# Patient Record
Sex: MALE | Race: BLACK OR AFRICAN AMERICAN | NOT HISPANIC OR LATINO | Employment: OTHER | URBAN - METROPOLITAN AREA
[De-identification: names, ages, dates, MRNs, and addresses within clinical notes are randomized per-mention and may not be internally consistent; named-entity substitution may affect disease eponyms.]

---

## 2017-01-03 ENCOUNTER — EPISODE CHANGES (OUTPATIENT)
Dept: HEPATOLOGY | Facility: CLINIC | Age: 62
End: 2017-01-03

## 2017-01-03 ENCOUNTER — TELEPHONE (OUTPATIENT)
Dept: INTERNAL MEDICINE | Facility: CLINIC | Age: 62
End: 2017-01-03

## 2017-01-03 ENCOUNTER — TELEPHONE (OUTPATIENT)
Dept: HEPATOLOGY | Facility: CLINIC | Age: 62
End: 2017-01-03

## 2017-01-03 DIAGNOSIS — B18.2 CHRONIC HEPATITIS C WITHOUT HEPATIC COMA: Primary | ICD-10-CM

## 2017-01-03 RX ORDER — LEVOTHYROXINE SODIUM 112 UG/1
112 TABLET ORAL
Qty: 90 TABLET | Refills: 0 | Status: SHIPPED | OUTPATIENT
Start: 2017-01-03 | End: 2017-04-06 | Stop reason: SDUPTHER

## 2017-01-03 NOTE — TELEPHONE ENCOUNTER
MA called pt to relay provider message. Call successful. Spoke with pt. Provider message relayed. Pt verbalized understanding. Next lab appt scheduled  for 1/27/17. Appt letter mailed.lp    *Pt DO NOT open my [facility] account.*

## 2017-01-03 NOTE — TELEPHONE ENCOUNTER
HCV LAB REVIEW  Week 4 of Harvoni, planning on 12 weeks treatment  Baseline HCV RNA >6 million    Pertinent labs:  CMP: stable, Ca mildly elevated, PTH followed with PCP  HCV RNA: <12, detected    pls call pt:  [facility] message sent to pt:  Labs look good. Liver enzymes now normal. HCV is now too low for the lab to count. The medication is working!   - Continue Harvoni - 1 pill daily - don't miss any doses.    Next labs due / pls schedule:  - CMP at week 8 - 01/27/16  - CMP, HCV RNA at week 12 - end of treatment - 02/24/17

## 2017-01-09 ENCOUNTER — TELEPHONE (OUTPATIENT)
Dept: INTERNAL MEDICINE | Facility: CLINIC | Age: 62
End: 2017-01-09

## 2017-01-09 DIAGNOSIS — M54.2 CHRONIC NECK PAIN: ICD-10-CM

## 2017-01-09 DIAGNOSIS — G89.29 CHRONIC NECK PAIN: ICD-10-CM

## 2017-01-09 DIAGNOSIS — E21.3 HYPERPARATHYROIDISM: ICD-10-CM

## 2017-01-09 DIAGNOSIS — E21.5 PARATHYROID GLAND DISORDER: ICD-10-CM

## 2017-01-09 DIAGNOSIS — M54.2 NECK PAIN: Primary | ICD-10-CM

## 2017-01-09 NOTE — TELEPHONE ENCOUNTER
----- Message from Laura Cary sent at 1/9/2017  2:18 PM CST -----  Contact: TOMASA HAQ JR. [[id number]]  _x  1st Request  _  2nd Request  _  3rd Request        Who: TOMASA HAQ JR. [[id number]]    Why: Patient would like a call back says he would like a referral to a endocrinologist and also an orthopedic doctor. Please give patient a call back at your earliest convenience. Thanks!     What Number to Call Back: [phone]    When to Expect a call back: (Before the end of the day)   -- if call after 3:00 call back will be tomorrow.

## 2017-01-10 NOTE — TELEPHONE ENCOUNTER
----- Message from Nelda Flaviojoya sent at 1/10/2017  1:59 PM CST -----  _  1st Request  x_  2nd Request  _  3rd Request        Who: TOMASA HAQ JR. [[id number]]    Why: Pt called again to talk to the nurse to see an endocrinologist, please call him    What Number to Call Back:[phone]    When to Expect a call back: (Before the end of the day)   -- if call after 3:00 call back will be tomorrow.

## 2017-01-10 NOTE — TELEPHONE ENCOUNTER
----- Message from Janell Bhatia sent at 1/9/2017  4:51 PM CST -----  Contact: pt  _  1st Request  _  2nd Request  _  3rd Request        Who: pt    Why: pt returned nurse's phone call    What Number to Call Back:[phone]    When to Expect a call back: (Before the end of the day)   -- if call after 3:00 call back will be tomorrow.

## 2017-01-12 ENCOUNTER — HOSPITAL ENCOUNTER (OUTPATIENT)
Dept: RADIOLOGY | Facility: OTHER | Age: 62
Discharge: HOME | End: 2017-01-12
Attending: NEUROLOGICAL SURGERY
Payer: MEDICARE

## 2017-01-12 ENCOUNTER — TELEPHONE (OUTPATIENT)
Dept: SPINE | Facility: CLINIC | Age: 62
End: 2017-01-12

## 2017-01-12 ENCOUNTER — TELEPHONE (OUTPATIENT)
Dept: PHARMACY | Facility: CLINIC | Age: 62
End: 2017-01-12

## 2017-01-12 ENCOUNTER — OFFICE VISIT (OUTPATIENT)
Dept: SPINE | Facility: CLINIC | Age: 62
End: 2017-01-12
Payer: MEDICARE

## 2017-01-12 VITALS
BODY MASS INDEX: 23.62 KG/M2 | WEIGHT: 165 LBS | SYSTOLIC BLOOD PRESSURE: 165 MMHG | DIASTOLIC BLOOD PRESSURE: 84 MMHG | HEIGHT: 70 IN | HEART RATE: 68 BPM

## 2017-01-12 DIAGNOSIS — M54.12 BRACHIAL NEURITIS: ICD-10-CM

## 2017-01-12 DIAGNOSIS — M54.2 NECK PAIN: Primary | ICD-10-CM

## 2017-01-12 DIAGNOSIS — M47.12 CERVICAL SPONDYLOSIS WITH MYELOPATHY: ICD-10-CM

## 2017-01-12 DIAGNOSIS — M54.2 NECK PAIN: ICD-10-CM

## 2017-01-12 DIAGNOSIS — M48.02 CERVICAL STENOSIS OF SPINAL CANAL: ICD-10-CM

## 2017-01-12 PROCEDURE — 72050 X-RAY EXAM NECK SPINE 4/5VWS: CPT | Mod: 26,,, | Performed by: RADIOLOGY

## 2017-01-12 PROCEDURE — 3079F DIAST BP 80-89 MM HG: CPT | Mod: S$GLB,,, | Performed by: PHYSICIAN ASSISTANT

## 2017-01-12 PROCEDURE — 3077F SYST BP >= 140 MM HG: CPT | Mod: S$GLB,,, | Performed by: PHYSICIAN ASSISTANT

## 2017-01-12 PROCEDURE — 99499 UNLISTED E&M SERVICE: CPT | Mod: S$PBB,,, | Performed by: PHYSICIAN ASSISTANT

## 2017-01-12 PROCEDURE — 99204 OFFICE O/P NEW MOD 45 MIN: CPT | Mod: S$GLB,,, | Performed by: PHYSICIAN ASSISTANT

## 2017-01-12 PROCEDURE — 1159F MED LIST DOCD IN RCRD: CPT | Mod: S$GLB,,, | Performed by: PHYSICIAN ASSISTANT

## 2017-01-12 PROCEDURE — 99999 PR PBB SHADOW E&M-EST. PATIENT-LVL IV: CPT | Mod: PBBFAC,,, | Performed by: PHYSICIAN ASSISTANT

## 2017-01-12 NOTE — MR AVS SNAPSHOT
[facility]  [address]  [address]  [facility] [address]  Phone: [phone]  Fax: [phone]                  Rob Jones Jr.   2017 9:00 AM   Office Visit    Description:  Male : 1955   Provider:  Emy White PA-C   Department:  [facility]           Reason for Visit     Neck Pain           Diagnoses this Visit        Comments    Cervical spondylosis with myelopathy         Cervical stenosis of spinal canal         Brachial neuritis         Neck pain                To Do List           Future Appointments        Provider Department Dept Phone    2017 10:30 AM MD Miko Woodward - Endo/Diab/Metab [phone]    2017 1:00 PM LAB, [facility] [phone]    2/3/2017 2:00 PM Remi Weathers MD [facility] Internal Medicine [phone]    2017 2:00 PM VAL [facility] - Hepatology [phone]    2017 1:00 PM LAB, [facility] [phone]      Goals (5 Years of Data)     None      Follow-Up and Disposition     Return in 2 weeks (on 2017).      [facility]     [facility] On [facility] Nurse Care Line -  Assistance  Registered nurses in the [facility] Center provide clinical advisement, health education, appointment booking, and other advisory services.  Call for this free service at [phone].             Medications           Message regarding Medications     Verify the changes and/or additions to your medication regime listed below are the same as discussed with your clinician today.  If any of these changes or additions are incorrect, please notify your healthcare provider.             Verify that the below list of medications is an accurate representation of the medications you are currently taking.  If none reported, the list may be blank. If incorrect, please contact your healthcare provider. Carry this list with you in case of emergency.     "       Current Medications     dicyclomine (BENTYL) 20 mg tablet Take 20 mg by mouth daily as needed.    econazole nitrate 1 % cream Apply topically 2 (two) times daily.    fish oil-omega-3 fatty acids 300-1,000 mg capsule Take 2 g by mouth once daily.    gabapentin (NEURONTIN) 100 MG capsule Take 100 mg by mouth 3 (three) times daily.    hydrocodone-acetaminophen 10-325mg (NORCO)  mg Tab     insulin aspart (NOVOLOG FLEXPEN) 100 unit/mL InPn pen Inject 15 Units into the skin as needed.    insulin aspart (NOVOLOG) 100 unit/mL injection Inject 1 Units into the skin every evening.     insulin glargine (LANTUS SOLOSTAR) 100 unit/mL (3 mL) InPn pen Inject 15 Units into the skin every evening.    irbesartan (AVAPRO) 75 MG tablet Take 1 tablet (75 mg total) by mouth every evening.    ledipasvir-sofosbuvir (HARVONI)  mg Tab Take 1 tablet by mouth once daily.    levothyroxine (SYNTHROID) 112 MCG tablet Take 1 tablet (112 mcg total) by mouth before breakfast.    meloxicam (MOBIC) 15 MG tablet Take 1 tablet (15 mg total) by mouth daily as needed for Pain.    metformin (GLUCOPHAGE) 1000 MG tablet Take 1 tablet (1,000 mg total) by mouth 2 (two) times daily with meals.    nitroGLYCERIN 0.4 MG/HR TD PT24 (NITRODUR) 0.4 mg/hr Place 1 patch onto the skin continuous prn.    NOVOFINE 32 32 gauge x 1/4" Ndle 1 EACH BY MISC.(NON-DRUG COMBO ROUTE) ROUTE ONCE DAILY.    pen needle, diabetic (NOVOFINE PLUS) 32 gauge x 1/6" Ndle 1 each by Misc.(Non-Drug; Combo Route) route once daily.    tamsulosin (FLOMAX) 0.4 mg Cp24 Take 1 capsule (0.4 mg total) by mouth once daily.    temazepam (RESTORIL) 30 mg capsule TAKE 1 CAPSULE BY MOUTH EVERY DAY AT BEDTIME AS NEEDED    vitamin D 1000 units Tab Take 185 mg by mouth once daily.    clonazePAM (KLONOPIN) 0.5 MG tablet Take 1 tablet (0.5 mg total) by mouth 2 (two) times daily as needed for Anxiety.           Clinical Reference Information           Vital Signs - Last Recorded  Most recent " "update: 1/12/2017  9:11 AM by Jong Evans MA    BP Pulse Ht Wt BMI    (!) 165/84 68 5' 10" (1.778 m) 74.8 kg (165 lb) 23.68 kg/m2      Blood Pressure          Most Recent Value    BP  (!)  165/84      Allergies as of 1/12/2017     Tizanidine    Other [place]-3s      Immunizations Administered on Date of Encounter - 1/12/2017     None      Orders Placed During Today's Visit     Future Labs/Procedures Expected by Expires    MRI Cervical Spine Without Contrast  1/12/2017 1/12/2018      Smoking Cessation     If you would like to quit smoking:   You may be eligible for free services if you are a [place] resident and started smoking cigarettes before September 1, 1988.  Call the Smoking Cessation [employer] (SCT) toll free at [phone] [phone] or [phone].   Call 0-544-QUIT-NOW if you do not meet the above criteria.            "

## 2017-01-12 NOTE — LETTER
January 12, 2017      Remi Weathers MD  [address]  [facility] [address]           [facility]  [address]  [address]  [facility] [address]  Phone: [phone]  Fax: [phone]          Patient: Rob Jones Jr.   MR Number: [id number]   YOB: 1955   Date of Visit: 1/12/2017       Dear Dr. Remi Weathers:    Thank you for referring Rob Jones to me for evaluation. Attached you will find relevant portions of my assessment and plan of care.    If you have questions, please do not hesitate to call me. I look forward to following Rob Jones along with you.    Sincerely,    Emy White PA-C    Enclosure  CC:  No Recipients    If you would like to receive this communication electronically, please contact [email or web address] or [phone] to request more information on [facility] access.    For providers and/or their staff who would like to refer a patient to [facility], please contact us through our one-stop-shop provider referral line, [facility], at [phone].    If you feel you have received this communication in error or would no longer like to receive these types of communications, please e-mail [email or web address]

## 2017-01-12 NOTE — PROGRESS NOTES
Subjective:      Patient ID: Rob Jones Jr. is a 61 y.o. male.    Chief Complaint: Neck Pain      HPI     He presents today complaining of 15 month history of constant neck pain with right arm pain to his elbow. No left arm pain. Neck pain = right arm pain. He had injury on 10/31/15 when he slipped and fell- this is when pain started. He has intermittent numbness and tingling in right arm. He has weakness in right arm. History of bilateral shoulder surgery. Pain is burning in nature. He rates his pain as a 7 on a scale of 1-10. No alleviating/aggravating factors noted. He has known CTS in both hands and had gotten injections in the past (worse on left).     He has some PT in last year without relief. No cervical ESIs. No surgery on his neck. History of thyroidectomy. He is on mobic, neurontin, and norco with some improvement.     Patient denies fevers, chills, nausea, vomiting, and weight loss. He denies changes in handwriting, problems with hand dexterity, and frequent dropping of items. He admits to night sweats. He admits to balance issues.       Review of Systems   Constitution: Positive for weakness, malaise/fatigue and night sweats. Negative for fever, weight gain and weight loss.   HENT: Positive for headaches and tinnitus. Negative for hearing loss, nosebleeds and odynophagia.    Eyes: Negative for blurred vision and double vision.   Cardiovascular: Positive for chest pain. Negative for irregular heartbeat and palpitations.   Respiratory: Positive for shortness of breath and wheezing. Negative for cough and hemoptysis.    Endocrine: Positive for cold intolerance. Negative for polydipsia.   Hematologic/Lymphatic: Does not bruise/bleed easily.   Skin: Positive for dry skin and suspicious lesions. Negative for poor wound healing and rash.   Musculoskeletal: Positive for back pain, muscle cramps and neck pain.        See HPI for pertinent positives.   Gastrointestinal: Positive for constipation. Negative  for bloating, abdominal pain, diarrhea, hematochezia, melena, nausea and vomiting.   Genitourinary: Negative for bladder incontinence, dysuria, hematuria, hesitancy and incomplete emptying.        Positive for any type sexual dysfunction.    Neurological: Positive for dizziness, numbness and paresthesias. Negative for disturbances in coordination, focal weakness, loss of balance and seizures.        Positive for inability to feel hot/cold, loss of control of arms/legs, abnormal arm/leg sensations, unsteady gait.    Psychiatric/Behavioral: Negative for depression and hallucinations. The patient is nervous/anxious.         Positive for mood swings.            Objective:        General: Rob is well-developed, well-nourished, appears stated age, in no acute distress, alert and oriented to time, place and person.     General    Vitals reviewed.  Constitutional: He is oriented to person, place, and time. He appears well-developed and well-nourished.   Pulmonary/Chest: Effort normal.   Abdominal: He exhibits no distension.   Neurological: He is alert and oriented to person, place, and time.   Psychiatric: He has a normal mood and affect. His behavior is normal. Judgment and thought content normal.           Gait: normal, Romberg shows sway without falling, he has some difficulty tandem walking. He is able to heel/toe stand.     On exam of the cervical spine, pt has no posterior cervical or bilateral trapezial tenderness.     Patient has limited ROM of cervical spine in all planes with pain.     Skin in the cervical region is warm to the touch without visible rashes.     Strength Testing of Bilateral UEs shows  Right :  +5/5   Left :  +5/5  Right deltoid:  +5/5   Left deltoid:  +5/5  Right biceps:  +5/5   Left biceps:  +5/5  Right triceps:  +5/5   Left triceps:  +5/5  Right wrist extension:  +5/5  Left wrist extension:  +5/5  Right wrist flexion:  +5/5  Left wrist flexion:  +5/5  Right interosseus:  +5/5  Left  interosseus:  +5/5    Sensation is grossly intact to light touch in C5, C6, C7, C8, T1 distribution.     Right shoulder has mild pain with IR/ER. Limited ROM of shoulder and no tenderness.   Left shoulder has mild pain with IR/ER. Limited ROM of shoulder and no tenderness.     negative clonus in bilateral LEs.    negative hoffmans bilateral UEs.    DTRs:  Right biceps:  2+     Left biceps:  2+   Right brachioradialis:  2+  Left brachioradialis:  2+  Right patellar:  2+     Left patellar:  2+  Right achilles:  2+   Left achilles: 2+    On gross exam of bilateral LEs, patient has full painfree ROM with no signs of clubbing, laxity, cyanosis, edema, instability, and weakness.       XRAY INTERPRETATION:  X-rays of cervical spine (AP/lat/flex/ext) dated 1/12/17 are personally reviewed and show moderate to severe spondylosis with anterior spurring C4-C7.    Report of MRI of cervical spine dated 3/14/16 (films not available) is personally reviewed and shows severe central stenosis C6-C7 and diffuse spondylosis. This was scanned into chart.         Assessment:       1. Cervical spondylosis with myelopathy    2. Cervical stenosis of spinal canal    3. Brachial neuritis    4. Neck pain           Plan:       Orders Placed This Encounter    MRI Cervical Spine Without Contrast       15 month history of constant neck pain with right arm pain to his elbow s/p fall. No left arm pain. Neck pain = right arm pain. History of bilateral shoulder surgery and bilateral CTS. Known moderate to severe spondylosis with anterior spurring C4-C7. MRI report from 3/14/16 (films not available) shows severe central stenosis at C6-C7. He has balance issues, is hyper reflexic, and sways with romberg. Cervical myelopathy reviewed with patient along with above cervical XRs. Following plan made:     - Update cervical MRI scan to evaluate severe cervical stenosis C6-C7 seen on outside MRI.   - Continue neurontin, norco 10, and mobic from other  providers.   - Follow up on Kalyvas day. May need to discuss surgery.     Follow-up: Return in 2 weeks (on 1/26/2017). If there are any questions prior to this, the patient was instructed to contact the office.

## 2017-01-16 ENCOUNTER — HOSPITAL ENCOUNTER (OUTPATIENT)
Dept: RADIOLOGY | Facility: OTHER | Age: 62
Discharge: HOME | End: 2017-01-16
Attending: NEUROLOGICAL SURGERY
Payer: MEDICARE

## 2017-01-16 DIAGNOSIS — M48.02 CERVICAL STENOSIS OF SPINAL CANAL: ICD-10-CM

## 2017-01-16 DIAGNOSIS — M54.12 BRACHIAL NEURITIS: ICD-10-CM

## 2017-01-16 DIAGNOSIS — M47.12 CERVICAL SPONDYLOSIS WITH MYELOPATHY: ICD-10-CM

## 2017-01-16 DIAGNOSIS — M54.2 NECK PAIN: ICD-10-CM

## 2017-01-16 PROCEDURE — 72141 MRI NECK SPINE W/O DYE: CPT | Mod: TC

## 2017-01-16 PROCEDURE — 72141 MRI NECK SPINE W/O DYE: CPT | Mod: 26,,, | Performed by: RADIOLOGY

## 2017-01-18 ENCOUNTER — OFFICE VISIT (OUTPATIENT)
Dept: ENDOCRINOLOGY | Facility: CLINIC | Age: 62
End: 2017-01-18
Payer: MEDICARE

## 2017-01-18 VITALS
WEIGHT: 167.56 LBS | SYSTOLIC BLOOD PRESSURE: 136 MMHG | DIASTOLIC BLOOD PRESSURE: 82 MMHG | HEART RATE: 72 BPM | HEIGHT: 69 IN | BODY MASS INDEX: 24.82 KG/M2

## 2017-01-18 DIAGNOSIS — Z86.018 HISTORY OF PHEOCHROMOCYTOMA: ICD-10-CM

## 2017-01-18 DIAGNOSIS — E21.0 PRIMARY HYPERPARATHYROIDISM: Primary | ICD-10-CM

## 2017-01-18 DIAGNOSIS — E89.0 POSTOPERATIVE HYPOTHYROIDISM: ICD-10-CM

## 2017-01-18 DIAGNOSIS — I10 ESSENTIAL HYPERTENSION: ICD-10-CM

## 2017-01-18 DIAGNOSIS — Z72.0 TOBACCO USE: ICD-10-CM

## 2017-01-18 DIAGNOSIS — B35.1 ONYCHOMYCOSIS: ICD-10-CM

## 2017-01-18 PROCEDURE — 3079F DIAST BP 80-89 MM HG: CPT | Mod: S$GLB,,, | Performed by: INTERNAL MEDICINE

## 2017-01-18 PROCEDURE — 99499 UNLISTED E&M SERVICE: CPT | Mod: S$PBB,,, | Performed by: INTERNAL MEDICINE

## 2017-01-18 PROCEDURE — 99205 OFFICE O/P NEW HI 60 MIN: CPT | Mod: S$GLB,,, | Performed by: INTERNAL MEDICINE

## 2017-01-18 PROCEDURE — 1159F MED LIST DOCD IN RCRD: CPT | Mod: S$GLB,,, | Performed by: INTERNAL MEDICINE

## 2017-01-18 PROCEDURE — 4010F ACE/ARB THERAPY RXD/TAKEN: CPT | Mod: S$GLB,,, | Performed by: INTERNAL MEDICINE

## 2017-01-18 PROCEDURE — 99999 PR PBB SHADOW E&M-EST. PATIENT-LVL IV: CPT | Mod: PBBFAC,,, | Performed by: INTERNAL MEDICINE

## 2017-01-18 PROCEDURE — 3075F SYST BP GE 130 - 139MM HG: CPT | Mod: S$GLB,,, | Performed by: INTERNAL MEDICINE

## 2017-01-18 PROCEDURE — 2022F DILAT RTA XM EVC RTNOPTHY: CPT | Mod: S$GLB,,, | Performed by: INTERNAL MEDICINE

## 2017-01-18 PROCEDURE — 3044F HG A1C LEVEL LT 7.0%: CPT | Mod: S$GLB,,, | Performed by: INTERNAL MEDICINE

## 2017-01-18 NOTE — PROGRESS NOTES
Subjective:      Patient ID: Rob Jones Jr. is a 61 y.o. male.    Chief Complaint:  hyperparathyroidism      History of Present Illness    Mr.Lambert KHADAR Jones Jr. is seen as a new patient for hypothyroidism and hyperparathyroidism       Post operative hypothyroidism   likely for hyperthyroidism , all symptoms he mentioned were compatible with graves disease ~ in 2000     Since then on synthroid 112 mcg , was decreased in first week of January 2017     + weight loss - states tendency to lose weight   Chronic anxiety on medication, controlled with     Hyperparathyroidism   He was told he had hyperparathyroidism at the time of thyroidectomy, was advised by his Endocrinologist at the time for parathyroid surgery but surgeon did not agree at the time     Friday was his Endocrinologist and since then he was following with  for diabetes but he thought he is only diabetes doctor     Never been on lithium or HCTZ '  Never had nephrolithiasis   Fractures : left heel fracture after jagruti       T2DM uncontrolled     novolog sliding scale 0-8 units   lantus 15 units qhs     Hypoglycemia : once every couple of months lowest 70s         Review of Systems   Constitutional: Positive for unexpected weight change (per hpi).   HENT: Negative for trouble swallowing.    Eyes: Negative for visual disturbance (with glasses it is good).   Cardiovascular: Negative for chest pain.   Gastrointestinal: Positive for constipation (attributes to pain medication). Negative for diarrhea.   Endocrine: Negative for cold intolerance and heat intolerance.   Musculoskeletal: Positive for back pain.   Skin: Negative for rash and wound.   Allergic/Immunologic: Negative for food allergies.   Psychiatric/Behavioral: The patient is nervous/anxious (per hpi).        Objective:   Physical Exam   Constitutional: He appears well-developed.   HENT:   Right Ear: External ear normal.   Left Ear: External ear normal.   Nose: Nose normal.   Hearing  normal    Neck: No tracheal deviation present. Thyromegaly (palpable ) present.   Cardiovascular: Normal rate.    No murmur heard.  Pulmonary/Chest: Effort normal and breath sounds normal.   Abdominal: Soft. He exhibits no mass.   No hernia noted   Musculoskeletal: He exhibits deformity. He exhibits no edema.        Right foot: There is no deformity.        Left foot: There is no deformity.   Left foot onychomycosis    Feet:   Right Foot:   Skin Integrity: Negative for ulcer.   Left Foot:   Skin Integrity: Negative for ulcer.   Neurological: He is alert. No cranial nerve deficit or sensory deficit. Coordination and gait normal.   Sensations decreased to vibration     Skin: No rash noted.   No induration   injection sites are ok. No lipo hypertropthy or atrophy    +acanthosis and skin tags  No supraclavicular fulness  Pale thin striae  Normal proximal muscle strength     Psychiatric: He has a normal mood and affect. Judgment normal.   Vitals reviewed.      Lab Review:   Results for HAQ SEKOUSUMAYA RUBALCAVA JR. (MRN [id number]) as of 1/18/2017 11:25   Ref. Range 6/25/2009 04:55 9/14/2015 14:30 5/18/2016 15:49 11/3/2016 15:31 12/30/2016 09:00   Hemoglobin A1C Latest Ref Range: 4.5 - 6.2 % 6.5 (H)   6.6 (H)    A1c Latest Ref Range: <5.7 % of total Hgb  7.0 (H) 9.2 (H)     Estimated Avg Glucose Latest Ref Range: 68 - 131 mg/dL    143 (H)    TSH Latest Ref Range: 0.400 - 4.000 uIU/mL 1.38 0.47 0.15 (L) 0.311 (L) 0.270 (L)   T4, Free Latest Ref Range: 0.8 - 1.8 ng/dL  1.3 1.5     Free T4 Latest Ref Range: 0.71 - 1.51 ng/dL 1.22   1.32 1.47   PTH Latest Ref Range: 9.0 - 77.0 pg/mL 97 (H)    103.0 (H)     Clinical indication: Unspecified disorders of calcium metabolism    Technique: A Bone density measurement was performed to evaluate for the presence of bone mineral loss in the axial and appendicular skeleton.. Dual energy X-ray absorptiometry was used to determine the bone density of the distal radius and both hips   using the FRAX  methodology and a [employer] Lunar iDXA DXA system.    Findings:   The bone mineral density of the distal radius was 0.641 g/cm2. This corresponds to a Z-score of 1.9 and a T-score of 2.4 which places the patient in the normal bone densitycategory of bone mineral density as classified by the WHO criteria.    The bone mineral density of the left hip measured at the neck was 0.861 g/cm2. This corresponds to a Z-score of -1.8 and a T-score of -1.6 which places the patient in the osteopenia category of bone mineral density as classified by WHO criteria.    The bone mineral density of the right hip measured at the neck was 0.966g/cm2. This corresponds to a Z-score of -1.0 and a T-score of -0.8 which places the patient in the normal bone density category of bone density as classified by the WHO criteria.       Impression        1. Lumbar spine (L1-L4): Normal bone density  2. Left hip: Osteopenia  3. Right hip: Normal bone density.      ______________________________________  Electronically signed by: SHELLEY COOPER MD  Date: 10/30/12  Time: 11:20      Ct scan abdomen: 11/2016    CT abdomen with and without contrast.    Findings: 75 mL of Omnipaque 350 IV contrast was administered for today's examination.    The visualized portion of the base of the lungs is significant for bilateral dependent atelectatic versus fibrotic change.  The visualized portion of the heart is significant for calcification of the right coronary artery.  The stomach, spleen, pancreas, liver, and left adrenal gland are unremarkable.  The gallbladder is contracted but otherwise unremarkable.  There is nonspecific thickening of the right adrenal gland.  The kidneys have a normal appearance.  The kidneys appropriately concentrate and excrete contrast on the delayed images.  The visualized loops of bowel have a normal appearance.  The osseous structures demonstrate degenerative change.  There is postoperative change identified within the lower lumbar  spine.       Impression       No acute findings.      Electronically signed by: SHELLEY COOPER MD  Date: 11/25/16  Time: 14:14              Assessment:     1. Primary hyperparathyroidism  Miscellaneous Sendout Test Blood    PTH, intact    RENAL FUNCTION PANEL    Calcium, Timed Urine    Creatinine, urine, timed    Vitamin D    DXA Bone Density Spine And Hip_Axial Skeleton    Ambulatory referral to Smoking Cessation Program   2. Essential hypertension  Miscellaneous Sendout Test Blood    PTH, intact    RENAL FUNCTION PANEL    Calcium, Timed Urine    Creatinine, urine, timed    Vitamin D    DXA Bone Density Spine And Hip_Axial Skeleton    Ambulatory referral to Smoking Cessation Program   3. Type 2 diabetes, uncontrolled, with neuropathy  Miscellaneous Sendout Test Blood    PTH, intact    RENAL FUNCTION PANEL    Calcium, Timed Urine    Creatinine, urine, timed    Vitamin D    DXA Bone Density Spine And Hip_Axial Skeleton    Ambulatory referral to Smoking Cessation Program   4. Postoperative hypothyroidism  Miscellaneous Sendout Test Blood    PTH, intact    RENAL FUNCTION PANEL    Calcium, Timed Urine    Creatinine, urine, timed    Vitamin D    DXA Bone Density Spine And Hip_Axial Skeleton    Ambulatory referral to Smoking Cessation Program   5. History of pheochromocytoma  Miscellaneous Sendout Test Blood    PTH, intact    RENAL FUNCTION PANEL    Calcium, Timed Urine    Creatinine, urine, timed    Vitamin D    DXA Bone Density Spine And Hip_Axial Skeleton    Ambulatory referral to Smoking Cessation Program   6. Tobacco use  Miscellaneous Sendout Test Blood    PTH, intact    RENAL FUNCTION PANEL    Calcium, Timed Urine    Creatinine, urine, timed    Vitamin D    DXA Bone Density Spine And Hip_Axial Skeleton    Ambulatory referral to Smoking Cessation Program        # hyperparathyroidism with hypercalcemia - worsening   - we discussed indications for surgery   Plan to get Vit D,  renal function panel, 24 hour urine calcium ,  DXA scan  - if meet criteria for surgery we will get sestamibi and US thyroid       # post op hypothyroidism   bio chemically hyperthyroid  Agree with decreasing in synthroid to 112 mcg daily   Repeat TSH in 6-8 weeks       # h/o pheochromocytoma   - HTN well controlled   # T2DM controlled without complications   - followed by his PCP       # smoking cessation advised   - referral to smoking cessation program     # onychomycosis    - referral to podiatry       # 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

## 2017-01-18 NOTE — MR AVS SNAPSHOT
[facility]Diab/Metab  [address]  [facility] [address]  Phone: [phone]  Fax: [phone]                  Rob Jones Jr.   2017 10:30 AM   Office Visit    Description:  Male : 1955   Provider:  Doris Jo MD   Department:  [facility]Metab           Reason for Visit     hyperparathyroidism           Diagnoses this Visit        Comments    Primary hyperparathyroidism    -  Primary     Essential hypertension         Type 2 diabetes, uncontrolled, with neuropathy         Postoperative hypothyroidism         History of pheochromocytoma         Tobacco use         Onychomycosis                To Do List           Future Appointments        Provider Department Dept Phone    2017 9:00 AM Emy White PA-C [facility] - Spine Services [phone]    2017 1:00 PM LAB, BAP [facility] [phone]    2017 [facility]  [phone]    2017 10:20 AM [facility], DEXA1 [facility] [phone]    2/3/2017 2:00 PM Remi Weathers MD [facility] Internal Medicine [phone]      Goals (5 Years of Data)     None      Follow-Up and Disposition     Return for 6-8 weeks with me .      [facility]     [facility] -  Assistance  Registered nurses in the [facility] provide clinical advisement, health education, appointment booking, and other advisory services.  Call for this free service at [phone].             Medications           Message regarding Medications     Verify the changes and/or additions to your medication regime listed below are the same as discussed with your clinician today.  If any of these changes or additions are incorrect, please notify your healthcare provider.             Verify that the below list of medications is an accurate representation of the medications you are currently taking.  If none reported, the  "list may be blank. If incorrect, please contact your healthcare provider. Carry this list with you in case of emergency.           Current Medications     dicyclomine (BENTYL) 20 mg tablet Take 20 mg by mouth daily as needed.    econazole nitrate 1 % cream Apply topically 2 (two) times daily.    fish oil-omega-3 fatty acids 300-1,000 mg capsule Take 2 g by mouth once daily.    gabapentin (NEURONTIN) 100 MG capsule Take 100 mg by mouth 3 (three) times daily.    hydrocodone-acetaminophen 10-325mg (NORCO)  mg Tab     insulin aspart (NOVOLOG FLEXPEN) 100 unit/mL InPn pen Inject 15 Units into the skin as needed.    insulin aspart (NOVOLOG) 100 unit/mL injection Inject 1 Units into the skin every evening.     insulin glargine (LANTUS SOLOSTAR) 100 unit/mL (3 mL) InPn pen Inject 15 Units into the skin every evening.    irbesartan (AVAPRO) 75 MG tablet Take 1 tablet (75 mg total) by mouth every evening.    ledipasvir-sofosbuvir (HARVONI)  mg Tab Take 1 tablet by mouth once daily.    levothyroxine (SYNTHROID) 112 MCG tablet Take 1 tablet (112 mcg total) by mouth before breakfast.    meloxicam (MOBIC) 15 MG tablet Take 1 tablet (15 mg total) by mouth daily as needed for Pain.    metformin (GLUCOPHAGE) 1000 MG tablet Take 1 tablet (1,000 mg total) by mouth 2 (two) times daily with meals.    nitroGLYCERIN 0.4 MG/HR TD PT24 (NITRODUR) 0.4 mg/hr Place 1 patch onto the skin continuous prn.    NOVOFINE 32 32 gauge x 1/4" Ndle 1 EACH BY MISC.(NON-DRUG COMBO ROUTE) ROUTE ONCE DAILY.    pen needle, diabetic (NOVOFINE PLUS) 32 gauge x 1/6" Ndle 1 each by Misc.(Non-Drug; Combo Route) route once daily.    tamsulosin (FLOMAX) 0.4 mg Cp24 Take 1 capsule (0.4 mg total) by mouth once daily.    temazepam (RESTORIL) 30 mg capsule TAKE 1 CAPSULE BY MOUTH EVERY DAY AT BEDTIME AS NEEDED    vitamin D 1000 units Tab Take 185 mg by mouth once daily.    clonazePAM (KLONOPIN) 0.5 MG tablet Take 1 tablet (0.5 mg total) by mouth 2 (two) " "times daily as needed for Anxiety.           Clinical Reference Information           Vital Signs - Last Recorded  Most recent update: 1/18/2017 10:34 AM by Tania Hernandez LPN    BP Pulse Ht Wt BMI    136/82 72 5' 9" (1.753 m) 76 kg (167 lb 8.8 oz) 24.74 kg/m2      Blood Pressure          Most Recent Value    BP  136/82      Allergies as of 1/18/2017     Tizanidine    Other [place]-3s      Immunizations Administered on Date of Encounter - 1/18/2017     None      Orders Placed During Today's Visit      Normal Orders This Visit    Ambulatory Referral to Podiatry     Ambulatory referral to Smoking Cessation Program     Future Labs/Procedures Expected by Expires    DXA Bone Density Spine And Hip_Axial Skeleton  1/18/2017 1/18/2018    Miscellaneous Sendout Test Blood  1/18/2017 3/19/2018    PTH, intact  1/18/2017 1/18/2018    RENAL FUNCTION PANEL  1/18/2017 3/19/2018     Soft Tissue Head Neck Thyroid  1/18/2017 1/18/2018    Vitamin D  1/18/2017 3/19/2018    Calcium, Timed Urine  As directed 1/18/2018    Creatinine, urine, timed  As directed 1/18/2018      Smoking Cessation     If you would like to quit smoking:   You may be eligible for free services if you are a [place] resident and started smoking cigarettes before September 1, 1988.  Call the Smoking Cessation Trust (SCT) toll free at [phone] or [phone].   Call 2-151-QUIT-NOW if you do not meet the above criteria.            "

## 2017-01-23 ENCOUNTER — OFFICE VISIT (OUTPATIENT)
Dept: SPINE | Facility: CLINIC | Age: 62
End: 2017-01-23
Payer: MEDICARE

## 2017-01-23 ENCOUNTER — PATIENT OUTREACH (OUTPATIENT)
Dept: ADMINISTRATIVE | Facility: HOSPITAL | Age: 62
End: 2017-01-23
Payer: MEDICARE

## 2017-01-23 VITALS
WEIGHT: 167 LBS | BODY MASS INDEX: 24.73 KG/M2 | DIASTOLIC BLOOD PRESSURE: 85 MMHG | HEIGHT: 69 IN | HEART RATE: 65 BPM | SYSTOLIC BLOOD PRESSURE: 190 MMHG

## 2017-01-23 DIAGNOSIS — M54.17 THORACIC AND LUMBOSACRAL NEURITIS: ICD-10-CM

## 2017-01-23 DIAGNOSIS — M48.02 CERVICAL STENOSIS OF SPINAL CANAL: ICD-10-CM

## 2017-01-23 DIAGNOSIS — M54.2 NECK PAIN: ICD-10-CM

## 2017-01-23 DIAGNOSIS — M54.12 BRACHIAL NEURITIS: ICD-10-CM

## 2017-01-23 DIAGNOSIS — M47.12 CERVICAL SPONDYLOSIS WITH MYELOPATHY: ICD-10-CM

## 2017-01-23 DIAGNOSIS — Z98.1 HISTORY OF LUMBAR FUSION: Primary | ICD-10-CM

## 2017-01-23 DIAGNOSIS — R26.89 BALANCE PROBLEMS: ICD-10-CM

## 2017-01-23 DIAGNOSIS — M54.14 THORACIC AND LUMBOSACRAL NEURITIS: ICD-10-CM

## 2017-01-23 DIAGNOSIS — M54.42 BILATERAL LOW BACK PAIN WITH LEFT-SIDED SCIATICA, UNSPECIFIED CHRONICITY: ICD-10-CM

## 2017-01-23 PROCEDURE — 1159F MED LIST DOCD IN RCRD: CPT | Mod: S$GLB,,, | Performed by: PHYSICIAN ASSISTANT

## 2017-01-23 PROCEDURE — 3079F DIAST BP 80-89 MM HG: CPT | Mod: S$GLB,,, | Performed by: PHYSICIAN ASSISTANT

## 2017-01-23 PROCEDURE — 99999 PR PBB SHADOW E&M-EST. PATIENT-LVL V: CPT | Mod: PBBFAC,,, | Performed by: PHYSICIAN ASSISTANT

## 2017-01-23 PROCEDURE — 99214 OFFICE O/P EST MOD 30 MIN: CPT | Mod: S$GLB,,, | Performed by: PHYSICIAN ASSISTANT

## 2017-01-23 PROCEDURE — 3077F SYST BP >= 140 MM HG: CPT | Mod: S$GLB,,, | Performed by: PHYSICIAN ASSISTANT

## 2017-01-23 NOTE — MR AVS SNAPSHOT
[facility] - [facility]  [address]  [address]  [facility] [address]  Phone: [phone]  Fax: [phone]                  Rob Jones Jr.   2017 9:00 AM   Office Visit    Description:  Male : 1955   Provider:  Emy White PA-C   Department:  [facility] - Spine Services           Reason for Visit     Neck Pain           Diagnoses this Visit        Comments    History of lumbar fusion    -  Primary     Neck pain         Cervical stenosis of spinal canal         Brachial neuritis         Cervical spondylosis with myelopathy         Balance problems         Thoracic and lumbosacral neuritis         Bilateral low back pain with left-sided sciatica, unspecified chronicity                To Do List           Future Appointments        Provider Department Dept Phone    2017 1:00 PM LAB, B[facility] [phone]    2017 9:00 AM [facility]OLOG[facility]  [phone]    2017 10:20 AM [facility], DEXA1 [facility] [phone]    2/3/2017 2:00 PM Remi Weathers MD [facility] Internal Medicine [phone]    2017 2:30 PM [facility] - Podiatry [phone]      Goals (5 Years of Data)     None      Follow-Up and Disposition     Return in about 6 weeks (around 3/6/2017) for recheck on Kalyvas day.      [facility]     [facility] - 24/ Assistance  Registered nurses in the [facility] provide clinical advisement, health education, appointment booking, and other advisory services.  Call for this free service at [phone].             Medications           Message regarding Medications     Verify the changes and/or additions to your medication regime listed below are the same as discussed with your clinician today.  If any of these changes or additions are incorrect, please notify your healthcare provider.             Verify that the below list of  "medications is an accurate representation of the medications you are currently taking.  If none reported, the list may be blank. If incorrect, please contact your healthcare provider. Carry this list with you in case of emergency.           Current Medications     dicyclomine (BENTYL) 20 mg tablet Take 20 mg by mouth daily as needed.    econazole nitrate 1 % cream Apply topically 2 (two) times daily.    fish oil-omega-3 fatty acids 300-1,000 mg capsule Take 2 g by mouth once daily.    gabapentin (NEURONTIN) 100 MG capsule Take 100 mg by mouth 3 (three) times daily.    hydrocodone-acetaminophen 10-325mg (NORCO)  mg Tab     insulin aspart (NOVOLOG FLEXPEN) 100 unit/mL InPn pen Inject 15 Units into the skin as needed.    insulin aspart (NOVOLOG) 100 unit/mL injection Inject 1 Units into the skin every evening.     insulin glargine (LANTUS SOLOSTAR) 100 unit/mL (3 mL) InPn pen Inject 15 Units into the skin every evening.    irbesartan (AVAPRO) 75 MG tablet Take 1 tablet (75 mg total) by mouth every evening.    ledipasvir-sofosbuvir (HARVONI)  mg Tab Take 1 tablet by mouth once daily.    levothyroxine (SYNTHROID) 112 MCG tablet Take 1 tablet (112 mcg total) by mouth before breakfast.    meloxicam (MOBIC) 15 MG tablet Take 1 tablet (15 mg total) by mouth daily as needed for Pain.    metformin (GLUCOPHAGE) 1000 MG tablet Take 1 tablet (1,000 mg total) by mouth 2 (two) times daily with meals.    nitroGLYCERIN 0.4 MG/HR TD PT24 (NITRODUR) 0.4 mg/hr Place 1 patch onto the skin continuous prn.    NOVOFINE 32 32 gauge x 1/4" Ndle 1 EACH BY MISC.(NON-DRUG COMBO ROUTE) ROUTE ONCE DAILY.    pen needle, diabetic (NOVOFINE PLUS) 32 gauge x 1/6" Ndle 1 each by Misc.(Non-Drug; Combo Route) route once daily.    tamsulosin (FLOMAX) 0.4 mg Cp24 Take 1 capsule (0.4 mg total) by mouth once daily.    temazepam (RESTORIL) 30 mg capsule TAKE 1 CAPSULE BY MOUTH EVERY DAY AT BEDTIME AS NEEDED    vitamin D 1000 units Tab Take 185 mg " "by mouth once daily.    clonazePAM (KLONOPIN) 0.5 MG tablet Take 1 tablet (0.5 mg total) by mouth 2 (two) times daily as needed for Anxiety.           Clinical Reference Information           Vital Signs - Last Recorded  Most recent update: 1/23/2017  8:58 AM by Bettie Quijano MA    BP Pulse Ht Wt BMI    (!) 190/85 65 5' 9" (1.753 m) 75.8 kg (167 lb) 24.66 kg/m2      Blood Pressure          Most Recent Value    BP  (!)  190/85      Allergies as of 1/23/2017     Tizanidine    Other [place]-3s      Immunizations Administered on Date of Encounter - 1/23/2017     None      Orders Placed During Today's Visit     Future Labs/Procedures Expected by Expires    IR Epidural Transfor 1st Cerv Thor Uni  1/23/2017 1/23/2018    MRI Thoracic Spine Without Contrast  1/23/2017 1/23/2018    X-Ray Lumbar Spine Ap Lateral w/Flex Ext  1/23/2017 1/23/2018      Smoking Cessation     If you would like to quit smoking:   You may be eligible for free services if you are a [place] resident and started smoking cigarettes before September 1, 1988.  Call the Smoking Cessation Trust (SCT) toll free at [phone] or [phone].   Call 5-014-QUIT-NOW if you do not meet the above criteria.            "

## 2017-01-23 NOTE — PROGRESS NOTES
Subjective:      Patient ID: Rob Jones Jr. is a 61 y.o. male.    Chief Complaint: Neck Pain      HPI     He presents today to review his cervical MRI scan. He has known moderate to severe spondylosis with anterior spurring C4-C7. MRI report from 3/14/16 (films not available) shows severe central stenosis at C6-C7. He has balance issues, problems with hand dexterity, and frequent dropping of items. He is hyper reflexic and sways with romberg.    He continues to complain of constant neck pain with right arm pain to his elbow. No left arm pain. Neck pain = right arm pain. He has intermittent numbness and tingling in right arm. He has weakness in right arm. History of bilateral shoulder surgery. Pain is burning in nature. He rates his pain as a 7 on a scale of 1-10. No alleviating/aggravating factors noted. He has known CTS in both hands and had gotten injections in the past (worse on left). No relief with previous PT.     History of lumbar fusion in 2009 at [facility] with some improvement in his leg pain. He complains of constant LBP with left lateral leg pain to his knee. No right leg pain. LBP = left leg pain. Pain is better with pain medication. No known aggravating factors. He rates his pain as a 7 on a scale of 1-10. He's had weakness in left leg since his lumbar fusion. No numbness or tingling. Pain is burning in nature. No previous improvement with lumbar PT. Had ESIs prior to surgery. He is on mobic, neurontin, and norco with some improvement.       Review of Systems   Constitution: Negative for chills, fever, night sweats and weight gain.   Gastrointestinal: Negative for bowel incontinence, nausea and vomiting.   Genitourinary: Negative for bladder incontinence.   Neurological: Positive for loss of balance. Negative for disturbances in coordination.           Objective:        General: Rob is well-developed, well-nourished, appears stated age, in no acute distress, alert and oriented to time, place and  person.     Ortho/SPM ExamGait: normal, Romberg shows sway without falling, he has some difficulty tandem walking. He is able to heel/toe stand.     negative clonus in bilateral LEs.    negative hoffmans bilateral UEs.    DTRs:  Right biceps:  2+     Left biceps:  2+   Right brachioradialis:  2+  Left brachioradialis:  2+  Right patellar:  2+     Left patellar:  2+  Right achilles:  2+   Left achilles: 2+    Strength testing of the bilateral LEs shows  Right hip abduction:  +5/5  Left hip abduction:  5/5  Right hip flexion:  +5/5   Left hip flexion:  5/5  Right hip extensors:  +5/5  Left hip extensors:  5/5  Right quadriceps:  +5/5  Left quadriceps:  5/5  Right hamstring:  +5/5  Left hamstrin/5  Right dorsiflexion:  +5/5  Left dorsiflexion:  +5/5  Right plantar flexion:  +5/5  Left plantar flexion:  +5/5   Right EHL:  +5/5   Left EHL:  +5/5    negative straight leg raise on bilateral LEs.     Sensation is grossly intact in L2, L3, L4, L5, and S1 distribution.    Right hip has no pain with IR/ER. Left hip has no pain with IR/ER.       XRAY INTERPRETATION:  MRI of cervical spine dated 17 is personally reviewed and shows diffuse cervical spondylosis. Mild right foraminal stenosis C3-C4. Moderate bilateral foraminal stenosis and minimal central stenosis C4-C5. Minimal central stenosis and mild right foraminal stenosis C5-C6. Moderate right foraminal stenosis and moderate central stenosis C6-C7.     Above cervical MRI and previous cervical XRs reviewed with Dr. Liz.         Assessment:       1. History of lumbar fusion    2. Neck pain    3. Cervical stenosis of spinal canal    4. Brachial neuritis    5. Cervical spondylosis with myelopathy    6. Balance problems    7. Thoracic and lumbosacral neuritis    8. Bilateral low back pain with left-sided sciatica, unspecified chronicity           Plan:       Orders Placed This Encounter    MRI Thoracic Spine Without Contrast    IR Epidural Transfor 1st Cerv Thor  Uni    X-Ray Lumbar Spine Ap Lateral w/Flex Ext       15 month history of constant neck pain with right arm pain to his elbow s/p fall. No left arm pain. Neck pain = right arm pain. History of bilateral shoulder surgery and bilateral CTS. Known multilevel right foraminal stenosis with minimal central stenosis C4-C6 and moderate central stenosis C6-C7. He has balance issues, is hyper reflexic, and sways with romberg. Also with history of lumbar fusion in 2009 at [facility]. Now with constant LBP with left lateral leg pain to his knee. LBP = left leg pain. Mild weakness in left leg since his surgery. Treatment options discussed with Dr. Liz and reviewed with patient along with above cervical MRI scan. Following plan made:     - Set up for right C6-C7 TF DWAIN with IR. He is aware this will elevate his blood sugars short term.   - MRI of thoracic spine to further evaluate balance issues.   - Lumbar XRs to evaluate LBP and left leg radiculitis. History of lumbar fusion 2009.   - No relief with cervical PT in the past. He is not interested in PT for lumbar spine at this point.   - Continue neurontin, norco 10, and mobic from other providers.     Follow-up: Return in about 6 weeks (around 3/6/2017) for recheck on Kalyvas day. If there are any questions prior to this, the patient was instructed to contact the office.

## 2017-01-25 ENCOUNTER — HOSPITAL ENCOUNTER (OUTPATIENT)
Dept: RADIOLOGY | Facility: OTHER | Age: 62
Discharge: HOME | End: 2017-01-25
Attending: NEUROLOGICAL SURGERY
Payer: MEDICARE

## 2017-01-25 DIAGNOSIS — Z98.1 HISTORY OF LUMBAR FUSION: ICD-10-CM

## 2017-01-25 DIAGNOSIS — M54.42 BILATERAL LOW BACK PAIN WITH LEFT-SIDED SCIATICA, UNSPECIFIED CHRONICITY: ICD-10-CM

## 2017-01-25 DIAGNOSIS — M54.14 THORACIC AND LUMBOSACRAL NEURITIS: ICD-10-CM

## 2017-01-25 DIAGNOSIS — R26.89 BALANCE PROBLEMS: ICD-10-CM

## 2017-01-25 DIAGNOSIS — M54.17 THORACIC AND LUMBOSACRAL NEURITIS: ICD-10-CM

## 2017-01-25 PROCEDURE — 72120 X-RAY BEND ONLY L-S SPINE: CPT | Mod: 26,,, | Performed by: RADIOLOGY

## 2017-01-25 PROCEDURE — 72100 X-RAY EXAM L-S SPINE 2/3 VWS: CPT | Mod: 26,,, | Performed by: RADIOLOGY

## 2017-01-25 PROCEDURE — 72146 MRI CHEST SPINE W/O DYE: CPT | Mod: TC

## 2017-01-25 PROCEDURE — 72120 X-RAY BEND ONLY L-S SPINE: CPT | Mod: TC

## 2017-01-25 PROCEDURE — 72146 MRI CHEST SPINE W/O DYE: CPT | Mod: 26,,, | Performed by: RADIOLOGY

## 2017-01-26 ENCOUNTER — LAB VISIT (OUTPATIENT)
Dept: LAB | Facility: OTHER | Age: 62
End: 2017-01-26
Attending: INTERNAL MEDICINE
Payer: MEDICARE

## 2017-01-26 DIAGNOSIS — B18.2 CHRONIC HEPATITIS C WITHOUT HEPATIC COMA: ICD-10-CM

## 2017-01-26 LAB
ALBUMIN SERPL BCP-MCNC: 4 G/DL
ALP SERPL-CCNC: 107 U/L
ALT SERPL W/O P-5'-P-CCNC: 10 U/L
ANION GAP SERPL CALC-SCNC: 7 MMOL/L
AST SERPL-CCNC: 15 U/L
BILIRUB SERPL-MCNC: 0.2 MG/DL
BUN SERPL-MCNC: 14 MG/DL
CALCIUM SERPL-MCNC: 11.3 MG/DL
CHLORIDE SERPL-SCNC: 107 MMOL/L
CO2 SERPL-SCNC: 25 MMOL/L
CREAT SERPL-MCNC: 1.1 MG/DL
EST. GFR  (AFRICAN AMERICAN): >60 ML/MIN/1.73 M^2
EST. GFR  (NON AFRICAN AMERICAN): >60 ML/MIN/1.73 M^2
GLUCOSE SERPL-MCNC: 95 MG/DL
POTASSIUM SERPL-SCNC: 4.6 MMOL/L
PROT SERPL-MCNC: 7.6 G/DL
SODIUM SERPL-SCNC: 139 MMOL/L

## 2017-01-26 PROCEDURE — 36415 COLL VENOUS BLD VENIPUNCTURE: CPT

## 2017-01-26 PROCEDURE — 86704 HEP B CORE ANTIBODY TOTAL: CPT

## 2017-01-26 PROCEDURE — 86706 HEP B SURFACE ANTIBODY: CPT

## 2017-01-26 PROCEDURE — 86790 VIRUS ANTIBODY NOS: CPT

## 2017-01-26 PROCEDURE — 87340 HEPATITIS B SURFACE AG IA: CPT

## 2017-01-26 PROCEDURE — 80053 COMPREHEN METABOLIC PANEL: CPT

## 2017-01-27 ENCOUNTER — EPISODE CHANGES (OUTPATIENT)
Dept: HEPATOLOGY | Facility: CLINIC | Age: 62
End: 2017-01-27

## 2017-01-27 ENCOUNTER — TELEPHONE (OUTPATIENT)
Dept: HEPATOLOGY | Facility: CLINIC | Age: 62
End: 2017-01-27

## 2017-01-27 LAB
HBV CORE AB SERPL QL IA: POSITIVE
HBV SURFACE AB SER-ACNC: POSITIVE M[IU]/ML
HBV SURFACE AG SERPL QL IA: NEGATIVE
HEPATITIS A ANTIBODY, IGG: POSITIVE

## 2017-01-27 NOTE — TELEPHONE ENCOUNTER
HCV LAB REVIEW  Week 8 of Harvoni, planning on 12 weeks treatment  Baseline HCV RNA >6 million    Pertinent labs:  CMP: stable, Ca mildly elevated, PTH followed with PCP  (+) immunity to HAV and HBV    pls call pt:  Labs look good. He has protection against HAV and HBV and will not need vaccination. 4 weeks left!!     - Continue Harvoni - 1 pill daily - don't miss any doses.    Next labs due / pls schedule:  - CMP, HCV RNA at week 12 - end of treatment - 02/24/17

## 2017-01-31 ENCOUNTER — HOSPITAL ENCOUNTER (OUTPATIENT)
Dept: ENDOCRINOLOGY | Facility: CLINIC | Age: 62
Discharge: HOME | End: 2017-01-31
Attending: INTERNAL MEDICINE
Payer: MEDICARE

## 2017-01-31 ENCOUNTER — HOSPITAL ENCOUNTER (OUTPATIENT)
Dept: RADIOLOGY | Facility: CLINIC | Age: 62
Discharge: HOME | End: 2017-01-31
Attending: INTERNAL MEDICINE
Payer: MEDICARE

## 2017-01-31 DIAGNOSIS — E21.0 PRIMARY HYPERPARATHYROIDISM: ICD-10-CM

## 2017-01-31 DIAGNOSIS — Z72.0 TOBACCO USE: ICD-10-CM

## 2017-01-31 DIAGNOSIS — E89.0 POSTOPERATIVE HYPOTHYROIDISM: ICD-10-CM

## 2017-01-31 DIAGNOSIS — Z86.018 HISTORY OF PHEOCHROMOCYTOMA: ICD-10-CM

## 2017-01-31 DIAGNOSIS — I10 ESSENTIAL HYPERTENSION: ICD-10-CM

## 2017-01-31 PROCEDURE — 76536 US EXAM OF HEAD AND NECK: CPT | Mod: S$GLB,,, | Performed by: INTERNAL MEDICINE

## 2017-01-31 PROCEDURE — 77080 DXA BONE DENSITY AXIAL: CPT | Mod: TC

## 2017-01-31 PROCEDURE — 77080 DXA BONE DENSITY AXIAL: CPT | Mod: 26,,, | Performed by: INTERNAL MEDICINE

## 2017-02-06 ENCOUNTER — HOSPITAL ENCOUNTER (OUTPATIENT)
Dept: INTERVENTIONAL RADIOLOGY/VASCULAR | Facility: HOSPITAL | Age: 62
Discharge: HOME | End: 2017-02-06
Attending: NEUROLOGICAL SURGERY
Payer: MEDICARE

## 2017-02-06 ENCOUNTER — OFFICE VISIT (OUTPATIENT)
Dept: PODIATRY | Facility: CLINIC | Age: 62
End: 2017-02-06
Payer: MEDICARE

## 2017-02-06 VITALS
SYSTOLIC BLOOD PRESSURE: 165 MMHG | HEART RATE: 70 BPM | OXYGEN SATURATION: 98 % | DIASTOLIC BLOOD PRESSURE: 98 MMHG | RESPIRATION RATE: 20 BRPM

## 2017-02-06 VITALS
HEART RATE: 64 BPM | HEIGHT: 69 IN | BODY MASS INDEX: 25.31 KG/M2 | SYSTOLIC BLOOD PRESSURE: 160 MMHG | DIASTOLIC BLOOD PRESSURE: 82 MMHG | WEIGHT: 170.88 LBS

## 2017-02-06 DIAGNOSIS — M62.469 GASTROCNEMIUS EQUINUS, UNSPECIFIED LATERALITY: ICD-10-CM

## 2017-02-06 DIAGNOSIS — M54.2 NECK PAIN: ICD-10-CM

## 2017-02-06 DIAGNOSIS — M20.41 HAMMER TOES OF BOTH FEET: ICD-10-CM

## 2017-02-06 DIAGNOSIS — M47.12 CERVICAL SPONDYLOSIS WITH MYELOPATHY: ICD-10-CM

## 2017-02-06 DIAGNOSIS — M20.42 HAMMER TOES OF BOTH FEET: ICD-10-CM

## 2017-02-06 DIAGNOSIS — B35.1 ONYCHOMYCOSIS DUE TO DERMATOPHYTE: ICD-10-CM

## 2017-02-06 DIAGNOSIS — M54.12 BRACHIAL NEURITIS: ICD-10-CM

## 2017-02-06 DIAGNOSIS — M48.02 CERVICAL STENOSIS OF SPINAL CANAL: ICD-10-CM

## 2017-02-06 PROCEDURE — 3079F DIAST BP 80-89 MM HG: CPT | Mod: S$GLB,,, | Performed by: PODIATRIST

## 2017-02-06 PROCEDURE — 3044F HG A1C LEVEL LT 7.0%: CPT | Mod: S$GLB,,, | Performed by: PODIATRIST

## 2017-02-06 PROCEDURE — 64479 NJX AA&/STRD TFRM EPI C/T 1: CPT | Mod: RT,,, | Performed by: RADIOLOGY

## 2017-02-06 PROCEDURE — 99999 PR PBB SHADOW E&M-EST. PATIENT-LVL III: CPT | Mod: PBBFAC,,, | Performed by: PODIATRIST

## 2017-02-06 PROCEDURE — 4010F ACE/ARB THERAPY RXD/TAKEN: CPT | Mod: S$GLB,,, | Performed by: PODIATRIST

## 2017-02-06 PROCEDURE — 3077F SYST BP >= 140 MM HG: CPT | Mod: S$GLB,,, | Performed by: PODIATRIST

## 2017-02-06 PROCEDURE — 11721 DEBRIDE NAIL 6 OR MORE: CPT | Mod: Q9,S$GLB,, | Performed by: PODIATRIST

## 2017-02-06 PROCEDURE — 99202 OFFICE O/P NEW SF 15 MIN: CPT | Mod: 25,S$GLB,, | Performed by: PODIATRIST

## 2017-02-06 PROCEDURE — 99499 UNLISTED E&M SERVICE: CPT | Mod: S$PBB,,, | Performed by: PODIATRIST

## 2017-02-06 RX ORDER — DEXAMETHASONE SODIUM PHOSPHATE 100 MG/10ML
10 INJECTION INTRAMUSCULAR; INTRAVENOUS ONCE
Status: COMPLETED | OUTPATIENT
Start: 2017-02-06 | End: 2017-02-06

## 2017-02-06 RX ORDER — MIDAZOLAM HYDROCHLORIDE 1 MG/ML
2 INJECTION, SOLUTION INTRAMUSCULAR; INTRAVENOUS ONCE
Status: COMPLETED | OUTPATIENT
Start: 2017-02-06 | End: 2017-02-06

## 2017-02-06 RX ADMIN — IOHEXOL 1 ML: 180 INJECTION INTRAVENOUS at 11:02

## 2017-02-06 RX ADMIN — DEXAMETHASONE SODIUM PHOSPHATE 10 MG: 10 INJECTION INTRAMUSCULAR; INTRAVENOUS at 10:02

## 2017-02-06 RX ADMIN — MIDAZOLAM HYDROCHLORIDE 2 MG: 1 INJECTION, SOLUTION INTRAMUSCULAR; INTRAVENOUS at 10:02

## 2017-02-06 NOTE — H&P
Radiology Minor Procedure Note    Procedure Requested: Transforaminal DWAIN    History of Present Illness:  Rob Jones Jr. is a 61 y.o. male with history of neck pain who presents for right TF DWAIN C6/7.     Past Medical History   Diagnosis Date    Anxiety     Back pain     Chronic hepatitis C     Diabetes mellitus     Hypertension     Postoperative hypothyroidism 11/3/2016    Primary hyperparathyroidism 1/18/2017    Thyroid disease      Past Surgical History   Procedure Laterality Date    Lumbar fusion      Tonsillectomy      Thyroidectomy         Allergies:   Review of patient's allergies indicates:   Allergen Reactions    Tizanidine Shortness Of Breath    Other omega-3s      Pt states he is allergic to a muscle relaxer but unsure of name.       Current Inpatient Meds:   Home Meds:   Prior to Admission medications    Medication Sig Start Date End Date Taking? Authorizing Provider   clonazePAM (KLONOPIN) 0.5 MG tablet Take 1 tablet (0.5 mg total) by mouth 2 (two) times daily as needed for Anxiety. 9/14/15 11/21/16  Brody Díaz MD   dicyclomine (BENTYL) 20 mg tablet Take 20 mg by mouth daily as needed.    Historical Provider, MD   econazole nitrate 1 % cream Apply topically 2 (two) times daily. 9/14/15   Brody Díaz MD   fish oil-omega-3 fatty acids 300-1,000 mg capsule Take 2 g by mouth once daily.    Historical Provider, MD   gabapentin (NEURONTIN) 100 MG capsule Take 100 mg by mouth 3 (three) times daily. 8/8/16   Historical Provider, MD   hydrocodone-acetaminophen 10-325mg (NORCO)  mg Tab  1/4/16   Historical Provider, MD   insulin aspart (NOVOLOG FLEXPEN) 100 unit/mL InPn pen Inject 15 Units into the skin as needed. 6/14/16 6/14/17  Brody Díaz MD   insulin aspart (NOVOLOG) 100 unit/mL injection Inject 1 Units into the skin every evening.     Historical Provider, MD   insulin glargine (LANTUS SOLOSTAR) 100 unit/mL (3 mL) InPn pen Inject 15 Units into the skin every evening. 6/14/16  "6/14/17  Brody Díaz MD   irbesartan (AVAPRO) 75 MG tablet Take 1 tablet (75 mg total) by mouth every evening. 3/11/16 3/11/17  Brody Díaz MD   ledipasvir-sofosbuvir (HARVONI)  mg Tab Take 1 tablet by mouth once daily. 11/21/16   IVAN SagastumeC   levothyroxine (SYNTHROID) 112 MCG tablet Take 1 tablet (112 mcg total) by mouth before breakfast. 1/3/17   Remi Weathers MD   meloxicam (MOBIC) 15 MG tablet Take 1 tablet (15 mg total) by mouth daily as needed for Pain. 10/28/16   Brody Díaz MD   metformin (GLUCOPHAGE) 1000 MG tablet Take 1 tablet (1,000 mg total) by mouth 2 (two) times daily with meals. 9/21/16   Brody Díaz MD   nitroGLYCERIN 0.4 MG/HR TD PT24 (NITRODUR) 0.4 mg/hr Place 1 patch onto the skin continuous prn.    Historical Provider, MD   NOVOFINE 32 32 gauge x 1/4" Ndle 1 EACH BY MISC.(NON-DRUG COMBO ROUTE) ROUTE ONCE DAILY. 9/26/16   Historical Provider, MD   pen needle, diabetic (NOVOFINE PLUS) 32 gauge x 1/6" Ndle 1 each by Misc.(Non-Drug; Combo Route) route once daily. 9/26/16   Brody Díaz MD   tamsulosin (FLOMAX) 0.4 mg Cp24 Take 1 capsule (0.4 mg total) by mouth once daily. 1/14/16   Brody Díaz MD   temazepam (RESTORIL) 30 mg capsule TAKE 1 CAPSULE BY MOUTH EVERY DAY AT BEDTIME AS NEEDED 3/14/16   Brody Díaz MD   vitamin D 1000 units Tab Take 185 mg by mouth once daily.    Historical Provider, MD      Anticoagulants/Antiplatelets: no anticoagulation    Labs:  Lab Results   Component Value Date    INR 0.9 11/11/2016       Lab Results   Component Value Date    WBC 4.60 11/11/2016    HGB 12.6 (L) 11/11/2016    HCT 39.4 (L) 11/11/2016    MCV 97 11/11/2016     11/11/2016      Lab Results   Component Value Date    GLU 95 01/26/2017     01/26/2017    K 4.6 01/26/2017     01/26/2017    CO2 25 01/26/2017    BUN 14 01/26/2017    CREATININE 1.1 01/26/2017    CALCIUM 11.3 (H) 01/26/2017    ALT 10 01/26/2017    AST 15 01/26/2017    ALBUMIN 4.0 01/26/2017 "       Objective:  Vitals:     PE:  Sensation: equal bilaterally    Plan: Right C6/7 Transforaminal Epidural Steroid Injection    Ahmet Michaud MD  Resident  Department of Radiology  Pager: [phone]

## 2017-02-06 NOTE — IP AVS SNAPSHOT
[facility]  [address]  [facility] [address]  Phone: [phone]           Patient Discharge Instructions     Our goal is to set you up for success. This packet includes information on your condition, medications, and your home care. It will help you to care for yourself so you don't get sicker and need to go back to the hospital.     Please ask your nurse if you have any questions.        There are many details to remember when preparing to leave the hospital. Here is what you will need to do:    1. Take your medicine. If you are prescribed medications, review your Medication List in the following pages. You may have new medications to  at the pharmacy and others that you'll need to stop taking. Review the instructions for how and when to take your medications. Talk with your doctor or nurses if you are unsure of what to do.     2. Go to your follow-up appointments. Specific follow-up information is listed in the following pages. Your may be contacted by a transition nurse or clinical provider about future appointments. Be sure we have all of the phone numbers to reach you, if needed. Please contact your provider's office if you are unable to make an appointment.     3. Watch for warning signs. Your doctor or nurse will give you detailed warning signs to watch for and when to call for assistance. These instructions may also include educational information about your condition. If you experience any of warning signs to your health, call your doctor.               [facility]  Unless otherwise directed by your provider, please contact [facility], our nurse care line that is available for 24/7 assistance.     [phone] (toll-free)    Registered nurses in the [facility] provide clinical advisement, health education, appointment booking, and other advisory services.                    ** Verify the list of medication(s) below is accurate and up  to date. Carry this with you in case of emergency. If your medications have changed, please notify your healthcare provider.             Medication List      TAKE these medications        Additional Info                      clonazePAM 0.5 MG tablet   Commonly known as:  KLONOPIN   Quantity:  60 tablet   Refills:  2   Dose:  0.5 mg    Instructions:  Take 1 tablet (0.5 mg total) by mouth 2 (two) times daily as needed for Anxiety.     Begin Date    AM    Noon    PM    Bedtime       dicyclomine 20 mg tablet   Commonly known as:  BENTYL   Refills:  0   Dose:  20 mg    Instructions:  Take 20 mg by mouth daily as needed.     Begin Date    AM    Noon    PM    Bedtime       econazole nitrate 1 % cream   Quantity:  30 g   Refills:  1    Instructions:  Apply topically 2 (two) times daily.     Begin Date    AM    Noon    PM    Bedtime       fish oil-omega-3 fatty acids 300-1,000 mg capsule   Refills:  0   Dose:  2 g    Instructions:  Take 2 g by mouth once daily.     Begin Date    AM    Noon    PM    Bedtime       gabapentin 100 MG capsule   Commonly known as:  NEURONTIN   Refills:  0   Dose:  100 mg    Instructions:  Take 100 mg by mouth 3 (three) times daily.     Begin Date    AM    Noon    PM    Bedtime       hydrocodone-acetaminophen 10-325mg  mg Tab   Commonly known as:  NORCO   Refills:  0      Begin Date    AM    Noon    PM    Bedtime       * insulin aspart 100 unit/mL injection   Commonly known as:  NOVOLOG   Refills:  0   Dose:  1 Units   Indications:  sliding scale    Instructions:  Inject 1 Units into the skin every evening.     Begin Date    AM    Noon    PM    Bedtime       * insulin aspart 100 unit/mL Inpn pen   Commonly known as:  NOVOLOG FLEXPEN   Quantity:  1 Box   Refills:  5   Dose:  15 Units    Instructions:  Inject 15 Units into the skin as needed.     Begin Date    AM    Noon    PM    Bedtime       insulin glargine 100 unit/mL (3 mL) Inpn pen   Commonly known as:  LANTUS SOLOSTAR   Quantity:  3 Box  "  Refills:  1   Dose:  15 Units    Instructions:  Inject 15 Units into the skin every evening.     Begin Date    AM    Noon    PM    Bedtime       irbesartan 75 MG tablet   Commonly known as:  AVAPRO   Quantity:  90 tablet   Refills:  3   Dose:  75 mg    Instructions:  Take 1 tablet (75 mg total) by mouth every evening.     Begin Date    AM    Noon    PM    Bedtime       ledipasvir-sofosbuvir  mg Tab   Commonly known as:  HARVONI   Quantity:  28 tablet   Refills:  2   Dose:  1 tablet    Instructions:  Take 1 tablet by mouth once daily.     Begin Date    AM    Noon    PM    Bedtime       levothyroxine 112 MCG tablet   Commonly known as:  SYNTHROID   Quantity:  90 tablet   Refills:  0   Dose:  112 mcg    Instructions:  Take 1 tablet (112 mcg total) by mouth before breakfast.     Begin Date    AM    Noon    PM    Bedtime       meloxicam 15 MG tablet   Commonly known as:  MOBIC   Quantity:  90 tablet   Refills:  0   Dose:  15 mg    Instructions:  Take 1 tablet (15 mg total) by mouth daily as needed for Pain.     Begin Date    AM    Noon    PM    Bedtime       metformin 1000 MG tablet   Commonly known as:  GLUCOPHAGE   Quantity:  180 tablet   Refills:  3   Dose:  1000 mg    Instructions:  Take 1 tablet (1,000 mg total) by mouth 2 (two) times daily with meals.     Begin Date    AM    Noon    PM    Bedtime       nitroGLYCERIN 0.4 MG/HR TD PT24 0.4 mg/hr   Commonly known as:  NITRODUR   Refills:  0   Dose:  1 patch    Instructions:  Place 1 patch onto the skin continuous prn.     Begin Date    AM    Noon    PM    Bedtime       * pen needle, diabetic 32 gauge x 1/6" Ndle   Commonly known as:  NOVOFINE PLUS   Quantity:  100 each   Refills:  3   Dose:  1 each    Instructions:  1 each by Misc.(Non-Drug; Combo Route) route once daily.     Begin Date    AM    Noon    PM    Bedtime       * NOVOFINE 32 32 gauge x 1/4" Ndle   Refills:  3   Generic drug:  pen needle, diabetic    Instructions:  1 EACH BY MISC.(NON-DRUG COMBO " ROUTE) ROUTE ONCE DAILY.     Begin Date    AM    Noon    PM    Bedtime       tamsulosin 0.4 mg Cp24   Commonly known as:  FLOMAX   Quantity:  90 capsule   Refills:  3   Dose:  0.4 mg    Instructions:  Take 1 capsule (0.4 mg total) by mouth once daily.     Begin Date    AM    Noon    PM    Bedtime       temazepam 30 mg capsule   Commonly known as:  RESTORIL   Quantity:  90 capsule   Refills:  1    Instructions:  TAKE 1 CAPSULE BY MOUTH EVERY DAY AT BEDTIME AS NEEDED     Begin Date    AM    Noon    PM    Bedtime       vitamin D 1000 units Tab   Refills:  0   Dose:  185 mg    Instructions:  Take 185 mg by mouth once daily.     Begin Date    AM    Noon    PM    Bedtime       * Notice:  This list has 4 medication(s) that are the same as other medications prescribed for you. Read the directions carefully, and ask your doctor or other care provider to review them with you.               Please bring to all follow up appointments:    1. A copy of your discharge instructions.  2. All medicines you are currently taking in their original bottles.  3. Identification and insurance card.    Please arrive 15 minutes ahead of scheduled appointment time.    Please call 24 hours in advance if you must reschedule your appointment and/or time.        Your Scheduled Appointments     Feb 06, 2017  2:30 PM CST   Consult with CRISTHIAN Puckett - Podiatry ([address] )    [address]  [place] [address] [phone]            Feb 07, 2017  7:40 AM CST   Established Patient Visit with Remi Weathers MD   [facility] ([facility]    [address]  [place] [id number] [phone]            Feb 13, 2017  2:00 PM CST   Established Patient Visit with VAL Sagastume - Hepatology ([address] )    [address]  [place] [id number] [phone]            Feb 24, 2017  1:00 PM CST   Non-Fasting Lab with LAB, [facility]  ([facility])    [address]  [facility] [address] [phone]            Mar 10, 2017  8:30 AM CST   Fasting Lab with LAB, [facility]    [address]  [place] [address] [phone]                  Discharge Instructions       Interventional Radiology [phone]  Mon-Fri  8A-4P  24hr Radiology Resident on call [phone]      Discharge References/Attachments     CERVICAL DISK SURGERY, DISCHARGE INSTRUCTIONS FOR (ENGLISH)        Admission Information     Date & Time Provider Department CSN    2/6/2017 10:00 AM Benja Liz MD [facility] [id number]      Care Providers     Provider Role Specialty Primary office phone    Benja Liz MD Attending Provider Neurosurgery [phone]      Recent Lab Values        6/25/2009 11/3/2016                        4:55 AM  3:31 PM          A1C 6.5 (H) 6.6 (H)          Comment for A1C at  3:31 PM on 11/3/2016:  According to ADA guidelines, hemoglobin A1C <7.0% represents  optimal control in non-pregnant diabetic patients.  Different  metrics may apply to specific populations.   Standards of Medical Care in Diabetes - 2016.  For the purpose of screening for the presence of diabetes:  <5.7%     Consistent with the absence of diabetes  5.7-6.4%  Consistent with increasing risk for diabetes   (prediabetes)  >or=6.5%  Consistent with diabetes  Currently no consensus exists for use of hemoglobin A1C  for diagnosis of diabetes for children.        Allergies as of 2/6/2017        Reactions    Tizanidine Shortness Of Breath    Other Omega-3s     Pt states he is allergic to a muscle relaxer but unsure of name.      Advance Directives     An advance directive is a document which, in the event you are no longer able to make decisions for yourself, tells your healthcare team what kind of treatment you do or do not want to receive, or who you would like to make those decisions for you.  If you  do not currently have an advance directive, [facility] encourages you to create one.  For more information call:  [phone]), [phone] ([phone]),  or log on to [email or web address].        Smoking Cessation     If you would like to quit smoking:   You may be eligible for free services if you are a [place] resident and started smoking cigarettes before September 1, 1988.  Call the Smoking Cessation Trust (SCT) toll free at (841) 486-5294 or (718) 477-9505.   Call 6-052-QUIT-NOW if you do not meet the above criteria.            Language Assistance Services     ATTENTION: Language assistance services are available, free of charge. Please call [phone].      ATENCIÓN: Si habla danii, tiene a katz disposición servicios gratuitos de asistencia lingüística. Llame al [phone].     CHÚ Ý: N?u b?n nói Ti?ng Vi?t, có các d?ch v? h? tr? ngôn ng? mi?n phí dành cho b?n. G?i s? [phone].        Heart Failure Education       Heart Failure: Being Active  You have a condition called heart failure. Being active doesnt mean that you have to wear yourself out. Even a little movement each day helps to strengthen your heart. If you cant get out to exercise, you can do simple stretching and strengthening exercises at home. These are good ways to keep you well-conditioned and prevent you and your heart from becoming excessively weak.    Ideas to get you started  · Add a little movement to things you do now. Walk to mail letters. Park your car at the far end of the parking lot and walk to the store. Walk up a flight of stairs instead of taking the elevator.  · Choose activities you enjoy. You might walk, swim, or ride an exercise bike. Things like gardening and washing the car count, too. Other possibilities include: washing dishes, walking the dog, walking around the mall, and doing aerobic activities with friends.  · Join a group exercise program at a [facility], a senior center, or a  community center. Or look into a hospital cardiac rehabilitation program. Ask your doctor if you qualify.  Tips to keep you going  · Get up and get dressed each day. Go to a coffee shop and read a newspaper or go somewhere that you'll be in the presence of other active people. Youll feel more like being active.  · Make a plan. Choose one or more activities that you enjoy and that you can easily do. Then plan to do at least one each day. You might write your plan on a calendar.  · Go with a friend or a group if you like company. This can help you feel supported and stay motivated, too.  · Plan social events that you enjoy. This will keep you mentally engaged as well as physically motivated to do things you find pleasure in.  For your safety  · Talk with your healthcare provider before starting an exercise program.  · Exercise indoors when its too hot or too cold outside, or when the air quality is poor. Try walking at a shopping mall.  · Wear socks and sturdy shoes to maintain your balance and prevent falls.  · Start slowly. Do a few minutes several times a day at first. Increase your time and speed little by little.  · Stop and rest whenever you feel tired or get short of breath.  · Dont push yourself on days when you dont feel well.  Date Last Reviewed: 3/20/2016  © 0305-9678 [employer]. [address] [place] [address], [place] [address]. All rights reserved. This information is not intended as a substitute for professional medical care. Always follow your healthcare professional's instructions.              Heart Failure: Evaluating Your Heart  You have a condition called heart failure. To evaluate your condition, your doctor will examine you, ask questions, and do some tests. Along with looking for signs of heart failure, the doctor looks for any other health problems that may have led to heart failure. The results of your evaluation will help your doctor form a treatment plan.  Health history and  physical exam  Your visit will start with a health history. Tell the doctor about any symptoms youve noticed and about all medicines you take. Then youll have a physical exam. This includes listening to your heartbeat and breathing. Youll also be checked for swelling (edema) in your legs and neck. When you have fluid buildup or fluid in the lungs, it may be called congestive heart failure.  Diagnosing heart failure     During an echocardiogram, sound waves bounce off the heart. These are converted into a picture on the screen.   The following may be done to help your doctor form a diagnosis:  · X-rays show the size and shape of your heart. These pictures can also show fluid in your lungs.  · An electrocardiogram (ECG or EKG) shows the pattern of your heartbeat. Small pads (electrodes) are placed on your chest, arms, and legs. Wires connect the pads to the ECG machine, which records your hearts electrical signals. This can give the doctor information about heart function.  · An echocardiogram uses ultrasound waves to show the structure and movement of your heart muscle. This shows how well the heart pumps. It also shows the thickness of the heart walls, and if the heart is enlarged. It is one of the most useful, non-invasive tests as it provides information about the heart's general function. This helps your doctor make treatment decisions.  · Lab tests evaluate small amounts of blood or urine for signs of problems. A BNP lab test can help diagnose and evaluate heart failure. BNP stands for B-type natriuretic peptide. The ventricles secrete more BNP when heart failure worsens. Lab tests can also provide information about metabolic dysfunction or heart dysfunction.  Your treatment plan  Based on the results of your evaluation and tests, your doctor will develop a treatment plan. This plan is designed to relieve some of your heart failure symptoms and help make you more comfortable. Your treatment plan may  include:  · Medicine to help your heart work better and improve your quality of life  · Changes in what you eat and drink to help prevent fluid from backing up in your body  · Daily monitoring of your weight and heart failure symptoms to see how well your treatment plan is working  · Exercise to help you stay healthy  · Help with quitting smoking  · Emotional and psychological support to help adjust to the changes  · Referrals to other specialists to make sure you are being treated comprehensively  Date Last Reviewed: 3/21/2016  © 7100-6679 [employer]. [address] [place] [address]. All rights reserved. This information is not intended as a substitute for professional medical care. Always follow your healthcare professional's instructions.              Heart Failure: Making Changes to Your Diet  You have a condition called heart failure. When you have heart failure, excess fluid is more likely to build up in your body because your heart isn't working well. This makes the heart work harder to pump blood. Fluid buildup causes symptoms such as shortness of breath and swelling (edema). This is often referred to as congestive heart failure or CHF. Controlling the amount of salt (sodium) you eat may help stop fluid from building up. Your doctor may also tell you to reduce the amount of fluid you drink.  Reading food labels    Your healthcare provider will tell you how much sodium you can eat each day. Read food labels to keep track. Keep in mind that certain foods are high in salt. These include canned, frozen, and processed foods. Check the amount of sodium in each serving. Watch out for high-sodium ingredients. These include MSG (monosodium glutamate), baking soda, and sodium phosphate.   Eating less salt  Give yourself time to get used to eating less salt. It may take a little while. Here are some tips to help:  · Take the saltshaker off the table. Replace it with salt-free herb mixes and  spices.  · Eat fresh or plain frozen vegetables. These have much less salt than canned vegetables.  · Choose low-sodium snacks like sodium-free pretzels, crackers, or air-popped popcorn.  · Dont add salt to your food when youre cooking. Instead, season your foods with pepper, lemon, garlic, or onion.  · When you eat out, ask that your food be cooked without added salt.  · Avoid eating fried foods as these often have a great deal of salt.  If youre told to limit fluids  You may need to limit how much fluid you have to help prevent swelling. This includes anything that is liquid at room temperature, such as ice cream and soup. If your doctor tells you to limit fluid, try these tips:  · Measure drinks in a measuring cup before you drink them. This will help you meet daily goals.  · Chill drinks to make them more refreshing.  · Suck on frozen lemon wedges to quench thirst.  · Only drink when youre thirsty.  · Chew sugarless gum or suck on hard candy to keep your mouth moist.  · Weigh yourself daily to know if your body's fluid content is rising.  My sodium goal  Your healthcare provider may give you a sodium goal to meet each day. This includes sodium found in food as well as salt that you add. My goal is to eat no more than ___________ mg of sodium per day.     When to call your doctor  Call your doctor right away if you have any symptoms of worsening heart failure. These can include:  · Sudden weight gain  · Increased swelling of your legs or ankles  · Trouble breathing when youre resting or at night  · Increase in the number of pillows you have to sleep on  · Chest pain, pressure, discomfort, or pain in the jaw, neck, or back   Date Last Reviewed: 3/21/2016  © 1968-1782 [employer]. [address] [place] [address], [place] [address]. All rights reserved. This information is not intended as a substitute for professional medical care. Always follow your healthcare professional's instructions.               Heart Failure: Medicines to Help Your Heart    You have a condition called heart failure (also known as congestive heart failure, or CHF). Your doctor will likely prescribe medicines for heart failure and any underlying health problems you have. Most heart failure patients take one or more types of medicinen. Your healthcare provider will work to find the combination of medicines that works best for you.  Heart failure medicines  Here are the most common heart failure medicines:  · ACE inhibitors lower blood pressure and decrease strain on the heart. This makes it easier for the heart to pump. Angiotensin receptor blockers have similar effects. These are prescribed for some patients instead of ACE inhibitors.  · Beta-blockers relieve stress on the heart. They also improve symptoms. They may also improve the heart's pumping action over time.  · Diuretics (also called water pills) help rid your body of excess water. This can help rid your body of swelling (edema). Having less fluid to pump means your heart doesnt have to work as hard. Some diuretics make your body lose a mineral called potassium. Your doctor will tell you if you need to take supplements or eat more foods high in potassium.  · Digoxin helps your heart pump with more strength. This helps your heart pump more blood with each beat. So, more oxygen-rich blood travels to the rest of the body.  · Aldosterone antagonists help alter hormones and decrease strain on the heart.  · Hydralazine and nitrates are two separate medicines used together to treat heart failure. They may come in one combination pill. They lower blood pressure and decrease how hard the heart has to pump.  Medicines for related conditions  Controlling other heart problems helps keep heart failure under control, too. Depending on other heart problems you have, medicines may be prescribed to:  · Lower blood pressure (antihypertensives).  · Lower cholesterol levels (statins).  · Prevent  blood clots (anticoagulants or aspirin).  · Keep the heartbeat steady (antiarrhythmics).  Date Last Reviewed: 3/5/2016  © 3838-2151 [employer]. [address] [place] [address], [place] [address]. All rights reserved. This information is not intended as a substitute for professional medical care. Always follow your healthcare professional's instructions.              Heart Failure: Procedures That May Help    The heart is a muscle that pumps oxygen-rich blood to all parts of the body. When you have heart failure, the heart is not able to pump as well as it should. Blood and fluid may back up into the lungs (congestive heart failure), and some parts of the body dont get enough oxygen-rich blood to work normally. These problems lead to the symptoms of heart failure.     Certain procedures may help the heart pump better in some cases of heart failure. Some procedures are done to treat health problems that may have caused the heart failure such as coronary artery disease or heart rhythm problems. For more serious heart failure, other options are available.  Treating artery and valve problems  If you have coronary artery disease or valve disease, procedures may be done to improve blood flow. This helps the heart pump better, which can improve heart failure symptoms. First, your doctor may do a cardiac catheterization to help detect clogged blood vessels or valve damage. During this procedure, a  thin tube (catheter) in inserted into a blood vessel and guided to the heart. There a dye is injected and a special type of X-ray (angiogram) is taken of the blood vessels. Procedures to open a blocked artery or fix damaged valves can also be done using catheterization.  · Angioplasty uses a balloon-tipped instrument at the end of the catheter. The balloon is inflated to widen the narrowed artery. In many cases, a stent is expanded to further support the narrowed artery. A stent is a metal mesh tube.  · Valve surgery  repairs or replacement of faulty valves can also be done during catheterization so blood can flow properly through the chambers of the heart.  Bypass surgery is another option to help treat blocked arteries. It uses a healthy blood vessel from elsewhere in the body. The healthy blood vessel is attached above and below the blocked area so that blood can flow around the blocked artery.  Treating heart rhythm problems  A device may be placed in the chest to help a weak heart maintain a healthy, heartbeat so the heart can pump more effectively:  · Pacemaker. A pacemaker is an implanted device that regulates your heartbeat electronically. It monitors your heart's rhythm and generates a painless electric impulse that helps the heart beat in a regular rhythm. A pacemaker is programmed to meet your specific heart rhythm needs.  · Biventricular pacing/cardiac resynchronization therapy. A type of pacemaker that paces both pumping chambers of the heart at the same time to coordinate contractions and to improve the heart's function. Some people with heart failure are candidates for this therapy.  · Implantable cardioverter defibrillator. A device similar to a pacemaker that senses when the heart is beating too fast and delivers an electrical shock to convert the fast rhythm to a normal rhythm. This can be a life saving device.  In severe cases  In more serious cases of heart failure when other treatments no longer work, other options may include:  · Ventricular assist devices (VADs). These are mechanical devices used to take over the pumping function for one or both of the heart's ventricles, or pumping chambers. A VAD may be necessary when heart failure progresses to the point that medicines and other treatments no longer help. In some cases, a VAD may be used as a bridge to transplant.  · Heart transplant. This is replacing the diseased heart with a healthy one from a donor. This is an option for a few people who are very  sick. A heart transplant is very serious and not an option for all patients. Your doctor can tell you more.  Date Last Reviewed: 3/20/2016  © 9308-2758 [employer]. [address] [place] [address], [place] [address]. All rights reserved. This information is not intended as a substitute for professional medical care. Always follow your healthcare professional's instructions.              Heart Failure: Tracking Your Weight  You have a condition called heart failure. When you have heart failure, a sudden weight gain or a steady rise in weight is a warning sign that your body is retaining too much water and salt. This could mean your heart failure is getting worse. If left untreated, it can cause problems for your lungs and result in shortness of breath. Weighing yourself each day is the best way to know if youre retaining water. If your weight goes up quickly, call your doctor. You will be given instructions on how to get rid of the excess water. You will likely need medicines and to avoid salt. This will help your heart work better.  Call your doctor if you gain more than 2 pounds in 1 day, more than 5 pounds in 1 week, or whatever weight gain you were told to report by your doctor. This is often a sign of worsening heart failure and needs to be evaluated and treated. Your doctor will tell you what to do next.   Tips for weighing yourself    · Weigh yourself at the same time each morning, wearing the same clothes. Weigh yourself after urinating and before eating.  · Use the same scale each day. Make sure the numbers are easy to read. Put the scale on a flat, hard surface -- not on a rug or carpet.  · Do not stop weighing yourself. If you forget one day, weigh again the next morning.  How to use your weight chart  · Keep your weight chart near the scale. Write your weight on the chart as soon as you get off the scale.  · Fill in the month and the start date on the chart. Then write down your weight each day.  Your chart will look like this:    · If you miss a day, leave the space blank. Weigh yourself the next day and write your weight in the next space.  · Take your weight chart with you when you go to see your doctor.  Date Last Reviewed: 3/20/2016  © 4714-5108 [employer]. [address] [place] [address], [place] [address]. All rights reserved. This information is not intended as a substitute for professional medical care. Always follow your healthcare professional's instructions.              Heart Failure: Warning Signs of a Flare-Up  You have a condition called heart failure. Once you have heart failure, flare-ups can happen. Below are signs that can mean your heart failure is getting worse. If you notice any of these warning signs, call your healthcare provider.  Swelling    · Your feet, ankles, or lower legs get puffier.  · You notice skin changes on your lower legs.  · Your shoes feel too tight.  · Your clothes are tighter in the waist.  · You have trouble getting rings on or off your fingers.  Shortness of breath  · You have to breathe harder even when youre doing your normal activities or when youre resting.  · You are short of breath walking up stairs or even short distances.  · You wake up at night short of breath or coughing.  · You need to use more pillows or sit up to sleep.  · You wake up tired or restless.  Other warning signs  · You feel weaker, dizzy, or more tired.  · You have chest pain or changes in your heartbeat.  · You have a cough that wont go away.  · You cant remember things or dont feel like eating.  Tracking your weight  Gaining weight is often the first warning sign that heart failure is getting worse. Gaining even a few pounds can be a sign that your body is retaining excess water and salt. Weighing yourself each day in the morning after you urinate and before you eat, is the best way to know if you're retaining water. Get a scale that is easy to read and make sure you wear the  same clothes and use the same scale every time you weigh. Your healthcare provider will show you how to track your weight. Call your doctor if you gain more than 2 pounds in 1 day, 5 pounds in 1 week, or whatever weight gain you were told to report by your doctor. This is often a sign of worsening heart failure and needs to be evaluated and treated before it compromises your breathing. Your doctor will tell you what to do next.    Date Last Reviewed: 3/15/2016  © 5499-2347 [employer]. [address] [place] [address], [place] [address]. All rights reserved. This information is not intended as a substitute for professional medical care. Always follow your healthcare professional's instructions.              Diabetes Discharge Instructions                                    [facility] complies with applicable Federal civil rights laws and does not discriminate on the basis of race, color, national origin, age, disability, or sex.

## 2017-02-06 NOTE — LETTER
February 6, 2017      Doris Jo MD  [address] [facility] [address]           [facility] - Podiatry  [address]  [place] [address]  Phone: [phone]          Patient: Rob Jones Jr.   MR Number: [id number]   YOB: 1955   Date of Visit: 2/6/2017       Dear Dr. Doris Jo:    Thank you for referring Rob Jones to me for evaluation. Attached you will find relevant portions of my assessment and plan of care.    If you have questions, please do not hesitate to call me. I look forward to following Rob Jones along with you.    Sincerely,    Gibson Ramachandran, DPDEJON    Enclosure  CC:  No Recipients    If you would like to receive this communication electronically, please contact [email or web address] or [phone] to request more information on [employer] access.    For providers and/or their staff who would like to refer a patient to [facility], please contact us through our one-stop-shop provider referral line, [facility] , at [phone].    If you feel you have received this communication in error or would no longer like to receive these types of communications, please e-mail [email or web address]

## 2017-02-06 NOTE — PROCEDURES
Radiology Post-Procedure Note    Pre Op Diagnosis: LBP    Post Op Diagnosis: Same    Procedure: Cervical DWAIN    Procedure performed by: Benja Carmona MD ; Ahmet Michaud MD    Written Informed Consent Obtained: Yes    Specimen Removed: NO    Estimated Blood Loss: Minimal    Findings: right C6/7 neuroforaminal narrowing with osteophytic spurring    Level injected: right C6/7  Needle used: 22 gauge  Dose:  10 mg Dexamethasone   1.5 mL Lidocaine 1% MPF    Patient tolerated procedure well.    Ahmet Michaud MD  Resident  Department of Radiology  Pager: [phone]

## 2017-02-06 NOTE — PATIENT INSTRUCTIONS
Diabetes: Inspecting Your Feet  Diabetes increases your chances of developing foot problems. So inspect your feet every day. This helps you find small skin irritations before they become serious infections. If you have trouble seeing the bottoms of your feet, use a mirror or ask a family member or friend to help.    How to check your feet  Below are tips to help you look for foot problems. Try to check your feet at the same time each day, such as when you get out of bed in the morning:  · Check the top of each foot. The tops of toes, back of the heel, and outer edge of the foot can get a lot of rubbing from poor-fitting shoes.  · Check the bottom of each foot. Daily wear and tear often leads to problems at pressure spots.  · Check the toes and nails. Fungal infections often occur between toes. Toenail problems can also be a sign of fungal infections or lead to breaks in the skin.  · Check your shoes, too. Loose objects inside a shoe can injure the foot. Use your hand to feel inside your shoes for things like ciro, loose stitching, or rough areas that could irritate your skin.  Warning signs  Look for any color changes in the foot. Redness with streaks can signal a severe infection, which needs immediate medical attention. Tell your doctor right away if you have any of these problems:  · Swelling, sometimes with color changes, may be a sign of poor blood flow or infection. Symptoms include tenderness and an increase in the size of your foot.  · Warm or hot areas on your feet may be signs of infection. A foot that is cold may not be getting enough blood.  · Sensations such as burning, tingling, or pins and needles can be signs of a problem. Also check for areas that may be numb.  · Hot spots are caused by friction or pressure. Look for hot spots in areas that get a lot of rubbing. Hot spots can turn into blisters, calluses, or sores.  · Cracks and sores are caused by dry or irritated skin. They are a sign that  the skin is breaking down, which can lead to infection.  · Toenail problems to watch for include nails growing into the skin (ingrown toenail) and causing redness or pain. Thick, yellow, or discolored nails can signal a fungal infection.  · Drainage and odor can develop from untreated sores and ulcers. Call your doctor right away if you notice white or yellow drainage, bleeding, or unpleasant odor.   © 6989-8174 [employer]. [address] [place] [address], [place] [address]. All rights reserved. This information is not intended as a substitute for professional medical care. Always follow your healthcare professional's instructions.

## 2017-02-06 NOTE — DISCHARGE INSTRUCTIONS
Interventional Radiology [phone]  Mon-Fri  8A-4P  24hr Radiology Resident on call [phone]

## 2017-02-07 ENCOUNTER — TELEPHONE (OUTPATIENT)
Dept: OPTOMETRY | Facility: CLINIC | Age: 62
End: 2017-02-07

## 2017-02-07 ENCOUNTER — LAB VISIT (OUTPATIENT)
Dept: LAB | Facility: OTHER | Age: 62
End: 2017-02-07
Attending: INTERNAL MEDICINE
Payer: MEDICARE

## 2017-02-07 ENCOUNTER — OFFICE VISIT (OUTPATIENT)
Dept: INTERNAL MEDICINE | Facility: CLINIC | Age: 62
End: 2017-02-07
Payer: MEDICARE

## 2017-02-07 VITALS
OXYGEN SATURATION: 99 % | BODY MASS INDEX: 25.18 KG/M2 | HEART RATE: 73 BPM | HEIGHT: 69 IN | WEIGHT: 170 LBS | DIASTOLIC BLOOD PRESSURE: 84 MMHG | SYSTOLIC BLOOD PRESSURE: 150 MMHG

## 2017-02-07 DIAGNOSIS — Z79.4 CONTROLLED TYPE 2 DIABETES MELLITUS WITH DIABETIC NEUROPATHY, WITH LONG-TERM CURRENT USE OF INSULIN: Primary | ICD-10-CM

## 2017-02-07 DIAGNOSIS — E11.40 CONTROLLED TYPE 2 DIABETES MELLITUS WITH DIABETIC NEUROPATHY, WITH LONG-TERM CURRENT USE OF INSULIN: ICD-10-CM

## 2017-02-07 DIAGNOSIS — Z79.4 CONTROLLED TYPE 2 DIABETES MELLITUS WITH DIABETIC NEUROPATHY, WITH LONG-TERM CURRENT USE OF INSULIN: ICD-10-CM

## 2017-02-07 DIAGNOSIS — F41.9 ANXIETY: ICD-10-CM

## 2017-02-07 DIAGNOSIS — I10 ESSENTIAL HYPERTENSION: ICD-10-CM

## 2017-02-07 DIAGNOSIS — E11.40 CONTROLLED TYPE 2 DIABETES MELLITUS WITH DIABETIC NEUROPATHY, WITH LONG-TERM CURRENT USE OF INSULIN: Primary | ICD-10-CM

## 2017-02-07 DIAGNOSIS — H92.01 OTALGIA, RIGHT: ICD-10-CM

## 2017-02-07 DIAGNOSIS — G62.9 NEUROPATHY: ICD-10-CM

## 2017-02-07 DIAGNOSIS — E89.0 POSTOPERATIVE HYPOTHYROIDISM: ICD-10-CM

## 2017-02-07 DIAGNOSIS — N40.0 BENIGN NON-NODULAR PROSTATIC HYPERPLASIA WITHOUT LOWER URINARY TRACT SYMPTOMS: ICD-10-CM

## 2017-02-07 LAB
ESTIMATED AVG GLUCOSE: 163 MG/DL
HBA1C MFR BLD HPLC: 7.3 %

## 2017-02-07 PROCEDURE — 4010F ACE/ARB THERAPY RXD/TAKEN: CPT | Mod: S$GLB,,, | Performed by: INTERNAL MEDICINE

## 2017-02-07 PROCEDURE — 99215 OFFICE O/P EST HI 40 MIN: CPT | Mod: S$GLB,,, | Performed by: INTERNAL MEDICINE

## 2017-02-07 PROCEDURE — 99999 PR PBB SHADOW E&M-EST. PATIENT-LVL III: CPT | Mod: PBBFAC,,, | Performed by: INTERNAL MEDICINE

## 2017-02-07 PROCEDURE — 83036 HEMOGLOBIN GLYCOSYLATED A1C: CPT

## 2017-02-07 PROCEDURE — 3077F SYST BP >= 140 MM HG: CPT | Mod: S$GLB,,, | Performed by: INTERNAL MEDICINE

## 2017-02-07 PROCEDURE — 3045F PR MOST RECENT HEMOGLOBIN A1C LEVEL 7.0-9.0%: CPT | Mod: S$GLB,,, | Performed by: INTERNAL MEDICINE

## 2017-02-07 PROCEDURE — 3079F DIAST BP 80-89 MM HG: CPT | Mod: S$GLB,,, | Performed by: INTERNAL MEDICINE

## 2017-02-07 PROCEDURE — 36415 COLL VENOUS BLD VENIPUNCTURE: CPT

## 2017-02-07 RX ORDER — AMLODIPINE BESYLATE 10 MG/1
10 TABLET ORAL DAILY
Qty: 90 TABLET | Refills: 3 | Status: SHIPPED | OUTPATIENT
Start: 2017-02-07 | End: 2018-03-02 | Stop reason: SDUPTHER

## 2017-02-07 RX ORDER — GABAPENTIN 100 MG/1
100 CAPSULE ORAL 3 TIMES DAILY
Qty: 90 CAPSULE | Refills: 0 | Status: CANCELLED | OUTPATIENT
Start: 2017-02-07

## 2017-02-07 RX ORDER — TEMAZEPAM 30 MG/1
30 CAPSULE ORAL NIGHTLY PRN
Qty: 90 CAPSULE | Refills: 1 | Status: CANCELLED | OUTPATIENT
Start: 2017-02-07

## 2017-02-07 RX ORDER — CLONAZEPAM 0.5 MG/1
0.5 TABLET ORAL 2 TIMES DAILY PRN
Qty: 60 TABLET | Refills: 2 | Status: CANCELLED | OUTPATIENT
Start: 2017-02-07 | End: 2018-02-07

## 2017-02-07 RX ORDER — IRBESARTAN 150 MG/1
150 TABLET ORAL DAILY
Qty: 90 TABLET | Refills: 3 | Status: SHIPPED | OUTPATIENT
Start: 2017-02-07 | End: 2018-03-02 | Stop reason: SDUPTHER

## 2017-02-07 RX ORDER — MELOXICAM 15 MG/1
15 TABLET ORAL DAILY PRN
Qty: 90 TABLET | Refills: 1 | Status: SHIPPED | OUTPATIENT
Start: 2017-02-07 | End: 2017-08-04 | Stop reason: SDUPTHER

## 2017-02-07 RX ORDER — FLUOCINOLONE ACETONIDE 0.11 MG/ML
OIL AURICULAR (OTIC)
Qty: 1 BOTTLE | Refills: 0 | Status: SHIPPED | OUTPATIENT
Start: 2017-02-07 | End: 2017-09-08

## 2017-02-07 RX ORDER — TAMSULOSIN HYDROCHLORIDE 0.4 MG/1
0.4 CAPSULE ORAL DAILY
Qty: 90 CAPSULE | Refills: 3 | Status: SHIPPED | OUTPATIENT
Start: 2017-02-07 | End: 2017-05-08 | Stop reason: SDUPTHER

## 2017-02-07 NOTE — PROGRESS NOTES
"Subjective:       Patient ID: Rob Jones Jr. is a 61 y.o. male.    Chief Complaint: Diabetes (3 mo f/u)    HPI Comments:   Pt here for f/u DM2 and other issues.      AM fasting glucose is typically 90s and 100s.  No lows.     Lantus is still at 15u QHS.  He very rarely takes Novolog.      Pt c/o recurrence of R ear sx.  He reports having had this intermittently for years and has used abx drops in the past.  It feels clogged.  It is mildly painful or irritated.  No change in hearing.  No fever.  No drainage.          Diabetes       Review of Systems   Constitutional: Negative.    HENT: Negative.    Eyes: Negative.    Respiratory: Negative.    Cardiovascular: Negative.    Gastrointestinal: Negative.    Genitourinary: Negative.    Musculoskeletal: Negative.    Skin: Negative.    Neurological: Negative.    Psychiatric/Behavioral: Negative.        Objective:       Vitals:    02/07/17 0742   BP: (!) 150/84   Pulse: 73   SpO2: 99%   Weight: 77.1 kg (169 lb 15.6 oz)   Height: 5' 9" (1.753 m)     Physical Exam   Constitutional: He is oriented to person, place, and time. He appears well-developed and well-nourished. No distress.   HENT:   Head: Normocephalic and atraumatic.   Right Ear: External ear normal. Tympanic membrane is scarred. Tympanic membrane is not perforated, not erythematous, not retracted and not bulging. No middle ear effusion.   Left Ear: External ear and ear canal normal. Tympanic membrane is scarred. Tympanic membrane is not perforated, not erythematous, not retracted and not bulging.  No middle ear effusion.   Mouth/Throat: Oropharynx is clear and moist and mucous membranes are normal. No oropharyngeal exudate or posterior oropharyngeal erythema.   Equivocal redness in right EAC   Eyes: Conjunctivae and EOM are normal. Pupils are equal, round, and reactive to light.   Neck: Normal range of motion. Neck supple. No thyromegaly present.   Cardiovascular: Normal rate, regular rhythm and normal heart " sounds.  Exam reveals no gallop and no friction rub.    No murmur heard.  Pulmonary/Chest: Effort normal and breath sounds normal. No respiratory distress. He has no wheezes. He has no rhonchi. He has no rales.   Lymphadenopathy:     He has no cervical adenopathy.   Neurological: He is alert and oriented to person, place, and time.   Skin: He is not diaphoretic.       Assessment:       1. Controlled type 2 diabetes mellitus with diabetic neuropathy, with long-term current use of insulin    2. Essential hypertension    3. Postoperative hypothyroidism    4. Neuropathy    5. Otalgia, right    6. Benign non-nodular prostatic hyperplasia without lower urinary tract symptoms    7. Anxiety        Plan:           DM2 - Controlled when last tested. Chk new A1c and adjust meds PRN.     HTN - Above goal on irbesartan 75.  Increase this to 150 and add amlodipine.     Hypothyroidism - Dose of levothyroxine was too high and was lowered approx 4 weeks ago.  Repeat TSH in 2-4 weeks.       Neuropathy - Pt ran out of gabapentin approx 1 wk ago and hasn't noticed any difference w/o it.  He is not having sx of neuropathy at present.  D/c gabapentin.  Cont to monitor.     R otalgia - Unclear etiology.  Perhaps mild chronic otitis externa.  Pt was given steroid drops to try.      BPH - Doing well on Flomax.  Cont this.     Anxiety, insomnia - These are doing well and pt does not want to be on long-term BNZ.  D/c these.

## 2017-02-07 NOTE — PROGRESS NOTES
Subjective:      Patient ID: Rob Jones Jr. is a 61 y.o. male.    Chief Complaint: Diabetes Mellitus (PCP Dr. Ferris 11/3/16); Diabetic Foot Exam; and Routine Foot Care    Rob is a 61 y.o. male who presents to the clinic upon referral from Dr. Jo  for evaluation and treatment of diabetic feet. Rob has a past medical history of Anxiety; Back pain; Chronic hepatitis C; Diabetes mellitus; Hypertension; Postoperative hypothyroidism (11/3/2016); Primary hyperparathyroidism (1/18/2017); and Thyroid disease. Patient relates no major problem with feet. Only complaints today consist of thick, fungal nails. Notes he has tired and failed several topical treatments with none to little success.     PCP: Remi Weathers MD    Date Last Seen by PCP: 1/18/17, endocrine    Current shoe gear: Tennis shoes    Hemoglobin A1C   Date Value Ref Range Status   11/03/2016 6.6 (H) 4.5 - 6.2 % Final     Comment:     According to ADA guidelines, hemoglobin A1C <7.0% represents  optimal control in non-pregnant diabetic patients.  Different  metrics may apply to specific populations.   Standards of Medical Care in Diabetes - 2016.  For the purpose of screening for the presence of diabetes:  <5.7%     Consistent with the absence of diabetes  5.7-6.4%  Consistent with increasing risk for diabetes   (prediabetes)  >or=6.5%  Consistent with diabetes  Currently no consensus exists for use of hemoglobin A1C  for diagnosis of diabetes for children.     06/25/2009 6.5 (H) 4.0 - 6.2 % Final           Review of Systems   Constitution: Negative for chills, fever and malaise/fatigue.   Cardiovascular: Negative for chest pain, leg swelling, orthopnea and palpitations.   Respiratory: Negative for cough, shortness of breath and wheezing.    Skin: Positive for color change, dry skin and nail changes. Negative for itching, poor wound healing and rash.   Musculoskeletal: Negative for arthritis, gout, joint pain, joint swelling, muscle  weakness and myalgias.   Neurological: Positive for numbness, paresthesias and sensory change. Negative for disturbances in coordination, dizziness, focal weakness and tremors.           Objective:      Physical Exam   Cardiovascular:   Dorsalis pedis and posterior tibial pulses are diminished Bilaterally. Toes are cool to touch. Feet are warm proximally.There is decreased digital hair. Skin is atrophic, slightly hyperpigmented, and mildly edematous       Musculoskeletal:   Musculoskeletal:  Muscle strength is 5/5 in all groups bilaterally.  Metatarsophalangeal and subtalar range of motion are within normal limits without crepitus bilaterally. There is limitation of ankle dorsiflexion with knees extended and flexed Bilaterally.       Neurological:   Ashton-Dean 5.07 monofilamant testing is diminished both feet. Sharp/dull sensation diminished Bilaterally. Light touch absent Bilaterally.       Skin:   Toenails 1-5 bilaterally are elongated by 2-3 mm, thickened by 2-3 mm, discolored/yellowed, dystrophic, brittle with subungual debris. No incurvation. Mild xerosis noted, bilat. No open wounds.                 Assessment:       Encounter Diagnoses   Name Primary?    Type 2 diabetes, uncontrolled, with neuropathy Yes    Hammer toes of both feet     Gastrocnemius equinus, unspecified laterality     Onychomycosis due to dermatophyte          Plan:       Rob was seen today for diabetes mellitus, diabetic foot exam and routine foot care.    Diagnoses and all orders for this visit:    Type 2 diabetes, uncontrolled, with neuropathy  -     DIABETIC SHOES FOR HOME USE    Hammer toes of both feet  -     DIABETIC SHOES FOR HOME USE    Gastrocnemius equinus, unspecified laterality  -     DIABETIC SHOES FOR HOME USE    Onychomycosis due to dermatophyte      I counseled the patient on his conditions, their implications and medical management.        - Shoe inspection. Diabetic Foot Education. Patient reminded of the  importance of good nutrition and blood sugar control to help prevent podiatric complications of diabetes. Patient instructed on proper foot hygeine. We discussed wearing proper shoe gear, daily foot inspections, never walking without protective shoe gear, never putting sharp instruments to feet, routine podiatric visits every 6 months.      - With patient's permission, nails were aggressively reduced and debrided x 10 to their soft tissue attachment mechanically and with electric , removing all offending nail and debris. Patient relates relief following the procedure. He will continue to monitor the areas daily, inspect his feet, wear protective shoe gear when ambulatory, moisturizer to maintain skin integrity and follow in this office in approximately 6 months, sooner p.r.n.    - Discussed all treatment options such as topical, oral, laser treatments vs surgical removal of nail permanent vs non-permanent in detail pertaining to nail fungus  He will try vicks daily.

## 2017-02-07 NOTE — MR AVS SNAPSHOT
[facility]  [address]  [facility] [address]  Phone: [phone]  Fax: [phone]                  Rob Jones Jr.   2017 7:40 AM   Office Visit    Description:  Male : 1955   Provider:  Remi Weathers MD   Department:  [facility]           Reason for Visit     Diabetes           Diagnoses this Visit        Comments    Controlled type 2 diabetes mellitus with diabetic neuropathy, with long-term current use of insulin    -  Primary     Essential hypertension         Postoperative hypothyroidism         Neuropathy         Otalgia, right         Benign non-nodular prostatic hyperplasia without lower urinary tract symptoms         Anxiety                To Do List           Future Appointments        Provider Department Dept Phone    2017 9:15 AM LAB, [facility] [phone]    2017 9:15 AM Izabela Cifuentes OD [facility] - Optometry [phone]    2017 2:00 PM VAL [facility] - Hepatology [phone]    2017 1:00 PM LAB, [facility] [phone]    3/10/2017 8:30 AM LAB, [facility] [phone]      Goals (5 Years of Data)     None      Follow-Up and Disposition     Return in about 3 months (around 2017), or if symptoms worsen or fail to improve.       These Medications        Disp Refills Start End    irbesartan (AVAPRO) 150 MG tablet 90 tablet 3 2017     Take 1 tablet (150 mg total) by mouth once daily. - Oral    Pharmacy: [facility]/pharmacy #[id number] - [place] - [address] [facility] Ph #: [phone]       meloxicam (MOBIC) 15 MG tablet 90 tablet 1 2017     Take 1 tablet (15 mg total) by mouth daily as needed for Pain. - Oral    Pharmacy: [facility]/pharmacy #[id number] - [place] - [address] Ph #: [phone]       amlodipine (NORVASC) 10 MG tablet 90 tablet 3 2017     Take 1 tablet (10 mg total) by mouth  once daily. - Oral    Pharmacy: [facility]/pharmacy #[id number] - [place], [place] - [address] [facility] Ph #: [phone]       fluocinolone acetonide oil 0.01 % Drop 1 Bottle 0 2/7/2017     Put 3 drops in right ear twice daily for 1 week.    Pharmacy: [facility]/pharmacy #[id number] [facility] [place] - [address] Ph #: [phone]       tamsulosin (FLOMAX) 0.4 mg Cp24 90 capsule 3 2/7/2017     Take 1 capsule (0.4 mg total) by mouth once daily. - Oral    Pharmacy: [facility]/pharmacy #[id number] - [place], [place] - [address] [facility] Ph #: [phone]         [facility] On [facility]     [facility] On Call Nurse Care Line - 24/7 Assistance  Registered nurses in the [facility] provide clinical advisement, health education, appointment booking, and other advisory services.  Call for this free service at [phone].             Medications           Message regarding Medications     Verify the changes and/or additions to your medication regime listed below are the same as discussed with your clinician today.  If any of these changes or additions are incorrect, please notify your healthcare provider.        START taking these NEW medications        Refills    amlodipine (NORVASC) 10 MG tablet 3    Sig: Take 1 tablet (10 mg total) by mouth once daily.    Class: Normal    Route: Oral    fluocinolone acetonide oil 0.01 % Drop 0    Sig: Put 3 drops in right ear twice daily for 1 week.    Class: Normal      CHANGE how you are taking these medications     Start Taking Instead of    irbesartan (AVAPRO) 150 MG tablet irbesartan (AVAPRO) 75 MG tablet    Dosage:  Take 1 tablet (150 mg total) by mouth once daily. Dosage:  Take 1 tablet (75 mg total) by mouth every evening.    Reason for Change:  Reorder       STOP taking these medications     gabapentin (NEURONTIN) 100 MG capsule Take 100 mg by mouth 3 (three) times daily.    temazepam (RESTORIL) 30 mg capsule TAKE 1 CAPSULE BY MOUTH EVERY DAY AT BEDTIME AS NEEDED           Verify that  "the below list of medications is an accurate representation of the medications you are currently taking.  If none reported, the list may be blank. If incorrect, please contact your healthcare provider. Carry this list with you in case of emergency.           Current Medications     clonazePAM (KLONOPIN) 0.5 MG tablet Take 1 tablet (0.5 mg total) by mouth 2 (two) times daily as needed for Anxiety.    econazole nitrate 1 % cream Apply topically 2 (two) times daily.    fish oil-omega-3 fatty acids 300-1,000 mg capsule Take 2 g by mouth once daily.    hydrocodone-acetaminophen 10-325mg (NORCO)  mg Tab     insulin aspart (NOVOLOG FLEXPEN) 100 unit/mL InPn pen Inject 15 Units into the skin as needed.    insulin aspart (NOVOLOG) 100 unit/mL injection Inject 1 Units into the skin every evening.     insulin glargine (LANTUS SOLOSTAR) 100 unit/mL (3 mL) InPn pen Inject 15 Units into the skin every evening.    irbesartan (AVAPRO) 150 MG tablet Take 1 tablet (150 mg total) by mouth once daily.    ledipasvir-sofosbuvir (HARVONI)  mg Tab Take 1 tablet by mouth once daily.    levothyroxine (SYNTHROID) 112 MCG tablet Take 1 tablet (112 mcg total) by mouth before breakfast.    meloxicam (MOBIC) 15 MG tablet Take 1 tablet (15 mg total) by mouth daily as needed for Pain.    metformin (GLUCOPHAGE) 1000 MG tablet Take 1 tablet (1,000 mg total) by mouth 2 (two) times daily with meals.    nitroGLYCERIN 0.4 MG/HR TD PT24 (NITRODUR) 0.4 mg/hr Place 1 patch onto the skin continuous prn.    NOVOFINE 32 32 gauge x 1/4" Ndle 1 EACH BY MISC.(NON-DRUG COMBO ROUTE) ROUTE ONCE DAILY.    pen needle, diabetic (NOVOFINE PLUS) 32 gauge x 1/6" Ndle 1 each by Misc.(Non-Drug; Combo Route) route once daily.    tamsulosin (FLOMAX) 0.4 mg Cp24 Take 1 capsule (0.4 mg total) by mouth once daily.    vitamin D 1000 units Tab Take 185 mg by mouth once daily.    amlodipine (NORVASC) 10 MG tablet Take 1 tablet (10 mg total) by mouth once daily.    " "dicyclomine (BENTYL) 20 mg tablet Take 20 mg by mouth daily as needed.    fluocinolone acetonide oil 0.01 % Drop Put 3 drops in right ear twice daily for 1 week.           Clinical Reference Information           Your Vitals Were     BP Pulse Height Weight SpO2 BMI    150/84 73 5' 9" (1.753 m) 77.1 kg (169 lb 15.6 oz) 99% 25.1 kg/m2      Blood Pressure          Most Recent Value    BP  (!)  150/84      Allergies as of 2/7/2017     Tizanidine      Immunizations Administered on Date of Encounter - 2/7/2017     None      Orders Placed During Today's Visit      Normal Orders This Visit    Ambulatory Referral to Optometry     Future Labs/Procedures Expected by Expires    Hemoglobin A1c  2/7/2017 4/8/2018      Smoking Cessation     If you would like to quit smoking:   You may be eligible for free services if you are a [place] resident and started smoking cigarettes before September 1, 1988.  Call the Smoking Cessation Trust ([facility]) toll free at [phone] or [phone] [phone].   Call 5-011-QUIT-NOW if you do not meet the above criteria.            Language Assistance Services     ATTENTION: Language assistance services are available, free of charge. Please call [phone].      ATENCIÓN: Si habla danii, tiene a katz disposición servicios gratuitos de asistencia lingüística. Llame al [phone].     CHÚ Ý: N?u b?n nói Ti?ng Vi?t, có các d?ch v? h? tr? ngôn ng? mi?n phí dành cho b?n. G?i s? [phone].         [facility] complies with applicable Federal civil rights laws and does not discriminate on the basis of race, color, national origin, age, disability, or sex.        "

## 2017-02-08 ENCOUNTER — PATIENT MESSAGE (OUTPATIENT)
Dept: INTERNAL MEDICINE | Facility: CLINIC | Age: 62
End: 2017-02-08

## 2017-02-08 ENCOUNTER — OFFICE VISIT (OUTPATIENT)
Dept: OPTOMETRY | Facility: CLINIC | Age: 62
End: 2017-02-08
Payer: MEDICARE

## 2017-02-08 DIAGNOSIS — H25.13 NUCLEAR SCLEROSIS, BILATERAL: ICD-10-CM

## 2017-02-08 DIAGNOSIS — E11.9 TYPE 2 DIABETES MELLITUS WITHOUT OPHTHALMIC MANIFESTATIONS: Primary | ICD-10-CM

## 2017-02-08 DIAGNOSIS — H52.4 PRESBYOPIA: ICD-10-CM

## 2017-02-08 PROCEDURE — 99999 PR PBB SHADOW E&M-EST. PATIENT-LVL III: CPT | Mod: PBBFAC,,, | Performed by: OPTOMETRIST

## 2017-02-08 PROCEDURE — 92004 COMPRE OPH EXAM NEW PT 1/>: CPT | Mod: S$GLB,,, | Performed by: OPTOMETRIST

## 2017-02-08 PROCEDURE — 92015 DETERMINE REFRACTIVE STATE: CPT | Mod: S$GLB,,, | Performed by: OPTOMETRIST

## 2017-02-08 NOTE — LETTER
February 8, 2017      Remi Weathers MD  [address]  [facility] [address]           [facility]  [address]  [facility] [address]  Phone: [phone]  Fax: [phone]          Patient: Rob Jones Jr.   MR Number: [id number]   YOB: 1955   Date of Visit: 2/8/2017       Dear Dr. Remi Weathers:    Thank you for referring Rob Jones to me for evaluation. Attached you will find relevant portions of my assessment and plan of care.    If you have questions, please do not hesitate to call me. I look forward to following Rob Jones along with you.    Sincerely,    Izabela Cifuentes, OD    Enclosure  CC:  No Recipients    If you would like to receive this communication electronically, please contact [email or web address] or [phone] to request more information on [employer] access.    For providers and/or their staff who would like to refer a patient to [facility], please contact us through our one-stop-shop provider referral line, [facility], at [phone].    If you feel you have received this communication in error or would no longer like to receive these types of communications, please e-mail [email or web address]

## 2017-02-08 NOTE — PROGRESS NOTES
HPI     Last eye exam was approximately 2 years ago (Dr. Wilson).  Patient states no vision complaints with current glasses. Doesn't like   bright light especially oncoming headlights at night.  Patient denies diplopia, headaches, flashes/floaters, and pain.    Hemoglobin A1C       Date                     Value               Ref Range             Status                02/07/2017               7.3 (H)             4.5 - 6.2 %           Final                     Last edited by Roseann Sparks on 2/8/2017  9:01 AM.     ROS     Negative for: Constitutional, Gastrointestinal, Neurological, Skin,   Genitourinary, Musculoskeletal, HENT, Endocrine, Cardiovascular, Eyes,   Respiratory, Psychiatric, Allergic/Imm, Heme/Lymph    Last edited by Izabela Cifuentes, OD on 2/8/2017  9:58 AM. (History)        Assessment /Plan     For exam results, see Encounter Report.    Type 2 diabetes mellitus without ophthalmic manifestations    Nuclear sclerosis, bilateral    Presbyopia            1.  No retinopathy--monitor yearly.   Educated pt eye health correlates with blood sugar control.    2.  Educated on cataracts and affects on vision.  Early-monitor.  3.  Bifocal rx given        RTC 1 year for dm exam.

## 2017-02-10 ENCOUNTER — TELEPHONE (OUTPATIENT)
Dept: HEPATOLOGY | Facility: CLINIC | Age: 62
End: 2017-02-10

## 2017-02-10 NOTE — TELEPHONE ENCOUNTER
----- Message from Annette Wall RN sent at 2/9/2017 10:31 AM CST -----  Contact: pt  [facility].  Info in Epic already.  ----- Message -----     From: Radha Maxwell     Sent: 2/9/2017  10:13 AM       To: [facility] Hepatitis C Staff    Want to update you on medicines that his primary doctor prescribed him fluocinolone acetonide oil 0.01 % Drop,amlodipine (NORVASC) 10 MG tablet and he went up on his irbesartan (AVAPRO) 150 MG tablet if needed pt can be reached at

## 2017-02-13 ENCOUNTER — OFFICE VISIT (OUTPATIENT)
Dept: HEPATOLOGY | Facility: CLINIC | Age: 62
End: 2017-02-13
Payer: MEDICARE

## 2017-02-13 VITALS
OXYGEN SATURATION: 99 % | DIASTOLIC BLOOD PRESSURE: 76 MMHG | HEIGHT: 70 IN | HEART RATE: 60 BPM | BODY MASS INDEX: 24.2 KG/M2 | RESPIRATION RATE: 16 BRPM | TEMPERATURE: 96 F | WEIGHT: 169 LBS | SYSTOLIC BLOOD PRESSURE: 129 MMHG

## 2017-02-13 DIAGNOSIS — B18.2 CHRONIC HEPATITIS C WITHOUT HEPATIC COMA: Primary | ICD-10-CM

## 2017-02-13 PROCEDURE — 3074F SYST BP LT 130 MM HG: CPT | Mod: S$GLB,,, | Performed by: PHYSICIAN ASSISTANT

## 2017-02-13 PROCEDURE — 3078F DIAST BP <80 MM HG: CPT | Mod: S$GLB,,, | Performed by: PHYSICIAN ASSISTANT

## 2017-02-13 PROCEDURE — 99499 UNLISTED E&M SERVICE: CPT | Mod: S$PBB,,, | Performed by: PHYSICIAN ASSISTANT

## 2017-02-13 PROCEDURE — 99999 PR PBB SHADOW E&M-EST. PATIENT-LVL IV: CPT | Mod: PBBFAC,,, | Performed by: PHYSICIAN ASSISTANT

## 2017-02-13 PROCEDURE — 99213 OFFICE O/P EST LOW 20 MIN: CPT | Mod: S$GLB,,, | Performed by: PHYSICIAN ASSISTANT

## 2017-02-13 NOTE — PROGRESS NOTES
HEPATOLOGY CLINIC VISIT NOTE - HCV clinic    REFERRING PROVIDER: Dr. Remi Weathers     CHIEF COMPLAINT: Hepatitis C    HISTORY: This is a 61 y.o. Black or  male with chronic hepatitis C who returns for follow up. He is currently on week 10 of HCV treatment with Harvoni ([place], [place], HCV RNA >6 million). He reports treatment is going well. He denies side effects, he denies missed doses. His week 4 HCV RNA was <12, detected.     PMH significant for CHD, CAD,  HTN, DJD, DMII, hypothyroidism and h/o pheochromocytoma and h/o graves disease- s/p thyroidectomy.     HCV history:  Genotype 1a  Pre treatment HCV RNA: 12,885,504 IU/mL  Week 4 HCV RNA: <12, detected  Treatment naive    Risk Factors:  Blood transfusion- (-)  Tattoos- (+), homemade   IV/ELAINA- (+) ELAINA in 70s     Liver staging:  Fibroscan F0-F1  Normal transaminase   Tbili WNL  Albumin >3.5  PLTs >150    Denies jaundice, dark urine, abdominal distention, hematemesis, melena, slowed mentation.   No abnormal skin rashes. No generalized joint pain.                   Past Medical History   Diagnosis Date    Anxiety     Back pain     Cataract     Chronic hepatitis C     Diabetes mellitus     Hypertension     Postoperative hypothyroidism 11/3/2016    Primary hyperparathyroidism 1/18/2017    Thyroid disease      Past Surgical History   Procedure Laterality Date    Lumbar fusion      Tonsillectomy      Thyroidectomy       FAMILY HISTORY: Negative for liver disease    SOCIAL HISTORY:   History   Smoking Status    Current Every Day Smoker   Smokeless Tobacco    Not on file   5 cigarettes per day, wants to quit soon     History   Alcohol Use    Yes    socially,     History   Drug Use No     ROS:   No fever, chills, weight loss, fatigue  No chest pain, palpitations, dyspnea, cough  No abdominal pain, change in bowel pattern  No skin rashes   No lower extremity edema  No depression or anxiety       PHYSICAL EXAM:  Friendly Black or   male, in no acute distress; alert and oriented to person, place and time  VITALS: reviewed  HEENT: Sclerae anicteric.   NECK: Supple  LUNGS: Normal respiratory effort.   ABDOMEN: Flat, soft, nontender. No organomegaly or masses. No ascites or hernias.   SKIN: Warm and dry. No jaundice, No obvious rashes.   EXTREMITIES: No lower extremity edema  NEURO/PSYCH: Normal gate. Memory intact. Thought and speech pattern appropriate. Behavior normal. No depression or anxiety noted.    RECENT LABS:  Lab Results   Component Value Date    WBC 4.60 11/11/2016    HGB 12.6 (L) 11/11/2016     11/11/2016     Lab Results   Component Value Date    INR 0.9 11/11/2016     Lab Results   Component Value Date    AST 15 01/26/2017    ALT 10 01/26/2017    BILITOT 0.2 01/26/2017    ALBUMIN 4.0 01/26/2017    ALKPHOS 107 01/26/2017    CREATININE 1.1 01/26/2017    BUN 14 01/26/2017     01/26/2017    K 4.6 01/26/2017     RECENT IMAGING:  tpCT  Narrative   CT abdomen with and without contrast.    Findings: 75 mL of Omnipaque 350 IV contrast was administered for today's examination.    The visualized portion of the base of the lungs is significant for bilateral dependent atelectatic versus fibrotic change.  The visualized portion of the heart is significant for calcification of the right coronary artery.  The stomach, spleen, pancreas, liver, and left adrenal gland are unremarkable.  The gallbladder is contracted but otherwise unremarkable.  There is nonspecific thickening of the right adrenal gland.  The kidneys have a normal appearance.  The kidneys appropriately concentrate and excrete contrast on the delayed images.  The visualized loops of bowel have a normal appearance.  The osseous structures demonstrate degenerative change.  There is postoperative change identified within the lower lumbar spine.   Impression    No acute findings.     U/S abdomen 11/11/16  Narrative   Clinical history: Hepatitis    No comparison.    Findings: The  soft tissues adjacent to the pancreatic head demonstrate a 2 x 0.9 x 1.1 cm hypoechoic focus most compatible with an enlarged peripancreatic lymph node.  The pancreas itself demonstrates no significant abnormality in its visualized portion.  The aorta is non-aneurysmal.  The gallbladder demonstrates no gallstones.  The common duct is 2 mm, not dilated.    The liver is normal in size measuring 15.4 cm.  It demonstrates no focal abnormality.    Kidneys have a normal size and appearance.  The right kidney measures 9.7 cm and the left kidney measures 9.9 cm.  The spleen is not enlarged, 9.6 cm.  There is no ascites.   Impression    Prominent perihepatic lymph node but an otherwise unremarkable examination.       ASSESSMENT  61 y.o. Black or  male with:  1. CHRONIC HEPATITIS C, GENOTYPE 1a- currently on week 10 of Harvoni   -- Prior HCV treatment - none, naive   -- Elevated transaminases  -- high viral load, will treat for 12 weeks given HCV RNA >6 million   -- (+) immunity to HAV and HBV     2. PERIPANCREATIC LYMPH NODE  -- >2 cm, tpCT unremarkable  -- likely reactive due to HCV    EDUCATION:  Discussed that SVR 12=cure   Discussed that there's still a small chance the virus could return between now and then    Discussed that the HCV AB will remain positive for life, that he will be unable to donate blood, and that it does not give immunity. He could become reinfected if ever exposed.     PLAN:  1. Continue Harvoni until treatment complete  2. Await SVR 12 labs    Duration of visit: 35 minutes     With >50% of visit spent on counseling pt on HCV end of treatment    iJmmy Vale PA-C

## 2017-02-24 ENCOUNTER — LAB VISIT (OUTPATIENT)
Dept: LAB | Facility: OTHER | Age: 62
End: 2017-02-24
Attending: INTERNAL MEDICINE
Payer: MEDICARE

## 2017-02-24 ENCOUNTER — EPISODE CHANGES (OUTPATIENT)
Dept: HEPATOLOGY | Facility: CLINIC | Age: 62
End: 2017-02-24

## 2017-02-24 DIAGNOSIS — B18.2 CHRONIC HEPATITIS C WITHOUT HEPATIC COMA: ICD-10-CM

## 2017-02-24 LAB
ALBUMIN SERPL BCP-MCNC: 4.1 G/DL
ALP SERPL-CCNC: 85 U/L
ALT SERPL W/O P-5'-P-CCNC: 9 U/L
ANION GAP SERPL CALC-SCNC: 9 MMOL/L
AST SERPL-CCNC: 15 U/L
BILIRUB SERPL-MCNC: 0.4 MG/DL
BUN SERPL-MCNC: 18 MG/DL
CALCIUM SERPL-MCNC: 11.6 MG/DL
CHLORIDE SERPL-SCNC: 105 MMOL/L
CO2 SERPL-SCNC: 24 MMOL/L
CREAT SERPL-MCNC: 1.1 MG/DL
EST. GFR  (AFRICAN AMERICAN): >60 ML/MIN/1.73 M^2
EST. GFR  (NON AFRICAN AMERICAN): >60 ML/MIN/1.73 M^2
GLUCOSE SERPL-MCNC: 121 MG/DL
POTASSIUM SERPL-SCNC: 5.4 MMOL/L
PROT SERPL-MCNC: 8 G/DL
SODIUM SERPL-SCNC: 138 MMOL/L

## 2017-02-24 PROCEDURE — 87522 HEPATITIS C REVRS TRNSCRPJ: CPT

## 2017-02-24 PROCEDURE — 80053 COMPREHEN METABOLIC PANEL: CPT

## 2017-02-24 PROCEDURE — 36415 COLL VENOUS BLD VENIPUNCTURE: CPT

## 2017-02-27 ENCOUNTER — EPISODE CHANGES (OUTPATIENT)
Dept: HEPATOLOGY | Facility: CLINIC | Age: 62
End: 2017-02-27

## 2017-02-27 LAB
HCV LOG: <1.08 LOG (10) IU/ML
HCV RNA QUANT PCR: <12 IU/ML
HCV, QUALITATIVE: NOT DETECTED IU/ML

## 2017-03-01 ENCOUNTER — TELEPHONE (OUTPATIENT)
Dept: HEPATOLOGY | Facility: CLINIC | Age: 62
End: 2017-03-01

## 2017-03-01 DIAGNOSIS — B18.2 CHRONIC HEPATITIS C WITHOUT HEPATIC COMA: Primary | ICD-10-CM

## 2017-03-01 NOTE — TELEPHONE ENCOUNTER
MA called pt to relay provider (Jimmy Vale) message. Call unsuccessful. LVM with contact number for pt to return call.Lp

## 2017-03-01 NOTE — TELEPHONE ENCOUNTER
I spoke with patient and message from JESS Vale relayed and mailed to him.  It was stressed that he f/u with PCP regarding Ca and K+.  Lab scheduled 5/19/17; reminder notice mailed.

## 2017-03-01 NOTE — TELEPHONE ENCOUNTER
HCV LAB REVIEW  Week 12 of Harvoni, End of treatment   Baseline HCV RNA >6 million    Pertinent labs:  CMP: stable, Ca mildly elevated, K mildly elevated  HCV RNA: <12, not detected    pls call pt:  Labs look good.His HCV was too low for the lab to detect. We will check it again in 12 weeks to determine a cure. His Ca and K have been mildly elevated. I would suggest he follow up with PCP for this.     Next labs due/Please schedule:  HCV RNA in 12 weeks- SVR 12: 05/19/17

## 2017-03-13 ENCOUNTER — OFFICE VISIT (OUTPATIENT)
Dept: SPINE | Facility: CLINIC | Age: 62
End: 2017-03-13
Payer: MEDICARE

## 2017-03-13 VITALS
WEIGHT: 169 LBS | HEIGHT: 70 IN | SYSTOLIC BLOOD PRESSURE: 122 MMHG | BODY MASS INDEX: 24.2 KG/M2 | HEART RATE: 72 BPM | DIASTOLIC BLOOD PRESSURE: 62 MMHG

## 2017-03-13 DIAGNOSIS — M47.12 CERVICAL SPONDYLOSIS WITH MYELOPATHY: ICD-10-CM

## 2017-03-13 DIAGNOSIS — M54.42 BILATERAL LOW BACK PAIN WITH LEFT-SIDED SCIATICA, UNSPECIFIED CHRONICITY: ICD-10-CM

## 2017-03-13 DIAGNOSIS — R26.89 BALANCE PROBLEMS: ICD-10-CM

## 2017-03-13 DIAGNOSIS — M25.512 ACUTE PAIN OF LEFT SHOULDER: ICD-10-CM

## 2017-03-13 DIAGNOSIS — Z98.1 HISTORY OF LUMBAR FUSION: Primary | ICD-10-CM

## 2017-03-13 DIAGNOSIS — M54.12 BRACHIAL NEURITIS: ICD-10-CM

## 2017-03-13 DIAGNOSIS — M54.2 NECK PAIN: ICD-10-CM

## 2017-03-13 DIAGNOSIS — M54.14 THORACIC AND LUMBOSACRAL NEURITIS: ICD-10-CM

## 2017-03-13 DIAGNOSIS — M54.17 THORACIC AND LUMBOSACRAL NEURITIS: ICD-10-CM

## 2017-03-13 DIAGNOSIS — M48.02 CERVICAL STENOSIS OF SPINAL CANAL: ICD-10-CM

## 2017-03-13 PROCEDURE — 99999 PR PBB SHADOW E&M-EST. PATIENT-LVL V: CPT | Mod: PBBFAC,,, | Performed by: PHYSICIAN ASSISTANT

## 2017-03-13 PROCEDURE — 3078F DIAST BP <80 MM HG: CPT | Mod: S$GLB,,, | Performed by: PHYSICIAN ASSISTANT

## 2017-03-13 PROCEDURE — 99214 OFFICE O/P EST MOD 30 MIN: CPT | Mod: S$GLB,,, | Performed by: PHYSICIAN ASSISTANT

## 2017-03-13 PROCEDURE — 3074F SYST BP LT 130 MM HG: CPT | Mod: S$GLB,,, | Performed by: PHYSICIAN ASSISTANT

## 2017-03-13 PROCEDURE — 1160F RVW MEDS BY RX/DR IN RCRD: CPT | Mod: S$GLB,,, | Performed by: PHYSICIAN ASSISTANT

## 2017-03-13 NOTE — MR AVS SNAPSHOT
[facility]  [address] [facility] [address] [address] [address]  [address]  [facility] [place] [address]  Phone: [phone]  Fax: [phone]                  Rob Jones Jr.   3/13/2017 1:30 PM   Office Visit    Description:  Male : 1955   Provider:  Emy White PA-C   Department:  [facility]           Reason for Visit     Follow-up           Diagnoses this Visit        Comments    History of lumbar fusion    -  Primary     Neck pain         Cervical stenosis of spinal canal         Brachial neuritis         Cervical spondylosis with myelopathy         Balance problems         Thoracic and lumbosacral neuritis         Bilateral low back pain with left-sided sciatica, unspecified chronicity         Acute pain of left shoulder                To Do List           Future Appointments        Provider Department Dept Phone    3/17/2017 9:00 AM Doris Jo MD [facility] Endo/Diab/Metab [phone]    2017 8:40 AM Remi Weathers MD [facility] Internal Medicine [phone]    2017 1:00 PM LAB, [facility] [phone]      Goals (5 Years of Data)     None      Follow-Up and Disposition     Return in 2 weeks (on 3/27/2017).      [facility]     [facility] Care Line - 24/ Assistance  Registered nurses in the [facility] provide clinical advisement, health education, appointment booking, and other advisory services.  Call for this free service at [phone].             Medications           Message regarding Medications     Verify the changes and/or additions to your medication regime listed below are the same as discussed with your clinician today.  If any of these changes or additions are incorrect, please notify your healthcare provider.             Verify that the below list of medications is an accurate representation of the medications you are currently taking.  If none reported, the list may be blank. If incorrect, please contact your  "healthcare provider. Carry this list with you in case of emergency.           Current Medications     amlodipine (NORVASC) 10 MG tablet Take 1 tablet (10 mg total) by mouth once daily.    dicyclomine (BENTYL) 20 mg tablet Take 20 mg by mouth daily as needed.    econazole nitrate 1 % cream Apply topically 2 (two) times daily.    fish oil-omega-3 fatty acids 300-1,000 mg capsule Take 2 g by mouth once daily.    fluocinolone acetonide oil 0.01 % Drop Put 3 drops in right ear twice daily for 1 week.    hydrocodone-acetaminophen 10-325mg (NORCO)  mg Tab     insulin aspart (NOVOLOG FLEXPEN) 100 unit/mL InPn pen Inject 15 Units into the skin as needed.    insulin aspart (NOVOLOG) 100 unit/mL injection Inject 1 Units into the skin every evening.     insulin glargine (LANTUS SOLOSTAR) 100 unit/mL (3 mL) InPn pen Inject 15 Units into the skin every evening.    irbesartan (AVAPRO) 150 MG tablet Take 1 tablet (150 mg total) by mouth once daily.    levothyroxine (SYNTHROID) 112 MCG tablet Take 1 tablet (112 mcg total) by mouth before breakfast.    meloxicam (MOBIC) 15 MG tablet Take 1 tablet (15 mg total) by mouth daily as needed for Pain.    metformin (GLUCOPHAGE) 1000 MG tablet Take 1 tablet (1,000 mg total) by mouth 2 (two) times daily with meals.    nitroGLYCERIN 0.4 MG/HR TD PT24 (NITRODUR) 0.4 mg/hr Place 1 patch onto the skin continuous prn.    NOVOFINE 32 32 gauge x 1/4" Ndle 1 EACH BY MISC.(NON-DRUG COMBO ROUTE) ROUTE ONCE DAILY.    pen needle, diabetic (NOVOFINE PLUS) 32 gauge x 1/6" Ndle 1 each by Misc.(Non-Drug; Combo Route) route once daily.    tamsulosin (FLOMAX) 0.4 mg Cp24 Take 1 capsule (0.4 mg total) by mouth once daily.    vitamin D 1000 units Tab Take 185 mg by mouth once daily.           Clinical Reference Information           Your Vitals Were     BP Pulse Height Weight BMI    122/62 72 5' 10" (1.778 m) 76.7 kg (169 lb) 24.25 kg/m2      Blood Pressure          Most Recent Value    BP  122/62    "   Allergies as of 3/13/2017     Tizanidine      Immunizations Administered on Date of Encounter - 3/13/2017     None      Orders Placed During Today's Visit      Normal Orders This Visit    Ambulatory Referral to Orthopedics     Ambulatory referral to Pain Clinic     Future Labs/Procedures Expected by Expires    NM Bone Scan Spect  3/13/2017 3/13/2018      Smoking Cessation     If you would like to quit smoking:   You may be eligible for free services if you are a [place] resident and started smoking cigarettes before September 1, 1988.  Call the Smoking Cessation Trust ([facility]) toll free at ([phone] [phone] or [phone].   Call 4-073-QUIT-NOW if you do not meet the above criteria.            Language Assistance Services     ATTENTION: Language assistance services are available, free of charge. Please call [phone].      ATENCIÓN: Si habla danii, tiene a katz disposición servicios gratuitos de asistencia lingüística. Llame al [phone].     CHÚ Ý: N?u b?n nói Ti?ng Vi?t, có các d?ch v? h? tr? ngôn ng? mi?n phí dành cho b?n. G?i s? [phone].         [facility] complies with applicable Federal civil rights laws and does not discriminate on the basis of race, color, national origin, age, disability, or sex.

## 2017-03-13 NOTE — PROGRESS NOTES
Subjective:      Patient ID: Rob Jones Jr. is a 61 y.o. male.    Chief Complaint: Follow-up      HPI     He presents today for a follow up visit to review his thoracic MRI, his lumbar XRs, and his progress after cervical DWAIN.     He had right C6-C7 TF DWAIN with IR on 2/6/17 and noted at least 50% improvement in his neck and right arm pain. Primary complaint today is constant LBP and left shoulder pain. History of left shoulder surgery years ago. History of lumbar fusion in 2009 at [facility]. Now with constant LBP with intermittent left lateral leg pain to his knee. LBP > left leg pain. No right leg pain. He notes weakness in left leg since his surgery. Pain is better with pain medication, but he is almost out of it. No known aggravating factors. He rates his pain as a 9 on a scale of 1-10. No numbness or tingling. Pain is burning in nature. No previous improvement with lumbar PT. Had ESIs prior to surgery. He is on mobic and norco with some improvement.     He continues to complain of balance issues, problems with hand dexterity, and frequent dropping of items. These symptoms have not worsened since last visit.       Review of Systems   Constitution: Negative for chills, fever, night sweats and weight gain.   Gastrointestinal: Negative for bowel incontinence, nausea and vomiting.   Genitourinary: Negative for bladder incontinence.   Neurological: Positive for loss of balance. Negative for disturbances in coordination.           Objective:        General: Rob is well-developed, well-nourished, appears stated age, in no acute distress, alert and oriented to time, place and person.     Ortho/SPM Exam     Patient sits comfortably in the exam room and answers questions appropriately. Grossly patient is able to move bilateral UEs/LEs without difficulty. Ambulates normally.     He has mild tenderness about the left shoulder. Limited ROM of shoulder due to pain. Pain with stress of RC. Positive impingement sign.        XRAY INTERPRETATION:  MRI of thoracic spine dated 1/25/17 is personally reviewed and shows mild central stenosis T2-T3 with severe right lateral recess stenosis. Mild central stenosis T10-T11.     X-rays of lumbar spine (AP/lat/flex/ext) dated 1/25/17 are personally reviewed and show uninstrumented lumbar fusion L4-S1 with interbodies.     Above imaging reviewed with Dr. Liz.         Assessment:       1. History of lumbar fusion    2. Neck pain    3. Cervical stenosis of spinal canal    4. Brachial neuritis    5. Cervical spondylosis with myelopathy    6. Balance problems    7. Thoracic and lumbosacral neuritis    8. Bilateral low back pain with left-sided sciatica, unspecified chronicity    9. Acute pain of left shoulder           Plan:       Orders Placed This Encounter    NM Bone Scan Spect    Ambulatory Referral to Orthopedics    Ambulatory referral to Pain Clinic       Improvement in neck and right arm pain s/p DWAIN. Known multilevel right foraminal stenosis with minimal central stenosis C4-C6 and moderate central stenosis C6-C7. Primary complaint of constant LBP and left shoulder pain. History of left shoulder surgery years ago. History of lumbar fusion in 2009 at [facility]. Now with constant LBP and intermittent left lateral leg pain to his knee. LBP > left leg pain. No right leg pain. Fusion looks good on XR. Also with persistent balance issues which have not changed. Treatment options reviewed with Dr. Liz and patient. Above thoracic MRI and lumbar XRs reviewed with patient as well. Following plan made:     - Bone scan to further evaluate LBP. Depending on results, may consider ESIs/facet injections.   - Can repeat right C6-C7 TF DWAIN with IR if neck and right arm pain get worse. He is aware this will elevate his blood sugars short term.   - Balance issues likely are from cervical spine. Consider ACDF only if they progress.   - Referral to ortho for evaluation of left shoulder pain. History of  surgery years ago. Pain recreated with limited ROM of left shoulder on exam.   - Continue mobic from other providers. No change since he ran out of neurontin. Would not restart.   - He requests pain management referral for help with management of his chronic pain. This was set up for him.   - He will f/u after bone scan.     Follow-up: Return in 2 weeks (on 3/27/2017). If there are any questions prior to this, the patient was instructed to contact the office.

## 2017-03-17 ENCOUNTER — TELEPHONE (OUTPATIENT)
Dept: ORTHOPEDICS | Facility: CLINIC | Age: 62
End: 2017-03-17

## 2017-03-17 DIAGNOSIS — R52 PAIN: Primary | ICD-10-CM

## 2017-03-20 ENCOUNTER — OFFICE VISIT (OUTPATIENT)
Dept: ORTHOPEDICS | Facility: CLINIC | Age: 62
End: 2017-03-20
Payer: MEDICARE

## 2017-03-20 ENCOUNTER — HOSPITAL ENCOUNTER (OUTPATIENT)
Dept: RADIOLOGY | Facility: OTHER | Age: 62
Discharge: HOME | End: 2017-03-20
Attending: ORTHOPAEDIC SURGERY
Payer: MEDICARE

## 2017-03-20 ENCOUNTER — OFFICE VISIT (OUTPATIENT)
Dept: PAIN MEDICINE | Facility: CLINIC | Age: 62
End: 2017-03-20
Attending: ANESTHESIOLOGY
Payer: MEDICARE

## 2017-03-20 VITALS
BODY MASS INDEX: 24.2 KG/M2 | TEMPERATURE: 98 F | DIASTOLIC BLOOD PRESSURE: 92 MMHG | SYSTOLIC BLOOD PRESSURE: 158 MMHG | HEART RATE: 78 BPM | HEIGHT: 70 IN | WEIGHT: 169 LBS

## 2017-03-20 VITALS
BODY MASS INDEX: 24.2 KG/M2 | WEIGHT: 169 LBS | HEIGHT: 70 IN | HEART RATE: 66 BPM | SYSTOLIC BLOOD PRESSURE: 142 MMHG | RESPIRATION RATE: 18 BRPM | DIASTOLIC BLOOD PRESSURE: 77 MMHG

## 2017-03-20 DIAGNOSIS — Z98.1 S/P LUMBAR FUSION: Primary | ICD-10-CM

## 2017-03-20 DIAGNOSIS — M19.012 GLENOHUMERAL ARTHRITIS, LEFT: Primary | ICD-10-CM

## 2017-03-20 DIAGNOSIS — G89.29 CHRONIC LEFT SHOULDER PAIN: ICD-10-CM

## 2017-03-20 DIAGNOSIS — R52 PAIN: ICD-10-CM

## 2017-03-20 DIAGNOSIS — M25.512 CHRONIC LEFT SHOULDER PAIN: ICD-10-CM

## 2017-03-20 DIAGNOSIS — M54.12 CERVICAL RADICULOPATHY: ICD-10-CM

## 2017-03-20 DIAGNOSIS — M48.02 CERVICAL STENOSIS OF SPINAL CANAL: ICD-10-CM

## 2017-03-20 DIAGNOSIS — M19.90 OSTEOARTHRITIS, UNSPECIFIED OSTEOARTHRITIS TYPE, UNSPECIFIED SITE: ICD-10-CM

## 2017-03-20 PROCEDURE — 99999 PR PBB SHADOW E&M-EST. PATIENT-LVL IV: CPT | Mod: PBBFAC,,, | Performed by: ANESTHESIOLOGY

## 2017-03-20 PROCEDURE — 3078F DIAST BP <80 MM HG: CPT | Mod: S$GLB,,, | Performed by: ANESTHESIOLOGY

## 2017-03-20 PROCEDURE — 99999 PR PBB SHADOW E&M-EST. PATIENT-LVL IV: CPT | Mod: PBBFAC,,, | Performed by: PHYSICIAN ASSISTANT

## 2017-03-20 PROCEDURE — 3077F SYST BP >= 140 MM HG: CPT | Mod: S$GLB,,, | Performed by: ANESTHESIOLOGY

## 2017-03-20 PROCEDURE — 1160F RVW MEDS BY RX/DR IN RCRD: CPT | Mod: S$GLB,,, | Performed by: PHYSICIAN ASSISTANT

## 2017-03-20 PROCEDURE — 20610 DRAIN/INJ JOINT/BURSA W/O US: CPT | Mod: LT,S$GLB,, | Performed by: PHYSICIAN ASSISTANT

## 2017-03-20 PROCEDURE — 99203 OFFICE O/P NEW LOW 30 MIN: CPT | Mod: 25,S$GLB,, | Performed by: PHYSICIAN ASSISTANT

## 2017-03-20 PROCEDURE — 3077F SYST BP >= 140 MM HG: CPT | Mod: S$GLB,,, | Performed by: PHYSICIAN ASSISTANT

## 2017-03-20 PROCEDURE — 3078F DIAST BP <80 MM HG: CPT | Mod: S$GLB,,, | Performed by: PHYSICIAN ASSISTANT

## 2017-03-20 PROCEDURE — 99499 UNLISTED E&M SERVICE: CPT | Mod: S$PBB,,, | Performed by: PHYSICIAN ASSISTANT

## 2017-03-20 PROCEDURE — 99204 OFFICE O/P NEW MOD 45 MIN: CPT | Mod: S$GLB,,, | Performed by: ANESTHESIOLOGY

## 2017-03-20 PROCEDURE — 73030 X-RAY EXAM OF SHOULDER: CPT | Mod: 26,LT,, | Performed by: RADIOLOGY

## 2017-03-20 PROCEDURE — 1160F RVW MEDS BY RX/DR IN RCRD: CPT | Mod: S$GLB,,, | Performed by: ANESTHESIOLOGY

## 2017-03-20 PROCEDURE — 99499 UNLISTED E&M SERVICE: CPT | Mod: S$PBB,,, | Performed by: ANESTHESIOLOGY

## 2017-03-20 RX ORDER — HYDROCODONE BITARTRATE AND ACETAMINOPHEN 10; 325 MG/1; MG/1
1 TABLET ORAL EVERY 12 HOURS PRN
Qty: 60 TABLET | Refills: 0 | Status: SHIPPED | OUTPATIENT
Start: 2017-03-20 | End: 2017-04-20 | Stop reason: SDUPTHER

## 2017-03-20 RX ORDER — TRIAMCINOLONE ACETONIDE 40 MG/ML
80 INJECTION, SUSPENSION INTRA-ARTICULAR; INTRAMUSCULAR
Status: COMPLETED | OUTPATIENT
Start: 2017-03-20 | End: 2017-03-20

## 2017-03-20 RX ORDER — GABAPENTIN 300 MG/1
300 CAPSULE ORAL 3 TIMES DAILY
Qty: 90 CAPSULE | Refills: 11 | Status: SHIPPED | OUTPATIENT
Start: 2017-03-20 | End: 2017-11-07 | Stop reason: SDUPTHER

## 2017-03-20 RX ADMIN — TRIAMCINOLONE ACETONIDE 80 MG: 40 INJECTION, SUSPENSION INTRA-ARTICULAR; INTRAMUSCULAR at 02:03

## 2017-03-20 NOTE — PROGRESS NOTES
Chronic Pain - New Consult    Referring Physician: Benja Liz MD    Chief Complaint:   Chief Complaint   Patient presents with    Joint Pain        SUBJECTIVE: Disclaimer: This note has been generated using voice-recognition software. There may be typographical errors that have been missed during proof-reading    Initial encounter:    Rob Jones Jr. presents to the clinic for the evaluation of neck, shoulder and lower back with lower extremities pain. The pain started years ago and symptoms have been worsening.    Brief history:   patient recently evaluated in neurosurgery is scheduled for a nuclear medicine bone scan, possible workup for ACDF secondary to severe central stenosis of the cervical spine with radiculopathy.  The patient had a previous cervical transverse foraminal epidural steroid injection with greater than 50% improvement in his radicular symptoms into his right upper extremity although he continues to have what appears to be rotator cuff syndrome of the left shoulder and is scheduled for orthopedic evaluation soon.    Pain Description:    The current worst pain is located in the left shoulder area    At BEST  10/10     At WORST  10/10 on the WORST day.      On average pain is rated as 10/10.     Today the pain is rated as 10/10    The pain is described as aching, sharp, shooting, throbbing and tingling      Symptoms interfere with daily activity, sleeping and work.     Exacerbating factors: Sitting, Standing, Laying, Bending, Extension, Flexing, Lifting and Getting out of bed/chair.      Mitigating factors medications and injection.     Patient denies urinary incontinence and bowel incontinence.  Patient denies any suicidal or homicidal ideations    Pain Medications:  Current:  Hydrocodone/acetaminophen    Tried in Past:  NSAIDs -Never  TCA -Never  SNRI -Never  Anti-convulsants  Gabapentin in the past, unsure of dose or efficacy  Muscle Relaxants -Never  Opioids-Never    Physical  Therapy/Home Exercise: no       report:  Reviewed and consistent with medication use as prescribed.    Pain Procedures:   right C6-C7 TF DWAIN with IR on 2/6/17     Chiropractor -never  Acupuncture - never  TENS unit -never  Spinal decompression -previous lumbar surgery  Joint replacement -never    Imaging:   MRI thoracic spine 1/23/2017  Narrative   MRI thoracic spine    Technique: MRI thoracic spine performed without contrast utilizing the following sequences: Localizer; sagittal T1, T2, and STIR; axial T1 and T2.    Comparison: None.    Findings: Vertebral bodies demonstrate preserved height and alignment.  Bone marrow signal is maintained.  There is multilevel mild disc height loss.  Spinal cord demonstrates normal signal and morphology.  The conus terminates at L1.  Limited evaluation of posterior thoracic structures is unremarkable.    T1-T2: Marginal osteophytes and facet arthropathy result in moderate right, mild left neuroforaminal narrowing.    T2-T3: Facet arthropathy and marginal osteophyte production resulting in mild spinal canal stenosis with severe narrowing of the right lateral recess and neural foramen.    T3-T4: Facet arthropathy and marginal osteophyte production resulting in mild left neuroforaminal narrowing.    T5-T6: Facet arthropathy results in moderate right neuroforaminal narrowing.    T6-T7: Circumferential disc bulge effaces the ventral thecal sac.    T7-T8: Circumferential disc bulge effaces the ventral thecal sac.    T8-T9: Circumferential disc bulge effaces the ventral thecal sac.    T10-T11: Circumferential disc bulge and bilateral facet arthropathy result in mild spinal canal stenosis.    T11-T12: Left-sided facet arthropathy and productive changes result in mild left neuroforaminal narrowing.   Impression    Degenerative changes of the thoracic spine as detailed above.  No evidence for spinal cord edema or myelomalacia.         Past Medical History:   Diagnosis Date    Anxiety      Back pain     Cataract     Chronic hepatitis C     Diabetes mellitus     Hypertension     Postoperative hypothyroidism 11/3/2016    Primary hyperparathyroidism 1/18/2017    Thyroid disease      Past Surgical History:   Procedure Laterality Date    LUMBAR FUSION      THYROIDECTOMY      TONSILLECTOMY       Social History     Social History    Marital status: Single     Spouse name: N/A    Number of children: N/A    Years of education: N/A     Occupational History    Disabled, pt says from [place] and now for back, DM2      Social History Main Topics    Smoking status: Current Every Day Smoker    Smokeless tobacco: Not on file    Alcohol use Yes    Drug use: No    Sexual activity: Not on file     Other Topics Concern    Not on file     Social History Narrative    Lives alone.  He has 2 adult children who do not live here.       Family History   Problem Relation Age of Onset    Cancer Mother      breast    Cataracts Father     No Known Problems Sister     No Known Problems Brother     No Known Problems Brother     Heart disease Brother     No Known Problems Sister     No Known Problems Sister     No Known Problems Sister     Glaucoma Paternal Uncle     Glaucoma Paternal Uncle        Review of patient's allergies indicates:   Allergen Reactions    Tizanidine Shortness Of Breath       Current Outpatient Prescriptions   Medication Sig    amlodipine (NORVASC) 10 MG tablet Take 1 tablet (10 mg total) by mouth once daily.    dicyclomine (BENTYL) 20 mg tablet Take 20 mg by mouth daily as needed.    econazole nitrate 1 % cream Apply topically 2 (two) times daily.    fish oil-omega-3 fatty acids 300-1,000 mg capsule Take 2 g by mouth once daily.    fluocinolone acetonide oil 0.01 % Drop Put 3 drops in right ear twice daily for 1 week.    hydrocodone-acetaminophen 10-325mg (NORCO)  mg Tab Take 1 tablet by mouth every 12 (twelve) hours as needed for Pain.    insulin aspart (NOVOLOG  "FLEXPEN) 100 unit/mL InPn pen Inject 15 Units into the skin as needed.    insulin aspart (NOVOLOG) 100 unit/mL injection Inject 1 Units into the skin every evening.     insulin glargine (LANTUS SOLOSTAR) 100 unit/mL (3 mL) InPn pen Inject 15 Units into the skin every evening.    irbesartan (AVAPRO) 150 MG tablet Take 1 tablet (150 mg total) by mouth once daily.    levothyroxine (SYNTHROID) 112 MCG tablet Take 1 tablet (112 mcg total) by mouth before breakfast.    meloxicam (MOBIC) 15 MG tablet Take 1 tablet (15 mg total) by mouth daily as needed for Pain.    metformin (GLUCOPHAGE) 1000 MG tablet Take 1 tablet (1,000 mg total) by mouth 2 (two) times daily with meals.    nitroGLYCERIN 0.4 MG/HR TD PT24 (NITRODUR) 0.4 mg/hr Place 1 patch onto the skin continuous prn.    NOVOFINE 32 32 gauge x 1/4" Ndle 1 EACH BY MISC.(NON-DRUG COMBO ROUTE) ROUTE ONCE DAILY.    pen needle, diabetic (NOVOFINE PLUS) 32 gauge x 1/6" Ndle 1 each by Misc.(Non-Drug; Combo Route) route once daily.    tamsulosin (FLOMAX) 0.4 mg Cp24 Take 1 capsule (0.4 mg total) by mouth once daily.    vitamin D 1000 units Tab Take 185 mg by mouth once daily.    gabapentin (NEURONTIN) 300 MG capsule Take 1 capsule (300 mg total) by mouth 3 (three) times daily. Take 1 pill at nightx 7 days, followed by 1 pill in morning and night for 7 days, followed by 1 pill morning afternoon and night     No current facility-administered medications for this visit.        REVIEW OF SYSTEMS:    GENERAL:  No weight loss, malaise or fevers.  HEENT:   No recent changes in vision or hearing  NECK:  Negative for lumps, no difficulty with swallowing.  RESPIRATORY:  Negative for cough, wheezing or shortness of breath, patient denies any recent URI.  CARDIOVASCULAR:  Negative for chest pain, leg swelling or palpitations.  GI:  Negative for abdominal discomfort, blood in stools or black stools or change in bowel habits.  MUSCULOSKELETAL:  See HPI.  SKIN:  Negative for " "lesions, rash, and itching.  PSYCH:  History of anxiety.  Patients sleep is disturbed secondary to pain.  HEMATOLOGY/LYMPHOLOGY:  Negative for prolonged bleeding, bruising easily or swollen nodes.  Patient is not currently taking any anti-coagulants  ENDO: History of hypothyroidism  NEURO:   No history of headaches, syncope, paralysis, seizures or tremors.  All other reviewed and negative other than HPI.    OBJECTIVE:    BP (!) 158/92  Pulse 78  Temp 98.4 °F (36.9 °C) (Oral)   Ht 5' 10" (1.778 m)  Wt 76.7 kg (169 lb)  BMI 24.25 kg/m2    PHYSICAL EXAMINATION:    GENERAL: Well appearing, in no acute distress, alert and oriented x3.  PSYCH:  Mood and affect appropriate.  SKIN: Skin color, texture, turgor normal, no rashes or lesions.  HEAD/FACE:  Normocephalic, atraumatic. Cranial nerves grossly intact.  NECK: Pain to palpation over the cervical paraspinous muscles. Spurling Negative. Pain with neck flexion, extension, and lateral flexion.   CV: RRR with palpation of the radial artery.  PULM: No evidence of respiratory difficulty, symmetric chest rise.  EXTREMITIES: Peripheral joint ROM is full and pain free without obvious instability or laxity in all four extremities. No deformities, edema, or skin discoloration. Good capillary refill.  MUSCULOSKELETAL: Shoulder provocative maneuvers are positive on the left.   Bilateral upper  extremity strength is normal and symmetric.  No atrophy or tone abnormalities are noted.  NEURO: Bilateral upper extremity coordination and muscle stretch reflexes are physiologic and symmetric.  Abimael's negative. No clonus.  No loss of sensation is noted.  GAIT: Antalgic, ambulates without assistance         Lab Results   Component Value Date    WBC 4.60 11/11/2016    HGB 12.6 (L) 11/11/2016    HCT 39.4 (L) 11/11/2016    MCV 97 11/11/2016     11/11/2016      Lab Results   Component Value Date    LABA1C 9.2 (H) 05/18/2016    HGBA1C 7.3 (H) 02/07/2017     Lab Results   Component " Value Date    CREATININE 1.1 02/24/2017        ASSESSMENT: 61 y.o. year old male with pain, consistent with     Encounter Diagnoses   Name Primary?    S/P lumbar fusion Yes    Osteoarthritis, unspecified osteoarthritis type, unspecified site     Cervical stenosis of spinal canal     Cervical radiculopathy     Chronic left shoulder pain        PLAN:     I discussed the goals of interventional chronic pain management with the patient on today's visit.  I explained the utility of injections for diagnostic and therapeutic purposes.  We discussed a multimodal approach to pain including treating the patient's given worst pain at any given time.  We will use a systematic approach to addressing pain.  We will also adopt a multimodal approach that includes injections, adjuvant medications, physical therapy, at times psychiatry.  There may be a limited role for opioid use intermittently in the treatment of pain, more particularly for acute pain although no one approach can be used as a sole treatment modality.    I emphasized the importance of regular exercise, core strengthening and stretching, diet and weight loss as a cornerstone of long-term pain management.    We will need to obtain records from the previous treating physicians     We'll trial gabapentin with gradual escalation and 900 mg per day in the future this can be further escalated to 1800 mg per day depending on response.    Hydrocodone acetaminophen 10/325 one tablet every 12 hours by mouth when necessary quantity #60, refill 0    Obtain nuclear medicine bone scan as ordered by neurosurgery    Follow-up with orthopedics regarding frozen left shoulder    Follow-up in one month    Depending on response in the future the patient may benefit from repeat cervical intralaminar epidural steroid injection given his positive response the previous transforaminal epidural steroid injection for his cervical radiculopathy.    Depending on workup in the future the  patient may be a candidate for an ACDF, additionally spinal cord stimulation may be an option in the future.    The above plan and management options were discussed at length with patient. Patient is in agreement with the above and verbalized understanding. It will be communicated with the referring physician via electronic record, fax, or mail.    Christina Espinoza  03/20/2017

## 2017-03-20 NOTE — LETTER
March 20, 2017      Benja Liz MD  [address]  [facility] [address]           [facility]  [address]  [facility] [address]  Phone: [phone]          Patient: Rob Jones Jr.   MR Number: [id number]   YOB: 1955   Date of Visit: 3/20/2017       Dear Dr. Benja Liz:    Thank you for referring Rob Jones to me for evaluation. Attached you will find relevant portions of my assessment and plan of care.    If you have questions, please do not hesitate to call me. I look forward to following Rob Jones along with you.    Sincerely,    Peyton Torres PA-C    Enclosure  CC:  No Recipients    If you would like to receive this communication electronically, please contact [email or web address] or [phone] to request more information on [email or web address] access.    For providers and/or their staff who would like to refer a patient to [facility], please contact us through our one-stop-shop provider referral line, [facility], at [phone].    If you feel you have received this communication in error or would no longer like to receive these types of communications, please e-mail [email or web address]

## 2017-03-20 NOTE — MR AVS SNAPSHOT
[facility]  [address]  [facility] [address]  Phone: [phone]  Fax: [phone]                  Rob Jones Jr.   3/20/2017 11:30 AM   Office Visit    Description:  Male : 1955   Provider:  Christina Espinoza MD   Department:  [facility]           Reason for Visit     Joint Pain                To Do List           Future Appointments        Provider Department Dept Phone    3/20/2017 12:45 PM [facility] XROP  [facility] [phone]    3/20/2017 2:00 PM Peyton Torres PA-C [facility] [phone]    3/29/2017 9:00 AM NOM NM5 PREP ROOM [facility] [phone]    3/29/2017 11:30 AM NOM NM2 INFINIA 400LB LIMIT [facility] [phone]    4/3/2017 1:00 PM Benja Liz MD [facility] [phone]      Goals (5 Years of Data)     None       These Medications        Disp Refills Start End    gabapentin (NEURONTIN) 300 MG capsule 90 capsule 11 3/20/2017 3/20/2018    Take 1 capsule (300 mg total) by mouth 3 (three) times daily. Take 1 pill at nightx 7 days, followed by 1 pill in morning and night for 7 days, followed by 1 pill morning afternoon and night - Oral    Pharmacy: [facility]/pharmacy #[id number] - [place], [place] - [address] Ph #: [phone]       hydrocodone-acetaminophen 10-325mg (NORCO)  mg Tab 60 tablet 0 3/20/2017     Take 1 tablet by mouth every 12 (twelve) hours as needed for Pain. - Oral    Pharmacy: [facility]/pharmacy #[id number] - [place] - [address] Ph #: [phone]         [facility]     [facility] Care [facility] -  Assistance  Registered nurses in the [facility] provide clinical advisement, health education, appointment booking, and other advisory services.  Call for this free service at [phone].             Medications           Message regarding Medications     Verify the changes and/or  additions to your medication regime listed below are the same as discussed with your clinician today.  If any of these changes or additions are incorrect, please notify your healthcare provider.        START taking these NEW medications        Refills    gabapentin (NEURONTIN) 300 MG capsule 11    Sig: Take 1 capsule (300 mg total) by mouth 3 (three) times daily. Take 1 pill at nightx 7 days, followed by 1 pill in morning and night for 7 days, followed by 1 pill morning afternoon and night    Class: Print    Route: Oral      CHANGE how you are taking these medications     Start Taking Instead of    hydrocodone-acetaminophen 10-325mg (NORCO)  mg Tab hydrocodone-acetaminophen 10-325mg (NORCO)  mg Tab    Dosage:  Take 1 tablet by mouth every 12 (twelve) hours as needed for Pain.     Reason for Change:  Reorder            Verify that the below list of medications is an accurate representation of the medications you are currently taking.  If none reported, the list may be blank. If incorrect, please contact your healthcare provider. Carry this list with you in case of emergency.           Current Medications     amlodipine (NORVASC) 10 MG tablet Take 1 tablet (10 mg total) by mouth once daily.    dicyclomine (BENTYL) 20 mg tablet Take 20 mg by mouth daily as needed.    econazole nitrate 1 % cream Apply topically 2 (two) times daily.    fish oil-omega-3 fatty acids 300-1,000 mg capsule Take 2 g by mouth once daily.    fluocinolone acetonide oil 0.01 % Drop Put 3 drops in right ear twice daily for 1 week.    gabapentin (NEURONTIN) 300 MG capsule Take 1 capsule (300 mg total) by mouth 3 (three) times daily. Take 1 pill at nightx 7 days, followed by 1 pill in morning and night for 7 days, followed by 1 pill morning afternoon and night    hydrocodone-acetaminophen 10-325mg (NORCO)  mg Tab Take 1 tablet by mouth every 12 (twelve) hours as needed for Pain.    insulin aspart (NOVOLOG FLEXPEN) 100 unit/mL InPn  "pen Inject 15 Units into the skin as needed.    insulin aspart (NOVOLOG) 100 unit/mL injection Inject 1 Units into the skin every evening.     insulin glargine (LANTUS SOLOSTAR) 100 unit/mL (3 mL) InPn pen Inject 15 Units into the skin every evening.    irbesartan (AVAPRO) 150 MG tablet Take 1 tablet (150 mg total) by mouth once daily.    levothyroxine (SYNTHROID) 112 MCG tablet Take 1 tablet (112 mcg total) by mouth before breakfast.    meloxicam (MOBIC) 15 MG tablet Take 1 tablet (15 mg total) by mouth daily as needed for Pain.    metformin (GLUCOPHAGE) 1000 MG tablet Take 1 tablet (1,000 mg total) by mouth 2 (two) times daily with meals.    nitroGLYCERIN 0.4 MG/HR TD PT24 (NITRODUR) 0.4 mg/hr Place 1 patch onto the skin continuous prn.    NOVOFINE 32 32 gauge x 1/4" Ndle 1 EACH BY MISC.(NON-DRUG COMBO ROUTE) ROUTE ONCE DAILY.    pen needle, diabetic (NOVOFINE PLUS) 32 gauge x 1/6" Ndle 1 each by Misc.(Non-Drug; Combo Route) route once daily.    tamsulosin (FLOMAX) 0.4 mg Cp24 Take 1 capsule (0.4 mg total) by mouth once daily.    vitamin D 1000 units Tab Take 185 mg by mouth once daily.           Clinical Reference Information           Your Vitals Were     BP Pulse Temp Height Weight BMI    158/92 78 98.4 °F (36.9 °C) (Oral) 5' 10" (1.778 m) 76.7 kg (169 lb) 24.25 kg/m2      Blood Pressure          Most Recent Value    BP  (!)  158/92      Allergies as of 3/20/2017     Tizanidine      Immunizations Administered on Date of Encounter - 3/20/2017     None      Smoking Cessation     If you would like to quit smoking:   You may be eligible for free services if you are a [place] resident and started smoking cigarettes before September 1, 1988.  Call the Smoking Cessation Trust (SCT) toll free at ([phone] 652-6001 or ([phone] 762-6456.   Call -800-QUIT-NOW if you do not meet the above criteria.            Language Assistance Services     ATTENTION: Language assistance services are available, free of charge. Please call " [phone].      ATENCIÓN: Si habla español, tiene a katz disposición servicios gratuitos de asistencia lingüística. Llame al [phone].     CHÚ Ý: N?u b?n nói Ti?ng Vi?t, có các d?ch v? h? tr? ngôn ng? mi?n phí dành cho b?n. G?i s? [phone].         [facility] complies with applicable Federal civil rights laws and does not discriminate on the basis of race, color, national origin, age, disability, or sex.

## 2017-03-20 NOTE — MR AVS SNAPSHOT
[facility]  [address]  [address]  [facility] [address]  Phone: [phone]                  Rob Jones Jr.   3/20/2017 2:00 PM   Office Visit    Description:  Male : 1955   Provider:  Peyton Torres PA-C   Department:  [facility]           Reason for Visit     Left Shoulder - Pain                To Do List           Future Appointments        Provider Department Dept Phone    3/20/2017 2:00 PM Peyton Torres PA-C [facility] [phone]    3/29/2017 9:00 AM [facility] NM5 PREP ROOM [facility] [phone]    3/29/2017 11:30 AM [facility] NM2 INFINIA 400LB LIMIT [facility] [phone]    4/3/2017 1:00 PM Benja Liz MD [facility] [phone]    2017 11:00 AM Christina Espinoza MD [facility] [phone]      Goals (5 Years of Data)     None      [facility]     [facility] Care [facility] -  Assistance  Registered nurses in the [facility] provide clinical advisement, health education, appointment booking, and other advisory services.  Call for this free service at [phone].             Medications           Message regarding Medications     Verify the changes and/or additions to your medication regime listed below are the same as discussed with your clinician today.  If any of these changes or additions are incorrect, please notify your healthcare provider.             Verify that the below list of medications is an accurate representation of the medications you are currently taking.  If none reported, the list may be blank. If incorrect, please contact your healthcare provider. Carry this list with you in case of emergency.           Current Medications     amlodipine (NORVASC) 10 MG tablet Take 1 tablet (10 mg total) by mouth once daily.    dicyclomine (BENTYL) 20 mg tablet Take 20 mg by mouth daily as needed.    econazole nitrate 1 % cream  "Apply topically 2 (two) times daily.    fish oil-omega-3 fatty acids 300-1,000 mg capsule Take 2 g by mouth once daily.    fluocinolone acetonide oil 0.01 % Drop Put 3 drops in right ear twice daily for 1 week.    gabapentin (NEURONTIN) 300 MG capsule Take 1 capsule (300 mg total) by mouth 3 (three) times daily. Take 1 pill at nightx 7 days, followed by 1 pill in morning and night for 7 days, followed by 1 pill morning afternoon and night    hydrocodone-acetaminophen 10-325mg (NORCO)  mg Tab Take 1 tablet by mouth every 12 (twelve) hours as needed for Pain.    insulin aspart (NOVOLOG FLEXPEN) 100 unit/mL InPn pen Inject 15 Units into the skin as needed.    insulin aspart (NOVOLOG) 100 unit/mL injection Inject 1 Units into the skin every evening.     insulin glargine (LANTUS SOLOSTAR) 100 unit/mL (3 mL) InPn pen Inject 15 Units into the skin every evening.    irbesartan (AVAPRO) 150 MG tablet Take 1 tablet (150 mg total) by mouth once daily.    levothyroxine (SYNTHROID) 112 MCG tablet Take 1 tablet (112 mcg total) by mouth before breakfast.    meloxicam (MOBIC) 15 MG tablet Take 1 tablet (15 mg total) by mouth daily as needed for Pain.    metformin (GLUCOPHAGE) 1000 MG tablet Take 1 tablet (1,000 mg total) by mouth 2 (two) times daily with meals.    nitroGLYCERIN 0.4 MG/HR TD PT24 (NITRODUR) 0.4 mg/hr Place 1 patch onto the skin continuous prn.    NOVOFINE 32 32 gauge x 1/4" Ndle 1 EACH BY MISC.(NON-DRUG COMBO ROUTE) ROUTE ONCE DAILY.    pen needle, diabetic (NOVOFINE PLUS) 32 gauge x 1/6" Ndle 1 each by Misc.(Non-Drug; Combo Route) route once daily.    tamsulosin (FLOMAX) 0.4 mg Cp24 Take 1 capsule (0.4 mg total) by mouth once daily.    vitamin D 1000 units Tab Take 185 mg by mouth once daily.           Clinical Reference Information           Your Vitals Were     BP Pulse Resp Height Weight BMI    142/77 66 18 5' 10" (1.778 m) 76.7 kg (169 lb) 24.25 kg/m2      Blood Pressure          Most Recent Value    BP  " (!)  142/77      Allergies as of 3/20/2017     Tizanidine      Immunizations Administered on Date of Encounter - 3/20/2017     None      Smoking Cessation     If you would like to quit smoking:   You may be eligible for free services if you are a [place] resident and started smoking cigarettes before September 1, 1988.  Call the Smoking Cessation Trust (SCT) toll free at ([phone] 658-6120 or (522) 049-1803.   Call 1-800-QUIT-NOW if you do not meet the above criteria.            Language Assistance Services     ATTENTION: Language assistance services are available, free of charge. Please call [phone].      ATENCIÓN: Si habla español, tiene a katz disposición servicios gratuitos de asistencia lingüística. Llame al [phone].     CHÚ Ý: N?u b?n nói Ti?ng Vi?t, có các d?ch v? h? tr? ngôn ng? mi?n phí dành cho b?n. G?i s? [phone].         [facility] complies with applicable Federal civil rights laws and does not discriminate on the basis of race, color, national origin, age, disability, or sex.

## 2017-03-20 NOTE — LETTER
March 20, 2017      Benja Liz MD  [address]  [facility] [address]           [facility]  [address]  [facility] [address]  Phone: [phone]  Fax: [phone]          Patient: Rob Jones Jr.   MR Number: [id number]   YOB: 1955   Date of Visit: 3/20/2017       Dear Dr. Benja Liz:    Thank you for referring Rob Jones to me for evaluation. Attached you will find relevant portions of my assessment and plan of care.    If you have questions, please do not hesitate to call me. I look forward to following Rob Jones along with you.    Sincerely,    Christina Espinoza MD    Enclosure  CC:  No Recipients    If you would like to receive this communication electronically, please contact [email or web address] or [phone] to request more information on [employer] access.    For providers and/or their staff who would like to refer a patient to [facility], please contact us through our one-stop-shop provider referral line, [facility], at [phone].    If you feel you have received this communication in error or would no longer like to receive these types of communications, please e-mail [email or web address]

## 2017-03-20 NOTE — PROGRESS NOTES
This office note has been dictated.   Dictation Confirmation Code: [id number]  Sheela Torres PA-C  03/20/2017  1:45 PM  Supervising Physician:  MD Rob Molina was seen today for pain.    Diagnoses and all orders for this visit:    Glenohumeral arthritis, left  -     triamcinolone acetonide injection 80 mg; 2 mLs (80 mg total) by INTRABURSAL route one time.      PROCEDURE NOTE:  I have explained the risks, benefits, and alternatives of the procedure in detail.  The patient voices understanding and all questions have been answered.  The patient agrees to proceed as planned. After a sterile prep of the skin in the normal the left shoulder is injected from the posterior approach using a 22 gauge needle with a combination of 8cc 1% lidocaine and 80 mg of kenalog. The patient is cautioned and immediate relief of pain is secondary to the local anesthetic and will be temporary.  After the anesthetic wears off there may be a increase in pain that may last for a few hours or a few days and they should use ice to help alleviate this flair up of pain.

## 2017-03-21 NOTE — PROGRESS NOTES
CHIEF COMPLAINT:  Severe left shoulder pain.    HISTORY OF PRESENT ILLNESS:  Rob is a 61-year-old right-hand dominant male   presenting today for evaluation of his left shoulder pain.  He reports that 30   years ago he had a rotator cuff repair.  He had not been having problems with it   other than a few months ago.  He started having left shoulder pain.  He did not   have any injury.  He feels that he can hardly raise his shoulder.  He reports   that he is very active.  He also has neck and back issues.  He has had surgery   on his right shoulder for rotator cuff repair as well.  He has had surgery on   his back recently.  He also has neck pain.  He is seen in Pain Management.  He   reports that he does have hepatitis C, but received the Harvoni treatment for   that and has been cleared from the hepatitis C he states. He denies nausea,   vomiting, fever or chills.  He reports he does have paresthesias in his   bilateral hands.  He sees Dr. Villavicencio for carpal tunnel injections.  His numbness   is intermittent and the injections still help.  He feels that he is unable to   sleep on his left shoulder or use his left upper extremity because of the pain.    Past Medical History:   Diagnosis Date    Anxiety     Back pain     Cataract     Chronic hepatitis C     Diabetes mellitus     Hypertension     Postoperative hypothyroidism 11/3/2016    Primary hyperparathyroidism 1/18/2017    Thyroid disease        Past Surgical History:   Procedure Laterality Date    LUMBAR FUSION      THYROIDECTOMY      TONSILLECTOMY         Social History     Social History    Marital status: Single     Spouse name: N/A    Number of children: N/A    Years of education: N/A     Occupational History    Disabled, pt says from [facility] and now for back, DM2      Social History Main Topics    Smoking status: Current Every Day Smoker    Smokeless tobacco: Not on file    Alcohol use Yes    Drug use: No    Sexual activity: Not on  "file     Other Topics Concern    Not on file     Social History Narrative    Lives alone.  He has 2 adult children who do not live here.         Current Outpatient Prescriptions on File Prior to Visit   Medication Sig Dispense Refill    amlodipine (NORVASC) 10 MG tablet Take 1 tablet (10 mg total) by mouth once daily. 90 tablet 3    dicyclomine (BENTYL) 20 mg tablet Take 20 mg by mouth daily as needed.      econazole nitrate 1 % cream Apply topically 2 (two) times daily. 30 g 1    fish oil-omega-3 fatty acids 300-1,000 mg capsule Take 2 g by mouth once daily.      fluocinolone acetonide oil 0.01 % Drop Put 3 drops in right ear twice daily for 1 week. 1 Bottle 0    insulin aspart (NOVOLOG FLEXPEN) 100 unit/mL InPn pen Inject 15 Units into the skin as needed. 1 Box 5    insulin aspart (NOVOLOG) 100 unit/mL injection Inject 1 Units into the skin every evening.       insulin glargine (LANTUS SOLOSTAR) 100 unit/mL (3 mL) InPn pen Inject 15 Units into the skin every evening. 3 Box 1    irbesartan (AVAPRO) 150 MG tablet Take 1 tablet (150 mg total) by mouth once daily. 90 tablet 3    levothyroxine (SYNTHROID) 112 MCG tablet Take 1 tablet (112 mcg total) by mouth before breakfast. 90 tablet 0    meloxicam (MOBIC) 15 MG tablet Take 1 tablet (15 mg total) by mouth daily as needed for Pain. 90 tablet 1    metformin (GLUCOPHAGE) 1000 MG tablet Take 1 tablet (1,000 mg total) by mouth 2 (two) times daily with meals. 180 tablet 3    nitroGLYCERIN 0.4 MG/HR TD PT24 (NITRODUR) 0.4 mg/hr Place 1 patch onto the skin continuous prn.      NOVOFINE 32 32 gauge x 1/4" Ndle 1 EACH BY MISC.(NON-DRUG COMBO ROUTE) ROUTE ONCE DAILY.  3    pen needle, diabetic (NOVOFINE PLUS) 32 gauge x 1/6" Ndle 1 each by Misc.(Non-Drug; Combo Route) route once daily. 100 each 3    tamsulosin (FLOMAX) 0.4 mg Cp24 Take 1 capsule (0.4 mg total) by mouth once daily. 90 capsule 3    vitamin D 1000 units Tab Take 185 mg by mouth once daily.   " "    No current facility-administered medications on file prior to visit.        Review of patient's allergies indicates:   Allergen Reactions    Tizanidine Shortness Of Breath       Review of Systems:  Constitutional: no fever or chills  ENT: no nasal congestion or sore throat  Respiratory: no cough or shortness of breath  Cardiovascular: no chest pain or palpitations  Gastrointestinal: no nausea or vomiting  Genitourinary: no hematuria or dysuria  Integument/Breast: no rash or pruritis  Hematologic/Lymphatic: no easy bruising or lymphadenopathy  Musculoskeletal: see HPI  Neurological: no seizures or tremors  Behavioral/Psych: no auditory or visual hallucinations      PHYSICAL EXAM    Vitals:    03/20/17 1319   BP: (!) 142/77   Pulse: 66   Resp: 18   Weight: 76.7 kg (169 lb)   Height: 5' 10" (1.778 m)   PainSc:   5   PainLoc: Shoulder       GENERAL:  Well developed, well nourished, no acute distress.  CARDIOVASCULAR:  Regular rate and rhythm.  LUNGS:  Respirations equal and unlabored.  BEHAVIORAL AND PSYCH:  Mood and affect appropriate.  NEUROLOGIC:  No tremor.  HEENT:  Normocephalic, atraumatic.  MUSCULOSKELETAL:  Upper extremity exam:  He is distally neurovascularly intact.    AIN, PIN nerves are intact.  Sensation over the radial, ulnar and median nerves   is equivocal.  Positive Tinel's bilaterally at the wrist.  He has limited range   of motion of the left upper extremity.  He has only about 90 degrees of forward   flexion, about 60 degrees of abduction, only able to internally rotate to   L5-S1, significant pain on range of motion.  There is a significant amount of   crepitus on passive range of motion.    X-RAY IMAGING:  There does appear to be severe osteoarthritis of the left   shoulder at the glenohumeral joint as well as the AC joint.  He also has what   appears to be stable present from previous rotator cuff repair.  There are   subchondral cystic changes in both the glenoid as well as the humeral " head.    ASSESSMENT:  1.  Severe glenohumeral arthritis.  2.  Status post rotator cuff repair.    PLAN:  Discussed with Mr. Jones today that he does have severe glenohumeral   arthritis, likely has rotator cuff tendinopathy; however, we can try   conservative treatment with an injection.  The patient wished to proceed with   this.  He is also interested in surgery; however, I discussed that it is best to   proceed with conservative treatment to see if we can make him better.  He will   follow up in eight weeks after the injection.  He is diabetic.  Did discuss with   him to monitor his sugars.    SUPERVISING PHYSICIAN:  Macrina Hair M.D.    DICTATED BY:  VAL Ambrocio  dd: 03/20/2017 15:48:47 (CDT)  td: 03/21/2017 10:17:39 (CDT)  Doc ID   #[id number]  Job ID #[id number]    CC:

## 2017-03-23 ENCOUNTER — TELEPHONE (OUTPATIENT)
Dept: ORTHOPEDICS | Facility: CLINIC | Age: 62
End: 2017-03-23

## 2017-03-23 NOTE — TELEPHONE ENCOUNTER
----- Message from Bettie Angeles sent at 3/23/2017 10:27 AM CDT -----  Contact: pt  x_  1st Request  _  2nd Request  _  3rd Request      Who:pt    Why: pt would like to know if he left his blue folder with  MRIs left inside     What Number to Call Back:  [phone]    When to Expect a call back: (Before the end of the day)   -- if call after 3:00 call back will be tomorrow.

## 2017-03-29 ENCOUNTER — HOSPITAL ENCOUNTER (OUTPATIENT)
Dept: RADIOLOGY | Facility: HOSPITAL | Age: 62
Discharge: HOME | End: 2017-03-29
Attending: NEUROLOGICAL SURGERY
Payer: MEDICARE

## 2017-03-29 ENCOUNTER — TELEPHONE (OUTPATIENT)
Dept: SPINE | Facility: CLINIC | Age: 62
End: 2017-03-29

## 2017-03-29 DIAGNOSIS — M54.42 BILATERAL LOW BACK PAIN WITH LEFT-SIDED SCIATICA, UNSPECIFIED CHRONICITY: ICD-10-CM

## 2017-03-29 DIAGNOSIS — M54.17 THORACIC AND LUMBOSACRAL NEURITIS: ICD-10-CM

## 2017-03-29 DIAGNOSIS — M54.14 THORACIC AND LUMBOSACRAL NEURITIS: ICD-10-CM

## 2017-03-29 DIAGNOSIS — Z98.1 HISTORY OF LUMBAR FUSION: ICD-10-CM

## 2017-03-29 PROCEDURE — A9503 TC99M MEDRONATE: HCPCS

## 2017-03-29 PROCEDURE — 78320 NM BONE SCAN SPECT: CPT | Mod: 26,,, | Performed by: NUCLEAR MEDICINE

## 2017-03-29 NOTE — TELEPHONE ENCOUNTER
----- Message from Emy White PA-C sent at 3/29/2017  1:45 PM CDT -----  Please call him to make f/u with Agusto to review his bone scan.     Thanks.

## 2017-04-06 RX ORDER — LEVOTHYROXINE SODIUM 112 UG/1
TABLET ORAL
Qty: 90 TABLET | Refills: 0 | Status: SHIPPED | OUTPATIENT
Start: 2017-04-06 | End: 2017-05-09 | Stop reason: SDUPTHER

## 2017-04-20 ENCOUNTER — OFFICE VISIT (OUTPATIENT)
Dept: PAIN MEDICINE | Facility: CLINIC | Age: 62
End: 2017-04-20
Attending: ANESTHESIOLOGY
Payer: MEDICARE

## 2017-04-20 VITALS
HEART RATE: 64 BPM | WEIGHT: 163.13 LBS | DIASTOLIC BLOOD PRESSURE: 75 MMHG | BODY MASS INDEX: 23.35 KG/M2 | TEMPERATURE: 98 F | RESPIRATION RATE: 20 BRPM | SYSTOLIC BLOOD PRESSURE: 140 MMHG | HEIGHT: 70 IN

## 2017-04-20 DIAGNOSIS — M54.12 CERVICAL RADICULOPATHY: ICD-10-CM

## 2017-04-20 DIAGNOSIS — G89.29 CHRONIC LEFT SHOULDER PAIN: ICD-10-CM

## 2017-04-20 DIAGNOSIS — Z98.1 S/P LUMBAR FUSION: ICD-10-CM

## 2017-04-20 DIAGNOSIS — M48.02 CERVICAL STENOSIS OF SPINAL CANAL: ICD-10-CM

## 2017-04-20 DIAGNOSIS — M25.512 CHRONIC LEFT SHOULDER PAIN: ICD-10-CM

## 2017-04-20 DIAGNOSIS — G62.9 NEUROPATHY: Primary | ICD-10-CM

## 2017-04-20 PROCEDURE — 3077F SYST BP >= 140 MM HG: CPT | Mod: S$GLB,,, | Performed by: ANESTHESIOLOGY

## 2017-04-20 PROCEDURE — 99214 OFFICE O/P EST MOD 30 MIN: CPT | Mod: S$GLB,,, | Performed by: ANESTHESIOLOGY

## 2017-04-20 PROCEDURE — 99499 UNLISTED E&M SERVICE: CPT | Mod: S$PBB,,, | Performed by: ANESTHESIOLOGY

## 2017-04-20 PROCEDURE — 99999 PR PBB SHADOW E&M-EST. PATIENT-LVL IV: CPT | Mod: PBBFAC,,, | Performed by: ANESTHESIOLOGY

## 2017-04-20 PROCEDURE — 1160F RVW MEDS BY RX/DR IN RCRD: CPT | Mod: S$GLB,,, | Performed by: ANESTHESIOLOGY

## 2017-04-20 PROCEDURE — 3078F DIAST BP <80 MM HG: CPT | Mod: S$GLB,,, | Performed by: ANESTHESIOLOGY

## 2017-04-20 RX ORDER — HYDROCODONE BITARTRATE AND ACETAMINOPHEN 10; 325 MG/1; MG/1
1 TABLET ORAL EVERY 12 HOURS PRN
Qty: 60 TABLET | Refills: 0 | Status: SHIPPED | OUTPATIENT
Start: 2017-04-20 | End: 2017-06-06 | Stop reason: SDUPTHER

## 2017-04-20 RX ORDER — GABAPENTIN 300 MG/1
CAPSULE ORAL
Qty: 180 CAPSULE | Refills: 2 | Status: SHIPPED | OUTPATIENT
Start: 2017-04-20 | End: 2017-05-08 | Stop reason: SDUPTHER

## 2017-04-20 NOTE — PROGRESS NOTES
Chronic Pain - Follow Up    Referring Physician: No ref. provider found    Chief Complaint:   Chief Complaint   Patient presents with    Neck Pain    Low-back Pain     radiates to left leg        SUBJECTIVE: Disclaimer: This note has been generated using voice-recognition software. There may be typographical errors that have been missed during proof-reading  Interval History 4/20/2017  NM bone scan 3/29 with arthritis T12/L1 and L5/S1 follow-up with orthopedics regarding shoulder has severe osteoarthritis had a shoulder joint injection with great relief pain.  Not currently having any cervical radicular symptoms.  Gabapentin at 900 mg per day no side effects, does have some lower extremity radicular symptoms.  No other health changes.        Initial encounter:rt KHADAR Jones Jr. presents to the clinic for the evaluation of neck, shoulder and lower back with lower extremities pain. The pain started years ago and symptoms have been worsening.    Brief history:   patient recently evaluated in neurosurgery is scheduled for a nuclear medicine bone scan, possible workup for ACDF secondary to severe central stenosis of the cervical spine with radiculopathy.  The patient had a previous cervical transverse foraminal epidural steroid injection with greater than 50% improvement in his radicular symptoms into his right upper extremity although he continues to have what appears to be rotator cuff syndrome of the left shoulder and is scheduled for orthopedic evaluation soon.    Pain Description:    The current worst pain is located in the left shoulder area    At BEST  10/10     At WORST  10/10 on the WORST day.      On average pain is rated as 10/10.     Today the pain is rated as 10/10    The pain is described as aching, sharp, shooting, throbbing and tingling      Symptoms interfere with daily activity, sleeping and work.     Exacerbating factors: Sitting, Standing, Laying, Bending, Extension, Flexing, Lifting and Getting out  of bed/chair.      Mitigating factors medications and injection.     Patient denies urinary incontinence and bowel incontinence.  Patient denies any suicidal or homicidal ideations    Pain Medications:  Current:  Hydrocodone/acetaminophen    Tried in Past:  NSAIDs -Never  TCA -Never  SNRI -Never  Anti-convulsants  Gabapentin in the past, unsure of dose or efficacy  Muscle Relaxants -Never  Opioids-Never    Physical Therapy/Home Exercise: no       report:  Reviewed and consistent with medication use as prescribed.    Pain Procedures:   right C6-C7 TF DWAIN with IR on 2/6/17     Chiropractor -never  Acupuncture - never  TENS unit -never  Spinal decompression -previous lumbar surgery  Joint replacement -never    Imaging:   NM bone scan 3/29/2017  Technique: Following intravenous administration of 28 mCi technetium 99m MDP, anterior, posterior, and SPECT images of the lumbar spine were obtained.    Result:    Delayed Planar and SPECT images: Increased activity in the left anterolateral aspect of T12-L1 compatible degenerative disc changes with osteophyte. Also increased linear activity at level L5-S1, also compatible with degenerative changes with possible osteophytosis.   Impression        1.Degenerative disc changes with superimposed synovitis at level of T12-L1 and L5-S1.           MRI thoracic spine 1/23/2017  Narrative   MRI thoracic spine    Technique: MRI thoracic spine performed without contrast utilizing the following sequences: Localizer; sagittal T1, T2, and STIR; axial T1 and T2.    Comparison: None.    Findings: Vertebral bodies demonstrate preserved height and alignment.  Bone marrow signal is maintained.  There is multilevel mild disc height loss.  Spinal cord demonstrates normal signal and morphology.  The conus terminates at L1.  Limited evaluation of posterior thoracic structures is unremarkable.    T1-T2: Marginal osteophytes and facet arthropathy result in moderate right, mild left neuroforaminal  narrowing.    T2-T3: Facet arthropathy and marginal osteophyte production resulting in mild spinal canal stenosis with severe narrowing of the right lateral recess and neural foramen.    T3-T4: Facet arthropathy and marginal osteophyte production resulting in mild left neuroforaminal narrowing.    T5-T6: Facet arthropathy results in moderate right neuroforaminal narrowing.    T6-T7: Circumferential disc bulge effaces the ventral thecal sac.    T7-T8: Circumferential disc bulge effaces the ventral thecal sac.    T8-T9: Circumferential disc bulge effaces the ventral thecal sac.    T10-T11: Circumferential disc bulge and bilateral facet arthropathy result in mild spinal canal stenosis.    T11-T12: Left-sided facet arthropathy and productive changes result in mild left neuroforaminal narrowing.   Impression    Degenerative changes of the thoracic spine as detailed above.  No evidence for spinal cord edema or myelomalacia.         Past Medical History:   Diagnosis Date    Anxiety     Back pain     Cataract     Chronic hepatitis C     Diabetes mellitus     Hypertension     Postoperative hypothyroidism 11/3/2016    Primary hyperparathyroidism 1/18/2017    Thyroid disease      Past Surgical History:   Procedure Laterality Date    LUMBAR FUSION      THYROIDECTOMY      TONSILLECTOMY       Social History     Social History    Marital status: Single     Spouse name: N/A    Number of children: N/A    Years of education: N/A     Occupational History    Disabled, pt says from [place] and now for back, DM2      Social History Main Topics    Smoking status: Current Every Day Smoker    Smokeless tobacco: Not on file    Alcohol use Yes    Drug use: No    Sexual activity: Not on file     Other Topics Concern    Not on file     Social History Narrative    Lives alone.  He has 2 adult children who do not live here.       Family History   Problem Relation Age of Onset    Cancer Mother      breast    Cataracts Father      No Known Problems Sister     No Known Problems Brother     No Known Problems Brother     Heart disease Brother     No Known Problems Sister     No Known Problems Sister     No Known Problems Sister     Glaucoma Paternal Uncle     Glaucoma Paternal Uncle        Review of patient's allergies indicates:   Allergen Reactions    Tizanidine Shortness Of Breath       Current Outpatient Prescriptions   Medication Sig    amlodipine (NORVASC) 10 MG tablet Take 1 tablet (10 mg total) by mouth once daily.    dicyclomine (BENTYL) 20 mg tablet Take 20 mg by mouth daily as needed.    econazole nitrate 1 % cream Apply topically 2 (two) times daily.    fish oil-omega-3 fatty acids 300-1,000 mg capsule Take 2 g by mouth once daily.    fluocinolone acetonide oil 0.01 % Drop Put 3 drops in right ear twice daily for 1 week.    gabapentin (NEURONTIN) 300 MG capsule Take 1 capsule (300 mg total) by mouth 3 (three) times daily. Take 1 pill at nightx 7 days, followed by 1 pill in morning and night for 7 days, followed by 1 pill morning afternoon and night    hydrocodone-acetaminophen 10-325mg (NORCO)  mg Tab Take 1 tablet by mouth every 12 (twelve) hours as needed for Pain.    insulin aspart (NOVOLOG FLEXPEN) 100 unit/mL InPn pen Inject 15 Units into the skin as needed.    insulin aspart (NOVOLOG) 100 unit/mL injection Inject 1 Units into the skin every evening.     insulin glargine (LANTUS SOLOSTAR) 100 unit/mL (3 mL) InPn pen Inject 15 Units into the skin every evening.    irbesartan (AVAPRO) 150 MG tablet Take 1 tablet (150 mg total) by mouth once daily.    levothyroxine (SYNTHROID) 112 MCG tablet TAKE 1 TABLET BY MOUTH DAILY BEFORE BREAKFAST    meloxicam (MOBIC) 15 MG tablet Take 1 tablet (15 mg total) by mouth daily as needed for Pain.    metformin (GLUCOPHAGE) 1000 MG tablet Take 1 tablet (1,000 mg total) by mouth 2 (two) times daily with meals.    nitroGLYCERIN 0.4 MG/HR TD PT24 (NITRODUR) 0.4 mg/hr  "Place 1 patch onto the skin continuous prn.    NOVOFINE 32 32 gauge x 1/4" Ndle 1 EACH BY MISC.(NON-DRUG COMBO ROUTE) ROUTE ONCE DAILY.    pen needle, diabetic (NOVOFINE PLUS) 32 gauge x 1/6" Ndle 1 each by Misc.(Non-Drug; Combo Route) route once daily.    tamsulosin (FLOMAX) 0.4 mg Cp24 Take 1 capsule (0.4 mg total) by mouth once daily.    vitamin D 1000 units Tab Take 185 mg by mouth once daily.     No current facility-administered medications for this visit.        REVIEW OF SYSTEMS:    GENERAL:  No weight loss, malaise or fevers.  RESPIRATORY:  Negative for cough, wheezing or shortness of breath, patient denies any recent URI.  CARDIOVASCULAR:  Negative for chest pain, leg swelling or palpitations.  GI:  Negative for abdominal discomfort, blood in stools or black stools or change in bowel habits.  MUSCULOSKELETAL:  See HPI.  SKIN:  Negative for lesions, rash, and itching.  PSYCH:  History of anxiety.  Patients sleep is disturbed secondary to pain.  HEMATOLOGY/LYMPHOLOGY:  Negative for prolonged bleeding, bruising easily or swollen nodes.  Patient is not currently taking any anti-coagulants  ENDO: History of hypothyroidism  NEURO:   No history of headaches, syncope, paralysis, seizures or tremors.  All other reviewed and negative other than HPI.    OBJECTIVE:    BP (!) 140/75  Pulse 64  Temp 98.2 °F (36.8 °C)  Resp 20  Ht 5' 10" (1.778 m)  Wt 74 kg (163 lb 1.6 oz)  BMI 23.4 kg/m2    PHYSICAL EXAMINATION:    GENERAL: Well appearing, in no acute distress, alert and oriented x3.  PSYCH:  Mood and affect appropriate.  SKIN: Skin color, texture, turgor normal, no rashes or lesions.  HEAD/FACE:  Normocephalic, atraumatic. Cranial nerves grossly intact.  NECK: Pain to palpation over the cervical paraspinous muscles. Spurling Negative. Pain with neck flexion, extension, and lateral flexion.   CV: RRR with palpation of the radial artery.  PULM: No evidence of respiratory difficulty, symmetric chest " rise.  EXTREMITIES: Peripheral joint ROM is full and pain free without obvious instability or laxity in all four extremities. No deformities, edema, or skin discoloration. Good capillary refill.  MUSCULOSKELETAL: Shoulder provocative maneuvers are positive on the left.   Bilateral upper  extremity strength is normal and symmetric.  No atrophy or tone abnormalities are noted.  NEURO: Bilateral upper extremity coordination and muscle stretch reflexes are physiologic and symmetric.  Abimael's negative. No clonus.  No loss of sensation is noted.  GAIT: Antalgic, ambulates without assistance         Lab Results   Component Value Date    WBC 4.60 11/11/2016    HGB 12.6 (L) 11/11/2016    HCT 39.4 (L) 11/11/2016    MCV 97 11/11/2016     11/11/2016      Lab Results   Component Value Date    LABA1C 9.2 (H) 05/18/2016    HGBA1C 7.3 (H) 02/07/2017     Lab Results   Component Value Date    CREATININE 1.1 02/24/2017        ASSESSMENT: 61 y.o. year old male with pain, consistent with     Encounter Diagnoses   Name Primary?    Neuropathy Yes    Chronic left shoulder pain     Cervical radiculopathy     S/P lumbar fusion     Cervical stenosis of spinal canal        PLAN:     Escalate gabapentin to 1800 mg per day    Hydrocodone acetaminophen 10/325 one tablet every 12 hours by mouth when necessary quantity #60, refill 0    I emphasized the importance of regular exercise, core strengthening and stretching, diet and weight loss as a cornerstone of long-term pain management.    Follow-up with orthopedics regarding frozen left shoulder -good response to steroid injection    Follow-up in one month    Depending on response in the future the patient may benefit from repeat cervical intralaminar epidural steroid injection given his positive response the previous transforaminal epidural steroid injection for his cervical radiculopathy.    Depending on workup in the future the patient may be a candidate for an ACDF, additionally  spinal cord stimulation may be an option in the future.     Continue f/u with back and spine    The above plan and management options were discussed at length with patient. Patient is in agreement with the above and verbalized understanding. It will be communicated with the referring physician via electronic record, fax, or mail.    Christina Espinoza  04/20/2017

## 2017-04-20 NOTE — MR AVS SNAPSHOT
[facility]  [address]  [facility] [address]  Phone: [phone]  Fax: [phone]                  Rob Jones Jr.   2017 11:00 AM   Office Visit    Description:  Male : 1955   Provider:  Christina Espinoza MD   Department:  [facility]           Reason for Visit     Neck Pain     Low-back Pain           Diagnoses this Visit        Comments    Neuropathy    -  Primary     Chronic left shoulder pain         Cervical radiculopathy         S/P lumbar fusion         Cervical stenosis of spinal canal                To Do List           Future Appointments        Provider Department Dept Phone    2017 9:15 AM Benja Liz MD [facility] [place] [facility] [phone]    2017 2:00 PM Peyton Torres PA-C [facility] [phone]    2017 8:40 AM Remi Weathers MD [facility] [phone]    2017 1:00 PM Sue Walker NP [facility] [phone]    2017 1:00 PM LAB, [facility] [phone]      Goals (5 Years of Data)     None       These Medications        Disp Refills Start End    hydrocodone-acetaminophen 10-325mg (NORCO)  mg Tab 60 tablet 0 2017     Take 1 tablet by mouth every 12 (twelve) hours as needed for Pain. - Oral    Pharmacy: [facility]/pharmacy #[id number] - [place], [place] - [address] Ph #: [phone]       gabapentin (NEURONTIN) 300 MG capsule 180 capsule 2 2017     Increase to 2 tablets at night, 1 in AM and 1 in afternoon for 7 days.  Then increase to 2 tablets at night and 2 in morning, 1 tablet in afternoon for 7 days.  Then increase to 2 tablets three times a day (1800mg)    Pharmacy: [facility]/pharmacy #[id number] [place], [place] - [address] Ph #: [phone]         [facility]     [facility] -  Assistance  Unless otherwise directed by your provider, please contact [facility],  our nurse care line that is available for 24/7 assistance.     Registered nurses in the [facility] provide: appointment scheduling, clinical advisement, health education, and other advisory services.  Call: [phone] (toll free)               Medications           Message regarding Medications     Verify the changes and/or additions to your medication regime listed below are the same as discussed with your clinician today.  If any of these changes or additions are incorrect, please notify your healthcare provider.        START taking these NEW medications        Refills    gabapentin (NEURONTIN) 300 MG capsule 2    Sig: Increase to 2 tablets at night, 1 in AM and 1 in afternoon for 7 days.  Then increase to 2 tablets at night and 2 in morning, 1 tablet in afternoon for 7 days.  Then increase to 2 tablets three times a day (1800mg)    Class: Print           Verify that the below list of medications is an accurate representation of the medications you are currently taking.  If none reported, the list may be blank. If incorrect, please contact your healthcare provider. Carry this list with you in case of emergency.           Current Medications     amlodipine (NORVASC) 10 MG tablet Take 1 tablet (10 mg total) by mouth once daily.    dicyclomine (BENTYL) 20 mg tablet Take 20 mg by mouth daily as needed.    econazole nitrate 1 % cream Apply topically 2 (two) times daily.    fish oil-omega-3 fatty acids 300-1,000 mg capsule Take 2 g by mouth once daily.    fluocinolone acetonide oil 0.01 % Drop Put 3 drops in right ear twice daily for 1 week.    gabapentin (NEURONTIN) 300 MG capsule Take 1 capsule (300 mg total) by mouth 3 (three) times daily. Take 1 pill at nightx 7 days, followed by 1 pill in morning and night for 7 days, followed by 1 pill morning afternoon and night    hydrocodone-acetaminophen 10-325mg (NORCO)  mg Tab Take 1 tablet by mouth every 12 (twelve) hours as needed for Pain.    insulin  "aspart (NOVOLOG FLEXPEN) 100 unit/mL InPn pen Inject 15 Units into the skin as needed.    insulin aspart (NOVOLOG) 100 unit/mL injection Inject 1 Units into the skin every evening.     insulin glargine (LANTUS SOLOSTAR) 100 unit/mL (3 mL) InPn pen Inject 15 Units into the skin every evening.    irbesartan (AVAPRO) 150 MG tablet Take 1 tablet (150 mg total) by mouth once daily.    levothyroxine (SYNTHROID) 112 MCG tablet TAKE 1 TABLET BY MOUTH DAILY BEFORE BREAKFAST    meloxicam (MOBIC) 15 MG tablet Take 1 tablet (15 mg total) by mouth daily as needed for Pain.    metformin (GLUCOPHAGE) 1000 MG tablet Take 1 tablet (1,000 mg total) by mouth 2 (two) times daily with meals.    nitroGLYCERIN 0.4 MG/HR TD PT24 (NITRODUR) 0.4 mg/hr Place 1 patch onto the skin continuous prn.    NOVOFINE 32 32 gauge x 1/4" Ndle 1 EACH BY MISC.(NON-DRUG COMBO ROUTE) ROUTE ONCE DAILY.    pen needle, diabetic (NOVOFINE PLUS) 32 gauge x 1/6" Ndle 1 each by Misc.(Non-Drug; Combo Route) route once daily.    tamsulosin (FLOMAX) 0.4 mg Cp24 Take 1 capsule (0.4 mg total) by mouth once daily.    vitamin D 1000 units Tab Take 185 mg by mouth once daily.    gabapentin (NEURONTIN) 300 MG capsule Increase to 2 tablets at night, 1 in AM and 1 in afternoon for 7 days.  Then increase to 2 tablets at night and 2 in morning, 1 tablet in afternoon for 7 days.  Then increase to 2 tablets three times a day (1800mg)           Clinical Reference Information           Your Vitals Were     BP Pulse Temp Resp Height Weight    140/75 64 98.2 °F (36.8 °C) 20 5' 10" (1.778 m) 74 kg (163 lb 1.6 oz)    BMI                23.4 kg/m2          Blood Pressure          Most Recent Value    BP  (!)  140/75      Allergies as of 4/20/2017     Tizanidine      Immunizations Administered on Date of Encounter - 4/20/2017     None      Smoking Cessation     If you would like to quit smoking:   You may be eligible for free services if you are a [place] resident and started smoking " cigarettes before September 1, 1988.  Call the Smoking Cessation [employer] (SCT) toll free at (937) 006-9430 or (613) 699-7321.   Call 1-800-QUIT-NOW if you do not meet the above criteria.   Contact us via email: [email or web address]   View our website for more information: [email or web address]        Language Assistance Services     ATTENTION: Language assistance services are available, free of charge. Please call [phone].      ATENCIÓN: Si habla español, tiene a katz disposición servicios gratuitos de asistencia lingüística. Llame al [phone].     CHÚ Ý: N?u b?n nói Ti?ng Vi?t, có các d?ch v? h? tr? ngôn ng? mi?n phí dành cho b?n. G?i s? [phone].         [facility] complies with applicable Federal civil rights laws and does not discriminate on the basis of race, color, national origin, age, disability, or sex.

## 2017-04-24 ENCOUNTER — OFFICE VISIT (OUTPATIENT)
Dept: SPINE | Facility: CLINIC | Age: 62
End: 2017-04-24
Payer: MEDICARE

## 2017-04-24 VITALS
HEIGHT: 70 IN | DIASTOLIC BLOOD PRESSURE: 78 MMHG | SYSTOLIC BLOOD PRESSURE: 140 MMHG | HEART RATE: 62 BPM | BODY MASS INDEX: 23.77 KG/M2 | WEIGHT: 166 LBS

## 2017-04-24 DIAGNOSIS — M54.50 LOW BACK PAIN WITHOUT SCIATICA, UNSPECIFIED BACK PAIN LATERALITY, UNSPECIFIED CHRONICITY: ICD-10-CM

## 2017-04-24 DIAGNOSIS — M41.50 DEGENERATIVE SCOLIOSIS IN ADULT PATIENT: Primary | ICD-10-CM

## 2017-04-24 PROCEDURE — 3078F DIAST BP <80 MM HG: CPT | Mod: S$GLB,,, | Performed by: NEUROLOGICAL SURGERY

## 2017-04-24 PROCEDURE — 99999 PR PBB SHADOW E&M-EST. PATIENT-LVL III: CPT | Mod: PBBFAC,,, | Performed by: NEUROLOGICAL SURGERY

## 2017-04-24 PROCEDURE — 99499 UNLISTED E&M SERVICE: CPT | Mod: S$PBB,,, | Performed by: NEUROLOGICAL SURGERY

## 2017-04-24 PROCEDURE — 3077F SYST BP >= 140 MM HG: CPT | Mod: S$GLB,,, | Performed by: NEUROLOGICAL SURGERY

## 2017-04-24 PROCEDURE — 99204 OFFICE O/P NEW MOD 45 MIN: CPT | Mod: S$GLB,,, | Performed by: NEUROLOGICAL SURGERY

## 2017-04-24 PROCEDURE — 1160F RVW MEDS BY RX/DR IN RCRD: CPT | Mod: S$GLB,,, | Performed by: NEUROLOGICAL SURGERY

## 2017-04-24 NOTE — PROGRESS NOTES
Dear Emy White PA-C,     Thank you for referring this patient to my clinic. The full details of my evaluation will also be forthcoming to you by letter.    CHIEF COMPLAINT:  Consult    I, Alejandra Rivers, am scribing for, and in the presence of, Dr. Liz.    HPI:  Rob Jones Jr. is a 61 y.o.  male with a history of hepatitis C, diabetes mellitus, and previous lumbar fusion (2009) at [facility], who is referred to me by Emy White PA-C for evaluation of low back pain. Pt reports neck pain and lower back pain. Pt also reports LLE pain radiating to the knee.  Pt has difficulty with balance. Pt notes relief of shoulder pain with injections. Pt is on Gabapentin and Norco. Pt has participated in physical therapy with no improvement.      Review of patient's allergies indicates:   Allergen Reactions    Tizanidine Shortness Of Breath       Past Medical History:   Diagnosis Date    Anxiety     Back pain     Cataract     Chronic hepatitis C     Diabetes mellitus     Hypertension     Postoperative hypothyroidism 11/3/2016    Primary hyperparathyroidism 1/18/2017    Thyroid disease      Past Surgical History:   Procedure Laterality Date    LUMBAR FUSION      THYROIDECTOMY      TONSILLECTOMY       Family History   Problem Relation Age of Onset    Cancer Mother      breast    Cataracts Father     No Known Problems Sister     No Known Problems Brother     No Known Problems Brother     Heart disease Brother     No Known Problems Sister     No Known Problems Sister     No Known Problems Sister     Glaucoma Paternal Uncle     Glaucoma Paternal Uncle      Social History   Substance Use Topics    Smoking status: Current Every Day Smoker    Smokeless tobacco: None    Alcohol use Yes        Review of Systems   Constitutional: Negative.    HENT: Negative.    Eyes: Negative.    Respiratory: Negative.    Cardiovascular: Negative.    Gastrointestinal: Negative.    Endocrine: Negative.    Genitourinary:  Negative.    Musculoskeletal: Negative for back pain, gait problem and neck pain.   Skin: Negative.    Allergic/Immunologic: Negative.    Neurological: Negative for weakness, light-headedness, numbness and headaches.   Hematological: Negative.    Psychiatric/Behavioral: Negative.        OBJECTIVE:   Vital Signs:  Pulse: 62 (04/24/17 0945)  BP: (!) 140/78 (04/24/17 0945)    Physical Exam:    Vital signs: All nursing notes and vital signs reviewed -- afebrile, vital signs stable.  Constitutional: Patient sitting comfortably in chair. Appears well developed and well nourished.  Skin: Exposed areas are intact without abnormal markings, rashes or other lesions. Well healed scare over lower left abdominal quadrant.   HEENT: Normocephalic. Normal conjunctivae.  Cardiovascular: Normal rate and regular rhythm.  Respiratory: Chest wall rises and falls symmetrically, without signs of respiratory distress.  Abdomen: Soft and non-tender.  Extremities: Warm and without edema. Calves supple, non-tender.  Psych/Behavior: Normal affect.    Neurological:    Mental status: Alert and oriented. Conversational and appropriate.       Cranial Nerves: Grossly intact.    Motor:    Upper:  Deltoids Triceps Biceps WE WF     R 5/5 5/5 5/5 5/5 5/5 5/5    L 5/5 5/5 5/5 5/5 5/5 5/5      Lower:  HF KE KF DF PF EHL    R 5/5 5/5 5/5 5/5 5/5 5/5    L 5/5 5/5 5/5 5/5 5/5 5/5     Sensory: Intact sensation to light touch in all extremities. Romberg negative.    Reflexes:          DTR: 2+ symmetrically throughout.     Orellana's: Negative.     Babinski's: Negative.     Clonus: Negative.    Cerebellar: Finger-to-nose and rapid alternating movements normal. Gait stable, fluid.    Spine:    Posture: Head well aligned over pelvis in front and side views.  No focal or global spinal deformity visible on inspection. Shoulders and hips even. No obvious leg length discrepancy. No scapula winging.    Bending: Full ROM with forward, back and lateral bending. No  rib prominence with forward bend.    Cervical:      ROM: Full with flexion, extension, lateral rotation and ear-to-shoulder bend.      Midline TTP: Negative.     Spurling's test: Negative.     Lhermitte's: Negative.    Thoracic:     Midline TTP: Negative    Lumbar:     Midline TTP: Negative     Straight Leg Test: Negative     Crossed Straight Leg Test: Negative     Sciatic notch tenderness: Negative.    Other:     SI joint TTP: Negative.     Greater trochanter TTP: Negative.     Tenderness with external/internal hip rotation: Negative.    Diagnostic Results:  All imaging was independently reviewed by me.    MRI C-spine, dated 1/16/2017:  1. Moderate stenosis C6/7    MRI T-spine, dated 1/25/2017:  1. Facet arthropathy T11/12 without significant stenosis    Flex/Ex X-ray L-spine, dated 1/25/2017:  1. Previous TLIF L4/5 L5/S1 with bony fusion   2. No gross instability     Flex/Ex X-ray C-spine, dated 1/12/2017:  1. No gross instability    DXA Bone Scan, dated 1/31/2017:  1. Osteopenia  2. Trace uptake at L5/S1 to space    ASSESSMENT/PLAN:     Rob Jones Jr. has history of 2 level ALIF L4/5 and L5/S1 with intermittent radicular left leg pain. X-rays show good spinal alignment and fusion at those levels. I do not have an MRI or CT to evaluate bony fusion and soft tissues.  Regarding his low back pain he will follow up with Dr. Espinoza for facet injections at L5/S1, and I will prescribe PT.  Additionally he has neck pain, shooting arm pain, and gait instability, all of which are stable or well controlled with injections. I do not believe that he has cervical or thoracic myelopathy. He will follow up in a month.    The patient understands and agrees with the plan of care. All questions were answered.     1. CT L-spine   2. MRI L-spine  3. RTC 1 month    I, Dr. Liz, personally performed the services described in this documentation as scribed by Alejandra Rivers in my presence, and it is both accurate and  complete.      Benja Liz M.D.  Department of Neurosurgery  [facility]

## 2017-04-24 NOTE — LETTER
April 24, 2017      Emy White PA-C  [address]  [facility] [address]           [facility]  [address], [address]  [facility] [address]  Phone: [phone]  Fax: [phone]          Patient: Rob Jones Jr.   MR Number: [id number]   YOB: 1955   Date of Visit: 4/24/2017       Dear Emy White:    Thank you for referring Rob Jones to me for evaluation. Attached you will find relevant portions of my assessment and plan of care.    If you have questions, please do not hesitate to call me. I look forward to following Rob Jones along with you.    Sincerely,    Benja Liz MD    Enclosure  CC:  No Recipients    If you would like to receive this communication electronically, please contact [email or web address] or [phone] to request more information on [employer] access.    For providers and/or their staff who would like to refer a patient to [facility], please contact us through our one-stop-shop provider referral line, [facility], at [phone].    If you feel you have received this communication in error or would no longer like to receive these types of communications, please e-mail [email or web address]

## 2017-05-01 ENCOUNTER — OFFICE VISIT (OUTPATIENT)
Dept: ORTHOPEDICS | Facility: CLINIC | Age: 62
End: 2017-05-01
Payer: MEDICARE

## 2017-05-01 VITALS
BODY MASS INDEX: 23.77 KG/M2 | HEIGHT: 70 IN | DIASTOLIC BLOOD PRESSURE: 70 MMHG | WEIGHT: 166 LBS | SYSTOLIC BLOOD PRESSURE: 117 MMHG | HEART RATE: 76 BPM | RESPIRATION RATE: 18 BRPM

## 2017-05-01 DIAGNOSIS — G56.03 BILATERAL CARPAL TUNNEL SYNDROME: Primary | ICD-10-CM

## 2017-05-01 PROCEDURE — 3078F DIAST BP <80 MM HG: CPT | Mod: S$GLB,,, | Performed by: ORTHOPAEDIC SURGERY

## 2017-05-01 PROCEDURE — 99214 OFFICE O/P EST MOD 30 MIN: CPT | Mod: 25,S$GLB,, | Performed by: ORTHOPAEDIC SURGERY

## 2017-05-01 PROCEDURE — 99999 PR PBB SHADOW E&M-EST. PATIENT-LVL IV: CPT | Mod: PBBFAC,,, | Performed by: ORTHOPAEDIC SURGERY

## 2017-05-01 PROCEDURE — 3074F SYST BP LT 130 MM HG: CPT | Mod: S$GLB,,, | Performed by: ORTHOPAEDIC SURGERY

## 2017-05-01 PROCEDURE — 20526 THER INJECTION CARP TUNNEL: CPT | Mod: 50,S$GLB,, | Performed by: ORTHOPAEDIC SURGERY

## 2017-05-01 PROCEDURE — 1160F RVW MEDS BY RX/DR IN RCRD: CPT | Mod: S$GLB,,, | Performed by: ORTHOPAEDIC SURGERY

## 2017-05-01 RX ADMIN — DEXAMETHASONE SODIUM PHOSPHATE 4 MG: 4 INJECTION, SOLUTION INTRA-ARTICULAR; INTRALESIONAL; INTRAMUSCULAR; INTRAVENOUS; SOFT TISSUE at 02:05

## 2017-05-01 NOTE — PROCEDURES
Carpal Tunnel  Date/Time: 5/1/2017 2:07 PM  Performed by: HERLINDA RAY  Authorized by: HERLINDA RAY     Consent Done?:  Yes (Verbal)  Indications:  Pain  Timeout: Prior to procedure the correct patient, procedure, and site was verified      Location:  Wrist  Site:  L radiocarpal  Needle size:  25 G  Approach:  Volar  Medications:  4 mg dexamethasone 4 mg/mL

## 2017-05-01 NOTE — PROCEDURES
Carpal Tunnel  Date/Time: 5/1/2017 2:06 PM  Performed by: HERLIDNA RAY  Authorized by: HERLINDA RAY     Consent Done?:  Yes (Verbal)  Indications:  Pain  Timeout: Prior to procedure the correct patient, procedure, and site was verified      Location:  Wrist  Site:  R radiocarpal  Needle size:  25 G  Approach:  Volar  Medications:  4 mg dexamethasone 4 mg/mL

## 2017-05-01 NOTE — MR AVS SNAPSHOT
[facility]  [address], [address]  [facility] [address]  Phone: [phone]                  Rob Jones Jr.   2017 2:00 PM   Office Visit    Description:  Male : 1955   Provider:  Macrina Hair MD   Department:  [facility]           Reason for Visit     Left Shoulder - Pain           Diagnoses this Visit        Comments    Bilateral carpal tunnel syndrome    -  Primary            To Do List           Future Appointments        Provider Department Dept Phone    2017 8:40 AM Remi Weathers MD [facility] Internal Medicine [phone]    2017 1:00 PM Sue Walker NP [facility] Pain Management [phone]    2017 1:00 PM LAB, [facility] [phone]    2017 8:00 AM [facility] [place] MRI1 350 LB LIMIT [facility] [phone]    2017 8:30 AM [facility] [place] CT OP LIMIT 450 LBS [facility] [phone]      Goals (5 Years of Data)     None      [facility] On [facility]     [facility] Care Line -  Assistance  Unless otherwise directed by your provider, please contact [facility], our nurse care line that is available for  assistance.     Registered nurses in the [facility] provide: appointment scheduling, clinical advisement, health education, and other advisory services.  Call: [phone] (toll free)               Medications           Message regarding Medications     Verify the changes and/or additions to your medication regime listed below are the same as discussed with your clinician today.  If any of these changes or additions are incorrect, please notify your healthcare provider.             Verify that the below list of medications is an accurate representation of the medications you are currently taking.  If none reported, the list may be blank. If incorrect, please contact your healthcare provider. Carry this list with you in case of emergency.          "  Current Medications     amlodipine (NORVASC) 10 MG tablet Take 1 tablet (10 mg total) by mouth once daily.    dicyclomine (BENTYL) 20 mg tablet Take 20 mg by mouth daily as needed.    econazole nitrate 1 % cream Apply topically 2 (two) times daily.    fish oil-omega-3 fatty acids 300-1,000 mg capsule Take 2 g by mouth once daily.    fluocinolone acetonide oil 0.01 % Drop Put 3 drops in right ear twice daily for 1 week.    gabapentin (NEURONTIN) 300 MG capsule Increase to 2 tablets at night, 1 in AM and 1 in afternoon for 7 days.  Then increase to 2 tablets at night and 2 in morning, 1 tablet in afternoon for 7 days.  Then increase to 2 tablets three times a day (1800mg)    hydrocodone-acetaminophen 10-325mg (NORCO)  mg Tab Take 1 tablet by mouth every 12 (twelve) hours as needed for Pain.    insulin aspart (NOVOLOG FLEXPEN) 100 unit/mL InPn pen Inject 15 Units into the skin as needed.    insulin aspart (NOVOLOG) 100 unit/mL injection Inject 1 Units into the skin every evening.     insulin glargine (LANTUS SOLOSTAR) 100 unit/mL (3 mL) InPn pen Inject 15 Units into the skin every evening.    irbesartan (AVAPRO) 150 MG tablet Take 1 tablet (150 mg total) by mouth once daily.    levothyroxine (SYNTHROID) 112 MCG tablet TAKE 1 TABLET BY MOUTH DAILY BEFORE BREAKFAST    meloxicam (MOBIC) 15 MG tablet Take 1 tablet (15 mg total) by mouth daily as needed for Pain.    metformin (GLUCOPHAGE) 1000 MG tablet Take 1 tablet (1,000 mg total) by mouth 2 (two) times daily with meals.    nitroGLYCERIN 0.4 MG/HR TD PT24 (NITRODUR) 0.4 mg/hr Place 1 patch onto the skin continuous prn.    NOVOFINE 32 32 gauge x 1/4" Ndle 1 EACH BY MISC.(NON-DRUG COMBO ROUTE) ROUTE ONCE DAILY.    pen needle, diabetic (NOVOFINE PLUS) 32 gauge x 1/6" Ndle 1 each by Misc.(Non-Drug; Combo Route) route once daily.    vitamin D 1000 units Tab Take 185 mg by mouth once daily.    gabapentin (NEURONTIN) 300 MG capsule Take 1 capsule (300 mg total) by mouth " "3 (three) times daily. Take 1 pill at nightx 7 days, followed by 1 pill in morning and night for 7 days, followed by 1 pill morning afternoon and night    tamsulosin (FLOMAX) 0.4 mg Cp24 Take 1 capsule (0.4 mg total) by mouth once daily.           Clinical Reference Information           Your Vitals Were     BP Pulse Resp Height Weight BMI    117/70 76 18 5' 10" (1.778 m) 75.3 kg (166 lb) 23.82 kg/m2      Blood Pressure          Most Recent Value    BP  117/70      Allergies as of 5/1/2017     Tizanidine      Immunizations Administered on Date of Encounter - 5/1/2017     None      Orders Placed During Today's Visit      Normal Orders This Visit    Carpal Tunnel     Carpal Tunnel       Smoking Cessation     If you would like to quit smoking:   You may be eligible for free services if you are a [place] resident and started smoking cigarettes before September 1, 1988.  Call the Smoking Cessation Trust ([facility]) toll free at ([phone] 754-2005 or ([phone] 034-9169.   Call 1-800-QUIT-NOW if you do not meet the above criteria.   Contact us via email: [email or web address]   View our website for more information: [email or web address]        Language Assistance Services     ATTENTION: Language assistance services are available, free of charge. Please call [phone].      ATENCIÓN: Si habla español, tiene a katz disposición servicios gratuitos de asistencia lingüística. Llame al [phone].     CHÚ Ý: N?u b?n nói Ti?ng Vi?t, có các d?ch v? h? tr? ngôn ng? mi?n phí dành cho b?n. G?i s? [phone].         [facility] complies with applicable Federal civil rights laws and does not discriminate on the basis of race, color, national origin, age, disability, or sex.        "

## 2017-05-08 ENCOUNTER — TELEPHONE (OUTPATIENT)
Dept: ENDOCRINOLOGY | Facility: CLINIC | Age: 62
End: 2017-05-08

## 2017-05-08 ENCOUNTER — LAB VISIT (OUTPATIENT)
Dept: LAB | Facility: OTHER | Age: 62
End: 2017-05-08
Attending: INTERNAL MEDICINE
Payer: MEDICARE

## 2017-05-08 ENCOUNTER — OFFICE VISIT (OUTPATIENT)
Dept: INTERNAL MEDICINE | Facility: CLINIC | Age: 62
End: 2017-05-08
Payer: MEDICARE

## 2017-05-08 VITALS
SYSTOLIC BLOOD PRESSURE: 120 MMHG | DIASTOLIC BLOOD PRESSURE: 80 MMHG | BODY MASS INDEX: 23.77 KG/M2 | HEIGHT: 70 IN | WEIGHT: 166 LBS | HEART RATE: 73 BPM

## 2017-05-08 DIAGNOSIS — N40.0 BENIGN NON-NODULAR PROSTATIC HYPERPLASIA WITHOUT LOWER URINARY TRACT SYMPTOMS: ICD-10-CM

## 2017-05-08 DIAGNOSIS — E89.0 POSTOPERATIVE HYPOTHYROIDISM: ICD-10-CM

## 2017-05-08 DIAGNOSIS — I10 ESSENTIAL HYPERTENSION: ICD-10-CM

## 2017-05-08 DIAGNOSIS — Z72.0 TOBACCO USE: ICD-10-CM

## 2017-05-08 LAB
T4 FREE SERPL-MCNC: 1.04 NG/DL
TSH SERPL DL<=0.005 MIU/L-ACNC: 5.35 UIU/ML

## 2017-05-08 PROCEDURE — 99999 PR PBB SHADOW E&M-EST. PATIENT-LVL III: CPT | Mod: PBBFAC,,, | Performed by: INTERNAL MEDICINE

## 2017-05-08 PROCEDURE — 83036 HEMOGLOBIN GLYCOSYLATED A1C: CPT

## 2017-05-08 PROCEDURE — 3079F DIAST BP 80-89 MM HG: CPT | Mod: S$GLB,,, | Performed by: INTERNAL MEDICINE

## 2017-05-08 PROCEDURE — 84439 ASSAY OF FREE THYROXINE: CPT

## 2017-05-08 PROCEDURE — 36415 COLL VENOUS BLD VENIPUNCTURE: CPT

## 2017-05-08 PROCEDURE — 99214 OFFICE O/P EST MOD 30 MIN: CPT | Mod: S$GLB,,, | Performed by: INTERNAL MEDICINE

## 2017-05-08 PROCEDURE — 4010F ACE/ARB THERAPY RXD/TAKEN: CPT | Mod: S$GLB,,, | Performed by: INTERNAL MEDICINE

## 2017-05-08 PROCEDURE — 3074F SYST BP LT 130 MM HG: CPT | Mod: S$GLB,,, | Performed by: INTERNAL MEDICINE

## 2017-05-08 PROCEDURE — 3045F PR MOST RECENT HEMOGLOBIN A1C LEVEL 7.0-9.0%: CPT | Mod: S$GLB,,, | Performed by: INTERNAL MEDICINE

## 2017-05-08 PROCEDURE — 84443 ASSAY THYROID STIM HORMONE: CPT

## 2017-05-08 PROCEDURE — 1160F RVW MEDS BY RX/DR IN RCRD: CPT | Mod: S$GLB,,, | Performed by: INTERNAL MEDICINE

## 2017-05-08 RX ORDER — TAMSULOSIN HYDROCHLORIDE 0.4 MG/1
0.4 CAPSULE ORAL DAILY
Qty: 90 CAPSULE | Refills: 3 | Status: SHIPPED | OUTPATIENT
Start: 2017-05-08 | End: 2018-06-13 | Stop reason: SDUPTHER

## 2017-05-08 RX ORDER — TEMAZEPAM 30 MG/1
CAPSULE ORAL
Qty: 90 CAPSULE | Refills: 1 | Status: SHIPPED | OUTPATIENT
Start: 2017-05-08 | End: 2018-03-09 | Stop reason: SDUPTHER

## 2017-05-08 NOTE — MR AVS SNAPSHOT
[facility]  [address]  [facility] [address]  Phone: [phone]  Fax: [phone]                  Rob Jones Jr.   2017 8:40 AM   Office Visit    Description:  Male : 1955   Provider:  Remi Weathers MD   Department:  [facility] Internal Medicine           Reason for Visit     Diabetes           Diagnoses this Visit        Comments    Uncontrolled type 2 diabetes mellitus with complication, with long-term current use of insulin    -  Primary     Essential hypertension         Postoperative hypothyroidism         Benign non-nodular prostatic hyperplasia without lower urinary tract symptoms         Tobacco use                To Do List           Future Appointments        Provider Department Dept Phone    2017 10:15 AM LAB, SAME DAY [facility] [phone]    2017 1:00 PM Sue Walker NP [facility] Pain Management [phone]    2017 1:00 PM LAB, [facility] [phone]    2017 8:00 AM [facility] MRI1 350 LB LIMIT [facility] [phone]    2017 8:30 AM [facility] CT OP LIMIT 450 LBS [facility] [phone]      Goals (5 Years of Data)     None      Follow-Up and Disposition     Return in about 3 months (around 2017), or if symptoms worsen or fail to improve.       These Medications        Disp Refills Start End    temazepam (RESTORIL) 30 mg capsule 90 capsule 1 2017     TAKE 1 CAPSULE BY MOUTH EVERY DAY AT BEDTIME AS NEEDED    Pharmacy: [facility]/pharmacy #[id number] - [place], [place] - [address] Ph #: [phone]       tamsulosin (FLOMAX) 0.4 mg Cp24 90 capsule 3 2017     Take 1 capsule (0.4 mg total) by mouth once daily. - Oral    Pharmacy: [facility]/pharmacy #[id number] - [place], [place] - [address] Ph #: [phone]         [facility]     [facility] Nurse Care Line -  Assistance  Unless otherwise directed by your  provider, please contact [facility], our nurse care line that is available for 24/7 assistance.     Registered nurses in the [facility] provide: appointment scheduling, clinical advisement, health education, and other advisory services.  Call: [phone] (toll free)               Medications           Message regarding Medications     Verify the changes and/or additions to your medication regime listed below are the same as discussed with your clinician today.  If any of these changes or additions are incorrect, please notify your healthcare provider.        START taking these NEW medications        Refills    temazepam (RESTORIL) 30 mg capsule 1    Sig: TAKE 1 CAPSULE BY MOUTH EVERY DAY AT BEDTIME AS NEEDED    Class: Normal      STOP taking these medications     insulin aspart (NOVOLOG) 100 unit/mL injection Inject 1 Units into the skin every evening.            Verify that the below list of medications is an accurate representation of the medications you are currently taking.  If none reported, the list may be blank. If incorrect, please contact your healthcare provider. Carry this list with you in case of emergency.           Current Medications     amlodipine (NORVASC) 10 MG tablet Take 1 tablet (10 mg total) by mouth once daily.    dicyclomine (BENTYL) 20 mg tablet Take 20 mg by mouth daily as needed.    econazole nitrate 1 % cream Apply topically 2 (two) times daily.    fish oil-omega-3 fatty acids 300-1,000 mg capsule Take 2 g by mouth once daily.    fluocinolone acetonide oil 0.01 % Drop Put 3 drops in right ear twice daily for 1 week.    gabapentin (NEURONTIN) 300 MG capsule Take 1 capsule (300 mg total) by mouth 3 (three) times daily. Take 1 pill at nightx 7 days, followed by 1 pill in morning and night for 7 days, followed by 1 pill morning afternoon and night    hydrocodone-acetaminophen 10-325mg (NORCO)  mg Tab Take 1 tablet by mouth every 12 (twelve) hours as needed for Pain.  "   insulin aspart (NOVOLOG FLEXPEN) 100 unit/mL InPn pen Inject 15 Units into the skin as needed.    insulin glargine (LANTUS SOLOSTAR) 100 unit/mL (3 mL) InPn pen Inject 15 Units into the skin every evening.    irbesartan (AVAPRO) 150 MG tablet Take 1 tablet (150 mg total) by mouth once daily.    levothyroxine (SYNTHROID) 112 MCG tablet TAKE 1 TABLET BY MOUTH DAILY BEFORE BREAKFAST    meloxicam (MOBIC) 15 MG tablet Take 1 tablet (15 mg total) by mouth daily as needed for Pain.    metformin (GLUCOPHAGE) 1000 MG tablet Take 1 tablet (1,000 mg total) by mouth 2 (two) times daily with meals.    nitroGLYCERIN 0.4 MG/HR TD PT24 (NITRODUR) 0.4 mg/hr Place 1 patch onto the skin continuous prn.    NOVOFINE 32 32 gauge x 1/4" Ndle 1 EACH BY MISC.(NON-DRUG COMBO ROUTE) ROUTE ONCE DAILY.    pen needle, diabetic (NOVOFINE PLUS) 32 gauge x 1/6" Ndle 1 each by Misc.(Non-Drug; Combo Route) route once daily.    tamsulosin (FLOMAX) 0.4 mg Cp24 Take 1 capsule (0.4 mg total) by mouth once daily.    temazepam (RESTORIL) 30 mg capsule TAKE 1 CAPSULE BY MOUTH EVERY DAY AT BEDTIME AS NEEDED    vitamin D 1000 units Tab Take 185 mg by mouth once daily.           Clinical Reference Information           Your Vitals Were     BP Pulse Height Weight BMI    120/80 73 5' 10" (1.778 m) 75.3 kg (166 lb 0.1 oz) 23.82 kg/m2      Blood Pressure          Most Recent Value    BP  120/80      Allergies as of 5/8/2017     Tizanidine      Immunizations Administered on Date of Encounter - 5/8/2017     None      Orders Placed During Today's Visit     Future Labs/Procedures Expected by Expires    Hemoglobin A1c  5/8/2017 7/7/2018    TSH  5/8/2017 7/7/2018      Instructions    Your test results will be communicated to you via: [facility], Telephone or Letter.  If you have not received your test results within one week. Please contact the clinic.           Smoking Cessation     If you would like to quit smoking:   You may be eligible for free services if you " are a [place] resident and started smoking cigarettes before September 1, 1988.  Call the Smoking Cessation Trust ([facility]) toll free at (974) 256-9890 or ([phone] 065-4850.   Call 1-800-QUIT-NOW if you do not meet the above criteria.   Contact us via email: [email or web address]   View our website for more information: [email or web address]        Language Assistance Services     ATTENTION: Language assistance services are available, free of charge. Please call [phone].      ATENCIÓN: Si habla español, tiene a katz disposición servicios gratuitos de asistencia lingüística. Llame al [phone].     CHÚ Ý: N?u b?n nói Ti?ng Vi?t, có các d?ch v? h? tr? ngôn ng? mi?n phí dành cho b?n. G?i s? [phone].         [facility] complies with applicable Federal civil rights laws and does not discriminate on the basis of race, color, national origin, age, disability, or sex.

## 2017-05-08 NOTE — TELEPHONE ENCOUNTER
Can increase half pill a week ( total 7 and a half pills of 112 mcg weekly )  Also I had advised him to follow up with me for his hyperparathyroidism which he did not   If you can follow up with him for that, that will be great and will be happy to assist you

## 2017-05-08 NOTE — PROGRESS NOTES
"Subjective:       Patient ID: Rob Jones Jr. is a 61 y.o. male.    Chief Complaint: Diabetes    HPI Comments:   Pt here for f/u DM2 and other issues.    Feeling well.  No new c/o.     He is still taking Lantus at 15u QHS.  Fasting morning CBGs are 90s-100s.  No lows.  He is also still taking 5-8u Novolog PRN, he estimates 2-3x/wk.     Wants to resume temazepam.  He recalls when he had this previously he would use it 2-3x/wk.     Never got Flomax.  Wants new Rx for this.  No change in nocturia.         Diabetes       Review of Systems   Constitutional: Negative.    HENT: Negative.    Eyes: Negative.    Respiratory: Negative.    Cardiovascular: Negative.    Gastrointestinal: Negative.    Genitourinary: Negative.    Musculoskeletal: Negative.    Skin: Negative.    Neurological: Negative.    Psychiatric/Behavioral: Negative.        Objective:       Vitals:    05/08/17 0848   BP: 120/80   Pulse: 73   Weight: 75.3 kg (166 lb 0.1 oz)   Height: 5' 10" (1.778 m)     Physical Exam   Constitutional: He is oriented to person, place, and time. He appears well-developed and well-nourished. No distress.   HENT:   Head: Normocephalic and atraumatic.   Eyes: Conjunctivae and EOM are normal. Pupils are equal, round, and reactive to light.   Neurological: He is alert and oriented to person, place, and time.   Skin: No rash noted. He is not diaphoretic.   Psychiatric: He has a normal mood and affect. His behavior is normal. Thought content normal.       Assessment:       1. Uncontrolled type 2 diabetes mellitus with complication, with long-term current use of insulin    2. Essential hypertension    3. Postoperative hypothyroidism    4. Benign non-nodular prostatic hyperplasia without lower urinary tract symptoms    5. Tobacco use        Plan:           DM2 - Uncontrolled when last tested.  Cont current tx and check new A1c.  Pt also following with Endocrinology.     HTN - At goal.  Cont current tx.    Hypothyroidism - Cont " current tx and chk new TSH.    BPH - Stable.  Resume Flomax.    Tobacco - Pt advised to quit.  Pt aware of risks.  Pt not ready to quit.      Insomnia - Stable.  Resume temazepam.

## 2017-05-08 NOTE — TELEPHONE ENCOUNTER
----- Message from Remi Weathers MD sent at 5/8/2017  2:08 PM CDT -----  [place] Jo.  I hope you're doing well.     I'm writing about our mutual patient Mr Jones, who I saw earlier today.  Approximately six months ago I changed his dose of levothyroxine from 125mcg down to 112mcg due to a low TSH.  Today he had his TSH tested again for the first time since then and it is now a little higher than the target.  Any suggestions?     Thanks,  Remi Weathers

## 2017-05-09 ENCOUNTER — PATIENT MESSAGE (OUTPATIENT)
Dept: INTERNAL MEDICINE | Facility: CLINIC | Age: 62
End: 2017-05-09

## 2017-05-09 LAB
ESTIMATED AVG GLUCOSE: 160 MG/DL
HBA1C MFR BLD HPLC: 7.2 %

## 2017-05-09 RX ORDER — LEVOTHYROXINE SODIUM 112 UG/1
112 TABLET ORAL DAILY
Qty: 90 TABLET | Refills: 0
Start: 2017-05-09 | End: 2017-10-12

## 2017-05-10 ENCOUNTER — TELEPHONE (OUTPATIENT)
Dept: INTERNAL MEDICINE | Facility: CLINIC | Age: 62
End: 2017-05-10

## 2017-05-10 NOTE — TELEPHONE ENCOUNTER
----- Message from Doris Jo MD sent at 5/8/2017  6:01 PM CDT -----  I am good Thank you for asking   Can increase synthoid half pill a week ( total 7 and a half pills of 112 mcg weekly )  Also I had advised him to follow up with me for his hyperparathyroidism which he did not   If you can follow up with him for that, that will be great and will be happy to assist you    -RP  ----- Message -----     From: Remi Weathers MD     Sent: 5/8/2017   2:08 PM       To: MD [place] [address].  I hope you're doing well.     I'm writing about our mutual patient Mr Jones, who I saw earlier today.  Approximately six months ago I changed his dose of levothyroxine from 125mcg down to 112mcg due to a low TSH.  Today he had his TSH tested again for the first time since then and it is now a little higher than the target.  Any suggestions?     Thanks,  Remi Weathers

## 2017-05-16 RX ORDER — DEXAMETHASONE SODIUM PHOSPHATE 4 MG/ML
4 INJECTION, SOLUTION INTRA-ARTICULAR; INTRALESIONAL; INTRAMUSCULAR; INTRAVENOUS; SOFT TISSUE
Status: DISCONTINUED | OUTPATIENT
Start: 2017-05-01 | End: 2017-05-16 | Stop reason: HOSPADM

## 2017-05-16 NOTE — PROGRESS NOTES
CHIEF COMPLAINT:  Severe left shoulder pain.    HISTORY OF PRESENT ILLNESS: At last visit:  Rob is a 61-year-old right-hand dominant male   presenting today for evaluation of his left shoulder pain.  He reports that 30   years ago he had a rotator cuff repair.  He had not been having problems with it   other than a few months ago.  He started having left shoulder pain.  He did not   have any injury.  He feels that he can hardly raise his shoulder.  He reports   that he is very active.  He also has neck and back issues.  He has had surgery   on his right shoulder for rotator cuff repair as well.  He has had surgery on   his back recently.  He also has neck pain.  He is seen in Pain Management.  He   reports that he does have hepatitis C, but received the Harvoni treatment for   that and has been cleared from the hepatitis C he states. He denies nausea,   vomiting, fever or chills.  He reports he does have paresthesias in his   bilateral hands.  He sees Dr. Villavicencio for carpal tunnel injections.  His numbness   is intermittent and the injections still help.  He feels that he is unable to   sleep on his left shoulder or use his left upper extremity because of the pain.    Today: He is doing much better in reference to his shoulder but he is interested in undergoing carpal tunnel injections today since he is in clinic.     Past Medical History:   Diagnosis Date    Anxiety     Back pain     Cataract     Chronic hepatitis C     Diabetes mellitus     Hypertension     Postoperative hypothyroidism 11/3/2016    Primary hyperparathyroidism 1/18/2017    Thyroid disease        Past Surgical History:   Procedure Laterality Date    LUMBAR FUSION      THYROIDECTOMY      TONSILLECTOMY         Social History     Social History    Marital status: Single     Spouse name: N/A    Number of children: N/A    Years of education: N/A     Occupational History    Disabled, pt says from [place] and now for back, DM2      Social  "History Main Topics    Smoking status: Current Every Day Smoker    Smokeless tobacco: Not on file    Alcohol use Yes    Drug use: No    Sexual activity: Not on file     Other Topics Concern    Not on file     Social History Narrative    Lives alone.  He has 2 adult children who do not live here.         Current Outpatient Prescriptions on File Prior to Visit   Medication Sig Dispense Refill    amlodipine (NORVASC) 10 MG tablet Take 1 tablet (10 mg total) by mouth once daily. 90 tablet 3    dicyclomine (BENTYL) 20 mg tablet Take 20 mg by mouth daily as needed.      econazole nitrate 1 % cream Apply topically 2 (two) times daily. 30 g 1    fish oil-omega-3 fatty acids 300-1,000 mg capsule Take 2 g by mouth once daily.      fluocinolone acetonide oil 0.01 % Drop Put 3 drops in right ear twice daily for 1 week. 1 Bottle 0    hydrocodone-acetaminophen 10-325mg (NORCO)  mg Tab Take 1 tablet by mouth every 12 (twelve) hours as needed for Pain. 60 tablet 0    insulin aspart (NOVOLOG FLEXPEN) 100 unit/mL InPn pen Inject 15 Units into the skin as needed. 1 Box 5    insulin glargine (LANTUS SOLOSTAR) 100 unit/mL (3 mL) InPn pen Inject 15 Units into the skin every evening. 3 Box 1    irbesartan (AVAPRO) 150 MG tablet Take 1 tablet (150 mg total) by mouth once daily. 90 tablet 3    meloxicam (MOBIC) 15 MG tablet Take 1 tablet (15 mg total) by mouth daily as needed for Pain. 90 tablet 1    metformin (GLUCOPHAGE) 1000 MG tablet Take 1 tablet (1,000 mg total) by mouth 2 (two) times daily with meals. 180 tablet 3    nitroGLYCERIN 0.4 MG/HR TD PT24 (NITRODUR) 0.4 mg/hr Place 1 patch onto the skin continuous prn.      NOVOFINE 32 32 gauge x 1/4" Ndle 1 EACH BY MISC.(NON-DRUG COMBO ROUTE) ROUTE ONCE DAILY.  3    pen needle, diabetic (NOVOFINE PLUS) 32 gauge x 1/6" Ndle 1 each by Misc.(Non-Drug; Combo Route) route once daily. 100 each 3    vitamin D 1000 units Tab Take 185 mg by mouth once daily.      " "gabapentin (NEURONTIN) 300 MG capsule Take 1 capsule (300 mg total) by mouth 3 (three) times daily. Take 1 pill at nightx 7 days, followed by 1 pill in morning and night for 7 days, followed by 1 pill morning afternoon and night 90 capsule 11     No current facility-administered medications on file prior to visit.        Review of patient's allergies indicates:   Allergen Reactions    Tizanidine Shortness Of Breath       Review of Systems:  Constitutional: no fever or chills  ENT: no nasal congestion or sore throat  Respiratory: no cough or shortness of breath  Cardiovascular: no chest pain or palpitations  Gastrointestinal: no nausea or vomiting  Genitourinary: no hematuria or dysuria  Integument/Breast: no rash or pruritis  Hematologic/Lymphatic: no easy bruising or lymphadenopathy  Musculoskeletal: see HPI  Neurological: no seizures or tremors  Behavioral/Psych: no auditory or visual hallucinations      PHYSICAL EXAM    Vitals:    05/01/17 1351   BP: 117/70   Pulse: 76   Resp: 18   Weight: 75.3 kg (166 lb)   Height: 5' 10" (1.778 m)   PainSc: 0-No pain       GENERAL:  Well developed, well nourished, no acute distress.  CARDIOVASCULAR:  Regular rate and rhythm.  LUNGS:  Respirations equal and unlabored.  BEHAVIORAL AND PSYCH:  Mood and affect appropriate.  NEUROLOGIC:  No tremor.  HEENT:  Normocephalic, atraumatic.  MUSCULOSKELETAL:  Upper extremity exam:  He is distally neurovascularly intact.    AIN, PIN nerves are intact.  Sensation over the radial, ulnar and median nerves   is equivocal.  Positive Tinel's bilaterally at the wrist.  He has limited range   of motion of the left upper extremity.    Shoulder : ROM without pain, FF to 120  X-ASSESSMENT:  1.  Severe glenohumeral arthritis.  2.  Status post rotator cuff repair.    PLAN:  Today pt will undergo B carpal tunnel injections by request. Pt can f/u PRN  "

## 2017-05-19 ENCOUNTER — EPISODE CHANGES (OUTPATIENT)
Dept: HEPATOLOGY | Facility: CLINIC | Age: 62
End: 2017-05-19

## 2017-05-23 ENCOUNTER — EPISODE CHANGES (OUTPATIENT)
Dept: HEPATOLOGY | Facility: CLINIC | Age: 62
End: 2017-05-23

## 2017-05-23 DIAGNOSIS — E11.9 TYPE 2 DIABETES MELLITUS WITHOUT COMPLICATION: ICD-10-CM

## 2017-05-29 ENCOUNTER — EPISODE CHANGES (OUTPATIENT)
Dept: HEPATOLOGY | Facility: CLINIC | Age: 62
End: 2017-05-29

## 2017-05-29 ENCOUNTER — HOSPITAL ENCOUNTER (OUTPATIENT)
Dept: RADIOLOGY | Facility: OTHER | Age: 62
Discharge: HOME | End: 2017-05-29
Attending: NEUROLOGICAL SURGERY
Payer: MEDICARE

## 2017-05-29 DIAGNOSIS — M54.50 LOW BACK PAIN WITHOUT SCIATICA, UNSPECIFIED BACK PAIN LATERALITY, UNSPECIFIED CHRONICITY: ICD-10-CM

## 2017-05-29 DIAGNOSIS — M41.50 DEGENERATIVE SCOLIOSIS IN ADULT PATIENT: ICD-10-CM

## 2017-05-29 PROCEDURE — 72131 CT LUMBAR SPINE W/O DYE: CPT | Mod: 26,,, | Performed by: RADIOLOGY

## 2017-05-29 PROCEDURE — 72148 MRI LUMBAR SPINE W/O DYE: CPT | Mod: TC

## 2017-05-29 PROCEDURE — 72131 CT LUMBAR SPINE W/O DYE: CPT | Mod: TC

## 2017-05-29 PROCEDURE — 72148 MRI LUMBAR SPINE W/O DYE: CPT | Mod: 26,,, | Performed by: RADIOLOGY

## 2017-06-01 ENCOUNTER — EPISODE CHANGES (OUTPATIENT)
Dept: HEPATOLOGY | Facility: CLINIC | Age: 62
End: 2017-06-01

## 2017-06-01 ENCOUNTER — TELEPHONE (OUTPATIENT)
Dept: HEPATOLOGY | Facility: CLINIC | Age: 62
End: 2017-06-01

## 2017-06-01 DIAGNOSIS — Z86.19 HISTORY OF HEPATITIS C: Primary | ICD-10-CM

## 2017-06-01 NOTE — TELEPHONE ENCOUNTER
HCV LAB REVIEW  SVR 12  Baseline HCV RNA >6 million    Pertinent labs:  HCV RNA: <12, not detected    Results discussed with patient     Next labs due/Please schedule:  HCV RNA in 12 weeks- SVR 24: 08/11/17  HCV RNA in 1 year: 05/19/18

## 2017-06-05 ENCOUNTER — OFFICE VISIT (OUTPATIENT)
Dept: SPINE | Facility: CLINIC | Age: 62
End: 2017-06-05
Payer: MEDICARE

## 2017-06-05 VITALS
DIASTOLIC BLOOD PRESSURE: 81 MMHG | BODY MASS INDEX: 23.77 KG/M2 | HEIGHT: 70 IN | HEART RATE: 64 BPM | SYSTOLIC BLOOD PRESSURE: 142 MMHG | WEIGHT: 166 LBS

## 2017-06-05 DIAGNOSIS — Z98.1 HISTORY OF LUMBAR FUSION: ICD-10-CM

## 2017-06-05 DIAGNOSIS — M51.36 DDD (DEGENERATIVE DISC DISEASE), LUMBAR: Primary | ICD-10-CM

## 2017-06-05 PROCEDURE — 99499 UNLISTED E&M SERVICE: CPT | Mod: S$PBB,,, | Performed by: NEUROLOGICAL SURGERY

## 2017-06-05 PROCEDURE — 99214 OFFICE O/P EST MOD 30 MIN: CPT | Mod: S$GLB,,, | Performed by: NEUROLOGICAL SURGERY

## 2017-06-05 PROCEDURE — 99999 PR PBB SHADOW E&M-EST. PATIENT-LVL III: CPT | Mod: PBBFAC,,, | Performed by: NEUROLOGICAL SURGERY

## 2017-06-05 NOTE — PROGRESS NOTES
CHIEF COMPLAINT:  Follow up for low back pain    GELY, Tess Spicer, am scribing for, and in the presence of, Dr. Liz.    HPI:  Rob Jones Jr. is a 61 y.o.  male with a history of hepatitis C, diabetes mellitus, and previous lumbar fusion (2009) at [facility], who presents today for follow up regarding low back pain. Patient states that his lower back pain is unchanged since his last visit. He reports that he has not seen pain management yet because he accidentally canceled it . He experiences left lower extremity pain which radiates down to the knee. His back pain is much worse than his knee pain.       Review of patient's allergies indicates:   Allergen Reactions    Tizanidine Shortness Of Breath       Past Medical History:   Diagnosis Date    Anxiety     Back pain     Cataract     Diabetes mellitus     History of hepatitis C; S/p RX with SVR 12 documented 06/2017 6/1/2017    Hypertension     Postoperative hypothyroidism 11/3/2016    Primary hyperparathyroidism 1/18/2017    Thyroid disease      Past Surgical History:   Procedure Laterality Date    LUMBAR FUSION      THYROIDECTOMY      TONSILLECTOMY       Family History   Problem Relation Age of Onset    Cancer Mother      breast    Cataracts Father     No Known Problems Sister     No Known Problems Brother     No Known Problems Brother     Heart disease Brother     No Known Problems Sister     No Known Problems Sister     No Known Problems Sister     Glaucoma Paternal Uncle     Glaucoma Paternal Uncle      Social History   Substance Use Topics    Smoking status: Current Every Day Smoker    Smokeless tobacco: Not on file    Alcohol use Yes        Review of Systems   Constitutional: Negative.    HENT: Negative.    Eyes: Negative.    Respiratory: Negative.    Cardiovascular: Negative.    Gastrointestinal: Negative.    Endocrine: Negative.    Genitourinary: Negative.    Musculoskeletal: Positive for back pain and myalgias (LLE pain).  Negative for gait problem and neck pain.   Skin: Negative.    Allergic/Immunologic: Negative.    Neurological: Negative for weakness, light-headedness, numbness and headaches.   Hematological: Negative.    Psychiatric/Behavioral: Negative.        OBJECTIVE:     Vital Signs:  Pulse: 64 (06/05/17 1615)  BP: (!) 142/81 (06/05/17 1615)    Physical Exam:     Vital signs: All nursing notes and vital signs reviewed -- afebrile, vital signs stable.  Constitutional: Patient sitting comfortably in chair. Appears well developed and well nourished.  Skin: Exposed areas are intact without abnormal markings, rashes or other lesions. Well healed scare over lower left abdominal quadrant.   HEENT: Normocephalic. Normal conjunctivae.  Cardiovascular: Normal rate and regular rhythm.  Respiratory: Chest wall rises and falls symmetrically, without signs of respiratory distress.  Abdomen: Soft and non-tender.  Extremities: Warm and without edema. Calves supple, non-tender.  Psych/Behavior: Normal affect.     Neurological:     Mental status: Alert and oriented. Conversational and appropriate.     Cranial Nerves: Grossly intact.     Motor:  Upper:  Deltoids Triceps Biceps WE WF     R 5/5 5/5 5/5 5/5 5/5 5/5    L 5/5 5/5 5/5 5/5 5/5 5/5      Lower:  HF KE KF DF PF EHL    R 5/5 5/5 5/5 5/5 5/5 5/5    L 5/5 5/5 5/5 5/5 5/5 5/5         Sensory: Intact sensation to light touch in all extremities. Romberg negative.     Reflexes:         DTR: 2+ symmetrically throughout.     Orellana's: Negative.     Babinski's: Negative.     Clonus: Negative.     Cerebellar: Finger-to-nose and rapid alternating movements normal. Gait stable, fluid.     Spine:     Posture: Head well aligned over pelvis in front and side views.  No focal or global spinal deformity visible on inspection. Shoulders and hips even. No obvious leg length discrepancy. No scapula winging.     Bending: Full ROM with forward, back and lateral bending. No rib prominence with forward  bend.     Cervical:      ROM: Full with flexion, extension, lateral rotation and ear-to-shoulder bend.      Midline TTP: Negative.     Spurling's test: Negative.     Lhermitte's: Negative.     Thoracic:     Midline TTP: Negative     Lumbar:     Midline TTP: Negative     Straight Leg Test: Negative     Crossed Straight Leg Test: Negative     Sciatic notch tenderness: Negative.     Other:     SI joint TTP: Negative.     Greater trochanter TTP: Negative.     Tenderness with external/internal hip rotation: Negative.    Diagnostic Results:  All imaging was independently reviewed by me.  MRI C-spine, dated 1/16/2017:  1. Moderate stenosis C6/7     MRI T-spine, dated 1/25/2017:  1. Facet arthropathy T11/12 without significant stenosis     Flex/Ex X-ray L-spine, dated 1/25/2017:  1. Previous TLIF L4/5 L5/S1 with bony fusion   2. No gross instability      Flex/Ex X-ray C-spine, dated 1/12/2017:  1. No gross instability     DXA Bone Scan, dated 1/31/2017:  1. Osteopenia  2. Trace uptake at L5/S1 to space    MRI L-spine, dated 5/29/2017:  1. Moderate left neuroforaminal stenois at L3/4 and L5/S1    CT L-spine, dated 5/29/2017:  1. Evidence of anterior interbody fusion L4/5, L5/S1      ASSESSMENT/PLAN:     Rob Jones Jr. has L5/S1 facet hypertrophy which is the likely source of his back pain and moderate neuroforaminal stenosis at left L3/4 which likely cause of his left radicular pain. I recommend L5/S1 facet joint injections bilaterally and left selective L3/4 nerve block. He will follow up in 6 months for reevaluation    The patient understands and agrees with the plan of care. All questions were answered.     1. L5/S1 facet joint injections bilaterally and left selective L3/4 nerve block with Pain Management  2. RTC in 6 months      I, Dr. Liz, personally performed the services described in this documentation as scribed by Tess Spicer in my presence, and it is both accurate and complete.        Benja Liz,  M.D.  Department of Neurosurgery  [facility]

## 2017-06-06 ENCOUNTER — OFFICE VISIT (OUTPATIENT)
Dept: PAIN MEDICINE | Facility: CLINIC | Age: 62
End: 2017-06-06
Payer: MEDICARE

## 2017-06-06 VITALS
BODY MASS INDEX: 23.34 KG/M2 | HEART RATE: 65 BPM | WEIGHT: 163 LBS | SYSTOLIC BLOOD PRESSURE: 142 MMHG | DIASTOLIC BLOOD PRESSURE: 93 MMHG | TEMPERATURE: 98 F | HEIGHT: 70 IN

## 2017-06-06 DIAGNOSIS — Z98.1 S/P LUMBAR FUSION: ICD-10-CM

## 2017-06-06 DIAGNOSIS — M51.36 DDD (DEGENERATIVE DISC DISEASE), LUMBAR: ICD-10-CM

## 2017-06-06 DIAGNOSIS — M54.16 LUMBAR RADICULOPATHY: Primary | ICD-10-CM

## 2017-06-06 PROCEDURE — 99213 OFFICE O/P EST LOW 20 MIN: CPT | Mod: S$GLB,,, | Performed by: NURSE PRACTITIONER

## 2017-06-06 PROCEDURE — 99999 PR PBB SHADOW E&M-EST. PATIENT-LVL III: CPT | Mod: PBBFAC,,, | Performed by: NURSE PRACTITIONER

## 2017-06-06 RX ORDER — HYDROCODONE BITARTRATE AND ACETAMINOPHEN 10; 325 MG/1; MG/1
1 TABLET ORAL EVERY 12 HOURS PRN
Qty: 60 TABLET | Refills: 0 | Status: SHIPPED | OUTPATIENT
Start: 2017-06-06 | End: 2017-07-06 | Stop reason: SDUPTHER

## 2017-06-06 NOTE — PROGRESS NOTES
Chronic Pain - Established Visit    Referring Physician: No ref. provider found    Chief Complaint:   Chief Complaint   Patient presents with    Low-back Pain        SUBJECTIVE: Disclaimer: This note has been generated using voice-recognition software. There may be typographical errors that have been missed during proof-reading    Interval History 6/6/2017:  The patient returns to clinic today for follow up. He complains of low back pain that is radiating into his left leg. This pain has increased in intensity in the last few weeks. He did see Dr. Liz yesterday. He reports pain that begins in his low back and radiates down the side of his left thigh to his knee. He denies any radiating right leg pain. He also complains of stiffness in his lower back that is worse in the morning. He denies any bowel or bladder incontinence. He continues to take Gabapentin 1800 mg daily. He also take Norco every 12 hours as needed for pain and does need a refill today. He reports continued relief of shoulder pain with previous injection. His pain today is 5/10.    Interval History 4/20/2017  NM bone scan 3/29 with arthritis T12/L1 and L5/S1 follow-up with orthopedics regarding shoulder has severe osteoarthritis had a shoulder joint injection with great relief pain.  Not currently having any cervical radicular symptoms.  Gabapentin at 900 mg per day no side effects, does have some lower extremity radicular symptoms.  No other health changes.    Initial encounter:    Rob Jones Jr. presents to the clinic for the evaluation of neck, shoulder and lower back with lower extremities pain. The pain started years ago and symptoms have been worsening.    Brief history:   patient recently evaluated in neurosurgery is scheduled for a nuclear medicine bone scan, possible workup for ACDF secondary to severe central stenosis of the cervical spine with radiculopathy.  The patient had a previous cervical transverse foraminal epidural  steroid injection with greater than 50% improvement in his radicular symptoms into his right upper extremity although he continues to have what appears to be rotator cuff syndrome of the left shoulder and is scheduled for orthopedic evaluation soon.    Pain Description:    The current worst pain is located in the left shoulder area    At BEST  10/10     At WORST  10/10 on the WORST day.      On average pain is rated as 10/10.     Today the pain is rated as 10/10    The pain is described as aching, sharp, shooting, throbbing and tingling      Symptoms interfere with daily activity, sleeping and work.     Exacerbating factors: Sitting, Standing, Laying, Bending, Extension, Flexing, Lifting and Getting out of bed/chair.      Mitigating factors medications and injection.     Patient denies urinary incontinence and bowel incontinence.  Patient denies any suicidal or homicidal ideations    Pain Medications:  Current:  Hydrocodone/acetaminophen    Tried in Past:  NSAIDs -Never  TCA -Never  SNRI -Never  Anti-convulsants  Gabapentin   Muscle Relaxants -Never  Opioids-Never    Physical Therapy/Home Exercise: no       report:  Reviewed and consistent with medication use as prescribed.    Pain Procedures:   right C6-C7 TF DWAIN with IR on 2/6/17     Chiropractor -never  Acupuncture - never  TENS unit -never  Spinal decompression -previous lumbar surgery  Joint replacement -never    Imaging:   Lumbar MRI 5/29/2017:  FINDINGS:     Alignment: Normal.    Vertebrae: No fracture. Essentially normal marrow signal, with fatty endplate degenerative changes L4-S1.    Discs:  Disc spacer devices at L4-L5 and L5-S1 with partial fusion of the disc spaces.  Mild diffuse disc space narrowing throughout the lumbar spine with mild desiccation L3-L4.    Cord: Normal. Conus terminates at T12-L1.    Degenerative findings:        T11-T12: Incompletely imaged on MRI.  No significant disc disease.  No spinal canal stenosis.  Left-sided facet  arthropathy results in mild left foraminal stenosis.       T12-L1: Incompletely imaged on MRI.  No significant disc disease.  No spinal canal stenosis.  No neural foraminal stenosis.       L1-L2: No significant disc disease.  Minimal bilateral facet arthropathy without spinal canal or foraminal stenosis.       L2-L3: Minimal circumferential disc bulge with mild bilateral facet arthropathy and buckling of the ligamentum flavum, overall resulting in mild left foraminal stenosis with no significant spinal canal stenosis.       L3-L4: Minimal circumferential disc bulge with mild bilateral facet arthropathy and buckling of the ligamentum flavum results in mild spinal canal stenosis, moderate left foraminal stenosis, and mild right foraminal stenosis.       L4-L5: Fusion of the disc space with mild right worse than left facet arthropathy results in mild right foraminal stenosis.  No spinal canal stenosis.       L5-S1: Minimal circumferential disc bulge with superimposed calcified left foraminal protrusion and mild bilateral facet arthropathy results in mild right and moderate left foraminal stenosis.  No spinal canal stenosis.    Paraspinal muscles & soft tissues: Mild thickening of the right adrenal gland without discrete nodule, unchanged from prior CT.   Impression       Disc spacers with partial fusion L4-S1 with mild disc and facet degeneration throughout the lumbar spine most pronounced at L3-L4 with mild spinal canal stenosis and moderate left foraminal stenosis as well as L5-S1 with moderate left foraminal stenosis.       Past Medical History:   Diagnosis Date    Anxiety     Back pain     Cataract     Diabetes mellitus     History of hepatitis C; S/p RX with SVR 12 documented 06/2017 6/1/2017    Hypertension     Postoperative hypothyroidism 11/3/2016    Primary hyperparathyroidism 1/18/2017    Thyroid disease      Past Surgical History:   Procedure Laterality Date    LUMBAR FUSION      THYROIDECTOMY       TONSILLECTOMY       Social History     Social History    Marital status: Single     Spouse name: N/A    Number of children: N/A    Years of education: N/A     Occupational History    Disabled, pt says from [place] and now for back, DM2      Social History Main Topics    Smoking status: Current Every Day Smoker    Smokeless tobacco: Not on file    Alcohol use Yes    Drug use: No    Sexual activity: Not on file     Other Topics Concern    Not on file     Social History Narrative    Lives alone.  He has 2 adult children who do not live here.       Family History   Problem Relation Age of Onset    Cancer Mother      breast    Cataracts Father     No Known Problems Sister     No Known Problems Brother     No Known Problems Brother     Heart disease Brother     No Known Problems Sister     No Known Problems Sister     No Known Problems Sister     Glaucoma Paternal Uncle     Glaucoma Paternal Uncle        Review of patient's allergies indicates:   Allergen Reactions    Tizanidine Shortness Of Breath       Current Outpatient Prescriptions   Medication Sig    amlodipine (NORVASC) 10 MG tablet Take 1 tablet (10 mg total) by mouth once daily.    dicyclomine (BENTYL) 20 mg tablet Take 20 mg by mouth daily as needed.    econazole nitrate 1 % cream Apply topically 2 (two) times daily.    fish oil-omega-3 fatty acids 300-1,000 mg capsule Take 2 g by mouth once daily.    fluocinolone acetonide oil 0.01 % Drop Put 3 drops in right ear twice daily for 1 week.    gabapentin (NEURONTIN) 300 MG capsule Take 1 capsule (300 mg total) by mouth 3 (three) times daily. Take 1 pill at nightx 7 days, followed by 1 pill in morning and night for 7 days, followed by 1 pill morning afternoon and night    hydrocodone-acetaminophen 10-325mg (NORCO)  mg Tab Take 1 tablet by mouth every 12 (twelve) hours as needed for Pain.    insulin aspart (NOVOLOG FLEXPEN) 100 unit/mL InPn pen Inject 15 Units into the skin as  "needed.    insulin glargine (LANTUS SOLOSTAR) 100 unit/mL (3 mL) InPn pen Inject 15 Units into the skin every evening.    irbesartan (AVAPRO) 150 MG tablet Take 1 tablet (150 mg total) by mouth once daily.    levothyroxine (SYNTHROID) 112 MCG tablet Take 1 tablet (112 mcg total) by mouth once daily. One day per week take one and a half tablets.    meloxicam (MOBIC) 15 MG tablet Take 1 tablet (15 mg total) by mouth daily as needed for Pain.    metformin (GLUCOPHAGE) 1000 MG tablet Take 1 tablet (1,000 mg total) by mouth 2 (two) times daily with meals.    nitroGLYCERIN 0.4 MG/HR TD PT24 (NITRODUR) 0.4 mg/hr Place 1 patch onto the skin continuous prn.    NOVOFINE 32 32 gauge x 1/4" Ndle 1 EACH BY MISC.(NON-DRUG COMBO ROUTE) ROUTE ONCE DAILY.    pen needle, diabetic (NOVOFINE PLUS) 32 gauge x 1/6" Ndle 1 each by Misc.(Non-Drug; Combo Route) route once daily.    tamsulosin (FLOMAX) 0.4 mg Cp24 Take 1 capsule (0.4 mg total) by mouth once daily.    temazepam (RESTORIL) 30 mg capsule TAKE 1 CAPSULE BY MOUTH EVERY DAY AT BEDTIME AS NEEDED    vitamin D 1000 units Tab Take 185 mg by mouth once daily.     No current facility-administered medications for this visit.        REVIEW OF SYSTEMS:    GENERAL:  No weight loss, malaise or fevers.  HEENT:   No recent changes in vision or hearing  NECK:  Negative for lumps, no difficulty with swallowing.  RESPIRATORY:  Negative for cough, wheezing or shortness of breath, patient denies any recent URI.  CARDIOVASCULAR:  Negative for chest pain, leg swelling or palpitations. HTN.   GI:  Negative for abdominal discomfort, blood in stools or black stools or change in bowel habits.  MUSCULOSKELETAL:  See HPI.  SKIN:  Negative for lesions, rash, and itching.  PSYCH:  History of anxiety. Patient's sleep is disturbed secondary to pain.  HEMATOLOGY/LYMPHOLOGY:  Negative for prolonged bleeding, bruising easily or swollen nodes.  Patient is not currently taking any anti-coagulants  ENDO: " "History of hypothyroidism and Diabetes.   NEURO:   No history of headaches, syncope, paralysis, seizures or tremors.  All other reviewed and negative other than HPI.    OBJECTIVE:    BP (!) 142/93   Pulse 65   Temp 98.1 °F (36.7 °C) (Oral)   Ht 5' 10" (1.778 m)   Wt 73.9 kg (163 lb)   BMI 23.39 kg/m²     PHYSICAL EXAMINATION:    GENERAL: Well appearing, in no acute distress, alert and oriented x3.  PSYCH:  Mood and affect appropriate.  SKIN: Skin color, texture, turgor normal, no rashes or lesions.  HEAD/FACE:  Normocephalic, atraumatic. Cranial nerves grossly intact.  NECK: Pain to palpation over the cervical paraspinous muscles. Spurling Negative. Mild pain with neck flexion, extension, and lateral flexion.   CV: RRR with palpation of the radial artery.  PULM: No evidence of respiratory difficulty, symmetric chest rise.  BACK: Mild pain with palpation over lumbar spine. Straight leg raise is positive on the left. Limited ROM with pain on flexion and extension. Positive facet loading bilaterally.   EXTREMITIES: Peripheral joint ROM is full and pain free without obvious instability or laxity in all four extremities. No deformities, edema, or skin discoloration. Good capillary refill.  MUSCULOSKELETAL: Shoulder provocative maneuvers are positive on the left.   Bilateral upper  extremity strength is normal and symmetric.  No atrophy or tone abnormalities are noted.  NEURO: Bilateral upper extremity coordination and muscle stretch reflexes are physiologic and symmetric.  Abimael's negative. No clonus.  No loss of sensation is noted.  GAIT: Antalgic, ambulates without assistance         Lab Results   Component Value Date    WBC 4.60 11/11/2016    HGB 12.6 (L) 11/11/2016    HCT 39.4 (L) 11/11/2016    MCV 97 11/11/2016     11/11/2016      Lab Results   Component Value Date    LABA1C 9.2 (H) 05/18/2016    HGBA1C 7.2 (H) 05/08/2017     Lab Results   Component Value Date    CREATININE 1.1 02/24/2017    "     ASSESSMENT: 61 y.o. year old male with pain, consistent with     Encounter Diagnoses   Name Primary?    Lumbar radiculopathy Yes    S/P lumbar fusion     DDD (degenerative disc disease), lumbar        PLAN:     - Previous imaging was reviewed and discussed with the patient today.    - Schedule for left selective L3/4 nerve block per Dr. Liz. The procedure, risks, benefits and options were discussed with patient. There are no contraindications to the procedure. The patient expressed understanding and agreed to proceed.  Consent obtained today.    - Consider facet joint injections in the future for his facet mediated pain.     - Continue Gabapentin.     - Norco 10/325 mg every 12 hours as needed for pain, #60, 0 refills.     - The patient is here today for a refill of current pain medications and they believe these provide effective pain control and improvements in quality of life.  The patient notes no serious side effects, and feels the benefits outweigh the risks.  The patient was reminded of the pain contract that they signed previously as well as the risks and benefits of the medication including possible death.  The updated [place] Board of [employer] prescription monitoring program was reviewed, and the patient has been found to be compliant with current treatment plan. Medication management provided by Dr. Espinoza.     - RTC 2 weeks after above procedure.     - Dr. Espinoza was consulted on the patient and agrees with this plan.    The above plan and management options were discussed at length with patient. Patient is in agreement with the above and verbalized understanding.     Sue Walker NP  06/06/2017

## 2017-06-08 ENCOUNTER — TELEPHONE (OUTPATIENT)
Dept: PHARMACY | Facility: CLINIC | Age: 62
End: 2017-06-08

## 2017-06-21 ENCOUNTER — HOSPITAL ENCOUNTER (OUTPATIENT)
Facility: OTHER | Age: 62
Discharge: HOME | End: 2017-06-21
Attending: ANESTHESIOLOGY | Admitting: ANESTHESIOLOGY
Payer: MEDICARE

## 2017-06-21 VITALS
WEIGHT: 168 LBS | BODY MASS INDEX: 24.88 KG/M2 | HEART RATE: 52 BPM | HEIGHT: 69 IN | OXYGEN SATURATION: 99 % | SYSTOLIC BLOOD PRESSURE: 137 MMHG | DIASTOLIC BLOOD PRESSURE: 79 MMHG | RESPIRATION RATE: 18 BRPM | TEMPERATURE: 98 F

## 2017-06-21 DIAGNOSIS — Z98.1 S/P LUMBAR FUSION: Primary | ICD-10-CM

## 2017-06-21 LAB — POCT GLUCOSE: 113 MG/DL (ref 70–110)

## 2017-06-21 PROCEDURE — 25000003 PHARM REV CODE 250: Performed by: ANESTHESIOLOGY

## 2017-06-21 PROCEDURE — 25500020 PHARM REV CODE 255: Performed by: ANESTHESIOLOGY

## 2017-06-21 PROCEDURE — 63600175 PHARM REV CODE 636 W HCPCS: Performed by: ANESTHESIOLOGY

## 2017-06-21 PROCEDURE — 64483 NJX AA&/STRD TFRM EPI L/S 1: CPT | Performed by: ANESTHESIOLOGY

## 2017-06-21 PROCEDURE — 64483 NJX AA&/STRD TFRM EPI L/S 1: CPT | Mod: LT,,, | Performed by: ANESTHESIOLOGY

## 2017-06-21 PROCEDURE — 99152 MOD SED SAME PHYS/QHP 5/>YRS: CPT | Mod: ,,, | Performed by: ANESTHESIOLOGY

## 2017-06-21 PROCEDURE — 82947 ASSAY GLUCOSE BLOOD QUANT: CPT | Performed by: ANESTHESIOLOGY

## 2017-06-21 RX ORDER — SODIUM CHLORIDE 9 MG/ML
INJECTION, SOLUTION INTRAVENOUS CONTINUOUS
Status: DISCONTINUED | OUTPATIENT
Start: 2017-06-21 | End: 2017-06-21 | Stop reason: HOSPADM

## 2017-06-21 RX ORDER — MIDAZOLAM HYDROCHLORIDE 5 MG/ML
INJECTION INTRAMUSCULAR; INTRAVENOUS
Status: DISCONTINUED | OUTPATIENT
Start: 2017-06-21 | End: 2017-06-21 | Stop reason: HOSPADM

## 2017-06-21 RX ORDER — METHYLPREDNISOLONE ACETATE 40 MG/ML
INJECTION, SUSPENSION INTRA-ARTICULAR; INTRALESIONAL; INTRAMUSCULAR; SOFT TISSUE
Status: DISCONTINUED | OUTPATIENT
Start: 2017-06-21 | End: 2017-06-21 | Stop reason: HOSPADM

## 2017-06-21 RX ORDER — LIDOCAINE HYDROCHLORIDE 10 MG/ML
INJECTION INFILTRATION; PERINEURAL
Status: DISCONTINUED | OUTPATIENT
Start: 2017-06-21 | End: 2017-06-21 | Stop reason: HOSPADM

## 2017-06-21 RX ORDER — BUPIVACAINE HYDROCHLORIDE 2.5 MG/ML
INJECTION, SOLUTION EPIDURAL; INFILTRATION; INTRACAUDAL
Status: DISCONTINUED | OUTPATIENT
Start: 2017-06-21 | End: 2017-06-21 | Stop reason: HOSPADM

## 2017-06-21 RX ADMIN — SODIUM CHLORIDE: 0.9 INJECTION, SOLUTION INTRAVENOUS at 08:06

## 2017-06-21 NOTE — DISCHARGE INSTRUCTIONS
Home Care Instructions Pain Management:    1. DIET:   You may resume your normal diet today.   2. BATHING:   You may shower with luke warm water. No tub baths or anything that will soak injection sites under water for 24 hours.  3. DRESSING:   You may remove your bandage today.   4. ACTIVITY LEVEL:   You may resume your normal activities 24 hrs after your procedure. Nothing strenuous today.  5. MEDICATIONS:   You may resume your normal medications today. To restart blood thinners, ask your doctor.  6. SPECIAL INSTRUCTIONS:   No heat to the injection site for 24 hrs including, bath or shower, heating pad, moist heat, or hot tubs.    Use ice pack to injection site for any pain or discomfort.  Apply ice packs for 20 minute intervals as needed.     If you have received any sedatives by mouth today you may not drive for 12 hours.    If you have received any sedation through your IV, you may not drive for 24 hrs.     PLEASE CALL YOUR DOCTOR IF:  1. Redness or swelling around the injection site.  2. Fever of 101 degrees or more  3. Drainage (pus) from the injection site.  4. For any continuous bleeding (some dried blood over the incision is normal.)    FOR EMERGENCIES:   If any unusual problems or difficulties occur during clinic hours, call [phone] or [phone].

## 2017-06-21 NOTE — OP NOTE
INFORMED CONSENT: The procedure, risks, benefits and options were discussed with patient. There are no contraindications to the procedure. The patient expressed understanding and agreed to proceed. The personnel performing the procedure was discussed.    06/21/2017    Surgeon: Christina Espinoza MD    Assistants: None    PROCEDURE:  Left  L3  TRANSFORAMINAL EPIDURAL STEROID INJECTION    Pre Procedure diagnosis:   Left  L3  transforaminal stenosis with radiculopathy    Post-Procedure diagnosis:   same    Complications: None    Specimens: None    Sedation: None    DESCRIPTION OF PROCEDURE: The patient was brought to the procedure room. IV access was obtained prior to the procedure. The patient was positioned prone on the fluoroscopy table. Continuous hemodynamic monitoring was initiated including blood pressure, EKG, and pulse oximetry. . The skin was prepped with chlorhexidine and draped in a sterile fashion. Skin anesthesia was achieved using a total of 5mL of lidocaine over the respective injection site.     The Left L3  transforaminal space was identified with fluoroscopy in the  AP, oblique, and lateral views.  A 22 gauge spinal quinke needle was then advanced into the area of the trans foraminal space with confirmation of proper needle position using AP, oblique, and lateral fluoroscopic views. Once the needle tip was in the area of the transforaminal space, and there was no blood, CSF or paraesthesias,  1.5 mL of Omnipaque 300mg/ml was injected.  Fluoroscopic imaging in the AP and lateral views revealed a clear outline of the spinal nerve with proximal spread of agent through the neural foramen into the epidural space. A total combination of 1 mL of Bupivicaine 0.25% and 40 mg depo medrol was injected with displacement of the contrast dye confirming that the medication went into the area of the transforaminal space. A sterile dressing was applied.   Patient tolerated the procedure well.    Conscious sedation  provided by M.D    The patient was monitored with continuous pulse oximetry, EKG, and intermittent blood pressure monitors.  The patient was hemodynamically stable throughout the entire process was responsive to voice, and breathing spontaneously.  Supplemental O2 was provided at 2L/min via nasal cannula.  Patient was comfortable for the duration of the procedure.    There was a total of 2mg IV Midazolam was titrated for the procedure    Patient was taken back to the recovery room for further observation.     The patient was discharged to home in stable condition

## 2017-06-21 NOTE — DISCHARGE SUMMARY
Discharge Note  Short Stay      SUMMARY     Admit Date: 6/21/2017    Attending Physician: Christina Espinoza      Discharge Physician: Christina Espinoza      Discharge Date: 6/21/2017 8:53 AM       PROCEDURE:  Left  L3  TRANSFORAMINAL EPIDURAL STEROID INJECTION    Pre Procedure diagnosis:   Left  L3  transforaminal stenosis with radiculopathy    Disposition: Home or self care    Patient Instructions:   Current Discharge Medication List      CONTINUE these medications which have NOT CHANGED    Details   amlodipine (NORVASC) 10 MG tablet Take 1 tablet (10 mg total) by mouth once daily.  Qty: 90 tablet, Refills: 3      fish oil-omega-3 fatty acids 300-1,000 mg capsule Take 2 g by mouth once daily.      fluocinolone acetonide oil 0.01 % Drop Put 3 drops in right ear twice daily for 1 week.  Qty: 1 Bottle, Refills: 0      gabapentin (NEURONTIN) 300 MG capsule Take 1 capsule (300 mg total) by mouth 3 (three) times daily. Take 1 pill at nightx 7 days, followed by 1 pill in morning and night for 7 days, followed by 1 pill morning afternoon and night  Qty: 90 capsule, Refills: 11      hydrocodone-acetaminophen 10-325mg (NORCO)  mg Tab Take 1 tablet by mouth every 12 (twelve) hours as needed for Pain.  Qty: 60 tablet, Refills: 0      irbesartan (AVAPRO) 150 MG tablet Take 1 tablet (150 mg total) by mouth once daily.  Qty: 90 tablet, Refills: 3      levothyroxine (SYNTHROID) 112 MCG tablet Take 1 tablet (112 mcg total) by mouth once daily. One day per week take one and a half tablets.  Qty: 90 tablet, Refills: 0      meloxicam (MOBIC) 15 MG tablet Take 1 tablet (15 mg total) by mouth daily as needed for Pain.  Qty: 90 tablet, Refills: 1      metformin (GLUCOPHAGE) 1000 MG tablet Take 1 tablet (1,000 mg total) by mouth 2 (two) times daily with meals.  Qty: 180 tablet, Refills: 3      dicyclomine (BENTYL) 20 mg tablet Take 20 mg by mouth daily as needed.      econazole nitrate 1 % cream Apply topically 2 (two) times  "daily.  Qty: 30 g, Refills: 1      insulin aspart (NOVOLOG FLEXPEN) 100 unit/mL InPn pen Inject 15 Units into the skin as needed.  Qty: 1 Box, Refills: 5    Associated Diagnoses: Diabetes mellitus without complication      insulin glargine (LANTUS SOLOSTAR) 100 unit/mL (3 mL) InPn pen Inject 15 Units into the skin every evening.  Qty: 3 Box, Refills: 1    Associated Diagnoses: Diabetes mellitus without complication      nitroGLYCERIN 0.4 MG/HR TD PT24 (NITRODUR) 0.4 mg/hr Place 1 patch onto the skin continuous prn.      NOVOFINE 32 32 gauge x 1/4" Ndle 1 EACH BY MISC.(NON-DRUG COMBO ROUTE) ROUTE ONCE DAILY.  Refills: 3      pen needle, diabetic (NOVOFINE PLUS) 32 gauge x 1/6" Ndle 1 each by Misc.(Non-Drug; Combo Route) route once daily.  Qty: 100 each, Refills: 3    Associated Diagnoses: Diabetes mellitus without complication      tamsulosin (FLOMAX) 0.4 mg Cp24 Take 1 capsule (0.4 mg total) by mouth once daily.  Qty: 90 capsule, Refills: 3    Associated Diagnoses: Benign non-nodular prostatic hyperplasia without lower urinary tract symptoms      temazepam (RESTORIL) 30 mg capsule TAKE 1 CAPSULE BY MOUTH EVERY DAY AT BEDTIME AS NEEDED  Qty: 90 capsule, Refills: 1      vitamin D 1000 units Tab Take 185 mg by mouth once daily.             Resume home diet and activity    "

## 2017-06-21 NOTE — PLAN OF CARE
Problem: Patient Care Overview  Goal: Plan of Care Review  Pt tolerated procedure well. Pt reports 2/10 pain after procedure. Assisted pt up for first time. Steady on feet. All discharge instructions given.

## 2017-07-05 RX ORDER — LEVOTHYROXINE SODIUM 112 UG/1
TABLET ORAL
Qty: 90 TABLET | Refills: 0 | Status: SHIPPED | OUTPATIENT
Start: 2017-07-05 | End: 2017-10-12 | Stop reason: SDUPTHER

## 2017-07-06 ENCOUNTER — OFFICE VISIT (OUTPATIENT)
Dept: PAIN MEDICINE | Facility: CLINIC | Age: 62
End: 2017-07-06
Payer: MEDICARE

## 2017-07-06 VITALS
DIASTOLIC BLOOD PRESSURE: 82 MMHG | HEART RATE: 57 BPM | RESPIRATION RATE: 18 BRPM | BODY MASS INDEX: 24.16 KG/M2 | WEIGHT: 163.13 LBS | HEIGHT: 69 IN | TEMPERATURE: 98 F | SYSTOLIC BLOOD PRESSURE: 143 MMHG

## 2017-07-06 DIAGNOSIS — M51.36 DDD (DEGENERATIVE DISC DISEASE), LUMBAR: ICD-10-CM

## 2017-07-06 DIAGNOSIS — Z98.1 S/P LUMBAR FUSION: Primary | ICD-10-CM

## 2017-07-06 DIAGNOSIS — M54.16 LUMBAR RADICULOPATHY: ICD-10-CM

## 2017-07-06 PROCEDURE — 99213 OFFICE O/P EST LOW 20 MIN: CPT | Mod: S$GLB,,, | Performed by: NURSE PRACTITIONER

## 2017-07-06 PROCEDURE — 99999 PR PBB SHADOW E&M-EST. PATIENT-LVL III: CPT | Mod: PBBFAC,,, | Performed by: NURSE PRACTITIONER

## 2017-07-06 RX ORDER — TETANUS TOXOID, REDUCED DIPHTHERIA TOXOID AND ACELLULAR PERTUSSIS VACCINE, ADSORBED 5; 2.5; 8; 8; 2.5 [IU]/.5ML; [IU]/.5ML; UG/.5ML; UG/.5ML; UG/.5ML
SUSPENSION INTRAMUSCULAR
COMMUNITY
Start: 2017-05-08 | End: 2018-03-09

## 2017-07-06 RX ORDER — HYDROCODONE BITARTRATE AND ACETAMINOPHEN 10; 325 MG/1; MG/1
1 TABLET ORAL EVERY 12 HOURS PRN
Qty: 60 TABLET | Refills: 0 | Status: SHIPPED | OUTPATIENT
Start: 2017-07-06 | End: 2017-08-05

## 2017-07-06 NOTE — PROGRESS NOTES
Chronic Pain - Established Visit    Referring Physician: No ref. provider found    Chief Complaint:   Chief Complaint   Patient presents with    Low-back Pain        SUBJECTIVE: Disclaimer: This note has been generated using voice-recognition software. There may be typographical errors that have been missed during proof-reading    Interval History 7/6/2017:  The patient returns to clinic today for follow up. He is s/p left L3 TF DWAIN on 6/21/2017. He reports 80% relief of his leg pain. He does report low back pain that is worse in the morning. He does report stiffness. He continues to report benefit with Gabapentin and Norco. He does report increased activity since the procedure. He denies any other health changes. He denies any bowel or bladder incontinence. His pain today is 3/10.     Interval History 6/6/2017:  The patient returns to clinic today for follow up. He complains of low back pain that is radiating into his left leg. This pain has increased in intensity in the last few weeks. He did see Dr. Liz yesterday. He reports pain that begins in his low back and radiates down the side of his left thigh to his knee. He denies any radiating right leg pain. He also complains of stiffness in his lower back that is worse in the morning. He denies any bowel or bladder incontinence. He continues to take Gabapentin 1800 mg daily. He also take Norco every 12 hours as needed for pain and does need a refill today. He reports continued relief of shoulder pain with previous injection. His pain today is 5/10.    Interval History 4/20/2017  NM bone scan 3/29 with arthritis T12/L1 and L5/S1 follow-up with orthopedics regarding shoulder has severe osteoarthritis had a shoulder joint injection with great relief pain.  Not currently having any cervical radicular symptoms.  Gabapentin at 900 mg per day no side effects, does have some lower extremity radicular symptoms.  No other health changes.    Initial encounter:    Rob  KHADAR Jones JrRamona presents to the clinic for the evaluation of neck, shoulder and lower back with lower extremities pain. The pain started years ago and symptoms have been worsening.    Brief history:   patient recently evaluated in neurosurgery is scheduled for a nuclear medicine bone scan, possible workup for ACDF secondary to severe central stenosis of the cervical spine with radiculopathy.  The patient had a previous cervical transverse foraminal epidural steroid injection with greater than 50% improvement in his radicular symptoms into his right upper extremity although he continues to have what appears to be rotator cuff syndrome of the left shoulder and is scheduled for orthopedic evaluation soon.    Pain Description:    The current worst pain is located in the left shoulder area    At BEST  10/10     At WORST  10/10 on the WORST day.      On average pain is rated as 10/10.     Today the pain is rated as 10/10    The pain is described as aching, sharp, shooting, throbbing and tingling      Symptoms interfere with daily activity, sleeping and work.     Exacerbating factors: Sitting, Standing, Laying, Bending, Extension, Flexing, Lifting and Getting out of bed/chair.      Mitigating factors medications and injection.     Patient denies urinary incontinence and bowel incontinence.  Patient denies any suicidal or homicidal ideations    Pain Medications:  Current:  Hydrocodone/acetaminophen  Gabapentin    Tried in Past:  NSAIDs -Never  TCA -Never  SNRI -Never  Anti-convulsants  Gabapentin   Muscle Relaxants -Never  Opioids-Never    Physical Therapy/Home Exercise: no       report:  Reviewed and consistent with medication use as prescribed.    Pain Procedures:   right C6-C7 TF DWAIN with IR on 2/6/17 6/21/2017- Left L3 TF DWAIN- 80% relief    Chiropractor -never  Acupuncture - never  TENS unit -never  Spinal decompression -previous lumbar surgery  Joint replacement -never    Imaging:   Lumbar MRI 5/29/2017:  FINDINGS:      Alignment: Normal.    Vertebrae: No fracture. Essentially normal marrow signal, with fatty endplate degenerative changes L4-S1.    Discs:  Disc spacer devices at L4-L5 and L5-S1 with partial fusion of the disc spaces.  Mild diffuse disc space narrowing throughout the lumbar spine with mild desiccation L3-L4.    Cord: Normal. Conus terminates at T12-L1.    Degenerative findings:        T11-T12: Incompletely imaged on MRI.  No significant disc disease.  No spinal canal stenosis.  Left-sided facet arthropathy results in mild left foraminal stenosis.       T12-L1: Incompletely imaged on MRI.  No significant disc disease.  No spinal canal stenosis.  No neural foraminal stenosis.       L1-L2: No significant disc disease.  Minimal bilateral facet arthropathy without spinal canal or foraminal stenosis.       L2-L3: Minimal circumferential disc bulge with mild bilateral facet arthropathy and buckling of the ligamentum flavum, overall resulting in mild left foraminal stenosis with no significant spinal canal stenosis.       L3-L4: Minimal circumferential disc bulge with mild bilateral facet arthropathy and buckling of the ligamentum flavum results in mild spinal canal stenosis, moderate left foraminal stenosis, and mild right foraminal stenosis.       L4-L5: Fusion of the disc space with mild right worse than left facet arthropathy results in mild right foraminal stenosis.  No spinal canal stenosis.       L5-S1: Minimal circumferential disc bulge with superimposed calcified left foraminal protrusion and mild bilateral facet arthropathy results in mild right and moderate left foraminal stenosis.  No spinal canal stenosis.    Paraspinal muscles & soft tissues: Mild thickening of the right adrenal gland without discrete nodule, unchanged from prior CT.   Impression       Disc spacers with partial fusion L4-S1 with mild disc and facet degeneration throughout the lumbar spine most pronounced at L3-L4 with mild spinal canal  stenosis and moderate left foraminal stenosis as well as L5-S1 with moderate left foraminal stenosis.       Past Medical History:   Diagnosis Date    Anxiety     Back pain     Cataract     Diabetes mellitus     History of hepatitis C; S/p RX with SVR 12 documented 06/2017 6/1/2017    Hypertension     Postoperative hypothyroidism 11/3/2016    Primary hyperparathyroidism 1/18/2017    Thyroid disease      Past Surgical History:   Procedure Laterality Date    LUMBAR FUSION      THYROIDECTOMY      TONSILLECTOMY       Social History     Social History    Marital status: Single     Spouse name: N/A    Number of children: N/A    Years of education: N/A     Occupational History    Disabled, pt says from [place] and now for back, DM2      Social History Main Topics    Smoking status: Current Every Day Smoker    Smokeless tobacco: Not on file    Alcohol use Yes    Drug use: No    Sexual activity: Not on file     Other Topics Concern    Not on file     Social History Narrative    Lives alone.  He has 2 adult children who do not live here.       Family History   Problem Relation Age of Onset    Cancer Mother      breast    Cataracts Father     No Known Problems Sister     No Known Problems Brother     No Known Problems Brother     Heart disease Brother     No Known Problems Sister     No Known Problems Sister     No Known Problems Sister     Glaucoma Paternal Uncle     Glaucoma Paternal Uncle        Review of patient's allergies indicates:   Allergen Reactions    Tizanidine Shortness Of Breath       Current Outpatient Prescriptions   Medication Sig    amlodipine (NORVASC) 10 MG tablet Take 1 tablet (10 mg total) by mouth once daily.    dicyclomine (BENTYL) 20 mg tablet Take 20 mg by mouth daily as needed.    econazole nitrate 1 % cream Apply topically 2 (two) times daily.    fish oil-omega-3 fatty acids 300-1,000 mg capsule Take 2 g by mouth once daily.    fluocinolone acetonide oil 0.01 %  "Drop Put 3 drops in right ear twice daily for 1 week.    gabapentin (NEURONTIN) 300 MG capsule Take 1 capsule (300 mg total) by mouth 3 (three) times daily. Take 1 pill at nightx 7 days, followed by 1 pill in morning and night for 7 days, followed by 1 pill morning afternoon and night    hydrocodone-acetaminophen 10-325mg (NORCO)  mg Tab Take 1 tablet by mouth every 12 (twelve) hours as needed for Pain.    insulin aspart (NOVOLOG FLEXPEN) 100 unit/mL InPn pen Inject 15 Units into the skin as needed.    insulin glargine (LANTUS SOLOSTAR) 100 unit/mL (3 mL) InPn pen Inject 15 Units into the skin every evening.    irbesartan (AVAPRO) 150 MG tablet Take 1 tablet (150 mg total) by mouth once daily.    levothyroxine (SYNTHROID) 112 MCG tablet Take 1 tablet (112 mcg total) by mouth once daily. One day per week take one and a half tablets.    levothyroxine (SYNTHROID) 112 MCG tablet TAKE 1 TABLET BY MOUTH DAILY BEFORE BREAKFAST    meloxicam (MOBIC) 15 MG tablet Take 1 tablet (15 mg total) by mouth daily as needed for Pain.    metformin (GLUCOPHAGE) 1000 MG tablet Take 1 tablet (1,000 mg total) by mouth 2 (two) times daily with meals.    nitroGLYCERIN 0.4 MG/HR TD PT24 (NITRODUR) 0.4 mg/hr Place 1 patch onto the skin continuous prn.    NOVOFINE 32 32 gauge x 1/4" Ndle 1 EACH BY MISC.(NON-DRUG COMBO ROUTE) ROUTE ONCE DAILY.    pen needle, diabetic (NOVOFINE PLUS) 32 gauge x 1/6" Ndle 1 each by Misc.(Non-Drug; Combo Route) route once daily.    tamsulosin (FLOMAX) 0.4 mg Cp24 Take 1 capsule (0.4 mg total) by mouth once daily.    temazepam (RESTORIL) 30 mg capsule TAKE 1 CAPSULE BY MOUTH EVERY DAY AT BEDTIME AS NEEDED    vitamin D 1000 units Tab Take 185 mg by mouth once daily.     No current facility-administered medications for this visit.        REVIEW OF SYSTEMS:    GENERAL:  No weight loss, malaise or fevers.  HEENT:   No recent changes in vision or hearing  NECK:  Negative for lumps, no difficulty with " "swallowing.  RESPIRATORY:  Negative for cough, wheezing or shortness of breath, patient denies any recent URI.  CARDIOVASCULAR:  Negative for chest pain, leg swelling or palpitations. HTN.   GI:  Negative for abdominal discomfort, blood in stools or black stools or change in bowel habits.  MUSCULOSKELETAL:  See HPI.  SKIN:  Negative for lesions, rash, and itching.  PSYCH:  History of anxiety. Patient's sleep is disturbed secondary to pain.  HEMATOLOGY/LYMPHOLOGY:  Negative for prolonged bleeding, bruising easily or swollen nodes.  Patient is not currently taking any anti-coagulants  ENDO: History of hypothyroidism and Diabetes.   NEURO:   No history of headaches, syncope, paralysis, seizures or tremors.  All other reviewed and negative other than HPI.    OBJECTIVE:    BP (!) 143/82   Pulse (!) 57   Temp 97.9 °F (36.6 °C) (Oral)   Resp 18   Ht 5' 9" (1.753 m)   Wt 74 kg (163 lb 1.6 oz)   BMI 24.09 kg/m²     PHYSICAL EXAMINATION:    GENERAL: Well appearing, in no acute distress, alert and oriented x3.  PSYCH:  Mood and affect appropriate.  SKIN: Skin color, texture, turgor normal, no rashes or lesions.  HEAD/FACE:  Normocephalic, atraumatic. Cranial nerves grossly intact.  NECK: Pain to palpation over the cervical paraspinous muscles. Spurling Negative. Mild pain with neck flexion, extension, and lateral flexion.   CV: RRR with palpation of the radial artery.  PULM: No evidence of respiratory difficulty, symmetric chest rise.  BACK: Mild pain with palpation over lumbar spine. Straight leg raise in the sitting position is negative bilaerally. Limited ROM with pain on extension. Positive facet loading bilaterally.   EXTREMITIES: Peripheral joint ROM is full and pain free without obvious instability or laxity in all four extremities. No deformities, edema, or skin discoloration. Good capillary refill.  MUSCULOSKELETAL: Shoulder provocative maneuvers are positive on the left.   Bilateral upper  extremity strength is " normal and symmetric.  No atrophy or tone abnormalities are noted.  NEURO: Bilateral upper extremity coordination and muscle stretch reflexes are physiologic and symmetric.  Abimael's negative. No clonus.  No loss of sensation is noted.  GAIT: Antalgic, ambulates without assistance         Lab Results   Component Value Date    WBC 4.60 11/11/2016    HGB 12.6 (L) 11/11/2016    HCT 39.4 (L) 11/11/2016    MCV 97 11/11/2016     11/11/2016      Lab Results   Component Value Date    LABA1C 9.2 (H) 05/18/2016    HGBA1C 7.2 (H) 05/08/2017     Lab Results   Component Value Date    CREATININE 1.1 02/24/2017        ASSESSMENT: 61 y.o. year old male with pain, consistent with     Encounter Diagnoses   Name Primary?    S/P lumbar fusion Yes    Lumbar radiculopathy     DDD (degenerative disc disease), lumbar        PLAN:     - Previous imaging was reviewed and discussed with the patient today.    - He is s/p left L3 TF DWAIN with significant relief. We can repeat this in the future.     - Consider facet joint injections in the future for his facet mediated pain. This pain is tolerable at this time.     - Continue Gabapentin.     - Norco 10/325 mg every 12 hours as needed for pain, #60, 0 refills.     - The patient is here today for a refill of current pain medications and they believe these provide effective pain control and improvements in quality of life.  The patient notes no serious side effects, and feels the benefits outweigh the risks.  The patient was reminded of the pain contract that they signed previously as well as the risks and benefits of the medication including possible death.  The updated [place] [employer] prescription monitoring program was reviewed, and the patient has been found to be compliant with current treatment plan. Medication management provided by Dr. Espinoza.     - RTC in 1 month or sooner if needed.     - Dr. Espinoza was consulted on the patient and agrees with this plan.    The  above plan and management options were discussed at length with patient. Patient is in agreement with the above and verbalized understanding.     Sue Walker NP  07/06/2017

## 2017-07-10 DIAGNOSIS — Z12.11 COLON CANCER SCREENING: ICD-10-CM

## 2017-08-02 ENCOUNTER — OFFICE VISIT (OUTPATIENT)
Dept: PAIN MEDICINE | Facility: CLINIC | Age: 62
End: 2017-08-02
Payer: MEDICARE

## 2017-08-02 VITALS
WEIGHT: 166.44 LBS | HEIGHT: 69 IN | BODY MASS INDEX: 24.65 KG/M2 | TEMPERATURE: 98 F | SYSTOLIC BLOOD PRESSURE: 127 MMHG | DIASTOLIC BLOOD PRESSURE: 78 MMHG | HEART RATE: 62 BPM

## 2017-08-02 DIAGNOSIS — M54.16 LUMBAR RADICULOPATHY: Primary | ICD-10-CM

## 2017-08-02 DIAGNOSIS — G89.29 CHRONIC LEFT SHOULDER PAIN: ICD-10-CM

## 2017-08-02 DIAGNOSIS — M17.0 PRIMARY OSTEOARTHRITIS OF BOTH KNEES: ICD-10-CM

## 2017-08-02 DIAGNOSIS — Z98.1 S/P LUMBAR FUSION: ICD-10-CM

## 2017-08-02 DIAGNOSIS — M25.512 CHRONIC LEFT SHOULDER PAIN: ICD-10-CM

## 2017-08-02 DIAGNOSIS — M51.36 DDD (DEGENERATIVE DISC DISEASE), LUMBAR: ICD-10-CM

## 2017-08-02 PROCEDURE — 99999 PR PBB SHADOW E&M-EST. PATIENT-LVL III: CPT | Mod: PBBFAC,,, | Performed by: NURSE PRACTITIONER

## 2017-08-02 PROCEDURE — 99213 OFFICE O/P EST LOW 20 MIN: CPT | Mod: S$GLB,,, | Performed by: NURSE PRACTITIONER

## 2017-08-02 RX ORDER — DICLOFENAC SODIUM 10 MG/G
2 GEL TOPICAL 4 TIMES DAILY
Qty: 1 TUBE | Refills: 2 | Status: SHIPPED | OUTPATIENT
Start: 2017-08-02 | End: 2019-02-20

## 2017-08-02 NOTE — PROGRESS NOTES
Chronic Pain - Established Visit    Referring Physician: No ref. provider found    Chief Complaint:   Chief Complaint   Patient presents with    Low-back Pain        SUBJECTIVE: Disclaimer: This note has been generated using voice-recognition software. There may be typographical errors that have been missed during proof-reading    Interval History 8/2/2017:  The patient returns to clinic today for follow up. He reports that his left leg pain has returned in the last few days. He reports low back pain that is radiating down the side of his left leg to mid thigh. He describes this pain as shooting and burning. He continues to use Gabapentin and Norco with benefit. He also reports bilateral knee pain that is dull and aching in nature. He denies any injury. He has never had any interventions for his knees. He denies any bowel or bladder incontinence. His pain today is 6/10.    Interval History 7/6/2017:  The patient returns to clinic today for follow up. He is s/p left L3 TF DWAIN on 6/21/2017. He reports 80% relief of his leg pain. He does report low back pain that is worse in the morning. He does report stiffness. He continues to report benefit with Gabapentin and Norco. He does report increased activity since the procedure. He denies any other health changes. He denies any bowel or bladder incontinence. His pain today is 3/10.     Interval History 6/6/2017:  The patient returns to clinic today for follow up. He complains of low back pain that is radiating into his left leg. This pain has increased in intensity in the last few weeks. He did see Dr. Liz yesterday. He reports pain that begins in his low back and radiates down the side of his left thigh to his knee. He denies any radiating right leg pain. He also complains of stiffness in his lower back that is worse in the morning. He denies any bowel or bladder incontinence. He continues to take Gabapentin 1800 mg daily. He also take Norco every 12 hours as needed  for pain and does need a refill today. He reports continued relief of shoulder pain with previous injection. His pain today is 5/10.    Interval History 4/20/2017  NM bone scan 3/29 with arthritis T12/L1 and L5/S1 follow-up with orthopedics regarding shoulder has severe osteoarthritis had a shoulder joint injection with great relief pain.  Not currently having any cervical radicular symptoms.  Gabapentin at 900 mg per day no side effects, does have some lower extremity radicular symptoms.  No other health changes.    Initial encounter:    Rob RUBALCAVA Robert Perera presents to the clinic for the evaluation of neck, shoulder and lower back with lower extremities pain. The pain started years ago and symptoms have been worsening.    Brief history:   patient recently evaluated in neurosurgery is scheduled for a nuclear medicine bone scan, possible workup for ACDF secondary to severe central stenosis of the cervical spine with radiculopathy.  The patient had a previous cervical transverse foraminal epidural steroid injection with greater than 50% improvement in his radicular symptoms into his right upper extremity although he continues to have what appears to be rotator cuff syndrome of the left shoulder and is scheduled for orthopedic evaluation soon.    Pain Description:    The current worst pain is located in the left shoulder area    At BEST  10/10     At WORST  10/10 on the WORST day.      On average pain is rated as 10/10.     Today the pain is rated as 10/10    The pain is described as aching, sharp, shooting, throbbing and tingling      Symptoms interfere with daily activity, sleeping and work.     Exacerbating factors: Sitting, Standing, Laying, Bending, Extension, Flexing, Lifting and Getting out of bed/chair.      Mitigating factors medications and injection.     Patient denies urinary incontinence and bowel incontinence.  Patient denies any suicidal or homicidal ideations    Pain  Medications:  Current:  Hydrocodone/acetaminophen  Gabapentin    Tried in Past:  NSAIDs -Never  TCA -Never  SNRI -Never  Anti-convulsants  Gabapentin   Muscle Relaxants -Never  Opioids-Never    Physical Therapy/Home Exercise: no       report:  Reviewed and consistent with medication use as prescribed.    Pain Procedures:   right C6-C7 TF DWAIN with IR on 2/6/17 6/21/2017- Left L3 TF DWAIN- 80% relief    Chiropractor -never  Acupuncture - never  TENS unit -never  Spinal decompression -previous lumbar surgery  Joint replacement -never    Imaging:   Lumbar MRI 5/29/2017:  FINDINGS:     Alignment: Normal.    Vertebrae: No fracture. Essentially normal marrow signal, with fatty endplate degenerative changes L4-S1.    Discs:  Disc spacer devices at L4-L5 and L5-S1 with partial fusion of the disc spaces.  Mild diffuse disc space narrowing throughout the lumbar spine with mild desiccation L3-L4.    Cord: Normal. Conus terminates at T12-L1.    Degenerative findings:        T11-T12: Incompletely imaged on MRI.  No significant disc disease.  No spinal canal stenosis.  Left-sided facet arthropathy results in mild left foraminal stenosis.       T12-L1: Incompletely imaged on MRI.  No significant disc disease.  No spinal canal stenosis.  No neural foraminal stenosis.       L1-L2: No significant disc disease.  Minimal bilateral facet arthropathy without spinal canal or foraminal stenosis.       L2-L3: Minimal circumferential disc bulge with mild bilateral facet arthropathy and buckling of the ligamentum flavum, overall resulting in mild left foraminal stenosis with no significant spinal canal stenosis.       L3-L4: Minimal circumferential disc bulge with mild bilateral facet arthropathy and buckling of the ligamentum flavum results in mild spinal canal stenosis, moderate left foraminal stenosis, and mild right foraminal stenosis.       L4-L5: Fusion of the disc space with mild right worse than left facet arthropathy results in  mild right foraminal stenosis.  No spinal canal stenosis.       L5-S1: Minimal circumferential disc bulge with superimposed calcified left foraminal protrusion and mild bilateral facet arthropathy results in mild right and moderate left foraminal stenosis.  No spinal canal stenosis.    Paraspinal muscles & soft tissues: Mild thickening of the right adrenal gland without discrete nodule, unchanged from prior CT.   Impression       Disc spacers with partial fusion L4-S1 with mild disc and facet degeneration throughout the lumbar spine most pronounced at L3-L4 with mild spinal canal stenosis and moderate left foraminal stenosis as well as L5-S1 with moderate left foraminal stenosis.       Past Medical History:   Diagnosis Date    Anxiety     Back pain     Cataract     Diabetes mellitus     History of hepatitis C; S/p RX with SVR 12 documented 06/2017 6/1/2017    Hypertension     Postoperative hypothyroidism 11/3/2016    Primary hyperparathyroidism 1/18/2017    Thyroid disease      Past Surgical History:   Procedure Laterality Date    LUMBAR FUSION      THYROIDECTOMY      TONSILLECTOMY       Social History     Social History    Marital status: Single     Spouse name: N/A    Number of children: N/A    Years of education: N/A     Occupational History    Disabled, pt says from [employer] and now for back, DM2      Social History Main Topics    Smoking status: Current Every Day Smoker    Smokeless tobacco: Not on file    Alcohol use Yes    Drug use: No    Sexual activity: Not on file     Other Topics Concern    Not on file     Social History Narrative    Lives alone.  He has 2 adult children who do not live here.       Family History   Problem Relation Age of Onset    Cancer Mother      breast    Cataracts Father     No Known Problems Sister     No Known Problems Brother     No Known Problems Brother     Heart disease Brother     No Known Problems Sister     No Known Problems Sister     No Known  "Problems Sister     Glaucoma Paternal Uncle     Glaucoma Paternal Uncle        Review of patient's allergies indicates:   Allergen Reactions    Tizanidine Shortness Of Breath       Current Outpatient Prescriptions   Medication Sig    amlodipine (NORVASC) 10 MG tablet Take 1 tablet (10 mg total) by mouth once daily.    BOOSTRIX TDAP 2.5-8-5 Lf-mcg-Lf/0.5mL Syrg injection     dicyclomine (BENTYL) 20 mg tablet Take 20 mg by mouth daily as needed.    econazole nitrate 1 % cream Apply topically 2 (two) times daily.    fish oil-omega-3 fatty acids 300-1,000 mg capsule Take 2 g by mouth once daily.    fluocinolone acetonide oil 0.01 % Drop Put 3 drops in right ear twice daily for 1 week.    gabapentin (NEURONTIN) 300 MG capsule Take 1 capsule (300 mg total) by mouth 3 (three) times daily. Take 1 pill at nightx 7 days, followed by 1 pill in morning and night for 7 days, followed by 1 pill morning afternoon and night    hydrocodone-acetaminophen 10-325mg (NORCO)  mg Tab Take 1 tablet by mouth every 12 (twelve) hours as needed for Pain.    insulin aspart (NOVOLOG FLEXPEN) 100 unit/mL InPn pen Inject 15 Units into the skin as needed.    insulin glargine (LANTUS SOLOSTAR) 100 unit/mL (3 mL) InPn pen Inject 15 Units into the skin every evening.    irbesartan (AVAPRO) 150 MG tablet Take 1 tablet (150 mg total) by mouth once daily.    levothyroxine (SYNTHROID) 112 MCG tablet Take 1 tablet (112 mcg total) by mouth once daily. One day per week take one and a half tablets.    levothyroxine (SYNTHROID) 112 MCG tablet TAKE 1 TABLET BY MOUTH DAILY BEFORE BREAKFAST    meloxicam (MOBIC) 15 MG tablet Take 1 tablet (15 mg total) by mouth daily as needed for Pain.    metformin (GLUCOPHAGE) 1000 MG tablet Take 1 tablet (1,000 mg total) by mouth 2 (two) times daily with meals.    nitroGLYCERIN 0.4 MG/HR TD PT24 (NITRODUR) 0.4 mg/hr Place 1 patch onto the skin continuous prn.    NOVOFINE 32 32 gauge x 1/4" Ndle 1 EACH " "BY MISC.(NON-DRUG COMBO ROUTE) ROUTE ONCE DAILY.    pen needle, diabetic (NOVOFINE PLUS) 32 gauge x 1/6" Ndle 1 each by Misc.(Non-Drug; Combo Route) route once daily.    tamsulosin (FLOMAX) 0.4 mg Cp24 Take 1 capsule (0.4 mg total) by mouth once daily.    temazepam (RESTORIL) 30 mg capsule TAKE 1 CAPSULE BY MOUTH EVERY DAY AT BEDTIME AS NEEDED    vitamin D 1000 units Tab Take 185 mg by mouth once daily.    ZOSTAVAX, PF, 19,400 unit/0.65 mL injection      No current facility-administered medications for this visit.        REVIEW OF SYSTEMS:    GENERAL:  No weight loss, malaise or fevers.  HEENT:   No recent changes in vision or hearing  NECK:  Negative for lumps, no difficulty with swallowing.  RESPIRATORY:  Negative for cough, wheezing or shortness of breath, patient denies any recent URI.  CARDIOVASCULAR:  Negative for chest pain, leg swelling or palpitations. HTN.   GI:  Negative for abdominal discomfort, blood in stools or black stools or change in bowel habits.  MUSCULOSKELETAL:  See HPI.  SKIN:  Negative for lesions, rash, and itching.  PSYCH:  History of anxiety. Patient's sleep is disturbed secondary to pain.  HEMATOLOGY/LYMPHOLOGY:  Negative for prolonged bleeding, bruising easily or swollen nodes.  Patient is not currently taking any anti-coagulants  ENDO: History of hypothyroidism and Diabetes.   NEURO:   No history of headaches, syncope, paralysis, seizures or tremors.  All other reviewed and negative other than HPI.    OBJECTIVE:    /78 (BP Location: Right arm)   Pulse 62   Temp 98.3 °F (36.8 °C) (Oral)   Ht 5' 9" (1.753 m)   Wt 75.5 kg (166 lb 7.2 oz)   BMI 24.58 kg/m²     PHYSICAL EXAMINATION:    GENERAL: Well appearing, in no acute distress, alert and oriented x3.  PSYCH:  Mood and affect appropriate.  SKIN: Skin color, texture, turgor normal, no rashes or lesions.  HEAD/FACE:  Normocephalic, atraumatic. Cranial nerves grossly intact.  NECK: Pain to palpation over the cervical " paraspinous muscles. Spurling Negative. Mild pain with neck flexion, extension, and lateral flexion.   CV: RRR with palpation of the radial artery.  PULM: No evidence of respiratory difficulty, symmetric chest rise.  BACK: Mild pain with palpation over lumbar spine. Straight leg raise in the sitting position is positive to the left, negative to the right. Limited ROM with pain on extension. Positive facet loading bilaterally.   EXTREMITIES: Peripheral joint ROM is full and pain free without obvious instability or laxity in all four extremities. No deformities, edema, or skin discoloration. Good capillary refill.  MUSCULOSKELETAL: Shoulder provocative maneuvers are positive on the left. There is no pain with palpation over medial or lateral joint line of bilateral knees. Crepitus noted bilaterally. There is pain with extension of bilateral knees. Bilateral upper extremity strength is normal and symmetric.  No atrophy or tone abnormalities are noted.  NEURO: Bilateral upper extremity coordination and muscle stretch reflexes are physiologic and symmetric.  Abimael's negative. No clonus.  No loss of sensation is noted.  GAIT: Antalgic, ambulates without assistance         Lab Results   Component Value Date    WBC 4.60 11/11/2016    HGB 12.6 (L) 11/11/2016    HCT 39.4 (L) 11/11/2016    MCV 97 11/11/2016     11/11/2016      Lab Results   Component Value Date    LABA1C 9.2 (H) 05/18/2016    HGBA1C 7.2 (H) 05/08/2017     Lab Results   Component Value Date    CREATININE 1.1 02/24/2017        ASSESSMENT: 61 y.o. year old male with pain, consistent with     Encounter Diagnoses   Name Primary?    Lumbar radiculopathy Yes    S/P lumbar fusion     DDD (degenerative disc disease), lumbar     Chronic left shoulder pain     Primary osteoarthritis of both knees        PLAN:     - Previous imaging was reviewed and discussed with the patient today.    - Schedule for left L3 TF DWAIN. The procedure, risks, benefits and  options were discussed with patient. There are no contraindications to the procedure. The patient expressed understanding and agreed to proceed.  Consent obtained today.    - Consider facet joint injections in the future for his facet mediated pain. This pain is tolerable at this time.     - In the future, we can consider knee steroid injections. His pain is tolerable at this time.     - Continue Gabapentin.     - Trial Voltaren gel for his knee pain.     - RTC 2 weeks after above procedure.     - Dr. Espinoza was consulted on the patient and agrees with this plan.    The above plan and management options were discussed at length with patient. Patient is in agreement with the above and verbalized understanding.     Sue Walker NP  08/02/2017

## 2017-08-04 RX ORDER — MELOXICAM 15 MG/1
15 TABLET ORAL DAILY PRN
Qty: 90 TABLET | Refills: 1 | Status: SHIPPED | OUTPATIENT
Start: 2017-08-04 | End: 2018-04-24 | Stop reason: SDUPTHER

## 2017-08-09 ENCOUNTER — TELEPHONE (OUTPATIENT)
Dept: PAIN MEDICINE | Facility: OTHER | Age: 62
End: 2017-08-09

## 2017-08-09 NOTE — TELEPHONE ENCOUNTER
----- Message from Barbie Goldberg sent at 8/9/2017  8:50 AM CDT -----  Contact: Pt   Pt. Will call to reschedule procedure 8/9/17 w/ Dr. Espinoza at a later date, f/u appt. cxl at this time.    ----- Message -----  From: Jasmyn Elliott LPN  Sent: 8/9/2017   8:28 AM  To: [facility] Pain Management Schedulers        ----- Message -----  From: Kimmie Yousif  Sent: 8/8/2017   5:26 PM  To: [facility] Staff    Pt needs to cancel appt that is scheduled for tomorrow and wants to cancel it     Pt says he will call at a later time to r/s    Pt can be reached at [phone]    Thanks

## 2017-08-23 ENCOUNTER — TELEPHONE (OUTPATIENT)
Dept: PAIN MEDICINE | Facility: OTHER | Age: 62
End: 2017-08-23

## 2017-08-25 ENCOUNTER — HOSPITAL ENCOUNTER (OUTPATIENT)
Facility: OTHER | Age: 62
Discharge: HOME | End: 2017-08-25
Attending: ANESTHESIOLOGY | Admitting: ANESTHESIOLOGY
Payer: MEDICARE

## 2017-08-25 ENCOUNTER — SURGERY (OUTPATIENT)
Age: 62
End: 2017-08-25

## 2017-08-25 VITALS
BODY MASS INDEX: 23.77 KG/M2 | HEIGHT: 70 IN | HEART RATE: 71 BPM | OXYGEN SATURATION: 95 % | DIASTOLIC BLOOD PRESSURE: 74 MMHG | RESPIRATION RATE: 18 BRPM | WEIGHT: 166 LBS | TEMPERATURE: 98 F | SYSTOLIC BLOOD PRESSURE: 168 MMHG

## 2017-08-25 DIAGNOSIS — M54.17 LUMBOSACRAL RADICULOPATHY: ICD-10-CM

## 2017-08-25 DIAGNOSIS — G89.29 CHRONIC PAIN: ICD-10-CM

## 2017-08-25 DIAGNOSIS — M51.36 DDD (DEGENERATIVE DISC DISEASE), LUMBAR: Primary | ICD-10-CM

## 2017-08-25 LAB — POCT GLUCOSE: 140 MG/DL (ref 70–110)

## 2017-08-25 PROCEDURE — 64483 NJX AA&/STRD TFRM EPI L/S 1: CPT | Mod: 50 | Performed by: ANESTHESIOLOGY

## 2017-08-25 PROCEDURE — 25000003 PHARM REV CODE 250: Performed by: ANESTHESIOLOGY

## 2017-08-25 PROCEDURE — 25500020 PHARM REV CODE 255: Performed by: ANESTHESIOLOGY

## 2017-08-25 PROCEDURE — 64483 NJX AA&/STRD TFRM EPI L/S 1: CPT | Mod: 50,,, | Performed by: ANESTHESIOLOGY

## 2017-08-25 PROCEDURE — 63600175 PHARM REV CODE 636 W HCPCS: Performed by: ANESTHESIOLOGY

## 2017-08-25 RX ORDER — MIDAZOLAM HYDROCHLORIDE 1 MG/ML
INJECTION INTRAMUSCULAR; INTRAVENOUS
Status: DISCONTINUED | OUTPATIENT
Start: 2017-08-25 | End: 2017-08-25 | Stop reason: HOSPADM

## 2017-08-25 RX ORDER — LIDOCAINE HYDROCHLORIDE 5 MG/ML
INJECTION, SOLUTION INFILTRATION; INTRAVENOUS
Status: DISCONTINUED | OUTPATIENT
Start: 2017-08-25 | End: 2017-08-25 | Stop reason: HOSPADM

## 2017-08-25 RX ORDER — SODIUM CHLORIDE 9 MG/ML
500 INJECTION, SOLUTION INTRAVENOUS CONTINUOUS
Status: DISCONTINUED | OUTPATIENT
Start: 2017-08-25 | End: 2017-08-25 | Stop reason: HOSPADM

## 2017-08-25 RX ORDER — FENTANYL CITRATE 50 UG/ML
INJECTION, SOLUTION INTRAMUSCULAR; INTRAVENOUS
Status: DISCONTINUED | OUTPATIENT
Start: 2017-08-25 | End: 2017-08-25 | Stop reason: HOSPADM

## 2017-08-25 RX ORDER — LIDOCAINE HYDROCHLORIDE 10 MG/ML
INJECTION INFILTRATION; PERINEURAL
Status: DISCONTINUED | OUTPATIENT
Start: 2017-08-25 | End: 2017-08-25 | Stop reason: HOSPADM

## 2017-08-25 RX ORDER — DEXAMETHASONE SODIUM PHOSPHATE 10 MG/ML
INJECTION INTRAMUSCULAR; INTRAVENOUS
Status: DISCONTINUED | OUTPATIENT
Start: 2017-08-25 | End: 2017-08-25 | Stop reason: HOSPADM

## 2017-08-25 RX ADMIN — MIDAZOLAM HYDROCHLORIDE 1 MG: 1 INJECTION, SOLUTION INTRAMUSCULAR; INTRAVENOUS at 11:08

## 2017-08-25 RX ADMIN — LIDOCAINE HYDROCHLORIDE 10 ML: 10 INJECTION, SOLUTION INFILTRATION; PERINEURAL at 11:08

## 2017-08-25 RX ADMIN — LIDOCAINE HYDROCHLORIDE 10 ML: 5 INJECTION, SOLUTION INFILTRATION; INTRAVENOUS at 11:08

## 2017-08-25 RX ADMIN — FENTANYL CITRATE 50 MCG: 50 INJECTION, SOLUTION INTRAMUSCULAR; INTRAVENOUS at 11:08

## 2017-08-25 RX ADMIN — IOHEXOL 3 ML: 300 INJECTION, SOLUTION INTRAVENOUS at 11:08

## 2017-08-25 RX ADMIN — DEXAMETHASONE SODIUM PHOSPHATE 10 MG: 10 INJECTION, SOLUTION INTRAMUSCULAR; INTRAVENOUS at 11:08

## 2017-08-25 NOTE — PLAN OF CARE
Pt tolerated procedure well. Reports 0/10 pain after procedure. Pt assisted up for first time, steady on feet. D/c instructions given.

## 2017-08-25 NOTE — DISCHARGE SUMMARY
Discharge Note  Short Stay      SUMMARY     Admit Date: 8/25/2017    Attending Physician: Sulaiman Gudino      Discharge Physician: Sulaiman Gudino      Discharge Date: 8/25/2017 12:32 PM    Final Diagnosis: Lumbar radiculopathy [M54.16]    Disposition: Home or self care    Patient Instructions:   Current Discharge Medication List      CONTINUE these medications which have NOT CHANGED    Details   amlodipine (NORVASC) 10 MG tablet Take 1 tablet (10 mg total) by mouth once daily.  Qty: 90 tablet, Refills: 3      BOOSTRIX TDAP 2.5-8-5 Lf-mcg-Lf/0.5mL Syrg injection       diclofenac sodium 1 % Gel Apply 2 g topically 4 (four) times daily.  Qty: 1 Tube, Refills: 2    Associated Diagnoses: Chronic left shoulder pain; Primary osteoarthritis of both knees      dicyclomine (BENTYL) 20 mg tablet Take 20 mg by mouth daily as needed.      econazole nitrate 1 % cream Apply topically 2 (two) times daily.  Qty: 30 g, Refills: 1      fish oil-omega-3 fatty acids 300-1,000 mg capsule Take 2 g by mouth once daily.      fluocinolone acetonide oil 0.01 % Drop Put 3 drops in right ear twice daily for 1 week.  Qty: 1 Bottle, Refills: 0      gabapentin (NEURONTIN) 300 MG capsule Take 1 capsule (300 mg total) by mouth 3 (three) times daily. Take 1 pill at nightx 7 days, followed by 1 pill in morning and night for 7 days, followed by 1 pill morning afternoon and night  Qty: 90 capsule, Refills: 11      insulin aspart (NOVOLOG FLEXPEN) 100 unit/mL InPn pen Inject 15 Units into the skin as needed.  Qty: 1 Box, Refills: 5    Associated Diagnoses: Diabetes mellitus without complication      insulin glargine (LANTUS SOLOSTAR) 100 unit/mL (3 mL) InPn pen Inject 15 Units into the skin every evening.  Qty: 3 Box, Refills: 1    Associated Diagnoses: Diabetes mellitus without complication      irbesartan (AVAPRO) 150 MG tablet Take 1 tablet (150 mg total) by mouth once daily.  Qty: 90 tablet, Refills: 3      !! levothyroxine (SYNTHROID) 112 MCG  "tablet Take 1 tablet (112 mcg total) by mouth once daily. One day per week take one and a half tablets.  Qty: 90 tablet, Refills: 0      !! levothyroxine (SYNTHROID) 112 MCG tablet TAKE 1 TABLET BY MOUTH DAILY BEFORE BREAKFAST  Qty: 90 tablet, Refills: 0      meloxicam (MOBIC) 15 MG tablet TAKE 1 TABLET (15 MG TOTAL) BY MOUTH DAILY AS NEEDED FOR PAIN.  Qty: 90 tablet, Refills: 1      metformin (GLUCOPHAGE) 1000 MG tablet Take 1 tablet (1,000 mg total) by mouth 2 (two) times daily with meals.  Qty: 180 tablet, Refills: 3      nitroGLYCERIN 0.4 MG/HR TD PT24 (NITRODUR) 0.4 mg/hr Place 1 patch onto the skin continuous prn.      NOVOFINE 32 32 gauge x 1/4" Ndle 1 EACH BY MISC.(NON-DRUG COMBO ROUTE) ROUTE ONCE DAILY.  Refills: 3      pen needle, diabetic (NOVOFINE PLUS) 32 gauge x 1/6" Ndle 1 each by Misc.(Non-Drug; Combo Route) route once daily.  Qty: 100 each, Refills: 3    Associated Diagnoses: Diabetes mellitus without complication      tamsulosin (FLOMAX) 0.4 mg Cp24 Take 1 capsule (0.4 mg total) by mouth once daily.  Qty: 90 capsule, Refills: 3    Associated Diagnoses: Benign non-nodular prostatic hyperplasia without lower urinary tract symptoms      temazepam (RESTORIL) 30 mg capsule TAKE 1 CAPSULE BY MOUTH EVERY DAY AT BEDTIME AS NEEDED  Qty: 90 capsule, Refills: 1      vitamin D 1000 units Tab Take 185 mg by mouth once daily.      ZOSTAVAX, PF, 19,400 unit/0.65 mL injection        !! - Potential duplicate medications found. Please discuss with provider.              Discharge Diagnosis: Lumbar radiculopathy [M54.16]  Condition on Discharge: Stable.  Diet on Discharge: Same as before.  Activity: as per instruction sheet.  Discharge to: Home with a responsible adult.  Follow up: as per Discharge instructions.        "

## 2017-08-25 NOTE — H&P (VIEW-ONLY)
Chronic Pain - Established Visit    Referring Physician: No ref. provider found    Chief Complaint:   Chief Complaint   Patient presents with    Low-back Pain        SUBJECTIVE: Disclaimer: This note has been generated using voice-recognition software. There may be typographical errors that have been missed during proof-reading    Interval History 8/2/2017:  The patient returns to clinic today for follow up. He reports that his left leg pain has returned in the last few days. He reports low back pain that is radiating down the side of his left leg to mid thigh. He describes this pain as shooting and burning. He continues to use Gabapentin and Norco with benefit. He also reports bilateral knee pain that is dull and aching in nature. He denies any injury. He has never had any interventions for his knees. He denies any bowel or bladder incontinence. His pain today is 6/10.    Interval History 7/6/2017:  The patient returns to clinic today for follow up. He is s/p left L3 TF DWAIN on 6/21/2017. He reports 80% relief of his leg pain. He does report low back pain that is worse in the morning. He does report stiffness. He continues to report benefit with Gabapentin and Norco. He does report increased activity since the procedure. He denies any other health changes. He denies any bowel or bladder incontinence. His pain today is 3/10.     Interval History 6/6/2017:  The patient returns to clinic today for follow up. He complains of low back pain that is radiating into his left leg. This pain has increased in intensity in the last few weeks. He did see Dr. Liz yesterday. He reports pain that begins in his low back and radiates down the side of his left thigh to his knee. He denies any radiating right leg pain. He also complains of stiffness in his lower back that is worse in the morning. He denies any bowel or bladder incontinence. He continues to take Gabapentin 1800 mg daily. He also take Norco every 12 hours as needed  for pain and does need a refill today. He reports continued relief of shoulder pain with previous injection. His pain today is 5/10.    Interval History 4/20/2017  NM bone scan 3/29 with arthritis T12/L1 and L5/S1 follow-up with orthopedics regarding shoulder has severe osteoarthritis had a shoulder joint injection with great relief pain.  Not currently having any cervical radicular symptoms.  Gabapentin at 900 mg per day no side effects, does have some lower extremity radicular symptoms.  No other health changes.    Initial encounter:    Rob RUBALCAVA Robert Perera presents to the clinic for the evaluation of neck, shoulder and lower back with lower extremities pain. The pain started years ago and symptoms have been worsening.    Brief history:   patient recently evaluated in neurosurgery is scheduled for a nuclear medicine bone scan, possible workup for ACDF secondary to severe central stenosis of the cervical spine with radiculopathy.  The patient had a previous cervical transverse foraminal epidural steroid injection with greater than 50% improvement in his radicular symptoms into his right upper extremity although he continues to have what appears to be rotator cuff syndrome of the left shoulder and is scheduled for orthopedic evaluation soon.    Pain Description:    The current worst pain is located in the left shoulder area    At BEST  10/10     At WORST  10/10 on the WORST day.      On average pain is rated as 10/10.     Today the pain is rated as 10/10    The pain is described as aching, sharp, shooting, throbbing and tingling      Symptoms interfere with daily activity, sleeping and work.     Exacerbating factors: Sitting, Standing, Laying, Bending, Extension, Flexing, Lifting and Getting out of bed/chair.      Mitigating factors medications and injection.     Patient denies urinary incontinence and bowel incontinence.  Patient denies any suicidal or homicidal ideations    Pain  Medications:  Current:  Hydrocodone/acetaminophen  Gabapentin    Tried in Past:  NSAIDs -Never  TCA -Never  SNRI -Never  Anti-convulsants  Gabapentin   Muscle Relaxants -Never  Opioids-Never    Physical Therapy/Home Exercise: no       report:  Reviewed and consistent with medication use as prescribed.    Pain Procedures:   right C6-C7 TF DWAIN with IR on 2/6/17 6/21/2017- Left L3 TF DWAIN- 80% relief    Chiropractor -never  Acupuncture - never  TENS unit -never  Spinal decompression -previous lumbar surgery  Joint replacement -never    Imaging:   Lumbar MRI 5/29/2017:  FINDINGS:     Alignment: Normal.    Vertebrae: No fracture. Essentially normal marrow signal, with fatty endplate degenerative changes L4-S1.    Discs:  Disc spacer devices at L4-L5 and L5-S1 with partial fusion of the disc spaces.  Mild diffuse disc space narrowing throughout the lumbar spine with mild desiccation L3-L4.    Cord: Normal. Conus terminates at T12-L1.    Degenerative findings:        T11-T12: Incompletely imaged on MRI.  No significant disc disease.  No spinal canal stenosis.  Left-sided facet arthropathy results in mild left foraminal stenosis.       T12-L1: Incompletely imaged on MRI.  No significant disc disease.  No spinal canal stenosis.  No neural foraminal stenosis.       L1-L2: No significant disc disease.  Minimal bilateral facet arthropathy without spinal canal or foraminal stenosis.       L2-L3: Minimal circumferential disc bulge with mild bilateral facet arthropathy and buckling of the ligamentum flavum, overall resulting in mild left foraminal stenosis with no significant spinal canal stenosis.       L3-L4: Minimal circumferential disc bulge with mild bilateral facet arthropathy and buckling of the ligamentum flavum results in mild spinal canal stenosis, moderate left foraminal stenosis, and mild right foraminal stenosis.       L4-L5: Fusion of the disc space with mild right worse than left facet arthropathy results in  mild right foraminal stenosis.  No spinal canal stenosis.       L5-S1: Minimal circumferential disc bulge with superimposed calcified left foraminal protrusion and mild bilateral facet arthropathy results in mild right and moderate left foraminal stenosis.  No spinal canal stenosis.    Paraspinal muscles & soft tissues: Mild thickening of the right adrenal gland without discrete nodule, unchanged from prior CT.   Impression       Disc spacers with partial fusion L4-S1 with mild disc and facet degeneration throughout the lumbar spine most pronounced at L3-L4 with mild spinal canal stenosis and moderate left foraminal stenosis as well as L5-S1 with moderate left foraminal stenosis.       Past Medical History:   Diagnosis Date    Anxiety     Back pain     Cataract     Diabetes mellitus     History of hepatitis C; S/p RX with SVR 12 documented 06/2017 6/1/2017    Hypertension     Postoperative hypothyroidism 11/3/2016    Primary hyperparathyroidism 1/18/2017    Thyroid disease      Past Surgical History:   Procedure Laterality Date    LUMBAR FUSION      THYROIDECTOMY      TONSILLECTOMY       Social History     Social History    Marital status: Single     Spouse name: N/A    Number of children: N/A    Years of education: N/A     Occupational History    Disabled, pt says from [employer] and now for back, DM2      Social History Main Topics    Smoking status: Current Every Day Smoker    Smokeless tobacco: Not on file    Alcohol use Yes    Drug use: No    Sexual activity: Not on file     Other Topics Concern    Not on file     Social History Narrative    Lives alone.  He has 2 adult children who do not live here.       Family History   Problem Relation Age of Onset    Cancer Mother      breast    Cataracts Father     No Known Problems Sister     No Known Problems Brother     No Known Problems Brother     Heart disease Brother     No Known Problems Sister     No Known Problems Sister     No Known  "Problems Sister     Glaucoma Paternal Uncle     Glaucoma Paternal Uncle        Review of patient's allergies indicates:   Allergen Reactions    Tizanidine Shortness Of Breath       Current Outpatient Prescriptions   Medication Sig    amlodipine (NORVASC) 10 MG tablet Take 1 tablet (10 mg total) by mouth once daily.    BOOSTRIX TDAP 2.5-8-5 Lf-mcg-Lf/0.5mL Syrg injection     dicyclomine (BENTYL) 20 mg tablet Take 20 mg by mouth daily as needed.    econazole nitrate 1 % cream Apply topically 2 (two) times daily.    fish oil-omega-3 fatty acids 300-1,000 mg capsule Take 2 g by mouth once daily.    fluocinolone acetonide oil 0.01 % Drop Put 3 drops in right ear twice daily for 1 week.    gabapentin (NEURONTIN) 300 MG capsule Take 1 capsule (300 mg total) by mouth 3 (three) times daily. Take 1 pill at nightx 7 days, followed by 1 pill in morning and night for 7 days, followed by 1 pill morning afternoon and night    hydrocodone-acetaminophen 10-325mg (NORCO)  mg Tab Take 1 tablet by mouth every 12 (twelve) hours as needed for Pain.    insulin aspart (NOVOLOG FLEXPEN) 100 unit/mL InPn pen Inject 15 Units into the skin as needed.    insulin glargine (LANTUS SOLOSTAR) 100 unit/mL (3 mL) InPn pen Inject 15 Units into the skin every evening.    irbesartan (AVAPRO) 150 MG tablet Take 1 tablet (150 mg total) by mouth once daily.    levothyroxine (SYNTHROID) 112 MCG tablet Take 1 tablet (112 mcg total) by mouth once daily. One day per week take one and a half tablets.    levothyroxine (SYNTHROID) 112 MCG tablet TAKE 1 TABLET BY MOUTH DAILY BEFORE BREAKFAST    meloxicam (MOBIC) 15 MG tablet Take 1 tablet (15 mg total) by mouth daily as needed for Pain.    metformin (GLUCOPHAGE) 1000 MG tablet Take 1 tablet (1,000 mg total) by mouth 2 (two) times daily with meals.    nitroGLYCERIN 0.4 MG/HR TD PT24 (NITRODUR) 0.4 mg/hr Place 1 patch onto the skin continuous prn.    NOVOFINE 32 32 gauge x 1/4" Ndle 1 EACH " "BY MISC.(NON-DRUG COMBO ROUTE) ROUTE ONCE DAILY.    pen needle, diabetic (NOVOFINE PLUS) 32 gauge x 1/6" Ndle 1 each by Misc.(Non-Drug; Combo Route) route once daily.    tamsulosin (FLOMAX) 0.4 mg Cp24 Take 1 capsule (0.4 mg total) by mouth once daily.    temazepam (RESTORIL) 30 mg capsule TAKE 1 CAPSULE BY MOUTH EVERY DAY AT BEDTIME AS NEEDED    vitamin D 1000 units Tab Take 185 mg by mouth once daily.    ZOSTAVAX, PF, 19,400 unit/0.65 mL injection      No current facility-administered medications for this visit.        REVIEW OF SYSTEMS:    GENERAL:  No weight loss, malaise or fevers.  HEENT:   No recent changes in vision or hearing  NECK:  Negative for lumps, no difficulty with swallowing.  RESPIRATORY:  Negative for cough, wheezing or shortness of breath, patient denies any recent URI.  CARDIOVASCULAR:  Negative for chest pain, leg swelling or palpitations. HTN.   GI:  Negative for abdominal discomfort, blood in stools or black stools or change in bowel habits.  MUSCULOSKELETAL:  See HPI.  SKIN:  Negative for lesions, rash, and itching.  PSYCH:  History of anxiety. Patient's sleep is disturbed secondary to pain.  HEMATOLOGY/LYMPHOLOGY:  Negative for prolonged bleeding, bruising easily or swollen nodes.  Patient is not currently taking any anti-coagulants  ENDO: History of hypothyroidism and Diabetes.   NEURO:   No history of headaches, syncope, paralysis, seizures or tremors.  All other reviewed and negative other than HPI.    OBJECTIVE:    /78 (BP Location: Right arm)   Pulse 62   Temp 98.3 °F (36.8 °C) (Oral)   Ht 5' 9" (1.753 m)   Wt 75.5 kg (166 lb 7.2 oz)   BMI 24.58 kg/m²     PHYSICAL EXAMINATION:    GENERAL: Well appearing, in no acute distress, alert and oriented x3.  PSYCH:  Mood and affect appropriate.  SKIN: Skin color, texture, turgor normal, no rashes or lesions.  HEAD/FACE:  Normocephalic, atraumatic. Cranial nerves grossly intact.  NECK: Pain to palpation over the cervical " paraspinous muscles. Spurling Negative. Mild pain with neck flexion, extension, and lateral flexion.   CV: RRR with palpation of the radial artery.  PULM: No evidence of respiratory difficulty, symmetric chest rise.  BACK: Mild pain with palpation over lumbar spine. Straight leg raise in the sitting position is positive to the left, negative to the right. Limited ROM with pain on extension. Positive facet loading bilaterally.   EXTREMITIES: Peripheral joint ROM is full and pain free without obvious instability or laxity in all four extremities. No deformities, edema, or skin discoloration. Good capillary refill.  MUSCULOSKELETAL: Shoulder provocative maneuvers are positive on the left. There is no pain with palpation over medial or lateral joint line of bilateral knees. Crepitus noted bilaterally. There is pain with extension of bilateral knees. Bilateral upper extremity strength is normal and symmetric.  No atrophy or tone abnormalities are noted.  NEURO: Bilateral upper extremity coordination and muscle stretch reflexes are physiologic and symmetric.  Abimael's negative. No clonus.  No loss of sensation is noted.  GAIT: Antalgic, ambulates without assistance         Lab Results   Component Value Date    WBC 4.60 11/11/2016    HGB 12.6 (L) 11/11/2016    HCT 39.4 (L) 11/11/2016    MCV 97 11/11/2016     11/11/2016      Lab Results   Component Value Date    LABA1C 9.2 (H) 05/18/2016    HGBA1C 7.2 (H) 05/08/2017     Lab Results   Component Value Date    CREATININE 1.1 02/24/2017        ASSESSMENT: 61 y.o. year old male with pain, consistent with     Encounter Diagnoses   Name Primary?    Lumbar radiculopathy Yes    S/P lumbar fusion     DDD (degenerative disc disease), lumbar     Chronic left shoulder pain     Primary osteoarthritis of both knees        PLAN:     - Previous imaging was reviewed and discussed with the patient today.    - Schedule for left L3 TF DWAIN. The procedure, risks, benefits and  options were discussed with patient. There are no contraindications to the procedure. The patient expressed understanding and agreed to proceed.  Consent obtained today.    - Consider facet joint injections in the future for his facet mediated pain. This pain is tolerable at this time.     - In the future, we can consider knee steroid injections. His pain is tolerable at this time.     - Continue Gabapentin.     - Trial Voltaren gel for his knee pain.     - RTC 2 weeks after above procedure.     - Dr. Espinoza was consulted on the patient and agrees with this plan.    The above plan and management options were discussed at length with patient. Patient is in agreement with the above and verbalized understanding.     Sue Walker NP  08/02/2017

## 2017-08-25 NOTE — OP NOTE
Patient Name: Rob Jones Jr.  MRN: [id number]    INFORMED CONSENT: The procedure, risks, benefits and options were discussed with patient. There are no contraindications to the procedure. The patient expressed understanding and agreed to proceed. The personnel performing the procedure was discussed. I verify that I personally obtained Rob's consent prior to the start of the procedure and the signed consent can be found on the patient's chart.    Procedure Date: 08/25/2017    Anesthesia: Topical    Pre Procedure diagnosis: Lumbar radiculopathy [M54.16]  1. DDD (degenerative disc disease), lumbar    2. Lumbosacral radiculopathy    3. Chronic pain      Post-Procedure diagnosis: SAME      Sedation: Yes - Fentanyl 100 mcg and Midazolam 2 mg    PROCEDURE:Bilateral L3-4   TRANSFORAMINAL EPIDURAL STEROID INJECTION        DESCRIPTION OF PROCEDURE: The patient was brought to the procedure room. After performing time out IV access was obtained prior to the procedure. The patient was positioned prone on the fluoroscopy table. Continuous hemodynamic monitoring was initiated including blood pressure, EKG, and pulse oximetry. . The skin was prepped with chlorhexidine three times and draped in a sterile fashion. Skin anesthesia was achieved using 3 mL of lidocaine 1% over the respective injection site.     An oblique fluoroscopic view was obtained, with the superior articular process of the inferior vertebral body aligned with the pedicle. The tip of a 22-gauge 3.5-inch Quincke-type spinal needle was advanced toward the 6 oclock position of the pedicle under intermittent fluoroscopic guidance. Confirmation of proper needle position was made with AP, oblique, and lateral fluoroscopic views. Negative aspiration for blood or CSF was confirmed. 2 mL of Omnipaque 300 was injected. Live fluoroscopic imaging revealed a clear outline of the spinal nerve with proximal spread of agent through the neural foramen into the anterior  epidural space. A total combination of 3 mL of Lidocaine 0.5% and 10 mg decadron was injected at each level. Contrast spread was noted from L2 to L4 level. There was no pain on injection. The needle was removed and bleeding was nil.  A sterile dressing was applied. Rob was taken back to the recovery room for further observation.     Blood Loss: Nill  Specimen: None    Sulaiman Gudino MD

## 2017-08-25 NOTE — DISCHARGE INSTRUCTIONS

## 2017-08-25 NOTE — INTERVAL H&P NOTE
The patient has been examined and the H&P has been reviewed:    I concur with the findings and changes have been noted since the H&P was written: Pt presents with bilateral lower back pain today (R>L). Will change procedure to Bilateral Transforaminal L3 DWAIN.     Anesthesia/Surgery risks, benefits and alternative options discussed and understood by patient/family.          There are no hospital problems to display for this patient.

## 2017-08-28 ENCOUNTER — TELEPHONE (OUTPATIENT)
Dept: PAIN MEDICINE | Facility: CLINIC | Age: 62
End: 2017-08-28

## 2017-08-28 NOTE — TELEPHONE ENCOUNTER
----- Message from Barbie Goldberg sent at 8/23/2017  3:51 PM CDT -----  Correct  Entered on procedure case.

## 2017-09-07 ENCOUNTER — OFFICE VISIT (OUTPATIENT)
Dept: PAIN MEDICINE | Facility: CLINIC | Age: 62
End: 2017-09-07
Payer: MEDICARE

## 2017-09-07 VITALS
SYSTOLIC BLOOD PRESSURE: 134 MMHG | WEIGHT: 163.13 LBS | RESPIRATION RATE: 18 BRPM | TEMPERATURE: 98 F | DIASTOLIC BLOOD PRESSURE: 74 MMHG | BODY MASS INDEX: 23.35 KG/M2 | HEART RATE: 56 BPM | HEIGHT: 70 IN

## 2017-09-07 DIAGNOSIS — G89.4 CHRONIC PAIN SYNDROME: ICD-10-CM

## 2017-09-07 DIAGNOSIS — M47.816 LUMBAR SPONDYLOSIS: ICD-10-CM

## 2017-09-07 DIAGNOSIS — M51.36 DDD (DEGENERATIVE DISC DISEASE), LUMBAR: ICD-10-CM

## 2017-09-07 DIAGNOSIS — M47.816 FACET ARTHRITIS, DEGENERATIVE, LUMBAR SPINE: ICD-10-CM

## 2017-09-07 DIAGNOSIS — M54.17 LUMBOSACRAL RADICULOPATHY: Primary | ICD-10-CM

## 2017-09-07 PROCEDURE — 3078F DIAST BP <80 MM HG: CPT | Mod: S$GLB,,, | Performed by: NURSE PRACTITIONER

## 2017-09-07 PROCEDURE — 99999 PR PBB SHADOW E&M-EST. PATIENT-LVL III: CPT | Mod: PBBFAC,,, | Performed by: NURSE PRACTITIONER

## 2017-09-07 PROCEDURE — 99213 OFFICE O/P EST LOW 20 MIN: CPT | Mod: S$GLB,,, | Performed by: NURSE PRACTITIONER

## 2017-09-07 PROCEDURE — 3008F BODY MASS INDEX DOCD: CPT | Mod: S$GLB,,, | Performed by: NURSE PRACTITIONER

## 2017-09-07 PROCEDURE — 3075F SYST BP GE 130 - 139MM HG: CPT | Mod: S$GLB,,, | Performed by: NURSE PRACTITIONER

## 2017-09-07 RX ORDER — HYDROCODONE BITARTRATE AND ACETAMINOPHEN 10; 325 MG/1; MG/1
1 TABLET ORAL EVERY 12 HOURS PRN
Qty: 60 TABLET | Refills: 0 | Status: SHIPPED | OUTPATIENT
Start: 2017-09-07 | End: 2017-10-07

## 2017-09-07 NOTE — PROGRESS NOTES
Chronic Pain - Established Visit    Referring Physician: No ref. provider found    Chief Complaint:   Chief Complaint   Patient presents with    Low-back Pain        SUBJECTIVE: Disclaimer: This note has been generated using voice-recognition software. There may be typographical errors that have been missed during proof-reading    Interval History 9/7/2017:  The patient returns to clinic today for follow up. He is s/p bilateral L3 TF DWAIN on 8/25/2017. He reports 50% relief of his low back and radiating leg pain. He continues to report low back pain that is aching in nature. He reports that this is tolerable at this time. He continues to take Gabapentin with benefit. He continues to take Norco sparingly with benefit. He would like a refill today. He denies any other health changes. He denies any bowel or bladder incontinence. His pain today is 4/10.    Interval History 8/2/2017:  The patient returns to clinic today for follow up. He reports that his left leg pain has returned in the last few days. He reports low back pain that is radiating down the side of his left leg to mid thigh. He describes this pain as shooting and burning. He continues to use Gabapentin and Norco with benefit. He also reports bilateral knee pain that is dull and aching in nature. He denies any injury. He has never had any interventions for his knees. He denies any bowel or bladder incontinence. His pain today is 6/10.    Interval History 7/6/2017:  The patient returns to clinic today for follow up. He is s/p left L3 TF DWAIN on 6/21/2017. He reports 80% relief of his leg pain. He does report low back pain that is worse in the morning. He does report stiffness. He continues to report benefit with Gabapentin and Norco. He does report increased activity since the procedure. He denies any other health changes. He denies any bowel or bladder incontinence. His pain today is 3/10.     Interval History 6/6/2017:  The patient returns to clinic today  for follow up. He complains of low back pain that is radiating into his left leg. This pain has increased in intensity in the last few weeks. He did see Dr. Liz yesterday. He reports pain that begins in his low back and radiates down the side of his left thigh to his knee. He denies any radiating right leg pain. He also complains of stiffness in his lower back that is worse in the morning. He denies any bowel or bladder incontinence. He continues to take Gabapentin 1800 mg daily. He also take Norco every 12 hours as needed for pain and does need a refill today. He reports continued relief of shoulder pain with previous injection. His pain today is 5/10.    Interval History 4/20/2017  NM bone scan 3/29 with arthritis T12/L1 and L5/S1 follow-up with orthopedics regarding shoulder has severe osteoarthritis had a shoulder joint injection with great relief pain.  Not currently having any cervical radicular symptoms.  Gabapentin at 900 mg per day no side effects, does have some lower extremity radicular symptoms.  No other health changes.    Initial encounter:    Frederickbin KHADAR Jones Jr. presents to the clinic for the evaluation of neck, shoulder and lower back with lower extremities pain. The pain started years ago and symptoms have been worsening.    Brief history:   patient recently evaluated in neurosurgery is scheduled for a nuclear medicine bone scan, possible workup for ACDF secondary to severe central stenosis of the cervical spine with radiculopathy.  The patient had a previous cervical transverse foraminal epidural steroid injection with greater than 50% improvement in his radicular symptoms into his right upper extremity although he continues to have what appears to be rotator cuff syndrome of the left shoulder and is scheduled for orthopedic evaluation soon.    Pain Description:    The current worst pain is located in the left shoulder area    At BEST  10/10     At WORST  10/10 on the WORST day.      On  average pain is rated as 10/10.     Today the pain is rated as 10/10    The pain is described as aching, sharp, shooting, throbbing and tingling      Symptoms interfere with daily activity, sleeping and work.     Exacerbating factors: Sitting, Standing, Laying, Bending, Extension, Flexing, Lifting and Getting out of bed/chair.      Mitigating factors medications and injection.     Patient denies urinary incontinence and bowel incontinence.  Patient denies any suicidal or homicidal ideations    Pain Medications:  Current:  Hydrocodone/acetaminophen  Gabapentin    Tried in Past:  NSAIDs -Never  TCA -Never  SNRI -Never  Anti-convulsants  Gabapentin   Muscle Relaxants -Never  Opioids-Never    Physical Therapy/Home Exercise: no       report:  Reviewed and consistent with medication use as prescribed.    Pain Procedures:   right C6-C7 TF DWAIN with IR on 2/6/17 6/21/2017- Left L3 TF DWAIN- 80% relief  8/25/2017- Bilateral L3 TF DWAIN- 50% relief    Chiropractor -never  Acupuncture - never  TENS unit -never  Spinal decompression -previous lumbar surgery  Joint replacement -never    Imaging:   Lumbar MRI 5/29/2017:  FINDINGS:     Alignment: Normal.    Vertebrae: No fracture. Essentially normal marrow signal, with fatty endplate degenerative changes L4-S1.    Discs:  Disc spacer devices at L4-L5 and L5-S1 with partial fusion of the disc spaces.  Mild diffuse disc space narrowing throughout the lumbar spine with mild desiccation L3-L4.    Cord: Normal. Conus terminates at T12-L1.    Degenerative findings:        T11-T12: Incompletely imaged on MRI.  No significant disc disease.  No spinal canal stenosis.  Left-sided facet arthropathy results in mild left foraminal stenosis.       T12-L1: Incompletely imaged on MRI.  No significant disc disease.  No spinal canal stenosis.  No neural foraminal stenosis.       L1-L2: No significant disc disease.  Minimal bilateral facet arthropathy without spinal canal or foraminal stenosis.        L2-L3: Minimal circumferential disc bulge with mild bilateral facet arthropathy and buckling of the ligamentum flavum, overall resulting in mild left foraminal stenosis with no significant spinal canal stenosis.       L3-L4: Minimal circumferential disc bulge with mild bilateral facet arthropathy and buckling of the ligamentum flavum results in mild spinal canal stenosis, moderate left foraminal stenosis, and mild right foraminal stenosis.       L4-L5: Fusion of the disc space with mild right worse than left facet arthropathy results in mild right foraminal stenosis.  No spinal canal stenosis.       L5-S1: Minimal circumferential disc bulge with superimposed calcified left foraminal protrusion and mild bilateral facet arthropathy results in mild right and moderate left foraminal stenosis.  No spinal canal stenosis.    Paraspinal muscles & soft tissues: Mild thickening of the right adrenal gland without discrete nodule, unchanged from prior CT.   Impression       Disc spacers with partial fusion L4-S1 with mild disc and facet degeneration throughout the lumbar spine most pronounced at L3-L4 with mild spinal canal stenosis and moderate left foraminal stenosis as well as L5-S1 with moderate left foraminal stenosis.       Past Medical History:   Diagnosis Date    Anxiety     Back pain     Cataract     Diabetes mellitus     History of hepatitis C; S/p RX with SVR 12 documented 06/2017 6/1/2017    Hypertension     Postoperative hypothyroidism 11/3/2016    Primary hyperparathyroidism 1/18/2017    Thyroid disease      Past Surgical History:   Procedure Laterality Date    LUMBAR FUSION      THYROIDECTOMY      TONSILLECTOMY       Social History     Social History    Marital status: Single     Spouse name: N/A    Number of children: N/A    Years of education: N/A     Occupational History    Disabled, pt says from [place] and now for back, DM2      Social History Main Topics    Smoking status: Current Every  Day Smoker    Smokeless tobacco: Not on file    Alcohol use Yes    Drug use: No    Sexual activity: Not on file     Other Topics Concern    Not on file     Social History Narrative    Lives alone.  He has 2 adult children who do not live here.       Family History   Problem Relation Age of Onset    Cancer Mother      breast    Cataracts Father     No Known Problems Sister     No Known Problems Brother     No Known Problems Brother     Heart disease Brother     No Known Problems Sister     No Known Problems Sister     No Known Problems Sister     Glaucoma Paternal Uncle     Glaucoma Paternal Uncle        Review of patient's allergies indicates:   Allergen Reactions    Tizanidine Shortness Of Breath       Current Outpatient Prescriptions   Medication Sig    amlodipine (NORVASC) 10 MG tablet Take 1 tablet (10 mg total) by mouth once daily.    BOOSTRIX TDAP 2.5-8-5 Lf-mcg-Lf/0.5mL Syrg injection     dicyclomine (BENTYL) 20 mg tablet Take 20 mg by mouth daily as needed.    econazole nitrate 1 % cream Apply topically 2 (two) times daily.    fish oil-omega-3 fatty acids 300-1,000 mg capsule Take 2 g by mouth once daily.    fluocinolone acetonide oil 0.01 % Drop Put 3 drops in right ear twice daily for 1 week.    gabapentin (NEURONTIN) 300 MG capsule Take 1 capsule (300 mg total) by mouth 3 (three) times daily. Take 1 pill at nightx 7 days, followed by 1 pill in morning and night for 7 days, followed by 1 pill morning afternoon and night    irbesartan (AVAPRO) 150 MG tablet Take 1 tablet (150 mg total) by mouth once daily.    levothyroxine (SYNTHROID) 112 MCG tablet Take 1 tablet (112 mcg total) by mouth once daily. One day per week take one and a half tablets.    levothyroxine (SYNTHROID) 112 MCG tablet TAKE 1 TABLET BY MOUTH DAILY BEFORE BREAKFAST    meloxicam (MOBIC) 15 MG tablet TAKE 1 TABLET (15 MG TOTAL) BY MOUTH DAILY AS NEEDED FOR PAIN.    metformin (GLUCOPHAGE) 1000 MG tablet Take 1  "tablet (1,000 mg total) by mouth 2 (two) times daily with meals.    nitroGLYCERIN 0.4 MG/HR TD PT24 (NITRODUR) 0.4 mg/hr Place 1 patch onto the skin continuous prn.    NOVOFINE 32 32 gauge x 1/4" Ndle 1 EACH BY MISC.(NON-DRUG COMBO ROUTE) ROUTE ONCE DAILY.    pen needle, diabetic (NOVOFINE PLUS) 32 gauge x 1/6" Ndle 1 each by Misc.(Non-Drug; Combo Route) route once daily.    tamsulosin (FLOMAX) 0.4 mg Cp24 Take 1 capsule (0.4 mg total) by mouth once daily.    temazepam (RESTORIL) 30 mg capsule TAKE 1 CAPSULE BY MOUTH EVERY DAY AT BEDTIME AS NEEDED    vitamin D 1000 units Tab Take 185 mg by mouth once daily.    ZOSTAVAX, PF, 19,400 unit/0.65 mL injection     diclofenac sodium 1 % Gel Apply 2 g topically 4 (four) times daily.    insulin aspart (NOVOLOG FLEXPEN) 100 unit/mL InPn pen Inject 15 Units into the skin as needed.    insulin glargine (LANTUS SOLOSTAR) 100 unit/mL (3 mL) InPn pen Inject 15 Units into the skin every evening.     No current facility-administered medications for this visit.        REVIEW OF SYSTEMS:    GENERAL:  No weight loss, malaise or fevers.  HEENT:   No recent changes in vision or hearing  NECK:  Negative for lumps, no difficulty with swallowing.  RESPIRATORY:  Negative for cough, wheezing or shortness of breath, patient denies any recent URI.  CARDIOVASCULAR:  Negative for chest pain, leg swelling or palpitations. HTN.   GI:  Negative for abdominal discomfort, blood in stools or black stools or change in bowel habits.  MUSCULOSKELETAL:  See HPI.  SKIN:  Negative for lesions, rash, and itching.  PSYCH:  History of anxiety. Patient's sleep is disturbed secondary to pain.  HEMATOLOGY/LYMPHOLOGY:  Negative for prolonged bleeding, bruising easily or swollen nodes.  Patient is not currently taking any anti-coagulants  ENDO: History of hypothyroidism and Diabetes.   NEURO:   No history of headaches, syncope, paralysis, seizures or tremors.  All other reviewed and negative other than " "HPI.    OBJECTIVE:    /74   Pulse (!) 56   Temp 97.6 °F (36.4 °C) (Oral)   Resp 18   Ht 5' 10" (1.778 m)   Wt 74 kg (163 lb 1.6 oz)   BMI 23.40 kg/m²     PHYSICAL EXAMINATION:    GENERAL: Well appearing, in no acute distress, alert and oriented x3.  PSYCH:  Mood and affect appropriate.  SKIN: Skin color, texture, turgor normal, no rashes or lesions.  HEAD/FACE:  Normocephalic, atraumatic. Cranial nerves grossly intact.  NECK: Pain to palpation over the cervical paraspinous muscles. Spurling Negative. Mild pain with neck flexion, extension, and lateral flexion.   CV: RRR with palpation of the radial artery.  PULM: No evidence of respiratory difficulty, symmetric chest rise.  BACK: Mild pain with palpation over lumbar spine. Straight leg raise in the sitting position is negative bilaterally. Limited ROM with pain on extension. Positive facet loading bilaterally.   EXTREMITIES: Peripheral joint ROM is full and pain free without obvious instability or laxity in all four extremities. No deformities, edema, or skin discoloration. Good capillary refill.  MUSCULOSKELETAL: Shoulder provocative maneuvers are positive on the left. There is no pain with palpation over medial or lateral joint line of bilateral knees. Crepitus noted bilaterally. There is pain with extension of bilateral knees. Bilateral upper extremity strength is normal and symmetric.  No atrophy or tone abnormalities are noted.  NEURO: Bilateral upper extremity coordination and muscle stretch reflexes are physiologic and symmetric.  Abimael's negative. No clonus.  No loss of sensation is noted.  GAIT: Antalgic, ambulates without assistance.         Lab Results   Component Value Date    WBC 4.60 11/11/2016    HGB 12.6 (L) 11/11/2016    HCT 39.4 (L) 11/11/2016    MCV 97 11/11/2016     11/11/2016      Lab Results   Component Value Date    LABA1C 9.2 (H) 05/18/2016    HGBA1C 7.2 (H) 05/08/2017     Lab Results   Component Value Date    CREATININE " 1.1 02/24/2017        ASSESSMENT: 62 y.o. year old male with pain, consistent with     Encounter Diagnoses   Name Primary?    Lumbosacral radiculopathy Yes    DDD (degenerative disc disease), lumbar     Chronic pain syndrome     Lumbar spondylosis     Facet arthritis, degenerative, lumbar spine        PLAN:     - Previous imaging was reviewed and discussed with the patient today.    - He is s/p bilateral L3 TF DWAIN with benefit. We can repeat this in the future.     - Consider facet joint injections in the future for his facet mediated pain. This pain is tolerable at this time.     - In the future, we can consider knee steroid injections. His pain is tolerable at this time.     - Continue Gabapentin.     - Continue Norco 10/325 mg every 12 hours as needed, #60. Medication management provided by Dr. Espinoza.     - Trial Voltaren gel for his knee pain.     - RTC in 2 months or sooner if needed.     - Dr. Espinoza was consulted on the patient and agrees with this plan.    The above plan and management options were discussed at length with patient. Patient is in agreement with the above and verbalized understanding.     Sue Walker NP  09/07/2017

## 2017-09-08 ENCOUNTER — OFFICE VISIT (OUTPATIENT)
Dept: INTERNAL MEDICINE | Facility: CLINIC | Age: 62
End: 2017-09-08
Payer: MEDICARE

## 2017-09-08 ENCOUNTER — LAB VISIT (OUTPATIENT)
Dept: LAB | Facility: OTHER | Age: 62
End: 2017-09-08
Attending: INTERNAL MEDICINE
Payer: MEDICARE

## 2017-09-08 VITALS
HEIGHT: 70 IN | BODY MASS INDEX: 23.68 KG/M2 | DIASTOLIC BLOOD PRESSURE: 80 MMHG | HEART RATE: 60 BPM | SYSTOLIC BLOOD PRESSURE: 112 MMHG | WEIGHT: 165.38 LBS

## 2017-09-08 DIAGNOSIS — I10 ESSENTIAL HYPERTENSION: ICD-10-CM

## 2017-09-08 DIAGNOSIS — H92.01 CHRONIC RIGHT EAR PAIN: ICD-10-CM

## 2017-09-08 DIAGNOSIS — E89.0 POSTOPERATIVE HYPOTHYROIDISM: ICD-10-CM

## 2017-09-08 DIAGNOSIS — G89.29 CHRONIC RIGHT EAR PAIN: ICD-10-CM

## 2017-09-08 DIAGNOSIS — E11.9 TYPE 2 DIABETES MELLITUS WITHOUT COMPLICATION: ICD-10-CM

## 2017-09-08 DIAGNOSIS — Z72.0 TOBACCO USE: ICD-10-CM

## 2017-09-08 DIAGNOSIS — E11.9 DIABETES MELLITUS WITHOUT COMPLICATION: ICD-10-CM

## 2017-09-08 LAB
CHOLEST SERPL-MCNC: 158 MG/DL
CHOLEST/HDLC SERPL: 1.9 {RATIO}
HDLC SERPL-MCNC: 82 MG/DL
HDLC SERPL: 51.9 %
LDLC SERPL CALC-MCNC: 66 MG/DL
NONHDLC SERPL-MCNC: 76 MG/DL
T4 FREE SERPL-MCNC: 0.97 NG/DL
TRIGL SERPL-MCNC: 50 MG/DL
TSH SERPL DL<=0.005 MIU/L-ACNC: 9.88 UIU/ML

## 2017-09-08 PROCEDURE — 80061 LIPID PANEL: CPT

## 2017-09-08 PROCEDURE — 99999 PR PBB SHADOW E&M-EST. PATIENT-LVL III: CPT | Mod: PBBFAC,,, | Performed by: INTERNAL MEDICINE

## 2017-09-08 PROCEDURE — 84439 ASSAY OF FREE THYROXINE: CPT

## 2017-09-08 PROCEDURE — 4010F ACE/ARB THERAPY RXD/TAKEN: CPT | Mod: S$GLB,,, | Performed by: INTERNAL MEDICINE

## 2017-09-08 PROCEDURE — 3008F BODY MASS INDEX DOCD: CPT | Mod: S$GLB,,, | Performed by: INTERNAL MEDICINE

## 2017-09-08 PROCEDURE — 83036 HEMOGLOBIN GLYCOSYLATED A1C: CPT

## 2017-09-08 PROCEDURE — 36415 COLL VENOUS BLD VENIPUNCTURE: CPT

## 2017-09-08 PROCEDURE — 84443 ASSAY THYROID STIM HORMONE: CPT

## 2017-09-08 PROCEDURE — 3074F SYST BP LT 130 MM HG: CPT | Mod: S$GLB,,, | Performed by: INTERNAL MEDICINE

## 2017-09-08 PROCEDURE — 3045F PR MOST RECENT HEMOGLOBIN A1C LEVEL 7.0-9.0%: CPT | Mod: S$GLB,,, | Performed by: INTERNAL MEDICINE

## 2017-09-08 PROCEDURE — 3079F DIAST BP 80-89 MM HG: CPT | Mod: S$GLB,,, | Performed by: INTERNAL MEDICINE

## 2017-09-08 PROCEDURE — 99214 OFFICE O/P EST MOD 30 MIN: CPT | Mod: S$GLB,,, | Performed by: INTERNAL MEDICINE

## 2017-09-08 RX ORDER — INSULIN GLARGINE 100 [IU]/ML
15 INJECTION, SOLUTION SUBCUTANEOUS NIGHTLY
Qty: 3 BOX | Refills: 1 | Status: SHIPPED | OUTPATIENT
Start: 2017-09-08 | End: 2018-03-09

## 2017-09-08 RX ORDER — INSULIN ASPART 100 [IU]/ML
15 INJECTION, SOLUTION INTRAVENOUS; SUBCUTANEOUS
Qty: 1 BOX | Refills: 5 | Status: SHIPPED | OUTPATIENT
Start: 2017-09-08 | End: 2019-01-21

## 2017-09-08 RX ORDER — ALBUTEROL SULFATE 1.25 MG/3ML
1.25 SOLUTION RESPIRATORY (INHALATION) EVERY 6 HOURS PRN
Qty: 1 BOX | Refills: 2 | Status: SHIPPED | OUTPATIENT
Start: 2017-09-08 | End: 2019-06-05

## 2017-09-08 RX ORDER — METFORMIN HYDROCHLORIDE 1000 MG/1
1000 TABLET ORAL 2 TIMES DAILY WITH MEALS
Qty: 180 TABLET | Refills: 1 | Status: SHIPPED | OUTPATIENT
Start: 2017-09-08 | End: 2018-06-13 | Stop reason: SDUPTHER

## 2017-09-08 NOTE — PROGRESS NOTES
"Subjective:       Patient ID: Rob Jones Jr. is a 62 y.o. male.    Chief Complaint: Medication Refill (Annual F/U)      Pt here for f/u DM2 and other issues.     Pt c/o ongoing L ear pain and itching.  Drops I gave him in the past haven't helped.     Pt has been taking Novolog 1x/day since last visit.      No change in fasting morning glucoses.  These have been 80s-100s.  No lows.    He didn't change his levothyroxine dose as I recommended.            Review of Systems   Constitutional: Negative.    HENT: Positive for ear pain.    Eyes: Negative.    Respiratory: Negative.        Objective:       Vitals:    09/08/17 0932   BP: 112/80   Pulse: 60   Weight: 75 kg (165 lb 5.5 oz)   Height: 5' 10" (1.778 m)     Physical Exam   Constitutional: He is oriented to person, place, and time. He appears well-developed and well-nourished. No distress.   HENT:   Head: Normocephalic and atraumatic.   Right Ear: External ear and ear canal normal. Tympanic membrane is scarred. Tympanic membrane is not perforated, not erythematous, not retracted and not bulging.   Left Ear: External ear and ear canal normal. Tympanic membrane is scarred. Tympanic membrane is not perforated, not erythematous, not retracted and not bulging.   Mouth/Throat: Oropharynx is clear and moist and mucous membranes are normal. No oropharyngeal exudate or posterior oropharyngeal erythema.   Eyes: Conjunctivae and EOM are normal. Pupils are equal, round, and reactive to light.   Neck: Normal range of motion. Neck supple. No thyromegaly present.   Cardiovascular: Normal rate, regular rhythm and normal heart sounds.  Exam reveals no gallop and no friction rub.    No murmur heard.  Pulmonary/Chest: Effort normal and breath sounds normal. No respiratory distress. He has no wheezes. He has no rhonchi. He has no rales.   Lymphadenopathy:     He has no cervical adenopathy.   Neurological: He is alert and oriented to person, place, and time.   Skin: He is not " diaphoretic.       Assessment:       1. Uncontrolled type 2 diabetes mellitus with complication, with long-term current use of insulin    2. Tobacco use    3. Essential hypertension    4. Postoperative hypothyroidism        Plan:           DM2 - Uncontrolled when last tested.  Cont current tx and check new A1c.    HTN - At goal.  Cont current tx.    Hypothyroidism - Pt didn't change dose as instructed.  Chk new TSH and adjust med PRN.     Tobacco - Pt advised to quit.  Pt aware of risks.  Pt not ready to quit.      R ear pain - Chronic.  Unclear etiology.  Didn't improve with topical steroid drops.  Referred to ENT.

## 2017-09-09 LAB
ESTIMATED AVG GLUCOSE: 134 MG/DL
HBA1C MFR BLD HPLC: 6.3 %

## 2017-09-11 ENCOUNTER — PATIENT MESSAGE (OUTPATIENT)
Dept: INTERNAL MEDICINE | Facility: CLINIC | Age: 62
End: 2017-09-11

## 2017-10-12 RX ORDER — LEVOTHYROXINE SODIUM 112 UG/1
TABLET ORAL
Qty: 90 TABLET | Refills: 0 | Status: SHIPPED | OUTPATIENT
Start: 2017-10-12 | End: 2018-01-07 | Stop reason: SDUPTHER

## 2017-10-16 ENCOUNTER — OFFICE VISIT (OUTPATIENT)
Dept: OTOLARYNGOLOGY | Facility: CLINIC | Age: 62
End: 2017-10-16
Payer: MEDICARE

## 2017-10-16 ENCOUNTER — CLINICAL SUPPORT (OUTPATIENT)
Dept: AUDIOLOGY | Facility: CLINIC | Age: 62
End: 2017-10-16
Payer: MEDICARE

## 2017-10-16 VITALS
HEIGHT: 69 IN | SYSTOLIC BLOOD PRESSURE: 120 MMHG | DIASTOLIC BLOOD PRESSURE: 69 MMHG | HEART RATE: 62 BPM | BODY MASS INDEX: 24.36 KG/M2 | WEIGHT: 164.44 LBS | TEMPERATURE: 97 F

## 2017-10-16 DIAGNOSIS — M50.30 DDD (DEGENERATIVE DISC DISEASE), CERVICAL: ICD-10-CM

## 2017-10-16 DIAGNOSIS — K08.109 EDENTULOUS: ICD-10-CM

## 2017-10-16 DIAGNOSIS — H61.21 IMPACTED CERUMEN OF RIGHT EAR: ICD-10-CM

## 2017-10-16 DIAGNOSIS — Z97.2 WEARS DENTURES: ICD-10-CM

## 2017-10-16 DIAGNOSIS — Z77.122 HISTORY OF EXPOSURE TO NOISE: ICD-10-CM

## 2017-10-16 DIAGNOSIS — H90.3 SENSORY HEARING LOSS, BILATERAL: Primary | ICD-10-CM

## 2017-10-16 DIAGNOSIS — H92.01 RIGHT EAR PAIN: Primary | ICD-10-CM

## 2017-10-16 PROCEDURE — 99999 PR PBB SHADOW E&M-EST. PATIENT-LVL III: CPT | Mod: PBBFAC,,, | Performed by: OTOLARYNGOLOGY

## 2017-10-16 PROCEDURE — 92550 TYMPANOMETRY & REFLEX THRESH: CPT | Mod: S$GLB,,, | Performed by: AUDIOLOGIST

## 2017-10-16 PROCEDURE — 92557 COMPREHENSIVE HEARING TEST: CPT | Mod: S$GLB,,, | Performed by: AUDIOLOGIST

## 2017-10-16 PROCEDURE — 99203 OFFICE O/P NEW LOW 30 MIN: CPT | Mod: 25,S$GLB,, | Performed by: OTOLARYNGOLOGY

## 2017-10-16 PROCEDURE — G0268 REMOVAL OF IMPACTED WAX MD: HCPCS | Mod: S$GLB,,, | Performed by: OTOLARYNGOLOGY

## 2017-10-16 NOTE — PROGRESS NOTES
"Subjective:       Patient ID: Rob Jones Jr. is a 62 y.o. male.    Chief Complaint: No chief complaint on file.    HPI: Mr. Jones is a 62-year-old bespectacled male whose handwritten reason for visit today is "ears".  He specifically indicates right ear discomfort of many years duration.  Weather change makes it worse.  "It aggravates me".  He was prescribed fluocinolone aceta night ear drops for the condition by his PCP which is not helping much at this point.  He indicates an itching sensation in his right ear as if something is in it.  This is "very aggravating".  His ear can hurt "bad".  When he swallows, this sometimes causes right ear pain.  He is a lifelong smoker greater than 40 years,  4 cigarettes per day.  He denies a family history of hearing loss or history of childhood ear infections.  He does indicate right ear ringing perception.    He has a history of cervical disc disease, degenerative.    MRI of the cervical spine without contrast performed 1/17/17 indicated multifocal degenerative disc disease with focal thickening of the ligamentum flavum at C6-C7 which resulted in severe canal stenosis at that level.  There was moderate right neural foraminal narrowing there.  Most of the normal foraminal narrowing is on the right side.    He was examined by Dr. Quinton Weathers  9/8/17 ongoing ear pain and itching.  The previously prescribed drops were not helping him.  He was diagnosed with uncontrolled type 2 diabetes, tobacco use, essential hypertension and postoperative hypothyroidism.  His referred here for evaluation of his chronic ear pain of unclear etiology which did not improve with topical steroid drops.    PMH: High blood pressure, diabetes, thyroid disease, arthritis, liver disease  PSH: T&A at age 21  Family history: Heart disease, high blood pressure, asthma, diabetes, arthritis, unspecified cancer  Review of Systems   Ears: Positive for hearing loss, ear pain, ringing in ear and ear " infections.    Nose:  Positive for postnasal drip.    Cardiovascular:  Positive for history of high blood pressure.   Respiratory:  Positive for asthma.    Other:  Positive for prostate disease, arthritis and weakness. Negative for rash.    ALLERGIES: Tizanidine      The patient completed an audiometric study performed by the [facility] audiology service.  The study is duplicated below and the results are reviewed with the patient  Objective:         Blood pressure 120/69 pulse 62 temperature 96.9 height 5 feet 9 inches weight 154 pounds  Gen.: Alert and oriented gentleman in no acute distress  Physical Exam   Constitutional: He is oriented to person, place, and time. He appears well-developed and well-nourished.   HENT:   Head: Normocephalic.   Right Ear: Hearing, tympanic membrane and ear canal normal. No drainage. No foreign bodies. No mastoid tenderness. Tympanic membrane is not perforated. No decreased hearing is noted.   Left Ear: Hearing, tympanic membrane and ear canal normal. No drainage. No foreign bodies. No mastoid tenderness. Tympanic membrane is not perforated. No decreased hearing is noted.   Ears:    Nose: No nose lacerations, nasal deformity, septal deviation or nasal septal hematoma. No epistaxis. Right sinus exhibits no maxillary sinus tenderness and no frontal sinus tenderness. Left sinus exhibits no maxillary sinus tenderness and no frontal sinus tenderness.       Mouth/Throat: Uvula is midline, oropharynx is clear and moist and mucous membranes are normal. He does not have dentures. No oral lesions. No trismus in the jaw. No uvula swelling or dental caries. No oropharyngeal exudate or tonsillar abscesses.       Neck: No thyromegaly present.   Pulmonary/Chest: Effort normal. No stridor.   Lymphadenopathy:     He has no cervical adenopathy.   Neurological: He is alert and oriented to person, place, and time. He displays weakness.   Skin: No rash noted.   Psychiatric: His behavior  is normal.       Assessment:       1. Right ear pain    2. Edentulous    3. Wears dentures    4. DDD (degenerative disc disease), cervical    5. History of exposure to noise    6. Impacted cerumen of right ear        Plan:     Audiometry reviewed  Hearing proection encouraged prn  TMJ otalgia literature provided  Rx for Cortisporin otic susp; 4-5 drops to right ear BID x 7-10 days  Discontinue use of fluocinolone otic drops mfor now  Avoid use of cotton swabs for ear cleaning  Some wax removed from right eac

## 2017-10-16 NOTE — LETTER
October 17, 2017      Remi Weathers MD  [address]  [facility] [address]           [facility] - Otorhinolaryngology  [address]  [facility] [address]  Phone: [phone]  Fax: [phone]          Patient: Rob Jones Jr.   MR Number: [id number]   YOB: 1955   Date of Visit: 10/16/2017       Dear Dr. Remi Weathers:    Thank you for referring Rob Jones to me for evaluation. Attached you will find relevant portions of my assessment and plan of care.    If you have questions, please do not hesitate to call me. I look forward to following Rob Jones along with you.    Sincerely,    Aj Naylor III, MD    Enclosure  CC:  No Recipients    If you would like to receive this communication electronically, please contact [email or web address] or [phone] to request more information on [employer] Link access.    For providers and/or their staff who would like to refer a patient to [facility], please contact us through our one-stop-shop provider referral line, [facility], at [phone].    If you feel you have received this communication in error or would no longer like to receive these types of communications, please e-mail [email or web address]

## 2017-10-16 NOTE — PATIENT INSTRUCTIONS
Audiometry reviewed  Hearing proection encouraged prn  TMJ otalgia literature provided  Rx for Cortisporin otic susp; 4-5 drops to right ear BID x 7-10 days  Discontinue use of fluocinolone otic drops mfor now  Avoid use of cotton swabs for ear cleaning  Some wax removed from right eac

## 2017-11-07 ENCOUNTER — OFFICE VISIT (OUTPATIENT)
Dept: PAIN MEDICINE | Facility: CLINIC | Age: 62
End: 2017-11-07
Payer: MEDICARE

## 2017-11-07 VITALS
DIASTOLIC BLOOD PRESSURE: 78 MMHG | HEART RATE: 70 BPM | RESPIRATION RATE: 20 BRPM | HEIGHT: 69 IN | SYSTOLIC BLOOD PRESSURE: 144 MMHG | TEMPERATURE: 98 F | BODY MASS INDEX: 24.16 KG/M2 | WEIGHT: 163.13 LBS

## 2017-11-07 DIAGNOSIS — M54.17 LUMBOSACRAL RADICULOPATHY: Primary | ICD-10-CM

## 2017-11-07 DIAGNOSIS — G89.4 CHRONIC PAIN SYNDROME: ICD-10-CM

## 2017-11-07 DIAGNOSIS — M47.816 FACET ARTHRITIS, DEGENERATIVE, LUMBAR SPINE: ICD-10-CM

## 2017-11-07 DIAGNOSIS — Z98.1 S/P LUMBAR FUSION: ICD-10-CM

## 2017-11-07 DIAGNOSIS — M47.816 LUMBAR SPONDYLOSIS: ICD-10-CM

## 2017-11-07 DIAGNOSIS — M51.36 DDD (DEGENERATIVE DISC DISEASE), LUMBAR: ICD-10-CM

## 2017-11-07 PROCEDURE — 99999 PR PBB SHADOW E&M-EST. PATIENT-LVL III: CPT | Mod: PBBFAC,,, | Performed by: NURSE PRACTITIONER

## 2017-11-07 PROCEDURE — 99214 OFFICE O/P EST MOD 30 MIN: CPT | Mod: S$GLB,,, | Performed by: NURSE PRACTITIONER

## 2017-11-07 RX ORDER — HYDROCODONE BITARTRATE AND ACETAMINOPHEN 10; 325 MG/1; MG/1
1 TABLET ORAL EVERY 12 HOURS PRN
Qty: 60 TABLET | Refills: 0 | Status: SHIPPED | OUTPATIENT
Start: 2017-11-07 | End: 2017-12-07

## 2017-11-07 RX ORDER — GABAPENTIN 300 MG/1
300 CAPSULE ORAL 3 TIMES DAILY
Qty: 90 CAPSULE | Refills: 5 | Status: SHIPPED | OUTPATIENT
Start: 2017-11-07 | End: 2018-04-22 | Stop reason: SDUPTHER

## 2017-11-07 NOTE — PROGRESS NOTES
Chronic Pain - Established Visit    Referring Physician: No ref. provider found    Chief Complaint:   Chief Complaint   Patient presents with    Low-back Pain        SUBJECTIVE: Disclaimer: This note has been generated using voice-recognition software. There may be typographical errors that have been missed during proof-reading    Interval History 11/7/2017:  The patient returns to clinic today for follow up of low back and leg pain. He reports low back pain that radiates down the side of left leg to mid thigh. He reports a recent flare up that was intense for a few days but then improved significantly. He denies any injury. He reports his pain is tolerable at this time. He continues to be physically active and participate in a home exercise routine. He continues to take Gabapentin TID with benefit and does need a refill. He also continues to take Norco sparingly with benefit. He denies any other health changes. His pain today is 5/10.     Interval History 9/7/2017:  The patient returns to clinic today for follow up. He is s/p bilateral L3 TF DWAIN on 8/25/2017. He reports 50% relief of his low back and radiating leg pain. He continues to report low back pain that is aching in nature. He reports that this is tolerable at this time. He continues to take Gabapentin with benefit. He continues to take Norco sparingly with benefit. He would like a refill today. He denies any other health changes. He denies any bowel or bladder incontinence. His pain today is 4/10.    Interval History 8/2/2017:  The patient returns to clinic today for follow up. He reports that his left leg pain has returned in the last few days. He reports low back pain that is radiating down the side of his left leg to mid thigh. He describes this pain as shooting and burning. He continues to use Gabapentin and Norco with benefit. He also reports bilateral knee pain that is dull and aching in nature. He denies any injury. He has never had any  interventions for his knees. He denies any bowel or bladder incontinence. His pain today is 6/10.    Interval History 7/6/2017:  The patient returns to clinic today for follow up. He is s/p left L3 TF DWAIN on 6/21/2017. He reports 80% relief of his leg pain. He does report low back pain that is worse in the morning. He does report stiffness. He continues to report benefit with Gabapentin and Norco. He does report increased activity since the procedure. He denies any other health changes. He denies any bowel or bladder incontinence. His pain today is 3/10.     Interval History 6/6/2017:  The patient returns to clinic today for follow up. He complains of low back pain that is radiating into his left leg. This pain has increased in intensity in the last few weeks. He did see Dr. Liz yesterday. He reports pain that begins in his low back and radiates down the side of his left thigh to his knee. He denies any radiating right leg pain. He also complains of stiffness in his lower back that is worse in the morning. He denies any bowel or bladder incontinence. He continues to take Gabapentin 1800 mg daily. He also take Norco every 12 hours as needed for pain and does need a refill today. He reports continued relief of shoulder pain with previous injection. His pain today is 5/10.    Interval History 4/20/2017  NM bone scan 3/29 with arthritis T12/L1 and L5/S1 follow-up with orthopedics regarding shoulder has severe osteoarthritis had a shoulder joint injection with great relief pain.  Not currently having any cervical radicular symptoms.  Gabapentin at 900 mg per day no side effects, does have some lower extremity radicular symptoms.  No other health changes.    Initial encounter:    Rob Jones Jr. presents to the clinic for the evaluation of neck, shoulder and lower back with lower extremities pain. The pain started years ago and symptoms have been worsening.    Brief history:   patient recently evaluated in  neurosurgery is scheduled for a nuclear medicine bone scan, possible workup for ACDF secondary to severe central stenosis of the cervical spine with radiculopathy.  The patient had a previous cervical transverse foraminal epidural steroid injection with greater than 50% improvement in his radicular symptoms into his right upper extremity although he continues to have what appears to be rotator cuff syndrome of the left shoulder and is scheduled for orthopedic evaluation soon.    Pain Description:    The current worst pain is located in the left shoulder area    At BEST  10/10     At WORST  10/10 on the WORST day.      On average pain is rated as 10/10.     Today the pain is rated as 10/10    The pain is described as aching, sharp, shooting, throbbing and tingling      Symptoms interfere with daily activity, sleeping and work.     Exacerbating factors: Sitting, Standing, Laying, Bending, Extension, Flexing, Lifting and Getting out of bed/chair.      Mitigating factors medications and injection.     Patient denies urinary incontinence and bowel incontinence.  Patient denies any suicidal or homicidal ideations    Pain Medications:  Current:  Hydrocodone/acetaminophen  Gabapentin    Tried in Past:  NSAIDs -Never  TCA -Never  SNRI -Never  Anti-convulsants  Gabapentin   Muscle Relaxants -Never  Opioids-Never    Physical Therapy/Home Exercise: no       report:  Reviewed and consistent with medication use as prescribed.    Pain Procedures:   2/6/17- right C6-C7 TF DWAIN with IR   6/21/2017- Left L3 TF DWAIN- 80% relief  8/25/2017- Bilateral L3 TF DWAIN- 50% relief    Chiropractor -never  Acupuncture - never  TENS unit -never  Spinal decompression -previous lumbar surgery  Joint replacement -never    Imaging:   Lumbar MRI 5/29/2017:  FINDINGS:     Alignment: Normal.    Vertebrae: No fracture. Essentially normal marrow signal, with fatty endplate degenerative changes L4-S1.    Discs:  Disc spacer devices at L4-L5 and L5-S1 with  partial fusion of the disc spaces.  Mild diffuse disc space narrowing throughout the lumbar spine with mild desiccation L3-L4.    Cord: Normal. Conus terminates at T12-L1.    Degenerative findings:        T11-T12: Incompletely imaged on MRI.  No significant disc disease.  No spinal canal stenosis.  Left-sided facet arthropathy results in mild left foraminal stenosis.       T12-L1: Incompletely imaged on MRI.  No significant disc disease.  No spinal canal stenosis.  No neural foraminal stenosis.       L1-L2: No significant disc disease.  Minimal bilateral facet arthropathy without spinal canal or foraminal stenosis.       L2-L3: Minimal circumferential disc bulge with mild bilateral facet arthropathy and buckling of the ligamentum flavum, overall resulting in mild left foraminal stenosis with no significant spinal canal stenosis.       L3-L4: Minimal circumferential disc bulge with mild bilateral facet arthropathy and buckling of the ligamentum flavum results in mild spinal canal stenosis, moderate left foraminal stenosis, and mild right foraminal stenosis.       L4-L5: Fusion of the disc space with mild right worse than left facet arthropathy results in mild right foraminal stenosis.  No spinal canal stenosis.       L5-S1: Minimal circumferential disc bulge with superimposed calcified left foraminal protrusion and mild bilateral facet arthropathy results in mild right and moderate left foraminal stenosis.  No spinal canal stenosis.    Paraspinal muscles & soft tissues: Mild thickening of the right adrenal gland without discrete nodule, unchanged from prior CT.   Impression       Disc spacers with partial fusion L4-S1 with mild disc and facet degeneration throughout the lumbar spine most pronounced at L3-L4 with mild spinal canal stenosis and moderate left foraminal stenosis as well as L5-S1 with moderate left foraminal stenosis.       Past Medical History:   Diagnosis Date    Anxiety     Back pain     Cataract      Diabetes mellitus     History of hepatitis C; S/p RX with SVR 12 documented 06/2017 6/1/2017    Hypertension     Postoperative hypothyroidism 11/3/2016    Primary hyperparathyroidism 1/18/2017    Thyroid disease      Past Surgical History:   Procedure Laterality Date    LUMBAR FUSION      THYROIDECTOMY      TONSILLECTOMY       Social History     Social History    Marital status: Single     Spouse name: N/A    Number of children: N/A    Years of education: N/A     Occupational History    Disabled, pt says from [place] and now for back, DM2      Social History Main Topics    Smoking status: Current Every Day Smoker    Smokeless tobacco: Not on file    Alcohol use Yes    Drug use: No    Sexual activity: Not on file     Other Topics Concern    Not on file     Social History Narrative    Lives alone.  He has 2 adult children who do not live here.       Family History   Problem Relation Age of Onset    Cancer Mother      breast    Cataracts Father     No Known Problems Sister     No Known Problems Brother     No Known Problems Brother     Heart disease Brother     No Known Problems Sister     No Known Problems Sister     No Known Problems Sister     Glaucoma Paternal Uncle     Glaucoma Paternal Uncle        Review of patient's allergies indicates:   Allergen Reactions    Tizanidine Shortness Of Breath       Current Outpatient Prescriptions   Medication Sig    albuterol (ACCUNEB) 1.25 mg/3 mL Nebu Take 3 mLs (1.25 mg total) by nebulization every 6 (six) hours as needed (shortness of breath). Rescue    amlodipine (NORVASC) 10 MG tablet Take 1 tablet (10 mg total) by mouth once daily.    BOOSTRIX TDAP 2.5-8-5 Lf-mcg-Lf/0.5mL Syrg injection     dicyclomine (BENTYL) 20 mg tablet Take 20 mg by mouth daily as needed.    econazole nitrate 1 % cream Apply topically 2 (two) times daily.    fish oil-omega-3 fatty acids 300-1,000 mg capsule Take 2 g by mouth once daily.    FLUARIX QUAD  "[id number], PF, 60 mcg (15 mcg x 4)/0.5 mL vaccine TO BE ADMINISTERED BY PHARMACIST FOR IMMUNIZATION    gabapentin (NEURONTIN) 300 MG capsule Take 1 capsule (300 mg total) by mouth 3 (three) times daily. Take 1 pill at nightx 7 days, followed by 1 pill in morning and night for 7 days, followed by 1 pill morning afternoon and night    insulin aspart (NOVOLOG FLEXPEN) 100 unit/mL InPn pen Inject 15 Units into the skin with lunch.    insulin glargine (LANTUS SOLOSTAR) 100 unit/mL (3 mL) InPn pen Inject 15 Units into the skin every evening.    irbesartan (AVAPRO) 150 MG tablet Take 1 tablet (150 mg total) by mouth once daily.    levothyroxine (SYNTHROID) 112 MCG tablet TAKE 1 TABLET BY MOUTH DAILY BEFORE BREAKFAST    meloxicam (MOBIC) 15 MG tablet TAKE 1 TABLET (15 MG TOTAL) BY MOUTH DAILY AS NEEDED FOR PAIN.    metformin (GLUCOPHAGE) 1000 MG tablet Take 1 tablet (1,000 mg total) by mouth 2 (two) times daily with meals.    nitroGLYCERIN 0.4 MG/HR TD PT24 (NITRODUR) 0.4 mg/hr Place 1 patch onto the skin continuous prn.    NOVOFINE 32 32 gauge x 1/4" Ndle 1 EACH BY MISC.(NON-DRUG COMBO ROUTE) ROUTE ONCE DAILY.    pen needle, diabetic (NOVOFINE PLUS) 32 gauge x 1/6" Ndle 1 each by Misc.(Non-Drug; Combo Route) route once daily.    tamsulosin (FLOMAX) 0.4 mg Cp24 Take 1 capsule (0.4 mg total) by mouth once daily.    temazepam (RESTORIL) 30 mg capsule TAKE 1 CAPSULE BY MOUTH EVERY DAY AT BEDTIME AS NEEDED    vitamin D 1000 units Tab Take 185 mg by mouth once daily.    ZOSTAVAX, PF, 19,400 unit/0.65 mL injection     diclofenac sodium 1 % Gel Apply 2 g topically 4 (four) times daily.     No current facility-administered medications for this visit.        REVIEW OF SYSTEMS:    GENERAL:  No weight loss, malaise or fevers.  HEENT:   No recent changes in vision or hearing  NECK:  Negative for lumps, no difficulty with swallowing.  RESPIRATORY:  Negative for cough, wheezing or shortness of breath, patient denies any " "recent URI.  CARDIOVASCULAR:  Negative for chest pain, leg swelling or palpitations. HTN.   GI:  Negative for abdominal discomfort, blood in stools or black stools or change in bowel habits.  MUSCULOSKELETAL:  See HPI.  SKIN:  Negative for lesions, rash, and itching.  PSYCH:  History of anxiety. Patient's sleep is disturbed secondary to pain.  HEMATOLOGY/LYMPHOLOGY:  Negative for prolonged bleeding, bruising easily or swollen nodes.  Patient is not currently taking any anti-coagulants  ENDO: History of hypothyroidism and Diabetes.   NEURO:   No history of headaches, syncope, paralysis, seizures or tremors.  All other reviewed and negative other than HPI.    OBJECTIVE:    BP (!) 144/78   Pulse 70   Temp 97.6 °F (36.4 °C) (Oral)   Resp 20   Ht 5' 9" (1.753 m)   Wt 74 kg (163 lb 1.6 oz)   BMI 24.09 kg/m²     PHYSICAL EXAMINATION:    GENERAL: Well appearing, in no acute distress, alert and oriented x3.  PSYCH:  Mood and affect appropriate.  SKIN: Skin color, texture, turgor normal, no rashes or lesions.  HEAD/FACE:  Normocephalic, atraumatic. Cranial nerves grossly intact.  NECK: Mild pain to palpation over the cervical paraspinous muscles. Spurling Negative. Mild pain with neck flexion, extension, and lateral flexion.   CV: RRR with palpation of the radial artery.  PULM: No evidence of respiratory difficulty, symmetric chest rise.  BACK: There is pain with palpation over lumbar spine. Straight leg raise in the sitting position is negative bilaterally. Limited ROM with pain on extension. Positive facet loading bilaterally.   EXTREMITIES: Peripheral joint ROM is full and pain free without obvious instability or laxity in all four extremities. No deformities, edema, or skin discoloration. Good capillary refill.  MUSCULOSKELETAL: Shoulder provocative maneuvers are positive on the left. There is no pain with palpation over medial or lateral joint line of bilateral knees. Crepitus noted bilaterally. There is pain with " extension of bilateral knees. Bilateral upper extremity strength is normal and symmetric.  No atrophy or tone abnormalities are noted.  NEURO: Bilateral upper extremity coordination and muscle stretch reflexes are physiologic and symmetric.  Abimael's negative. No clonus.  No loss of sensation is noted.  GAIT: Antalgic, ambulates without assistance.         Lab Results   Component Value Date    WBC 4.60 11/11/2016    HGB 12.6 (L) 11/11/2016    HCT 39.4 (L) 11/11/2016    MCV 97 11/11/2016     11/11/2016      Lab Results   Component Value Date    LABA1C 9.2 (H) 05/18/2016    HGBA1C 6.3 (H) 09/08/2017     Lab Results   Component Value Date    CREATININE 1.1 02/24/2017        ASSESSMENT: 62 y.o. year old male with pain, consistent with     Encounter Diagnoses   Name Primary?    Lumbosacral radiculopathy Yes    DDD (degenerative disc disease), lumbar     Lumbar spondylosis     S/P lumbar fusion     Facet arthritis, degenerative, lumbar spine     Chronic pain syndrome        PLAN:     - Previous imaging was reviewed and discussed with the patient today. Previous labs reviewed today.     - He is s/p bilateral L3 TF DWAIN with benefit. We can repeat this in the future.     - Consider facet joint injections in the future for his facet mediated pain. This pain is tolerable at this time.     - In the future, we can consider knee steroid injections. His pain is tolerable at this time.     - Continue Gabapentin. Refill provided today.     - Continue Norco 10/325 mg every 12 hours as needed, #60.     - The patient is here today for a refill of current pain medications and they believe these provide effective pain control and improvements in quality of life.  The patient notes no serious side effects, and feels the benefits outweigh the risks.  The patient was reminded of the pain contract that they signed previously as well as the risks and benefits of the medication including possible death.  The updated [place]  Board of [employer] prescription monitoring program was reviewed, and the patient has been found to be compliant with current treatment plan. Medication management provided by Dr. Espinoza.     - Continue Voltaren gel for his knee pain.     - RTC in 2 months or sooner if needed.     - Dr. Espinoza was consulted on the patient and agrees with this plan.    The above plan and management options were discussed at length with patient. Patient is in agreement with the above and verbalized understanding.     Sue Walker NP  11/07/2017

## 2017-12-28 ENCOUNTER — TELEPHONE (OUTPATIENT)
Dept: PAIN MEDICINE | Facility: CLINIC | Age: 62
End: 2017-12-28

## 2017-12-28 NOTE — TELEPHONE ENCOUNTER
Cancelled patient appointment and left voice mail with call center phone number and instructions for patient to reschedule.

## 2018-01-07 RX ORDER — LEVOTHYROXINE SODIUM 112 UG/1
TABLET ORAL
Qty: 90 TABLET | Refills: 0 | Status: SHIPPED | OUTPATIENT
Start: 2018-01-07 | End: 2018-04-15 | Stop reason: SDUPTHER

## 2018-01-09 ENCOUNTER — OFFICE VISIT (OUTPATIENT)
Dept: PAIN MEDICINE | Facility: CLINIC | Age: 63
End: 2018-01-09
Payer: MEDICARE

## 2018-01-09 VITALS
OXYGEN SATURATION: 99 % | SYSTOLIC BLOOD PRESSURE: 154 MMHG | HEIGHT: 69 IN | BODY MASS INDEX: 25.27 KG/M2 | WEIGHT: 170.63 LBS | RESPIRATION RATE: 18 BRPM | TEMPERATURE: 98 F | HEART RATE: 65 BPM | DIASTOLIC BLOOD PRESSURE: 86 MMHG

## 2018-01-09 DIAGNOSIS — M54.17 LUMBOSACRAL RADICULOPATHY: ICD-10-CM

## 2018-01-09 DIAGNOSIS — M51.36 DDD (DEGENERATIVE DISC DISEASE), LUMBAR: ICD-10-CM

## 2018-01-09 DIAGNOSIS — M19.012 PRIMARY OSTEOARTHRITIS OF LEFT SHOULDER: ICD-10-CM

## 2018-01-09 DIAGNOSIS — M54.12 CERVICAL RADICULOPATHY: ICD-10-CM

## 2018-01-09 DIAGNOSIS — M50.30 DDD (DEGENERATIVE DISC DISEASE), CERVICAL: ICD-10-CM

## 2018-01-09 DIAGNOSIS — M25.512 CHRONIC LEFT SHOULDER PAIN: ICD-10-CM

## 2018-01-09 DIAGNOSIS — M47.816 FACET ARTHRITIS, DEGENERATIVE, LUMBAR SPINE: ICD-10-CM

## 2018-01-09 DIAGNOSIS — G89.29 CHRONIC LEFT SHOULDER PAIN: ICD-10-CM

## 2018-01-09 DIAGNOSIS — G89.4 CHRONIC PAIN SYNDROME: ICD-10-CM

## 2018-01-09 DIAGNOSIS — M54.12 CERVICAL RADICULOPATHY: Primary | ICD-10-CM

## 2018-01-09 DIAGNOSIS — M48.02 CERVICAL STENOSIS OF SPINAL CANAL: ICD-10-CM

## 2018-01-09 DIAGNOSIS — G89.4 CHRONIC PAIN SYNDROME: Primary | ICD-10-CM

## 2018-01-09 DIAGNOSIS — Z98.1 S/P LUMBAR FUSION: ICD-10-CM

## 2018-01-09 PROCEDURE — 99214 OFFICE O/P EST MOD 30 MIN: CPT | Mod: S$GLB,,, | Performed by: NURSE PRACTITIONER

## 2018-01-09 PROCEDURE — 99999 PR PBB SHADOW E&M-EST. PATIENT-LVL III: CPT | Mod: PBBFAC,,, | Performed by: NURSE PRACTITIONER

## 2018-01-09 RX ORDER — HYDROCODONE BITARTRATE AND ACETAMINOPHEN 10; 325 MG/1; MG/1
1 TABLET ORAL EVERY 12 HOURS PRN
Qty: 60 TABLET | Refills: 0 | Status: SHIPPED | OUTPATIENT
Start: 2018-01-09 | End: 2018-02-08

## 2018-01-09 NOTE — PROGRESS NOTES
Chronic Pain - Established Visit    Referring Physician: No ref. provider found    Chief Complaint:   Chief Complaint   Patient presents with    Neck Pain     follow up     Low-back Pain    Knee Pain    Shoulder Pain        SUBJECTIVE: Disclaimer: This note has been generated using voice-recognition software. There may be typographical errors that have been missed during proof-reading    Interval History 1/9/2018:  The patient returns to clinic today for follow up. He reports increased neck pain in the last month. He describes the pain as constant, aching and burning. He reports intermittent radiating pain into both arms that is burning in nature. He also reports increased shoulder pain. He does have a past history of left shoulder surgery years ago. He reports that his low back pain is tolerable at this time. He continues to take Gabapentin with benefit. He also takes Norco sparingly with benefit. He denies any adverse reactions with this medication. He denies any other health changes. His pain today is 5/10.    Interval History 11/7/2017:  The patient returns to clinic today for follow up of low back and leg pain. He reports low back pain that radiates down the side of left leg to mid thigh. He reports a recent flare up that was intense for a few days but then improved significantly. He denies any injury. He reports his pain is tolerable at this time. He continues to be physically active and participate in a home exercise routine. He continues to take Gabapentin TID with benefit and does need a refill. He also continues to take Norco sparingly with benefit. He denies any other health changes. His pain today is 5/10.     Interval History 9/7/2017:  The patient returns to clinic today for follow up. He is s/p bilateral L3 TF DWAIN on 8/25/2017. He reports 50% relief of his low back and radiating leg pain. He continues to report low back pain that is aching in nature. He reports that this is tolerable at this  time. He continues to take Gabapentin with benefit. He continues to take Norco sparingly with benefit. He would like a refill today. He denies any other health changes. He denies any bowel or bladder incontinence. His pain today is 4/10.    Interval History 8/2/2017:  The patient returns to clinic today for follow up. He reports that his left leg pain has returned in the last few days. He reports low back pain that is radiating down the side of his left leg to mid thigh. He describes this pain as shooting and burning. He continues to use Gabapentin and Norco with benefit. He also reports bilateral knee pain that is dull and aching in nature. He denies any injury. He has never had any interventions for his knees. He denies any bowel or bladder incontinence. His pain today is 6/10.    Interval History 7/6/2017:  The patient returns to clinic today for follow up. He is s/p left L3 TF DWAIN on 6/21/2017. He reports 80% relief of his leg pain. He does report low back pain that is worse in the morning. He does report stiffness. He continues to report benefit with Gabapentin and Norco. He does report increased activity since the procedure. He denies any other health changes. He denies any bowel or bladder incontinence. His pain today is 3/10.     Interval History 6/6/2017:  The patient returns to clinic today for follow up. He complains of low back pain that is radiating into his left leg. This pain has increased in intensity in the last few weeks. He did see Dr. Liz yesterday. He reports pain that begins in his low back and radiates down the side of his left thigh to his knee. He denies any radiating right leg pain. He also complains of stiffness in his lower back that is worse in the morning. He denies any bowel or bladder incontinence. He continues to take Gabapentin 1800 mg daily. He also take Norco every 12 hours as needed for pain and does need a refill today. He reports continued relief of shoulder pain with  previous injection. His pain today is 5/10.    Interval History 4/20/2017  NM bone scan 3/29 with arthritis T12/L1 and L5/S1 follow-up with orthopedics regarding shoulder has severe osteoarthritis had a shoulder joint injection with great relief pain.  Not currently having any cervical radicular symptoms.  Gabapentin at 900 mg per day no side effects, does have some lower extremity radicular symptoms.  No other health changes.    Initial encounter:    Rob Jones JrRamona presents to the clinic for the evaluation of neck, shoulder and lower back with lower extremities pain. The pain started years ago and symptoms have been worsening.    Brief history:   patient recently evaluated in neurosurgery is scheduled for a nuclear medicine bone scan, possible workup for ACDF secondary to severe central stenosis of the cervical spine with radiculopathy.  The patient had a previous cervical transverse foraminal epidural steroid injection with greater than 50% improvement in his radicular symptoms into his right upper extremity although he continues to have what appears to be rotator cuff syndrome of the left shoulder and is scheduled for orthopedic evaluation soon.    Pain Description:    The current worst pain is located in the left shoulder area    At BEST  10/10     At WORST  10/10 on the WORST day.      On average pain is rated as 10/10.     Today the pain is rated as 10/10    The pain is described as aching, sharp, shooting, throbbing and tingling      Symptoms interfere with daily activity, sleeping and work.     Exacerbating factors: Sitting, Standing, Laying, Bending, Extension, Flexing, Lifting and Getting out of bed/chair.      Mitigating factors medications and injection.     Patient denies urinary incontinence and bowel incontinence.  Patient denies any suicidal or homicidal ideations    Pain Medications:  Current:  Hydrocodone/acetaminophen  Gabapentin    Tried in Past:  NSAIDs -Never  TCA -Never  SNRI  -Never  Anti-convulsants  Gabapentin   Muscle Relaxants -Never  Opioids-Never    Physical Therapy/Home Exercise: no       report:  Reviewed and consistent with medication use as prescribed.    Pain Procedures:   2/6/17- right C6-C7 TF DWAIN with IR   6/21/2017- Left L3 TF DWAIN- 80% relief  8/25/2017- Bilateral L3 TF DWAIN- 50% relief    Chiropractor -never  Acupuncture - never  TENS unit -never  Spinal decompression -previous lumbar surgery  Joint replacement -never    Imaging:   Lumbar MRI 5/29/2017:  FINDINGS:     Alignment: Normal.    Vertebrae: No fracture. Essentially normal marrow signal, with fatty endplate degenerative changes L4-S1.    Discs:  Disc spacer devices at L4-L5 and L5-S1 with partial fusion of the disc spaces.  Mild diffuse disc space narrowing throughout the lumbar spine with mild desiccation L3-L4.    Cord: Normal. Conus terminates at T12-L1.    Degenerative findings:        T11-T12: Incompletely imaged on MRI.  No significant disc disease.  No spinal canal stenosis.  Left-sided facet arthropathy results in mild left foraminal stenosis.       T12-L1: Incompletely imaged on MRI.  No significant disc disease.  No spinal canal stenosis.  No neural foraminal stenosis.       L1-L2: No significant disc disease.  Minimal bilateral facet arthropathy without spinal canal or foraminal stenosis.       L2-L3: Minimal circumferential disc bulge with mild bilateral facet arthropathy and buckling of the ligamentum flavum, overall resulting in mild left foraminal stenosis with no significant spinal canal stenosis.       L3-L4: Minimal circumferential disc bulge with mild bilateral facet arthropathy and buckling of the ligamentum flavum results in mild spinal canal stenosis, moderate left foraminal stenosis, and mild right foraminal stenosis.       L4-L5: Fusion of the disc space with mild right worse than left facet arthropathy results in mild right foraminal stenosis.  No spinal canal stenosis.       L5-S1:  Minimal circumferential disc bulge with superimposed calcified left foraminal protrusion and mild bilateral facet arthropathy results in mild right and moderate left foraminal stenosis.  No spinal canal stenosis.    Paraspinal muscles & soft tissues: Mild thickening of the right adrenal gland without discrete nodule, unchanged from prior CT.   Impression       Disc spacers with partial fusion L4-S1 with mild disc and facet degeneration throughout the lumbar spine most pronounced at L3-L4 with mild spinal canal stenosis and moderate left foraminal stenosis as well as L5-S1 with moderate left foraminal stenosis.     Cervical MRI 1/16/2017:  Findings: There is no evidence of fracture, dislocation or marrow replacement process.  The vertebral body heights and alignment are maintained.  The visualized posterior fossa structures demonstrate no significant abnormality.  The surrounding soft tissue structures demonstrate no significant abnormality.  The cervical cord signal is normal.    C2-3: Unremarkable    C3-4: Uncovertebral joint hypertrophy resulting in mild right neural foraminal narrowing.    C4-5: Uncovertebral joint hypertrophy resulting in moderate bilateral neural foraminal narrowing.    C5-6: Uncovertebral joint hypertrophy resulting in mild right neural foraminal narrowing.    C6-7: Uncovertebral joint hypertrophy with a small broad-based posterior disc bulge and focal thickening of the ligamentum flavum resulting in severe canal stenosis and moderate right neural foraminal narrowing.    C7-T1: Unremarkable.   Impression       Multifocal degenerative disc disease as above. There is focal thickening of the ligamentum flavum at C6-7 which results in severe canal stenosis at that level. No cord signal abnormality.     Xray Shoulder 3/20/2017:  Findings: External rotation, Y. view and axillary views of the left shoulder demonstrate postsurgical hardware in the humeral neck which shows no significant change from  the 2009 exam.  There is advanced joint space loss at the glenohumeral joint with sclerosis and osteophyte formation.  There is an ossific body at the inferior aspect of the joint which is well-corticated and measures approximately 2.2 cm.  Small osteophytes are also identified at the acromioclavicular joint.  There is no fracture, dislocation or osseous destructive process.  The visualized portions of the left lung are normal.   Impression      Findings suggestive of advanced osteoarthritis of the left shoulder.  The 2.2 cm osseous body may represent a loose body.  It is located at the inferior aspect of the joint however.         Past Medical History:   Diagnosis Date    Anxiety     Back pain     Cataract     Diabetes mellitus     History of hepatitis C; S/p RX with SVR 12 documented 06/2017 6/1/2017    Hypertension     Postoperative hypothyroidism 11/3/2016    Primary hyperparathyroidism 1/18/2017    Thyroid disease      Past Surgical History:   Procedure Laterality Date    LUMBAR FUSION      THYROIDECTOMY      TONSILLECTOMY       Social History     Social History    Marital status: Single     Spouse name: N/A    Number of children: N/A    Years of education: N/A     Occupational History    Disabled, pt says from "[employer] and now for back, DM2      Social History Main Topics    Smoking status: Current Every Day Smoker    Smokeless tobacco: Not on file    Alcohol use Yes    Drug use: No    Sexual activity: Not on file     Other Topics Concern    Not on file     Social History Narrative    Lives alone.  He has 2 adult children who do not live here.       Family History   Problem Relation Age of Onset    Cancer Mother      breast    Cataracts Father     No Known Problems Sister     No Known Problems Brother     No Known Problems Brother     Heart disease Brother     No Known Problems Sister     No Known Problems Sister     No Known Problems Sister     Glaucoma Paternal Uncle      "Glaucoma Paternal Uncle        Review of patient's allergies indicates:   Allergen Reactions    Tizanidine Shortness Of Breath       Current Outpatient Prescriptions   Medication Sig    albuterol (ACCUNEB) 1.25 mg/3 mL Nebu Take 3 mLs (1.25 mg total) by nebulization every 6 (six) hours as needed (shortness of breath). Rescue    amlodipine (NORVASC) 10 MG tablet Take 1 tablet (10 mg total) by mouth once daily.    BOOSTRIX TDAP 2.5-8-5 Lf-mcg-Lf/0.5mL Syrg injection     diclofenac sodium 1 % Gel Apply 2 g topically 4 (four) times daily.    dicyclomine (BENTYL) 20 mg tablet Take 20 mg by mouth daily as needed.    econazole nitrate 1 % cream Apply topically 2 (two) times daily.    fish oil-omega-3 fatty acids 300-1,000 mg capsule Take 2 g by mouth once daily.    FLUARIX QUAD 0591-2254, PF, 60 mcg (15 mcg x 4)/0.5 mL vaccine TO BE ADMINISTERED BY PHARMACIST FOR IMMUNIZATION    gabapentin (NEURONTIN) 300 MG capsule Take 1 capsule (300 mg total) by mouth 3 (three) times daily.    insulin aspart (NOVOLOG FLEXPEN) 100 unit/mL InPn pen Inject 15 Units into the skin with lunch.    insulin glargine (LANTUS SOLOSTAR) 100 unit/mL (3 mL) InPn pen Inject 15 Units into the skin every evening.    irbesartan (AVAPRO) 150 MG tablet Take 1 tablet (150 mg total) by mouth once daily.    levothyroxine (SYNTHROID) 112 MCG tablet TAKE 1 TABLET BY MOUTH DAILY BEFORE BREAKFAST    meloxicam (MOBIC) 15 MG tablet TAKE 1 TABLET (15 MG TOTAL) BY MOUTH DAILY AS NEEDED FOR PAIN.    metformin (GLUCOPHAGE) 1000 MG tablet Take 1 tablet (1,000 mg total) by mouth 2 (two) times daily with meals.    nitroGLYCERIN 0.4 MG/HR TD PT24 (NITRODUR) 0.4 mg/hr Place 1 patch onto the skin continuous prn.    NOVOFINE 32 32 gauge x 1/4" Ndle 1 EACH BY MISC.(NON-DRUG COMBO ROUTE) ROUTE ONCE DAILY.    pen needle, diabetic (NOVOFINE PLUS) 32 gauge x 1/6" Ndle 1 each by Misc.(Non-Drug; Combo Route) route once daily.    tamsulosin (FLOMAX) 0.4 mg Cp24 " "Take 1 capsule (0.4 mg total) by mouth once daily.    temazepam (RESTORIL) 30 mg capsule TAKE 1 CAPSULE BY MOUTH EVERY DAY AT BEDTIME AS NEEDED    vitamin D 1000 units Tab Take 185 mg by mouth once daily.    ZOSTAVAX, PF, 19,400 unit/0.65 mL injection      No current facility-administered medications for this visit.        REVIEW OF SYSTEMS:    GENERAL:  No weight loss, malaise or fevers.  HEENT:   No recent changes in vision or hearing  NECK:  Negative for lumps, no difficulty with swallowing.  RESPIRATORY:  Negative for cough, wheezing or shortness of breath, patient denies any recent URI.  CARDIOVASCULAR:  Negative for chest pain, leg swelling or palpitations. HTN.   GI:  Negative for abdominal discomfort, blood in stools or black stools or change in bowel habits.  MUSCULOSKELETAL:  See HPI.  SKIN:  Negative for lesions, rash, and itching.  PSYCH:  History of anxiety. Patient's sleep is disturbed secondary to pain.  HEMATOLOGY/LYMPHOLOGY:  Negative for prolonged bleeding, bruising easily or swollen nodes.  Patient is not currently taking any anti-coagulants  ENDO: History of hypothyroidism and Diabetes.   NEURO:   No history of headaches, syncope, paralysis, seizures or tremors.  All other reviewed and negative other than HPI.    OBJECTIVE:    BP (!) 154/86   Pulse 65   Temp 98 °F (36.7 °C) (Oral)   Resp 18   Ht 5' 9" (1.753 m)   Wt 77.4 kg (170 lb 10.2 oz)   SpO2 99%   BMI 25.20 kg/m²     PHYSICAL EXAMINATION:    GENERAL: Well appearing, in no acute distress, alert and oriented x3.  PSYCH:  Mood and affect appropriate.  SKIN: Skin color, texture, turgor normal, no rashes or lesions.  HEAD/FACE:  Normocephalic, atraumatic. Cranial nerves grossly intact.  NECK: Mild pain to palpation over the cervical paraspinous muscles. Spurling Negative. Limited ROM with pain on flexion and extension. Positive facet loading bilaterally.    CV: RRR with palpation of the radial artery.  PULM: No evidence of respiratory " difficulty, symmetric chest rise.  BACK: There is pain with palpation over lumbar spine. Straight leg raise in the sitting position is negative bilaterally. Limited ROM with pain on extension. Positive facet loading bilaterally.   EXTREMITIES: Peripheral joint ROM is full and pain free without obvious instability or laxity in all four extremities. No deformities, edema, or skin discoloration. Good capillary refill.  MUSCULOSKELETAL: Shoulder provocative maneuvers are positive on the left. There is no pain with palpation over medial or lateral joint line of bilateral knees. Bilateral upper extremity strength is normal and symmetric.  No atrophy or tone abnormalities are noted.  NEURO: Bilateral upper extremity coordination and muscle stretch reflexes are physiologic and symmetric.  Abimael's negative. No clonus.  No loss of sensation is noted.  GAIT: Antalgic, ambulates without assistance.         Lab Results   Component Value Date    WBC 4.60 11/11/2016    HGB 12.6 (L) 11/11/2016    HCT 39.4 (L) 11/11/2016    MCV 97 11/11/2016     11/11/2016      Lab Results   Component Value Date    LABA1C 9.2 (H) 05/18/2016    HGBA1C 6.3 (H) 09/08/2017     Lab Results   Component Value Date    CREATININE 1.1 02/24/2017        ASSESSMENT: 62 y.o. year old male with pain, consistent with     Encounter Diagnoses   Name Primary?    Chronic pain syndrome Yes    Chronic left shoulder pain     Primary osteoarthritis of left shoulder     Cervical radiculopathy     DDD (degenerative disc disease), cervical     Cervical stenosis of spinal canal     Lumbosacral radiculopathy     DDD (degenerative disc disease), lumbar     Facet arthritis, degenerative, lumbar spine        PLAN:     - Previous imaging was reviewed and discussed with the patient today. Previous labs reviewed today.     - Schedule for C7-T1 IL DWAIN.   The procedure, risks, benefits and options were discussed with patient. There are no contraindications to the  procedure. The patient expressed understanding and agreed to proceed.  Consent obtained today.    - If limited relief with above, we can consider cervical medial branch blocks for his facet mediated pain.     - Consult orthopedics for shoulder pain with history of shoulder surgery.     - Continue Gabapentin.     - Continue Norco 10/325 mg every 12 hours as needed, #60.     - The patient is here today for a refill of current pain medications and they believe these provide effective pain control and improvements in quality of life.  The patient notes no serious side effects, and feels the benefits outweigh the risks.  The patient was reminded of the pain contract that they signed previously as well as the risks and benefits of the medication including possible death.  The updated [place] [employer] prescription monitoring program was reviewed, and the patient has been found to be compliant with current treatment plan. Medication management provided by Dr. Espinoza.     - UDS next visit.     - RTC 2 weeks after above procedure.     - Dr. Espinoza was consulted on the patient and agrees with this plan.    The above plan and management options were discussed at length with patient. Patient is in agreement with the above and verbalized understanding.     Sue Walker NP  01/09/2018

## 2018-01-17 ENCOUNTER — SURGERY (OUTPATIENT)
Age: 63
End: 2018-01-17

## 2018-01-17 ENCOUNTER — HOSPITAL ENCOUNTER (OUTPATIENT)
Facility: OTHER | Age: 63
Discharge: HOME | End: 2018-01-17
Attending: ANESTHESIOLOGY | Admitting: ANESTHESIOLOGY
Payer: MEDICARE

## 2018-01-17 VITALS
SYSTOLIC BLOOD PRESSURE: 138 MMHG | DIASTOLIC BLOOD PRESSURE: 76 MMHG | WEIGHT: 170 LBS | TEMPERATURE: 98 F | RESPIRATION RATE: 18 BRPM | HEIGHT: 69 IN | BODY MASS INDEX: 25.18 KG/M2 | OXYGEN SATURATION: 94 % | HEART RATE: 50 BPM

## 2018-01-17 DIAGNOSIS — M50.30 DDD (DEGENERATIVE DISC DISEASE), CERVICAL: Primary | ICD-10-CM

## 2018-01-17 DIAGNOSIS — G89.29 CHRONIC PAIN: ICD-10-CM

## 2018-01-17 DIAGNOSIS — M54.12 CERVICAL RADICULOPATHY: ICD-10-CM

## 2018-01-17 LAB — POCT GLUCOSE: 129 MG/DL (ref 70–110)

## 2018-01-17 PROCEDURE — 99152 MOD SED SAME PHYS/QHP 5/>YRS: CPT | Mod: ,,, | Performed by: ANESTHESIOLOGY

## 2018-01-17 PROCEDURE — 63600175 PHARM REV CODE 636 W HCPCS: Performed by: ANESTHESIOLOGY

## 2018-01-17 PROCEDURE — 25000003 PHARM REV CODE 250: Performed by: ANESTHESIOLOGY

## 2018-01-17 PROCEDURE — 25000003 PHARM REV CODE 250: Performed by: PAIN MEDICINE

## 2018-01-17 PROCEDURE — 62321 NJX INTERLAMINAR CRV/THRC: CPT | Performed by: ANESTHESIOLOGY

## 2018-01-17 PROCEDURE — 25500020 PHARM REV CODE 255: Performed by: ANESTHESIOLOGY

## 2018-01-17 PROCEDURE — 62321 NJX INTERLAMINAR CRV/THRC: CPT | Mod: ,,, | Performed by: ANESTHESIOLOGY

## 2018-01-17 PROCEDURE — 62320 NJX INTERLAMINAR CRV/THRC: CPT | Performed by: ANESTHESIOLOGY

## 2018-01-17 PROCEDURE — 82947 ASSAY GLUCOSE BLOOD QUANT: CPT | Performed by: ANESTHESIOLOGY

## 2018-01-17 RX ORDER — MIDAZOLAM HYDROCHLORIDE 1 MG/ML
INJECTION INTRAMUSCULAR; INTRAVENOUS
Status: DISCONTINUED | OUTPATIENT
Start: 2018-01-17 | End: 2018-01-17 | Stop reason: HOSPADM

## 2018-01-17 RX ORDER — SODIUM CHLORIDE 9 MG/ML
500 INJECTION, SOLUTION INTRAVENOUS CONTINUOUS
Status: DISCONTINUED | OUTPATIENT
Start: 2018-01-17 | End: 2018-01-17 | Stop reason: HOSPADM

## 2018-01-17 RX ORDER — LIDOCAINE HYDROCHLORIDE 10 MG/ML
INJECTION INFILTRATION; PERINEURAL
Status: DISCONTINUED | OUTPATIENT
Start: 2018-01-17 | End: 2018-01-17 | Stop reason: HOSPADM

## 2018-01-17 RX ORDER — DEXAMETHASONE SODIUM PHOSPHATE 10 MG/ML
INJECTION INTRAMUSCULAR; INTRAVENOUS
Status: DISCONTINUED | OUTPATIENT
Start: 2018-01-17 | End: 2018-01-17 | Stop reason: HOSPADM

## 2018-01-17 RX ORDER — BUPIVACAINE HYDROCHLORIDE 2.5 MG/ML
INJECTION, SOLUTION EPIDURAL; INFILTRATION; INTRACAUDAL
Status: DISCONTINUED | OUTPATIENT
Start: 2018-01-17 | End: 2018-01-17 | Stop reason: HOSPADM

## 2018-01-17 RX ADMIN — IOHEXOL 10 ML: 300 INJECTION, SOLUTION INTRAVENOUS at 12:01

## 2018-01-17 RX ADMIN — SODIUM CHLORIDE 500 ML: 900 INJECTION, SOLUTION INTRAVENOUS at 12:01

## 2018-01-17 RX ADMIN — DEXAMETHASONE SODIUM PHOSPHATE 10 MG: 10 INJECTION, SOLUTION INTRAMUSCULAR; INTRAVENOUS at 12:01

## 2018-01-17 RX ADMIN — LIDOCAINE HYDROCHLORIDE 10 ML: 10 INJECTION, SOLUTION INFILTRATION; PERINEURAL at 12:01

## 2018-01-17 RX ADMIN — BUPIVACAINE HYDROCHLORIDE 10 ML: 2.5 INJECTION, SOLUTION EPIDURAL; INFILTRATION; INTRACAUDAL; PERINEURAL at 12:01

## 2018-01-17 RX ADMIN — MIDAZOLAM HYDROCHLORIDE 2 MG: 1 INJECTION, SOLUTION INTRAMUSCULAR; INTRAVENOUS at 12:01

## 2018-01-17 NOTE — H&P (VIEW-ONLY)
Chronic Pain - Established Visit    Referring Physician: No ref. provider found    Chief Complaint:   Chief Complaint   Patient presents with    Neck Pain     follow up     Low-back Pain    Knee Pain    Shoulder Pain        SUBJECTIVE: Disclaimer: This note has been generated using voice-recognition software. There may be typographical errors that have been missed during proof-reading    Interval History 1/9/2018:  The patient returns to clinic today for follow up. He reports increased neck pain in the last month. He describes the pain as constant, aching and burning. He reports intermittent radiating pain into both arms that is burning in nature. He also reports increased shoulder pain. He does have a past history of left shoulder surgery years ago. He reports that his low back pain is tolerable at this time. He continues to take Gabapentin with benefit. He also takes Norco sparingly with benefit. He denies any adverse reactions with this medication. He denies any other health changes. His pain today is 5/10.    Interval History 11/7/2017:  The patient returns to clinic today for follow up of low back and leg pain. He reports low back pain that radiates down the side of left leg to mid thigh. He reports a recent flare up that was intense for a few days but then improved significantly. He denies any injury. He reports his pain is tolerable at this time. He continues to be physically active and participate in a home exercise routine. He continues to take Gabapentin TID with benefit and does need a refill. He also continues to take Norco sparingly with benefit. He denies any other health changes. His pain today is 5/10.     Interval History 9/7/2017:  The patient returns to clinic today for follow up. He is s/p bilateral L3 TF DWAIN on 8/25/2017. He reports 50% relief of his low back and radiating leg pain. He continues to report low back pain that is aching in nature. He reports that this is tolerable at this  time. He continues to take Gabapentin with benefit. He continues to take Norco sparingly with benefit. He would like a refill today. He denies any other health changes. He denies any bowel or bladder incontinence. His pain today is 4/10.    Interval History 8/2/2017:  The patient returns to clinic today for follow up. He reports that his left leg pain has returned in the last few days. He reports low back pain that is radiating down the side of his left leg to mid thigh. He describes this pain as shooting and burning. He continues to use Gabapentin and Norco with benefit. He also reports bilateral knee pain that is dull and aching in nature. He denies any injury. He has never had any interventions for his knees. He denies any bowel or bladder incontinence. His pain today is 6/10.    Interval History 7/6/2017:  The patient returns to clinic today for follow up. He is s/p left L3 TF DWAIN on 6/21/2017. He reports 80% relief of his leg pain. He does report low back pain that is worse in the morning. He does report stiffness. He continues to report benefit with Gabapentin and Norco. He does report increased activity since the procedure. He denies any other health changes. He denies any bowel or bladder incontinence. His pain today is 3/10.     Interval History 6/6/2017:  The patient returns to clinic today for follow up. He complains of low back pain that is radiating into his left leg. This pain has increased in intensity in the last few weeks. He did see Dr. Liz yesterday. He reports pain that begins in his low back and radiates down the side of his left thigh to his knee. He denies any radiating right leg pain. He also complains of stiffness in his lower back that is worse in the morning. He denies any bowel or bladder incontinence. He continues to take Gabapentin 1800 mg daily. He also take Norco every 12 hours as needed for pain and does need a refill today. He reports continued relief of shoulder pain with  previous injection. His pain today is 5/10.    Interval History 4/20/2017  NM bone scan 3/29 with arthritis T12/L1 and L5/S1 follow-up with orthopedics regarding shoulder has severe osteoarthritis had a shoulder joint injection with great relief pain.  Not currently having any cervical radicular symptoms.  Gabapentin at 900 mg per day no side effects, does have some lower extremity radicular symptoms.  No other health changes.    Initial encounter:    Rob Jones JrRamona presents to the clinic for the evaluation of neck, shoulder and lower back with lower extremities pain. The pain started years ago and symptoms have been worsening.    Brief history:   patient recently evaluated in neurosurgery is scheduled for a nuclear medicine bone scan, possible workup for ACDF secondary to severe central stenosis of the cervical spine with radiculopathy.  The patient had a previous cervical transverse foraminal epidural steroid injection with greater than 50% improvement in his radicular symptoms into his right upper extremity although he continues to have what appears to be rotator cuff syndrome of the left shoulder and is scheduled for orthopedic evaluation soon.    Pain Description:    The current worst pain is located in the left shoulder area    At BEST  10/10     At WORST  10/10 on the WORST day.      On average pain is rated as 10/10.     Today the pain is rated as 10/10    The pain is described as aching, sharp, shooting, throbbing and tingling      Symptoms interfere with daily activity, sleeping and work.     Exacerbating factors: Sitting, Standing, Laying, Bending, Extension, Flexing, Lifting and Getting out of bed/chair.      Mitigating factors medications and injection.     Patient denies urinary incontinence and bowel incontinence.  Patient denies any suicidal or homicidal ideations    Pain Medications:  Current:  Hydrocodone/acetaminophen  Gabapentin    Tried in Past:  NSAIDs -Never  TCA -Never  SNRI  -Never  Anti-convulsants  Gabapentin   Muscle Relaxants -Never  Opioids-Never    Physical Therapy/Home Exercise: no       report:  Reviewed and consistent with medication use as prescribed.    Pain Procedures:   2/6/17- right C6-C7 TF DWAIN with IR   6/21/2017- Left L3 TF DWAIN- 80% relief  8/25/2017- Bilateral L3 TF DWAIN- 50% relief    Chiropractor -never  Acupuncture - never  TENS unit -never  Spinal decompression -previous lumbar surgery  Joint replacement -never    Imaging:   Lumbar MRI 5/29/2017:  FINDINGS:     Alignment: Normal.    Vertebrae: No fracture. Essentially normal marrow signal, with fatty endplate degenerative changes L4-S1.    Discs:  Disc spacer devices at L4-L5 and L5-S1 with partial fusion of the disc spaces.  Mild diffuse disc space narrowing throughout the lumbar spine with mild desiccation L3-L4.    Cord: Normal. Conus terminates at T12-L1.    Degenerative findings:        T11-T12: Incompletely imaged on MRI.  No significant disc disease.  No spinal canal stenosis.  Left-sided facet arthropathy results in mild left foraminal stenosis.       T12-L1: Incompletely imaged on MRI.  No significant disc disease.  No spinal canal stenosis.  No neural foraminal stenosis.       L1-L2: No significant disc disease.  Minimal bilateral facet arthropathy without spinal canal or foraminal stenosis.       L2-L3: Minimal circumferential disc bulge with mild bilateral facet arthropathy and buckling of the ligamentum flavum, overall resulting in mild left foraminal stenosis with no significant spinal canal stenosis.       L3-L4: Minimal circumferential disc bulge with mild bilateral facet arthropathy and buckling of the ligamentum flavum results in mild spinal canal stenosis, moderate left foraminal stenosis, and mild right foraminal stenosis.       L4-L5: Fusion of the disc space with mild right worse than left facet arthropathy results in mild right foraminal stenosis.  No spinal canal stenosis.       L5-S1:  Minimal circumferential disc bulge with superimposed calcified left foraminal protrusion and mild bilateral facet arthropathy results in mild right and moderate left foraminal stenosis.  No spinal canal stenosis.    Paraspinal muscles & soft tissues: Mild thickening of the right adrenal gland without discrete nodule, unchanged from prior CT.   Impression       Disc spacers with partial fusion L4-S1 with mild disc and facet degeneration throughout the lumbar spine most pronounced at L3-L4 with mild spinal canal stenosis and moderate left foraminal stenosis as well as L5-S1 with moderate left foraminal stenosis.     Cervical MRI 1/16/2017:  Findings: There is no evidence of fracture, dislocation or marrow replacement process.  The vertebral body heights and alignment are maintained.  The visualized posterior fossa structures demonstrate no significant abnormality.  The surrounding soft tissue structures demonstrate no significant abnormality.  The cervical cord signal is normal.    C2-3: Unremarkable    C3-4: Uncovertebral joint hypertrophy resulting in mild right neural foraminal narrowing.    C4-5: Uncovertebral joint hypertrophy resulting in moderate bilateral neural foraminal narrowing.    C5-6: Uncovertebral joint hypertrophy resulting in mild right neural foraminal narrowing.    C6-7: Uncovertebral joint hypertrophy with a small broad-based posterior disc bulge and focal thickening of the ligamentum flavum resulting in severe canal stenosis and moderate right neural foraminal narrowing.    C7-T1: Unremarkable.   Impression       Multifocal degenerative disc disease as above. There is focal thickening of the ligamentum flavum at C6-7 which results in severe canal stenosis at that level. No cord signal abnormality.     Xray Shoulder 3/20/2017:  Findings: External rotation, Y. view and axillary views of the left shoulder demonstrate postsurgical hardware in the humeral neck which shows no significant change from  the 2009 exam.  There is advanced joint space loss at the glenohumeral joint with sclerosis and osteophyte formation.  There is an ossific body at the inferior aspect of the joint which is well-corticated and measures approximately 2.2 cm.  Small osteophytes are also identified at the acromioclavicular joint.  There is no fracture, dislocation or osseous destructive process.  The visualized portions of the left lung are normal.   Impression      Findings suggestive of advanced osteoarthritis of the left shoulder.  The 2.2 cm osseous body may represent a loose body.  It is located at the inferior aspect of the joint however.         Past Medical History:   Diagnosis Date    Anxiety     Back pain     Cataract     Diabetes mellitus     History of hepatitis C; S/p RX with SVR 12 documented 06/2017 6/1/2017    Hypertension     Postoperative hypothyroidism 11/3/2016    Primary hyperparathyroidism 1/18/2017    Thyroid disease      Past Surgical History:   Procedure Laterality Date    LUMBAR FUSION      THYROIDECTOMY      TONSILLECTOMY       Social History     Social History    Marital status: Single     Spouse name: N/A    Number of children: N/A    Years of education: N/A     Occupational History    Disabled, pt says from [employer] and now for back, DM2      Social History Main Topics    Smoking status: Current Every Day Smoker    Smokeless tobacco: Not on file    Alcohol use Yes    Drug use: No    Sexual activity: Not on file     Other Topics Concern    Not on file     Social History Narrative    Lives alone.  He has 2 adult children who do not live here.       Family History   Problem Relation Age of Onset    Cancer Mother      breast    Cataracts Father     No Known Problems Sister     No Known Problems Brother     No Known Problems Brother     Heart disease Brother     No Known Problems Sister     No Known Problems Sister     No Known Problems Sister     Glaucoma Paternal Uncle      "Glaucoma Paternal Uncle        Review of patient's allergies indicates:   Allergen Reactions    Tizanidine Shortness Of Breath       Current Outpatient Prescriptions   Medication Sig    albuterol (ACCUNEB) 1.25 mg/3 mL Nebu Take 3 mLs (1.25 mg total) by nebulization every 6 (six) hours as needed (shortness of breath). Rescue    amlodipine (NORVASC) 10 MG tablet Take 1 tablet (10 mg total) by mouth once daily.    BOOSTRIX TDAP 2.5-8-5 Lf-mcg-Lf/0.5mL Syrg injection     diclofenac sodium 1 % Gel Apply 2 g topically 4 (four) times daily.    dicyclomine (BENTYL) 20 mg tablet Take 20 mg by mouth daily as needed.    econazole nitrate 1 % cream Apply topically 2 (two) times daily.    fish oil-omega-3 fatty acids 300-1,000 mg capsule Take 2 g by mouth once daily.    FLUARIX QUAD 9574-6875, PF, 60 mcg (15 mcg x 4)/0.5 mL vaccine TO BE ADMINISTERED BY PHARMACIST FOR IMMUNIZATION    gabapentin (NEURONTIN) 300 MG capsule Take 1 capsule (300 mg total) by mouth 3 (three) times daily.    insulin aspart (NOVOLOG FLEXPEN) 100 unit/mL InPn pen Inject 15 Units into the skin with lunch.    insulin glargine (LANTUS SOLOSTAR) 100 unit/mL (3 mL) InPn pen Inject 15 Units into the skin every evening.    irbesartan (AVAPRO) 150 MG tablet Take 1 tablet (150 mg total) by mouth once daily.    levothyroxine (SYNTHROID) 112 MCG tablet TAKE 1 TABLET BY MOUTH DAILY BEFORE BREAKFAST    meloxicam (MOBIC) 15 MG tablet TAKE 1 TABLET (15 MG TOTAL) BY MOUTH DAILY AS NEEDED FOR PAIN.    metformin (GLUCOPHAGE) 1000 MG tablet Take 1 tablet (1,000 mg total) by mouth 2 (two) times daily with meals.    nitroGLYCERIN 0.4 MG/HR TD PT24 (NITRODUR) 0.4 mg/hr Place 1 patch onto the skin continuous prn.    NOVOFINE 32 32 gauge x 1/4" Ndle 1 EACH BY MISC.(NON-DRUG COMBO ROUTE) ROUTE ONCE DAILY.    pen needle, diabetic (NOVOFINE PLUS) 32 gauge x 1/6" Ndle 1 each by Misc.(Non-Drug; Combo Route) route once daily.    tamsulosin (FLOMAX) 0.4 mg Cp24 " "Take 1 capsule (0.4 mg total) by mouth once daily.    temazepam (RESTORIL) 30 mg capsule TAKE 1 CAPSULE BY MOUTH EVERY DAY AT BEDTIME AS NEEDED    vitamin D 1000 units Tab Take 185 mg by mouth once daily.    ZOSTAVAX, PF, 19,400 unit/0.65 mL injection      No current facility-administered medications for this visit.        REVIEW OF SYSTEMS:    GENERAL:  No weight loss, malaise or fevers.  HEENT:   No recent changes in vision or hearing  NECK:  Negative for lumps, no difficulty with swallowing.  RESPIRATORY:  Negative for cough, wheezing or shortness of breath, patient denies any recent URI.  CARDIOVASCULAR:  Negative for chest pain, leg swelling or palpitations. HTN.   GI:  Negative for abdominal discomfort, blood in stools or black stools or change in bowel habits.  MUSCULOSKELETAL:  See HPI.  SKIN:  Negative for lesions, rash, and itching.  PSYCH:  History of anxiety. Patient's sleep is disturbed secondary to pain.  HEMATOLOGY/LYMPHOLOGY:  Negative for prolonged bleeding, bruising easily or swollen nodes.  Patient is not currently taking any anti-coagulants  ENDO: History of hypothyroidism and Diabetes.   NEURO:   No history of headaches, syncope, paralysis, seizures or tremors.  All other reviewed and negative other than HPI.    OBJECTIVE:    BP (!) 154/86   Pulse 65   Temp 98 °F (36.7 °C) (Oral)   Resp 18   Ht 5' 9" (1.753 m)   Wt 77.4 kg (170 lb 10.2 oz)   SpO2 99%   BMI 25.20 kg/m²     PHYSICAL EXAMINATION:    GENERAL: Well appearing, in no acute distress, alert and oriented x3.  PSYCH:  Mood and affect appropriate.  SKIN: Skin color, texture, turgor normal, no rashes or lesions.  HEAD/FACE:  Normocephalic, atraumatic. Cranial nerves grossly intact.  NECK: Mild pain to palpation over the cervical paraspinous muscles. Spurling Negative. Limited ROM with pain on flexion and extension. Positive facet loading bilaterally.    CV: RRR with palpation of the radial artery.  PULM: No evidence of respiratory " difficulty, symmetric chest rise.  BACK: There is pain with palpation over lumbar spine. Straight leg raise in the sitting position is negative bilaterally. Limited ROM with pain on extension. Positive facet loading bilaterally.   EXTREMITIES: Peripheral joint ROM is full and pain free without obvious instability or laxity in all four extremities. No deformities, edema, or skin discoloration. Good capillary refill.  MUSCULOSKELETAL: Shoulder provocative maneuvers are positive on the left. There is no pain with palpation over medial or lateral joint line of bilateral knees. Bilateral upper extremity strength is normal and symmetric.  No atrophy or tone abnormalities are noted.  NEURO: Bilateral upper extremity coordination and muscle stretch reflexes are physiologic and symmetric.  Abimael's negative. No clonus.  No loss of sensation is noted.  GAIT: Antalgic, ambulates without assistance.         Lab Results   Component Value Date    WBC 4.60 11/11/2016    HGB 12.6 (L) 11/11/2016    HCT 39.4 (L) 11/11/2016    MCV 97 11/11/2016     11/11/2016      Lab Results   Component Value Date    LABA1C 9.2 (H) 05/18/2016    HGBA1C 6.3 (H) 09/08/2017     Lab Results   Component Value Date    CREATININE 1.1 02/24/2017        ASSESSMENT: 62 y.o. year old male with pain, consistent with     Encounter Diagnoses   Name Primary?    Chronic pain syndrome Yes    Chronic left shoulder pain     Primary osteoarthritis of left shoulder     Cervical radiculopathy     DDD (degenerative disc disease), cervical     Cervical stenosis of spinal canal     Lumbosacral radiculopathy     DDD (degenerative disc disease), lumbar     Facet arthritis, degenerative, lumbar spine        PLAN:     - Previous imaging was reviewed and discussed with the patient today. Previous labs reviewed today.     - Schedule for C7-T1 IL DWAIN.   The procedure, risks, benefits and options were discussed with patient. There are no contraindications to the  procedure. The patient expressed understanding and agreed to proceed.  Consent obtained today.    - If limited relief with above, we can consider cervical medial branch blocks for his facet mediated pain.     - Consult orthopedics for shoulder pain with history of shoulder surgery.     - Continue Gabapentin.     - Continue Norco 10/325 mg every 12 hours as needed, #60.     - The patient is here today for a refill of current pain medications and they believe these provide effective pain control and improvements in quality of life.  The patient notes no serious side effects, and feels the benefits outweigh the risks.  The patient was reminded of the pain contract that they signed previously as well as the risks and benefits of the medication including possible death.  The updated [place] Board of [employer] prescription monitoring program was reviewed, and the patient has been found to be compliant with current treatment plan. Medication management provided by Dr. Espinoza.     - UDS next visit.     - RTC 2 weeks after above procedure.     - Dr. Espinoza was consulted on the patient and agrees with this plan.    The above plan and management options were discussed at length with patient. Patient is in agreement with the above and verbalized understanding.     Sue Walker NP  01/09/2018

## 2018-01-17 NOTE — DISCHARGE SUMMARY
Discharge Note  Short Stay      SUMMARY     Admit Date: 1/17/2018    Attending Physician: Christina Espinoza    Discharge Diagnosis: Cervical radiculopathy [M54.12]    Discharge Physician: Christina Espinoza      Discharge Date: 1/17/2018 12:54 PM       PROCEDURE: Cervical Interlaminar epidural steroid injection C7/T1 under fluoroscopic guidance.     Pre-Op diagnosis: Cervical radiculopathy [M54.12]    Post-Op diagnosis: Cervical radiculopathy [M54.12]    Disposition: Home or self care    Patient Instructions:   Current Discharge Medication List      CONTINUE these medications which have NOT CHANGED    Details   albuterol (ACCUNEB) 1.25 mg/3 mL Nebu Take 3 mLs (1.25 mg total) by nebulization every 6 (six) hours as needed (shortness of breath). Rescue  Qty: 1 Box, Refills: 2      amlodipine (NORVASC) 10 MG tablet Take 1 tablet (10 mg total) by mouth once daily.  Qty: 90 tablet, Refills: 3      BOOSTRIX TDAP 2.5-8-5 Lf-mcg-Lf/0.5mL Syrg injection       diclofenac sodium 1 % Gel Apply 2 g topically 4 (four) times daily.  Qty: 1 Tube, Refills: 2    Associated Diagnoses: Chronic left shoulder pain; Primary osteoarthritis of both knees      dicyclomine (BENTYL) 20 mg tablet Take 20 mg by mouth daily as needed.      econazole nitrate 1 % cream Apply topically 2 (two) times daily.  Qty: 30 g, Refills: 1      fish oil-omega-3 fatty acids 300-1,000 mg capsule Take 2 g by mouth once daily.      FLUARIX QUAD [id number], PF, 60 mcg (15 mcg x 4)/0.5 mL vaccine TO BE ADMINISTERED BY PHARMACIST FOR IMMUNIZATION  Refills: 0      gabapentin (NEURONTIN) 300 MG capsule Take 1 capsule (300 mg total) by mouth 3 (three) times daily.  Qty: 90 capsule, Refills: 5    Associated Diagnoses: Lumbosacral radiculopathy; DDD (degenerative disc disease), lumbar; Lumbar spondylosis; S/P lumbar fusion; Facet arthritis, degenerative, lumbar spine; Chronic pain syndrome      hydrocodone-acetaminophen 10-325mg (NORCO)  mg Tab Take 1 tablet by mouth  "every 12 (twelve) hours as needed for Pain.  Qty: 60 tablet, Refills: 0    Associated Diagnoses: Cervical radiculopathy; Chronic pain syndrome; DDD (degenerative disc disease), cervical; S/P lumbar fusion; Lumbosacral radiculopathy; DDD (degenerative disc disease), lumbar      insulin aspart (NOVOLOG FLEXPEN) 100 unit/mL InPn pen Inject 15 Units into the skin with lunch.  Qty: 1 Box, Refills: 5    Associated Diagnoses: Diabetes mellitus without complication      insulin glargine (LANTUS SOLOSTAR) 100 unit/mL (3 mL) InPn pen Inject 15 Units into the skin every evening.  Qty: 3 Box, Refills: 1    Associated Diagnoses: Diabetes mellitus without complication      irbesartan (AVAPRO) 150 MG tablet Take 1 tablet (150 mg total) by mouth once daily.  Qty: 90 tablet, Refills: 3      levothyroxine (SYNTHROID) 112 MCG tablet TAKE 1 TABLET BY MOUTH DAILY BEFORE BREAKFAST  Qty: 90 tablet, Refills: 0      meloxicam (MOBIC) 15 MG tablet TAKE 1 TABLET (15 MG TOTAL) BY MOUTH DAILY AS NEEDED FOR PAIN.  Qty: 90 tablet, Refills: 1      metformin (GLUCOPHAGE) 1000 MG tablet Take 1 tablet (1,000 mg total) by mouth 2 (two) times daily with meals.  Qty: 180 tablet, Refills: 1      nitroGLYCERIN 0.4 MG/HR TD PT24 (NITRODUR) 0.4 mg/hr Place 1 patch onto the skin continuous prn.      NOVOFINE 32 32 gauge x 1/4" Ndle 1 EACH BY MISC.(NON-DRUG COMBO ROUTE) ROUTE ONCE DAILY.  Refills: 3      pen needle, diabetic (NOVOFINE PLUS) 32 gauge x 1/6" Ndle 1 each by Misc.(Non-Drug; Combo Route) route once daily.  Qty: 100 each, Refills: 3    Associated Diagnoses: Diabetes mellitus without complication      tamsulosin (FLOMAX) 0.4 mg Cp24 Take 1 capsule (0.4 mg total) by mouth once daily.  Qty: 90 capsule, Refills: 3    Associated Diagnoses: Benign non-nodular prostatic hyperplasia without lower urinary tract symptoms      temazepam (RESTORIL) 30 mg capsule TAKE 1 CAPSULE BY MOUTH EVERY DAY AT BEDTIME AS NEEDED  Qty: 90 capsule, Refills: 1      vitamin D " 1000 units Tab Take 185 mg by mouth once daily.      ZOSTAVAX, PF, 19,400 unit/0.65 mL injection              Resume home diet and activity

## 2018-01-17 NOTE — INTERVAL H&P NOTE
The patient has been examined and the H&P has been reviewed:    I concur with the findings and no changes have occurred since H&P was written.     No change in the location or quality of the pain since the most recent clinic visit.  No new symptoms.  He wishes to proceed with the procedure today.    PE, unchanged from previous:  CV:  RRR  Resp: unlabored, no wheezing.    NPO since MN.      Anesthesia/Surgery risks, benefits and alternative options discussed and understood by patient/family.          Active Hospital Problems    Diagnosis  POA    Chronic pain [G89.29]  Yes      Resolved Hospital Problems    Diagnosis Date Resolved POA   No resolved problems to display.

## 2018-01-17 NOTE — DISCHARGE INSTRUCTIONS

## 2018-01-17 NOTE — OP NOTE
Cervical Interlaminar Epidural Steroid Injection under Fluoroscopic Guidance.   Time-out taken to identify patient and procedure prior to starting the procedure.     Date of Procedure: 01/17/2018    PROCEDURE: Cervical Interlaminar epidural steroid injection C7/T1 under fluoroscopic guidance.     Pre-Op diagnosis: Cervical radiculopathy [M54.12]    Post-Op diagnosis: Cervical radiculopathy [M54.12]    PHYSICIAN: JASMIN AJ     ASSISTANTS: None     MEDICATIONS INJECTED: Preservative-free Decadron 10 mg with 1mL of sterile 0.25%Bupivicaine and 3ml of preservative free normal saline.     LOCAL ANESTHETIC INJECTED: Xylocaine 1% 3mL    ESTIMATED BLOOD LOSS: none.     COMPLICATIONS: none.     TECHNIQUE: With the patient laying in a prone position, the area was prepped and draped in the usual sterile fashion using ChloraPrep and a fenestrated drape. Local anesthetic was given using a 27-gauge needle by raising a wheal and going down to the hub of the needle over the C7/T1 interlaminar space.  The interlaminar space was then approached with a 3.5 inch 18-gauge Touhy needle was introduced under fluoroscopic guidance in the AP and Lateral view. Once the Ligamentum flavum was encountered loss of resistance to saline was used to enter the epidural space. With positive loss of resistance and negative CSF or Blood, 2mL contrast dye Omnipaque (300mg/ml) was injected to confirm placement and there was no vascular runoff. The medication was then injected slowly. The patient tolerated the procedure well.     Conscious sedation provided by M.D    The patient was monitored with continuous pulse oximetry, EKG, and intermittent blood pressure monitors.  The patient was hemodynamically stable throughout the entire process was responsive to voice, and breathing spontaneously.  Supplemental O2 was provided at 2L/min via nasal cannula.  Patient was comfortable for the duration of the procedure. (See nurse documentation and case log for  sedation time)    There was a total of 2mg IV Midazolam was titrated for the procedure        The patient was monitored after the procedure.   They were given post-procedure and discharge instructions to follow at home.  The patient was discharged in a stable condition.

## 2018-02-06 ENCOUNTER — OFFICE VISIT (OUTPATIENT)
Dept: PODIATRY | Facility: CLINIC | Age: 63
End: 2018-02-06
Payer: MEDICARE

## 2018-02-06 VITALS
DIASTOLIC BLOOD PRESSURE: 68 MMHG | HEART RATE: 45 BPM | HEIGHT: 69 IN | BODY MASS INDEX: 25.57 KG/M2 | SYSTOLIC BLOOD PRESSURE: 127 MMHG | WEIGHT: 172.63 LBS

## 2018-02-06 DIAGNOSIS — B35.1 ONYCHOMYCOSIS DUE TO DERMATOPHYTE: ICD-10-CM

## 2018-02-06 DIAGNOSIS — E11.49 TYPE II DIABETES MELLITUS WITH NEUROLOGICAL MANIFESTATIONS: Primary | ICD-10-CM

## 2018-02-06 DIAGNOSIS — R60.0 BILATERAL LOWER EXTREMITY EDEMA: ICD-10-CM

## 2018-02-06 DIAGNOSIS — I73.9 PVD (PERIPHERAL VASCULAR DISEASE): ICD-10-CM

## 2018-02-06 PROCEDURE — 99499 UNLISTED E&M SERVICE: CPT | Mod: S$GLB,,, | Performed by: PODIATRIST

## 2018-02-06 PROCEDURE — 11721 DEBRIDE NAIL 6 OR MORE: CPT | Mod: Q8,S$GLB,, | Performed by: PODIATRIST

## 2018-02-06 PROCEDURE — 99999 PR PBB SHADOW E&M-EST. PATIENT-LVL III: CPT | Mod: PBBFAC,,, | Performed by: PODIATRIST

## 2018-02-14 ENCOUNTER — OFFICE VISIT (OUTPATIENT)
Dept: OPTOMETRY | Facility: CLINIC | Age: 63
End: 2018-02-14
Payer: MEDICARE

## 2018-02-14 ENCOUNTER — OFFICE VISIT (OUTPATIENT)
Dept: PAIN MEDICINE | Facility: CLINIC | Age: 63
End: 2018-02-14
Payer: MEDICARE

## 2018-02-14 VITALS
WEIGHT: 172 LBS | RESPIRATION RATE: 14 BRPM | HEART RATE: 58 BPM | BODY MASS INDEX: 25.48 KG/M2 | HEIGHT: 69 IN | SYSTOLIC BLOOD PRESSURE: 156 MMHG | DIASTOLIC BLOOD PRESSURE: 89 MMHG | TEMPERATURE: 98 F

## 2018-02-14 DIAGNOSIS — M54.12 CERVICAL RADICULOPATHY: ICD-10-CM

## 2018-02-14 DIAGNOSIS — M48.02 CERVICAL STENOSIS OF SPINAL CANAL: ICD-10-CM

## 2018-02-14 DIAGNOSIS — Z98.1 S/P LUMBAR FUSION: ICD-10-CM

## 2018-02-14 DIAGNOSIS — M47.816 FACET ARTHRITIS, DEGENERATIVE, LUMBAR SPINE: ICD-10-CM

## 2018-02-14 DIAGNOSIS — M54.16 LUMBAR RADICULOPATHY: ICD-10-CM

## 2018-02-14 DIAGNOSIS — M50.30 DDD (DEGENERATIVE DISC DISEASE), CERVICAL: ICD-10-CM

## 2018-02-14 DIAGNOSIS — G89.4 CHRONIC PAIN SYNDROME: Primary | ICD-10-CM

## 2018-02-14 DIAGNOSIS — M51.36 DDD (DEGENERATIVE DISC DISEASE), LUMBAR: ICD-10-CM

## 2018-02-14 DIAGNOSIS — H52.4 PRESBYOPIA: ICD-10-CM

## 2018-02-14 DIAGNOSIS — H25.13 NUCLEAR SCLEROSIS, BILATERAL: ICD-10-CM

## 2018-02-14 DIAGNOSIS — E11.9 TYPE 2 DIABETES MELLITUS WITHOUT OPHTHALMIC MANIFESTATIONS: Primary | ICD-10-CM

## 2018-02-14 PROCEDURE — 99214 OFFICE O/P EST MOD 30 MIN: CPT | Mod: S$GLB,,, | Performed by: NURSE PRACTITIONER

## 2018-02-14 PROCEDURE — 99999 PR PBB SHADOW E&M-EST. PATIENT-LVL II: CPT | Mod: PBBFAC,,, | Performed by: OPTOMETRIST

## 2018-02-14 PROCEDURE — 92014 COMPRE OPH EXAM EST PT 1/>: CPT | Mod: S$GLB,,, | Performed by: OPTOMETRIST

## 2018-02-14 PROCEDURE — 3008F BODY MASS INDEX DOCD: CPT | Mod: S$GLB,,, | Performed by: NURSE PRACTITIONER

## 2018-02-14 PROCEDURE — 99999 PR PBB SHADOW E&M-EST. PATIENT-LVL III: CPT | Mod: PBBFAC,,, | Performed by: NURSE PRACTITIONER

## 2018-02-14 RX ORDER — GABAPENTIN 300 MG/1
300 CAPSULE ORAL 2 TIMES DAILY
COMMUNITY
Start: 2018-01-27 | End: 2019-05-16 | Stop reason: CLARIF

## 2018-02-14 NOTE — PROGRESS NOTES
HPI     Last eye exam was 2/8/17 with Dr. Cifuentes.  Patient states vision seems about the same since last exam.  Patient denies diplopia, headaches, flashes/floaters, and pain.    Hemoglobin A1C       Date                     Value               Ref Range             Status                09/08/2017               6.3 (H)             4.0 - 5.6 %           Final                  Last edited by Roseann Sparks on 2/14/2018 12:59 PM. (History)            Assessment /Plan     For exam results, see Encounter Report.    Type 2 diabetes mellitus without ophthalmic manifestations    Nuclear sclerosis, bilateral    Presbyopia          1.  No retinopathy--monitor yearly.  BS control.  2.  Educated on cataracts and affects on vision.  Early-monitor.  3.  Continue w/ current rx          RTC 1 year for dm exam.

## 2018-02-14 NOTE — PROGRESS NOTES
Chronic Pain - Established Visit    Referring Physician: No ref. provider found    Chief Complaint:   Chief Complaint   Patient presents with    Follow-up     4weeks after DWAIN        SUBJECTIVE: Disclaimer: This note has been generated using voice-recognition software. There may be typographical errors that have been missed during proof-reading    Interval History 2/14/2018:  The patient returns to clinic today for follow up. He is s/p C7-T1 IL DWAIN on 1/17/2018. He reports 25% relief of his neck pain. He continues to report neck pain that is constant, aching, and burning. He reports intermittent aching pain into his arms bilaterally. He reports that his low back pain is worse today. He reports low back pain that is radiating down the side of his right leg to mid thigh. He describes this pain as burning and shooting. He denies any left leg pain today. He continues to take Gabapentin 300 mg BID. He does take Norco sparingly with benefit. He denies any other health changes. He denies any bowel or bladder incontinence. His pain today is 6/10.         Interval History 1/9/2018:  The patient returns to clinic today for follow up. He reports increased neck pain in the last month. He describes the pain as constant, aching and burning. He reports intermittent radiating pain into both arms that is burning in nature. He also reports increased shoulder pain. He does have a past history of left shoulder surgery years ago. He reports that his low back pain is tolerable at this time. He continues to take Gabapentin with benefit. He also takes Norco sparingly with benefit. He denies any adverse reactions with this medication. He denies any other health changes. His pain today is 5/10.    Interval History 11/7/2017:  The patient returns to clinic today for follow up of low back and leg pain. He reports low back pain that radiates down the side of left leg to mid thigh. He reports a recent flare up that was intense for a few days  but then improved significantly. He denies any injury. He reports his pain is tolerable at this time. He continues to be physically active and participate in a home exercise routine. He continues to take Gabapentin TID with benefit and does need a refill. He also continues to take Norco sparingly with benefit. He denies any other health changes. His pain today is 5/10.     Interval History 9/7/2017:  The patient returns to clinic today for follow up. He is s/p bilateral L3 TF DWAIN on 8/25/2017. He reports 50% relief of his low back and radiating leg pain. He continues to report low back pain that is aching in nature. He reports that this is tolerable at this time. He continues to take Gabapentin with benefit. He continues to take Norco sparingly with benefit. He would like a refill today. He denies any other health changes. He denies any bowel or bladder incontinence. His pain today is 4/10.    Interval History 8/2/2017:  The patient returns to clinic today for follow up. He reports that his left leg pain has returned in the last few days. He reports low back pain that is radiating down the side of his left leg to mid thigh. He describes this pain as shooting and burning. He continues to use Gabapentin and Norco with benefit. He also reports bilateral knee pain that is dull and aching in nature. He denies any injury. He has never had any interventions for his knees. He denies any bowel or bladder incontinence. His pain today is 6/10.    Interval History 7/6/2017:  The patient returns to clinic today for follow up. He is s/p left L3 TF DWAIN on 6/21/2017. He reports 80% relief of his leg pain. He does report low back pain that is worse in the morning. He does report stiffness. He continues to report benefit with Gabapentin and Norco. He does report increased activity since the procedure. He denies any other health changes. He denies any bowel or bladder incontinence. His pain today is 3/10.     Interval History  6/6/2017:  The patient returns to clinic today for follow up. He complains of low back pain that is radiating into his left leg. This pain has increased in intensity in the last few weeks. He did see Dr. Liz yesterday. He reports pain that begins in his low back and radiates down the side of his left thigh to his knee. He denies any radiating right leg pain. He also complains of stiffness in his lower back that is worse in the morning. He denies any bowel or bladder incontinence. He continues to take Gabapentin 1800 mg daily. He also take Norco every 12 hours as needed for pain and does need a refill today. He reports continued relief of shoulder pain with previous injection. His pain today is 5/10.    Interval History 4/20/2017  NM bone scan 3/29 with arthritis T12/L1 and L5/S1 follow-up with orthopedics regarding shoulder has severe osteoarthritis had a shoulder joint injection with great relief pain.  Not currently having any cervical radicular symptoms.  Gabapentin at 900 mg per day no side effects, does have some lower extremity radicular symptoms.  No other health changes.    Initial encounter:    Frederickbin KHADAR Jones Jr. presents to the clinic for the evaluation of neck, shoulder and lower back with lower extremities pain. The pain started years ago and symptoms have been worsening.    Brief history:   patient recently evaluated in neurosurgery is scheduled for a nuclear medicine bone scan, possible workup for ACDF secondary to severe central stenosis of the cervical spine with radiculopathy.  The patient had a previous cervical transverse foraminal epidural steroid injection with greater than 50% improvement in his radicular symptoms into his right upper extremity although he continues to have what appears to be rotator cuff syndrome of the left shoulder and is scheduled for orthopedic evaluation soon.    Pain Description:    The current worst pain is located in the left shoulder area    At BEST  10/10      At WORST  10/10 on the WORST day.      On average pain is rated as 10/10.     Today the pain is rated as 10/10    The pain is described as aching, sharp, shooting, throbbing and tingling      Symptoms interfere with daily activity, sleeping and work.     Exacerbating factors: Sitting, Standing, Laying, Bending, Extension, Flexing, Lifting and Getting out of bed/chair.      Mitigating factors medications and injection.     Patient denies urinary incontinence and bowel incontinence.  Patient denies any suicidal or homicidal ideations    Pain Medications:  Current:  Hydrocodone/acetaminophen  Gabapentin 300 mg BID     Tried in Past:  NSAIDs -Never  TCA -Never  SNRI -Never  Anti-convulsants  Gabapentin   Muscle Relaxants -Never  Opioids-Never    Physical Therapy/Home Exercise: no       report:  Reviewed and consistent with medication use as prescribed.    Pain Procedures:   2/6/17- right C6-C7 TF DWAIN with IR   6/21/2017- Left L3 TF DWAIN- 80% relief  8/25/2017- Bilateral L3 TF DWAIN- 50% relief  1/17/2018- C7-T1 IL DWAIN- 25% relief    Chiropractor -never  Acupuncture - never  TENS unit -never  Spinal decompression -previous lumbar surgery  Joint replacement -never    Imaging:   Lumbar MRI 5/29/2017:  FINDINGS:     Alignment: Normal.    Vertebrae: No fracture. Essentially normal marrow signal, with fatty endplate degenerative changes L4-S1.    Discs:  Disc spacer devices at L4-L5 and L5-S1 with partial fusion of the disc spaces.  Mild diffuse disc space narrowing throughout the lumbar spine with mild desiccation L3-L4.    Cord: Normal. Conus terminates at T12-L1.    Degenerative findings:        T11-T12: Incompletely imaged on MRI.  No significant disc disease.  No spinal canal stenosis.  Left-sided facet arthropathy results in mild left foraminal stenosis.       T12-L1: Incompletely imaged on MRI.  No significant disc disease.  No spinal canal stenosis.  No neural foraminal stenosis.       L1-L2: No significant disc  disease.  Minimal bilateral facet arthropathy without spinal canal or foraminal stenosis.       L2-L3: Minimal circumferential disc bulge with mild bilateral facet arthropathy and buckling of the ligamentum flavum, overall resulting in mild left foraminal stenosis with no significant spinal canal stenosis.       L3-L4: Minimal circumferential disc bulge with mild bilateral facet arthropathy and buckling of the ligamentum flavum results in mild spinal canal stenosis, moderate left foraminal stenosis, and mild right foraminal stenosis.       L4-L5: Fusion of the disc space with mild right worse than left facet arthropathy results in mild right foraminal stenosis.  No spinal canal stenosis.       L5-S1: Minimal circumferential disc bulge with superimposed calcified left foraminal protrusion and mild bilateral facet arthropathy results in mild right and moderate left foraminal stenosis.  No spinal canal stenosis.    Paraspinal muscles & soft tissues: Mild thickening of the right adrenal gland without discrete nodule, unchanged from prior CT.   Impression       Disc spacers with partial fusion L4-S1 with mild disc and facet degeneration throughout the lumbar spine most pronounced at L3-L4 with mild spinal canal stenosis and moderate left foraminal stenosis as well as L5-S1 with moderate left foraminal stenosis.     Cervical MRI 1/16/2017:  Findings: There is no evidence of fracture, dislocation or marrow replacement process.  The vertebral body heights and alignment are maintained.  The visualized posterior fossa structures demonstrate no significant abnormality.  The surrounding soft tissue structures demonstrate no significant abnormality.  The cervical cord signal is normal.    C2-3: Unremarkable    C3-4: Uncovertebral joint hypertrophy resulting in mild right neural foraminal narrowing.    C4-5: Uncovertebral joint hypertrophy resulting in moderate bilateral neural foraminal narrowing.    C5-6: Uncovertebral joint  hypertrophy resulting in mild right neural foraminal narrowing.    C6-7: Uncovertebral joint hypertrophy with a small broad-based posterior disc bulge and focal thickening of the ligamentum flavum resulting in severe canal stenosis and moderate right neural foraminal narrowing.    C7-T1: Unremarkable.   Impression       Multifocal degenerative disc disease as above. There is focal thickening of the ligamentum flavum at C6-7 which results in severe canal stenosis at that level. No cord signal abnormality.     Xray Shoulder 3/20/2017:  Findings: External rotation, Y. view and axillary views of the left shoulder demonstrate postsurgical hardware in the humeral neck which shows no significant change from the 2009 exam.  There is advanced joint space loss at the glenohumeral joint with sclerosis and osteophyte formation.  There is an ossific body at the inferior aspect of the joint which is well-corticated and measures approximately 2.2 cm.  Small osteophytes are also identified at the acromioclavicular joint.  There is no fracture, dislocation or osseous destructive process.  The visualized portions of the left lung are normal.   Impression      Findings suggestive of advanced osteoarthritis of the left shoulder.  The 2.2 cm osseous body may represent a loose body.  It is located at the inferior aspect of the joint however.         Past Medical History:   Diagnosis Date    Anxiety     Back pain     Cataract     Diabetes mellitus     History of hepatitis C; S/p RX with SVR 12 documented 06/2017 6/1/2017    Hypertension     Postoperative hypothyroidism 11/3/2016    Primary hyperparathyroidism 1/18/2017    Thyroid disease      Past Surgical History:   Procedure Laterality Date    LUMBAR FUSION      THYROIDECTOMY      TONSILLECTOMY       Social History     Social History    Marital status: Single     Spouse name: N/A    Number of children: N/A    Years of education: N/A     Occupational History    Disabled,  pt says from [place] and now for back, DM2      Social History Main Topics    Smoking status: Current Every Day Smoker    Smokeless tobacco: Not on file    Alcohol use Yes    Drug use: No    Sexual activity: Not on file     Other Topics Concern    Not on file     Social History Narrative    Lives alone.  He has 2 adult children who do not live here.       Family History   Problem Relation Age of Onset    Cancer Mother      breast    Cataracts Father     No Known Problems Sister     No Known Problems Brother     No Known Problems Brother     Heart disease Brother     No Known Problems Sister     No Known Problems Sister     No Known Problems Sister     Glaucoma Paternal Uncle     Glaucoma Paternal Uncle        Review of patient's allergies indicates:   Allergen Reactions    Tizanidine Shortness Of Breath       Current Outpatient Prescriptions   Medication Sig    albuterol (ACCUNEB) 1.25 mg/3 mL Nebu Take 3 mLs (1.25 mg total) by nebulization every 6 (six) hours as needed (shortness of breath). Rescue    amlodipine (NORVASC) 10 MG tablet Take 1 tablet (10 mg total) by mouth once daily.    BOOSTRIX TDAP 2.5-8-5 Lf-mcg-Lf/0.5mL Syrg injection     diclofenac sodium 1 % Gel Apply 2 g topically 4 (four) times daily.    dicyclomine (BENTYL) 20 mg tablet Take 20 mg by mouth daily as needed.    econazole nitrate 1 % cream Apply topically 2 (two) times daily.    fish oil-omega-3 fatty acids 300-1,000 mg capsule Take 2 g by mouth once daily.    FLUARIX QUAD 4355-7248, PF, 60 mcg (15 mcg x 4)/0.5 mL vaccine TO BE ADMINISTERED BY PHARMACIST FOR IMMUNIZATION    gabapentin (NEURONTIN) 300 MG capsule Take 1 capsule (300 mg total) by mouth 3 (three) times daily.    insulin aspart (NOVOLOG FLEXPEN) 100 unit/mL InPn pen Inject 15 Units into the skin with lunch.    insulin glargine (LANTUS SOLOSTAR) 100 unit/mL (3 mL) InPn pen Inject 15 Units into the skin every evening.    irbesartan (AVAPRO) 150 MG tablet  "Take 1 tablet (150 mg total) by mouth once daily.    levothyroxine (SYNTHROID) 112 MCG tablet TAKE 1 TABLET BY MOUTH DAILY BEFORE BREAKFAST    meloxicam (MOBIC) 15 MG tablet TAKE 1 TABLET (15 MG TOTAL) BY MOUTH DAILY AS NEEDED FOR PAIN.    metformin (GLUCOPHAGE) 1000 MG tablet Take 1 tablet (1,000 mg total) by mouth 2 (two) times daily with meals.    nitroGLYCERIN 0.4 MG/HR TD PT24 (NITRODUR) 0.4 mg/hr Place 1 patch onto the skin continuous prn.    NOVOFINE 32 32 gauge x 1/4" Ndle 1 EACH BY MISC.(NON-DRUG COMBO ROUTE) ROUTE ONCE DAILY.    pen needle, diabetic (NOVOFINE PLUS) 32 gauge x 1/6" Ndle 1 each by Misc.(Non-Drug; Combo Route) route once daily.    tamsulosin (FLOMAX) 0.4 mg Cp24 Take 1 capsule (0.4 mg total) by mouth once daily.    temazepam (RESTORIL) 30 mg capsule TAKE 1 CAPSULE BY MOUTH EVERY DAY AT BEDTIME AS NEEDED    vitamin D 1000 units Tab Take 185 mg by mouth once daily.    ZOSTAVAX, PF, 19,400 unit/0.65 mL injection      No current facility-administered medications for this visit.        REVIEW OF SYSTEMS:    GENERAL:  No weight loss, malaise or fevers.  HEENT:   No recent changes in vision or hearing  NECK:  Negative for lumps, no difficulty with swallowing.  RESPIRATORY:  Negative for cough, wheezing or shortness of breath, patient denies any recent URI.  CARDIOVASCULAR:  Negative for chest pain, leg swelling or palpitations. HTN.   GI:  Negative for abdominal discomfort, blood in stools or black stools or change in bowel habits.  MUSCULOSKELETAL:  See HPI.  SKIN:  Negative for lesions, rash, and itching.  PSYCH:  History of anxiety. Patient's sleep is disturbed secondary to pain.  HEMATOLOGY/LYMPHOLOGY:  Negative for prolonged bleeding, bruising easily or swollen nodes.  Patient is not currently taking any anti-coagulants  ENDO: History of hypothyroidism and Diabetes.   NEURO:   No history of headaches, syncope, paralysis, seizures or tremors.  All other reviewed and negative other than " "HPI.    OBJECTIVE:    BP (!) 156/89 (BP Location: Left arm, Patient Position: Sitting)   Pulse (!) 58   Temp 97.6 °F (36.4 °C) (Oral)   Resp 14   Ht 5' 9" (1.753 m)   Wt 78 kg (172 lb)   BMI 25.40 kg/m²     PHYSICAL EXAMINATION:    GENERAL: Well appearing, in no acute distress, alert and oriented x3.  PSYCH:  Mood and affect appropriate.  SKIN: Skin color, texture, turgor normal, no rashes or lesions.  HEAD/FACE:  Normocephalic, atraumatic. Cranial nerves grossly intact.  NECK: Mild pain to palpation over the cervical paraspinous muscles. Spurling Negative. Limited ROM with pain on flexion and extension. Positive facet loading bilaterally.    CV: RRR with palpation of the radial artery.  PULM: No evidence of respiratory difficulty, symmetric chest rise.  BACK: Straight leg raise in the seated position is positive to pain in the L3 distribution on the right. There is pain with palpation over lumbar spine. Limited ROM with pain on extension. Positive facet loading bilaterally.   EXTREMITIES: Peripheral joint ROM is full and pain free without obvious instability or laxity in all four extremities. No deformities, edema, or skin discoloration. Good capillary refill.  MUSCULOSKELETAL: Shoulder provocative maneuvers are positive on the left. There is no pain with palpation over medial or lateral joint line of bilateral knees. Bilateral upper extremity strength is normal and symmetric.  No atrophy or tone abnormalities are noted.  NEURO: Bilateral upper extremity coordination and muscle stretch reflexes are physiologic and symmetric.  Abimael's negative. No clonus.  No loss of sensation is noted.  GAIT: Antalgic, ambulates without assistance.         Lab Results   Component Value Date    WBC 4.60 11/11/2016    HGB 12.6 (L) 11/11/2016    HCT 39.4 (L) 11/11/2016    MCV 97 11/11/2016     11/11/2016      Lab Results   Component Value Date    LABA1C 9.2 (H) 05/18/2016    HGBA1C 6.3 (H) 09/08/2017     Lab Results "   Component Value Date    CREATININE 1.1 02/24/2017        ASSESSMENT: 62 y.o. year old male with pain, consistent with     Encounter Diagnoses   Name Primary?    Chronic pain syndrome Yes    Lumbar radiculopathy     S/P lumbar fusion     DDD (degenerative disc disease), lumbar     Facet arthritis, degenerative, lumbar spine     Cervical radiculopathy     DDD (degenerative disc disease), cervical     Cervical stenosis of spinal canal        PLAN:     - Previous imaging was reviewed and discussed with the patient today. Previous labs reviewed today.     - Schedule for right L3 and L4 TF DWAIN.   The procedure, risks, benefits and options were discussed with patient. There are no contraindications to the procedure. The patient expressed understanding and agreed to proceed.  Consent obtained today.    - In the future, we can consider cervical medial branch blocks for his facet mediated pain.     - Continue Gabapentin. Encouraged to increase to TID.     - Continue Norco 10/325 mg every 12 hours as needed, #60. He does not need a refill today.     - Continue home exercise routine.     - Counseled the patient on the importance of activity restrictions and constant sleeping habits.     - RTC 2 weeks after above procedure.     - Dr. Espinoza was consulted on the patient and agrees with this plan.    The above plan and management options were discussed at length with patient. Patient is in agreement with the above and verbalized understanding.     Sue Walker NP  02/14/2018

## 2018-03-02 DIAGNOSIS — I10 HYPERTENSION, UNSPECIFIED TYPE: Primary | ICD-10-CM

## 2018-03-02 RX ORDER — IRBESARTAN 150 MG/1
150 TABLET ORAL DAILY
Qty: 30 TABLET | Refills: 0 | Status: SHIPPED | OUTPATIENT
Start: 2018-03-02 | End: 2018-03-29 | Stop reason: SDUPTHER

## 2018-03-02 RX ORDER — AMLODIPINE BESYLATE 10 MG/1
10 TABLET ORAL DAILY
Qty: 30 TABLET | Refills: 0 | Status: SHIPPED | OUTPATIENT
Start: 2018-03-02 | End: 2018-03-29 | Stop reason: SDUPTHER

## 2018-03-02 NOTE — TELEPHONE ENCOUNTER
----- Message from Bill Escoto sent at 3/2/2018 10:06 AM CST -----  Contact: Pt      _x  1st Request  _  2nd Request  _  3rd Request    Please refill the medication(s) listed below. Please call the patient when the prescription(s) is ready for  at this phone number      [phone] (M)      Medication #1: amlodipine (NORVASC) 10 MG tablet   Medication #2: irbesartan (AVAPRO) 150 MG tablet      Pt states he was previously a pt of  & has a NP appt with  on 03/09 & needs his Rx refilled prior to, its been 2 weeks since pt has taken Rx.     Preferred Pharmacy:   [facility]/pharmacy #[id number] - [place] - [address]  [address]  [place] [address]  Phone: [phone] Fax: [phone]

## 2018-03-08 ENCOUNTER — PATIENT OUTREACH (OUTPATIENT)
Dept: ADMINISTRATIVE | Facility: HOSPITAL | Age: 63
End: 2018-03-08

## 2018-03-09 ENCOUNTER — LAB VISIT (OUTPATIENT)
Dept: LAB | Facility: OTHER | Age: 63
End: 2018-03-09
Attending: INTERNAL MEDICINE
Payer: MEDICARE

## 2018-03-09 ENCOUNTER — OFFICE VISIT (OUTPATIENT)
Dept: INTERNAL MEDICINE | Facility: CLINIC | Age: 63
End: 2018-03-09
Attending: INTERNAL MEDICINE
Payer: MEDICARE

## 2018-03-09 VITALS
HEIGHT: 70 IN | HEART RATE: 65 BPM | OXYGEN SATURATION: 98 % | BODY MASS INDEX: 24.4 KG/M2 | RESPIRATION RATE: 12 BRPM | WEIGHT: 170.44 LBS | DIASTOLIC BLOOD PRESSURE: 72 MMHG | SYSTOLIC BLOOD PRESSURE: 128 MMHG

## 2018-03-09 DIAGNOSIS — B18.2 CHRONIC HEPATITIS C WITHOUT HEPATIC COMA: ICD-10-CM

## 2018-03-09 DIAGNOSIS — E21.0 PRIMARY HYPERPARATHYROIDISM: ICD-10-CM

## 2018-03-09 DIAGNOSIS — E03.9 HYPOTHYROIDISM, UNSPECIFIED TYPE: ICD-10-CM

## 2018-03-09 DIAGNOSIS — Z72.0 TOBACCO USE: ICD-10-CM

## 2018-03-09 DIAGNOSIS — F51.01 PRIMARY INSOMNIA: ICD-10-CM

## 2018-03-09 DIAGNOSIS — E11.9 DIABETES MELLITUS WITHOUT COMPLICATION: ICD-10-CM

## 2018-03-09 DIAGNOSIS — I25.10 CORONARY ARTERY DISEASE, ANGINA PRESENCE UNSPECIFIED, UNSPECIFIED VESSEL OR LESION TYPE, UNSPECIFIED WHETHER NATIVE OR TRANSPLANTED HEART: ICD-10-CM

## 2018-03-09 DIAGNOSIS — N40.0 BENIGN NON-NODULAR PROSTATIC HYPERPLASIA WITHOUT LOWER URINARY TRACT SYMPTOMS: ICD-10-CM

## 2018-03-09 DIAGNOSIS — Z12.11 COLON CANCER SCREENING: ICD-10-CM

## 2018-03-09 LAB
ALBUMIN SERPL BCP-MCNC: 4.7 G/DL
ALP SERPL-CCNC: 91 U/L
ALT SERPL W/O P-5'-P-CCNC: 11 U/L
ANION GAP SERPL CALC-SCNC: 10 MMOL/L
AST SERPL-CCNC: 19 U/L
BILIRUB SERPL-MCNC: 0.5 MG/DL
BUN SERPL-MCNC: 17 MG/DL
CALCIUM SERPL-MCNC: 11.2 MG/DL
CHLORIDE SERPL-SCNC: 106 MMOL/L
CO2 SERPL-SCNC: 23 MMOL/L
CREAT SERPL-MCNC: 1.2 MG/DL
EST. GFR  (AFRICAN AMERICAN): >60 ML/MIN/1.73 M^2
EST. GFR  (NON AFRICAN AMERICAN): >60 ML/MIN/1.73 M^2
ESTIMATED AVG GLUCOSE: 131 MG/DL
GLUCOSE SERPL-MCNC: 105 MG/DL
HBA1C MFR BLD HPLC: 6.2 %
POTASSIUM SERPL-SCNC: 5 MMOL/L
PROT SERPL-MCNC: 8.3 G/DL
SODIUM SERPL-SCNC: 139 MMOL/L
T4 FREE SERPL-MCNC: 1.01 NG/DL
TSH SERPL DL<=0.005 MIU/L-ACNC: 4.71 UIU/ML

## 2018-03-09 PROCEDURE — 80053 COMPREHEN METABOLIC PANEL: CPT

## 2018-03-09 PROCEDURE — 84439 ASSAY OF FREE THYROXINE: CPT

## 2018-03-09 PROCEDURE — 99999 PR PBB SHADOW E&M-EST. PATIENT-LVL IV: CPT | Mod: PBBFAC,,, | Performed by: INTERNAL MEDICINE

## 2018-03-09 PROCEDURE — 3074F SYST BP LT 130 MM HG: CPT | Mod: CPTII,S$GLB,, | Performed by: INTERNAL MEDICINE

## 2018-03-09 PROCEDURE — 99499 UNLISTED E&M SERVICE: CPT | Mod: S$GLB,,, | Performed by: INTERNAL MEDICINE

## 2018-03-09 PROCEDURE — 99214 OFFICE O/P EST MOD 30 MIN: CPT | Mod: S$GLB,,, | Performed by: INTERNAL MEDICINE

## 2018-03-09 PROCEDURE — 83036 HEMOGLOBIN GLYCOSYLATED A1C: CPT

## 2018-03-09 PROCEDURE — 84443 ASSAY THYROID STIM HORMONE: CPT

## 2018-03-09 PROCEDURE — 87522 HEPATITIS C REVRS TRNSCRPJ: CPT

## 2018-03-09 PROCEDURE — 3078F DIAST BP <80 MM HG: CPT | Mod: CPTII,S$GLB,, | Performed by: INTERNAL MEDICINE

## 2018-03-09 PROCEDURE — 3044F HG A1C LEVEL LT 7.0%: CPT | Mod: CPTII,S$GLB,, | Performed by: INTERNAL MEDICINE

## 2018-03-09 NOTE — PROGRESS NOTES
Subjective:       Patient ID: Rob Jones Jr. is a 62 y.o. male.    Chief Complaint: Establish Care    Here to establish care  Care previously provided by my colleague Remi Weathers who is no longer practicing with us.      PMHx  -DM- FBG this morning 100-130. Does not check before meals.    -HTN  -HLD  -CAD-does not currently have cardiologist. [facility] CP that last 2 seconds described as   -Hypothyroidism- chart has 125 but he says 137mcg. He will confirm this when he gets home and let me know. He is taking is appropriately though.   -Primary hyperparathyroid  -BPH-controlled with flomax.   -KRISTAN-  -Tobacco abuse-continues to smoke  -chronic low back pain s/p fusion-missed appt last week for injections with Dr. Hall  -Hx of HCV s/p tx 06/2017  -Had left sided pheochromocytoma removal 2006 by Dr. Brush in [place]. Reports BP issues at that time which led to diagnosis.  -CTS- has had injection by Dr. Villavicencio          Review of Systems   Constitutional: Negative for appetite change, chills, fever and unexpected weight change.   HENT: Negative for hearing loss, sore throat and trouble swallowing.    Eyes: Negative for visual disturbance.   Respiratory: Negative for cough, chest tightness and shortness of breath.    Cardiovascular: Negative for palpitations.   Gastrointestinal: Negative for abdominal pain, blood in stool, constipation, diarrhea, nausea and vomiting.   Endocrine: Negative for polydipsia and polyuria.   Genitourinary: Negative for decreased urine volume, difficulty urinating, dysuria, frequency and urgency.   Musculoskeletal: Positive for arthralgias, back pain, gait problem, myalgias and neck pain.   Skin: Negative for rash.   Neurological: Negative for dizziness and numbness.   Psychiatric/Behavioral: The patient is not nervous/anxious.        Past Medical History:   Diagnosis Date    Anxiety     Back pain     Cataract     Diabetes mellitus     History of hepatitis C; S/p RX with SVR 12  "documented 06/2017 6/1/2017    Hypertension     Postoperative hypothyroidism 11/3/2016    Primary hyperparathyroidism 1/18/2017    Thyroid disease      Past Surgical History:   Procedure Laterality Date    LUMBAR FUSION      THYROIDECTOMY      TONSILLECTOMY       Family History   Problem Relation Age of Onset    Cancer Mother      breast    Cataracts Father     No Known Problems Sister     No Known Problems Brother     No Known Problems Brother     Heart disease Brother     No Known Problems Sister     No Known Problems Sister     No Known Problems Sister     Glaucoma Paternal Uncle     Glaucoma Paternal Uncle      Social History     Social History    Marital status: Single     Spouse name: N/A    Number of children: N/A    Years of education: N/A     Occupational History    Disabled, pt says from [place] and now for back, DM2      Social History Main Topics    Smoking status: Current Every Day Smoker    Smokeless tobacco: Not on file    Alcohol use Yes    Drug use: No    Sexual activity: Not on file     Other Topics Concern    Not on file     Social History Narrative    Lives alone.  He has 2 adult children who do not live here.           Objective:      Vitals:    03/09/18 1243   BP: 128/72   BP Location: Left arm   Patient Position: Sitting   BP Method: Medium (Manual)   Pulse: 65   Resp: 12   SpO2: 98%   Weight: 77.3 kg (170 lb 6.7 oz)   Height: 5' 10" (1.778 m)      Physical Exam    Assessment:       1. Uncontrolled type 2 diabetes mellitus with complication, with long-term current use of insulin    2. Colon cancer screening    3. Coronary artery disease, angina presence unspecified, unspecified vessel or lesion type, unspecified whether native or transplanted heart    4. Hypothyroidism, unspecified type    5. Chronic hepatitis C without hepatic coma    6. Diabetes mellitus without complication    7. Primary hyperparathyroidism    8. Benign non-nodular prostatic hyperplasia without " lower urinary tract symptoms    9. Tobacco use    10. Primary insomnia        Plan:       Rob was seen today for establish care.    Diagnoses and all orders for this visit:    Uncontrolled type 2 diabetes mellitus with complication, with long-term current use of insulin  -     Hemoglobin A1c; Future  -     Comprehensive metabolic panel; Future    Colon cancer screening  -     Fecal Immunochemical Test (iFOBT); Future    Coronary artery disease, angina presence unspecified, unspecified vessel or lesion type, unspecified whether native or transplanted heart  -     Ambulatory referral to Cardiology  -     2D echo with color flow doppler; Future    Hypothyroidism, unspecified type  -     TSH; Future    Chronic hepatitis C without hepatic coma  -s/p Tx.  -     HEPATITIS C RNA, QUANTITATIVE, PCR; Future      Primary hyperparathyroidism   -asymptomatic. Update labs    Benign non-nodular prostatic hyperplasia without lower urinary tract symptoms   -controlled. Continue current regimen    Tobacco use  Patient counseled extensively on need for tobacco cessation and the risk associated with continuing, including but not limited to head/neck cancer, lung cancer, bladder cancer, increased risk of stroke and heart attack, development of chronic lung disease, etc.  Patient has been made aware of cessation assistance including medications, nicotine replacement, personal support.      Insomnia  -he uses restoril He uses appropriately. SE discussed. We will continue this.  -     temazepam (RESTORIL) 30 mg capsule; TAKE 1 CAPSULE BY MOUTH EVERY DAY AT BEDTIME AS NEEDED                Notification of Lab Results: Phone Call  Side effects of medication(s) were discussed in detail and patient voiced understanding.  Patient will call back for any issues or complications.

## 2018-03-09 NOTE — Clinical Note
Please let pt know his labs look good and are stable. Diabetes is will controlled. Hepatitis C remains cured. Thyroid labs are slightly off. Please confirm dose you have been taking. F/U in 6 months.

## 2018-03-10 RX ORDER — TEMAZEPAM 30 MG/1
CAPSULE ORAL
Qty: 90 CAPSULE | Refills: 1 | Status: SHIPPED | OUTPATIENT
Start: 2018-03-10 | End: 2018-12-10 | Stop reason: SDUPTHER

## 2018-03-10 RX ORDER — INSULIN DEGLUDEC 100 U/ML
15 INJECTION, SOLUTION SUBCUTANEOUS NIGHTLY
Qty: 15 ML | Refills: 2 | Status: SHIPPED | OUTPATIENT
Start: 2018-03-10 | End: 2019-02-20

## 2018-03-12 LAB
HCV LOG: <1.08 LOG (10) IU/ML
HCV RNA QUANT PCR: <12 IU/ML
HCV, QUALITATIVE: NOT DETECTED IU/ML

## 2018-03-14 ENCOUNTER — TELEPHONE (OUTPATIENT)
Dept: INTERNAL MEDICINE | Facility: CLINIC | Age: 63
End: 2018-03-14

## 2018-03-14 NOTE — TELEPHONE ENCOUNTER
----- Message from Yue Chen sent at 3/13/2018  8:20 AM CDT -----  _  1st Request  _  2nd Request  _  3rd Request        Who:   German from Dr Jerson Villavicencio office  Why: Requesting a call back in regards to having the pt medication list faxed to their office, pt is there now fax [phone]   Please return the call at earliest convenience. Thanks!    What Number to Call Back: [phone]      When to Expect a call back: (Within 24 hours)

## 2018-03-15 ENCOUNTER — OFFICE VISIT (OUTPATIENT)
Dept: PAIN MEDICINE | Facility: CLINIC | Age: 63
End: 2018-03-15
Payer: MEDICARE

## 2018-03-15 VITALS
HEART RATE: 63 BPM | WEIGHT: 170.88 LBS | DIASTOLIC BLOOD PRESSURE: 79 MMHG | SYSTOLIC BLOOD PRESSURE: 140 MMHG | BODY MASS INDEX: 24.46 KG/M2 | HEIGHT: 70 IN | TEMPERATURE: 98 F

## 2018-03-15 DIAGNOSIS — Z79.891 ENCOUNTER FOR MONITORING OPIOID MAINTENANCE THERAPY: ICD-10-CM

## 2018-03-15 DIAGNOSIS — Z98.1 S/P LUMBAR FUSION: ICD-10-CM

## 2018-03-15 DIAGNOSIS — M47.26 OSTEOARTHRITIS OF SPINE WITH RADICULOPATHY, LUMBAR REGION: ICD-10-CM

## 2018-03-15 DIAGNOSIS — M50.30 DDD (DEGENERATIVE DISC DISEASE), CERVICAL: ICD-10-CM

## 2018-03-15 DIAGNOSIS — M51.36 DDD (DEGENERATIVE DISC DISEASE), LUMBAR: ICD-10-CM

## 2018-03-15 DIAGNOSIS — M54.16 LUMBAR RADICULOPATHY: Primary | ICD-10-CM

## 2018-03-15 DIAGNOSIS — M48.02 CERVICAL STENOSIS OF SPINAL CANAL: ICD-10-CM

## 2018-03-15 DIAGNOSIS — M54.12 CERVICAL RADICULOPATHY: ICD-10-CM

## 2018-03-15 DIAGNOSIS — Z51.81 ENCOUNTER FOR MONITORING OPIOID MAINTENANCE THERAPY: ICD-10-CM

## 2018-03-15 DIAGNOSIS — M47.816 FACET ARTHRITIS, DEGENERATIVE, LUMBAR SPINE: ICD-10-CM

## 2018-03-15 DIAGNOSIS — G89.4 CHRONIC PAIN SYNDROME: ICD-10-CM

## 2018-03-15 PROCEDURE — 99214 OFFICE O/P EST MOD 30 MIN: CPT | Mod: S$GLB,,, | Performed by: NURSE PRACTITIONER

## 2018-03-15 PROCEDURE — 99999 PR PBB SHADOW E&M-EST. PATIENT-LVL III: CPT | Mod: PBBFAC,,, | Performed by: NURSE PRACTITIONER

## 2018-03-15 PROCEDURE — 3077F SYST BP >= 140 MM HG: CPT | Mod: CPTII,S$GLB,, | Performed by: NURSE PRACTITIONER

## 2018-03-15 PROCEDURE — 99499 UNLISTED E&M SERVICE: CPT | Mod: S$GLB,,, | Performed by: NURSE PRACTITIONER

## 2018-03-15 PROCEDURE — 3078F DIAST BP <80 MM HG: CPT | Mod: CPTII,S$GLB,, | Performed by: NURSE PRACTITIONER

## 2018-03-15 RX ORDER — INSULIN GLARGINE 100 [IU]/ML
INJECTION, SOLUTION SUBCUTANEOUS
COMMUNITY
Start: 2018-03-03 | End: 2019-02-20

## 2018-03-15 RX ORDER — HYDROCODONE BITARTRATE AND ACETAMINOPHEN 10; 325 MG/1; MG/1
1 TABLET ORAL EVERY 12 HOURS PRN
Qty: 60 TABLET | Refills: 0 | Status: SHIPPED | OUTPATIENT
Start: 2018-03-15 | End: 2018-04-14

## 2018-03-15 NOTE — PROGRESS NOTES
Chronic Pain - Established Visit    Referring Physician: No ref. provider found    Chief Complaint:   Chief Complaint   Patient presents with    Low-back Pain    Leg Pain        SUBJECTIVE: Disclaimer: This note has been generated using voice-recognition software. There may be typographical errors that have been missed during proof-reading    Interval History 3/15/2018:  The patient returns to clinic today for follow up. He was previously scheduled for repeat lumbar DWAIN which was canceled due to transportation issues. He continues to report low back pain that radiates down the side of his right leg to mid thigh. This pain is burning and shooting in nature. He denies any left leg pain. He continues to report neck pain with intermittent radiating pain into his arms. He continues to take Gabapentin 300 mg BID with benefit. He also takes Norco sparingly with benefit and without adverse effects. He denies any other health changes. He denies any bowel or bladder incontinence. His pain today is 5/10.    Interval History 2/14/2018:  The patient returns to clinic today for follow up. He is s/p C7-T1 IL DWAIN on 1/17/2018. He reports 25% relief of his neck pain. He continues to report neck pain that is constant, aching, and burning. He reports intermittent aching pain into his arms bilaterally. He reports that his low back pain is worse today. He reports low back pain that is radiating down the side of his right leg to mid thigh. He describes this pain as burning and shooting. He denies any left leg pain today. He continues to take Gabapentin 300 mg BID. He does take Norco sparingly with benefit. He denies any other health changes. He denies any bowel or bladder incontinence. His pain today is 6/10.         Interval History 1/9/2018:  The patient returns to clinic today for follow up. He reports increased neck pain in the last month. He describes the pain as constant, aching and burning. He reports intermittent radiating  pain into both arms that is burning in nature. He also reports increased shoulder pain. He does have a past history of left shoulder surgery years ago. He reports that his low back pain is tolerable at this time. He continues to take Gabapentin with benefit. He also takes Norco sparingly with benefit. He denies any adverse reactions with this medication. He denies any other health changes. His pain today is 5/10.    Interval History 11/7/2017:  The patient returns to clinic today for follow up of low back and leg pain. He reports low back pain that radiates down the side of left leg to mid thigh. He reports a recent flare up that was intense for a few days but then improved significantly. He denies any injury. He reports his pain is tolerable at this time. He continues to be physically active and participate in a home exercise routine. He continues to take Gabapentin TID with benefit and does need a refill. He also continues to take Norco sparingly with benefit. He denies any other health changes. His pain today is 5/10.     Interval History 9/7/2017:  The patient returns to clinic today for follow up. He is s/p bilateral L3 TF DWAIN on 8/25/2017. He reports 50% relief of his low back and radiating leg pain. He continues to report low back pain that is aching in nature. He reports that this is tolerable at this time. He continues to take Gabapentin with benefit. He continues to take Norco sparingly with benefit. He would like a refill today. He denies any other health changes. He denies any bowel or bladder incontinence. His pain today is 4/10.    Interval History 8/2/2017:  The patient returns to clinic today for follow up. He reports that his left leg pain has returned in the last few days. He reports low back pain that is radiating down the side of his left leg to mid thigh. He describes this pain as shooting and burning. He continues to use Gabapentin and Norco with benefit. He also reports bilateral knee pain  that is dull and aching in nature. He denies any injury. He has never had any interventions for his knees. He denies any bowel or bladder incontinence. His pain today is 6/10.    Interval History 7/6/2017:  The patient returns to clinic today for follow up. He is s/p left L3 TF DWAIN on 6/21/2017. He reports 80% relief of his leg pain. He does report low back pain that is worse in the morning. He does report stiffness. He continues to report benefit with Gabapentin and Norco. He does report increased activity since the procedure. He denies any other health changes. He denies any bowel or bladder incontinence. His pain today is 3/10.     Interval History 6/6/2017:  The patient returns to clinic today for follow up. He complains of low back pain that is radiating into his left leg. This pain has increased in intensity in the last few weeks. He did see Dr. Liz yesterday. He reports pain that begins in his low back and radiates down the side of his left thigh to his knee. He denies any radiating right leg pain. He also complains of stiffness in his lower back that is worse in the morning. He denies any bowel or bladder incontinence. He continues to take Gabapentin 1800 mg daily. He also take Norco every 12 hours as needed for pain and does need a refill today. He reports continued relief of shoulder pain with previous injection. His pain today is 5/10.    Interval History 4/20/2017  NM bone scan 3/29 with arthritis T12/L1 and L5/S1 follow-up with orthopedics regarding shoulder has severe osteoarthritis had a shoulder joint injection with great relief pain.  Not currently having any cervical radicular symptoms.  Gabapentin at 900 mg per day no side effects, does have some lower extremity radicular symptoms.  No other health changes.    Initial encounter:    Rob Jones Jr. presents to the clinic for the evaluation of neck, shoulder and lower back with lower extremities pain. The pain started years ago and symptoms  have been worsening.    Brief history:   patient recently evaluated in neurosurgery is scheduled for a nuclear medicine bone scan, possible workup for ACDF secondary to severe central stenosis of the cervical spine with radiculopathy.  The patient had a previous cervical transverse foraminal epidural steroid injection with greater than 50% improvement in his radicular symptoms into his right upper extremity although he continues to have what appears to be rotator cuff syndrome of the left shoulder and is scheduled for orthopedic evaluation soon.    Pain Description:    The current worst pain is located in the left shoulder area    At BEST  10/10     At WORST  10/10 on the WORST day.      On average pain is rated as 10/10.     Today the pain is rated as 10/10    The pain is described as aching, sharp, shooting, throbbing and tingling      Symptoms interfere with daily activity, sleeping and work.     Exacerbating factors: Sitting, Standing, Laying, Bending, Extension, Flexing, Lifting and Getting out of bed/chair.      Mitigating factors medications and injection.     Patient denies urinary incontinence and bowel incontinence.  Patient denies any suicidal or homicidal ideations    Pain Medications:  Current:  Hydrocodone/acetaminophen  Gabapentin 300 mg BID     Tried in Past:  NSAIDs -Never  TCA -Never  SNRI -Never  Anti-convulsants  Gabapentin   Muscle Relaxants -Never  Opioids-Never    Physical Therapy/Home Exercise: no       report:  Reviewed and consistent with medication use as prescribed.    Pain Procedures:   2/6/17- right C6-C7 TF DWAIN with IR   6/21/2017- Left L3 TF DWAIN- 80% relief  8/25/2017- Bilateral L3 TF DWAIN- 50% relief  1/17/2018- C7-T1 IL DWAIN- 25% relief    Chiropractor -never  Acupuncture - never  TENS unit -never  Spinal decompression -previous lumbar surgery  Joint replacement -never    Imaging:   Lumbar MRI 5/29/2017:  FINDINGS:     Alignment: Normal.    Vertebrae: No fracture. Essentially  normal marrow signal, with fatty endplate degenerative changes L4-S1.    Discs:  Disc spacer devices at L4-L5 and L5-S1 with partial fusion of the disc spaces.  Mild diffuse disc space narrowing throughout the lumbar spine with mild desiccation L3-L4.    Cord: Normal. Conus terminates at T12-L1.    Degenerative findings:        T11-T12: Incompletely imaged on MRI.  No significant disc disease.  No spinal canal stenosis.  Left-sided facet arthropathy results in mild left foraminal stenosis.       T12-L1: Incompletely imaged on MRI.  No significant disc disease.  No spinal canal stenosis.  No neural foraminal stenosis.       L1-L2: No significant disc disease.  Minimal bilateral facet arthropathy without spinal canal or foraminal stenosis.       L2-L3: Minimal circumferential disc bulge with mild bilateral facet arthropathy and buckling of the ligamentum flavum, overall resulting in mild left foraminal stenosis with no significant spinal canal stenosis.       L3-L4: Minimal circumferential disc bulge with mild bilateral facet arthropathy and buckling of the ligamentum flavum results in mild spinal canal stenosis, moderate left foraminal stenosis, and mild right foraminal stenosis.       L4-L5: Fusion of the disc space with mild right worse than left facet arthropathy results in mild right foraminal stenosis.  No spinal canal stenosis.       L5-S1: Minimal circumferential disc bulge with superimposed calcified left foraminal protrusion and mild bilateral facet arthropathy results in mild right and moderate left foraminal stenosis.  No spinal canal stenosis.    Paraspinal muscles & soft tissues: Mild thickening of the right adrenal gland without discrete nodule, unchanged from prior CT.   Impression       Disc spacers with partial fusion L4-S1 with mild disc and facet degeneration throughout the lumbar spine most pronounced at L3-L4 with mild spinal canal stenosis and moderate left foraminal stenosis as well as L5-S1  with moderate left foraminal stenosis.     Cervical MRI 1/16/2017:  Findings: There is no evidence of fracture, dislocation or marrow replacement process.  The vertebral body heights and alignment are maintained.  The visualized posterior fossa structures demonstrate no significant abnormality.  The surrounding soft tissue structures demonstrate no significant abnormality.  The cervical cord signal is normal.    C2-3: Unremarkable    C3-4: Uncovertebral joint hypertrophy resulting in mild right neural foraminal narrowing.    C4-5: Uncovertebral joint hypertrophy resulting in moderate bilateral neural foraminal narrowing.    C5-6: Uncovertebral joint hypertrophy resulting in mild right neural foraminal narrowing.    C6-7: Uncovertebral joint hypertrophy with a small broad-based posterior disc bulge and focal thickening of the ligamentum flavum resulting in severe canal stenosis and moderate right neural foraminal narrowing.    C7-T1: Unremarkable.   Impression       Multifocal degenerative disc disease as above. There is focal thickening of the ligamentum flavum at C6-7 which results in severe canal stenosis at that level. No cord signal abnormality.     Xray Shoulder 3/20/2017:  Findings: External rotation, Y. view and axillary views of the left shoulder demonstrate postsurgical hardware in the humeral neck which shows no significant change from the 2009 exam.  There is advanced joint space loss at the glenohumeral joint with sclerosis and osteophyte formation.  There is an ossific body at the inferior aspect of the joint which is well-corticated and measures approximately 2.2 cm.  Small osteophytes are also identified at the acromioclavicular joint.  There is no fracture, dislocation or osseous destructive process.  The visualized portions of the left lung are normal.   Impression      Findings suggestive of advanced osteoarthritis of the left shoulder.  The 2.2 cm osseous body may represent a loose body.  It is  located at the inferior aspect of the joint however.         Past Medical History:   Diagnosis Date    Anxiety     Back pain     Cataract     Diabetes mellitus     History of hepatitis C; S/p RX with SVR 12 documented 06/2017 6/1/2017    Hypertension     Postoperative hypothyroidism 11/3/2016    Primary hyperparathyroidism 1/18/2017    Thyroid disease      Past Surgical History:   Procedure Laterality Date    LUMBAR FUSION      THYROIDECTOMY      TONSILLECTOMY       Social History     Social History    Marital status: Single     Spouse name: N/A    Number of children: N/A    Years of education: N/A     Occupational History    Disabled, pt says from [place] and now for back, DM2      Social History Main Topics    Smoking status: Current Every Day Smoker    Smokeless tobacco: Not on file    Alcohol use Yes    Drug use: No    Sexual activity: Not on file     Other Topics Concern    Not on file     Social History Narrative    Lives alone.  He has 2 adult children who do not live here.       Family History   Problem Relation Age of Onset    Cancer Mother      breast    Cataracts Father     No Known Problems Sister     No Known Problems Brother     No Known Problems Brother     Heart disease Brother     No Known Problems Sister     No Known Problems Sister     No Known Problems Sister     Glaucoma Paternal Uncle     Glaucoma Paternal Uncle        Review of patient's allergies indicates:   Allergen Reactions    Tizanidine Shortness Of Breath       Current Outpatient Prescriptions   Medication Sig    albuterol (ACCUNEB) 1.25 mg/3 mL Nebu Take 3 mLs (1.25 mg total) by nebulization every 6 (six) hours as needed (shortness of breath). Rescue    amLODIPine (NORVASC) 10 MG tablet Take 1 tablet (10 mg total) by mouth once daily.    diclofenac sodium 1 % Gel Apply 2 g topically 4 (four) times daily.    dicyclomine (BENTYL) 20 mg tablet Take 20 mg by mouth daily as needed.    econazole nitrate  "1 % cream Apply topically 2 (two) times daily.    fish oil-omega-3 fatty acids 300-1,000 mg capsule Take 2 g by mouth once daily.    gabapentin (NEURONTIN) 300 MG capsule Take 300 mg by mouth 2 (two) times daily.    insulin aspart (NOVOLOG FLEXPEN) 100 unit/mL InPn pen Inject 15 Units into the skin with lunch.    insulin degludec (TRESIBA FLEXTOUCH U-100) 100 unit/mL (3 mL) InPn Inject 15 Units into the skin every evening.    irbesartan (AVAPRO) 150 MG tablet Take 1 tablet (150 mg total) by mouth once daily.    levothyroxine (SYNTHROID) 112 MCG tablet TAKE 1 TABLET BY MOUTH DAILY BEFORE BREAKFAST    meloxicam (MOBIC) 15 MG tablet TAKE 1 TABLET (15 MG TOTAL) BY MOUTH DAILY AS NEEDED FOR PAIN.    metformin (GLUCOPHAGE) 1000 MG tablet Take 1 tablet (1,000 mg total) by mouth 2 (two) times daily with meals.    nitroGLYCERIN 0.4 MG/HR TD PT24 (NITRODUR) 0.4 mg/hr Place 1 patch onto the skin continuous prn.    NOVOFINE 32 32 gauge x 1/4" Ndle 1 EACH BY MISC.(NON-DRUG COMBO ROUTE) ROUTE ONCE DAILY.    pen needle, diabetic (NOVOFINE PLUS) 32 gauge x 1/6" Ndle 1 each by Misc.(Non-Drug; Combo Route) route once daily.    tamsulosin (FLOMAX) 0.4 mg Cp24 Take 1 capsule (0.4 mg total) by mouth once daily.    temazepam (RESTORIL) 30 mg capsule TAKE 1 CAPSULE BY MOUTH EVERY DAY AT BEDTIME AS NEEDED    vitamin D 1000 units Tab Take 185 mg by mouth once daily.     No current facility-administered medications for this visit.        REVIEW OF SYSTEMS:    GENERAL:  No weight loss, malaise or fevers.  HEENT:   No recent changes in vision or hearing  NECK:  Negative for lumps, no difficulty with swallowing.  RESPIRATORY:  Negative for cough, wheezing or shortness of breath, patient denies any recent URI.  CARDIOVASCULAR:  Negative for chest pain, leg swelling or palpitations. HTN.   GI:  Negative for abdominal discomfort, blood in stools or black stools or change in bowel habits.  MUSCULOSKELETAL:  See HPI.  SKIN:  Negative " "for lesions, rash, and itching.  PSYCH:  History of anxiety. Patient's sleep is disturbed secondary to pain.  HEMATOLOGY/LYMPHOLOGY:  Negative for prolonged bleeding, bruising easily or swollen nodes.  Patient is not currently taking any anti-coagulants  ENDO: History of hypothyroidism and Diabetes.   NEURO:   No history of headaches, syncope, paralysis, seizures or tremors.  All other reviewed and negative other than HPI.    OBJECTIVE:    BP (!) 140/79   Pulse 63   Temp 98 °F (36.7 °C)   Ht 5' 10" (1.778 m)   Wt 77.5 kg (170 lb 13.7 oz)   BMI 24.52 kg/m²     PHYSICAL EXAMINATION:    GENERAL: Well appearing, in no acute distress, alert and oriented x3.  PSYCH:  Mood and affect appropriate.  SKIN: Skin color, texture, turgor normal, no rashes or lesions.  HEAD/FACE:  Normocephalic, atraumatic. Cranial nerves grossly intact.  NECK: Mild pain to palpation over the cervical paraspinous muscles. Spurling Negative. Limited ROM with pain on flexion and extension. Positive facet loading bilaterally.    CV: RRR with palpation of the radial artery.  PULM: No evidence of respiratory difficulty, symmetric chest rise.  BACK: Straight leg raise in the seated position is positive to pain in the L3 distribution on the right. There is pain with palpation over lumbar spine. Limited ROM with pain on flexion and extension. Positive facet loading bilaterally.   EXTREMITIES: Peripheral joint ROM is full and pain free without obvious instability or laxity in all four extremities. No deformities, edema, or skin discoloration. Good capillary refill.  MUSCULOSKELETAL: Shoulder provocative maneuvers are positive on the left. There is no pain with palpation over medial or lateral joint line of bilateral knees. Bilateral upper extremity strength is normal and symmetric.  No atrophy or tone abnormalities are noted.  NEURO: Bilateral upper extremity coordination and muscle stretch reflexes are physiologic and symmetric.  Abimael's negative. " No clonus.  No loss of sensation is noted.  GAIT: Antalgic, ambulates without assistance.         Lab Results   Component Value Date    WBC 4.60 11/11/2016    HGB 12.6 (L) 11/11/2016    HCT 39.4 (L) 11/11/2016    MCV 97 11/11/2016     11/11/2016      Lab Results   Component Value Date    LABA1C 9.2 (H) 05/18/2016    HGBA1C 6.2 (H) 03/09/2018     Lab Results   Component Value Date    CREATININE 1.2 03/09/2018        ASSESSMENT: 62 y.o. year old male with pain, consistent with     Encounter Diagnoses   Name Primary?    Lumbar radiculopathy Yes    Osteoarthritis of spine with radiculopathy, lumbar region     S/P lumbar fusion     DDD (degenerative disc disease), lumbar     Facet arthritis, degenerative, lumbar spine     Cervical radiculopathy     DDD (degenerative disc disease), cervical     Cervical stenosis of spinal canal     Chronic pain syndrome     Encounter for monitoring opioid maintenance therapy        PLAN:     - Previous imaging was reviewed and discussed with the patient today. Previous labs reviewed today.     - He may call to reschedule right L3 and L4 TF DWAIN at any time.     - In the future, we can consider cervical medial branch blocks for his facet mediated pain.     - Continue Gabapentin. Encouraged to increase to TID.     - Continue Norco 10/325 mg every 12 hours as needed, #60.     - The patient is here today for a refill of current pain medications and they believe these provide effective pain control and improvements in quality of life.  The patient notes no serious side effects, and feels the benefits outweigh the risks.  The patient was reminded of the pain contract that they signed previously as well as the risks and benefits of the medication including possible death.  The updated [place] Board of Pharmacy prescription monitoring program was reviewed, and the patient has been found to be compliant with current treatment plan. Medication management provided by Dr. Espinoza.      - UDS next visit.     - Continue home exercise routine.     - Counseled the patient on the importance of activity restrictions and constant sleeping habits.     - RTC in 2 months or sooner if needed.     - Dr. Espinoza was consulted on the patient and agrees with this plan.    The above plan and management options were discussed at length with patient. Patient is in agreement with the above and verbalized understanding.     Sue Walker NP  03/15/2018

## 2018-03-29 DIAGNOSIS — I10 HYPERTENSION, UNSPECIFIED TYPE: ICD-10-CM

## 2018-03-29 RX ORDER — AMLODIPINE BESYLATE 10 MG/1
10 TABLET ORAL DAILY
Qty: 90 TABLET | Refills: 2 | Status: SHIPPED | OUTPATIENT
Start: 2018-03-29 | End: 2018-12-20 | Stop reason: SDUPTHER

## 2018-03-29 RX ORDER — IRBESARTAN 150 MG/1
150 TABLET ORAL DAILY
Qty: 90 TABLET | Refills: 2 | Status: SHIPPED | OUTPATIENT
Start: 2018-03-29 | End: 2018-12-20 | Stop reason: SDUPTHER

## 2018-04-07 PROBLEM — F51.01 PRIMARY INSOMNIA: Status: ACTIVE | Noted: 2018-04-07

## 2018-04-16 RX ORDER — LEVOTHYROXINE SODIUM 112 UG/1
TABLET ORAL
Qty: 90 TABLET | Refills: 0 | Status: SHIPPED | OUTPATIENT
Start: 2018-04-16 | End: 2018-07-19 | Stop reason: SDUPTHER

## 2018-04-22 DIAGNOSIS — M47.816 FACET ARTHRITIS, DEGENERATIVE, LUMBAR SPINE: ICD-10-CM

## 2018-04-22 DIAGNOSIS — M51.36 DDD (DEGENERATIVE DISC DISEASE), LUMBAR: ICD-10-CM

## 2018-04-22 DIAGNOSIS — Z98.1 S/P LUMBAR FUSION: ICD-10-CM

## 2018-04-22 DIAGNOSIS — M47.816 LUMBAR SPONDYLOSIS: ICD-10-CM

## 2018-04-22 DIAGNOSIS — G89.4 CHRONIC PAIN SYNDROME: ICD-10-CM

## 2018-04-22 DIAGNOSIS — M54.17 LUMBOSACRAL RADICULOPATHY: ICD-10-CM

## 2018-04-23 RX ORDER — GABAPENTIN 300 MG/1
300 CAPSULE ORAL 3 TIMES DAILY
Qty: 90 CAPSULE | Refills: 5 | Status: SHIPPED | OUTPATIENT
Start: 2018-04-23 | End: 2018-12-07 | Stop reason: SDUPTHER

## 2018-04-24 RX ORDER — MELOXICAM 15 MG/1
15 TABLET ORAL DAILY PRN
Qty: 90 TABLET | Refills: 1 | Status: SHIPPED | OUTPATIENT
Start: 2018-04-24 | End: 2018-10-03 | Stop reason: SDUPTHER

## 2018-04-26 ENCOUNTER — PATIENT OUTREACH (OUTPATIENT)
Dept: ADMINISTRATIVE | Facility: HOSPITAL | Age: 63
End: 2018-04-26

## 2018-04-26 NOTE — PROGRESS NOTES
Dear Rob:    Your [facility] Team wants to make sure we help you prevent illness and make the healthiest choices possible.    Our records show you are due for colon cancer screening.  If you have already had your screening, or you have made an appointment for your screening, congratulations!  Youre on the road to good health. If you havent signed up for a colorectal screening please accept this invitation to be screened.      According to the American Cancer Society, colon cancer is the third most common cancer for people in the [place].  A simple screening test Fit Kit - done in your own home - can help find colon cancer at an early stage when it can be treated, even before any signs or symptoms develop.     Testing for blood in your stool (feces or bowel movement) is the first step. If you have an upcoming visit with your Primary Care Physician you can  a Fit Kit during your visit, or you can  a Fit Kit at your Primary Care Clinic prior to your visit.    The Fit Kit includes:     Instructions on how to perform the test   (1) Sheet of tissue paper   (1) Small Absorption pad   (1) Bottle to hold the sample and a small probe to help you take the sample   (1) Small plastic bio-hazard bag   (1) Postage-paid return envelope    Please do your test (the instructions will tell you how) and then return your sample in the postage-paid return envelope within 24 hours of collection.     A friendly reminder to please complete and mail in your fitkit for your colon rectal cancer screening. The kit was given to you on 3/9/18. If you have  misplaced kit or need instructions on completing  please contact Vasyl Jimenez MD office to obtain. Thank you for  Choosing [facility] for your Healthcare needs. Have a great day.     If your test results are negative, you wont need testing again for another year.  If results show you need more testing, we will call you with next steps.    Regular  colorectal cancer screening is one of the most powerful weapons for preventing colon cancer.  The website https://www.cancer.org/cancer/colon-rectal-cancer.html can answer many of your questions about this cancer and its prevention.  Just search for colorectal cancer.    I wish you continued good health!

## 2018-04-26 NOTE — PROGRESS NOTES
"Spoke with patient regarding completing of fit kit for his colon rectal cancer screening he stated ," I really dont like doing all of that, but I guess fred have to ," patient offered a colonoscopy in which he declined because of a past experience with prep.    Patient stated he does not return to town until next week and attempt to complete on his return. Understanding verbalized and patient informed that he will obtain another reminder call within a month if the test was not received in which he verbalized understanding.     Patient will also be mailed a reminder to complete test.   "

## 2018-05-01 DIAGNOSIS — M54.16 LUMBAR RADICULOPATHY: Primary | ICD-10-CM

## 2018-05-01 DIAGNOSIS — M47.26 OSTEOARTHRITIS OF SPINE WITH RADICULOPATHY, LUMBAR REGION: ICD-10-CM

## 2018-05-01 RX ORDER — HYDROCODONE BITARTRATE AND ACETAMINOPHEN 10; 325 MG/1; MG/1
1 TABLET ORAL EVERY 12 HOURS PRN
Qty: 60 TABLET | Refills: 0 | Status: SHIPPED | OUTPATIENT
Start: 2018-05-01 | End: 2018-06-18 | Stop reason: SDUPTHER

## 2018-05-01 NOTE — TELEPHONE ENCOUNTER
----- Message from Oneida Vazquez sent at 5/1/2018  9:04 AM CDT -----  Can the clinic reply in [facility]: no      Please refill the medication(s) listed below. Please call the patient when the prescription(s) is ready for  at the phone number [phone]      Medication #1  hydrocodone-acetaminophen 10-325mg (NORCO)  mg Tab 60 tablet 0 3/15/2018 4/14/2018 --  Sig - Route: Take 1 tablet by mouth every 12 (twelve) hours as needed. - Oral  Class: Print  Order: [id number]

## 2018-05-01 NOTE — TELEPHONE ENCOUNTER
Patient is requesting a refill on medication  Her last appointment was 03/17/2018.    shows last refill on 03/17/2018  NO UDS  No pain contract on file.

## 2018-05-02 ENCOUNTER — LAB VISIT (OUTPATIENT)
Dept: LAB | Facility: OTHER | Age: 63
End: 2018-05-02
Attending: INTERNAL MEDICINE
Payer: MEDICARE

## 2018-05-02 DIAGNOSIS — Z12.11 COLON CANCER SCREENING: ICD-10-CM

## 2018-05-02 PROCEDURE — 82274 ASSAY TEST FOR BLOOD FECAL: CPT

## 2018-05-03 LAB — HEMOCCULT STL QL IA: NEGATIVE

## 2018-05-15 ENCOUNTER — OFFICE VISIT (OUTPATIENT)
Dept: PAIN MEDICINE | Facility: CLINIC | Age: 63
End: 2018-05-15
Payer: MEDICARE

## 2018-05-15 VITALS
BODY MASS INDEX: 24 KG/M2 | HEIGHT: 70 IN | DIASTOLIC BLOOD PRESSURE: 85 MMHG | WEIGHT: 167.63 LBS | SYSTOLIC BLOOD PRESSURE: 140 MMHG | TEMPERATURE: 99 F | HEART RATE: 73 BPM | RESPIRATION RATE: 18 BRPM

## 2018-05-15 DIAGNOSIS — M50.30 DDD (DEGENERATIVE DISC DISEASE), CERVICAL: ICD-10-CM

## 2018-05-15 DIAGNOSIS — M47.816 FACET ARTHRITIS, DEGENERATIVE, LUMBAR SPINE: ICD-10-CM

## 2018-05-15 DIAGNOSIS — M54.16 LUMBAR RADICULOPATHY: Primary | ICD-10-CM

## 2018-05-15 DIAGNOSIS — G89.4 CHRONIC PAIN SYNDROME: ICD-10-CM

## 2018-05-15 DIAGNOSIS — Z51.81 ENCOUNTER FOR MONITORING OPIOID MAINTENANCE THERAPY: ICD-10-CM

## 2018-05-15 DIAGNOSIS — M48.02 CERVICAL STENOSIS OF SPINAL CANAL: ICD-10-CM

## 2018-05-15 DIAGNOSIS — Z98.1 S/P LUMBAR FUSION: ICD-10-CM

## 2018-05-15 DIAGNOSIS — M54.12 CERVICAL RADICULOPATHY: ICD-10-CM

## 2018-05-15 DIAGNOSIS — M51.36 DDD (DEGENERATIVE DISC DISEASE), LUMBAR: ICD-10-CM

## 2018-05-15 DIAGNOSIS — Z79.891 ENCOUNTER FOR MONITORING OPIOID MAINTENANCE THERAPY: ICD-10-CM

## 2018-05-15 DIAGNOSIS — M47.26 OSTEOARTHRITIS OF SPINE WITH RADICULOPATHY, LUMBAR REGION: ICD-10-CM

## 2018-05-15 PROCEDURE — 99213 OFFICE O/P EST LOW 20 MIN: CPT | Mod: S$GLB,,, | Performed by: NURSE PRACTITIONER

## 2018-05-15 PROCEDURE — 3008F BODY MASS INDEX DOCD: CPT | Mod: CPTII,S$GLB,, | Performed by: NURSE PRACTITIONER

## 2018-05-15 PROCEDURE — 99999 PR PBB SHADOW E&M-EST. PATIENT-LVL III: CPT | Mod: PBBFAC,,, | Performed by: NURSE PRACTITIONER

## 2018-05-15 PROCEDURE — 80307 DRUG TEST PRSMV CHEM ANLYZR: CPT

## 2018-05-15 PROCEDURE — 3077F SYST BP >= 140 MM HG: CPT | Mod: CPTII,S$GLB,, | Performed by: NURSE PRACTITIONER

## 2018-05-15 PROCEDURE — 3079F DIAST BP 80-89 MM HG: CPT | Mod: CPTII,S$GLB,, | Performed by: NURSE PRACTITIONER

## 2018-05-15 NOTE — PROGRESS NOTES
Chronic Pain - Established Visit    Referring Physician: No ref. provider found    Chief Complaint:   Chief Complaint   Patient presents with    Low-back Pain     left sided lower back pain         SUBJECTIVE: Disclaimer: This note has been generated using voice-recognition software. There may be typographical errors that have been missed during proof-reading    Interval History 5/15/2018:  The patient returns to clinic today for follow up. He continues to report low back pain that radiates down the side of his both legs to his mid thigh. He describes this pain as tingling and shooting. He continues to report neck pain that radiates into both arms. He continues to report benefit with current medication regimen. He takes Gabapentin 300 mg BID. He also takes Norco sparingly with benefit. He denies any adverse effects. He continues to perform a home exercise routine. He denies any other health changes. His pain today is 5/10.    Interval History 3/15/2018:  The patient returns to clinic today for follow up. He was previously scheduled for repeat lumbar DWAIN which was canceled due to transportation issues. He continues to report low back pain that radiates down the side of his right leg to mid thigh. This pain is burning and shooting in nature. He denies any left leg pain. He continues to report neck pain with intermittent radiating pain into his arms. He continues to take Gabapentin 300 mg BID with benefit. He also takes Norco sparingly with benefit and without adverse effects. He denies any other health changes. He denies any bowel or bladder incontinence. His pain today is 5/10.    Interval History 2/14/2018:  The patient returns to clinic today for follow up. He is s/p C7-T1 IL DWAIN on 1/17/2018. He reports 25% relief of his neck pain. He continues to report neck pain that is constant, aching, and burning. He reports intermittent aching pain into his arms bilaterally. He reports that his low back pain is worse  today. He reports low back pain that is radiating down the side of his right leg to mid thigh. He describes this pain as burning and shooting. He denies any left leg pain today. He continues to take Gabapentin 300 mg BID. He does take Norco sparingly with benefit. He denies any other health changes. He denies any bowel or bladder incontinence. His pain today is 6/10.         Interval History 1/9/2018:  The patient returns to clinic today for follow up. He reports increased neck pain in the last month. He describes the pain as constant, aching and burning. He reports intermittent radiating pain into both arms that is burning in nature. He also reports increased shoulder pain. He does have a past history of left shoulder surgery years ago. He reports that his low back pain is tolerable at this time. He continues to take Gabapentin with benefit. He also takes Norco sparingly with benefit. He denies any adverse reactions with this medication. He denies any other health changes. His pain today is 5/10.    Interval History 11/7/2017:  The patient returns to clinic today for follow up of low back and leg pain. He reports low back pain that radiates down the side of left leg to mid thigh. He reports a recent flare up that was intense for a few days but then improved significantly. He denies any injury. He reports his pain is tolerable at this time. He continues to be physically active and participate in a home exercise routine. He continues to take Gabapentin TID with benefit and does need a refill. He also continues to take Norco sparingly with benefit. He denies any other health changes. His pain today is 5/10.     Interval History 9/7/2017:  The patient returns to clinic today for follow up. He is s/p bilateral L3 TF DWAIN on 8/25/2017. He reports 50% relief of his low back and radiating leg pain. He continues to report low back pain that is aching in nature. He reports that this is tolerable at this time. He continues to  take Gabapentin with benefit. He continues to take Norco sparingly with benefit. He would like a refill today. He denies any other health changes. He denies any bowel or bladder incontinence. His pain today is 4/10.    Interval History 8/2/2017:  The patient returns to clinic today for follow up. He reports that his left leg pain has returned in the last few days. He reports low back pain that is radiating down the side of his left leg to mid thigh. He describes this pain as shooting and burning. He continues to use Gabapentin and Norco with benefit. He also reports bilateral knee pain that is dull and aching in nature. He denies any injury. He has never had any interventions for his knees. He denies any bowel or bladder incontinence. His pain today is 6/10.    Interval History 7/6/2017:  The patient returns to clinic today for follow up. He is s/p left L3 TF DWAIN on 6/21/2017. He reports 80% relief of his leg pain. He does report low back pain that is worse in the morning. He does report stiffness. He continues to report benefit with Gabapentin and Norco. He does report increased activity since the procedure. He denies any other health changes. He denies any bowel or bladder incontinence. His pain today is 3/10.     Interval History 6/6/2017:  The patient returns to clinic today for follow up. He complains of low back pain that is radiating into his left leg. This pain has increased in intensity in the last few weeks. He did see Dr. Liz yesterday. He reports pain that begins in his low back and radiates down the side of his left thigh to his knee. He denies any radiating right leg pain. He also complains of stiffness in his lower back that is worse in the morning. He denies any bowel or bladder incontinence. He continues to take Gabapentin 1800 mg daily. He also take Norco every 12 hours as needed for pain and does need a refill today. He reports continued relief of shoulder pain with previous injection. His  pain today is 5/10.    Interval History 4/20/2017  NM bone scan 3/29 with arthritis T12/L1 and L5/S1 follow-up with orthopedics regarding shoulder has severe osteoarthritis had a shoulder joint injection with great relief pain.  Not currently having any cervical radicular symptoms.  Gabapentin at 900 mg per day no side effects, does have some lower extremity radicular symptoms.  No other health changes.    Initial encounter:    Rob Jones JrRamona presents to the clinic for the evaluation of neck, shoulder and lower back with lower extremities pain. The pain started years ago and symptoms have been worsening.    Brief history:   patient recently evaluated in neurosurgery is scheduled for a nuclear medicine bone scan, possible workup for ACDF secondary to severe central stenosis of the cervical spine with radiculopathy.  The patient had a previous cervical transverse foraminal epidural steroid injection with greater than 50% improvement in his radicular symptoms into his right upper extremity although he continues to have what appears to be rotator cuff syndrome of the left shoulder and is scheduled for orthopedic evaluation soon.    Pain Description:    The current worst pain is located in the left shoulder area    At BEST  10/10     At WORST  10/10 on the WORST day.      On average pain is rated as 10/10.     Today the pain is rated as 10/10    The pain is described as aching, sharp, shooting, throbbing and tingling      Symptoms interfere with daily activity, sleeping and work.     Exacerbating factors: Sitting, Standing, Laying, Bending, Extension, Flexing, Lifting and Getting out of bed/chair.      Mitigating factors medications and injection.     Patient denies urinary incontinence and bowel incontinence.  Patient denies any suicidal or homicidal ideations    Pain Medications:  Current:  Hydrocodone/acetaminophen  Gabapentin 300 mg BID     Tried in Past:  NSAIDs -Never  TCA -Never  SNRI  -Never  Anti-convulsants  Gabapentin   Muscle Relaxants -Never  Opioids-Never    Physical Therapy/Home Exercise: no       report:  Reviewed and consistent with medication use as prescribed.    Pain Procedures:   2/6/17- right C6-C7 TF DWAIN with IR   6/21/2017- Left L3 TF DWAIN- 80% relief  8/25/2017- Bilateral L3 TF DWAIN- 50% relief  1/17/2018- C7-T1 IL DWAIN- 25% relief    Chiropractor -never  Acupuncture - never  TENS unit -never  Spinal decompression -previous lumbar surgery  Joint replacement -never    Imaging:   Lumbar MRI 5/29/2017:  FINDINGS:     Alignment: Normal.    Vertebrae: No fracture. Essentially normal marrow signal, with fatty endplate degenerative changes L4-S1.    Discs:  Disc spacer devices at L4-L5 and L5-S1 with partial fusion of the disc spaces.  Mild diffuse disc space narrowing throughout the lumbar spine with mild desiccation L3-L4.    Cord: Normal. Conus terminates at T12-L1.    Degenerative findings:        T11-T12: Incompletely imaged on MRI.  No significant disc disease.  No spinal canal stenosis.  Left-sided facet arthropathy results in mild left foraminal stenosis.       T12-L1: Incompletely imaged on MRI.  No significant disc disease.  No spinal canal stenosis.  No neural foraminal stenosis.       L1-L2: No significant disc disease.  Minimal bilateral facet arthropathy without spinal canal or foraminal stenosis.       L2-L3: Minimal circumferential disc bulge with mild bilateral facet arthropathy and buckling of the ligamentum flavum, overall resulting in mild left foraminal stenosis with no significant spinal canal stenosis.       L3-L4: Minimal circumferential disc bulge with mild bilateral facet arthropathy and buckling of the ligamentum flavum results in mild spinal canal stenosis, moderate left foraminal stenosis, and mild right foraminal stenosis.       L4-L5: Fusion of the disc space with mild right worse than left facet arthropathy results in mild right foraminal stenosis.  No  spinal canal stenosis.       L5-S1: Minimal circumferential disc bulge with superimposed calcified left foraminal protrusion and mild bilateral facet arthropathy results in mild right and moderate left foraminal stenosis.  No spinal canal stenosis.    Paraspinal muscles & soft tissues: Mild thickening of the right adrenal gland without discrete nodule, unchanged from prior CT.   Impression       Disc spacers with partial fusion L4-S1 with mild disc and facet degeneration throughout the lumbar spine most pronounced at L3-L4 with mild spinal canal stenosis and moderate left foraminal stenosis as well as L5-S1 with moderate left foraminal stenosis.     Cervical MRI 1/16/2017:  Findings: There is no evidence of fracture, dislocation or marrow replacement process.  The vertebral body heights and alignment are maintained.  The visualized posterior fossa structures demonstrate no significant abnormality.  The surrounding soft tissue structures demonstrate no significant abnormality.  The cervical cord signal is normal.    C2-3: Unremarkable    C3-4: Uncovertebral joint hypertrophy resulting in mild right neural foraminal narrowing.    C4-5: Uncovertebral joint hypertrophy resulting in moderate bilateral neural foraminal narrowing.    C5-6: Uncovertebral joint hypertrophy resulting in mild right neural foraminal narrowing.    C6-7: Uncovertebral joint hypertrophy with a small broad-based posterior disc bulge and focal thickening of the ligamentum flavum resulting in severe canal stenosis and moderate right neural foraminal narrowing.    C7-T1: Unremarkable.   Impression       Multifocal degenerative disc disease as above. There is focal thickening of the ligamentum flavum at C6-7 which results in severe canal stenosis at that level. No cord signal abnormality.     Xray Shoulder 3/20/2017:  Findings: External rotation, Y. view and axillary views of the left shoulder demonstrate postsurgical hardware in the humeral neck which  shows no significant change from the 2009 exam.  There is advanced joint space loss at the glenohumeral joint with sclerosis and osteophyte formation.  There is an ossific body at the inferior aspect of the joint which is well-corticated and measures approximately 2.2 cm.  Small osteophytes are also identified at the acromioclavicular joint.  There is no fracture, dislocation or osseous destructive process.  The visualized portions of the left lung are normal.   Impression      Findings suggestive of advanced osteoarthritis of the left shoulder.  The 2.2 cm osseous body may represent a loose body.  It is located at the inferior aspect of the joint however.         Past Medical History:   Diagnosis Date    Anxiety     Back pain     Cataract     Diabetes mellitus     History of hepatitis C; S/p RX with SVR 12 documented 06/2017 6/1/2017    Hypertension     Postoperative hypothyroidism 11/3/2016    Primary hyperparathyroidism 1/18/2017    Thyroid disease      Past Surgical History:   Procedure Laterality Date    LUMBAR FUSION      THYROIDECTOMY      TONSILLECTOMY       Social History     Social History    Marital status: Single     Spouse name: N/A    Number of children: N/A    Years of education: N/A     Occupational History    Disabled, pt says from [facility] and now for back, DM2      Social History Main Topics    Smoking status: Current Every Day Smoker    Smokeless tobacco: Not on file    Alcohol use Yes    Drug use: No    Sexual activity: Not on file     Other Topics Concern    Not on file     Social History Narrative    Lives alone.  He has 2 adult children who do not live here.       Family History   Problem Relation Age of Onset    Cancer Mother         breast    Cataracts Father     No Known Problems Sister     No Known Problems Brother     No Known Problems Brother     Heart disease Brother     No Known Problems Sister     No Known Problems Sister     No Known Problems Sister      "Glaucoma Paternal Uncle     Glaucoma Paternal Uncle        Review of patient's allergies indicates:   Allergen Reactions    Tizanidine Shortness Of Breath       Current Outpatient Prescriptions   Medication Sig    albuterol (ACCUNEB) 1.25 mg/3 mL Nebu Take 3 mLs (1.25 mg total) by nebulization every 6 (six) hours as needed (shortness of breath). Rescue    amLODIPine (NORVASC) 10 MG tablet TAKE 1 TABLET (10 MG TOTAL) BY MOUTH ONCE DAILY.    diclofenac sodium 1 % Gel Apply 2 g topically 4 (four) times daily.    dicyclomine (BENTYL) 20 mg tablet Take 20 mg by mouth daily as needed.    econazole nitrate 1 % cream Apply topically 2 (two) times daily.    fish oil-omega-3 fatty acids 300-1,000 mg capsule Take 2 g by mouth once daily.    gabapentin (NEURONTIN) 300 MG capsule Take 300 mg by mouth 2 (two) times daily.    gabapentin (NEURONTIN) 300 MG capsule TAKE 1 CAPSULE (300 MG TOTAL) BY MOUTH 3 (THREE) TIMES DAILY.    hydrocodone-acetaminophen 10-325mg (NORCO)  mg Tab Take 1 tablet by mouth every 12 (twelve) hours as needed for Pain.    insulin aspart (NOVOLOG FLEXPEN) 100 unit/mL InPn pen Inject 15 Units into the skin with lunch.    insulin degludec (TRESIBA FLEXTOUCH U-100) 100 unit/mL (3 mL) InPn Inject 15 Units into the skin every evening.    irbesartan (AVAPRO) 150 MG tablet TAKE 1 TABLET (150 MG TOTAL) BY MOUTH ONCE DAILY.    LANTUS SOLOSTAR U-100 INSULIN 100 unit/mL (3 mL) InPn pen     levothyroxine (SYNTHROID) 112 MCG tablet TAKE 1 TABLET BY MOUTH DAILY BEFORE BREAKFAST    meloxicam (MOBIC) 15 MG tablet Take 1 tablet (15 mg total) by mouth daily as needed for Pain.    metformin (GLUCOPHAGE) 1000 MG tablet Take 1 tablet (1,000 mg total) by mouth 2 (two) times daily with meals.    nitroGLYCERIN 0.4 MG/HR TD PT24 (NITRODUR) 0.4 mg/hr Place 1 patch onto the skin continuous prn.    NOVOFINE 32 32 gauge x 1/4" Ndle 1 EACH BY MISC.(NON-DRUG COMBO ROUTE) ROUTE ONCE DAILY.    pen needle, diabetic " "(NOVOFINE PLUS) 32 gauge x 1/6" Ndle 1 each by Misc.(Non-Drug; Combo Route) route once daily.    tamsulosin (FLOMAX) 0.4 mg Cp24 Take 1 capsule (0.4 mg total) by mouth once daily.    temazepam (RESTORIL) 30 mg capsule TAKE 1 CAPSULE BY MOUTH EVERY DAY AT BEDTIME AS NEEDED    vitamin D 1000 units Tab Take 185 mg by mouth once daily.     No current facility-administered medications for this visit.        REVIEW OF SYSTEMS:    GENERAL:  No weight loss, malaise or fevers.  HEENT:   No recent changes in vision or hearing  NECK:  Negative for lumps, no difficulty with swallowing.  RESPIRATORY:  Negative for cough, wheezing or shortness of breath, patient denies any recent URI.  CARDIOVASCULAR:  Negative for chest pain, leg swelling or palpitations. HTN.   GI:  Negative for abdominal discomfort, blood in stools or black stools or change in bowel habits.  MUSCULOSKELETAL:  See HPI.  SKIN:  Negative for lesions, rash, and itching.  PSYCH:  History of anxiety. Patient's sleep is disturbed secondary to pain.  HEMATOLOGY/LYMPHOLOGY:  Negative for prolonged bleeding, bruising easily or swollen nodes.  Patient is not currently taking any anti-coagulants  ENDO: History of hypothyroidism and Diabetes.   NEURO:   No history of headaches, syncope, paralysis, seizures or tremors.  All other reviewed and negative other than HPI.    OBJECTIVE:    BP (!) 140/85   Pulse 73   Temp 98.5 °F (36.9 °C) (Oral)   Resp 18   Ht 5' 10" (1.778 m)   Wt 76 kg (167 lb 9.6 oz)   BMI 24.05 kg/m²     PHYSICAL EXAMINATION:    GENERAL: Well appearing, in no acute distress, alert and oriented x3.  PSYCH:  Mood and affect appropriate.  SKIN: Skin color, texture, turgor normal, no rashes or lesions.  HEAD/FACE:  Normocephalic, atraumatic. Cranial nerves grossly intact.  NECK: Mild pain to palpation over the cervical paraspinous muscles. Spurling Negative. Limited ROM with pain on flexion and extension. Positive facet loading bilaterally.    CV: RRR " with palpation of the radial artery.  PULM: No evidence of respiratory difficulty, symmetric chest rise.  BACK: Straight leg raise in the seated position is positive to pain bilaterally, left greater than right. There is pain with palpation over lumbar spine. Limited ROM with pain on extension. Positive facet loading bilaterally.   EXTREMITIES: Peripheral joint ROM is full and pain free without obvious instability or laxity in all four extremities. No deformities, edema, or skin discoloration. Good capillary refill.  MUSCULOSKELETAL: Shoulder provocative maneuvers are positive on the left. There is no pain with palpation over medial or lateral joint line of bilateral knees. Bilateral upper extremity strength is normal and symmetric.  No atrophy or tone abnormalities are noted.  NEURO: Bilateral upper extremity coordination and muscle stretch reflexes are physiologic and symmetric.  Abimael's negative. No clonus.  No loss of sensation is noted.  GAIT: Antalgic, ambulates without assistance.         Lab Results   Component Value Date    WBC 4.60 11/11/2016    HGB 12.6 (L) 11/11/2016    HCT 39.4 (L) 11/11/2016    MCV 97 11/11/2016     11/11/2016      Lab Results   Component Value Date    LABA1C 9.2 (H) 05/18/2016    HGBA1C 6.2 (H) 03/09/2018     Lab Results   Component Value Date    CREATININE 1.2 03/09/2018        ASSESSMENT: 62 y.o. year old male with pain, consistent with     Encounter Diagnoses   Name Primary?    Lumbar radiculopathy Yes    Osteoarthritis of spine with radiculopathy, lumbar region     S/P lumbar fusion     DDD (degenerative disc disease), lumbar     Facet arthritis, degenerative, lumbar spine     Cervical radiculopathy     DDD (degenerative disc disease), cervical     Cervical stenosis of spinal canal     Chronic pain syndrome     Encounter for monitoring opioid maintenance therapy        PLAN:     - Previous imaging was reviewed and discussed with the patient today. Previous labs  reviewed today.     - We may repeat lumbar DWAIN at any time.     - In the future, we can consider cervical medial branch blocks for his facet mediated pain.     - Continue Gabapentin.     - Continue Norco 10/325 mg every 12 hours as needed, #60. He does not need a refill today.     - The patient is here today for a refill of current pain medications and they believe these provide effective pain control and improvements in quality of life.  The patient notes no serious side effects, and feels the benefits outweigh the risks.  The patient was reminded of the pain contract that they signed previously as well as the risks and benefits of the medication including possible death.  The updated [place] Board [employer] prescription monitoring program was reviewed, and the patient has been found to be compliant with current treatment plan. Medication management provided by Dr. Espinoza.     - UDS done today.      - Continue home exercise routine.     - Counseled the patient on the importance of activity restrictions and constant sleeping habits.     - RTC in 2 months or sooner if needed.     - Dr. Espinoza was consulted on the patient and agrees with this plan.    The above plan and management options were discussed at length with patient. Patient is in agreement with the above and verbalized understanding.     Sue Walker NP  05/15/2018

## 2018-05-18 ENCOUNTER — LAB VISIT (OUTPATIENT)
Dept: LAB | Facility: OTHER | Age: 63
End: 2018-05-18
Payer: MEDICARE

## 2018-05-18 DIAGNOSIS — Z86.19 HISTORY OF HEPATITIS C: ICD-10-CM

## 2018-05-18 LAB
6MAM UR QL: NOT DETECTED
7AMINOCLONAZEPAM UR QL: NOT DETECTED
A-OH ALPRAZ UR QL: NOT DETECTED
ALPRAZ UR QL: NOT DETECTED
AMPHET UR QL SCN: NOT DETECTED
ANNOTATION COMMENT IMP: NORMAL
ANNOTATION COMMENT IMP: NORMAL
BARBITURATES UR QL: NOT DETECTED
BUPRENORPHINE UR QL: NOT DETECTED
BZE UR QL: PRESENT
CARBOXYTHC UR QL: NOT DETECTED
CARISOPRODOL UR QL: NOT DETECTED
CLONAZEPAM UR QL: NOT DETECTED
CODEINE UR QL: NOT DETECTED
CREAT UR-MCNC: 133.8 MG/DL (ref 20–400)
DIAZEPAM UR QL: NOT DETECTED
ETHYL GLUCURONIDE UR QL: PRESENT
FENTANYL UR QL: NOT DETECTED
HYDROCODONE UR QL: PRESENT
HYDROMORPHONE UR QL: PRESENT
LORAZEPAM UR QL: NOT DETECTED
MDA UR QL: NOT DETECTED
MDEA UR QL: NOT DETECTED
MDMA UR QL: NOT DETECTED
ME-PHENIDATE UR QL: NOT DETECTED
MEPERIDINE UR QL: NOT DETECTED
METHADONE UR QL: NOT DETECTED
METHAMPHET UR QL: NOT DETECTED
MIDAZOLAM UR QL SCN: NOT DETECTED
MORPHINE UR QL: NOT DETECTED
NORBUPRENORPHINE UR QL CFM: NOT DETECTED
NORDIAZEPAM UR QL: NOT DETECTED
NORFENTANYL UR QL: NOT DETECTED
NORHYDROCODONE UR QL CFM: PRESENT
NOROXYCODONE UR QL CFM: NOT DETECTED
NOROXYMORPHONE: NOT DETECTED
OXAZEPAM UR QL: PRESENT
OXYCODONE UR QL: NOT DETECTED
OXYMORPHONE UR QL: NOT DETECTED
PATHOLOGY STUDY: NORMAL
PCP UR QL: NOT DETECTED
PHENTERMINE UR QL: NOT DETECTED
PROPOXYPH UR QL: NOT DETECTED
SERVICE CMNT-IMP: NORMAL
TAPENTADOL UR QL SCN: NOT DETECTED
TAPENTADOL-O-SULF: NOT DETECTED
TEMAZEPAM UR QL: PRESENT
TRAMADOL UR QL: NOT DETECTED
ZOLPIDEM UR QL: NOT DETECTED

## 2018-05-18 PROCEDURE — 36415 COLL VENOUS BLD VENIPUNCTURE: CPT

## 2018-05-18 PROCEDURE — 87522 HEPATITIS C REVRS TRNSCRPJ: CPT

## 2018-05-21 LAB
HCV LOG: <1.08 LOG (10) IU/ML
HCV RNA QUANT PCR: <12 IU/ML
HCV, QUALITATIVE: NOT DETECTED IU/ML

## 2018-05-23 ENCOUNTER — TELEPHONE (OUTPATIENT)
Dept: HEPATOLOGY | Facility: CLINIC | Age: 63
End: 2018-05-23

## 2018-05-23 NOTE — TELEPHONE ENCOUNTER
HCV LAB REVIEW  SVR 1 year  Baseline HCV RNA >6 million    Pertinent labs:  HCV RNA: <12, not detected    Results discussed with patient     Will mail letter

## 2018-05-30 DIAGNOSIS — E03.9 HYPOTHYROIDISM, UNSPECIFIED TYPE: Primary | ICD-10-CM

## 2018-06-06 ENCOUNTER — OFFICE VISIT (OUTPATIENT)
Dept: PODIATRY | Facility: CLINIC | Age: 63
End: 2018-06-06
Payer: MEDICARE

## 2018-06-06 VITALS
SYSTOLIC BLOOD PRESSURE: 132 MMHG | HEART RATE: 59 BPM | WEIGHT: 167 LBS | BODY MASS INDEX: 23.91 KG/M2 | DIASTOLIC BLOOD PRESSURE: 71 MMHG | HEIGHT: 70 IN

## 2018-06-06 DIAGNOSIS — Q66.6 PES VALGUS: ICD-10-CM

## 2018-06-06 DIAGNOSIS — I73.9 PVD (PERIPHERAL VASCULAR DISEASE): ICD-10-CM

## 2018-06-06 DIAGNOSIS — M20.11 VALGUS DEFORMITY OF BOTH GREAT TOES: ICD-10-CM

## 2018-06-06 DIAGNOSIS — B35.1 ONYCHOMYCOSIS DUE TO DERMATOPHYTE: ICD-10-CM

## 2018-06-06 DIAGNOSIS — M72.2 PLANTAR FASCIITIS: ICD-10-CM

## 2018-06-06 DIAGNOSIS — M20.42 HAMMER TOES OF BOTH FEET: ICD-10-CM

## 2018-06-06 DIAGNOSIS — M20.41 HAMMER TOES OF BOTH FEET: ICD-10-CM

## 2018-06-06 DIAGNOSIS — R60.0 BILATERAL LOWER EXTREMITY EDEMA: ICD-10-CM

## 2018-06-06 DIAGNOSIS — M20.12 VALGUS DEFORMITY OF BOTH GREAT TOES: ICD-10-CM

## 2018-06-06 DIAGNOSIS — M62.469 GASTROCNEMIUS EQUINUS, UNSPECIFIED LATERALITY: ICD-10-CM

## 2018-06-06 DIAGNOSIS — E11.49 TYPE II DIABETES MELLITUS WITH NEUROLOGICAL MANIFESTATIONS: Primary | ICD-10-CM

## 2018-06-06 PROCEDURE — 11721 DEBRIDE NAIL 6 OR MORE: CPT | Mod: Q9,S$GLB,,

## 2018-06-06 PROCEDURE — 3075F SYST BP GE 130 - 139MM HG: CPT | Mod: CPTII,S$GLB,,

## 2018-06-06 PROCEDURE — 3044F HG A1C LEVEL LT 7.0%: CPT | Mod: CPTII,S$GLB,,

## 2018-06-06 PROCEDURE — 3008F BODY MASS INDEX DOCD: CPT | Mod: CPTII,S$GLB,,

## 2018-06-06 PROCEDURE — 99214 OFFICE O/P EST MOD 30 MIN: CPT | Mod: 25,S$GLB,,

## 2018-06-06 PROCEDURE — 3078F DIAST BP <80 MM HG: CPT | Mod: CPTII,S$GLB,,

## 2018-06-06 NOTE — PROGRESS NOTES
Subjective:      Patient ID: Rob Jones Jr. is a 62 y.o. male.    Chief Complaint: Foot Pain (Left Arch) and Nail Care (PCP: Vasyl Jimenez 03/09/2018  A1C: 03/09/2018 / 6.2)    Rob is a 62 y.o. male who presents to the clinic for evaluation and treatment of high risk feet. Rob has a past medical history of Anxiety; Back pain; Cataract; Diabetes mellitus; History of hepatitis C; S/p RX with SVR 12 documented 06/2017 (6/1/2017); Hypertension; Postoperative hypothyroidism (11/3/2016); Primary hyperparathyroidism (1/18/2017); and Thyroid disease. The patient's chief complaint is long, thick toenails and left arch pain.  He does report a history of a calcaneal fracture which was not operated upon. This patient has documented high risk feet requiring routine maintenance secondary to peripheral neuropathy.    PCP: Vasyl Jimenez MD        Current shoe gear:  Affected Foot: Tennis shoes     Unaffected Foot: Tennis shoes    Hemoglobin A1C   Date Value Ref Range Status   03/09/2018 6.2 (H) 4.0 - 5.6 % Final     Comment:     According to ADA guidelines, hemoglobin A1c <7.0% represents  optimal control in non-pregnant diabetic patients. Different  metrics may apply to specific patient populations.   Standards of Medical Care in Diabetes-2016.  For the purpose of screening for the presence of diabetes:  <5.7%     Consistent with the absence of diabetes  5.7-6.4%  Consistent with increasing risk for diabetes   (prediabetes)  >or=6.5%  Consistent with diabetes  Currently, no consensus exists for use of hemoglobin A1c  for diagnosis of diabetes for children.  This Hemoglobin A1c assay has significant interference with fetal   hemoglobin   (HbF). The results are invalid for patients with abnormal amounts of   HbF,   including those with known Hereditary Persistence   of Fetal Hemoglobin. Heterozygous hemoglobin variants (HbAS, HbAC,   HbAD, HbAE, HbA2) do not significantly interfere with this assay;   however,  presence of multiple variants in a sample may impact the %   interference.     09/08/2017 6.3 (H) 4.0 - 5.6 % Final     Comment:     According to ADA guidelines, hemoglobin A1c <7.0% represents  optimal control in non-pregnant diabetic patients. Different  metrics may apply to specific patient populations.   Standards of Medical Care in Diabetes-2016.  For the purpose of screening for the presence of diabetes:  <5.7%     Consistent with the absence of diabetes  5.7-6.4%  Consistent with increasing risk for diabetes   (prediabetes)  >or=6.5%  Consistent with diabetes  Currently, no consensus exists for use of hemoglobin A1c  for diagnosis of diabetes for children.  This Hemoglobin A1c assay has significant interference with fetal   hemoglobin   (HbF). The results are invalid for patients with abnormal amounts of   HbF,   including those with known Hereditary Persistence   of Fetal Hemoglobin. Heterozygous hemoglobin variants (HbAS, HbAC,   HbAD, HbAE, HbA2) do not significantly interfere with this assay;   however, presence of multiple variants in a sample may impact the %   interference.     05/08/2017 7.2 (H) 4.5 - 6.2 % Final     Comment:     According to ADA guidelines, hemoglobin A1C <7.0% represents  optimal control in non-pregnant diabetic patients.  Different  metrics may apply to specific populations.   Standards of Medical Care in Diabetes - 2016.  For the purpose of screening for the presence of diabetes:  <5.7%     Consistent with the absence of diabetes  5.7-6.4%  Consistent with increasing risk for diabetes   (prediabetes)  >or=6.5%  Consistent with diabetes  Currently no consensus exists for use of hemoglobin A1C  for diagnosis of diabetes for children.         Past Medical History:   Diagnosis Date    Anxiety     Back pain     Cataract     Diabetes mellitus     History of hepatitis C; S/p RX with SVR 12 documented 06/2017 6/1/2017    Hypertension     Postoperative hypothyroidism 11/3/2016     Primary hyperparathyroidism 1/18/2017    Thyroid disease        Past Surgical History:   Procedure Laterality Date    LUMBAR FUSION      THYROIDECTOMY      TONSILLECTOMY         Family History   Problem Relation Age of Onset    Cancer Mother         breast    Cataracts Father     No Known Problems Sister     No Known Problems Brother     No Known Problems Brother     Heart disease Brother     No Known Problems Sister     No Known Problems Sister     No Known Problems Sister     Glaucoma Paternal Uncle     Glaucoma Paternal Uncle        Social History     Social History    Marital status: Single     Spouse name: N/A    Number of children: N/A    Years of education: N/A     Occupational History    Disabled, pt says from [place] and now for back, DM2      Social History Main Topics    Smoking status: Current Every Day Smoker    Smokeless tobacco: None    Alcohol use Yes    Drug use: No    Sexual activity: Not Asked     Other Topics Concern    None     Social History Narrative    Lives alone.  He has 2 adult children who do not live here.         Current Outpatient Prescriptions   Medication Sig Dispense Refill    albuterol (ACCUNEB) 1.25 mg/3 mL Nebu Take 3 mLs (1.25 mg total) by nebulization every 6 (six) hours as needed (shortness of breath). Rescue 1 Box 2    amLODIPine (NORVASC) 10 MG tablet TAKE 1 TABLET (10 MG TOTAL) BY MOUTH ONCE DAILY. 90 tablet 2    dicyclomine (BENTYL) 20 mg tablet Take 20 mg by mouth daily as needed.      econazole nitrate 1 % cream Apply topically 2 (two) times daily. 30 g 1    fish oil-omega-3 fatty acids 300-1,000 mg capsule Take 2 g by mouth once daily.      gabapentin (NEURONTIN) 300 MG capsule Take 300 mg by mouth 2 (two) times daily.      hydrocodone-acetaminophen 10-325mg (NORCO)  mg Tab Take 1 tablet by mouth every 12 (twelve) hours as needed for Pain. 60 tablet 0    insulin aspart (NOVOLOG FLEXPEN) 100 unit/mL InPn pen Inject 15 Units into the  "skin with lunch. 1 Box 5    insulin degludec (TRESIBA FLEXTOUCH U-100) 100 unit/mL (3 mL) InPn Inject 15 Units into the skin every evening. 15 mL 2    irbesartan (AVAPRO) 150 MG tablet TAKE 1 TABLET (150 MG TOTAL) BY MOUTH ONCE DAILY. 90 tablet 2    LANTUS SOLOSTAR U-100 INSULIN 100 unit/mL (3 mL) InPn pen       levothyroxine (SYNTHROID) 112 MCG tablet TAKE 1 TABLET BY MOUTH DAILY BEFORE BREAKFAST 90 tablet 0    meloxicam (MOBIC) 15 MG tablet Take 1 tablet (15 mg total) by mouth daily as needed for Pain. 90 tablet 1    metformin (GLUCOPHAGE) 1000 MG tablet Take 1 tablet (1,000 mg total) by mouth 2 (two) times daily with meals. 180 tablet 1    nitroGLYCERIN 0.4 MG/HR TD PT24 (NITRODUR) 0.4 mg/hr Place 1 patch onto the skin continuous prn.      NOVOFINE 32 32 gauge x 1/4" Ndle 1 EACH BY MISC.(NON-DRUG COMBO ROUTE) ROUTE ONCE DAILY.  3    pen needle, diabetic (NOVOFINE PLUS) 32 gauge x 1/6" Ndle 1 each by Misc.(Non-Drug; Combo Route) route once daily. 100 each 3    tamsulosin (FLOMAX) 0.4 mg Cp24 Take 1 capsule (0.4 mg total) by mouth once daily. 90 capsule 3    temazepam (RESTORIL) 30 mg capsule TAKE 1 CAPSULE BY MOUTH EVERY DAY AT BEDTIME AS NEEDED 90 capsule 1    vitamin D 1000 units Tab Take 185 mg by mouth once daily.      diclofenac sodium 1 % Gel Apply 2 g topically 4 (four) times daily. 1 Tube 2     No current facility-administered medications for this visit.        Review of patient's allergies indicates:   Allergen Reactions    Tizanidine Shortness Of Breath         ROS  ROS:  Constitution: Negative for chills, fever, weakness and malaise/fatigue.   HEENT: Negative for headaches.   Cardiovascular: Negative for chest pain and claudication.   Respiratory: Negative for cough and shortness of breath.   Musculoskeletal: (+) for foot pain.  Negative for muscle cramps and muscle weakness.   Gastrointestinal: Negative for nausea and vomiting.   Neurological:(+) for numbness, tingling and paresthesias. "   Dermatological:  (+) for skin rash, (--) for keratosis, (--) for fungal toenails, (--) for wound.          Objective:      Physical Exam  Constitutional:  Patient is oriented to person, place, and time. Vital signs are normal.  Appears well-developed and well-nourished.     Vascular:  Dorsalis pedis pulses are 1/4 on the right side, and 1/4 on the left side.   Posterior tibial pulses are 2/4 on the right side, and 2/4 on the left side.   (--) digital hair growth, capillary fill time to all toes <3 seconds, toes are cool touch, trace pedal swelling    Skin/Dermatological:  Skin is warm and intact.  No cyanosis or clubbing.  No rashes noted.  No open wounds.  Right 1, 2, 3, 4, 5 and left 1, 2, 3, 4, 5  toenails yellow discolored, thickened 2-4 mm to base with subungual debris.  (--) keratosis     Musculoskeletal:      Pes planus, reduced rom left subtalar, ttp left proximal medial plantar fascia. Hallux abducto valgus bilaterally, hammertoes 2-5 observed.  Pedal rom within normal limits.  (--) ankle joint DF restriction with both knee flexed and extened.    Neurological:  (+) deficits to sharp/dull, light touch or vibratory sensation bilateral feet, ten points tested.   Muscle strength to tibialis anterior, extensor hallucis longus, extensor digitorum longus, peroneal muscles, flexor hallucis/digotorum longus, posterior tibial and gastrosoleal complex is 5/5, normal tone without assymmetry   Patellar reflexes are 2+ on the right side and 2+ on the left side.  Achilles reflexes are 2+ on the right side and 2+ on the left side.        Assessment:       Encounter Diagnoses   Name Primary?    Type II diabetes mellitus with neurological manifestations Yes    PVD (peripheral vascular disease)     Bilateral lower extremity edema     Onychomycosis due to dermatophyte     Hammer toes of both feet     Valgus deformity of both great toes     Gastrocnemius equinus, unspecified laterality     Plantar fasciitis     Pes  valgus          Plan:       Rob was seen today for foot pain and nail care.    Diagnoses and all orders for this visit:    Type II diabetes mellitus with neurological manifestations  -     DIABETIC SHOES FOR HOME USE    PVD (peripheral vascular disease)  -     DIABETIC SHOES FOR HOME USE    Bilateral lower extremity edema  -     DIABETIC SHOES FOR HOME USE    Onychomycosis due to dermatophyte  -     DIABETIC SHOES FOR HOME USE    Hammer toes of both feet  -     DIABETIC SHOES FOR HOME USE    Valgus deformity of both great toes  -     DIABETIC SHOES FOR HOME USE    Gastrocnemius equinus, unspecified laterality  -     DIABETIC SHOES FOR HOME USE    Plantar fasciitis  -     DIABETIC SHOES FOR HOME USE    Pes valgus  -     DIABETIC SHOES FOR HOME USE      I counseled the patient on his conditions, their implications and medical management.    Shoe inspection. Diabetic Foot Education. Patient reminded of the importance of good nutrition and blood sugar control to help prevent podiatric complications of diabetes. Patient instructed on proper foot hygeine. We discussed wearing proper shoe gear, daily foot inspections, never walking without protective shoe gear, never putting sharp instruments to feet.  We also discussed padding and shoes with high toe boxes for foot deformities.  [facility] dispensed prior to getting custom orthotics and shoe. Compression stockings.  Stretching and arch supports, icing.  Compression stockings.      - With patient's permission, right 1, 2, 3, 4, 5 and left 1, 2, 3, 4, 5 toenails were aggressively reduced and debrided  to their soft tissue attachment mechanically with nail nipper, removing all offending nail and debris. Patient relates relief following the procedure. Patient will continue to monitor the areas daily, inspect feet, wear protective shoe gear when ambulatory, moisturizer to maintain skin integrity and follow in this office in approximately 3 months, sooner p.denise KELLOGG  CRISTHIAN Mandujano

## 2018-06-06 NOTE — PATIENT INSTRUCTIONS
How to Check Your Feet    Below are tips to help you look for foot problems. Try to check your feet at the same time each day, such as when you get out of bed in the morning.    · Check the top of each foot. The tops of toes, back of the heel, and outer edge of the foot can get a lot of rubbing from poor-fitting shoes.    · Check the bottom of each foot. Daily wear and tear often leads to problems at pressure spots.    · Check the toes and nails. Fungal infections often occur between toes. Toenail problems can also be a sign of fungal infections or lead to breaks in the skin.    · Check your shoes, too. Loose objects inside a shoe can injure the foot. Use your hand to feel inside your shoes for things like ciro, loose stitching, or rough areas that could irritate your skin.        Diabetic Foot Care    Diabetes can lead to a number of different foot complications. Fortunately, most of these complications can be prevented with a little extra foot care. If diabetes is not well controlled, the high blood sugar can cause damage to blood vessels and result in poor circulation to the foot. When the skin does not get enough blood flow, it becomes prone to pressure sores and ulcers, which heal slowly.  High blood sugar can also damage nerves, interfering with the ability to feel pain and pressure. When you cant feel your foot normally, it is easy to injure your skin, bones and joints without knowing it. For these reasons diabetes increases the risk of fungal infections, bunions and ulcers. Deep ulcers can lead to bone infection. Gangrene is the most serious foot complication of diabetes. It usually occurs on the tips of the toes as blacked areas of skin. The black area is dead tissue. In severe cases, gangrene spreads to involve the entire toe, other toes and the entire foot. Foot or toe amputation may be required. Good foot care and blood sugar control can prevent this.    Home Care  1. Wear comfortable, proper  fitting shoes.  2. Wash your feet daily with warm water and mild soap.  3. After drying, apply a moisturizing cream or lotion.  4. Check your feet daily for skin breaks, blisters, swelling, or redness. Look between your toes also.  5. Wear cotton socks and change them every day.  6. Trim toe nails carefully and do not cut your cuticles.  7. Strive to keep your blood sugar under control with a combination of medicines, diet and activity.  8. If you smoke and have diabetes, it is very important that you stop. Smoking reduces blood flow to your foot.  9. Avoid activities that increase your risk of foot injury:  · Do not walk barefoot.  · Do not use heating pads or hot water bottles on your feet.  · Do not put your foot in a hot tub without first checking the temperature with your hand.  10) Schedule yearly foot exams.    Follow Up  with your doctor or as advised by our staff. Report any cut, puncture, scrape, other injury, blister, ingrown toenail or ulcer on your foot.    Get Prompt Medical Attention  if any of the following occur:  -- Open ulcer with pus draining from the wound  -- Increasing foot or leg pain  -- New areas of redness or swelling or tender areas of the foot    © 0585-7691 [employer]. [address] [place] [address], [place] [address]. All rights reserved. This information is not intended as a substitute for professional medical care. Always follow your healthcare professional's instructions.

## 2018-06-13 DIAGNOSIS — N40.0 BENIGN NON-NODULAR PROSTATIC HYPERPLASIA WITHOUT LOWER URINARY TRACT SYMPTOMS: ICD-10-CM

## 2018-06-14 RX ORDER — METFORMIN HYDROCHLORIDE 1000 MG/1
1000 TABLET ORAL 2 TIMES DAILY WITH MEALS
Qty: 180 TABLET | Refills: 1 | Status: SHIPPED | OUTPATIENT
Start: 2018-06-14 | End: 2018-12-11 | Stop reason: SDUPTHER

## 2018-06-14 RX ORDER — TAMSULOSIN HYDROCHLORIDE 0.4 MG/1
0.4 CAPSULE ORAL DAILY
Qty: 90 CAPSULE | Refills: 3 | Status: SHIPPED | OUTPATIENT
Start: 2018-06-14 | End: 2019-06-12 | Stop reason: SDUPTHER

## 2018-06-16 ENCOUNTER — TELEPHONE (OUTPATIENT)
Dept: INTERNAL MEDICINE | Facility: CLINIC | Age: 63
End: 2018-06-16

## 2018-06-18 DIAGNOSIS — M47.26 OSTEOARTHRITIS OF SPINE WITH RADICULOPATHY, LUMBAR REGION: ICD-10-CM

## 2018-06-18 DIAGNOSIS — M54.16 LUMBAR RADICULOPATHY: ICD-10-CM

## 2018-06-18 RX ORDER — HYDROCODONE BITARTRATE AND ACETAMINOPHEN 10; 325 MG/1; MG/1
1 TABLET ORAL EVERY 12 HOURS PRN
Qty: 60 TABLET | Refills: 0 | Status: SHIPPED | OUTPATIENT
Start: 2018-06-18 | End: 2018-07-16 | Stop reason: SDUPTHER

## 2018-06-18 NOTE — TELEPHONE ENCOUNTER
I have reviewed the provider's instructions with the patient, answering all questions to his satisfaction.    Pt scheduled f/u. appt reminder in the mail as requested.

## 2018-06-18 NOTE — TELEPHONE ENCOUNTER
----- Message from Sumanth Sanchez sent at 6/18/2018  9:23 AM CDT -----  Contact: Rob Jones  Can the clinic reply in [facility]: No      Please refill the medication(s) listed below. The patient can be reached at this phone number [phone] once it is called into the pharmacy.      Medication #1    hydrocodone-acetaminophen 10-325mg (NORCO)  mg Tab 60 tablet 0 5/1/2018  --  Sig - Route: Take 1 tablet by mouth every 12 (twelve) hours as needed for Pain. - Oral  Class: Normal  Order: [id number]  Date/Time Signed: 5/1/2018 16:05      E-Prescribing Status: Receipt confirmed by pharmacy (5/1/2018  4:06 PM CDT)          Medication #2      Preferred Pharmacy:    [facility]/pharmacy #[id number] - [place], [place] - [address] [phone] (Phone)  [phone] (Fax)

## 2018-06-18 NOTE — TELEPHONE ENCOUNTER
Patient requesting a refill on his Denio.     Last office visit 05/15/2018.   shows last refill on 05/01/2018  Patient does have a pain contract on file.   Patient Last UDS shows consistent with current therapy on 05/15/2018.

## 2018-07-16 ENCOUNTER — OFFICE VISIT (OUTPATIENT)
Dept: INTERNAL MEDICINE | Facility: CLINIC | Age: 63
End: 2018-07-16
Attending: INTERNAL MEDICINE
Payer: MEDICARE

## 2018-07-16 ENCOUNTER — OFFICE VISIT (OUTPATIENT)
Dept: PAIN MEDICINE | Facility: CLINIC | Age: 63
End: 2018-07-16
Payer: MEDICARE

## 2018-07-16 VITALS
HEIGHT: 70 IN | HEIGHT: 70 IN | BODY MASS INDEX: 23.86 KG/M2 | DIASTOLIC BLOOD PRESSURE: 77 MMHG | SYSTOLIC BLOOD PRESSURE: 124 MMHG | SYSTOLIC BLOOD PRESSURE: 124 MMHG | TEMPERATURE: 99 F | RESPIRATION RATE: 18 BRPM | BODY MASS INDEX: 23.66 KG/M2 | DIASTOLIC BLOOD PRESSURE: 77 MMHG | HEART RATE: 67 BPM | HEART RATE: 67 BPM | WEIGHT: 166.69 LBS | WEIGHT: 165.31 LBS

## 2018-07-16 DIAGNOSIS — M51.36 DDD (DEGENERATIVE DISC DISEASE), LUMBAR: ICD-10-CM

## 2018-07-16 DIAGNOSIS — M54.16 LUMBAR RADICULOPATHY: ICD-10-CM

## 2018-07-16 DIAGNOSIS — Z79.891 ENCOUNTER FOR MONITORING OPIOID MAINTENANCE THERAPY: ICD-10-CM

## 2018-07-16 DIAGNOSIS — M47.816 FACET ARTHRITIS, DEGENERATIVE, LUMBAR SPINE: ICD-10-CM

## 2018-07-16 DIAGNOSIS — I10 ESSENTIAL HYPERTENSION: ICD-10-CM

## 2018-07-16 DIAGNOSIS — E21.3 HYPERPARATHYROIDISM: ICD-10-CM

## 2018-07-16 DIAGNOSIS — Z98.1 S/P LUMBAR FUSION: Primary | ICD-10-CM

## 2018-07-16 DIAGNOSIS — M47.26 OSTEOARTHRITIS OF SPINE WITH RADICULOPATHY, LUMBAR REGION: ICD-10-CM

## 2018-07-16 DIAGNOSIS — Z51.81 ENCOUNTER FOR MONITORING OPIOID MAINTENANCE THERAPY: ICD-10-CM

## 2018-07-16 DIAGNOSIS — Z72.0 TOBACCO USE: ICD-10-CM

## 2018-07-16 DIAGNOSIS — E89.0 POSTOPERATIVE HYPOTHYROIDISM: Primary | ICD-10-CM

## 2018-07-16 PROCEDURE — 99214 OFFICE O/P EST MOD 30 MIN: CPT | Mod: S$GLB,,, | Performed by: NURSE PRACTITIONER

## 2018-07-16 PROCEDURE — 3074F SYST BP LT 130 MM HG: CPT | Mod: CPTII,S$GLB,, | Performed by: INTERNAL MEDICINE

## 2018-07-16 PROCEDURE — 92250 FUNDUS PHOTOGRAPHY W/I&R: CPT | Mod: S$GLB,,, | Performed by: OPTOMETRIST

## 2018-07-16 PROCEDURE — 3008F BODY MASS INDEX DOCD: CPT | Mod: CPTII,S$GLB,, | Performed by: NURSE PRACTITIONER

## 2018-07-16 PROCEDURE — 99499 UNLISTED E&M SERVICE: CPT | Mod: S$GLB,,, | Performed by: INTERNAL MEDICINE

## 2018-07-16 PROCEDURE — 99999 PR PBB SHADOW E&M-EST. PATIENT-LVL I: CPT | Mod: PBBFAC,,,

## 2018-07-16 PROCEDURE — 99999 PR PBB SHADOW E&M-EST. PATIENT-LVL III: CPT | Mod: PBBFAC,,, | Performed by: INTERNAL MEDICINE

## 2018-07-16 PROCEDURE — 3008F BODY MASS INDEX DOCD: CPT | Mod: CPTII,S$GLB,, | Performed by: INTERNAL MEDICINE

## 2018-07-16 PROCEDURE — 3074F SYST BP LT 130 MM HG: CPT | Mod: CPTII,S$GLB,, | Performed by: NURSE PRACTITIONER

## 2018-07-16 PROCEDURE — 3044F HG A1C LEVEL LT 7.0%: CPT | Mod: CPTII,S$GLB,, | Performed by: INTERNAL MEDICINE

## 2018-07-16 PROCEDURE — 80307 DRUG TEST PRSMV CHEM ANLYZR: CPT

## 2018-07-16 PROCEDURE — 99214 OFFICE O/P EST MOD 30 MIN: CPT | Mod: S$GLB,,, | Performed by: INTERNAL MEDICINE

## 2018-07-16 PROCEDURE — 3078F DIAST BP <80 MM HG: CPT | Mod: CPTII,S$GLB,, | Performed by: NURSE PRACTITIONER

## 2018-07-16 PROCEDURE — 3078F DIAST BP <80 MM HG: CPT | Mod: CPTII,S$GLB,, | Performed by: INTERNAL MEDICINE

## 2018-07-16 PROCEDURE — 99999 PR PBB SHADOW E&M-EST. PATIENT-LVL III: CPT | Mod: PBBFAC,,, | Performed by: NURSE PRACTITIONER

## 2018-07-16 RX ORDER — HYDROCODONE BITARTRATE AND ACETAMINOPHEN 10; 325 MG/1; MG/1
1 TABLET ORAL EVERY 12 HOURS PRN
Qty: 60 TABLET | Refills: 0 | Status: SHIPPED | OUTPATIENT
Start: 2018-07-18 | End: 2018-08-17

## 2018-07-16 RX ORDER — ATORVASTATIN CALCIUM 10 MG/1
10 TABLET, FILM COATED ORAL DAILY
Qty: 90 TABLET | Refills: 3 | Status: SHIPPED | OUTPATIENT
Start: 2018-07-16 | End: 2019-09-04 | Stop reason: SDUPTHER

## 2018-07-16 NOTE — NURSING
Rob Jones  is a 62 y.o. male here for a diabetic eye screening with non-dilated fundus photos per Tony.    Patient cooperative?: Yes  Small pupils?: Yes  Last eye exam: can not recall exact date, a year    For exam results, see Encounter Report.

## 2018-07-16 NOTE — PROGRESS NOTES
Chronic Pain - Established Visit    Referring Physician: No ref. provider found    Chief Complaint:   Chief Complaint   Patient presents with    Low-back Pain     radiates to left leg pain         SUBJECTIVE: Disclaimer: This note has been generated using voice-recognition software. There may be typographical errors that have been missed during proof-reading    Interval History 7/16/2018:  The patient returns to clinic today for follow up. He continues to report low back pain that radiates down the side of both legs to mid thigh that is shooting in nature. His back and leg pain is worse with prolonged activity. He continues to report intermittent neck pain that radiates into both arms. He continues to take Gabapentin with benefit. He also takes Norco sparingly with benefit and without adverse effects. He continues to perform a home exercise routine. He denies any other health changes. He denies any bowel or bladder incontinence. His pain today is 5/10.    Interval History 5/15/2018:  The patient returns to clinic today for follow up. He continues to report low back pain that radiates down the side of his both legs to his mid thigh. He describes this pain as tingling and shooting. He continues to report neck pain that radiates into both arms. He continues to report benefit with current medication regimen. He takes Gabapentin 300 mg BID. He also takes Norco sparingly with benefit. He denies any adverse effects. He continues to perform a home exercise routine. He denies any other health changes. His pain today is 5/10.    Interval History 3/15/2018:  The patient returns to clinic today for follow up. He was previously scheduled for repeat lumbar DWAIN which was canceled due to transportation issues. He continues to report low back pain that radiates down the side of his right leg to mid thigh. This pain is burning and shooting in nature. He denies any left leg pain. He continues to report neck pain with intermittent  radiating pain into his arms. He continues to take Gabapentin 300 mg BID with benefit. He also takes Norco sparingly with benefit and without adverse effects. He denies any other health changes. He denies any bowel or bladder incontinence. His pain today is 5/10.    Interval History 2/14/2018:  The patient returns to clinic today for follow up. He is s/p C7-T1 IL DWAIN on 1/17/2018. He reports 25% relief of his neck pain. He continues to report neck pain that is constant, aching, and burning. He reports intermittent aching pain into his arms bilaterally. He reports that his low back pain is worse today. He reports low back pain that is radiating down the side of his right leg to mid thigh. He describes this pain as burning and shooting. He denies any left leg pain today. He continues to take Gabapentin 300 mg BID. He does take Norco sparingly with benefit. He denies any other health changes. He denies any bowel or bladder incontinence. His pain today is 6/10.         Interval History 1/9/2018:  The patient returns to clinic today for follow up. He reports increased neck pain in the last month. He describes the pain as constant, aching and burning. He reports intermittent radiating pain into both arms that is burning in nature. He also reports increased shoulder pain. He does have a past history of left shoulder surgery years ago. He reports that his low back pain is tolerable at this time. He continues to take Gabapentin with benefit. He also takes Norco sparingly with benefit. He denies any adverse reactions with this medication. He denies any other health changes. His pain today is 5/10.    Interval History 11/7/2017:  The patient returns to clinic today for follow up of low back and leg pain. He reports low back pain that radiates down the side of left leg to mid thigh. He reports a recent flare up that was intense for a few days but then improved significantly. He denies any injury. He reports his pain is  tolerable at this time. He continues to be physically active and participate in a home exercise routine. He continues to take Gabapentin TID with benefit and does need a refill. He also continues to take Norco sparingly with benefit. He denies any other health changes. His pain today is 5/10.     Interval History 9/7/2017:  The patient returns to clinic today for follow up. He is s/p bilateral L3 TF DWAIN on 8/25/2017. He reports 50% relief of his low back and radiating leg pain. He continues to report low back pain that is aching in nature. He reports that this is tolerable at this time. He continues to take Gabapentin with benefit. He continues to take Norco sparingly with benefit. He would like a refill today. He denies any other health changes. He denies any bowel or bladder incontinence. His pain today is 4/10.    Interval History 8/2/2017:  The patient returns to clinic today for follow up. He reports that his left leg pain has returned in the last few days. He reports low back pain that is radiating down the side of his left leg to mid thigh. He describes this pain as shooting and burning. He continues to use Gabapentin and Norco with benefit. He also reports bilateral knee pain that is dull and aching in nature. He denies any injury. He has never had any interventions for his knees. He denies any bowel or bladder incontinence. His pain today is 6/10.    Interval History 7/6/2017:  The patient returns to clinic today for follow up. He is s/p left L3 TF DWAIN on 6/21/2017. He reports 80% relief of his leg pain. He does report low back pain that is worse in the morning. He does report stiffness. He continues to report benefit with Gabapentin and Norco. He does report increased activity since the procedure. He denies any other health changes. He denies any bowel or bladder incontinence. His pain today is 3/10.     Interval History 6/6/2017:  The patient returns to clinic today for follow up. He complains of low back  pain that is radiating into his left leg. This pain has increased in intensity in the last few weeks. He did see Dr. Liz yesterday. He reports pain that begins in his low back and radiates down the side of his left thigh to his knee. He denies any radiating right leg pain. He also complains of stiffness in his lower back that is worse in the morning. He denies any bowel or bladder incontinence. He continues to take Gabapentin 1800 mg daily. He also take Norco every 12 hours as needed for pain and does need a refill today. He reports continued relief of shoulder pain with previous injection. His pain today is 5/10.    Interval History 4/20/2017  NM bone scan 3/29 with arthritis T12/L1 and L5/S1 follow-up with orthopedics regarding shoulder has severe osteoarthritis had a shoulder joint injection with great relief pain.  Not currently having any cervical radicular symptoms.  Gabapentin at 900 mg per day no side effects, does have some lower extremity radicular symptoms.  No other health changes.    Initial encounter:    Rob Jones Jr. presents to the clinic for the evaluation of neck, shoulder and lower back with lower extremities pain. The pain started years ago and symptoms have been worsening.    Brief history:   patient recently evaluated in neurosurgery is scheduled for a nuclear medicine bone scan, possible workup for ACDF secondary to severe central stenosis of the cervical spine with radiculopathy.  The patient had a previous cervical transverse foraminal epidural steroid injection with greater than 50% improvement in his radicular symptoms into his right upper extremity although he continues to have what appears to be rotator cuff syndrome of the left shoulder and is scheduled for orthopedic evaluation soon.    Pain Description:    The current worst pain is located in the left shoulder area    At BEST  10/10     At WORST  10/10 on the WORST day.      On average pain is rated as 10/10.     Today the  pain is rated as 10/10    The pain is described as aching, sharp, shooting, throbbing and tingling      Symptoms interfere with daily activity, sleeping and work.     Exacerbating factors: Sitting, Standing, Laying, Bending, Extension, Flexing, Lifting and Getting out of bed/chair.      Mitigating factors medications and injection.     Patient denies urinary incontinence and bowel incontinence.  Patient denies any suicidal or homicidal ideations    Pain Medications:  Current:  Hydrocodone/acetaminophen  Gabapentin 300 mg BID     Tried in Past:  NSAIDs -Never  TCA -Never  SNRI -Never  Anti-convulsants  Gabapentin   Muscle Relaxants -Never  Opioids-Never    Physical Therapy/Home Exercise: no       report:  Reviewed and consistent with medication use as prescribed.    Pain Procedures:   2/6/17- right C6-C7 TF DWAIN with IR   6/21/2017- Left L3 TF DWAIN- 80% relief  8/25/2017- Bilateral L3 TF DWAIN- 50% relief  1/17/2018- C7-T1 IL DWAIN- 25% relief    Chiropractor -never  Acupuncture - never  TENS unit -never  Spinal decompression -previous lumbar surgery  Joint replacement -never    Imaging:   Lumbar MRI 5/29/2017:  FINDINGS:     Alignment: Normal.    Vertebrae: No fracture. Essentially normal marrow signal, with fatty endplate degenerative changes L4-S1.    Discs:  Disc spacer devices at L4-L5 and L5-S1 with partial fusion of the disc spaces.  Mild diffuse disc space narrowing throughout the lumbar spine with mild desiccation L3-L4.    Cord: Normal. Conus terminates at T12-L1.    Degenerative findings:        T11-T12: Incompletely imaged on MRI.  No significant disc disease.  No spinal canal stenosis.  Left-sided facet arthropathy results in mild left foraminal stenosis.       T12-L1: Incompletely imaged on MRI.  No significant disc disease.  No spinal canal stenosis.  No neural foraminal stenosis.       L1-L2: No significant disc disease.  Minimal bilateral facet arthropathy without spinal canal or foraminal stenosis.        L2-L3: Minimal circumferential disc bulge with mild bilateral facet arthropathy and buckling of the ligamentum flavum, overall resulting in mild left foraminal stenosis with no significant spinal canal stenosis.       L3-L4: Minimal circumferential disc bulge with mild bilateral facet arthropathy and buckling of the ligamentum flavum results in mild spinal canal stenosis, moderate left foraminal stenosis, and mild right foraminal stenosis.       L4-L5: Fusion of the disc space with mild right worse than left facet arthropathy results in mild right foraminal stenosis.  No spinal canal stenosis.       L5-S1: Minimal circumferential disc bulge with superimposed calcified left foraminal protrusion and mild bilateral facet arthropathy results in mild right and moderate left foraminal stenosis.  No spinal canal stenosis.    Paraspinal muscles & soft tissues: Mild thickening of the right adrenal gland without discrete nodule, unchanged from prior CT.   Impression       Disc spacers with partial fusion L4-S1 with mild disc and facet degeneration throughout the lumbar spine most pronounced at L3-L4 with mild spinal canal stenosis and moderate left foraminal stenosis as well as L5-S1 with moderate left foraminal stenosis.     Cervical MRI 1/16/2017:  Findings: There is no evidence of fracture, dislocation or marrow replacement process.  The vertebral body heights and alignment are maintained.  The visualized posterior fossa structures demonstrate no significant abnormality.  The surrounding soft tissue structures demonstrate no significant abnormality.  The cervical cord signal is normal.    C2-3: Unremarkable    C3-4: Uncovertebral joint hypertrophy resulting in mild right neural foraminal narrowing.    C4-5: Uncovertebral joint hypertrophy resulting in moderate bilateral neural foraminal narrowing.    C5-6: Uncovertebral joint hypertrophy resulting in mild right neural foraminal narrowing.    C6-7: Uncovertebral joint  hypertrophy with a small broad-based posterior disc bulge and focal thickening of the ligamentum flavum resulting in severe canal stenosis and moderate right neural foraminal narrowing.    C7-T1: Unremarkable.   Impression       Multifocal degenerative disc disease as above. There is focal thickening of the ligamentum flavum at C6-7 which results in severe canal stenosis at that level. No cord signal abnormality.     Xray Shoulder 3/20/2017:  Findings: External rotation, Y. view and axillary views of the left shoulder demonstrate postsurgical hardware in the humeral neck which shows no significant change from the 2009 exam.  There is advanced joint space loss at the glenohumeral joint with sclerosis and osteophyte formation.  There is an ossific body at the inferior aspect of the joint which is well-corticated and measures approximately 2.2 cm.  Small osteophytes are also identified at the acromioclavicular joint.  There is no fracture, dislocation or osseous destructive process.  The visualized portions of the left lung are normal.   Impression      Findings suggestive of advanced osteoarthritis of the left shoulder.  The 2.2 cm osseous body may represent a loose body.  It is located at the inferior aspect of the joint however.         Past Medical History:   Diagnosis Date    Anxiety     Back pain     Cataract     Diabetes mellitus     History of hepatitis C; S/p RX with SVR 12 documented 06/2017 6/1/2017    Hypertension     Postoperative hypothyroidism 11/3/2016    Primary hyperparathyroidism 1/18/2017    Thyroid disease      Past Surgical History:   Procedure Laterality Date    LUMBAR FUSION      THYROIDECTOMY      TONSILLECTOMY       Social History     Social History    Marital status: Single     Spouse name: N/A    Number of children: N/A    Years of education: N/A     Occupational History    Disabled, pt says from [place] and now for back, DM2      Social History Main Topics    Smoking status:  Current Every Day Smoker    Smokeless tobacco: Not on file    Alcohol use Yes    Drug use: No    Sexual activity: Not on file     Other Topics Concern    Not on file     Social History Narrative    Lives alone.  He has 2 adult children who do not live here.       Family History   Problem Relation Age of Onset    Cancer Mother         breast    Cataracts Father     No Known Problems Sister     No Known Problems Brother     No Known Problems Brother     Heart disease Brother     No Known Problems Sister     No Known Problems Sister     No Known Problems Sister     Glaucoma Paternal Uncle     Glaucoma Paternal Uncle        Review of patient's allergies indicates:   Allergen Reactions    Tizanidine Shortness Of Breath       Current Outpatient Prescriptions   Medication Sig    albuterol (ACCUNEB) 1.25 mg/3 mL Nebu Take 3 mLs (1.25 mg total) by nebulization every 6 (six) hours as needed (shortness of breath). Rescue    amLODIPine (NORVASC) 10 MG tablet TAKE 1 TABLET (10 MG TOTAL) BY MOUTH ONCE DAILY.    diclofenac sodium 1 % Gel Apply 2 g topically 4 (four) times daily.    dicyclomine (BENTYL) 20 mg tablet Take 20 mg by mouth daily as needed.    econazole nitrate 1 % cream Apply topically 2 (two) times daily.    fish oil-omega-3 fatty acids 300-1,000 mg capsule Take 2 g by mouth once daily.    gabapentin (NEURONTIN) 300 MG capsule Take 300 mg by mouth 2 (two) times daily.    HYDROcodone-acetaminophen (NORCO)  mg per tablet Take 1 tablet by mouth every 12 (twelve) hours as needed for Pain.    insulin aspart (NOVOLOG FLEXPEN) 100 unit/mL InPn pen Inject 15 Units into the skin with lunch.    insulin degludec (TRESIBA FLEXTOUCH U-100) 100 unit/mL (3 mL) InPn Inject 15 Units into the skin every evening.    irbesartan (AVAPRO) 150 MG tablet TAKE 1 TABLET (150 MG TOTAL) BY MOUTH ONCE DAILY.    LANTUS SOLOSTAR U-100 INSULIN 100 unit/mL (3 mL) InPn pen     levothyroxine (SYNTHROID) 112 MCG  "tablet TAKE 1 TABLET BY MOUTH DAILY BEFORE BREAKFAST    meloxicam (MOBIC) 15 MG tablet Take 1 tablet (15 mg total) by mouth daily as needed for Pain.    metFORMIN (GLUCOPHAGE) 1000 MG tablet Take 1 tablet (1,000 mg total) by mouth 2 (two) times daily with meals.    nitroGLYCERIN 0.4 MG/HR TD PT24 (NITRODUR) 0.4 mg/hr Place 1 patch onto the skin continuous prn.    NOVOFINE 32 32 gauge x 1/4" Ndle 1 EACH BY MISC.(NON-DRUG COMBO ROUTE) ROUTE ONCE DAILY.    pen needle, diabetic (NOVOFINE PLUS) 32 gauge x 1/6" Ndle 1 each by Misc.(Non-Drug; Combo Route) route once daily.    tamsulosin (FLOMAX) 0.4 mg Cp24 Take 1 capsule (0.4 mg total) by mouth once daily.    temazepam (RESTORIL) 30 mg capsule TAKE 1 CAPSULE BY MOUTH EVERY DAY AT BEDTIME AS NEEDED    vitamin D 1000 units Tab Take 185 mg by mouth once daily.     No current facility-administered medications for this visit.        REVIEW OF SYSTEMS:    GENERAL:  No weight loss, malaise or fevers.  HEENT:   No recent changes in vision or hearing  NECK:  Negative for lumps, no difficulty with swallowing.  RESPIRATORY:  Negative for cough, wheezing or shortness of breath, patient denies any recent URI.  CARDIOVASCULAR:  Negative for chest pain, leg swelling or palpitations. HTN.   GI:  Negative for abdominal discomfort, blood in stools or black stools or change in bowel habits.  MUSCULOSKELETAL:  See HPI.  SKIN:  Negative for lesions, rash, and itching.  PSYCH:  History of anxiety. Patient's sleep is disturbed secondary to pain.  HEMATOLOGY/LYMPHOLOGY:  Negative for prolonged bleeding, bruising easily or swollen nodes.  Patient is not currently taking any anti-coagulants  ENDO: History of hypothyroidism and Diabetes.   NEURO:   No history of headaches, syncope, paralysis, seizures or tremors.  All other reviewed and negative other than HPI.    OBJECTIVE:    /77   Pulse 67   Temp 98.5 °F (36.9 °C) (Oral)   Resp 18   Ht 5' 10" (1.778 m)   Wt 75 kg (165 lb 4.8 " oz)   BMI 23.72 kg/m²     PHYSICAL EXAMINATION:    GENERAL: Well appearing, in no acute distress, alert and oriented x3.  PSYCH:  Mood and affect appropriate.  SKIN: Skin color, texture, turgor normal, no rashes or lesions.  HEAD/FACE:  Normocephalic, atraumatic. Cranial nerves grossly intact.  NECK: Mild pain to palpation over the cervical paraspinous muscles. Spurling Negative. Limited ROM with pain on extension. Positive facet loading bilaterally.    CV: RRR with palpation of the radial artery.  PULM: No evidence of respiratory difficulty, symmetric chest rise.  BACK: Straight leg raise in the seated position is positive to pain bilaterally, left greater than right. There is mild pain with palpation over lumbar paraspinals. There is pain with palpation over lumbar spine. Limited ROM with pain on extension. Positive facet loading bilaterally.   EXTREMITIES: Peripheral joint ROM is full and pain free without obvious instability or laxity in all four extremities. No deformities, edema, or skin discoloration. Good capillary refill.  MUSCULOSKELETAL: Shoulder provocative maneuvers are positive on the left. There is no pain with palpation over medial or lateral joint line of bilateral knees. Bilateral upper extremity strength is normal and symmetric.  No atrophy or tone abnormalities are noted.  NEURO: Bilateral upper extremity coordination and muscle stretch reflexes are physiologic and symmetric.  Abimael's negative. No clonus.  No loss of sensation is noted.  GAIT: Antalgic, ambulates without assistance.         Lab Results   Component Value Date    WBC 4.60 11/11/2016    HGB 12.6 (L) 11/11/2016    HCT 39.4 (L) 11/11/2016    MCV 97 11/11/2016     11/11/2016      Lab Results   Component Value Date    LABA1C 9.2 (H) 05/18/2016    HGBA1C 6.2 (H) 03/09/2018     Lab Results   Component Value Date    CREATININE 1.2 03/09/2018        ASSESSMENT: 62 y.o. year old male with pain, consistent with     Encounter  Diagnoses   Name Primary?    Lumbar radiculopathy     Osteoarthritis of spine with radiculopathy, lumbar region     S/P lumbar fusion Yes    DDD (degenerative disc disease), lumbar     Facet arthritis, degenerative, lumbar spine     Encounter for monitoring opioid maintenance therapy        PLAN:     - Previous imaging was reviewed and discussed with the patient today. Previous labs reviewed today.     - We may repeat lumbar DWAIN at any time.     - In the future, we can consider cervical medial branch blocks for his facet mediated pain.     - Continue Gabapentin.     - Continue Norco 10/325 mg every 12 hours as needed, #60. Refill provided today.     - The patient is here today for a refill of current pain medications and they believe these provide effective pain control and improvements in quality of life.  The patient notes no serious side effects, and feels the benefits outweigh the risks.  The patient was reminded of the pain contract that they signed previously as well as the risks and benefits of the medication including possible death.  The updated [place] [employer] prescription monitoring program was reviewed, and the patient has been found to be compliant with current treatment plan. Medication management provided by Dr. Espinoza.     - UDS from 5/15/2018 is positive for benzoylecgonine. Patient was given verbal warning of pain contract violation. Repeat UDS done today.      - Continue home exercise routine.     - Counseled the patient on the importance of activity restrictions and constant sleeping habits.     - RTC in 2 months or sooner if needed.     - Dr. Espinoza was consulted on the patient and agrees with this plan.    The above plan and management options were discussed at length with patient. Patient is in agreement with the above and verbalized understanding.     Sue Walker NP  07/16/2018

## 2018-07-16 NOTE — PROGRESS NOTES
"Subjective:       Patient ID: Rob Jones Jr. is a 62 y.o. male.    Chief Complaint: Follow-up    Here for f/u    Hypothyroidism  Pt denies fatigue, unexplained presley gain/loss, palpitations, tremors, diarrhea, constipation, changes to hair/skin, heat/cold intolerance, or dysphagia.     Hyperparathyroid  Last seen by endocrine 01/2017. Did not follow up. He has diffuse arthralgias with hx of repairs and chronic lumbar pain. This is overall unchanged. Some constipation on chronic opiates. Denies n/v, confusion. + non specific fatigue. Last DXA 01/2017        Review of Systems   Constitutional: Positive for fatigue. Negative for appetite change, chills, fever and unexpected weight change.   HENT: Negative for hearing loss, sore throat and trouble swallowing.    Eyes: Negative for visual disturbance.   Respiratory: Negative for cough, chest tightness and shortness of breath.    Cardiovascular: Negative for chest pain and leg swelling.   Gastrointestinal: Negative for abdominal pain, blood in stool, constipation, diarrhea, nausea and vomiting.   Endocrine: Negative for polydipsia and polyuria.   Genitourinary: Negative for decreased urine volume, difficulty urinating, dysuria, frequency and urgency.   Musculoskeletal: Positive for arthralgias, back pain, myalgias, neck pain and neck stiffness. Negative for gait problem.   Skin: Negative for rash.   Neurological: Positive for dizziness. Negative for numbness.   Psychiatric/Behavioral: The patient is not nervous/anxious.        Objective:      Vitals:    07/16/18 1050   BP: 124/77   Pulse: 67   Weight: 75.6 kg (166 lb 10.7 oz)   Height: 5' 10" (1.778 m)      Physical Exam   Constitutional: He is oriented to person, place, and time. He appears well-developed and well-nourished. He does not have a sickly appearance. No distress.   HENT:   Head: Normocephalic and atraumatic.   Eyes: Conjunctivae and EOM are normal. Right eye exhibits no discharge. Left eye exhibits no " discharge. No scleral icterus.   Pulmonary/Chest: Effort normal. No respiratory distress.   Abdominal: Normal appearance. He exhibits no distension.   Neurological: He is alert and oriented to person, place, and time.   Skin: Skin is warm and dry. He is not diaphoretic.   Psychiatric: He has a normal mood and affect. His speech is normal.       Assessment:       1. Postoperative hypothyroidism    2. Hyperparathyroidism    3. Uncontrolled type 2 diabetes mellitus with complication, with long-term current use of insulin    4. Essential hypertension    5. Tobacco use        Plan:       Rob was seen today for follow-up.    Diagnoses and all orders for this visit:    Postoperative hypothyroidism  -     TSH; Future    Hyperparathyroidism  -     Comprehensive metabolic panel; Future  -     PTH, intact; Future    Uncontrolled type 2 diabetes mellitus with complication, with long-term current use of insulin  -     Hemoglobin A1c; Future  -     Lipid panel; Future  -     Diabetic Eye Screening Photo; Future  -     atorvastatin (LIPITOR) 10 MG tablet; Take 1 tablet (10 mg total) by mouth once daily.    Essential hypertension  controlled. Continue current regimen    Tobacco use  Patient counseled extensively on need for tobacco cessation and the risk associated with continuing, including but not limited to head/neck cancer, lung cancer, bladder cancer, increased risk of stroke and heart attack, development of chronic lung disease, etc.  Patient has been made aware of cessation assistance including medications, nicotine replacement, personal support.    Patient is not interested in quitting at this time.  Will continue to discuss at each visit      RTC in 6 months or sooner prn                    Side effects of medication(s) were discussed in detail and patient voiced understanding.  Patient will call back for any issues or complications.

## 2018-07-17 ENCOUNTER — LAB VISIT (OUTPATIENT)
Dept: LAB | Facility: OTHER | Age: 63
End: 2018-07-17
Attending: INTERNAL MEDICINE
Payer: MEDICARE

## 2018-07-17 DIAGNOSIS — E89.0 POSTOPERATIVE HYPOTHYROIDISM: ICD-10-CM

## 2018-07-17 DIAGNOSIS — E21.3 HYPERPARATHYROIDISM: ICD-10-CM

## 2018-07-17 LAB
ALBUMIN SERPL BCP-MCNC: 4.2 G/DL
ALP SERPL-CCNC: 74 U/L
ALT SERPL W/O P-5'-P-CCNC: 12 U/L
ANION GAP SERPL CALC-SCNC: 6 MMOL/L
AST SERPL-CCNC: 17 U/L
BILIRUB SERPL-MCNC: 0.3 MG/DL
BUN SERPL-MCNC: 14 MG/DL
CALCIUM SERPL-MCNC: 11.3 MG/DL
CHLORIDE SERPL-SCNC: 106 MMOL/L
CHOLEST SERPL-MCNC: 158 MG/DL
CHOLEST/HDLC SERPL: 2.4 {RATIO}
CO2 SERPL-SCNC: 27 MMOL/L
CREAT SERPL-MCNC: 1.2 MG/DL
EST. GFR  (AFRICAN AMERICAN): >60 ML/MIN/1.73 M^2
EST. GFR  (NON AFRICAN AMERICAN): >60 ML/MIN/1.73 M^2
ESTIMATED AVG GLUCOSE: 137 MG/DL
GLUCOSE SERPL-MCNC: 103 MG/DL
HBA1C MFR BLD HPLC: 6.4 %
HDLC SERPL-MCNC: 65 MG/DL
HDLC SERPL: 41.1 %
LDLC SERPL CALC-MCNC: 82.4 MG/DL
NONHDLC SERPL-MCNC: 93 MG/DL
POTASSIUM SERPL-SCNC: 5.2 MMOL/L
PROT SERPL-MCNC: 7.2 G/DL
PTH-INTACT SERPL-MCNC: 74.5 PG/ML
SODIUM SERPL-SCNC: 139 MMOL/L
T4 FREE SERPL-MCNC: 1.01 NG/DL
TRIGL SERPL-MCNC: 53 MG/DL
TSH SERPL DL<=0.005 MIU/L-ACNC: 5.83 UIU/ML

## 2018-07-17 PROCEDURE — 80053 COMPREHEN METABOLIC PANEL: CPT

## 2018-07-17 PROCEDURE — 80061 LIPID PANEL: CPT

## 2018-07-17 PROCEDURE — 84439 ASSAY OF FREE THYROXINE: CPT

## 2018-07-17 PROCEDURE — 83036 HEMOGLOBIN GLYCOSYLATED A1C: CPT

## 2018-07-17 PROCEDURE — 36415 COLL VENOUS BLD VENIPUNCTURE: CPT

## 2018-07-17 PROCEDURE — 83970 ASSAY OF PARATHORMONE: CPT

## 2018-07-17 PROCEDURE — 84443 ASSAY THYROID STIM HORMONE: CPT

## 2018-07-19 ENCOUNTER — TELEPHONE (OUTPATIENT)
Dept: INTERNAL MEDICINE | Facility: CLINIC | Age: 63
End: 2018-07-19

## 2018-07-19 DIAGNOSIS — E87.5 HYPERKALEMIA: ICD-10-CM

## 2018-07-19 DIAGNOSIS — E03.9 HYPOTHYROIDISM, UNSPECIFIED TYPE: Primary | ICD-10-CM

## 2018-07-19 RX ORDER — LEVOTHYROXINE SODIUM 125 UG/1
125 TABLET ORAL
Qty: 90 TABLET | Refills: 2 | Status: SHIPPED | OUTPATIENT
Start: 2018-07-19 | End: 2018-10-05

## 2018-07-19 NOTE — TELEPHONE ENCOUNTER
Please let pt know his levothyroxine dose is too low. I have sent in a new dose. I would like to make sure you take your levothyroxine on an empty stomach, first thing in the morning, with nothing more than a glass of water, everyday. This is very important for this medication to be absorbed consistently. We will rpt labs in 8 weeks.     Also potassium came back very mildly elevated again. Your potassium level is a little low. I recommend avoiding foods rich in potassium for the next few days Foods such as bananas, avocados, white beans, spinach, baked potatoes, and yogurt. If this persist despite these changes we may need to change your medication some.

## 2018-07-19 NOTE — TELEPHONE ENCOUNTER
Please let pt know his levothyroxine dose is too low. I have sent in a new dose. I would like to make sure you take your levothyroxine on an empty stomach, first thing in the morning, with nothing more than a glass of water, everyday. This is very important for this medication to be absorbed consistently. We will rpt labs in 8 weeks.      Also potassium came back very mildly elevated again. Your potassium level is a little low. I recommend avoiding foods rich in potassium for the next few days Foods such as bananas, avocados, white beans, spinach, baked potatoes, and yogurt. If this persist despite these changes we may need to change your medication some.     Pt verbally agreed and schedule labs for September 13,2018

## 2018-07-21 LAB
6MAM UR QL: NOT DETECTED
7AMINOCLONAZEPAM UR QL: NOT DETECTED
A-OH ALPRAZ UR QL: NOT DETECTED
ALPRAZ UR QL: NOT DETECTED
AMPHET UR QL SCN: NOT DETECTED
ANNOTATION COMMENT IMP: NORMAL
ANNOTATION COMMENT IMP: NORMAL
BARBITURATES UR QL: NOT DETECTED
BUPRENORPHINE UR QL: NOT DETECTED
BZE UR QL: PRESENT
CARBOXYTHC UR QL: PRESENT
CARISOPRODOL UR QL: NOT DETECTED
CLONAZEPAM UR QL: NOT DETECTED
CODEINE UR QL: NOT DETECTED
CREAT UR-MCNC: 173.7 MG/DL (ref 20–400)
DIAZEPAM UR QL: NOT DETECTED
ETHYL GLUCURONIDE UR QL: PRESENT
FENTANYL UR QL: NOT DETECTED
HYDROCODONE UR QL: PRESENT
HYDROMORPHONE UR QL: NOT DETECTED
LORAZEPAM UR QL: NOT DETECTED
MDA UR QL: NOT DETECTED
MDEA UR QL: NOT DETECTED
MDMA UR QL: NOT DETECTED
ME-PHENIDATE UR QL: NOT DETECTED
MEPERIDINE UR QL: NOT DETECTED
METHADONE UR QL: NOT DETECTED
METHAMPHET UR QL: NOT DETECTED
MIDAZOLAM UR QL SCN: NOT DETECTED
MORPHINE UR QL: NOT DETECTED
NORBUPRENORPHINE UR QL CFM: NOT DETECTED
NORDIAZEPAM UR QL: NOT DETECTED
NORFENTANYL UR QL: NOT DETECTED
NORHYDROCODONE UR QL CFM: PRESENT
NOROXYCODONE UR QL CFM: NOT DETECTED
NOROXYMORPHONE: NOT DETECTED
OXAZEPAM UR QL: NOT DETECTED
OXYCODONE UR QL: NOT DETECTED
OXYMORPHONE UR QL: NOT DETECTED
PATHOLOGY STUDY: NORMAL
PCP UR QL: NOT DETECTED
PHENTERMINE UR QL: NOT DETECTED
PROPOXYPH UR QL: NOT DETECTED
SERVICE CMNT-IMP: NORMAL
TAPENTADOL UR QL SCN: NOT DETECTED
TAPENTADOL-O-SULF: NOT DETECTED
TEMAZEPAM UR QL: PRESENT
TRAMADOL UR QL: NOT DETECTED
ZOLPIDEM UR QL: NOT DETECTED

## 2018-08-28 ENCOUNTER — TELEPHONE (OUTPATIENT)
Dept: ADMINISTRATIVE | Facility: HOSPITAL | Age: 63
End: 2018-08-28

## 2018-08-28 NOTE — TELEPHONE ENCOUNTER
----- Message from Greg Faria, OD sent at 8/28/2018 11:54 AM CDT -----  No diabetic retinopathy, no csme. Return in 1 year for dilated eye exam.

## 2018-08-28 NOTE — PROGRESS NOTES
Assessment /Plan     For exam results, see Encounter Report.    Uncontrolled type 2 diabetes mellitus with complication, with long-term current use of insulin  -     Diabetic Eye Screening Photo

## 2018-08-28 NOTE — TELEPHONE ENCOUNTER
Left message for patient to contact office in regards to retina scan results.     Unable to reach on mobile number.

## 2018-08-30 RX ORDER — HYDROCODONE BITARTRATE AND ACETAMINOPHEN 10; 325 MG/1; MG/1
1 TABLET ORAL EVERY 12 HOURS PRN
COMMUNITY
Start: 2018-08-30 | End: 2018-08-30 | Stop reason: SDUPTHER

## 2018-08-30 RX ORDER — HYDROCODONE BITARTRATE AND ACETAMINOPHEN 10; 325 MG/1; MG/1
1 TABLET ORAL EVERY 12 HOURS PRN
Qty: 60 TABLET | Refills: 0 | Status: SHIPPED | OUTPATIENT
Start: 2018-08-30 | End: 2018-09-29

## 2018-08-30 NOTE — TELEPHONE ENCOUNTER
----- Message from Sumanth Sanchez sent at 8/30/2018  2:20 PM CDT -----  Contact: Rob Jones  Can the clinic reply in [facility]: No      Please refill the medication(s) listed below. The patient can be reached at this phone number [phone] once it is called into the pharmacy.      Medication #1    hydrocodone-acetaminophen 10-325mg (NORCO)  mg Tab 60 tablet 0 3/15/2018 4/14/2018 --  Sig - Route: Take 1 tablet by mouth every 12 (twelve) hours as needed. - Oral  Class: Print  Earliest Fill Date: 3/15/2018  Order: [id number]  Cosign for Ordering: Accepted by Christina Espinoza MD on 3/15/2018  2:21 PM  Date/Time Signed: 3/15/2018 10:36                    Preferred Pharmacy:      [facility]/pharmacy #[id number] - [place] - [address] [phone] (Phone)  [phone] (Fax)

## 2018-09-12 ENCOUNTER — OFFICE VISIT (OUTPATIENT)
Dept: PAIN MEDICINE | Facility: CLINIC | Age: 63
End: 2018-09-12
Payer: MEDICARE

## 2018-09-12 VITALS
HEART RATE: 62 BPM | BODY MASS INDEX: 24.2 KG/M2 | RESPIRATION RATE: 18 BRPM | DIASTOLIC BLOOD PRESSURE: 84 MMHG | WEIGHT: 169 LBS | SYSTOLIC BLOOD PRESSURE: 143 MMHG | HEIGHT: 70 IN

## 2018-09-12 DIAGNOSIS — M50.30 DDD (DEGENERATIVE DISC DISEASE), CERVICAL: ICD-10-CM

## 2018-09-12 DIAGNOSIS — M54.12 CERVICAL RADICULOPATHY: ICD-10-CM

## 2018-09-12 DIAGNOSIS — M48.02 CERVICAL STENOSIS OF SPINAL CANAL: Primary | ICD-10-CM

## 2018-09-12 DIAGNOSIS — G89.4 CHRONIC PAIN SYNDROME: ICD-10-CM

## 2018-09-12 PROCEDURE — 99999 PR PBB SHADOW E&M-EST. PATIENT-LVL III: CPT | Mod: PBBFAC,,, | Performed by: NURSE PRACTITIONER

## 2018-09-12 PROCEDURE — 3079F DIAST BP 80-89 MM HG: CPT | Mod: CPTII,,, | Performed by: NURSE PRACTITIONER

## 2018-09-12 PROCEDURE — 99213 OFFICE O/P EST LOW 20 MIN: CPT | Mod: S$PBB,,, | Performed by: NURSE PRACTITIONER

## 2018-09-12 PROCEDURE — 3077F SYST BP >= 140 MM HG: CPT | Mod: CPTII,,, | Performed by: NURSE PRACTITIONER

## 2018-09-12 PROCEDURE — 99213 OFFICE O/P EST LOW 20 MIN: CPT | Mod: PBBFAC | Performed by: NURSE PRACTITIONER

## 2018-09-12 PROCEDURE — 3008F BODY MASS INDEX DOCD: CPT | Mod: CPTII,,, | Performed by: NURSE PRACTITIONER

## 2018-09-12 NOTE — PROGRESS NOTES
Chronic Pain - Established Visit    Referring Physician: No ref. provider found    Chief Complaint:   Chief Complaint   Patient presents with    Follow-up        SUBJECTIVE: Disclaimer: This note has been generated using voice-recognition software. There may be typographical errors that have been missed during proof-reading    Interval History 9/12/2018:  The patient returns to clinic today for follow up. He reports increased neck pain that radiates into both arms. He does report numbness to both hands. He also report low back pain that radiates down the side of both legs to mid thigh. He continues to take Gabapentin with benefit. He does take Norco sparingly with benefit. At last visit, he was given a warning as his UDS was positive for cocaine. He does admit today to trying cocaine and marijuana. He denies any other health changes. His pain today is 4/10.    Interval History 7/16/2018:  The patient returns to clinic today for follow up. He continues to report low back pain that radiates down the side of both legs to mid thigh that is shooting in nature. His back and leg pain is worse with prolonged activity. He continues to report intermittent neck pain that radiates into both arms. He continues to take Gabapentin with benefit. He also takes Norco sparingly with benefit and without adverse effects. He continues to perform a home exercise routine. He denies any other health changes. He denies any bowel or bladder incontinence. His pain today is 5/10.    Interval History 5/15/2018:  The patient returns to clinic today for follow up. He continues to report low back pain that radiates down the side of his both legs to his mid thigh. He describes this pain as tingling and shooting. He continues to report neck pain that radiates into both arms. He continues to report benefit with current medication regimen. He takes Gabapentin 300 mg BID. He also takes Norco sparingly with benefit. He denies any adverse effects. He  continues to perform a home exercise routine. He denies any other health changes. His pain today is 5/10.    Interval History 3/15/2018:  The patient returns to clinic today for follow up. He was previously scheduled for repeat lumbar DWAIN which was canceled due to transportation issues. He continues to report low back pain that radiates down the side of his right leg to mid thigh. This pain is burning and shooting in nature. He denies any left leg pain. He continues to report neck pain with intermittent radiating pain into his arms. He continues to take Gabapentin 300 mg BID with benefit. He also takes Norco sparingly with benefit and without adverse effects. He denies any other health changes. He denies any bowel or bladder incontinence. His pain today is 5/10.    Interval History 2/14/2018:  The patient returns to clinic today for follow up. He is s/p C7-T1 IL DWAIN on 1/17/2018. He reports 25% relief of his neck pain. He continues to report neck pain that is constant, aching, and burning. He reports intermittent aching pain into his arms bilaterally. He reports that his low back pain is worse today. He reports low back pain that is radiating down the side of his right leg to mid thigh. He describes this pain as burning and shooting. He denies any left leg pain today. He continues to take Gabapentin 300 mg BID. He does take Norco sparingly with benefit. He denies any other health changes. He denies any bowel or bladder incontinence. His pain today is 6/10.         Interval History 1/9/2018:  The patient returns to clinic today for follow up. He reports increased neck pain in the last month. He describes the pain as constant, aching and burning. He reports intermittent radiating pain into both arms that is burning in nature. He also reports increased shoulder pain. He does have a past history of left shoulder surgery years ago. He reports that his low back pain is tolerable at this time. He continues to take  Gabapentin with benefit. He also takes Norco sparingly with benefit. He denies any adverse reactions with this medication. He denies any other health changes. His pain today is 5/10.    Interval History 11/7/2017:  The patient returns to clinic today for follow up of low back and leg pain. He reports low back pain that radiates down the side of left leg to mid thigh. He reports a recent flare up that was intense for a few days but then improved significantly. He denies any injury. He reports his pain is tolerable at this time. He continues to be physically active and participate in a home exercise routine. He continues to take Gabapentin TID with benefit and does need a refill. He also continues to take Norco sparingly with benefit. He denies any other health changes. His pain today is 5/10.     Interval History 9/7/2017:  The patient returns to clinic today for follow up. He is s/p bilateral L3 TF DWAIN on 8/25/2017. He reports 50% relief of his low back and radiating leg pain. He continues to report low back pain that is aching in nature. He reports that this is tolerable at this time. He continues to take Gabapentin with benefit. He continues to take Norco sparingly with benefit. He would like a refill today. He denies any other health changes. He denies any bowel or bladder incontinence. His pain today is 4/10.    Interval History 8/2/2017:  The patient returns to clinic today for follow up. He reports that his left leg pain has returned in the last few days. He reports low back pain that is radiating down the side of his left leg to mid thigh. He describes this pain as shooting and burning. He continues to use Gabapentin and Norco with benefit. He also reports bilateral knee pain that is dull and aching in nature. He denies any injury. He has never had any interventions for his knees. He denies any bowel or bladder incontinence. His pain today is 6/10.    Interval History 7/6/2017:  The patient returns to clinic  today for follow up. He is s/p left L3 TF DWAIN on 6/21/2017. He reports 80% relief of his leg pain. He does report low back pain that is worse in the morning. He does report stiffness. He continues to report benefit with Gabapentin and Norco. He does report increased activity since the procedure. He denies any other health changes. He denies any bowel or bladder incontinence. His pain today is 3/10.     Interval History 6/6/2017:  The patient returns to clinic today for follow up. He complains of low back pain that is radiating into his left leg. This pain has increased in intensity in the last few weeks. He did see Dr. Liz yesterday. He reports pain that begins in his low back and radiates down the side of his left thigh to his knee. He denies any radiating right leg pain. He also complains of stiffness in his lower back that is worse in the morning. He denies any bowel or bladder incontinence. He continues to take Gabapentin 1800 mg daily. He also take Norco every 12 hours as needed for pain and does need a refill today. He reports continued relief of shoulder pain with previous injection. His pain today is 5/10.    Interval History 4/20/2017  NM bone scan 3/29 with arthritis T12/L1 and L5/S1 follow-up with orthopedics regarding shoulder has severe osteoarthritis had a shoulder joint injection with great relief pain.  Not currently having any cervical radicular symptoms.  Gabapentin at 900 mg per day no side effects, does have some lower extremity radicular symptoms.  No other health changes.    Initial encounter:    Rob Jones Jr. presents to the clinic for the evaluation of neck, shoulder and lower back with lower extremities pain. The pain started years ago and symptoms have been worsening.    Brief history:   patient recently evaluated in neurosurgery is scheduled for a nuclear medicine bone scan, possible workup for ACDF secondary to severe central stenosis of the cervical spine with radiculopathy.   The patient had a previous cervical transverse foraminal epidural steroid injection with greater than 50% improvement in his radicular symptoms into his right upper extremity although he continues to have what appears to be rotator cuff syndrome of the left shoulder and is scheduled for orthopedic evaluation soon.    Pain Description:    The current worst pain is located in the left shoulder area    At BEST  10/10     At WORST  10/10 on the WORST day.      On average pain is rated as 10/10.     Today the pain is rated as 10/10    The pain is described as aching, sharp, shooting, throbbing and tingling      Symptoms interfere with daily activity, sleeping and work.     Exacerbating factors: Sitting, Standing, Laying, Bending, Extension, Flexing, Lifting and Getting out of bed/chair.      Mitigating factors medications and injection.     Patient denies urinary incontinence and bowel incontinence.  Patient denies any suicidal or homicidal ideations    Pain Medications:  Current:  Hydrocodone/acetaminophen  Gabapentin 300 mg BID     Tried in Past:  NSAIDs -Never  TCA -Never  SNRI -Never  Anti-convulsants  Gabapentin   Muscle Relaxants -Never  Opioids-Never    Physical Therapy/Home Exercise: no       report:  Reviewed and consistent with medication use as prescribed.    Pain Procedures:   2/6/17- right C6-C7 TF DWAIN with IR   6/21/2017- Left L3 TF DWAIN- 80% relief  8/25/2017- Bilateral L3 TF DWAIN- 50% relief  1/17/2018- C7-T1 IL DWAIN- 25% relief    Chiropractor -never  Acupuncture - never  TENS unit -never  Spinal decompression -previous lumbar surgery  Joint replacement -never    Imaging:   Lumbar MRI 5/29/2017:  FINDINGS:     Alignment: Normal.    Vertebrae: No fracture. Essentially normal marrow signal, with fatty endplate degenerative changes L4-S1.    Discs:  Disc spacer devices at L4-L5 and L5-S1 with partial fusion of the disc spaces.  Mild diffuse disc space narrowing throughout the lumbar spine with mild  desiccation L3-L4.    Cord: Normal. Conus terminates at T12-L1.    Degenerative findings:        T11-T12: Incompletely imaged on MRI.  No significant disc disease.  No spinal canal stenosis.  Left-sided facet arthropathy results in mild left foraminal stenosis.       T12-L1: Incompletely imaged on MRI.  No significant disc disease.  No spinal canal stenosis.  No neural foraminal stenosis.       L1-L2: No significant disc disease.  Minimal bilateral facet arthropathy without spinal canal or foraminal stenosis.       L2-L3: Minimal circumferential disc bulge with mild bilateral facet arthropathy and buckling of the ligamentum flavum, overall resulting in mild left foraminal stenosis with no significant spinal canal stenosis.       L3-L4: Minimal circumferential disc bulge with mild bilateral facet arthropathy and buckling of the ligamentum flavum results in mild spinal canal stenosis, moderate left foraminal stenosis, and mild right foraminal stenosis.       L4-L5: Fusion of the disc space with mild right worse than left facet arthropathy results in mild right foraminal stenosis.  No spinal canal stenosis.       L5-S1: Minimal circumferential disc bulge with superimposed calcified left foraminal protrusion and mild bilateral facet arthropathy results in mild right and moderate left foraminal stenosis.  No spinal canal stenosis.    Paraspinal muscles & soft tissues: Mild thickening of the right adrenal gland without discrete nodule, unchanged from prior CT.   Impression       Disc spacers with partial fusion L4-S1 with mild disc and facet degeneration throughout the lumbar spine most pronounced at L3-L4 with mild spinal canal stenosis and moderate left foraminal stenosis as well as L5-S1 with moderate left foraminal stenosis.     Cervical MRI 1/16/2017:  Findings: There is no evidence of fracture, dislocation or marrow replacement process.  The vertebral body heights and alignment are maintained.  The visualized  posterior fossa structures demonstrate no significant abnormality.  The surrounding soft tissue structures demonstrate no significant abnormality.  The cervical cord signal is normal.    C2-3: Unremarkable    C3-4: Uncovertebral joint hypertrophy resulting in mild right neural foraminal narrowing.    C4-5: Uncovertebral joint hypertrophy resulting in moderate bilateral neural foraminal narrowing.    C5-6: Uncovertebral joint hypertrophy resulting in mild right neural foraminal narrowing.    C6-7: Uncovertebral joint hypertrophy with a small broad-based posterior disc bulge and focal thickening of the ligamentum flavum resulting in severe canal stenosis and moderate right neural foraminal narrowing.    C7-T1: Unremarkable.   Impression       Multifocal degenerative disc disease as above. There is focal thickening of the ligamentum flavum at C6-7 which results in severe canal stenosis at that level. No cord signal abnormality.     Xray Shoulder 3/20/2017:  Findings: External rotation, Y. view and axillary views of the left shoulder demonstrate postsurgical hardware in the humeral neck which shows no significant change from the 2009 exam.  There is advanced joint space loss at the glenohumeral joint with sclerosis and osteophyte formation.  There is an ossific body at the inferior aspect of the joint which is well-corticated and measures approximately 2.2 cm.  Small osteophytes are also identified at the acromioclavicular joint.  There is no fracture, dislocation or osseous destructive process.  The visualized portions of the left lung are normal.   Impression      Findings suggestive of advanced osteoarthritis of the left shoulder.  The 2.2 cm osseous body may represent a loose body.  It is located at the inferior aspect of the joint however.         Past Medical History:   Diagnosis Date    Anxiety     Back pain     Cataract     Diabetes mellitus     History of hepatitis C; S/p RX with SVR 12 documented 06/2017  6/1/2017    Hypertension     Postoperative hypothyroidism 11/3/2016    Primary hyperparathyroidism 1/18/2017    Thyroid disease      Past Surgical History:   Procedure Laterality Date    BLOCK SELECTIVE NERVE ROOT TRANSFORAMINAL LUMBAR Left 6/21/2017    [facility]    INJECTION-STEROID-EPIDURAL-CERVICAL N/A 1/17/2018    [facility]    INJECTION-STEROID-EPIDURAL-TRANSFORAMINAL Left 8/25/2017    [facility] at [facility]    LUMBAR FUSION      THYROIDECTOMY      TONSILLECTOMY       Social History     Socioeconomic History    Marital status: Single     Spouse name: Not on file    Number of children: Not on file    Years of education: Not on file    Highest education level: Not on file   Social Needs    Financial resource strain: Not on file    Food insecurity - worry: Not on file    Food insecurity - inability: Not on file    Transportation needs - medical: Not on file    Transportation needs - non-medical: Not on file   Occupational History    Occupation: Disabled, pt says from [employer] and now for back, [employer]   Tobacco Use    Smoking status: Current Every Day Smoker    Smokeless tobacco: Never Used   Substance and Sexual Activity    Alcohol use: Yes    Drug use: No    Sexual activity: Not on file   Other Topics Concern    Not on file   Social History Narrative    Lives alone.  He has 2 adult children who do not live here.       Family History   Problem Relation Age of Onset    Cancer Mother         breast    Cataracts Father     No Known Problems Sister     No Known Problems Brother     No Known Problems Brother     Heart disease Brother     No Known Problems Sister     No Known Problems Sister     No Known Problems Sister     Glaucoma Paternal Uncle     Glaucoma Paternal Uncle        Review of patient's allergies indicates:   Allergen Reactions    Tizanidine Shortness Of Breath  "      Current Outpatient Medications   Medication Sig    albuterol (ACCUNEB) 1.25 mg/3 mL Nebu Take 3 mLs (1.25 mg total) by nebulization every 6 (six) hours as needed (shortness of breath). Rescue    amLODIPine (NORVASC) 10 MG tablet TAKE 1 TABLET (10 MG TOTAL) BY MOUTH ONCE DAILY.    atorvastatin (LIPITOR) 10 MG tablet Take 1 tablet (10 mg total) by mouth once daily.    diclofenac sodium 1 % Gel Apply 2 g topically 4 (four) times daily.    dicyclomine (BENTYL) 20 mg tablet Take 20 mg by mouth daily as needed.    econazole nitrate 1 % cream Apply topically 2 (two) times daily.    fish oil-omega-3 fatty acids 300-1,000 mg capsule Take 2 g by mouth once daily.    gabapentin (NEURONTIN) 300 MG capsule Take 300 mg by mouth 2 (two) times daily.    HYDROcodone-acetaminophen (NORCO)  mg per tablet Take 1 tablet by mouth every 12 (twelve) hours as needed for Pain.    insulin aspart (NOVOLOG FLEXPEN) 100 unit/mL InPn pen Inject 15 Units into the skin with lunch.    insulin degludec (TRESIBA FLEXTOUCH U-100) 100 unit/mL (3 mL) InPn Inject 15 Units into the skin every evening.    irbesartan (AVAPRO) 150 MG tablet TAKE 1 TABLET (150 MG TOTAL) BY MOUTH ONCE DAILY.    LANTUS SOLOSTAR U-100 INSULIN 100 unit/mL (3 mL) InPn pen     levothyroxine (SYNTHROID) 125 MCG tablet Take 1 tablet (125 mcg total) by mouth before breakfast.    meloxicam (MOBIC) 15 MG tablet Take 1 tablet (15 mg total) by mouth daily as needed for Pain.    metFORMIN (GLUCOPHAGE) 1000 MG tablet Take 1 tablet (1,000 mg total) by mouth 2 (two) times daily with meals.    nitroGLYCERIN 0.4 MG/HR TD PT24 (NITRODUR) 0.4 mg/hr Place 1 patch onto the skin continuous prn.    NOVOFINE 32 32 gauge x 1/4" Ndle 1 EACH BY MISC.(NON-DRUG COMBO ROUTE) ROUTE ONCE DAILY.    pen needle, diabetic (NOVOFINE PLUS) 32 gauge x 1/6" Ndle 1 each by Misc.(Non-Drug; Combo Route) route once daily.    tamsulosin (FLOMAX) 0.4 mg Cp24 Take 1 capsule (0.4 mg total) by " "mouth once daily.    temazepam (RESTORIL) 30 mg capsule TAKE 1 CAPSULE BY MOUTH EVERY DAY AT BEDTIME AS NEEDED    vitamin D 1000 units Tab Take 185 mg by mouth once daily.     No current facility-administered medications for this visit.        REVIEW OF SYSTEMS:    GENERAL:  No weight loss, malaise or fevers.  HEENT:   No recent changes in vision or hearing  NECK:  Negative for lumps, no difficulty with swallowing.  RESPIRATORY:  Negative for cough, wheezing or shortness of breath, patient denies any recent URI.  CARDIOVASCULAR:  Negative for chest pain, leg swelling or palpitations. HTN.   GI:  Negative for abdominal discomfort, blood in stools or black stools or change in bowel habits.  MUSCULOSKELETAL:  See HPI.  SKIN:  Negative for lesions, rash, and itching.  PSYCH:  History of anxiety. Patient's sleep is disturbed secondary to pain.  HEMATOLOGY/LYMPHOLOGY:  Negative for prolonged bleeding, bruising easily or swollen nodes.  Patient is not currently taking any anti-coagulants  ENDO: History of hypothyroidism and Diabetes.   NEURO:   No history of headaches, syncope, paralysis, seizures or tremors.  All other reviewed and negative other than HPI.    OBJECTIVE:    BP (!) 143/84   Pulse 62   Resp 18   Ht 5' 10" (1.778 m)   Wt 76.7 kg (169 lb)   BMI 24.25 kg/m²     PHYSICAL EXAMINATION:    GENERAL: Well appearing, in no acute distress, alert and oriented x3.  PSYCH:  Mood and affect appropriate.  SKIN: Skin color, texture, turgor normal, no rashes or lesions.  HEAD/FACE:  Normocephalic, atraumatic. Cranial nerves grossly intact.  NECK: There is pain to palpation over the cervical paraspinous muscles. Spurling Negative. Limited ROM with pain on extension. Positive facet loading bilaterally.    CV: RRR with palpation of the radial artery.  PULM: No evidence of respiratory difficulty, symmetric chest rise.  BACK: Straight leg raise in the seated position is positive to pain bilaterally, left greater than right. " There is pain with palpation over lumbar paraspinals. There is pain with palpation over lumbar spine. Limited ROM with pain on extension. Positive facet loading bilaterally.   EXTREMITIES: Peripheral joint ROM is full and pain free without obvious instability or laxity in all four extremities. No deformities, edema, or skin discoloration. Good capillary refill.  MUSCULOSKELETAL: Shoulder provocative maneuvers are positive on the left. There is no pain with palpation over medial or lateral joint line of bilateral knees. Bilateral upper extremity strength is normal and symmetric.  No atrophy or tone abnormalities are noted.  NEURO: Bilateral upper extremity coordination and muscle stretch reflexes are physiologic and symmetric.  Abimael's negative. No clonus.  No loss of sensation is noted.  GAIT: Antalgic, ambulates without assistance.         Lab Results   Component Value Date    WBC 4.60 11/11/2016    HGB 12.6 (L) 11/11/2016    HCT 39.4 (L) 11/11/2016    MCV 97 11/11/2016     11/11/2016      Lab Results   Component Value Date    LABA1C 9.2 (H) 05/18/2016    HGBA1C 6.4 (H) 07/17/2018     Lab Results   Component Value Date    CREATININE 1.2 07/17/2018        ASSESSMENT: 63 y.o. year old male with pain, consistent with     Encounter Diagnoses   Name Primary?    Cervical stenosis of spinal canal Yes    Cervical radiculopathy     DDD (degenerative disc disease), cervical     Chronic pain syndrome        PLAN:     - Previous imaging was reviewed and discussed with the patient today. Previous labs reviewed today.     - We may repeat lumbar DWAIN at any time.     - Can repeat C7-T1 IL DWAIN as needed.     - In the future, we can consider cervical medial branch blocks for his facet mediated pain.     - Continue Gabapentin.     - UDS from 7/15/2018 is positive for benzoylecgonine and marijuana. This is a violation of his pain contract. He did recently receive a refill of his hydrocodone. This will be his last  prescription. We are happy to continue procedures as needed but will no longer prescribe opioid medications.     - Continue home exercise routine.     - Counseled the patient on the importance of activity restrictions and constant sleeping habits.     - RTC in 2 months or sooner if needed.     - Dr. Espinoza was consulted on the patient and agrees with this plan.    The above plan and management options were discussed at length with patient. Patient is in agreement with the above and verbalized understanding.     Sue Walker NP  09/12/2018

## 2018-09-25 ENCOUNTER — LAB VISIT (OUTPATIENT)
Dept: LAB | Facility: OTHER | Age: 63
End: 2018-09-25
Attending: INTERNAL MEDICINE
Payer: MEDICARE

## 2018-09-25 ENCOUNTER — OFFICE VISIT (OUTPATIENT)
Dept: CARDIOLOGY | Facility: CLINIC | Age: 63
End: 2018-09-25
Attending: INTERNAL MEDICINE
Payer: MEDICARE

## 2018-09-25 VITALS
WEIGHT: 168 LBS | BODY MASS INDEX: 24.05 KG/M2 | HEIGHT: 70 IN | HEART RATE: 58 BPM | DIASTOLIC BLOOD PRESSURE: 69 MMHG | SYSTOLIC BLOOD PRESSURE: 138 MMHG

## 2018-09-25 DIAGNOSIS — E11.9 DIABETES MELLITUS WITHOUT COMPLICATION: ICD-10-CM

## 2018-09-25 DIAGNOSIS — R07.9 ACUTE CHEST PAIN: ICD-10-CM

## 2018-09-25 DIAGNOSIS — E03.9 HYPOTHYROIDISM, UNSPECIFIED TYPE: ICD-10-CM

## 2018-09-25 DIAGNOSIS — R07.89 ATYPICAL CHEST PAIN: ICD-10-CM

## 2018-09-25 DIAGNOSIS — Z72.0 TOBACCO USE: ICD-10-CM

## 2018-09-25 DIAGNOSIS — E87.5 HYPERKALEMIA: ICD-10-CM

## 2018-09-25 DIAGNOSIS — I73.9 INTERMITTENT CLAUDICATION: Primary | ICD-10-CM

## 2018-09-25 LAB
ANION GAP SERPL CALC-SCNC: 10 MMOL/L
BUN SERPL-MCNC: 17 MG/DL
CALCIUM SERPL-MCNC: 11.2 MG/DL
CHLORIDE SERPL-SCNC: 106 MMOL/L
CO2 SERPL-SCNC: 24 MMOL/L
CREAT SERPL-MCNC: 1.1 MG/DL
EST. GFR  (AFRICAN AMERICAN): >60 ML/MIN/1.73 M^2
EST. GFR  (NON AFRICAN AMERICAN): >60 ML/MIN/1.73 M^2
GLUCOSE SERPL-MCNC: 80 MG/DL
POTASSIUM SERPL-SCNC: 4.9 MMOL/L
SODIUM SERPL-SCNC: 140 MMOL/L
T4 FREE SERPL-MCNC: 1.09 NG/DL
TSH SERPL DL<=0.005 MIU/L-ACNC: 8.73 UIU/ML

## 2018-09-25 PROCEDURE — 3008F BODY MASS INDEX DOCD: CPT | Mod: CPTII,S$GLB,, | Performed by: INTERNAL MEDICINE

## 2018-09-25 PROCEDURE — 80048 BASIC METABOLIC PNL TOTAL CA: CPT

## 2018-09-25 PROCEDURE — 99204 OFFICE O/P NEW MOD 45 MIN: CPT | Mod: S$GLB,,, | Performed by: INTERNAL MEDICINE

## 2018-09-25 PROCEDURE — 84443 ASSAY THYROID STIM HORMONE: CPT

## 2018-09-25 PROCEDURE — 36415 COLL VENOUS BLD VENIPUNCTURE: CPT

## 2018-09-25 PROCEDURE — 84439 ASSAY OF FREE THYROXINE: CPT

## 2018-09-25 PROCEDURE — 3044F HG A1C LEVEL LT 7.0%: CPT | Mod: CPTII,S$GLB,, | Performed by: INTERNAL MEDICINE

## 2018-09-25 PROCEDURE — 3078F DIAST BP <80 MM HG: CPT | Mod: CPTII,S$GLB,, | Performed by: INTERNAL MEDICINE

## 2018-09-25 PROCEDURE — 93000 ELECTROCARDIOGRAM COMPLETE: CPT | Mod: S$GLB,,, | Performed by: INTERNAL MEDICINE

## 2018-09-25 PROCEDURE — 3075F SYST BP GE 130 - 139MM HG: CPT | Mod: CPTII,S$GLB,, | Performed by: INTERNAL MEDICINE

## 2018-09-25 NOTE — LETTER
September 25, 2018      Vasyl Jimenez MD  [address]  [address]  [place] [address]           [facility]  [address], [address]  [place] [address]  Phone: [phone]  Fax: [phone]          Patient: Rob Jones Jr.   MR Number: [id number]   YOB: 1955   Date of Visit: 9/25/2018       Dear Dr. Vasyl Jimenez:    Thank you for referring Rob Jones to me for evaluation. Attached you will find relevant portions of my assessment and plan of care.    If you have questions, please do not hesitate to call me. I look forward to following Rob Jones along with you.    Sincerely,    Moe Patrick MD    Enclosure  CC:  No Recipients    If you would like to receive this communication electronically, please contact [email or web address] or [phone] to request more information on [employer] access.    For providers and/or their staff who would like to refer a patient to [facility], please contact us through our one-stop-shop provider referral line, [facility], at [phone].    If you feel you have received this communication in error or would no longer like to receive these types of communications, please e-mail [email or web address]

## 2018-09-25 NOTE — PROGRESS NOTES
Subjective:    Patient ID:  Rob Jones Jr. is a 63 y.o. male     HPI   Here for cardiovascular evaluation.    I get sharp sticking pains in my chest at rest.  I do not get around too much because of my bad back, by bad knee, and my pain in the neck.    Past history  I had a heart attack before [place], and angiogram done at that time was normal.  I had 2 more heart attacks that after, and I had 1 more coronary angiogram which also was normal more than 10 years ago.  I have had diabetes mellitus since   Hypertension  I had  Adrenalectomy for pheochromocytoma  Thyroidectomy  Back surgery  Both shoulders were operated on  Hemorrhoidectomy.  Circumcision  I smoke a pack every 3 of 4 days  No alcohol    Mother  at 74 of a breast cancer  Father is 84 years old and well  One brother  of a heart attack, 2 brothers are okay, 4 sisters are in good health.    Current Outpatient Medications   Medication Sig    albuterol (ACCUNEB) 1.25 mg/3 mL Nebu Take 3 mLs (1.25 mg total) by nebulization every 6 (six) hours as needed (shortness of breath). Rescue    amLODIPine (NORVASC) 10 MG tablet TAKE 1 TABLET (10 MG TOTAL) BY MOUTH ONCE DAILY.    atorvastatin (LIPITOR) 10 MG tablet Take 1 tablet (10 mg total) by mouth once daily.    diclofenac sodium 1 % Gel Apply 2 g topically 4 (four) times daily.    dicyclomine (BENTYL) 20 mg tablet Take 20 mg by mouth daily as needed.    econazole nitrate 1 % cream Apply topically 2 (two) times daily.    fish oil-omega-3 fatty acids 300-1,000 mg capsule Take 2 g by mouth once daily.    gabapentin (NEURONTIN) 300 MG capsule Take 300 mg by mouth 2 (two) times daily.    HYDROcodone-acetaminophen (NORCO)  mg per tablet Take 1 tablet by mouth every 12 (twelve) hours as needed for Pain.    insulin aspart (NOVOLOG FLEXPEN) 100 unit/mL InPn pen Inject 15 Units into the skin with lunch.    insulin degludec (TRESIBA FLEXTOUCH U-100) 100 unit/mL (3 mL) InPn Inject 15 Units into  "the skin every evening.    irbesartan (AVAPRO) 150 MG tablet TAKE 1 TABLET (150 MG TOTAL) BY MOUTH ONCE DAILY.    LANTUS SOLOSTAR U-100 INSULIN 100 unit/mL (3 mL) InPn pen     levothyroxine (SYNTHROID) 125 MCG tablet Take 1 tablet (125 mcg total) by mouth before breakfast.    meloxicam (MOBIC) 15 MG tablet Take 1 tablet (15 mg total) by mouth daily as needed for Pain.    metFORMIN (GLUCOPHAGE) 1000 MG tablet Take 1 tablet (1,000 mg total) by mouth 2 (two) times daily with meals.    nitroGLYCERIN 0.4 MG/HR TD PT24 (NITRODUR) 0.4 mg/hr Place 1 patch onto the skin continuous prn.    NOVOFINE 32 32 gauge x 1/4" Ndle 1 EACH BY MISC.(NON-DRUG COMBO ROUTE) ROUTE ONCE DAILY.    pen needle, diabetic (NOVOFINE PLUS) 32 gauge x 1/6" Ndle 1 each by Misc.(Non-Drug; Combo Route) route once daily.    tamsulosin (FLOMAX) 0.4 mg Cp24 Take 1 capsule (0.4 mg total) by mouth once daily.    temazepam (RESTORIL) 30 mg capsule TAKE 1 CAPSULE BY MOUTH EVERY DAY AT BEDTIME AS NEEDED    vitamin D 1000 units Tab Take 185 mg by mouth once daily.     No current facility-administered medications for this visit.          Review of Systems   Constitution: Negative for chills, decreased appetite, fever, weight gain and weight loss.   HENT: Negative for congestion, hearing loss and sore throat.    Eyes: Negative for blurred vision, double vision and visual disturbance.   Cardiovascular: Positive for chest pain and claudication. Negative for dyspnea on exertion, leg swelling, palpitations and syncope.   Respiratory: Negative for cough, hemoptysis, shortness of breath, sputum production and wheezing.    Endocrine: Negative for cold intolerance and heat intolerance.   Hematologic/Lymphatic: Negative for bleeding problem. Does not bruise/bleed easily.   Skin: Negative for color change, dry skin, flushing and itching.   Musculoskeletal: Negative for back pain, joint pain and myalgias.   Gastrointestinal: Negative for abdominal pain, anorexia, " "constipation, diarrhea, dysphagia, nausea and vomiting.        No bleeding per rectum   Genitourinary: Negative for dysuria, flank pain, frequency, hematuria and nocturia.   Neurological: Negative for dizziness, headaches, light-headedness, loss of balance, seizures and tremors.   Psychiatric/Behavioral: Negative for altered mental status and depression.         Vitals:    09/25/18 1529   BP: 138/69   Pulse: (!) 58   Weight: 76.2 kg (168 lb)   Height: 5' 10" (1.778 m)     Objective:    Physical Exam   Constitutional: He is oriented to person, place, and time. He appears well-developed and well-nourished.   HENT:   Head: Normocephalic and atraumatic.   Right Ear: External ear normal.   Left Ear: External ear normal.   Nose: Nose normal.   Eyes: Conjunctivae and EOM are normal. Pupils are equal, round, and reactive to light. No scleral icterus.   Neck: Normal range of motion. Neck supple. No JVD present. No tracheal deviation present. No thyromegaly present.   Cardiovascular: Normal rate, regular rhythm and normal heart sounds. Exam reveals no gallop and no friction rub.   No murmur heard.  Pulmonary/Chest: Effort normal and breath sounds normal. No respiratory distress. He has no rales. He exhibits no tenderness.   Abdominal: Soft. Bowel sounds are normal. He exhibits no distension and no mass. There is no tenderness.   Musculoskeletal: Normal range of motion. He exhibits no edema or tenderness.   Lymphadenopathy:     He has no cervical adenopathy.   Neurological: He is alert and oriented to person, place, and time. He has normal reflexes. No cranial nerve deficit. Coordination normal.   Skin: Skin is warm and dry. No rash noted.   Psychiatric: He has a normal mood and affect. His behavior is normal.         Assessment:       1. Intermittent claudication    2. Acute chest pain    3. Atypical chest pain    4. Tobacco use    5. Diabetes mellitus without complication         Plan:       Nuclear stress test  Arterial " "Doppler studies of the legs  Counseled regarding smoking cessation. ("I really enjoy my smoking, I am not really prepared to quit")            "

## 2018-10-02 ENCOUNTER — CLINICAL SUPPORT (OUTPATIENT)
Dept: CARDIOLOGY | Facility: CLINIC | Age: 63
End: 2018-10-02
Payer: MEDICARE

## 2018-10-02 DIAGNOSIS — I73.9 INTERMITTENT CLAUDICATION: ICD-10-CM

## 2018-10-02 PROCEDURE — 93925 LOWER EXTREMITY STUDY: CPT | Mod: S$GLB,,, | Performed by: INTERNAL MEDICINE

## 2018-10-03 ENCOUNTER — TELEPHONE (OUTPATIENT)
Dept: INTERNAL MEDICINE | Facility: CLINIC | Age: 63
End: 2018-10-03

## 2018-10-03 RX ORDER — MELOXICAM 15 MG/1
15 TABLET ORAL DAILY PRN
Qty: 90 TABLET | Refills: 0 | Status: SHIPPED | OUTPATIENT
Start: 2018-10-03 | End: 2019-01-14 | Stop reason: SDUPTHER

## 2018-10-03 NOTE — TELEPHONE ENCOUNTER
Spoke with patient, he had a verbal understanding on how to take his levothyroxine. Pt stated he thinks he is taking 122mcg of the levothyroxine, but that he wasn't home and wasn't for sure. I advised he call us when he can clarify that for us.

## 2018-10-03 NOTE — TELEPHONE ENCOUNTER
Please contact pt  Before we change your dose I need to confirm the dose of levothyroxine you are taking. I also want to make sure you are taking your levothyroxine correctly as it is a finicky medication. It must be taken first thing in the morning on an empty stomach at least 60 minutes before eating or drinking anything, and preferably without other medications. What dose are you currently taking and for how long have you been on this dose.

## 2018-10-05 ENCOUNTER — TELEPHONE (OUTPATIENT)
Dept: INTERNAL MEDICINE | Facility: CLINIC | Age: 63
End: 2018-10-05

## 2018-10-05 DIAGNOSIS — E89.0 POSTOPERATIVE HYPOTHYROIDISM: Primary | ICD-10-CM

## 2018-10-05 RX ORDER — LEVOTHYROXINE SODIUM 137 UG/1
137 TABLET ORAL
Qty: 90 TABLET | Refills: 0 | Status: SHIPPED | OUTPATIENT
Start: 2018-10-05 | End: 2019-01-04 | Stop reason: SDUPTHER

## 2018-10-05 NOTE — TELEPHONE ENCOUNTER
----- Message from Ami March MA sent at 10/5/2018  2:02 PM CDT -----  Contact: pt      ----- Message -----  From: Chyna Erwin  Sent: 10/5/2018   9:33 AM  To: [facility] Staff    Pt called to verify that he is taking 125 mcg of the synthroid. Please call with any questions [phone]

## 2018-10-05 NOTE — TELEPHONE ENCOUNTER
New rx for 137mcg sent in. Please remind pt to take every morning on empty stomach and we will need to rpt labs in 8 weeks.

## 2018-10-05 NOTE — TELEPHONE ENCOUNTER
Sent message to inform pt new script for thyroid med sent in and give day and time to schedule labs in 8 wks

## 2018-10-12 ENCOUNTER — HOSPITAL ENCOUNTER (OUTPATIENT)
Dept: RADIOLOGY | Facility: OTHER | Age: 63
Discharge: HOME | End: 2018-10-12
Attending: INTERNAL MEDICINE
Payer: MEDICARE

## 2018-10-12 ENCOUNTER — HOSPITAL ENCOUNTER (OUTPATIENT)
Dept: CARDIOLOGY | Facility: OTHER | Age: 63
Discharge: HOME | End: 2018-10-12
Attending: INTERNAL MEDICINE
Payer: MEDICARE

## 2018-10-12 DIAGNOSIS — R07.9 ACUTE CHEST PAIN: ICD-10-CM

## 2018-10-12 LAB — DIASTOLIC DYSFUNCTION: NO

## 2018-10-12 PROCEDURE — 78452 HT MUSCLE IMAGE SPECT MULT: CPT | Mod: 26,,, | Performed by: RADIOLOGY

## 2018-10-12 PROCEDURE — 78452 HT MUSCLE IMAGE SPECT MULT: CPT | Mod: TC

## 2018-10-12 PROCEDURE — 93017 CV STRESS TEST TRACING ONLY: CPT

## 2018-10-15 ENCOUNTER — TELEPHONE (OUTPATIENT)
Dept: CARDIOLOGY | Facility: CLINIC | Age: 63
End: 2018-10-15

## 2018-10-25 ENCOUNTER — PATIENT MESSAGE (OUTPATIENT)
Dept: SURGERY | Facility: CLINIC | Age: 63
End: 2018-10-25

## 2018-11-28 ENCOUNTER — TELEPHONE (OUTPATIENT)
Dept: SURGERY | Facility: CLINIC | Age: 63
End: 2018-11-28

## 2018-12-07 DIAGNOSIS — M47.816 FACET ARTHRITIS, DEGENERATIVE, LUMBAR SPINE: ICD-10-CM

## 2018-12-07 DIAGNOSIS — M54.17 LUMBOSACRAL RADICULOPATHY: ICD-10-CM

## 2018-12-07 DIAGNOSIS — G89.4 CHRONIC PAIN SYNDROME: ICD-10-CM

## 2018-12-07 DIAGNOSIS — Z98.1 S/P LUMBAR FUSION: ICD-10-CM

## 2018-12-07 DIAGNOSIS — M51.36 DDD (DEGENERATIVE DISC DISEASE), LUMBAR: ICD-10-CM

## 2018-12-07 DIAGNOSIS — M47.816 LUMBAR SPONDYLOSIS: ICD-10-CM

## 2018-12-07 RX ORDER — GABAPENTIN 300 MG/1
300 CAPSULE ORAL 3 TIMES DAILY
Qty: 90 CAPSULE | Refills: 5 | Status: SHIPPED | OUTPATIENT
Start: 2018-12-07 | End: 2019-04-13 | Stop reason: SDUPTHER

## 2018-12-10 RX ORDER — TEMAZEPAM 30 MG/1
CAPSULE ORAL
Qty: 60 CAPSULE | Refills: 1 | Status: SHIPPED | OUTPATIENT
Start: 2018-12-10 | End: 2019-02-20 | Stop reason: SDUPTHER

## 2018-12-11 RX ORDER — METFORMIN HYDROCHLORIDE 1000 MG/1
TABLET ORAL
Qty: 180 TABLET | Refills: 2 | Status: SHIPPED | OUTPATIENT
Start: 2018-12-11 | End: 2019-09-13 | Stop reason: SDUPTHER

## 2018-12-20 DIAGNOSIS — I10 HYPERTENSION, UNSPECIFIED TYPE: ICD-10-CM

## 2018-12-20 RX ORDER — IRBESARTAN 150 MG/1
150 TABLET ORAL DAILY
Qty: 90 TABLET | Refills: 2 | Status: SHIPPED | OUTPATIENT
Start: 2018-12-20 | End: 2019-01-21 | Stop reason: SDUPTHER

## 2018-12-20 RX ORDER — AMLODIPINE BESYLATE 10 MG/1
10 TABLET ORAL DAILY
Qty: 90 TABLET | Refills: 2 | Status: SHIPPED | OUTPATIENT
Start: 2018-12-20 | End: 2019-09-13 | Stop reason: SDUPTHER

## 2019-01-04 RX ORDER — LEVOTHYROXINE SODIUM 137 UG/1
137 TABLET ORAL
Qty: 90 TABLET | Refills: 2 | Status: SHIPPED | OUTPATIENT
Start: 2019-01-04 | End: 2019-09-30 | Stop reason: SDUPTHER

## 2019-01-14 RX ORDER — MELOXICAM 15 MG/1
15 TABLET ORAL DAILY PRN
Qty: 90 TABLET | Refills: 1 | Status: SHIPPED | OUTPATIENT
Start: 2019-01-14 | End: 2019-06-05

## 2019-01-21 ENCOUNTER — LAB VISIT (OUTPATIENT)
Dept: LAB | Facility: OTHER | Age: 64
End: 2019-01-21
Attending: INTERNAL MEDICINE
Payer: MEDICARE

## 2019-01-21 ENCOUNTER — OFFICE VISIT (OUTPATIENT)
Dept: INTERNAL MEDICINE | Facility: CLINIC | Age: 64
End: 2019-01-21
Attending: INTERNAL MEDICINE
Payer: MEDICARE

## 2019-01-21 VITALS
HEIGHT: 70 IN | OXYGEN SATURATION: 99 % | SYSTOLIC BLOOD PRESSURE: 142 MMHG | BODY MASS INDEX: 24.87 KG/M2 | HEART RATE: 62 BPM | DIASTOLIC BLOOD PRESSURE: 88 MMHG | WEIGHT: 173.75 LBS

## 2019-01-21 DIAGNOSIS — I25.10 CORONARY ARTERY DISEASE INVOLVING NATIVE CORONARY ARTERY OF NATIVE HEART WITHOUT ANGINA PECTORIS: ICD-10-CM

## 2019-01-21 DIAGNOSIS — I10 ESSENTIAL HYPERTENSION: ICD-10-CM

## 2019-01-21 DIAGNOSIS — E89.0 POSTOPERATIVE HYPOTHYROIDISM: ICD-10-CM

## 2019-01-21 DIAGNOSIS — Z72.0 TOBACCO ABUSE: ICD-10-CM

## 2019-01-21 DIAGNOSIS — E21.0 PRIMARY HYPERPARATHYROIDISM: ICD-10-CM

## 2019-01-21 DIAGNOSIS — I10 HYPERTENSION, UNSPECIFIED TYPE: ICD-10-CM

## 2019-01-21 LAB
25(OH)D3+25(OH)D2 SERPL-MCNC: 14 NG/ML
ALBUMIN SERPL BCP-MCNC: 4.3 G/DL
ALP SERPL-CCNC: 95 U/L
ALT SERPL W/O P-5'-P-CCNC: 11 U/L
ANION GAP SERPL CALC-SCNC: 8 MMOL/L
AST SERPL-CCNC: 14 U/L
BILIRUB SERPL-MCNC: 0.6 MG/DL
BUN SERPL-MCNC: 14 MG/DL
CALCIUM SERPL-MCNC: 11.9 MG/DL
CHLORIDE SERPL-SCNC: 103 MMOL/L
CHOLEST SERPL-MCNC: 129 MG/DL
CHOLEST/HDLC SERPL: 1.7 {RATIO}
CO2 SERPL-SCNC: 27 MMOL/L
CREAT SERPL-MCNC: 1.3 MG/DL
EST. GFR  (AFRICAN AMERICAN): >60 ML/MIN/1.73 M^2
EST. GFR  (NON AFRICAN AMERICAN): 58 ML/MIN/1.73 M^2
ESTIMATED AVG GLUCOSE: 180 MG/DL
GLUCOSE SERPL-MCNC: 154 MG/DL
HBA1C MFR BLD HPLC: 7.9 %
HDLC SERPL-MCNC: 78 MG/DL
HDLC SERPL: 60.5 %
LDLC SERPL CALC-MCNC: 41.6 MG/DL
NONHDLC SERPL-MCNC: 51 MG/DL
POTASSIUM SERPL-SCNC: 5.2 MMOL/L
PROT SERPL-MCNC: 8 G/DL
PTH-INTACT SERPL-MCNC: 86.8 PG/ML
SODIUM SERPL-SCNC: 138 MMOL/L
TRIGL SERPL-MCNC: 47 MG/DL
TSH SERPL DL<=0.005 MIU/L-ACNC: 3.33 UIU/ML

## 2019-01-21 PROCEDURE — 82306 VITAMIN D 25 HYDROXY: CPT

## 2019-01-21 PROCEDURE — 84443 ASSAY THYROID STIM HORMONE: CPT

## 2019-01-21 PROCEDURE — 36415 COLL VENOUS BLD VENIPUNCTURE: CPT

## 2019-01-21 PROCEDURE — 83036 HEMOGLOBIN GLYCOSYLATED A1C: CPT

## 2019-01-21 PROCEDURE — 3079F PR MOST RECENT DIASTOLIC BLOOD PRESSURE 80-89 MM HG: ICD-10-PCS | Mod: CPTII,S$GLB,, | Performed by: INTERNAL MEDICINE

## 2019-01-21 PROCEDURE — 99999 PR PBB SHADOW E&M-EST. PATIENT-LVL III: ICD-10-PCS | Mod: PBBFAC,,, | Performed by: INTERNAL MEDICINE

## 2019-01-21 PROCEDURE — 3008F BODY MASS INDEX DOCD: CPT | Mod: CPTII,S$GLB,, | Performed by: INTERNAL MEDICINE

## 2019-01-21 PROCEDURE — 3044F PR MOST RECENT HEMOGLOBIN A1C LEVEL <7.0%: ICD-10-PCS | Mod: CPTII,S$GLB,, | Performed by: INTERNAL MEDICINE

## 2019-01-21 PROCEDURE — 80053 COMPREHEN METABOLIC PANEL: CPT

## 2019-01-21 PROCEDURE — 3079F DIAST BP 80-89 MM HG: CPT | Mod: CPTII,S$GLB,, | Performed by: INTERNAL MEDICINE

## 2019-01-21 PROCEDURE — 99999 PR PBB SHADOW E&M-EST. PATIENT-LVL III: CPT | Mod: PBBFAC,,, | Performed by: INTERNAL MEDICINE

## 2019-01-21 PROCEDURE — 99214 PR OFFICE/OUTPT VISIT, EST, LEVL IV, 30-39 MIN: ICD-10-PCS | Mod: S$GLB,,, | Performed by: INTERNAL MEDICINE

## 2019-01-21 PROCEDURE — 3008F PR BODY MASS INDEX (BMI) DOCUMENTED: ICD-10-PCS | Mod: CPTII,S$GLB,, | Performed by: INTERNAL MEDICINE

## 2019-01-21 PROCEDURE — 99214 OFFICE O/P EST MOD 30 MIN: CPT | Mod: S$GLB,,, | Performed by: INTERNAL MEDICINE

## 2019-01-21 PROCEDURE — 83970 ASSAY OF PARATHORMONE: CPT

## 2019-01-21 PROCEDURE — 99499 UNLISTED E&M SERVICE: CPT | Mod: S$GLB,,, | Performed by: INTERNAL MEDICINE

## 2019-01-21 PROCEDURE — 3044F HG A1C LEVEL LT 7.0%: CPT | Mod: CPTII,S$GLB,, | Performed by: INTERNAL MEDICINE

## 2019-01-21 PROCEDURE — 3077F PR MOST RECENT SYSTOLIC BLOOD PRESSURE >= 140 MM HG: ICD-10-PCS | Mod: CPTII,S$GLB,, | Performed by: INTERNAL MEDICINE

## 2019-01-21 PROCEDURE — 99499 RISK ADDL DX/OHS AUDIT: ICD-10-PCS | Mod: S$GLB,,, | Performed by: INTERNAL MEDICINE

## 2019-01-21 PROCEDURE — 80061 LIPID PANEL: CPT

## 2019-01-21 PROCEDURE — 3077F SYST BP >= 140 MM HG: CPT | Mod: CPTII,S$GLB,, | Performed by: INTERNAL MEDICINE

## 2019-01-21 RX ORDER — INSULIN PUMP SYRINGE, 3 ML
EACH MISCELLANEOUS
Qty: 1 EACH | Refills: 0 | Status: ON HOLD | OUTPATIENT
Start: 2019-01-21 | End: 2020-03-13 | Stop reason: HOSPADM

## 2019-01-21 RX ORDER — IRBESARTAN 300 MG/1
300 TABLET ORAL DAILY
Qty: 90 TABLET | Refills: 2 | Status: SHIPPED | OUTPATIENT
Start: 2019-01-21

## 2019-01-21 RX ORDER — LANCETS
EACH MISCELLANEOUS
Qty: 100 EACH | Refills: 11 | Status: SHIPPED | OUTPATIENT
Start: 2019-01-21 | End: 2019-06-05 | Stop reason: SDUPTHER

## 2019-01-21 NOTE — PROGRESS NOTES
"Subjective:       Patient ID: Rob Jones Jr. is a 63 y.o. male.    Chief Complaint: No chief complaint on file.    Here for f/u    Hypothyroidism  Pt denies fatigue, unexplained presley gain/loss, palpitations, tremors, diarrhea, constipation, changes to hair/skin, heat/cold intolerance, or dysphagia.     Hyperparathyroid  Last seen by endocrine 01/2017. Did not follow up. He has diffuse arthralgias with hx of repairs and chronic lumbar pain. This is overall unchanged. Some constipation on chronic opiates. Denies n/v, confusion. + non specific fatigue. Last DXA 01/2017    DM  Has been without his insulin "for a minute". Not checking his sugars on account of not having a meter at home.    HTN    Tobacco abuse-still smokes. Not interested in quiting at this time        Review of Systems   Constitutional: Positive for fatigue. Negative for appetite change, chills, fever and unexpected weight change.   HENT: Negative for hearing loss, sore throat and trouble swallowing.    Eyes: Negative for visual disturbance.   Respiratory: Negative for cough, chest tightness and shortness of breath.    Cardiovascular: Negative for chest pain and leg swelling.   Gastrointestinal: Negative for abdominal pain, blood in stool, constipation, diarrhea, nausea and vomiting.   Endocrine: Negative for polydipsia and polyuria.   Genitourinary: Negative for decreased urine volume, difficulty urinating, dysuria, frequency and urgency.   Musculoskeletal: Positive for arthralgias, back pain, myalgias, neck pain and neck stiffness. Negative for gait problem.   Skin: Negative for rash.   Neurological: Positive for dizziness. Negative for numbness.   Psychiatric/Behavioral: The patient is not nervous/anxious.        Objective:      Vitals:    01/21/19 1310   BP: (!) 142/88   BP Location: Left arm   Patient Position: Sitting   BP Method: Medium (Manual)   Pulse: 62   SpO2: 99%   Weight: 78.8 kg (173 lb 11.6 oz)   Height: 5' 10" (1.778 m) "      Physical Exam   Constitutional: He is oriented to person, place, and time. He appears well-developed and well-nourished. He does not have a sickly appearance. No distress.   HENT:   Head: Normocephalic and atraumatic.   Eyes: Conjunctivae and EOM are normal. Right eye exhibits no discharge. Left eye exhibits no discharge. No scleral icterus.   Pulmonary/Chest: Effort normal. No respiratory distress.   Abdominal: Normal appearance. He exhibits no distension.   Neurological: He is alert and oriented to person, place, and time.   Skin: Skin is warm and dry. He is not diaphoretic.   Psychiatric: He has a normal mood and affect. His speech is normal.       Assessment:       1. Uncontrolled type 2 diabetes mellitus with complication, with long-term current use of insulin    2. Essential hypertension    3. Hypertension, unspecified type    4. Coronary artery disease involving native coronary artery of native heart without angina pectoris    5. Primary hyperparathyroidism    6. Tobacco abuse    7. Postoperative hypothyroidism        Plan:     Diagnoses and all orders for this visit:    Uncontrolled type 2 diabetes mellitus with complication, with long-term current use of insulin  -     Lipid panel; Future    -     Hemoglobin A1c; Future    Essential hypertension    Hypertension, unspecified type  -     irbesartan (AVAPRO) 300 MG tablet; Take 1 tablet (300 mg total) by mouth once daily.    Coronary artery disease involving native coronary artery of native heart without angina pectoris  Stop smoking BP uncontrolled. Increase irbesartan 300mg   -     Lipid panel; Future    Primary hyperparathyroidism  -     DXA Bone Density Spine And Hip; Future  -     Comprehensive metabolic panel; Future  -     Vitamin D; Future  -     PTH, intact; Future    Tobacco abuse  -     Ambulatory referral to Smoking Cessation Program    Postoperative hypothyroidism   Update labs-     TSH; Future      RTC in 6 months or sooner prn                     Side effects of medication(s) were discussed in detail and patient voiced understanding.  Patient will call back for any issues or complications.

## 2019-01-31 ENCOUNTER — OFFICE VISIT (OUTPATIENT)
Dept: CARDIOLOGY | Facility: CLINIC | Age: 64
End: 2019-01-31
Attending: INTERNAL MEDICINE
Payer: MEDICARE

## 2019-01-31 VITALS
SYSTOLIC BLOOD PRESSURE: 129 MMHG | BODY MASS INDEX: 24.91 KG/M2 | DIASTOLIC BLOOD PRESSURE: 74 MMHG | HEART RATE: 66 BPM | WEIGHT: 174 LBS | HEIGHT: 70 IN

## 2019-01-31 DIAGNOSIS — R07.89 ATYPICAL CHEST PAIN: ICD-10-CM

## 2019-01-31 DIAGNOSIS — I10 ESSENTIAL HYPERTENSION: ICD-10-CM

## 2019-01-31 DIAGNOSIS — I25.10 CORONARY ARTERY DISEASE INVOLVING NATIVE CORONARY ARTERY OF NATIVE HEART WITHOUT ANGINA PECTORIS: Primary | ICD-10-CM

## 2019-01-31 DIAGNOSIS — Z72.0 TOBACCO USE: ICD-10-CM

## 2019-01-31 PROCEDURE — 3045F PR MOST RECENT HEMOGLOBIN A1C LEVEL 7.0-9.0%: CPT | Mod: CPTII,S$GLB,, | Performed by: INTERNAL MEDICINE

## 2019-01-31 PROCEDURE — 3045F PR MOST RECENT HEMOGLOBIN A1C LEVEL 7.0-9.0%: ICD-10-PCS | Mod: CPTII,S$GLB,, | Performed by: INTERNAL MEDICINE

## 2019-01-31 PROCEDURE — 3008F PR BODY MASS INDEX (BMI) DOCUMENTED: ICD-10-PCS | Mod: CPTII,S$GLB,, | Performed by: INTERNAL MEDICINE

## 2019-01-31 PROCEDURE — 3074F PR MOST RECENT SYSTOLIC BLOOD PRESSURE < 130 MM HG: ICD-10-PCS | Mod: CPTII,S$GLB,, | Performed by: INTERNAL MEDICINE

## 2019-01-31 PROCEDURE — 3078F PR MOST RECENT DIASTOLIC BLOOD PRESSURE < 80 MM HG: ICD-10-PCS | Mod: CPTII,S$GLB,, | Performed by: INTERNAL MEDICINE

## 2019-01-31 PROCEDURE — 3074F SYST BP LT 130 MM HG: CPT | Mod: CPTII,S$GLB,, | Performed by: INTERNAL MEDICINE

## 2019-01-31 PROCEDURE — 99214 OFFICE O/P EST MOD 30 MIN: CPT | Mod: S$GLB,,, | Performed by: INTERNAL MEDICINE

## 2019-01-31 PROCEDURE — 3008F BODY MASS INDEX DOCD: CPT | Mod: CPTII,S$GLB,, | Performed by: INTERNAL MEDICINE

## 2019-01-31 PROCEDURE — 3078F DIAST BP <80 MM HG: CPT | Mod: CPTII,S$GLB,, | Performed by: INTERNAL MEDICINE

## 2019-01-31 PROCEDURE — 99214 PR OFFICE/OUTPT VISIT, EST, LEVL IV, 30-39 MIN: ICD-10-PCS | Mod: S$GLB,,, | Performed by: INTERNAL MEDICINE

## 2019-01-31 NOTE — PROGRESS NOTES
Subjective:    Patient ID:  Rob Jones Jr. is a 63 y.o. male     HPI   History of myocardial infarction with normal coronary arteries, diabetes mellitus, hypertension, history of atypical chest pains, history of cigarette smoking.    I do not get around too much because my bad back does not allow me to.  I continue to smoke less than half a pack of cigarettes a day.  I have had no recent chest pains.  I have been off of my insulin, in my hemoglobin A1c went up to 7.9.  I am due to see the endocrinologist soon.    Current Outpatient Medications   Medication Sig    albuterol (ACCUNEB) 1.25 mg/3 mL Nebu Take 3 mLs (1.25 mg total) by nebulization every 6 (six) hours as needed (shortness of breath). Rescue    amLODIPine (NORVASC) 10 MG tablet TAKE 1 TABLET (10 MG TOTAL) BY MOUTH ONCE DAILY.    atorvastatin (LIPITOR) 10 MG tablet Take 1 tablet (10 mg total) by mouth once daily.    blood sugar diagnostic Strp Pt to check BG up to QID. Dispense strips compatible with pt brand meter    blood-glucose meter (FREESTYLE SYSTEM KIT) kit Please dispense per pt insurance formulary and pt choice Use as instructed    diclofenac sodium 1 % Gel Apply 2 g topically 4 (four) times daily.    fish oil-omega-3 fatty acids 300-1,000 mg capsule Take 2 g by mouth once daily.    gabapentin (NEURONTIN) 300 MG capsule Take 300 mg by mouth 2 (two) times daily.    insulin degludec (TRESIBA FLEXTOUCH U-100) 100 unit/mL (3 mL) InPn Inject 15 Units into the skin every evening.    irbesartan (AVAPRO) 300 MG tablet Take 1 tablet (300 mg total) by mouth once daily.    lancets (ONETOUCH ULTRASOFT LANCETS) Misc Pt to check BG up to QID. Dispense lancets compatible with pt brand meter    LANTUS SOLOSTAR U-100 INSULIN 100 unit/mL (3 mL) InPn pen     levothyroxine (SYNTHROID) 137 MCG Tab tablet TAKE 1 TABLET (137 MCG TOTAL) BY MOUTH BEFORE BREAKFAST.    meloxicam (MOBIC) 15 MG tablet TAKE 1 TABLET (15 MG TOTAL) BY MOUTH DAILY AS NEEDED FOR  "PAIN.    metFORMIN (GLUCOPHAGE) 1000 MG tablet TAKE 1 TABLET BY MOUTH TWICE A DAY WITH FOOD    nitroGLYCERIN 0.4 MG/HR TD PT24 (NITRODUR) 0.4 mg/hr Place 1 patch onto the skin continuous prn.    NOVOFINE 32 32 gauge x 1/4" Ndle 1 EACH BY MISC.(NON-DRUG COMBO ROUTE) ROUTE ONCE DAILY.    pen needle, diabetic (NOVOFINE PLUS) 32 gauge x 1/6" Ndle 1 each by Misc.(Non-Drug; Combo Route) route once daily.    tamsulosin (FLOMAX) 0.4 mg Cp24 Take 1 capsule (0.4 mg total) by mouth once daily.    temazepam (RESTORIL) 30 mg capsule TAKE 1 CAPSULE BY MOUTH EVERY DAY AT BEDTIME AS NEEDED    vitamin D 1000 units Tab Take 185 mg by mouth once daily.     No current facility-administered medications for this visit.          Review of Systems   Constitution: Negative for chills, decreased appetite, fever, weight gain and weight loss.   HENT: Negative for congestion, hearing loss and sore throat.    Eyes: Negative for blurred vision, double vision and visual disturbance.   Cardiovascular: Negative for chest pain, claudication, dyspnea on exertion, leg swelling, palpitations and syncope.   Respiratory: Negative for cough, hemoptysis, shortness of breath, sputum production and wheezing.    Endocrine: Negative for cold intolerance and heat intolerance.   Hematologic/Lymphatic: Negative for bleeding problem. Does not bruise/bleed easily.   Skin: Negative for color change, dry skin, flushing and itching.   Musculoskeletal: Positive for arthritis and back pain. Negative for joint pain and myalgias.   Gastrointestinal: Negative for abdominal pain, anorexia, constipation, diarrhea, dysphagia, nausea and vomiting.        No bleeding per rectum   Genitourinary: Negative for dysuria, flank pain, frequency, hematuria and nocturia.   Neurological: Negative for dizziness, headaches, light-headedness, loss of balance, seizures and tremors.   Psychiatric/Behavioral: Negative for altered mental status and depression.         Vitals:    01/31/19 " "1316   BP: 129/74   Pulse: 66   Weight: 78.9 kg (174 lb)   Height: 5' 10" (1.778 m)     Objective:    Physical Exam   Constitutional: He is oriented to person, place, and time. He appears well-developed and well-nourished.   HENT:   Head: Normocephalic and atraumatic.   Right Ear: External ear normal.   Left Ear: External ear normal.   Nose: Nose normal.   Eyes: Conjunctivae and EOM are normal. Pupils are equal, round, and reactive to light. No scleral icterus.   Neck: Normal range of motion. Neck supple. No JVD present. No tracheal deviation present. No thyromegaly present.   Cardiovascular: Normal rate, regular rhythm and normal heart sounds. Exam reveals no gallop and no friction rub.   No murmur heard.  Pulmonary/Chest: Effort normal and breath sounds normal. No respiratory distress. He has no rales. He exhibits no tenderness.   Abdominal: Soft. Bowel sounds are normal. He exhibits no distension and no mass. There is no tenderness.   Musculoskeletal: Normal range of motion. He exhibits no edema or tenderness.   Lymphadenopathy:     He has no cervical adenopathy.   Neurological: He is alert and oriented to person, place, and time. He has normal reflexes. No cranial nerve deficit. Coordination normal.   Skin: Skin is warm and dry. No rash noted.   Psychiatric: He has a normal mood and affect. His behavior is normal.         Assessment:       1. Coronary artery disease involving native coronary artery of native heart without angina pectoris    2. Essential hypertension    3. Uncontrolled type 2 diabetes mellitus with complication, with long-term current use of insulin    4. Atypical chest pain    5. Tobacco use         Plan:       Again counseled regarding smoking cessation.    continue the present pharmacological regimen, his endocrinologist to institute probably an oral medication to be taking  alongside metformin.        "

## 2019-02-04 ENCOUNTER — OFFICE VISIT (OUTPATIENT)
Dept: ENDOCRINOLOGY | Facility: CLINIC | Age: 64
End: 2019-02-04
Attending: INTERNAL MEDICINE
Payer: MEDICARE

## 2019-02-04 VITALS
SYSTOLIC BLOOD PRESSURE: 138 MMHG | HEIGHT: 70 IN | WEIGHT: 174.5 LBS | DIASTOLIC BLOOD PRESSURE: 78 MMHG | BODY MASS INDEX: 24.98 KG/M2 | HEART RATE: 73 BPM

## 2019-02-04 DIAGNOSIS — Z86.018 HISTORY OF PHEOCHROMOCYTOMA: ICD-10-CM

## 2019-02-04 DIAGNOSIS — E21.0 PRIMARY HYPERPARATHYROIDISM: Primary | ICD-10-CM

## 2019-02-04 DIAGNOSIS — E89.0 POSTOPERATIVE HYPOTHYROIDISM: ICD-10-CM

## 2019-02-04 PROCEDURE — 3008F BODY MASS INDEX DOCD: CPT | Mod: CPTII,S$GLB,, | Performed by: INTERNAL MEDICINE

## 2019-02-04 PROCEDURE — 99214 OFFICE O/P EST MOD 30 MIN: CPT | Mod: S$GLB,,, | Performed by: INTERNAL MEDICINE

## 2019-02-04 PROCEDURE — 3078F PR MOST RECENT DIASTOLIC BLOOD PRESSURE < 80 MM HG: ICD-10-PCS | Mod: CPTII,S$GLB,, | Performed by: INTERNAL MEDICINE

## 2019-02-04 PROCEDURE — 99214 PR OFFICE/OUTPT VISIT, EST, LEVL IV, 30-39 MIN: ICD-10-PCS | Mod: S$GLB,,, | Performed by: INTERNAL MEDICINE

## 2019-02-04 PROCEDURE — 3075F SYST BP GE 130 - 139MM HG: CPT | Mod: CPTII,S$GLB,, | Performed by: INTERNAL MEDICINE

## 2019-02-04 PROCEDURE — 99499 RISK ADDL DX/OHS AUDIT: ICD-10-PCS | Mod: S$GLB,,, | Performed by: INTERNAL MEDICINE

## 2019-02-04 PROCEDURE — 3075F PR MOST RECENT SYSTOLIC BLOOD PRESS GE 130-139MM HG: ICD-10-PCS | Mod: CPTII,S$GLB,, | Performed by: INTERNAL MEDICINE

## 2019-02-04 PROCEDURE — 3045F PR MOST RECENT HEMOGLOBIN A1C LEVEL 7.0-9.0%: ICD-10-PCS | Mod: CPTII,S$GLB,, | Performed by: INTERNAL MEDICINE

## 2019-02-04 PROCEDURE — 3008F PR BODY MASS INDEX (BMI) DOCUMENTED: ICD-10-PCS | Mod: CPTII,S$GLB,, | Performed by: INTERNAL MEDICINE

## 2019-02-04 PROCEDURE — 99499 UNLISTED E&M SERVICE: CPT | Mod: S$GLB,,, | Performed by: INTERNAL MEDICINE

## 2019-02-04 PROCEDURE — 3078F DIAST BP <80 MM HG: CPT | Mod: CPTII,S$GLB,, | Performed by: INTERNAL MEDICINE

## 2019-02-04 PROCEDURE — 3045F PR MOST RECENT HEMOGLOBIN A1C LEVEL 7.0-9.0%: CPT | Mod: CPTII,S$GLB,, | Performed by: INTERNAL MEDICINE

## 2019-02-04 NOTE — PROGRESS NOTES
Subjective:      Patient ID: Tomasa Jones Jr. is a 63 y.o. male.    Chief Complaint:  No chief complaint on file.      History of Present Illness  Mr.Lambert KHADAR Jones Jr. presents for follow up of hypothyroidism and primary hyperparathyroidism. Last visit with Dr. Jo in 1/2017.     Post operative hypothyroidism   likely for hyperthyroidism , all symptoms he mentioned were compatible with graves disease ~ in 2000, reports that path was benign ([facility]). No family history of thyroid cancer.      Now on levothyroxine 137 mcg once daily and takes on empty stomach 30 min before food or other meds.  Has had some fatigue. No heat or cold intolerance. Wt has been stable. BM's regular.  No heart palpitations or tremors.     In regards to hyperparathyroidism:  He was told he had hyperparathyroidism at the time of thyroidectomy, was advised by his Endocrinologist at the time to undergo parathyroid surgery but surgeon did not agree at the time so it has been monitored.    Never been on lithium or HCTZ   Never had nephrolithiasis   Fractures : left heel fracture after jagruti   Last bone density showed osteopenia  Denies polyuria  No family history of hypercalcemia.     Reports have left adrenal pheochromocytoma resected in [place] in 2005. At the time he had spikes in his bp associated with rapid heart beat. No longer having these symptoms.     Has had type 2 DM since 2000.   Currently taking metformin 1000 mg PO BID  Has been off of the Tresiba for 6 months.   Last eye exam 7/2018, no retinopathy     Review of Systems   Constitutional: Negative for chills and fever.   Gastrointestinal: Negative for nausea and vomiting.       Objective:   Physical Exam   Nursing note and vitals reviewed.  No thyroid tissue palpated  No adenopathy  No tremors  DTR's 2 +    Lab Review:   Results for TOMASA JONES JR. (MRN [id number]) as of 2/4/2019 08:10   Ref. Range 1/21/2019 14:13   Sodium Latest Ref Range: 136 - 145 mmol/L 138    Potassium Latest Ref Range: 3.5 - 5.1 mmol/L 5.2 (H)   Chloride Latest Ref Range: 95 - 110 mmol/L 103   CO2 Latest Ref Range: 23 - 29 mmol/L 27   Anion Gap Latest Ref Range: 8 - 16 mmol/L 8   BUN, Bld Latest Ref Range: 8 - 23 mg/dL 14   Creatinine Latest Ref Range: 0.5 - 1.4 mg/dL 1.3   eGFR if non African American Latest Ref Range: >60 mL/min/1.73 m^2 58 (A)   eGFR if African American Latest Ref Range: >60 mL/min/1.73 m^2 >60   Glucose Latest Ref Range: 70 - 110 mg/dL 154 (H)   Calcium Latest Ref Range: 8.7 - 10.5 mg/dL 11.9 (H)   Alkaline Phosphatase Latest Ref Range: 55 - 135 U/L 95   Total Protein Latest Ref Range: 6.0 - 8.4 g/dL 8.0   Albumin Latest Ref Range: 3.5 - 5.2 g/dL 4.3   Total Bilirubin Latest Ref Range: 0.1 - 1.0 mg/dL 0.6   AST Latest Ref Range: 10 - 40 U/L 14   ALT Latest Ref Range: 10 - 44 U/L 11   Triglycerides Latest Ref Range: 30 - 150 mg/dL 47   Cholesterol Latest Ref Range: 120 - 199 mg/dL 129   HDL Latest Ref Range: 40 - 75 mg/dL 78 (H)   HDL/Chol Ratio Latest Ref Range: 20.0 - 50.0 % 60.5 (H)   LDL Cholesterol Latest Ref Range: 63.0 - 159.0 mg/dL 41.6 (L)   Non-HDL Cholesterol Latest Units: mg/dL 51   Total Cholesterol/HDL Ratio Latest Ref Range: 2.0 - 5.0  1.7 (L)   Vit D, 25-Hydroxy Latest Ref Range: 30 - 96 ng/mL 14 (L)   Hemoglobin A1C External Latest Ref Range: 4.0 - 5.6 % 7.9 (H)   Estimated Avg Glucose Latest Ref Range: 68 - 131 mg/dL 180 (H)   TSH Latest Ref Range: 0.400 - 4.000 uIU/mL 3.325   PTH Latest Ref Range: 9.0 - 77.0 pg/mL 86.8 (H)       Assessment:     1. Primary hyperparathyroidism    2. Postoperative hypothyroidism    3. Uncontrolled type 2 diabetes mellitus with complication, with long-term current use of insulin    4. History of pheochromocytoma        Plan:     --Patient with likely primary HPT  --Unklikely to be [facility] given degree of hypercalcemia in the face of significant vit D deficiency  --He does meet surgical criteria give degree of calcium elevation  --Will  check NM sestimibi scan, 24 hr urine for Ca/Cr, PTH, renal panel and vitamin D  --If no localization on sestimibi will pursue 4D CT of neck  --Continue current thyroid hormone dose  --Will add Januvia to regimen of metformin (no history of pancreatitis)  --Not interested in GLP-1 since it's an injection and won't use SGLT-2 inhibitor to do elevated calcium and risk of dehydration  --Reports having pheo and in setting of primary HPT this would raise suspicion for MEN2, but he reports path in 2000 was negative for malignancy  --Denies family history of thyroid cancer, pheo or primary HPT  --Will check 24 hr urine mets    Min Brown M.D. Staff Endocrinology

## 2019-02-11 ENCOUNTER — HOSPITAL ENCOUNTER (OUTPATIENT)
Dept: RADIOLOGY | Facility: OTHER | Age: 64
Discharge: HOME | End: 2019-02-11
Attending: INTERNAL MEDICINE
Payer: MEDICARE

## 2019-02-11 DIAGNOSIS — E21.0 PRIMARY HYPERPARATHYROIDISM: ICD-10-CM

## 2019-02-11 PROCEDURE — 77080 DXA BONE DENSITY AXIAL: CPT | Mod: TC

## 2019-02-11 PROCEDURE — 77080 DXA BONE DENSITY AXIAL: CPT | Mod: 26,,, | Performed by: RADIOLOGY

## 2019-02-11 PROCEDURE — 77080 DEXA BONE DENSITY SPINE HIP: ICD-10-PCS | Mod: 26,,, | Performed by: RADIOLOGY

## 2019-02-12 ENCOUNTER — HOSPITAL ENCOUNTER (OUTPATIENT)
Dept: RADIOLOGY | Facility: HOSPITAL | Age: 64
Discharge: HOME | End: 2019-02-12
Attending: INTERNAL MEDICINE
Payer: MEDICARE

## 2019-02-12 DIAGNOSIS — E21.0 PRIMARY HYPERPARATHYROIDISM: ICD-10-CM

## 2019-02-12 PROCEDURE — A9500 TC99M SESTAMIBI: HCPCS

## 2019-02-12 PROCEDURE — 78071 PARATHYRD PLANAR W/WO SUBTRJ: CPT | Mod: 26,,, | Performed by: RADIOLOGY

## 2019-02-12 PROCEDURE — 78071 NM PARATHYROID SCAN WITH SPECT ROUTINE: ICD-10-PCS | Mod: 26,,, | Performed by: RADIOLOGY

## 2019-02-15 ENCOUNTER — TELEPHONE (OUTPATIENT)
Dept: ENDOCRINOLOGY | Facility: CLINIC | Age: 64
End: 2019-02-15

## 2019-02-15 DIAGNOSIS — E21.0 PRIMARY HYPERPARATHYROIDISM: Primary | ICD-10-CM

## 2019-02-15 NOTE — TELEPHONE ENCOUNTER
Spoke to patient and let him know that Dr Brown reviewed his nuclear medicine scan. It showed a possible enlarged parathyroid gland in the right side of his neck. The next step would be to do a CT scan of the neck to see if we can locate the enlarged parathyroid gland and Dr Brown will refer him to a surgeon after that is done. He will need to hold the metformin for 2 days prior to the CT and done day after the CT.

## 2019-02-15 NOTE — TELEPHONE ENCOUNTER
Please advise patient that I reviewed his nuclear medicine scan. It showed a possible enlarged parathyroid gland in the right side of his neck. The next step would be to do a CT scan of the neck to see if we can locate the enlarged parathyroid gland and I will refer him to a surgeon after that is done. He will need to hold the metformin for 2 days prior to the CT and done day after the CT.

## 2019-02-18 ENCOUNTER — TELEPHONE (OUTPATIENT)
Dept: RADIOLOGY | Facility: CLINIC | Age: 64
End: 2019-02-18

## 2019-02-18 ENCOUNTER — HOSPITAL ENCOUNTER (OUTPATIENT)
Dept: RADIOLOGY | Facility: OTHER | Age: 64
Discharge: HOME | End: 2019-02-18
Attending: INTERNAL MEDICINE
Payer: MEDICARE

## 2019-02-18 DIAGNOSIS — E21.0 PRIMARY HYPERPARATHYROIDISM: ICD-10-CM

## 2019-02-18 PROCEDURE — 25500020 PHARM REV CODE 255: Performed by: INTERNAL MEDICINE

## 2019-02-18 PROCEDURE — 70492 CT SFT TSUE NCK W/O & W/DYE: CPT | Mod: 26,,, | Performed by: RADIOLOGY

## 2019-02-18 PROCEDURE — 70492 CT SFT TSUE NCK W/O & W/DYE: CPT | Mod: TC

## 2019-02-18 PROCEDURE — 70492 CT NECK PARATHYROID (4D): ICD-10-PCS | Mod: 26,,, | Performed by: RADIOLOGY

## 2019-02-18 RX ADMIN — IOHEXOL 100 ML: 350 INJECTION, SOLUTION INTRAVENOUS at 02:02

## 2019-02-18 NOTE — TELEPHONE ENCOUNTER
----- Message from Lesly Joe sent at 2/18/2019  2:10 PM CST -----  Contact: [place]   URGENT   PT THERE    - CT scan     [place] [id number]      QUESTION ABOUT ORDERS     -           4 D orders ??    Please   Call        Thanks

## 2019-02-19 ENCOUNTER — TELEPHONE (OUTPATIENT)
Dept: SURGERY | Facility: CLINIC | Age: 64
End: 2019-02-19

## 2019-02-19 ENCOUNTER — TELEPHONE (OUTPATIENT)
Dept: ENDOCRINOLOGY | Facility: CLINIC | Age: 64
End: 2019-02-19

## 2019-02-19 DIAGNOSIS — E21.0 PRIMARY HYPERPARATHYROIDISM: Primary | ICD-10-CM

## 2019-02-19 NOTE — TELEPHONE ENCOUNTER
Please advise patient that I reviewed his CT scan and it looks like he does have an enlarged parathyroid gland on the right side of his neck. I am going to refer him to Dr. Shearer for consideration of surgery. Also, he still need to do the labs and 24 hour urine I ordered for calcium and metanephrines.

## 2019-02-19 NOTE — TELEPHONE ENCOUNTER
Pt returned my call. He's coming this Thursday AM to see Dr. Shearer as referred by Dr. Brown for Primary Hyperparathyroidism and to discuss possible surgery.

## 2019-02-19 NOTE — TELEPHONE ENCOUNTER
Spoke to patient and let him know that Dr Brown reviewed his CT scan and it looks like he does have an enlarged parathyroid gland on the right side of his neck. Dr Brown is going to refer him to Dr. Shearer for consideration of surgery, he said he already has an appointment schedule with her. And I let him know that her still need to do the labs and 24 hour urine  ordered for calcium and metanephrines. He said he will do it tomorrow.

## 2019-02-19 NOTE — TELEPHONE ENCOUNTER
Called to schedule pt to see Dr. Shearer as referred by Dr. Brown. No answer. Left a detailed message and called his daughter as well. Will await a return call to inquire if he can come in this week AM at her [facility] clinic.

## 2019-02-20 ENCOUNTER — OFFICE VISIT (OUTPATIENT)
Dept: OPTOMETRY | Facility: CLINIC | Age: 64
End: 2019-02-20
Payer: MEDICARE

## 2019-02-20 DIAGNOSIS — E11.9 TYPE 2 DIABETES MELLITUS WITHOUT OPHTHALMIC MANIFESTATIONS: Primary | ICD-10-CM

## 2019-02-20 DIAGNOSIS — H52.4 PRESBYOPIA: ICD-10-CM

## 2019-02-20 DIAGNOSIS — H25.13 NUCLEAR SCLEROSIS, BILATERAL: ICD-10-CM

## 2019-02-20 PROCEDURE — 92014 COMPRE OPH EXAM EST PT 1/>: CPT | Mod: S$GLB,,, | Performed by: OPTOMETRIST

## 2019-02-20 PROCEDURE — 99499 UNLISTED E&M SERVICE: CPT | Mod: S$GLB,,, | Performed by: OPTOMETRIST

## 2019-02-20 PROCEDURE — 92014 PR EYE EXAM, EST PATIENT,COMPREHESV: ICD-10-PCS | Mod: S$GLB,,, | Performed by: OPTOMETRIST

## 2019-02-20 PROCEDURE — 99999 PR PBB SHADOW E&M-EST. PATIENT-LVL II: CPT | Mod: PBBFAC,,, | Performed by: OPTOMETRIST

## 2019-02-20 PROCEDURE — 99499 RISK ADDL DX/OHS AUDIT: ICD-10-PCS | Mod: S$GLB,,, | Performed by: OPTOMETRIST

## 2019-02-20 PROCEDURE — 99999 PR PBB SHADOW E&M-EST. PATIENT-LVL II: ICD-10-PCS | Mod: PBBFAC,,, | Performed by: OPTOMETRIST

## 2019-02-20 RX ORDER — TEMAZEPAM 30 MG/1
CAPSULE ORAL
Qty: 60 CAPSULE | Refills: 1 | Status: SHIPPED | OUTPATIENT
Start: 2019-02-20 | End: 2019-08-22 | Stop reason: SDUPTHER

## 2019-02-20 NOTE — PROGRESS NOTES
HPI     Last eye exam was 2/14/18 with Dr. Cifuentes.  Patient states no vision changes since last exam. Occasionally sees   floaters OU.  Patient denies diplopia, headaches, flashes/floaters, and pain.    Hemoglobin A1C       Date                     Value               Ref Range             Status                01/21/2019               7.9 (H)             4.0 - 5.6 %           Final                  Last edited by Roseann Sparks on 2/20/2019  3:07 PM. (History)            Assessment /Plan     For exam results, see Encounter Report.    Type 2 diabetes mellitus without ophthalmic manifestations    Nuclear sclerosis, bilateral    Presbyopia            1.  No retinopathy--monitor yearly.  BS control.  Eye health normal OU.  2.  Educated on cataracts and affects on vision.  Patient happy with vision.  Monitor.  3.   Continue w/ current rx

## 2019-02-21 ENCOUNTER — INITIAL CONSULT (OUTPATIENT)
Dept: SURGERY | Facility: CLINIC | Age: 64
End: 2019-02-21
Payer: MEDICARE

## 2019-02-21 VITALS
DIASTOLIC BLOOD PRESSURE: 73 MMHG | HEART RATE: 62 BPM | TEMPERATURE: 98 F | RESPIRATION RATE: 18 BRPM | HEIGHT: 70 IN | BODY MASS INDEX: 24.9 KG/M2 | WEIGHT: 173.94 LBS | SYSTOLIC BLOOD PRESSURE: 125 MMHG

## 2019-02-21 DIAGNOSIS — E21.3 HYPERPARATHYROIDISM: Primary | ICD-10-CM

## 2019-02-21 PROCEDURE — 99499 UNLISTED E&M SERVICE: CPT | Mod: S$GLB,,, | Performed by: SURGERY

## 2019-02-21 PROCEDURE — 99204 OFFICE O/P NEW MOD 45 MIN: CPT | Mod: S$GLB,,, | Performed by: SURGERY

## 2019-02-21 PROCEDURE — 3074F SYST BP LT 130 MM HG: CPT | Mod: CPTII,S$GLB,, | Performed by: SURGERY

## 2019-02-21 PROCEDURE — 99499 RISK ADDL DX/OHS AUDIT: ICD-10-PCS | Mod: S$GLB,,, | Performed by: SURGERY

## 2019-02-21 PROCEDURE — 3078F PR MOST RECENT DIASTOLIC BLOOD PRESSURE < 80 MM HG: ICD-10-PCS | Mod: CPTII,S$GLB,, | Performed by: SURGERY

## 2019-02-21 PROCEDURE — 3074F PR MOST RECENT SYSTOLIC BLOOD PRESSURE < 130 MM HG: ICD-10-PCS | Mod: CPTII,S$GLB,, | Performed by: SURGERY

## 2019-02-21 PROCEDURE — 99999 PR PBB SHADOW E&M-EST. PATIENT-LVL III: CPT | Mod: PBBFAC,,, | Performed by: SURGERY

## 2019-02-21 PROCEDURE — 99999 PR PBB SHADOW E&M-EST. PATIENT-LVL III: ICD-10-PCS | Mod: PBBFAC,,, | Performed by: SURGERY

## 2019-02-21 PROCEDURE — 3008F PR BODY MASS INDEX (BMI) DOCUMENTED: ICD-10-PCS | Mod: CPTII,S$GLB,, | Performed by: SURGERY

## 2019-02-21 PROCEDURE — 99204 PR OFFICE/OUTPT VISIT, NEW, LEVL IV, 45-59 MIN: ICD-10-PCS | Mod: S$GLB,,, | Performed by: SURGERY

## 2019-02-21 PROCEDURE — 3008F BODY MASS INDEX DOCD: CPT | Mod: CPTII,S$GLB,, | Performed by: SURGERY

## 2019-02-21 PROCEDURE — 3078F DIAST BP <80 MM HG: CPT | Mod: CPTII,S$GLB,, | Performed by: SURGERY

## 2019-02-21 NOTE — PROGRESS NOTES
I have reviewed the Resident's history and physical, assessment and plan. I agree with the findings.     Mounika Carmona MD  Endocrine Surgery      Consult Note  Endocrine Surgery    Visit Diagnosis: Hyperparathyroidism [E21.3]    SUBJECTIVE:     Patient is a 63 y.o. male who was referred by Dr. Min Brown and is here unaccompanied and presents for evaluation of primary hyperparathyroidism. The patient has had complaints of elevation of the serum calcium and parathormone. There is no history of lithium or thiazide medication use. He has not had previous history of head or neck radiation. He admits sleep disturbance, mood changes, constipation and lethargy. He denies joint pain, abdominal pain, increased urination and increased thirst. Furthermore, there is no history of pathologic fractures. The patient is vitamin D deficient. He is not on calcium supplementation. He does not have familial history of endocrinopathies. Of note, the patient does have a hx of graves disease which was treated surgically with a total thyroidectomy in 2000 at [facility], reportedly the pathology was benign at that time. He also had a pheochromocytoma removed in [place] in about 2005, this was reportedly benign as well. His history in light of hyperparathyroidism raises the suspicioun for MEN2a syndrome, despite benign thyroid pathology was benign. He presents today for evaluation of hyperparathyroidism with elevated calcium, unknown hypercalciuria, and some osteopenia. He does have to localization studies indicating a possible adenoma on the right side. He feels well today.       Review of patient's allergies indicates:   Allergen Reactions    Tizanidine Shortness Of Breath       Current Outpatient Medications   Medication Sig Dispense Refill    amLODIPine (NORVASC) 10 MG tablet TAKE 1 TABLET (10 MG TOTAL) BY MOUTH ONCE DAILY. 90 tablet 2    atorvastatin (LIPITOR) 10 MG tablet Take 1 tablet (10 mg total) by mouth once  "daily. 90 tablet 3    blood sugar diagnostic Strp Pt to check BG up to QID. Dispense strips compatible with pt brand meter 100 each 11    blood-glucose meter (FREESTYLE SYSTEM KIT) kit Please dispense per pt insurance formulary and pt choice Use as instructed 1 each 0    gabapentin (NEURONTIN) 300 MG capsule Take 300 mg by mouth 2 (two) times daily.      irbesartan (AVAPRO) 300 MG tablet Take 1 tablet (300 mg total) by mouth once daily. 90 tablet 2    lancets (ONETOUCH ULTRASOFT LANCETS) [facility] Pt to check BG up to QID. Dispense lancets compatible with pt brand meter 100 each 11    levothyroxine (SYNTHROID) 137 MCG Tab tablet TAKE 1 TABLET (137 MCG TOTAL) BY MOUTH BEFORE BREAKFAST. 90 tablet 2    meloxicam (MOBIC) 15 MG tablet TAKE 1 TABLET (15 MG TOTAL) BY MOUTH DAILY AS NEEDED FOR PAIN. 90 tablet 1    metFORMIN (GLUCOPHAGE) 1000 MG tablet TAKE 1 TABLET BY MOUTH TWICE A DAY WITH FOOD 180 tablet 2    nitroGLYCERIN 0.4 MG/HR TD PT24 (NITRODUR) 0.4 mg/hr Place 1 patch onto the skin continuous prn.      NOVOFINE 32 32 gauge x 1/4" Ndle 1 EACH BY MISC.(NON-DRUG COMBO ROUTE) ROUTE ONCE DAILY.  3    pen needle, diabetic (NOVOFINE PLUS) 32 gauge x 1/6" Ndle 1 each by Misc.(Non-Drug; Combo Route) route once daily. 100 each 3    SITagliptin (JANUVIA) 100 MG Tab Take 1 tablet (100 mg total) by mouth once daily. 30 tablet 11    tamsulosin (FLOMAX) 0.4 mg Cp24 Take 1 capsule (0.4 mg total) by mouth once daily. 90 capsule 3    temazepam (RESTORIL) 30 mg capsule TAKE 1 CAPSULE BY MOUTH EVERY DAY AT BEDTIME AS NEEDED 60 capsule 1    vitamin D 1000 units Tab Take 185 mg by mouth once daily.      albuterol (ACCUNEB) 1.25 mg/3 mL Nebu Take 3 mLs (1.25 mg total) by nebulization every 6 (six) hours as needed (shortness of breath). Rescue 1 Box 2     No current facility-administered medications for this visit.        Past Medical History:   Diagnosis Date    Anxiety     Back pain     Cataract     Diabetes mellitus     " "History of hepatitis C; S/p RX with SVR 12 documented 06/2017 6/1/2017    Hypertension     Postoperative hypothyroidism 11/3/2016    Primary hyperparathyroidism 1/18/2017    Thyroid disease      Past Surgical History:   Procedure Laterality Date    BLOCK SELECTIVE NERVE ROOT TRANSFORAMINAL LUMBAR Left 6/21/2017    [facility]    INJECTION-STEROID-EPIDURAL-CERVICAL N/A 1/17/2018    [facility] at [facility]    INJECTION-STEROID-EPIDURAL-TRANSFORAMINAL Left 8/25/2017    Per[facility] by Sulaiman [facility]udino [facility] at [facility]    LUMBAR FUSION      THYROIDECTOMY      TONSILLECTOMY       Social History     Tobacco Use    Smoking status: Current Every Day Smoker    Smokeless tobacco: Never Used   Substance Use Topics    Alcohol use: Yes    Drug use: No          Review of Systems:    Review of Systems  Constitutional: negative for chills, fatigue, fevers, malaise and night sweats  Eyes: negative for icterus and irritation  Respiratory: negative for cough and dyspnea on exertion  Cardiovascular: negative for chest pain and palpitations  Gastrointestinal: negative for constipation, diarrhea and dysphagia  Genitourinary:negative for dysuria, hematuria and nocturia  Integument/breast: negative for rash and skin color change  Hematologic/lymphatic: negative for bleeding and easy bruising  Neurological: negative for gait problems, headaches and seizures  Behavioral/Psych: negative for anxiety and depression  Endocrine: negative    ENT ROS: negative  Endocrine ROS:   negative for - hair pattern changes, malaise/lethargy, mood swings, palpitations or polydipsia/polyuria      OBJECTIVE:     Vital Signs:  /73   Pulse 62   Temp 98 °F (36.7 °C)   Resp 18   Ht 5' 10" (1.778 m)   Wt 78.9 kg (173 lb 15.1 oz)   BMI 24.96 kg/m²    Body mass index is 24.96 kg/m².      Physical Exam    General:  no distress, see vitals for BMI    Eyes:  " conjunctivae/corneas clear   Neck: trachea midline and symmetric, no adenopathy    Thyroid:  thyroid absent   Lung: clear to auscultation bilaterally   Heart:  regular rate and rhythm   Abdomen: soft, non-tender; bowel sounds normal; no masses,  no organomegaly   Skin/Extremities: warm and well-perfused   Pulses: 2+ and symmetric   Neuro: normal without focal findings and mental status, speech normal, alert and oriented x3       Imaging    Studies:  Date:       Results:     DEXA:   2/11/19 FINDINGS:  The L1 to L4 vertebral bone mineral density is equal to 1.692 g/cm squared with a T score of 3.7.    The left femoral neck bone mineral density is equal to 0.859 g/cm squared with a T score of -1.6.    The total hip bone mineral density is equal to 0.928 g/cm squared with a T score of -1.2.    There is a 2.7% risk of a major osteoporotic fracture and a 0.6% risk of hip fracture in the next 10 years (FRAX).   Ultrasound:  1/31/17 Status post thyroidectomy  No abnormal lymph nodes seen.  No parathyroid adenomas seen.   Sestamebi/Parathyroid scan:  2/12/19 There is physiological tracer uptake in the myocardium, salivary glands, and thyroid gland. Delayed images demonstrate near-complete tracer washout from the thyroid.   Parathyroid protocol CT scan:   2/18/19 Subtle enhancing soft tissue density nodule identified anterior to the T1 vertebra and posterior to the trachea just to the right of the esophagus measuring 0.7 x 0.7 cm in size (image 134, series 3).  This likely represents a small parathyroid adenoma given the findings on the recent nuclear medicine parathyroid scan and the patient's clinical findings.    Surgical changes of prior thyroidectomy.       Lab Review    24-Hour Urine Collection:     Ordered, not yet completed          Component Value Date    Vit D, 25-Hydroxy 14 (L) 01/21/2019    PTH, Intact 86.8 (H) 01/21/2019    Calcium 11.9 (H) 01/21/2019    TSH 3.325 01/21/2019    Free T4 1.09 09/25/2018            ASSESSMENT/PLAN:       Assessment    Rob Jones Jr. is an 63 y.o. male who presents withprimary hyperparathyroidism. The above testing and examination support the diagnosis. Patient meets criteria for recommending parathyroid surgery. This is based on his history of reduced cortical bone density and Calcium level 1mg/dL above normal limit. He currently has possible localization to the right] via imaging (Sestamebi scan and Parathyroid protocol CT Scan).       Plan    1. Imaging: no further imaging required  2. Medications: patient should continue current medications: OTC Vitamin D  3. The natural history and treatment options for primary hyperparathyroidism were discussed with the patient. Parathyroidectomy is the only curative treatment for primary hyperparathyroidism. Parathyroidectomy, both minimally invasive technique and standard four-gland exploration, and its risks were discussed. I was also able to duscuss the expected melinda-operative course and the risks of surgery including failure to localize the parathyroid gland, persistent or recurrent hyperparathyroidism with the possibility of the need for a second surgery, temporary or permanent hypoparathyroidism resulting in low blood calcium levels that require extensive medication to avoid serious degenerative conditions such as cataracts, brittle bones, muscle weakness and muscle irritability. Other risks include bleeding, infection, injury to the recurrent laryngeal nerves resulting in hoarseness or impairment of speech and the risks of general anesthetic including MI, CVA, sudden death or even reaction to anesthetic medications.The role of intraoperative PTH monitoring was also discussed. The patient understands the risks, any and all questions were answered to the patients satisfaction. The patient is amenable to surgery.   4. Will ensure that patient is medically optimized prior to procedure(The patient should have voice evaluation related  "to prior thyroid surgery). In addition, the patient was given our "Understanding Parathyroid Disease" booklet and directed to the [employer] Patient Education portal as a resource.   5. Patient will require completion of previously ordered labs by Dr. Brown. We will also discuss the possibility of genetic testing as this could have a direct effect on the extent of surgery. preop clearance to be obtained from the patient's cardiologist.   "

## 2019-02-25 ENCOUNTER — LAB VISIT (OUTPATIENT)
Dept: LAB | Facility: OTHER | Age: 64
End: 2019-02-25
Attending: INTERNAL MEDICINE
Payer: MEDICARE

## 2019-02-25 DIAGNOSIS — Z86.018 HISTORY OF PHEOCHROMOCYTOMA: ICD-10-CM

## 2019-02-25 DIAGNOSIS — E21.0 PRIMARY HYPERPARATHYROIDISM: ICD-10-CM

## 2019-02-25 LAB
25(OH)D3+25(OH)D2 SERPL-MCNC: 32 NG/ML
ALBUMIN SERPL BCP-MCNC: 4.2 G/DL
ANION GAP SERPL CALC-SCNC: 8 MMOL/L
BUN SERPL-MCNC: 16 MG/DL
CALCIUM 24H UR-MRATE: 6 MG/HR
CALCIUM SERPL-MCNC: 11.5 MG/DL
CALCIUM UR-MCNC: 8.8 MG/DL
CALCIUM URINE (MG/SPEC): 145 MG/SPEC
CHLORIDE SERPL-SCNC: 105 MMOL/L
CO2 SERPL-SCNC: 25 MMOL/L
CREAT 24H UR-MRATE: 88.6 MG/HR
CREAT SERPL-MCNC: 1.1 MG/DL
CREAT UR-MCNC: 128.8 MG/DL
CREATININE, URINE (MG/SPEC): 2125.2 MG/SPEC
EST. GFR  (AFRICAN AMERICAN): >60 ML/MIN/1.73 M^2
EST. GFR  (NON AFRICAN AMERICAN): >60 ML/MIN/1.73 M^2
GLUCOSE SERPL-MCNC: 125 MG/DL
PHOSPHATE SERPL-MCNC: 3.2 MG/DL
POTASSIUM SERPL-SCNC: 5.1 MMOL/L
PTH-INTACT SERPL-MCNC: 84.3 PG/ML
SODIUM SERPL-SCNC: 138 MMOL/L
URINE COLLECTION DURATION: 24 HR
URINE COLLECTION DURATION: 24 HR
URINE VOLUME: 1650 ML
URINE VOLUME: 1650 ML

## 2019-02-25 PROCEDURE — 82306 VITAMIN D 25 HYDROXY: CPT

## 2019-02-25 PROCEDURE — 83835 ASSAY OF METANEPHRINES: CPT

## 2019-02-25 PROCEDURE — 82570 ASSAY OF URINE CREATININE: CPT

## 2019-02-25 PROCEDURE — 82340 ASSAY OF CALCIUM IN URINE: CPT

## 2019-02-25 PROCEDURE — 83970 ASSAY OF PARATHORMONE: CPT

## 2019-02-25 PROCEDURE — 36415 COLL VENOUS BLD VENIPUNCTURE: CPT

## 2019-02-25 PROCEDURE — 80069 RENAL FUNCTION PANEL: CPT

## 2019-03-01 LAB
COLLECT DURATION TIME SPEC: 24 HR
CREAT 24H UR-MRATE: 2145 MG/D (ref 800–2100)
CREAT UR-MCNC: 130 MG/DL
METANEPH 24H UR-MCNC: 117 UG/L
METANEPH 24H UR-MRATE: 193 UG/D (ref 62–207)
METANEPH+NORMETANEPH UR-IMP: ABNORMAL
METANEPH/CREAT 24H UR: 90 UG/G CRT (ref 0–300)
NORMETANEPHRINE 24H UR-MCNC: 452 UG/L
NORMETANEPHRINE 24H UR-MRATE: 746 UG/D (ref 125–510)
NORMETANEPHRINE/CREAT 24H UR: 348 UG/G CRT (ref 0–400)
SPECIMEN VOL ?TM UR: 1650 ML

## 2019-03-12 ENCOUNTER — TELEPHONE (OUTPATIENT)
Dept: ENDOCRINOLOGY | Facility: CLINIC | Age: 64
End: 2019-03-12

## 2019-03-12 DIAGNOSIS — Z86.018 HISTORY OF PHEOCHROMOCYTOMA: Primary | ICD-10-CM

## 2019-03-12 DIAGNOSIS — E21.0 PRIMARY HYPERPARATHYROIDISM: ICD-10-CM

## 2019-03-12 DIAGNOSIS — E07.9 THYROID DISEASE: ICD-10-CM

## 2019-03-12 NOTE — TELEPHONE ENCOUNTER
Called and spoke with patient regarding labs. Given history of pheochromocytoma and now with primary hyperparathyrodism will perform ret mutational testing as this would have implications for his children if positive. Given mildly elevated urine normetanephrines will check plasma metanephrines.

## 2019-03-13 ENCOUNTER — LAB VISIT (OUTPATIENT)
Dept: LAB | Facility: OTHER | Age: 64
End: 2019-03-13
Attending: INTERNAL MEDICINE
Payer: MEDICARE

## 2019-03-13 DIAGNOSIS — Z86.018 HISTORY OF PHEOCHROMOCYTOMA: ICD-10-CM

## 2019-03-13 DIAGNOSIS — E07.9 THYROID DISEASE: ICD-10-CM

## 2019-03-13 DIAGNOSIS — E21.0 PRIMARY HYPERPARATHYROIDISM: ICD-10-CM

## 2019-03-13 PROCEDURE — 36415 COLL VENOUS BLD VENIPUNCTURE: CPT

## 2019-03-13 PROCEDURE — 81406 MOPATH PROCEDURE LEVEL 7: CPT

## 2019-03-13 PROCEDURE — 83835 ASSAY OF METANEPHRINES: CPT

## 2019-03-18 LAB
METANEPH FREE SERPL-MCNC: 33 PG/ML
METANEPHS SERPL-MCNC: 109 PG/ML
NORMETANEPH FREE SERPL-MCNC: 76 PG/ML

## 2019-03-28 ENCOUNTER — PATIENT OUTREACH (OUTPATIENT)
Dept: ADMINISTRATIVE | Facility: HOSPITAL | Age: 64
End: 2019-03-28

## 2019-03-29 ENCOUNTER — OFFICE VISIT (OUTPATIENT)
Dept: INTERNAL MEDICINE | Facility: CLINIC | Age: 64
End: 2019-03-29
Attending: INTERNAL MEDICINE
Payer: MEDICARE

## 2019-03-29 ENCOUNTER — HOSPITAL ENCOUNTER (OUTPATIENT)
Dept: RADIOLOGY | Facility: OTHER | Age: 64
Discharge: HOME | End: 2019-03-29
Attending: INTERNAL MEDICINE
Payer: MEDICARE

## 2019-03-29 ENCOUNTER — TELEPHONE (OUTPATIENT)
Dept: INTERNAL MEDICINE | Facility: CLINIC | Age: 64
End: 2019-03-29

## 2019-03-29 VITALS
WEIGHT: 169.06 LBS | BODY MASS INDEX: 24.2 KG/M2 | HEART RATE: 73 BPM | OXYGEN SATURATION: 98 % | SYSTOLIC BLOOD PRESSURE: 125 MMHG | HEIGHT: 70 IN | DIASTOLIC BLOOD PRESSURE: 76 MMHG

## 2019-03-29 DIAGNOSIS — R20.0 ANESTHESIA OF SKIN: ICD-10-CM
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
## 2019-03-29 DIAGNOSIS — M19.90 OSTEOARTHRITIS, UNSPECIFIED OSTEOARTHRITIS TYPE, UNSPECIFIED SITE: ICD-10-CM

## 2019-03-29 DIAGNOSIS — M54.50 ACUTE BILATERAL LOW BACK PAIN WITHOUT SCIATICA: ICD-10-CM

## 2019-03-29 DIAGNOSIS — F32.A DEPRESSION, UNSPECIFIED DEPRESSION TYPE: ICD-10-CM

## 2019-03-29 DIAGNOSIS — G89.29 OTHER CHRONIC PAIN: ICD-10-CM

## 2019-03-29 PROCEDURE — 3008F BODY MASS INDEX DOCD: CPT | Mod: CPTII,S$GLB,, | Performed by: INTERNAL MEDICINE

## 2019-03-29 PROCEDURE — 3078F PR MOST RECENT DIASTOLIC BLOOD PRESSURE < 80 MM HG: ICD-10-PCS | Mod: CPTII,S$GLB,, | Performed by: INTERNAL MEDICINE

## 2019-03-29 PROCEDURE — 72110 XR LUMBAR SPINE AP AND LAT WITH FLEX/EXT: ICD-10-PCS | Mod: 26,,, | Performed by: RADIOLOGY

## 2019-03-29 PROCEDURE — 72110 X-RAY EXAM L-2 SPINE 4/>VWS: CPT | Mod: 26,,, | Performed by: RADIOLOGY

## 2019-03-29 PROCEDURE — 3074F PR MOST RECENT SYSTOLIC BLOOD PRESSURE < 130 MM HG: ICD-10-PCS | Mod: CPTII,S$GLB,, | Performed by: INTERNAL MEDICINE

## 2019-03-29 PROCEDURE — 99999 PR PBB SHADOW E&M-EST. PATIENT-LVL V: ICD-10-PCS | Mod: PBBFAC,,, | Performed by: INTERNAL MEDICINE

## 2019-03-29 PROCEDURE — 99214 PR OFFICE/OUTPT VISIT, EST, LEVL IV, 30-39 MIN: ICD-10-PCS | Mod: S$GLB,,, | Performed by: INTERNAL MEDICINE

## 2019-03-29 PROCEDURE — 99214 OFFICE O/P EST MOD 30 MIN: CPT | Mod: S$GLB,,, | Performed by: INTERNAL MEDICINE

## 2019-03-29 PROCEDURE — 72100 X-RAY EXAM L-S SPINE 2/3 VWS: CPT | Mod: TC,FY

## 2019-03-29 PROCEDURE — 73521 X-RAY EXAM HIPS BI 2 VIEWS: CPT | Mod: 26,,, | Performed by: RADIOLOGY

## 2019-03-29 PROCEDURE — 99999 PR PBB SHADOW E&M-EST. PATIENT-LVL V: CPT | Mod: PBBFAC,,, | Performed by: INTERNAL MEDICINE

## 2019-03-29 PROCEDURE — 3008F PR BODY MASS INDEX (BMI) DOCUMENTED: ICD-10-PCS | Mod: CPTII,S$GLB,, | Performed by: INTERNAL MEDICINE

## 2019-03-29 PROCEDURE — 73521 X-RAY EXAM HIPS BI 2 VIEWS: CPT | Mod: TC,FY

## 2019-03-29 PROCEDURE — 73521 XR HIPS BILATERAL 2 VIEW INCL AP PELVIS: ICD-10-PCS | Mod: 26,,, | Performed by: RADIOLOGY

## 2019-03-29 PROCEDURE — 3078F DIAST BP <80 MM HG: CPT | Mod: CPTII,S$GLB,, | Performed by: INTERNAL MEDICINE

## 2019-03-29 PROCEDURE — 3074F SYST BP LT 130 MM HG: CPT | Mod: CPTII,S$GLB,, | Performed by: INTERNAL MEDICINE

## 2019-03-29 RX ORDER — CYCLOBENZAPRINE HCL 5 MG
5 TABLET ORAL 3 TIMES DAILY PRN
Qty: 30 TABLET | Refills: 0 | Status: SHIPPED | OUTPATIENT
Start: 2019-03-29 | End: 2019-04-08

## 2019-03-29 RX ORDER — METHYLPREDNISOLONE 4 MG/1
TABLET ORAL
Qty: 1 PACKAGE | Refills: 0 | Status: SHIPPED | OUTPATIENT
Start: 2019-03-29 | End: 2019-05-16 | Stop reason: CLARIF

## 2019-03-29 NOTE — TELEPHONE ENCOUNTER
notified pt that his xrays are unchanged from prior. No new fractures or disc height loss. The patient verbalized understanding and had no further questions or concerns.    Mirza

## 2019-03-29 NOTE — PROGRESS NOTES
"Subjective:       Patient ID: Rob Jones Jr. is a 63 y.o. male.    Chief Complaint: Hip Pain; Leg Pain; and Chest Pain (side ribs on down to legs)    Here for     Fell 1 week prior while backig up while using . He reports exacerbation of his chronic low back pain with a burning bilateral thigh pain that worsens with standing long periods of time. He denies los of b/b, numbness of groin, weakness of LE, sudden weakness, LOC. He denies or arthralgias. He did not strike his head or lose consciousness. Taking gabapentin 300mg BID and meloxicam.    States he is so tired of his chronic pain and had thoughts from time to time of ending his life. No plan or active thoughts. He wants to see a psychiatrist.      Review of Systems   Constitutional: Negative for appetite change, chills, fever and unexpected weight change.   HENT: Negative for hearing loss, sore throat and trouble swallowing.    Eyes: Negative for visual disturbance.   Respiratory: Negative for cough, chest tightness and shortness of breath.    Cardiovascular: Negative for chest pain and leg swelling.   Gastrointestinal: Negative for abdominal pain, blood in stool, constipation, diarrhea, nausea and vomiting.   Endocrine: Negative for polydipsia and polyuria.   Genitourinary: Negative for decreased urine volume, difficulty urinating, dysuria, frequency and urgency.   Musculoskeletal: Positive for arthralgias, back pain and gait problem.   Skin: Negative for rash.   Neurological: Negative for dizziness.   Psychiatric/Behavioral: Positive for dysphoric mood and suicidal ideas. The patient is not nervous/anxious.        Objective:      Vitals:    03/29/19 1011   BP: 125/76   Pulse: 73   SpO2: 98%   Weight: 76.7 kg (169 lb 1.5 oz)   Height: 5' 10" (1.778 m)      Physical Exam   Constitutional: He is oriented to person, place, and time. He appears well-developed and well-nourished. He does not have a sickly appearance. No distress.   HENT:   Head: " Normocephalic and atraumatic.   Eyes: Conjunctivae and EOM are normal. Right eye exhibits no discharge. Left eye exhibits no discharge. No scleral icterus.   Pulmonary/Chest: Effort normal. No respiratory distress.   Abdominal: Normal appearance. He exhibits no distension.       Musculoskeletal:        Lumbar back: He exhibits decreased range of motion, bony tenderness, pain and spasm. He exhibits no tenderness (paraspin bilaterally) and no deformity.   Neurological: He is alert and oriented to person, place, and time. He has normal strength. He displays no Babinski's sign on the right side. He displays no Babinski's sign on the left side.   Lower Ext: SLR (-) bilaterally. Sensation to light touch intact and symmetric. Distal and proximal strength intact and symmetric. Pain in low back with leg raise.     Skin: Skin is warm and dry. He is not diaphoretic.   Psychiatric: He has a normal mood and affect. His speech is normal.       Assessment:       1. Fall, sequela    2. Osteoarthritis, unspecified osteoarthritis type, unspecified site    3. Other chronic pain    4. Depression, unspecified depression type    5. Anesthesia of skin     6. Acute bilateral low back pain without sciatica        Plan:       Rob was seen today for hip pain, leg pain and chest pain.    Diagnoses and all orders for this visit:    Fall, sequela  -     X-Ray Lumbar Spine Ap Lateral w/Flex Ext; Future  -     X-Ray Hips Bilateral 2 View Incl AP Pelvis; Future  -     Ambulatory Referral to [facility]  -     methylPREDNISolone (MEDROL DOSEPACK) 4 mg tablet; use as directed  -     cyclobenzaprine (FLEXERIL) 5 MG tablet; Take 1 tablet (5 mg total) by mouth 3 (three) times daily as needed for Muscle spasms.    Osteoarthritis, unspecified osteoarthritis type, unspecified site    Other chronic pain  -     Ambulatory Referral to Psychiatry    Depression, unspecified depression type  -     Ambulatory referral to Psychiatry  -     Ambulatory  referral to Psychiatry  -     Ambulatory referral to Psychiatry  -     Vitamin B12; Future    Anesthesia of skin   -     Vitamin B12; Future    Other orders                 Side effects of medication(s) were discussed in detail and patient voiced understanding.  Patient will call back for any issues or complications.

## 2019-04-05 LAB
ANNOTATION COMMENT IMP: NORMAL
MOL DX INTERP BLD/T QL: NEGATIVE
RET INTERPRETATION: NORMAL
RET RELEASED BY:: NORMAL
RET RESULT: NORMAL
SPECIMEN SOURCE: NORMAL
SPECIMEN SOURCE: NORMAL

## 2019-04-07 ENCOUNTER — TELEPHONE (OUTPATIENT)
Dept: ENDOCRINOLOGY | Facility: CLINIC | Age: 64
End: 2019-04-07

## 2019-04-07 NOTE — TELEPHONE ENCOUNTER
Please advise the patient that I reviewed his labs. The lab test for pheochromocytoma was negative. The genetic test was also negative. He can go forward with the parathyroid surgery with Dr. Shearer as previously planned. I will reach out to Dr. Shearer to let her know these results.

## 2019-04-08 NOTE — TELEPHONE ENCOUNTER
Spoke to patient an let him know that Dr Brown reviewed his labs. The lab test for pheochromocytoma was negative. The genetic test was also negative. You can go forward with the parathyroid surgery with Dr. Shearer as previously planned. Dr Brown will reach out to Dr. Shearer to let her know these results.

## 2019-04-13 DIAGNOSIS — M47.816 LUMBAR SPONDYLOSIS: ICD-10-CM

## 2019-04-13 DIAGNOSIS — Z98.1 S/P LUMBAR FUSION: ICD-10-CM

## 2019-04-13 DIAGNOSIS — M51.36 DDD (DEGENERATIVE DISC DISEASE), LUMBAR: ICD-10-CM

## 2019-04-13 DIAGNOSIS — M54.17 LUMBOSACRAL RADICULOPATHY: ICD-10-CM

## 2019-04-13 DIAGNOSIS — M47.816 FACET ARTHRITIS, DEGENERATIVE, LUMBAR SPINE: ICD-10-CM

## 2019-04-13 DIAGNOSIS — G89.4 CHRONIC PAIN SYNDROME: ICD-10-CM

## 2019-04-15 RX ORDER — GABAPENTIN 300 MG/1
300 CAPSULE ORAL 3 TIMES DAILY
Qty: 90 CAPSULE | Refills: 3 | Status: SHIPPED | OUTPATIENT
Start: 2019-04-15 | End: 2020-05-28 | Stop reason: SDUPTHER

## 2019-04-18 ENCOUNTER — TELEPHONE (OUTPATIENT)
Dept: SURGERY | Facility: CLINIC | Age: 64
End: 2019-04-18

## 2019-04-18 DIAGNOSIS — E21.3 HYPERPARATHYROIDISM: Primary | ICD-10-CM

## 2019-04-18 RX ORDER — SODIUM CHLORIDE 0.9 % (FLUSH) 0.9 %
10 SYRINGE (ML) INJECTION
Status: CANCELLED | OUTPATIENT
Start: 2019-04-18

## 2019-04-18 RX ORDER — LIDOCAINE HYDROCHLORIDE 10 MG/ML
1 INJECTION, SOLUTION EPIDURAL; INFILTRATION; INTRACAUDAL; PERINEURAL ONCE
Status: CANCELLED | OUTPATIENT
Start: 2019-04-18 | End: 2019-04-18

## 2019-04-18 NOTE — TELEPHONE ENCOUNTER
Spoke with pt to select surgery date. Pt decided on May 22, 2019 @ [facility].

## 2019-04-24 ENCOUNTER — DOCUMENTATION ONLY (OUTPATIENT)
Dept: SURGERY | Facility: CLINIC | Age: 64
End: 2019-04-24

## 2019-04-24 NOTE — PROGRESS NOTES
JEREMÍAS Trejo RN             Cleared from the cardiac standpoint per Dr. Patrick.        4/24/2019 @ 4:56pm   Dr. Shearer notified.

## 2019-04-24 NOTE — PROGRESS NOTES
Vocal cord eval, intraop PTH monitoring, PCP and cardiac clearance requested via InBasket today. Will schedule for pre-op and pre-admit.

## 2019-04-29 ENCOUNTER — TELEPHONE (OUTPATIENT)
Dept: PREADMISSION TESTING | Facility: HOSPITAL | Age: 64
End: 2019-04-29

## 2019-04-29 NOTE — TELEPHONE ENCOUNTER
----- Message from Perla Hubbard RN sent at 4/24/2019  5:00 PM CDT -----  Regarding: Pre-Admit Appt Request   Please schedule pt for pre-admit on 5/16/19 @ 2p, if available. Thanks    He has a 1p in ENT and 1:30p with Dr. SOOD

## 2019-04-30 ENCOUNTER — TELEPHONE (OUTPATIENT)
Dept: INTERNAL MEDICINE | Facility: CLINIC | Age: 64
End: 2019-04-30

## 2019-04-30 NOTE — TELEPHONE ENCOUNTER
----- Message from Perla Hubbard RN sent at 4/24/2019 11:59 AM CDT -----  Regarding: PCP Clearance Request   The above pt is scheduled for a Bilateral Exploration Parathyroidectomy with Dr. Shearer on 5/22/19.     Thanks,     MEKHI Benz, LNC, RN   [id number]

## 2019-04-30 NOTE — TELEPHONE ENCOUNTER
----- Message from Perla Hubbard RN sent at 4/24/2019 11:59 AM CDT -----  Regarding: PCP Clearance Request   The above pt is scheduled for a Bilateral Exploration Parathyroidectomy with Dr. Shearer on 5/22/19.     Thanks,     MEKHI Benz, LNC, RN   [id number]

## 2019-05-07 ENCOUNTER — HOSPITAL ENCOUNTER (OUTPATIENT)
Dept: CARDIOLOGY | Facility: OTHER | Age: 64
Discharge: HOME | End: 2019-05-07
Attending: INTERNAL MEDICINE
Payer: MEDICARE

## 2019-05-07 ENCOUNTER — OFFICE VISIT (OUTPATIENT)
Dept: INTERNAL MEDICINE | Facility: CLINIC | Age: 64
End: 2019-05-07
Attending: INTERNAL MEDICINE
Payer: MEDICARE

## 2019-05-07 VITALS
OXYGEN SATURATION: 98 % | DIASTOLIC BLOOD PRESSURE: 74 MMHG | BODY MASS INDEX: 24.3 KG/M2 | SYSTOLIC BLOOD PRESSURE: 133 MMHG | HEIGHT: 70 IN | HEART RATE: 66 BPM | WEIGHT: 169.75 LBS

## 2019-05-07 DIAGNOSIS — E89.0 POSTOPERATIVE HYPOTHYROIDISM: ICD-10-CM

## 2019-05-07 DIAGNOSIS — Z72.0 TOBACCO USE: ICD-10-CM

## 2019-05-07 DIAGNOSIS — I10 ESSENTIAL HYPERTENSION: ICD-10-CM

## 2019-05-07 DIAGNOSIS — Z01.818 PRE-OP EXAMINATION: Primary | ICD-10-CM

## 2019-05-07 DIAGNOSIS — Z01.818 PRE-OP EXAMINATION: ICD-10-CM

## 2019-05-07 DIAGNOSIS — E21.3 HYPERPARATHYROIDISM: ICD-10-CM

## 2019-05-07 DIAGNOSIS — I25.10 CORONARY ARTERY DISEASE INVOLVING NATIVE CORONARY ARTERY OF NATIVE HEART WITHOUT ANGINA PECTORIS: ICD-10-CM

## 2019-05-07 PROCEDURE — 3078F PR MOST RECENT DIASTOLIC BLOOD PRESSURE < 80 MM HG: ICD-10-PCS | Mod: CPTII,S$GLB,, | Performed by: INTERNAL MEDICINE

## 2019-05-07 PROCEDURE — 99499 UNLISTED E&M SERVICE: CPT | Mod: S$GLB,,, | Performed by: INTERNAL MEDICINE

## 2019-05-07 PROCEDURE — 3045F PR MOST RECENT HEMOGLOBIN A1C LEVEL 7.0-9.0%: ICD-10-PCS | Mod: CPTII,S$GLB,, | Performed by: INTERNAL MEDICINE

## 2019-05-07 PROCEDURE — 3008F PR BODY MASS INDEX (BMI) DOCUMENTED: ICD-10-PCS | Mod: CPTII,S$GLB,, | Performed by: INTERNAL MEDICINE

## 2019-05-07 PROCEDURE — 93010 ELECTROCARDIOGRAM REPORT: CPT | Mod: ,,, | Performed by: INTERNAL MEDICINE

## 2019-05-07 PROCEDURE — 99999 PR PBB SHADOW E&M-EST. PATIENT-LVL III: CPT | Mod: PBBFAC,,, | Performed by: INTERNAL MEDICINE

## 2019-05-07 PROCEDURE — 93010 EKG 12-LEAD: ICD-10-PCS | Mod: ,,, | Performed by: INTERNAL MEDICINE

## 2019-05-07 PROCEDURE — 99214 OFFICE O/P EST MOD 30 MIN: CPT | Mod: S$GLB,,, | Performed by: INTERNAL MEDICINE

## 2019-05-07 PROCEDURE — 3078F DIAST BP <80 MM HG: CPT | Mod: CPTII,S$GLB,, | Performed by: INTERNAL MEDICINE

## 2019-05-07 PROCEDURE — 99499 RISK ADDL DX/OHS AUDIT: ICD-10-PCS | Mod: S$GLB,,, | Performed by: INTERNAL MEDICINE

## 2019-05-07 PROCEDURE — 3008F BODY MASS INDEX DOCD: CPT | Mod: CPTII,S$GLB,, | Performed by: INTERNAL MEDICINE

## 2019-05-07 PROCEDURE — 3045F PR MOST RECENT HEMOGLOBIN A1C LEVEL 7.0-9.0%: CPT | Mod: CPTII,S$GLB,, | Performed by: INTERNAL MEDICINE

## 2019-05-07 PROCEDURE — 99999 PR PBB SHADOW E&M-EST. PATIENT-LVL III: ICD-10-PCS | Mod: PBBFAC,,, | Performed by: INTERNAL MEDICINE

## 2019-05-07 PROCEDURE — 3075F SYST BP GE 130 - 139MM HG: CPT | Mod: CPTII,S$GLB,, | Performed by: INTERNAL MEDICINE

## 2019-05-07 PROCEDURE — 3075F PR MOST RECENT SYSTOLIC BLOOD PRESS GE 130-139MM HG: ICD-10-PCS | Mod: CPTII,S$GLB,, | Performed by: INTERNAL MEDICINE

## 2019-05-07 PROCEDURE — 99214 PR OFFICE/OUTPT VISIT, EST, LEVL IV, 30-39 MIN: ICD-10-PCS | Mod: S$GLB,,, | Performed by: INTERNAL MEDICINE

## 2019-05-07 PROCEDURE — 93005 ELECTROCARDIOGRAM TRACING: CPT

## 2019-05-07 NOTE — PROGRESS NOTES
"Subjective:       Patient ID: Rob Jones Jr. is a 63 y.o. male.    Chief Complaint: Pre-op Exam    Here for pre op exam    62 yo M c/ PMHx of CAD, DM, HTN, hypothyroidism, tobacco abuse, HCV s/p Tx 2017, BPH, left sided Pheo s/p resection 2006, KRISTAN, MDD, chronic low back pain presents for pre op exam. He is having parathyroidectomy by the wonderful Dr. KHAN in endocrine surgery. He is scheduled for 5/22/19.    Pt denies CP at rest or with exertion, SOB, episodic chest tightness and wheezing, chronic cough, palpitations, dizziness, orthopnea, PND, LE edema, personal hx of adverse reactions to anesthesia. He is active with several projects on his house with painting and sanding, mopping and sweeping without any difficulty. He rides his bike, albeit slowly at a "cruising" speed, around the neighborhood for several blocks without SOB or CP. He had a normal cardiac SPECT scan 7 months prior with normal EF and without WMA. Had this done for atypical CP.                     Review of Systems   Constitutional: Negative for appetite change, chills, fever and unexpected weight change.   HENT: Negative for hearing loss, sore throat and trouble swallowing.    Eyes: Negative for visual disturbance.   Respiratory: Negative for cough, chest tightness and shortness of breath.    Cardiovascular: Negative for chest pain and leg swelling.   Gastrointestinal: Negative for abdominal pain, blood in stool, constipation, diarrhea, nausea and vomiting.   Endocrine: Negative for polydipsia and polyuria.   Genitourinary: Negative for decreased urine volume, difficulty urinating, dysuria, frequency and urgency.   Musculoskeletal: Negative for gait problem.   Skin: Negative for rash.   Neurological: Negative for dizziness and numbness.   Psychiatric/Behavioral: The patient is not nervous/anxious.        Objective:      Vitals:    05/07/19 1047   BP: 133/74   Pulse: 66   SpO2: 98%   Weight: 77 kg (169 lb 12.1 oz)   Height: 5' 10" (1.778 m) "      Physical Exam   Constitutional: He is oriented to person, place, and time. He appears well-developed and well-nourished. No distress.   HENT:   Head: Normocephalic and atraumatic.   Mouth/Throat: Oropharynx is clear and moist. No oropharyngeal exudate.   Eyes: Pupils are equal, round, and reactive to light. Conjunctivae and EOM are normal. No scleral icterus.   Neck: No thyromegaly present.   Cardiovascular: Normal rate, regular rhythm and normal heart sounds.   No murmur heard.  Pulmonary/Chest: Effort normal and breath sounds normal. He has no wheezes. He has no rales.   Abdominal: Soft. He exhibits no distension. There is no tenderness.   Musculoskeletal: He exhibits no edema or tenderness.   Lymphadenopathy:     He has no cervical adenopathy.   Neurological: He is alert and oriented to person, place, and time.   Skin: Skin is warm and dry.   Psychiatric: He has a normal mood and affect. His behavior is normal.       Assessment:       1. Pre-op examination    2. Hyperparathyroidism    3. Coronary artery disease involving native coronary artery of native heart without angina pectoris    4. Essential hypertension    5. Uncontrolled type 2 diabetes mellitus with complication, with long-term current use of insulin    6. Postoperative hypothyroidism    7. Tobacco use        Plan:       Rob was seen today for pre-op exam.    Diagnoses and all orders for this visit:    Pre-op examination  -No signs or symptoms of underlying or uncontrolled cardiac or pulmonary pathology  -EKG done 5/7/19 without any acute or remote signs of ischemia or underlying arrhythmia   -Patient has an estimated risk of perioperative myocardial infarction or cardiac arrest of approx 0.3 % using [facility] NSQIP database risk model. Capable of >4 METs.  -No additional testing or change in management is required prior to elective procedure.   -     SCHEDULED EKG 12-LEAD (to Muse); Future    Hyperparathyroidism     Coronary artery disease  involving native coronary artery of native heart without angina pectoris    Essential hypertension  controlled. Continue current regimen    Uncontrolled type 2 diabetes mellitus with complication, with long-term current use of insulin   Dr. KHAN more than capable of melinda-op control of near 150.   Postoperative hypothyroidism    Tobacco use                 Side effects of medication(s) were discussed in detail and patient voiced understanding.  Patient will call back for any issues or complications.

## 2019-05-15 ENCOUNTER — ANESTHESIA EVENT (OUTPATIENT)
Dept: SURGERY | Facility: HOSPITAL | Age: 64
DRG: 982 | End: 2019-05-15
Payer: MEDICARE

## 2019-05-15 DIAGNOSIS — E21.3 HYPERPARATHYROIDISM: Primary | ICD-10-CM

## 2019-05-16 ENCOUNTER — OFFICE VISIT (OUTPATIENT)
Dept: SURGERY | Facility: CLINIC | Age: 64
End: 2019-05-16
Payer: MEDICARE

## 2019-05-16 ENCOUNTER — HOSPITAL ENCOUNTER (OUTPATIENT)
Dept: PREADMISSION TESTING | Facility: HOSPITAL | Age: 64
Discharge: HOME | End: 2019-05-16
Attending: ANESTHESIOLOGY
Payer: MEDICARE

## 2019-05-16 VITALS
HEIGHT: 70 IN | SYSTOLIC BLOOD PRESSURE: 136 MMHG | RESPIRATION RATE: 18 BRPM | HEART RATE: 64 BPM | WEIGHT: 169 LBS | TEMPERATURE: 98 F | DIASTOLIC BLOOD PRESSURE: 75 MMHG | BODY MASS INDEX: 24.2 KG/M2

## 2019-05-16 VITALS
SYSTOLIC BLOOD PRESSURE: 140 MMHG | DIASTOLIC BLOOD PRESSURE: 77 MMHG | HEIGHT: 70 IN | RESPIRATION RATE: 18 BRPM | WEIGHT: 171 LBS | TEMPERATURE: 98 F | BODY MASS INDEX: 24.48 KG/M2 | OXYGEN SATURATION: 97 % | HEART RATE: 62 BPM

## 2019-05-16 DIAGNOSIS — E08.9 DIABETES MELLITUS DUE TO UNDERLYING CONDITION WITHOUT COMPLICATION, WITHOUT LONG-TERM CURRENT USE OF INSULIN: Primary | ICD-10-CM

## 2019-05-16 DIAGNOSIS — E21.3 HYPERPARATHYROIDISM: Primary | ICD-10-CM

## 2019-05-16 DIAGNOSIS — Z01.818 PREOPERATIVE TESTING: ICD-10-CM

## 2019-05-16 PROCEDURE — 99999 PR PBB SHADOW E&M-EST. PATIENT-LVL III: ICD-10-PCS | Mod: PBBFAC,,, | Performed by: SURGERY

## 2019-05-16 PROCEDURE — 99499 UNLISTED E&M SERVICE: CPT | Mod: S$GLB,,, | Performed by: SURGERY

## 2019-05-16 PROCEDURE — 99499 NO LOS: ICD-10-PCS | Mod: S$GLB,,, | Performed by: SURGERY

## 2019-05-16 PROCEDURE — 99999 PR PBB SHADOW E&M-EST. PATIENT-LVL III: CPT | Mod: PBBFAC,,, | Performed by: SURGERY

## 2019-05-16 RX ORDER — NITROGLYCERIN 0.4 MG/1
0.4 TABLET SUBLINGUAL EVERY 5 MIN PRN
COMMUNITY

## 2019-05-16 NOTE — PROGRESS NOTES
The patient presents for follow-up prior to scheduled parathyroidectomy.  The patient's genetic testing(MEN) was negative and follow-up labs were unremarkable.  In review, he is noted to have hyperparathyroidism.  US and sestamibi were negative for localization while the parathyroid CT revealed a possible abnormal parathyroid on the right.    He is awaiting voice evaluation.    I discussed the nature of the disease process and the risk of surgery including failure to localize the parathyroid gland, persistent or recurrent hyperparathyroidism with the possibility of the need for a second surgery, temporary or permanent hypoparathyroidism resulting in low blood calcium levels that require extensive medication to avoid serious degenerative conditions such as cataracts, brittle bones, muscle weakness and muscle irritability.  Other risks include bleeding, infection, injury to the recurrent laryngeal nerves resulting in hoarseness or impairment of speech and the risks of general anesthetic including MI, CVA, sudden death or even reaction to anesthetic medications. The patient understands the risks, any and all questions were answered to the patient's satisfaction.     See the original consult note below:    I have reviewed the Resident's history and physical, assessment and plan. I agree with the findings.     Mounika Carmona MD  Endocrine Surgery      Consult Note  Endocrine Surgery    Visit Diagnosis: Hyperparathyroidism [E21.3]    SUBJECTIVE:     Patient is a 63 y.o. male who was referred by Dr. Min Brown and is here unaccompanied and presents for evaluation of primary hyperparathyroidism. The patient has had complaints of elevation of the serum calcium and parathormone. There is no history of lithium or thiazide medication use. He has not had previous history of head or neck radiation. He admits sleep disturbance, mood changes, constipation and lethargy. He denies joint pain, abdominal pain, increased  urination and increased thirst. Furthermore, there is no history of pathologic fractures. The patient is vitamin D deficient. He is not on calcium supplementation. He does not have familial history of endocrinopathies. Of note, the patient does have a hx of graves disease which was treated surgically with a total thyroidectomy in 2000 at [facility], reportedly the pathology was benign at that time. He also had a pheochromocytoma removed in [place] in about 2005, this was reportedly benign as well. His history in light of hyperparathyroidism raises the suspicioun for MEN2a syndrome, despite benign thyroid pathology was benign. He presents today for evaluation of hyperparathyroidism with elevated calcium, unknown hypercalciuria, and some osteopenia. He does have to localization studies indicating a possible adenoma on the right side. He feels well today.       Review of patient's allergies indicates:   Allergen Reactions    Tizanidine Shortness Of Breath       Current Outpatient Medications   Medication Sig Dispense Refill    amLODIPine (NORVASC) 10 MG tablet TAKE 1 TABLET (10 MG TOTAL) BY MOUTH ONCE DAILY. 90 tablet 2    atorvastatin (LIPITOR) 10 MG tablet Take 1 tablet (10 mg total) by mouth once daily. 90 tablet 3    blood sugar diagnostic Strp Pt to check BG up to QID. Dispense strips compatible with pt brand meter 100 each 11    blood-glucose meter (FREESTYLE SYSTEM KIT) kit Please dispense per pt insurance formulary and pt choice Use as instructed 1 each 0    gabapentin (NEURONTIN) 300 MG capsule Take 300 mg by mouth 2 (two) times daily.      irbesartan (AVAPRO) 300 MG tablet Take 1 tablet (300 mg total) by mouth once daily. 90 tablet 2    lancets (ONETOUCH ULTRASOFT LANCETS) Misc Pt to check BG up to QID. Dispense lancets compatible with pt brand meter 100 each 11    levothyroxine (SYNTHROID) 137 MCG Tab tablet TAKE 1 TABLET (137 MCG TOTAL) BY MOUTH BEFORE BREAKFAST. 90 tablet 2    meloxicam  "(MOBIC) 15 MG tablet TAKE 1 TABLET (15 MG TOTAL) BY MOUTH DAILY AS NEEDED FOR PAIN. 90 tablet 1    metFORMIN (GLUCOPHAGE) 1000 MG tablet TAKE 1 TABLET BY MOUTH TWICE A DAY WITH FOOD 180 tablet 2    nitroGLYCERIN 0.4 MG/HR TD PT24 (NITRODUR) 0.4 mg/hr Place 1 patch onto the skin continuous prn.      NOVOFINE 32 32 gauge x 1/4" Ndle 1 EACH BY MISC.(NON-DRUG COMBO ROUTE) ROUTE ONCE DAILY.  3    pen needle, diabetic (NOVOFINE PLUS) 32 gauge x 1/6" Ndle 1 each by Misc.(Non-Drug; Combo Route) route once daily. 100 each 3    SITagliptin (JANUVIA) 100 MG Tab Take 1 tablet (100 mg total) by mouth once daily. 30 tablet 11    tamsulosin (FLOMAX) 0.4 mg Cp24 Take 1 capsule (0.4 mg total) by mouth once daily. 90 capsule 3    temazepam (RESTORIL) 30 mg capsule TAKE 1 CAPSULE BY MOUTH EVERY DAY AT BEDTIME AS NEEDED 60 capsule 1    vitamin D 1000 units Tab Take 185 mg by mouth once daily.      albuterol (ACCUNEB) 1.25 mg/3 mL Nebu Take 3 mLs (1.25 mg total) by nebulization every 6 (six) hours as needed (shortness of breath). Rescue 1 Box 2     No current facility-administered medications for this visit.        Past Medical History:   Diagnosis Date    Anxiety     Back pain     Cataract     Diabetes mellitus     History of hepatitis C; S/p RX with SVR 12 documented 06/2017 6/1/2017    Hypertension     Postoperative hypothyroidism 11/3/2016    Primary hyperparathyroidism 1/18/2017    Thyroid disease      Past Surgical History:   Procedure Laterality Date    BLOCK SELECTIVE NERVE ROOT TRANSFORAMINAL LUMBAR Left 6/21/2017    [facility]    INJECTION-STEROID-EPIDURAL-CERVICAL N/A 1/17/2018    Performed by Christina Esp[facility]za [facility] at [facility] [place] [place] [facility]    INJECTION-STEROID-EPIDURAL-TRANSFORAMINAL Left 8/25/2017    [facility]    LUMBAR FUSION      THYROIDECTOMY      TONSILLECTOMY       Social History     Tobacco Use    Smoking status: Current " "Every Day Smoker    Smokeless tobacco: Never Used   Substance Use Topics    Alcohol use: Yes    Drug use: No          Review of Systems:    Review of Systems  Constitutional: negative for chills, fatigue, fevers, malaise and night sweats  Eyes: negative for icterus and irritation  Respiratory: negative for cough and dyspnea on exertion  Cardiovascular: negative for chest pain and palpitations  Gastrointestinal: negative for constipation, diarrhea and dysphagia  Genitourinary:negative for dysuria, hematuria and nocturia  Integument/breast: negative for rash and skin color change  Hematologic/lymphatic: negative for bleeding and easy bruising  Neurological: negative for gait problems, headaches and seizures  Behavioral/Psych: negative for anxiety and depression  Endocrine: negative    ENT ROS: negative  Endocrine ROS:   negative for - hair pattern changes, malaise/lethargy, mood swings, palpitations or polydipsia/polyuria      OBJECTIVE:     Vital Signs:  /73   Pulse 62   Temp 98 °F (36.7 °C)   Resp 18   Ht 5' 10" (1.778 m)   Wt 78.9 kg (173 lb 15.1 oz)   BMI 24.96 kg/m²    Body mass index is 24.96 kg/m².      Physical Exam    General:  no distress, see vitals for BMI    Eyes:  conjunctivae/corneas clear   Neck: trachea midline and symmetric, no adenopathy    Thyroid:  thyroid absent   Lung: clear to auscultation bilaterally   Heart:  regular rate and rhythm   Abdomen: soft, non-tender; bowel sounds normal; no masses,  no organomegaly   Skin/Extremities: warm and well-perfused   Pulses: 2+ and symmetric   Neuro: normal without focal findings and mental status, speech normal, alert and oriented x3       Imaging    Studies:  Date:       Results:     DEXA:   2/11/19 FINDINGS:  The L1 to L4 vertebral bone mineral density is equal to 1.692 g/cm squared with a T score of 3.7.    The left femoral neck bone mineral density is equal to 0.859 g/cm squared with a T score of -1.6.    The total hip bone mineral " density is equal to 0.928 g/cm squared with a T score of -1.2.    There is a 2.7% risk of a major osteoporotic fracture and a 0.6% risk of hip fracture in the next 10 years (FRAX).   Ultrasound:  1/31/17 Status post thyroidectomy  No abnormal lymph nodes seen.  No parathyroid adenomas seen.   Sestamebi/Parathyroid scan:  2/12/19 There is physiological tracer uptake in the myocardium, salivary glands, and thyroid gland. Delayed images demonstrate near-complete tracer washout from the thyroid.   Parathyroid protocol CT scan:   2/18/19 Subtle enhancing soft tissue density nodule identified anterior to the T1 vertebra and posterior to the trachea just to the right of the esophagus measuring 0.7 x 0.7 cm in size (image 134, series 3).  This likely represents a small parathyroid adenoma given the findings on the recent nuclear medicine parathyroid scan and the patient's clinical findings.    Surgical changes of prior thyroidectomy.       Lab Review    24-Hour Urine Collection:     Ordered, not yet completed          Component Value Date    Vit D, 25-Hydroxy 14 (L) 01/21/2019    PTH, Intact 86.8 (H) 01/21/2019    Calcium 11.9 (H) 01/21/2019    TSH 3.325 01/21/2019    Free T4 1.09 09/25/2018           ASSESSMENT/PLAN:       Assessment    Rob Jones Jr. is an 63 y.o. male who presents withprimary hyperparathyroidism. The above testing and examination support the diagnosis. Patient meets criteria for recommending parathyroid surgery. This is based on his history of reduced cortical bone density and Calcium level 1mg/dL above normal limit. He currently has possible localization to the right] via imaging (Sestamebi scan and Parathyroid protocol CT Scan).       Plan    1. Imaging: no further imaging required  2. Medications: patient should continue current medications: OTC Vitamin D  3. The natural history and treatment options for primary hyperparathyroidism were discussed with the patient. Parathyroidectomy is the only  "curative treatment for primary hyperparathyroidism. Parathyroidectomy, both minimally invasive technique and standard four-gland exploration, and its risks were discussed. I was also able to duscuss the expected melinda-operative course and the risks of surgery including failure to localize the parathyroid gland, persistent or recurrent hyperparathyroidism with the possibility of the need for a second surgery, temporary or permanent hypoparathyroidism resulting in low blood calcium levels that require extensive medication to avoid serious degenerative conditions such as cataracts, brittle bones, muscle weakness and muscle irritability. Other risks include bleeding, infection, injury to the recurrent laryngeal nerves resulting in hoarseness or impairment of speech and the risks of general anesthetic including MI, CVA, sudden death or even reaction to anesthetic medications.The role of intraoperative PTH monitoring was also discussed. The patient understands the risks, any and all questions were answered to the patients satisfaction. The patient is amenable to surgery.   4. Will ensure that patient is medically optimized prior to procedure(The patient should have voice evaluation related to prior thyroid surgery). In addition, the patient was given our "Understanding Parathyroid Disease" booklet and directed to the [employer] Patient Education portal as a resource.   5. Patient will require completion of previously ordered labs by Dr. Brown. We will also discuss the possibility of genetic testing as this could have a direct effect on the extent of surgery. preop clearance to be obtained from the patient's cardiologist.   "

## 2019-05-16 NOTE — DISCHARGE INSTRUCTIONS
Your surgery has been scheduled for:__________________________________________    You should report to:  ____[facility], located on the [place] side of the first floor of the           [facility] ([phone])  ____[facility], located on the [facility] side of the            Second floor of the [facility] ([phone])  ____3rd Floor [facility] located on the [facility] side of the [facility] [phone]  Please Note   - Tell your doctor if you take Aspirin, products containing Aspirin, herbal medications  or blood thinners, such as Coumadin, Ticlid, or Plavix.  (Consult your provider regarding holding or stopping before surgery).  - Arrange for someone to drive you home following surgery.  You will not be allowed to leave the surgical facility alone or drive yourself home following sedation and anesthesia.  Before Surgery  - Stop taking all herbal medications 14days prior to surgery  - No Motrin/Advil (Ibuprofen) 7 days before surgery  - No Aleve (Naproxen) 7 days before surgery  - Stop Taking Asprin, products containing Asprin _____days before surgery  - Stop taking blood thinners_______days before surgery  - No Goody's/BC  Powder 7 days before surgery  - Refrain from drinking alcoholic beverages for 24hours before and after surgery  - Stop or limit smoking _________days before surgery  - You may take Tylenol for pain    Night before Surgery  NOTHING TO EAT OR DRINK AFTER MIDNIGHT OR FOLLOW SURGEON'S INSTRUCTION S  - Take a shower or bath (shower is recommended).  Bathe with Hibiclens soap or an antibacterial soap from the neck down.  If not supplied by your surgeon, hibiclens soap will need to be purchased over the counter in pharmacy.  Rinse soap off thoroughly.  - Shampoo your hair with your regular shampoo  The Day of Surgery  ·  If you are told to take medication on the morning of surgery, it may be taken  with a sip of water.   - Take another bath or shower with hibiclens or any antibacterial soap, to reduce the chance of infection.  - Take heart and blood pressure medications with a small sip of water, as advised by the perioperative team.  - Do not take fluid pills  - You may brush your teeth and rinse your mouth, but do not swall any additional water.   - Do not apply perfumes, powder, body lotions or deodorant on the day of surgery.  - Nail polish should be removed.  - Do not wear makeup or moisturizer  - Wear comfortable clothes, such as a button front shirt and loose fitting pants.  - Leave all jewelry, including body piercings, and valuables at home.    - Bring any devices you will neeed after surgery such as crutches or canes.  - If you have sleep apnea, please bring your CPAP machine  In the event that your physical condition changes including the onset of a cold or respiratory illness, or if you have to delay or cancel your surgery, please notify your surgeon.      Anesthesia: General Anesthesia  Youre due to have surgery. During surgery, youll be given medication called anesthesia. (It is also called anesthetic.) This will keep you comfortable and pain-free. Your anesthesia provider will use general anesthesia. This sheet tells you more about it.  What is general anesthesia?     You are watched continuously during your procedure by the anesthesia provider   General anesthesia puts you into a state like deep sleep. It goes into the bloodstream (IV anesthetics), into the lungs (gas anesthetics), or both. You feel nothing during the procedure. You will not remember it. During the procedure, the anesthesia provider monitors you continuously. He or she checks your heart rate and rhythm, blood pressure, breathing, and blood oxygen.  · IV Anesthetics. IV anesthetics are given through an IV line in your arm. Theyre often given first. This is so you are asleep before a gas anesthetic is started. Some kinds of IV  anesthetics relieve pain. Others relax you. Your doctor will decide which kind is best in your case.  · Gas Anesthetics. Gas anesthetics are breathed into the lungs. They are often used to keep you asleep. They can be given through a facemask or a tube placed in your larynx or trachea (breathing tube).  ? If you have a facemask, your anesthesia provider will most likely place it over your nose and mouth while youre still awake. Youll breathe oxygen through the mask as your IV anesthetic is started. Gas anesthetic may be added through the mask.  ? If you have a tube in the larynx or trachea, it will be inserted into your throat after youre asleep.  Anesthesia tools and medications  You will likely have:  · IV anesthetics. These are put into an IV line into your bloodstream.  · Gas anesthetics. You breathe these anesthetics into your lungs, where they pass into your bloodstream.  · Pulse oximeter. This is a small clip that is attached to the end of your finger. This measures your blood oxygen level.  · Electrocardiography leads (electrodes). These are small sticky pads that are placed on your chest. They record your heart rate and rhythm.  · Blood pressure cuff. This reads your blood pressure.  Risks and possible complications  General anesthesia has some risks. These include:  · Breathing problems  · Nausea and vomiting  · Sore throat or hoarseness (usually temporary)  · Allergic reaction to the anesthetic  · Irregular heartbeat (rare)  · Cardiac arrest (rare)   Anesthesia safety  · Follow all instructions you are given for how long not to eat or drink before your procedure.  · Be sure your doctor knows what medications and drugs you take. This includes over-the-counter medications, herbs, supplements, alcohol or other drugs. You will be asked when those were last taken.  · Have an adult family member or friend drive you home after the procedure.  · For the first 24 hours after your surgery:  ? Do not drive or use  heavy equipment.  ? Have a trusted family member or spouse make important decisions or sign documents.  ? Avoid alcohol.  ? Have a responsible adult stay with you. He or she can watch for problems and help keep you safe.  Date Last Reviewed: 10/16/2014  © 1280-7251 [employer]. [address] [place] [id number]. All rights reserved. This information is not intended as a substitute for professional medical care. Always follow your healthcare professional's instructions.

## 2019-05-16 NOTE — H&P (VIEW-ONLY)
The patient presents for follow-up prior to scheduled parathyroidectomy.  The patient's genetic testing(MEN) was negative and follow-up labs were unremarkable.  In review, he is noted to have hyperparathyroidism.  US and sestamibi were negative for localization while the parathyroid CT revealed a possible abnormal parathyroid on the right.    He is awaiting voice evaluation.    I discussed the nature of the disease process and the risk of surgery including failure to localize the parathyroid gland, persistent or recurrent hyperparathyroidism with the possibility of the need for a second surgery, temporary or permanent hypoparathyroidism resulting in low blood calcium levels that require extensive medication to avoid serious degenerative conditions such as cataracts, brittle bones, muscle weakness and muscle irritability.  Other risks include bleeding, infection, injury to the recurrent laryngeal nerves resulting in hoarseness or impairment of speech and the risks of general anesthetic including MI, CVA, sudden death or even reaction to anesthetic medications. The patient understands the risks, any and all questions were answered to the patient's satisfaction.     See the original consult note below:    I have reviewed the Resident's history and physical, assessment and plan. I agree with the findings.     Mounika Carmona MD  Endocrine Surgery      Consult Note  Endocrine Surgery    Visit Diagnosis: Hyperparathyroidism [E21.3]    SUBJECTIVE:     Patient is a 63 y.o. male who was referred by Dr. Min Brown and is here unaccompanied and presents for evaluation of primary hyperparathyroidism. The patient has had complaints of elevation of the serum calcium and parathormone. There is no history of lithium or thiazide medication use. He has not had previous history of head or neck radiation. He admits sleep disturbance, mood changes, constipation and lethargy. He denies joint pain, abdominal pain, increased  urination and increased thirst. Furthermore, there is no history of pathologic fractures. The patient is vitamin D deficient. He is not on calcium supplementation. He does not have familial history of endocrinopathies. Of note, the patient does have a hx of graves disease which was treated surgically with a total thyroidectomy in 2000 at [facility], reportedly the pathology was benign at that time. He also had a pheochromocytoma removed in [place] in about 2005, this was reportedly benign as well. His history in light of hyperparathyroidism raises the suspicioun for MEN2a syndrome, despite benign thyroid pathology was benign. He presents today for evaluation of hyperparathyroidism with elevated calcium, unknown hypercalciuria, and some osteopenia. He does have to localization studies indicating a possible adenoma on the right side. He feels well today.       Review of patient's allergies indicates:   Allergen Reactions    Tizanidine Shortness Of Breath       Current Outpatient Medications   Medication Sig Dispense Refill    amLODIPine (NORVASC) 10 MG tablet TAKE 1 TABLET (10 MG TOTAL) BY MOUTH ONCE DAILY. 90 tablet 2    atorvastatin (LIPITOR) 10 MG tablet Take 1 tablet (10 mg total) by mouth once daily. 90 tablet 3    blood sugar diagnostic Strp Pt to check BG up to QID. Dispense strips compatible with pt brand meter 100 each 11    blood-glucose meter (FREESTYLE SYSTEM KIT) kit Please dispense per pt insurance formulary and pt choice Use as instructed 1 each 0    gabapentin (NEURONTIN) 300 MG capsule Take 300 mg by mouth 2 (two) times daily.      irbesartan (AVAPRO) 300 MG tablet Take 1 tablet (300 mg total) by mouth once daily. 90 tablet 2    lancets (ONETOUCH ULTRASOFT LANCETS) Misc Pt to check BG up to QID. Dispense lancets compatible with pt brand meter 100 each 11    levothyroxine (SYNTHROID) 137 MCG Tab tablet TAKE 1 TABLET (137 MCG TOTAL) BY MOUTH BEFORE BREAKFAST. 90 tablet 2    meloxicam  "(MOBIC) 15 MG tablet TAKE 1 TABLET (15 MG TOTAL) BY MOUTH DAILY AS NEEDED FOR PAIN. 90 tablet 1    metFORMIN (GLUCOPHAGE) 1000 MG tablet TAKE 1 TABLET BY MOUTH TWICE A DAY WITH FOOD 180 tablet 2    nitroGLYCERIN 0.4 MG/HR TD PT24 (NITRODUR) 0.4 mg/hr Place 1 patch onto the skin continuous prn.      NOVOFINE 32 32 gauge x 1/4" Ndle 1 EACH BY MISC.(NON-DRUG COMBO ROUTE) ROUTE ONCE DAILY.  3    pen needle, diabetic (NOVOFINE PLUS) 32 gauge x 1/6" Ndle 1 each by Misc.(Non-Drug; Combo Route) route once daily. 100 each 3    SITagliptin (JANUVIA) 100 MG Tab Take 1 tablet (100 mg total) by mouth once daily. 30 tablet 11    tamsulosin (FLOMAX) 0.4 mg Cp24 Take 1 capsule (0.4 mg total) by mouth once daily. 90 capsule 3    temazepam (RESTORIL) 30 mg capsule TAKE 1 CAPSULE BY MOUTH EVERY DAY AT BEDTIME AS NEEDED 60 capsule 1    vitamin D 1000 units Tab Take 185 mg by mouth once daily.      albuterol (ACCUNEB) 1.25 mg/3 mL Nebu Take 3 mLs (1.25 mg total) by nebulization every 6 (six) hours as needed (shortness of breath). Rescue 1 Box 2     No current facility-administered medications for this visit.        Past Medical History:   Diagnosis Date    Anxiety     Back pain     Cataract     Diabetes mellitus     History of hepatitis C; S/p RX with SVR 12 documented 06/2017 6/1/2017    Hypertension     Postoperative hypothyroidism 11/3/2016    Primary hyperparathyroidism 1/18/2017    Thyroid disease      Past Surgical History:   Procedure Laterality Date    BLOCK SELECTIVE NERVE ROOT TRANSFORAMINAL LUMBAR Left 6/21/2017    [facility]    INJECTION-STEROID-EPIDURAL-CERVICAL N/A 1/17/2018    [facility]    INJECTION-STEROID-EPIDURAL-TRANSFORAMINAL Left 8/25/2017    [facility]    LUMBAR FUSION      THYROIDECTOMY      TONSILLECTOMY       Social History     Tobacco Use    Smoking status: Current " "Every Day Smoker    Smokeless tobacco: Never Used   Substance Use Topics    Alcohol use: Yes    Drug use: No          Review of Systems:    Review of Systems  Constitutional: negative for chills, fatigue, fevers, malaise and night sweats  Eyes: negative for icterus and irritation  Respiratory: negative for cough and dyspnea on exertion  Cardiovascular: negative for chest pain and palpitations  Gastrointestinal: negative for constipation, diarrhea and dysphagia  Genitourinary:negative for dysuria, hematuria and nocturia  Integument/breast: negative for rash and skin color change  Hematologic/lymphatic: negative for bleeding and easy bruising  Neurological: negative for gait problems, headaches and seizures  Behavioral/Psych: negative for anxiety and depression  Endocrine: negative    ENT ROS: negative  Endocrine ROS:   negative for - hair pattern changes, malaise/lethargy, mood swings, palpitations or polydipsia/polyuria      OBJECTIVE:     Vital Signs:  /73   Pulse 62   Temp 98 °F (36.7 °C)   Resp 18   Ht 5' 10" (1.778 m)   Wt 78.9 kg (173 lb 15.1 oz)   BMI 24.96 kg/m²    Body mass index is 24.96 kg/m².      Physical Exam    General:  no distress, see vitals for BMI    Eyes:  conjunctivae/corneas clear   Neck: trachea midline and symmetric, no adenopathy    Thyroid:  thyroid absent   Lung: clear to auscultation bilaterally   Heart:  regular rate and rhythm   Abdomen: soft, non-tender; bowel sounds normal; no masses,  no organomegaly   Skin/Extremities: warm and well-perfused   Pulses: 2+ and symmetric   Neuro: normal without focal findings and mental status, speech normal, alert and oriented x3       Imaging    Studies:  Date:       Results:     DEXA:   2/11/19 FINDINGS:  The L1 to L4 vertebral bone mineral density is equal to 1.692 g/cm squared with a T score of 3.7.    The left femoral neck bone mineral density is equal to 0.859 g/cm squared with a T score of -1.6.    The total hip bone mineral " density is equal to 0.928 g/cm squared with a T score of -1.2.    There is a 2.7% risk of a major osteoporotic fracture and a 0.6% risk of hip fracture in the next 10 years (FRAX).   Ultrasound:  1/31/17 Status post thyroidectomy  No abnormal lymph nodes seen.  No parathyroid adenomas seen.   Sestamebi/Parathyroid scan:  2/12/19 There is physiological tracer uptake in the myocardium, salivary glands, and thyroid gland. Delayed images demonstrate near-complete tracer washout from the thyroid.   Parathyroid protocol CT scan:   2/18/19 Subtle enhancing soft tissue density nodule identified anterior to the T1 vertebra and posterior to the trachea just to the right of the esophagus measuring 0.7 x 0.7 cm in size (image 134, series 3).  This likely represents a small parathyroid adenoma given the findings on the recent nuclear medicine parathyroid scan and the patient's clinical findings.    Surgical changes of prior thyroidectomy.       Lab Review    24-Hour Urine Collection:     Ordered, not yet completed          Component Value Date    Vit D, 25-Hydroxy 14 (L) 01/21/2019    PTH, Intact 86.8 (H) 01/21/2019    Calcium 11.9 (H) 01/21/2019    TSH 3.325 01/21/2019    Free T4 1.09 09/25/2018           ASSESSMENT/PLAN:       Assessment    Rob Jones Jr. is an 63 y.o. male who presents withprimary hyperparathyroidism. The above testing and examination support the diagnosis. Patient meets criteria for recommending parathyroid surgery. This is based on his history of reduced cortical bone density and Calcium level 1mg/dL above normal limit. He currently has possible localization to the right] via imaging (Sestamebi scan and Parathyroid protocol CT Scan).       Plan    1. Imaging: no further imaging required  2. Medications: patient should continue current medications: OTC Vitamin D  3. The natural history and treatment options for primary hyperparathyroidism were discussed with the patient. Parathyroidectomy is the only  "curative treatment for primary hyperparathyroidism. Parathyroidectomy, both minimally invasive technique and standard four-gland exploration, and its risks were discussed. I was also able to duscuss the expected melinda-operative course and the risks of surgery including failure to localize the parathyroid gland, persistent or recurrent hyperparathyroidism with the possibility of the need for a second surgery, temporary or permanent hypoparathyroidism resulting in low blood calcium levels that require extensive medication to avoid serious degenerative conditions such as cataracts, brittle bones, muscle weakness and muscle irritability. Other risks include bleeding, infection, injury to the recurrent laryngeal nerves resulting in hoarseness or impairment of speech and the risks of general anesthetic including MI, CVA, sudden death or even reaction to anesthetic medications.The role of intraoperative PTH monitoring was also discussed. The patient understands the risks, any and all questions were answered to the patients satisfaction. The patient is amenable to surgery.   4. Will ensure that patient is medically optimized prior to procedure(The patient should have voice evaluation related to prior thyroid surgery). In addition, the patient was given our "Understanding Parathyroid Disease" booklet and directed to the [employer] Patient Education portal as a resource.   5. Patient will require completion of previously ordered labs by Dr. Brown. We will also discuss the possibility of genetic testing as this could have a direct effect on the extent of surgery. preop clearance to be obtained from the patient's cardiologist.   "

## 2019-05-16 NOTE — ANESTHESIA PREPROCEDURE EVALUATION
[facility]  Anesthesia Pre-Operative Evaluation         Patient Name: Rob Jones Jr.  YOB: 1955  MRN: [id number]    SUBJECTIVE:     Pre-operative evaluation for Procedure(s) (LRB):  PARATHYROIDECTOMY (N/A)     05/21/2019    Rob Jones Jr. is a 63 y.o. male w/ a significant PMHx of CAD MI x3, last one in 2005), DM, HTN, hypothyroidism, tobacco abuse, HCV s/p Tx 2017, BPH, left sided Pheo s/p resection 2006, chronic low back pain presents with primary hyperparathyroidism. Planned for parathyroidectomy.     Patient now presents for the above procedure(s).    Prev airway: None documented.    Patient Active Problem List   Diagnosis    HTN (hypertension)    DJD (degenerative joint disease)    Neuropathy    Tobacco use    Postoperative hypothyroidism    S/P lumbar fusion    Coronary artery disease involving native coronary artery of native heart without angina pectoris    History of pheochromocytoma    Anxiety    Primary hyperparathyroidism    Cervical stenosis of spinal canal    Cervical radiculopathy    Chronic left shoulder pain    Uncontrolled type 2 diabetes mellitus with complication, with long-term current use of insulin    Benign non-nodular prostatic hyperplasia without lower urinary tract symptoms    History of hepatitis C; S/p RX with SVR 12 documented 06/2017    Chronic pain    Primary insomnia    Atypical chest pain    Diabetes mellitus without complication    Thyroid disease       Review of patient's allergies indicates:   Allergen Reactions    Tizanidine Shortness Of Breath       Current Inpatient Medications:      No current facility-administered medications on file prior to encounter.      Current Outpatient Medications on File Prior to Encounter   Medication Sig Dispense Refill    nitroGLYCERIN (NITROSTAT) 0.4 MG SL tablet Place 0.4 mg under the tongue every 5 (five) minutes as needed for Chest pain.      albuterol (ACCUNEB) 1.25 mg/3 mL  "Nebu Take 3 mLs (1.25 mg total) by nebulization every 6 (six) hours as needed (shortness of breath). Rescue 1 Box 2    amLODIPine (NORVASC) 10 MG tablet TAKE 1 TABLET (10 MG TOTAL) BY MOUTH ONCE DAILY. 90 tablet 2    atorvastatin (LIPITOR) 10 MG tablet Take 1 tablet (10 mg total) by mouth once daily. 90 tablet 3    blood sugar diagnostic Strp Pt to check BG up to QID. Dispense strips compatible with pt brand meter 100 each 11    blood-glucose meter (FREESTYLE SYSTEM KIT) kit Please dispense per pt insurance formulary and pt choice Use as instructed 1 each 0    irbesartan (AVAPRO) 300 MG tablet Take 1 tablet (300 mg total) by mouth once daily. (Patient taking differently: Take 150 mg by mouth once daily. ) 90 tablet 2    lancets (ONETOUCH ULTRASOFT LANCETS) [facility] Pt to check BG up to QID. Dispense lancets compatible with pt brand meter 100 each 11    levothyroxine (SYNTHROID) 137 MCG Tab tablet TAKE 1 TABLET (137 MCG TOTAL) BY MOUTH BEFORE BREAKFAST. 90 tablet 2    meloxicam (MOBIC) 15 MG tablet TAKE 1 TABLET (15 MG TOTAL) BY MOUTH DAILY AS NEEDED FOR PAIN. 90 tablet 1    metFORMIN (GLUCOPHAGE) 1000 MG tablet TAKE 1 TABLET BY MOUTH TWICE A DAY WITH FOOD 180 tablet 2    NOVOFINE 32 32 gauge x 1/4" Ndle 1 EACH BY MISC.(NON-DRUG COMBO ROUTE) ROUTE ONCE DAILY.  3    pen needle, diabetic (NOVOFINE PLUS) 32 gauge x 1/6" Ndle 1 each by Misc.(Non-Drug; Combo Route) route once daily. 100 each 3    SITagliptin (JANUVIA) 100 MG Tab Take 1 tablet (100 mg total) by mouth once daily. 30 tablet 11    tamsulosin (FLOMAX) 0.4 mg Cp24 Take 1 capsule (0.4 mg total) by mouth once daily. 90 capsule 3    temazepam (RESTORIL) 30 mg capsule TAKE 1 CAPSULE BY MOUTH EVERY DAY AT BEDTIME AS NEEDED 60 capsule 1    vitamin D 1000 units Tab Take 185 mg by mouth once daily.      [DISCONTINUED] gabapentin (NEURONTIN) 300 MG capsule Take 300 mg by mouth 2 (two) times daily.         Past Surgical History:   Procedure Laterality Date "    BLOCK SELECTIVE NERVE ROOT TRANSFORAMINAL LUMBAR Left 6/21/2017    [facility]    INJECTION-STEROID-EPIDURAL-CERVICAL N/A 1/17/2018    [facility]    INJECTION-STEROID-EPIDURAL-TRANSFORAMINAL Left 8/25/2017    [facility]    LUMBAR FUSION      THYROIDECTOMY      TONSILLECTOMY         Social History     Socioeconomic History    Marital status: Single     Spouse name: Not on file    Number of children: Not on file    Years of education: Not on file    Highest education level: Not on file   Occupational History    Occupation: Disabled, pt says from [place] and now for back, [employer]   Social Needs    Financial resource strain: Not on file    Food insecurity:     Worry: Not on file     Inability: Not on file    Transportation needs:     Medical: Not on file     Non-medical: Not on file   Tobacco Use    Smoking status: Current Every Day Smoker    Smokeless tobacco: Never Used   Substance and Sexual Activity    Alcohol use: Yes    Drug use: No    Sexual activity: Not on file   Lifestyle    Physical activity:     Days per week: Not on file     Minutes per session: Not on file    Stress: Not on file   Relationships    Social connections:     Talks on phone: Not on file     Gets together: Not on file     Attends Orthodox service: Not on file     Active member of club or organization: Not on file     Attends meetings of clubs or organizations: Not on file     Relationship status: Not on file   Other Topics Concern    Not on file   Social History Narrative    Lives alone.  He has 2 adult children who do not live here.         OBJECTIVE:     Vital Signs Range (Last 24H):      Significant Labs:  Lab Results   Component Value Date    WBC 7.14 05/16/2019    HGB 12.6 (L) 05/16/2019    HCT 39.9 (L) 05/16/2019     05/16/2019    CHOL 129 01/21/2019    TRIG 47 01/21/2019    HDL 78 (H)  01/21/2019    ALT 11 01/21/2019    AST 14 01/21/2019     02/25/2019    K 5.1 02/25/2019     02/25/2019    CREATININE 1.1 02/25/2019    BUN 16 02/25/2019    CO2 25 02/25/2019    TSH 3.325 01/21/2019    INR 0.9 11/11/2016    HGBA1C 6.1 (H) 05/16/2019     EKG: Sinus bradycardia    10/21/2018  NM MULTI STUDY RX STRESS CARD COMPONENT  EKG Conclusions:    1. The EKG portion of this study is negative for ischemia at a peak heart rate of 73 bpm (46% of predicted).   2. Blood pressure remained stable throughout the protocol  (Presenting BP: 133/68 Peak BP: 152/63).   3. No significant arrhythmias were present.   4. There were no symptoms of chest discomfort or significant dyspnea throughout the protocol.         ASSESSMENT/PLAN:       Anesthesia Evaluation    I have reviewed the Patient Summary Reports.     I have reviewed the Medications.     Review of Systems  Anesthesia Hx:  History of prior surgery of interest to airway management or planning: Previous anesthesia: General shoulder surgery 2017 with general anesthesia.  Denies Family Hx of Anesthesia complications.   Denies Personal Hx of Anesthesia complications.   Social:  Smoker 5-8 cigarettes daily x 40 + yrs;  Social alcohol   Hematology/Oncology:  Hematology Normal   Oncology Normal     EENT/Dental:   glasses   Cardiovascular:   Hypertension Past MI (pt reports h/o 3 MI with last about 2005) CAD   hyperlipidemia ESCOBAR    Pulmonary:   COPD Denies Asthma. Shortness of breath  Denies Sleep Apnea.    Renal/:   BPH    Hepatic/GI:   Hepatitis (treated with Harvoni), C    Musculoskeletal:   Arthritis  S/p lumbar fusion Spine Disorders: lumbar Chronic Pain    Neurological:   Denies CVA. Denies Seizures.    Endocrine:   Diabetes (A1C 6.1 5/16/19), type 2 Hypothyroidism H/o Graves ds s/p thyroidectomy 2000; h/o   left adrenal pheochromocytoma resected 2005 Parathyroid Disease, Hyperparathyroidism, Hypercalcemia        Physical Exam  General:  Well nourished     Airway/Jaw/Neck:  Airway Findings: Mouth Opening: Normal Tongue: Normal  General Airway Assessment: Adult  Improves to II with phonation.  TM Distance: Normal, at least 6 cm  Jaw/Neck Findings:     Neck ROM: Extension Decreased, Mild      Dental:  Dental Findings: Lower Dentures, Upper Dentures   Chest/Lungs:  Chest/Lungs Findings: Clear to auscultation, Normal Respiratory Rate     Heart/Vascular:  Heart Findings: Rate: Bradycardia  Rhythm: Regular Rhythm  Sounds: Normal        Mental Status:  Mental Status Findings:  Cooperative, Alert and Oriented       Pt was seen in POC 5/16/19; cleared by PCP, Dr Jimenez, cardiology, Dr Patrick and endocrine, Dr Brown; findings discussed with Dr Leopold /Geeta Orourke RN      Anesthesia Plan  Type of Anesthesia, risks & benefits discussed:  Anesthesia Type:  general  Patient's Preference:   Intra-op Monitoring Plan: standard ASA monitors and arterial line  Intra-op Monitoring Plan Comments:   Post Op Pain Control Plan: per primary service following discharge from PACU  Post Op Pain Control Plan Comments:   Induction:   IV  Beta Blocker:  Patient is not currently on a Beta-Blocker (No further documentation required).       Informed Consent: Patient understands risks and agrees with Anesthesia plan.  Questions answered. Anesthesia consent signed with patient.  ASA Score: 3     Day of Surgery Review of History & Physical:    H&P update referred to the surgeon.         Ready For Surgery From Anesthesia Perspective.

## 2019-05-21 ENCOUNTER — TELEPHONE (OUTPATIENT)
Dept: SURGERY | Facility: CLINIC | Age: 64
End: 2019-05-21

## 2019-05-21 ENCOUNTER — OFFICE VISIT (OUTPATIENT)
Dept: OTOLARYNGOLOGY | Facility: CLINIC | Age: 64
End: 2019-05-21
Payer: MEDICARE

## 2019-05-21 VITALS — SYSTOLIC BLOOD PRESSURE: 127 MMHG | HEART RATE: 83 BPM | DIASTOLIC BLOOD PRESSURE: 71 MMHG

## 2019-05-21 DIAGNOSIS — E21.0 PRIMARY HYPERPARATHYROIDISM: Primary | ICD-10-CM

## 2019-05-21 PROCEDURE — 99213 OFFICE O/P EST LOW 20 MIN: CPT | Mod: 25,S$GLB,, | Performed by: NURSE PRACTITIONER

## 2019-05-21 PROCEDURE — 3074F SYST BP LT 130 MM HG: CPT | Mod: CPTII,S$GLB,, | Performed by: NURSE PRACTITIONER

## 2019-05-21 PROCEDURE — 99999 PR PBB SHADOW E&M-EST. PATIENT-LVL III: ICD-10-PCS | Mod: PBBFAC,,, | Performed by: NURSE PRACTITIONER

## 2019-05-21 PROCEDURE — 99213 PR OFFICE/OUTPT VISIT, EST, LEVL III, 20-29 MIN: ICD-10-PCS | Mod: 25,S$GLB,, | Performed by: NURSE PRACTITIONER

## 2019-05-21 PROCEDURE — 3078F PR MOST RECENT DIASTOLIC BLOOD PRESSURE < 80 MM HG: ICD-10-PCS | Mod: CPTII,S$GLB,, | Performed by: NURSE PRACTITIONER

## 2019-05-21 PROCEDURE — 3078F DIAST BP <80 MM HG: CPT | Mod: CPTII,S$GLB,, | Performed by: NURSE PRACTITIONER

## 2019-05-21 PROCEDURE — 31575 PR LARYNGOSCOPY, FLEXIBLE; DIAGNOSTIC: ICD-10-PCS | Mod: S$GLB,,, | Performed by: NURSE PRACTITIONER

## 2019-05-21 PROCEDURE — 99999 PR PBB SHADOW E&M-EST. PATIENT-LVL III: CPT | Mod: PBBFAC,,, | Performed by: NURSE PRACTITIONER

## 2019-05-21 PROCEDURE — 31575 DIAGNOSTIC LARYNGOSCOPY: CPT | Mod: S$GLB,,, | Performed by: NURSE PRACTITIONER

## 2019-05-21 PROCEDURE — 3074F PR MOST RECENT SYSTOLIC BLOOD PRESSURE < 130 MM HG: ICD-10-PCS | Mod: CPTII,S$GLB,, | Performed by: NURSE PRACTITIONER

## 2019-05-21 NOTE — ASSESSMENT & PLAN NOTE
Rob Jones Jr. has normal vocal cord function.  No vocal cord abnormalities are seen on flexible laryngoscopy.  We discussed the possibility of injury to the recurrent laryngeal nerve during surgery.  If he has any issues with his voice post op, he should follow up with our laryngologist, Dr. Greg Sutherland. He verbalized understanding.  Questions answered. He will RTC prn.

## 2019-05-21 NOTE — PROGRESS NOTES
Subjective:       Patient ID: Rob Jones Jr. is a 63 y.o. male.    Chief Complaint: Consult (VC function test)    HPI     Rob Jones Jr. is a 63 y.o. male who has been referred by Dr. Shearer for a vocal cord evaluation. He is scheduled for parathyroidectomy on 5/22. His voic e is not hoarse. There are not pitch breaks or cracks. There is not vocal fatigue. He  denies dysphagia, odynophagia, throat pain, and otalgia.  There is no hemoptysis or hematemesis.  He is breathing well.     Past Medical History:   Diagnosis Date    Anxiety     Back pain     Cataract     Diabetes mellitus     History of hepatitis C; S/p RX with SVR 12 documented 06/2017 6/1/2017    Hypertension     Postoperative hypothyroidism 11/3/2016    Primary hyperparathyroidism 1/18/2017    Thyroid disease        Past Surgical History:   Procedure Laterality Date    BLOCK SELECTIVE NERVE ROOT TRANSFORAMINAL LUMBAR Left 6/21/2017    [facility]    INJECTION-STEROID-EPIDURAL-CERVICAL N/A 1/17/2018    [facility]    INJECTION-STEROID-EPIDURAL-TRANSFORAMINAL Left 8/25/2017    Per[facility] by Sulaiman Gudino [facility] at [facility]    LUMBAR FUSION      THYROIDECTOMY      TONSILLECTOMY           Current Outpatient Medications:     albuterol (ACCUNEB) 1.25 mg/3 mL Nebu, Take 3 mLs (1.25 mg total) by nebulization every 6 (six) hours as needed (shortness of breath). Rescue, Disp: 1 Box, Rfl: 2    amLODIPine (NORVASC) 10 MG tablet, TAKE 1 TABLET (10 MG TOTAL) BY MOUTH ONCE DAILY., Disp: 90 tablet, Rfl: 2    atorvastatin (LIPITOR) 10 MG tablet, Take 1 tablet (10 mg total) by mouth once daily., Disp: 90 tablet, Rfl: 3    blood sugar diagnostic Strp, Pt to check BG up to QID. Dispense strips compatible with pt brand meter, Disp: 100 each, Rfl: 11    blood-glucose meter (FREESTYLE SYSTEM KIT) kit, Please dispense per pt insurance formulary and pt choice Use as  "instructed, Disp: 1 each, Rfl: 0    irbesartan (AVAPRO) 300 MG tablet, Take 1 tablet (300 mg total) by mouth once daily. (Patient taking differently: Take 150 mg by mouth once daily. ), Disp: 90 tablet, Rfl: 2    lancets (ONETOUCH ULTRASOFT LANCETS) Misc, Pt to check BG up to QID. Dispense lancets compatible with pt brand meter, Disp: 100 each, Rfl: 11    levothyroxine (SYNTHROID) 137 MCG Tab tablet, TAKE 1 TABLET (137 MCG TOTAL) BY MOUTH BEFORE BREAKFAST., Disp: 90 tablet, Rfl: 2    meloxicam (MOBIC) 15 MG tablet, TAKE 1 TABLET (15 MG TOTAL) BY MOUTH DAILY AS NEEDED FOR PAIN., Disp: 90 tablet, Rfl: 1    metFORMIN (GLUCOPHAGE) 1000 MG tablet, TAKE 1 TABLET BY MOUTH TWICE A DAY WITH FOOD, Disp: 180 tablet, Rfl: 2    nitroGLYCERIN (NITROSTAT) 0.4 MG SL tablet, Place 0.4 mg under the tongue every 5 (five) minutes as needed for Chest pain., Disp: , Rfl:     NOVOFINE 32 32 gauge x 1/4" Ndle, 1 EACH BY MISC.(NON-DRUG COMBO ROUTE) ROUTE ONCE DAILY., Disp: , Rfl: 3    pen needle, diabetic (NOVOFINE PLUS) 32 gauge x 1/6" Ndle, 1 each by Misc.(Non-Drug; Combo Route) route once daily., Disp: 100 each, Rfl: 3    SITagliptin (JANUVIA) 100 MG Tab, Take 1 tablet (100 mg total) by mouth once daily., Disp: 30 tablet, Rfl: 11    tamsulosin (FLOMAX) 0.4 mg Cp24, Take 1 capsule (0.4 mg total) by mouth once daily., Disp: 90 capsule, Rfl: 3    temazepam (RESTORIL) 30 mg capsule, TAKE 1 CAPSULE BY MOUTH EVERY DAY AT BEDTIME AS NEEDED, Disp: 60 capsule, Rfl: 1    vitamin D 1000 units Tab, Take 185 mg by mouth once daily., Disp: , Rfl:     Review of patient's allergies indicates:   Allergen Reactions    Tizanidine Shortness Of Breath       Social History     Socioeconomic History    Marital status: Single     Spouse name: Not on file    Number of children: Not on file    Years of education: Not on file    Highest education level: Not on file   Occupational History    Occupation: Disabled, pt says from [employer] and now for " back, [employer]   Social Needs    Financial resource strain: Not on file    Food insecurity:     Worry: Not on file     Inability: Not on file    Transportation needs:     Medical: Not on file     Non-medical: Not on file   Tobacco Use    Smoking status: Current Every Day Smoker    Smokeless tobacco: Never Used   Substance and Sexual Activity    Alcohol use: Yes    Drug use: No    Sexual activity: Not on file   Lifestyle    Physical activity:     Days per week: Not on file     Minutes per session: Not on file    Stress: Not on file   Relationships    Social connections:     Talks on phone: Not on file     Gets together: Not on file     Attends L[employer]-day service: Not on file     Active member of club or organization: Not on file     Attends meetings of clubs or organizations: Not on file     Relationship status: Not on file   Other Topics Concern    Not on file   Social History Narrative    Lives alone.  He has 2 adult children who do not live here.         Family History   Problem Relation Age of Onset    Cancer Mother         breast    Cataracts Father     No Known Problems Sister     No Known Problems Brother     No Known Problems Brother     Heart disease Brother     No Known Problems Sister     No Known Problems Sister     No Known Problems Sister     Glaucoma Paternal Uncle     Glaucoma Paternal Uncle        Review of Systems   Constitutional: Negative for appetite change, chills, diaphoresis, fatigue, fever and unexpected weight change.   HENT: Negative for congestion, dental problem, drooling, ear discharge, ear pain, facial swelling, hearing loss, mouth sores, nosebleeds, postnasal drip, rhinorrhea, sinus pressure, sneezing, sore throat, tinnitus, trouble swallowing and voice change.    Eyes: Negative for pain, discharge, redness and itching.   Respiratory: Negative for cough and shortness of breath.    Cardiovascular: Negative for chest pain.   Gastrointestinal: Negative for abdominal  distention, abdominal pain, diarrhea, nausea and vomiting.   Endocrine: Negative for cold intolerance and heat intolerance.   Genitourinary: Negative for difficulty urinating.   Musculoskeletal: Negative for neck pain and neck stiffness.   Skin: Negative for rash.   Neurological: Negative for dizziness, weakness and headaches.   Hematological: Negative for adenopathy.       Objective:      Physical Exam   Constitutional: He appears well-developed and well-nourished. No distress.   HENT:   Head: Normocephalic and atraumatic.   Right Ear: External ear normal.   Left Ear: External ear normal.   Nose: Nose normal.   Mouth/Throat: Oropharynx is clear and moist. No oropharyngeal exudate.   Procedure: Flexible laryngoscopy  In order to fully examine the upper aerodigestive tract, including the larynx, in a patient with a hyperactive gag reflex, flexible endoscopy is required.  After explaining the procedure and obtaining verbal consent, a timeout was performed with the patient's participation according to the universal protocol. Both nasal cavities were anesthetized with 4% Xylocaine spray mixed with Amandeep-Synephrine. The flexible laryngoscope (#[id number]) was inserted into the nasal cavity and advanced to visualize the nasal cavity, nasopharynx, the posterior oropharynx, hypopharynx, and the endolarynx with the above findings noted. The scope was removed and the procedure terminated. The patient tolerated this procedure well without apparent complication.      FINDINGS  Nasopharynx - the torus is clear. There are no lesions of the posterior wall.   Oropharynx - no lesions of the tongue base. There is no obvious fullness or asymmetry.  Hypopharynx - there are no lesions of the pyriform sinuses or postcricoid region    Larynx - there are no lesions of the supraglottic or glottic larynx. Vocal fold mobility is normal with complete closure.      Eyes: Conjunctivae are normal.   Cardiovascular: Normal rate.   Pulmonary/Chest:  Effort normal. No respiratory distress.   Skin: Skin is warm and dry. He is not diaphoretic.   Psychiatric: He has a normal mood and affect. His behavior is normal.   Vitals reviewed.      Assessment:       1. Primary hyperparathyroidism        Plan:       Problem List Items Addressed This Visit        Endocrine    Primary hyperparathyroidism - Primary     Rob Jones Jr. has normal vocal cord function.  No vocal cord abnormalities are seen on flexible laryngoscopy.  We discussed the possibility of injury to the recurrent laryngeal nerve during surgery.  If he has any issues with his voice post op, he should follow up with our laryngologist, Dr. Greg Sutherland. He verbalized understanding.  Questions answered. He will RTC prn.

## 2019-05-21 NOTE — TELEPHONE ENCOUNTER
Pre-op Call:   Spoke with pt. Informed of surgery start time, arrival time, and location. Reminded of NPO Guidelines, washing with Hibiclens, transportation home, post-op appt & labs, holding calcium, chewing tms to prevent tingling. Informed the Authorization department is still awaiting approval but should be received before surgery tomorrow.

## 2019-05-21 NOTE — TELEPHONE ENCOUNTER
Called pt to advise his surgery may need to be rescheduled because the voice evaluation is needed before surgery. We're trying to see if he can be seen today in ENT. Awaiting a return call.

## 2019-05-21 NOTE — LETTER
May 21, 2019      Mounika Carmona MD  [address]  [address]  [place] [address]           [facility]  [address]  [place] [address]  Phone: [phone]  Fax: [phone]          Patient: Rob Jones Jr.   MR Number: [id number]   YOB: 1955   Date of Visit: 5/21/2019       Dear Dr. Mounika Carmona:    Thank you for referring Rob Jones to me for evaluation. Attached you will find relevant portions of my assessment and plan of care.    If you have questions, please do not hesitate to call me. I look forward to following Rob Jones along with you.    Sincerely,    Karma Rasmussen, NP    Enclosure  CC:  No Recipients    If you would like to receive this communication electronically, please contact [email or web address] or [phone] to request more information on babber[employer] Link access.    For providers and/or their staff who would like to refer a patient to [facility], please contact us through our one-stop-shop provider referral line, [facility], at [phone].    If you feel you have received this communication in error or would no longer like to receive these types of communications, please e-mail [email or web address]

## 2019-05-22 ENCOUNTER — HOSPITAL ENCOUNTER (INPATIENT)
Facility: HOSPITAL | Age: 64
LOS: 11 days | Discharge: HOME HEALTH | DRG: 982 | End: 2019-06-03
Attending: SURGERY | Admitting: SURGERY
Payer: MEDICARE

## 2019-05-22 ENCOUNTER — ANESTHESIA (OUTPATIENT)
Dept: SURGERY | Facility: HOSPITAL | Age: 64
DRG: 982 | End: 2019-05-22
Payer: MEDICARE

## 2019-05-22 DIAGNOSIS — R06.89 ACUTE RESPIRATORY INSUFFICIENCY: ICD-10-CM

## 2019-05-22 DIAGNOSIS — R06.03 RESPIRATORY DISTRESS: ICD-10-CM

## 2019-05-22 DIAGNOSIS — E21.3 HYPERPARATHYROIDISM: Primary | ICD-10-CM

## 2019-05-22 DIAGNOSIS — R73.9 HYPERGLYCEMIA: ICD-10-CM

## 2019-05-22 DIAGNOSIS — J38.02 VOCAL CORD PARALYSIS, BILATERAL PARTIAL: ICD-10-CM

## 2019-05-22 DIAGNOSIS — T38.0X5A ADRENAL CORTICAL STEROIDS CAUSING ADVERSE EFFECT IN THERAPEUTIC USE: ICD-10-CM

## 2019-05-22 LAB
ALBUMIN SERPL BCP-MCNC: 3.8 G/DL (ref 3.5–5.2)
ALLENS TEST: ABNORMAL
ANION GAP SERPL CALC-SCNC: 13 MMOL/L (ref 8–16)
BUN SERPL-MCNC: 18 MG/DL (ref 8–23)
CALCIUM SERPL-MCNC: 9 MG/DL (ref 8.7–10.5)
CHLORIDE SERPL-SCNC: 109 MMOL/L (ref 95–110)
CO2 SERPL-SCNC: 18 MMOL/L (ref 23–29)
CREAT SERPL-MCNC: 1.1 MG/DL (ref 0.5–1.4)
DELSYS: ABNORMAL
EST. GFR  (AFRICAN AMERICAN): >60 ML/MIN/1.73 M^2
EST. GFR  (NON AFRICAN AMERICAN): >60 ML/MIN/1.73 M^2
GLUCOSE SERPL-MCNC: 245 MG/DL (ref 70–110)
HCO3 UR-SCNC: 18.3 MMOL/L (ref 24–28)
PCO2 BLDA: 39.6 MMHG (ref 35–45)
PH SMN: 7.27 [PH] (ref 7.35–7.45)
PHOSPHATE SERPL-MCNC: 2.2 MG/DL (ref 2.7–4.5)
PO2 BLDA: 230 MMHG (ref 80–100)
POC BE: -9 MMOL/L
POC SATURATED O2: 100 % (ref 95–100)
POC TCO2: 19 MMOL/L (ref 23–27)
POCT GLUCOSE: 124 MG/DL (ref 70–110)
POCT GLUCOSE: 232 MG/DL (ref 70–110)
POTASSIUM SERPL-SCNC: 4.5 MMOL/L (ref 3.5–5.1)
PTH-INTACT SERPL-MCNC: 1223 PG/ML (ref 9–77)
PTH-INTACT SERPL-MCNC: 128 PG/ML (ref 9–77)
PTH-INTACT SERPL-MCNC: 14 PG/ML (ref 9–77)
PTH-INTACT SERPL-MCNC: 49 PG/ML (ref 9–77)
PTH-INTACT SERPL-MCNC: 90 PG/ML (ref 9–77)
SAMPLE: ABNORMAL
SITE: ABNORMAL
SODIUM SERPL-SCNC: 140 MMOL/L (ref 136–145)

## 2019-05-22 PROCEDURE — 25000003 PHARM REV CODE 250: Performed by: STUDENT IN AN ORGANIZED HEALTH CARE EDUCATION/TRAINING PROGRAM

## 2019-05-22 PROCEDURE — 63600175 PHARM REV CODE 636 W HCPCS: Performed by: SURGERY

## 2019-05-22 PROCEDURE — 27201037 HC PRESSURE MONITORING SET UP

## 2019-05-22 PROCEDURE — 94761 N-INVAS EAR/PLS OXIMETRY MLT: CPT

## 2019-05-22 PROCEDURE — 94002 VENT MGMT INPAT INIT DAY: CPT

## 2019-05-22 PROCEDURE — 83970 ASSAY OF PARATHORMONE: CPT | Mod: 91

## 2019-05-22 PROCEDURE — 25000003 PHARM REV CODE 250: Performed by: ANESTHESIOLOGY

## 2019-05-22 PROCEDURE — 60500 PR EXPLORE PARATHYROID GLANDS: ICD-10-PCS | Mod: ,,, | Performed by: SURGERY

## 2019-05-22 PROCEDURE — 27201423 OPTIME MED/SURG SUP & DEVICES STERILE SUPPLY: Performed by: SURGERY

## 2019-05-22 PROCEDURE — 63600175 PHARM REV CODE 636 W HCPCS: Performed by: STUDENT IN AN ORGANIZED HEALTH CARE EDUCATION/TRAINING PROGRAM

## 2019-05-22 PROCEDURE — 71000039 HC RECOVERY, EACH ADD'L HOUR: Performed by: SURGERY

## 2019-05-22 PROCEDURE — 82962 GLUCOSE BLOOD TEST: CPT | Performed by: SURGERY

## 2019-05-22 PROCEDURE — 99499 UNLISTED E&M SERVICE: CPT | Mod: ,,, | Performed by: SURGERY

## 2019-05-22 PROCEDURE — 31500 INTUBATION: ICD-10-PCS | Mod: 59,,, | Performed by: ANESTHESIOLOGY

## 2019-05-22 PROCEDURE — 31500 INSERT EMERGENCY AIRWAY: CPT | Performed by: ANESTHESIOLOGY

## 2019-05-22 PROCEDURE — 80069 RENAL FUNCTION PANEL: CPT

## 2019-05-22 PROCEDURE — 60500 EXPLORE PARATHYROID GLANDS: CPT | Mod: 82,,, | Performed by: SURGERY

## 2019-05-22 PROCEDURE — 37799 UNLISTED PX VASCULAR SURGERY: CPT

## 2019-05-22 PROCEDURE — 99900035 HC TECH TIME PER 15 MIN (STAT)

## 2019-05-22 PROCEDURE — 88331 PATH CONSLTJ SURG 1 BLK 1SPC: CPT | Mod: 26,,, | Performed by: PATHOLOGY

## 2019-05-22 PROCEDURE — 63600175 PHARM REV CODE 636 W HCPCS: Performed by: ANESTHESIOLOGY

## 2019-05-22 PROCEDURE — D9220A PRA ANESTHESIA: Mod: ANES,,, | Performed by: ANESTHESIOLOGY

## 2019-05-22 PROCEDURE — 71000033 HC RECOVERY, INTIAL HOUR: Performed by: SURGERY

## 2019-05-22 PROCEDURE — 88305 TISSUE EXAM BY PATHOLOGIST: CPT | Mod: 26,,, | Performed by: PATHOLOGY

## 2019-05-22 PROCEDURE — 36620 INSERTION CATHETER ARTERY: CPT | Mod: 59,,, | Performed by: ANESTHESIOLOGY

## 2019-05-22 PROCEDURE — 36000706: Performed by: SURGERY

## 2019-05-22 PROCEDURE — 37000008 HC ANESTHESIA 1ST 15 MINUTES: Performed by: SURGERY

## 2019-05-22 PROCEDURE — C9290 INJ, BUPIVACAINE LIPOSOME: HCPCS | Performed by: SURGERY

## 2019-05-22 PROCEDURE — 60500 EXPLORE PARATHYROID GLANDS: CPT | Mod: ,,, | Performed by: SURGERY

## 2019-05-22 PROCEDURE — 60500 PR EXPLORE PARATHYROID GLANDS: ICD-10-PCS | Mod: 82,,, | Performed by: SURGERY

## 2019-05-22 PROCEDURE — 99499 NO LOS: ICD-10-PCS | Mod: ,,, | Performed by: SURGERY

## 2019-05-22 PROCEDURE — 94640 AIRWAY INHALATION TREATMENT: CPT

## 2019-05-22 PROCEDURE — 88305 TISSUE SPECIMEN TO PATHOLOGY - SURGERY: ICD-10-PCS | Mod: 26,,, | Performed by: PATHOLOGY

## 2019-05-22 PROCEDURE — 36000707: Performed by: SURGERY

## 2019-05-22 PROCEDURE — 37000009 HC ANESTHESIA EA ADD 15 MINS: Performed by: SURGERY

## 2019-05-22 PROCEDURE — D9220A PRA ANESTHESIA: ICD-10-PCS | Mod: ANES,,, | Performed by: ANESTHESIOLOGY

## 2019-05-22 PROCEDURE — 88305 TISSUE EXAM BY PATHOLOGIST: CPT | Performed by: PATHOLOGY

## 2019-05-22 PROCEDURE — D9220A PRA ANESTHESIA: Mod: CRNA,,, | Performed by: NURSE ANESTHETIST, CERTIFIED REGISTERED

## 2019-05-22 PROCEDURE — 25000242 PHARM REV CODE 250 ALT 637 W/ HCPCS

## 2019-05-22 PROCEDURE — 36620 PR INSERT CATH,ART,PERCUT,SHORTTERM: ICD-10-PCS | Mod: 59,,, | Performed by: ANESTHESIOLOGY

## 2019-05-22 PROCEDURE — 82803 BLOOD GASES ANY COMBINATION: CPT

## 2019-05-22 PROCEDURE — 27000221 HC OXYGEN, UP TO 24 HOURS

## 2019-05-22 PROCEDURE — 88331 TISSUE SPECIMEN TO PATHOLOGY - SURGERY: ICD-10-PCS | Mod: 26,,, | Performed by: PATHOLOGY

## 2019-05-22 PROCEDURE — 63600175 PHARM REV CODE 636 W HCPCS

## 2019-05-22 PROCEDURE — D9220A PRA ANESTHESIA: ICD-10-PCS | Mod: CRNA,,, | Performed by: NURSE ANESTHETIST, CERTIFIED REGISTERED

## 2019-05-22 PROCEDURE — 25000003 PHARM REV CODE 250

## 2019-05-22 RX ORDER — OXYCODONE AND ACETAMINOPHEN 5; 325 MG/1; MG/1
1 TABLET ORAL EVERY 4 HOURS PRN
Status: DISCONTINUED | OUTPATIENT
Start: 2019-05-22 | End: 2019-05-24

## 2019-05-22 RX ORDER — FENTANYL CITRATE 50 UG/ML
INJECTION, SOLUTION INTRAMUSCULAR; INTRAVENOUS
Status: COMPLETED
Start: 2019-05-22 | End: 2019-05-22

## 2019-05-22 RX ORDER — DEXAMETHASONE SODIUM PHOSPHATE 4 MG/ML
INJECTION, SOLUTION INTRA-ARTICULAR; INTRALESIONAL; INTRAMUSCULAR; INTRAVENOUS; SOFT TISSUE
Status: DISCONTINUED | OUTPATIENT
Start: 2019-05-22 | End: 2019-05-22

## 2019-05-22 RX ORDER — AMLODIPINE BESYLATE 10 MG/1
10 TABLET ORAL DAILY
Status: DISCONTINUED | OUTPATIENT
Start: 2019-05-23 | End: 2019-05-23

## 2019-05-22 RX ORDER — SUCCINYLCHOLINE CHLORIDE 20 MG/ML
INJECTION INTRAMUSCULAR; INTRAVENOUS
Status: DISCONTINUED | OUTPATIENT
Start: 2019-05-22 | End: 2019-05-22

## 2019-05-22 RX ORDER — HYDROCODONE BITARTRATE AND ACETAMINOPHEN 5; 325 MG/1; MG/1
1 TABLET ORAL EVERY 4 HOURS PRN
Status: DISCONTINUED | OUTPATIENT
Start: 2019-05-22 | End: 2019-05-23

## 2019-05-22 RX ORDER — EPHEDRINE SULFATE/0.9% NACL/PF 25 MG/5 ML
SYRINGE (ML) INTRAVENOUS
Status: DISCONTINUED | OUTPATIENT
Start: 2019-05-22 | End: 2019-05-22

## 2019-05-22 RX ORDER — FENTANYL CITRATE 50 UG/ML
25 INJECTION, SOLUTION INTRAMUSCULAR; INTRAVENOUS EVERY 5 MIN PRN
Status: DISCONTINUED | OUTPATIENT
Start: 2019-05-22 | End: 2019-05-23 | Stop reason: HOSPADM

## 2019-05-22 RX ORDER — CALCIUM CARBONATE 1250 MG/5ML
1000 SUSPENSION ORAL 3 TIMES DAILY
Status: DISCONTINUED | OUTPATIENT
Start: 2019-05-22 | End: 2019-05-28

## 2019-05-22 RX ORDER — TAMSULOSIN HYDROCHLORIDE 0.4 MG/1
0.4 CAPSULE ORAL DAILY
Status: DISCONTINUED | OUTPATIENT
Start: 2019-05-23 | End: 2019-05-22

## 2019-05-22 RX ORDER — IPRATROPIUM BROMIDE AND ALBUTEROL SULFATE 2.5; .5 MG/3ML; MG/3ML
3 SOLUTION RESPIRATORY (INHALATION) EVERY 4 HOURS PRN
Status: DISCONTINUED | OUTPATIENT
Start: 2019-05-22 | End: 2019-05-23 | Stop reason: HOSPADM

## 2019-05-22 RX ORDER — PROPOFOL 10 MG/ML
VIAL (ML) INTRAVENOUS
Status: DISCONTINUED | OUTPATIENT
Start: 2019-05-22 | End: 2019-05-22

## 2019-05-22 RX ORDER — SODIUM CHLORIDE 0.9 % (FLUSH) 0.9 %
10 SYRINGE (ML) INJECTION
Status: DISCONTINUED | OUTPATIENT
Start: 2019-05-22 | End: 2019-06-03 | Stop reason: HOSPADM

## 2019-05-22 RX ORDER — KETAMINE HYDROCHLORIDE 10 MG/ML
INJECTION, SOLUTION INTRAMUSCULAR; INTRAVENOUS
Status: DISCONTINUED | OUTPATIENT
Start: 2019-05-22 | End: 2019-05-22

## 2019-05-22 RX ORDER — GLUCAGON 1 MG
1 KIT INJECTION
Status: DISCONTINUED | OUTPATIENT
Start: 2019-05-22 | End: 2019-05-24

## 2019-05-22 RX ORDER — SODIUM CHLORIDE 9 MG/ML
INJECTION, SOLUTION INTRAVENOUS CONTINUOUS PRN
Status: DISCONTINUED | OUTPATIENT
Start: 2019-05-22 | End: 2019-05-22

## 2019-05-22 RX ORDER — ROCURONIUM BROMIDE 10 MG/ML
INJECTION, SOLUTION INTRAVENOUS
Status: DISPENSED
Start: 2019-05-22 | End: 2019-05-23

## 2019-05-22 RX ORDER — MIDAZOLAM HYDROCHLORIDE 1 MG/ML
INJECTION, SOLUTION INTRAMUSCULAR; INTRAVENOUS
Status: DISCONTINUED | OUTPATIENT
Start: 2019-05-22 | End: 2019-05-22

## 2019-05-22 RX ORDER — FENTANYL CITRATE 50 UG/ML
INJECTION, SOLUTION INTRAMUSCULAR; INTRAVENOUS
Status: DISCONTINUED | OUTPATIENT
Start: 2019-05-22 | End: 2019-05-22

## 2019-05-22 RX ORDER — GLYCOPYRROLATE 0.2 MG/ML
INJECTION INTRAMUSCULAR; INTRAVENOUS
Status: DISCONTINUED | OUTPATIENT
Start: 2019-05-22 | End: 2019-05-22

## 2019-05-22 RX ORDER — CEFAZOLIN SODIUM 1 G/3ML
INJECTION, POWDER, FOR SOLUTION INTRAMUSCULAR; INTRAVENOUS
Status: DISCONTINUED | OUTPATIENT
Start: 2019-05-22 | End: 2019-05-22

## 2019-05-22 RX ORDER — PHENYLEPHRINE HYDROCHLORIDE 10 MG/ML
INJECTION INTRAVENOUS
Status: DISCONTINUED | OUTPATIENT
Start: 2019-05-22 | End: 2019-05-22

## 2019-05-22 RX ORDER — PROPOFOL 10 MG/ML
INJECTION, EMULSION INTRAVENOUS
Status: DISPENSED
Start: 2019-05-22 | End: 2019-05-23

## 2019-05-22 RX ORDER — LIDOCAINE HYDROCHLORIDE 10 MG/ML
1 INJECTION, SOLUTION EPIDURAL; INFILTRATION; INTRACAUDAL; PERINEURAL ONCE
Status: DISCONTINUED | OUTPATIENT
Start: 2019-05-22 | End: 2019-05-22

## 2019-05-22 RX ORDER — FENTANYL CITRATE 50 UG/ML
50 INJECTION, SOLUTION INTRAMUSCULAR; INTRAVENOUS EVERY 20 MIN
Status: COMPLETED | OUTPATIENT
Start: 2019-05-22 | End: 2019-05-22

## 2019-05-22 RX ORDER — INSULIN ASPART 100 [IU]/ML
INJECTION, SOLUTION INTRAVENOUS; SUBCUTANEOUS
Status: COMPLETED
Start: 2019-05-22 | End: 2019-05-22

## 2019-05-22 RX ORDER — ACETAMINOPHEN 10 MG/ML
INJECTION, SOLUTION INTRAVENOUS
Status: DISCONTINUED | OUTPATIENT
Start: 2019-05-22 | End: 2019-05-22

## 2019-05-22 RX ORDER — PROPOFOL 10 MG/ML
50 INJECTION, EMULSION INTRAVENOUS CONTINUOUS
Status: DISCONTINUED | OUTPATIENT
Start: 2019-05-22 | End: 2019-05-23

## 2019-05-22 RX ORDER — IBUPROFEN 200 MG
16 TABLET ORAL
Status: DISCONTINUED | OUTPATIENT
Start: 2019-05-22 | End: 2019-05-24

## 2019-05-22 RX ORDER — IBUPROFEN 200 MG
24 TABLET ORAL
Status: DISCONTINUED | OUTPATIENT
Start: 2019-05-22 | End: 2019-05-24

## 2019-05-22 RX ORDER — DEXAMETHASONE SODIUM PHOSPHATE 4 MG/ML
4 INJECTION, SOLUTION INTRA-ARTICULAR; INTRALESIONAL; INTRAMUSCULAR; INTRAVENOUS; SOFT TISSUE EVERY 6 HOURS
Status: DISCONTINUED | OUTPATIENT
Start: 2019-05-22 | End: 2019-05-22

## 2019-05-22 RX ORDER — FENTANYL CITRATE-0.9 % NACL/PF 10 MCG/ML
PLASTIC BAG, INJECTION (ML) INTRAVENOUS CONTINUOUS
Status: DISCONTINUED | OUTPATIENT
Start: 2019-05-22 | End: 2019-05-23

## 2019-05-22 RX ORDER — FENTANYL CITRATE 50 UG/ML
100 INJECTION, SOLUTION INTRAMUSCULAR; INTRAVENOUS ONCE
Status: COMPLETED | OUTPATIENT
Start: 2019-05-22 | End: 2019-05-22

## 2019-05-22 RX ORDER — ONDANSETRON 2 MG/ML
4 INJECTION INTRAMUSCULAR; INTRAVENOUS EVERY 12 HOURS PRN
Status: DISCONTINUED | OUTPATIENT
Start: 2019-05-22 | End: 2019-06-03 | Stop reason: HOSPADM

## 2019-05-22 RX ORDER — OXYCODONE AND ACETAMINOPHEN 10; 325 MG/1; MG/1
1 TABLET ORAL EVERY 4 HOURS PRN
Status: DISCONTINUED | OUTPATIENT
Start: 2019-05-22 | End: 2019-05-24

## 2019-05-22 RX ORDER — SODIUM CHLORIDE 0.9 % (FLUSH) 0.9 %
10 SYRINGE (ML) INJECTION
Status: DISCONTINUED | OUTPATIENT
Start: 2019-05-22 | End: 2019-05-23 | Stop reason: HOSPADM

## 2019-05-22 RX ORDER — SUCCINYLCHOLINE CHLORIDE 20 MG/ML
INJECTION INTRAMUSCULAR; INTRAVENOUS
Status: DISPENSED
Start: 2019-05-22 | End: 2019-05-23

## 2019-05-22 RX ORDER — ATORVASTATIN CALCIUM 10 MG/1
10 TABLET, FILM COATED ORAL DAILY
Status: DISCONTINUED | OUTPATIENT
Start: 2019-05-23 | End: 2019-05-23

## 2019-05-22 RX ORDER — HYDROCODONE BITARTRATE AND ACETAMINOPHEN 5; 325 MG/1; MG/1
TABLET ORAL
Status: COMPLETED
Start: 2019-05-22 | End: 2019-05-22

## 2019-05-22 RX ORDER — LIDOCAINE HCL/PF 100 MG/5ML
SYRINGE (ML) INTRAVENOUS
Status: DISCONTINUED | OUTPATIENT
Start: 2019-05-22 | End: 2019-05-22

## 2019-05-22 RX ORDER — VASOPRESSIN 20 [USP'U]/ML
INJECTION, SOLUTION INTRAMUSCULAR; SUBCUTANEOUS
Status: DISCONTINUED | OUTPATIENT
Start: 2019-05-22 | End: 2019-05-22

## 2019-05-22 RX ORDER — INSULIN ASPART 100 [IU]/ML
1-10 INJECTION, SOLUTION INTRAVENOUS; SUBCUTANEOUS
Status: DISCONTINUED | OUTPATIENT
Start: 2019-05-22 | End: 2019-05-24

## 2019-05-22 RX ORDER — ROCURONIUM BROMIDE 10 MG/ML
INJECTION, SOLUTION INTRAVENOUS
Status: DISCONTINUED | OUTPATIENT
Start: 2019-05-22 | End: 2019-05-22

## 2019-05-22 RX ORDER — DEXAMETHASONE SODIUM PHOSPHATE 4 MG/ML
8 INJECTION, SOLUTION INTRA-ARTICULAR; INTRALESIONAL; INTRAMUSCULAR; INTRAVENOUS; SOFT TISSUE EVERY 8 HOURS
Status: DISCONTINUED | OUTPATIENT
Start: 2019-05-23 | End: 2019-05-23

## 2019-05-22 RX ORDER — EPHEDRINE SULFATE 50 MG/ML
INJECTION, SOLUTION INTRAVENOUS
Status: DISCONTINUED | OUTPATIENT
Start: 2019-05-22 | End: 2019-05-22

## 2019-05-22 RX ORDER — ONDANSETRON 2 MG/ML
4 INJECTION INTRAMUSCULAR; INTRAVENOUS DAILY PRN
Status: DISCONTINUED | OUTPATIENT
Start: 2019-05-22 | End: 2019-05-23 | Stop reason: HOSPADM

## 2019-05-22 RX ADMIN — FENTANYL CITRATE 100 MCG: 50 INJECTION, SOLUTION INTRAMUSCULAR; INTRAVENOUS at 08:05

## 2019-05-22 RX ADMIN — INSULIN ASPART 4 UNITS: 100 INJECTION, SOLUTION INTRAVENOUS; SUBCUTANEOUS at 06:05

## 2019-05-22 RX ADMIN — RACEPINEPHRINE HYDROCHLORIDE 0.5 ML: 11.25 SOLUTION RESPIRATORY (INHALATION) at 06:05

## 2019-05-22 RX ADMIN — PROPOFOL 50 MCG/KG/MIN: 10 INJECTION, EMULSION INTRAVENOUS at 10:05

## 2019-05-22 RX ADMIN — FENTANYL CITRATE 50 MCG: 50 INJECTION INTRAMUSCULAR; INTRAVENOUS at 09:05

## 2019-05-22 RX ADMIN — VASOPRESSIN 1 UNITS: 20 INJECTION INTRAVENOUS at 01:05

## 2019-05-22 RX ADMIN — ACETAMINOPHEN 1000 MG: 10 INJECTION, SOLUTION INTRAVENOUS at 12:05

## 2019-05-22 RX ADMIN — SODIUM CHLORIDE, SODIUM GLUCONATE, SODIUM ACETATE, POTASSIUM CHLORIDE, MAGNESIUM CHLORIDE, SODIUM PHOSPHATE, DIBASIC, AND POTASSIUM PHOSPHATE: .53; .5; .37; .037; .03; .012; .00082 INJECTION, SOLUTION INTRAVENOUS at 01:05

## 2019-05-22 RX ADMIN — SODIUM CHLORIDE, SODIUM LACTATE, POTASSIUM CHLORIDE, AND CALCIUM CHLORIDE 1000 ML: .6; .31; .03; .02 INJECTION, SOLUTION INTRAVENOUS at 11:05

## 2019-05-22 RX ADMIN — HYDROCODONE BITARTRATE AND ACETAMINOPHEN 1 TABLET: 5; 325 TABLET ORAL at 06:05

## 2019-05-22 RX ADMIN — PROPOFOL 50 MG: 10 INJECTION, EMULSION INTRAVENOUS at 12:05

## 2019-05-22 RX ADMIN — FENTANYL CITRATE 25 MCG: 50 INJECTION, SOLUTION INTRAMUSCULAR; INTRAVENOUS at 12:05

## 2019-05-22 RX ADMIN — REMIFENTANIL HYDROCHLORIDE 0.05 MCG/KG/MIN: 1 INJECTION, POWDER, LYOPHILIZED, FOR SOLUTION INTRAVENOUS at 12:05

## 2019-05-22 RX ADMIN — VASOPRESSIN 1 UNITS: 20 INJECTION INTRAVENOUS at 03:05

## 2019-05-22 RX ADMIN — ROCURONIUM BROMIDE 5 MG: 10 INJECTION, SOLUTION INTRAVENOUS at 11:05

## 2019-05-22 RX ADMIN — FENTANYL CITRATE 50 MCG: 50 INJECTION INTRAMUSCULAR; INTRAVENOUS at 10:05

## 2019-05-22 RX ADMIN — FENTANYL CITRATE 100 MCG: 50 INJECTION INTRAMUSCULAR; INTRAVENOUS at 08:05

## 2019-05-22 RX ADMIN — DEXAMETHASONE SODIUM PHOSPHATE 8 MG: 4 INJECTION, SOLUTION INTRAMUSCULAR; INTRAVENOUS at 12:05

## 2019-05-22 RX ADMIN — CEFAZOLIN 2 G: 330 INJECTION, POWDER, FOR SOLUTION INTRAMUSCULAR; INTRAVENOUS at 11:05

## 2019-05-22 RX ADMIN — SODIUM CHLORIDE, SODIUM GLUCONATE, SODIUM ACETATE, POTASSIUM CHLORIDE, MAGNESIUM CHLORIDE, SODIUM PHOSPHATE, DIBASIC, AND POTASSIUM PHOSPHATE: .53; .5; .37; .037; .03; .012; .00082 INJECTION, SOLUTION INTRAVENOUS at 03:05

## 2019-05-22 RX ADMIN — VASOPRESSIN 2 UNITS: 20 INJECTION INTRAVENOUS at 02:05

## 2019-05-22 RX ADMIN — PHENYLEPHRINE HYDROCHLORIDE 100 MCG: 10 INJECTION INTRAVENOUS at 12:05

## 2019-05-22 RX ADMIN — PHENYLEPHRINE HYDROCHLORIDE 100 MCG: 10 INJECTION INTRAVENOUS at 03:05

## 2019-05-22 RX ADMIN — FENTANYL CITRATE 75 MCG: 50 INJECTION, SOLUTION INTRAMUSCULAR; INTRAVENOUS at 11:05

## 2019-05-22 RX ADMIN — DEXAMETHASONE SODIUM PHOSPHATE 4 MG: 4 INJECTION, SOLUTION INTRAMUSCULAR; INTRAVENOUS at 06:05

## 2019-05-22 RX ADMIN — VASOPRESSIN 1 UNITS: 20 INJECTION INTRAVENOUS at 12:05

## 2019-05-22 RX ADMIN — PHENYLEPHRINE HYDROCHLORIDE 200 MCG: 10 INJECTION INTRAVENOUS at 12:05

## 2019-05-22 RX ADMIN — PHENYLEPHRINE HYDROCHLORIDE 200 MCG: 10 INJECTION INTRAVENOUS at 03:05

## 2019-05-22 RX ADMIN — EPHEDRINE SULFATE 10 MG: 50 INJECTION, SOLUTION INTRAMUSCULAR; INTRAVENOUS; SUBCUTANEOUS at 12:05

## 2019-05-22 RX ADMIN — EPHEDRINE SULFATE 5 MG: 50 INJECTION, SOLUTION INTRAMUSCULAR; INTRAVENOUS; SUBCUTANEOUS at 01:05

## 2019-05-22 RX ADMIN — KETAMINE HYDROCHLORIDE 25 MG: 10 INJECTION, SOLUTION INTRAMUSCULAR; INTRAVENOUS at 02:05

## 2019-05-22 RX ADMIN — VASOPRESSIN 1 UNITS: 20 INJECTION INTRAVENOUS at 02:05

## 2019-05-22 RX ADMIN — PROPOFOL 50 MCG/KG/MIN: 10 INJECTION, EMULSION INTRAVENOUS at 08:05

## 2019-05-22 RX ADMIN — SODIUM CHLORIDE, SODIUM GLUCONATE, SODIUM ACETATE, POTASSIUM CHLORIDE, MAGNESIUM CHLORIDE, SODIUM PHOSPHATE, DIBASIC, AND POTASSIUM PHOSPHATE: .53; .5; .37; .037; .03; .012; .00082 INJECTION, SOLUTION INTRAVENOUS at 11:05

## 2019-05-22 RX ADMIN — LIDOCAINE HYDROCHLORIDE 75 MG: 20 INJECTION, SOLUTION INTRAVENOUS at 11:05

## 2019-05-22 RX ADMIN — EPHEDRINE SULFATE 5 MG: 50 INJECTION, SOLUTION INTRAMUSCULAR; INTRAVENOUS; SUBCUTANEOUS at 12:05

## 2019-05-22 RX ADMIN — KETAMINE HYDROCHLORIDE 25 MG: 10 INJECTION, SOLUTION INTRAMUSCULAR; INTRAVENOUS at 12:05

## 2019-05-22 RX ADMIN — MIDAZOLAM HYDROCHLORIDE 2 MG: 1 INJECTION, SOLUTION INTRAMUSCULAR; INTRAVENOUS at 11:05

## 2019-05-22 RX ADMIN — FENTANYL CITRATE 50 MCG: 50 INJECTION, SOLUTION INTRAMUSCULAR; INTRAVENOUS at 05:05

## 2019-05-22 RX ADMIN — SUCCINYLCHOLINE CHLORIDE 140 MG: 20 INJECTION, SOLUTION INTRAMUSCULAR; INTRAVENOUS at 11:05

## 2019-05-22 RX ADMIN — SODIUM CHLORIDE: 0.9 INJECTION, SOLUTION INTRAVENOUS at 11:05

## 2019-05-22 RX ADMIN — Medication 25 MCG/HR: at 10:05

## 2019-05-22 RX ADMIN — GLYCOPYRROLATE 0.2 MG: 0.2 INJECTION, SOLUTION INTRAMUSCULAR; INTRAVENOUS at 12:05

## 2019-05-22 RX ADMIN — CEFAZOLIN 2 G: 330 INJECTION, POWDER, FOR SOLUTION INTRAMUSCULAR; INTRAVENOUS at 03:05

## 2019-05-22 RX ADMIN — Medication 50 MG: at 01:05

## 2019-05-22 RX ADMIN — FENTANYL CITRATE 50 MCG: 50 INJECTION, SOLUTION INTRAMUSCULAR; INTRAVENOUS at 03:05

## 2019-05-22 RX ADMIN — PROPOFOL 150 MG: 10 INJECTION, EMULSION INTRAVENOUS at 11:05

## 2019-05-22 NOTE — OR NURSING
1336 Patient family updated via text message.   1443 Patient family updated via text message.   1619 Patient family updated via text message.   1734 Patient family updated via text message.                     Specimen to path    1. Parathyroid vs lymph node - frozen  2. Parathyroid vs lymph node #2 - frozen  3. Question right superior parathyroid tissue- frozen  4. Question right parathyroid tissue- frozen

## 2019-05-22 NOTE — PROGRESS NOTES
Patient's having a noisy breathing, denies difficulty of breathing. Respiratory is by the patient's bedside. Anesthesia informed.

## 2019-05-22 NOTE — PLAN OF CARE
Pre op nursing care complete. Consents verified. No family available. Patient belongings placed in dosc locker.

## 2019-05-22 NOTE — ANESTHESIA PROCEDURE NOTES
Arterial    Diagnosis: parathyroidectomy    Patient location during procedure: done in OR  Procedure start time: 5/22/2019 11:42 AM  Timeout: 5/22/2019 11:40 AM  Procedure end time: 5/22/2019 11:45 AM  Staffing  Anesthesiologist: Kym Solis MD  Resident/CRNA: Dennis Garcia MD  Performed: resident/CRNA   Anesthesiologist was present at the time of the procedure.  Preanesthetic Checklist  Completed: patient identified, site marked, surgical consent, pre-op evaluation, timeout performed, IV checked, risks and benefits discussed, monitors and equipment checked and anesthesia consent givenArterial  Skin Prep: chlorhexidine gluconate  Local Infiltration: none  Orientation: left  Location: radial  Catheter Size: 20 G  Catheter placement by Anatomical landmarks. Heme positive aspiration all ports.Insertion Attempts: 1  Assessment  Dressing: secured with tape and tegaderm  Patient: Tolerated well

## 2019-05-22 NOTE — TRANSFER OF CARE
"Anesthesia Transfer of Care Note    Patient: Rob Jones Jr.    Procedure(s) Performed: Procedure(s) (LRB):  PARATHYROIDECTOMY (N/A)  RE-EXPLORATION, FOR BLEEDING    Patient location: PACU    Anesthesia Type: general    Transport from OR: Transported from OR on 6-10 L/min O2 by face mask with adequate spontaneous ventilation    Post pain: adequate analgesia    Post assessment: no apparent anesthetic complications    Post vital signs: stable    Level of consciousness: awake and alert    Nausea/Vomiting: no nausea/vomiting    Complications: none    Transfer of care protocol was followed      Last vitals:   Visit Vitals  /61 (BP Location: Right arm, Patient Position: Lying)   Pulse 104   Temp 36.8 °C (98.2 °F) (Temporal)   Resp 20   Ht 5' 10" (1.778 m)   Wt 77.6 kg (171 lb)   SpO2 100%   BMI 24.54 kg/m²     "

## 2019-05-23 LAB
ALBUMIN SERPL BCP-MCNC: 3.9 G/DL (ref 3.5–5.2)
ALLENS TEST: ABNORMAL
ALLENS TEST: ABNORMAL
ALP SERPL-CCNC: 72 U/L (ref 55–135)
ALT SERPL W/O P-5'-P-CCNC: 9 U/L (ref 10–44)
ANION GAP SERPL CALC-SCNC: 9 MMOL/L (ref 8–16)
APTT BLDCRRT: 25.5 SEC (ref 21–32)
AST SERPL-CCNC: 15 U/L (ref 10–40)
BASOPHILS # BLD AUTO: 0.01 K/UL (ref 0–0.2)
BASOPHILS NFR BLD: 0.1 % (ref 0–1.9)
BILIRUB SERPL-MCNC: 0.2 MG/DL (ref 0.1–1)
BUN SERPL-MCNC: 19 MG/DL (ref 8–23)
CA-I BLDV-SCNC: 1.16 MMOL/L (ref 1.06–1.42)
CA-I BLDV-SCNC: 1.22 MMOL/L (ref 1.06–1.42)
CALCIUM SERPL-MCNC: 9.6 MG/DL (ref 8.7–10.5)
CHLORIDE SERPL-SCNC: 106 MMOL/L (ref 95–110)
CO2 SERPL-SCNC: 21 MMOL/L (ref 23–29)
CREAT SERPL-MCNC: 1.1 MG/DL (ref 0.5–1.4)
DELSYS: ABNORMAL
DELSYS: ABNORMAL
DIFFERENTIAL METHOD: ABNORMAL
EOSINOPHIL # BLD AUTO: 0 K/UL (ref 0–0.5)
EOSINOPHIL NFR BLD: 0.1 % (ref 0–8)
ERYTHROCYTE [DISTWIDTH] IN BLOOD BY AUTOMATED COUNT: 13.1 % (ref 11.5–14.5)
ERYTHROCYTE [SEDIMENTATION RATE] IN BLOOD BY WESTERGREN METHOD: 14 MM/H
ERYTHROCYTE [SEDIMENTATION RATE] IN BLOOD BY WESTERGREN METHOD: 16 MM/H
EST. GFR  (AFRICAN AMERICAN): >60 ML/MIN/1.73 M^2
EST. GFR  (NON AFRICAN AMERICAN): >60 ML/MIN/1.73 M^2
FIO2: 40
FIO2: 40
GLUCOSE SERPL-MCNC: 204 MG/DL (ref 70–110)
HCO3 UR-SCNC: 24.8 MMOL/L (ref 24–28)
HCO3 UR-SCNC: 25.5 MMOL/L (ref 24–28)
HCT VFR BLD AUTO: 34.2 % (ref 40–54)
HGB BLD-MCNC: 11.3 G/DL (ref 14–18)
IMM GRANULOCYTES # BLD AUTO: 0.05 K/UL (ref 0–0.04)
IMM GRANULOCYTES NFR BLD AUTO: 0.4 % (ref 0–0.5)
INR PPP: 1 (ref 0.8–1.2)
LYMPHOCYTES # BLD AUTO: 1.4 K/UL (ref 1–4.8)
LYMPHOCYTES NFR BLD: 10.6 % (ref 18–48)
MAGNESIUM SERPL-MCNC: 1.6 MG/DL (ref 1.6–2.6)
MCH RBC QN AUTO: 31.3 PG (ref 27–31)
MCHC RBC AUTO-ENTMCNC: 33 G/DL (ref 32–36)
MCV RBC AUTO: 95 FL (ref 82–98)
MODE: ABNORMAL
MODE: ABNORMAL
MONOCYTES # BLD AUTO: 0.5 K/UL (ref 0.3–1)
MONOCYTES NFR BLD: 4.1 % (ref 4–15)
NEUTROPHILS # BLD AUTO: 11 K/UL (ref 1.8–7.7)
NEUTROPHILS NFR BLD: 84.7 % (ref 38–73)
NRBC BLD-RTO: 0 /100 WBC
PCO2 BLDA: 50 MMHG (ref 35–45)
PCO2 BLDA: 50.6 MMHG (ref 35–45)
PEEP: 5
PEEP: 5
PH SMN: 7.3 [PH] (ref 7.35–7.45)
PH SMN: 7.31 [PH] (ref 7.35–7.45)
PHOSPHATE SERPL-MCNC: 3.3 MG/DL (ref 2.7–4.5)
PLATELET # BLD AUTO: 270 K/UL (ref 150–350)
PMV BLD AUTO: 9.7 FL (ref 9.2–12.9)
PO2 BLDA: 103 MMHG (ref 80–100)
PO2 BLDA: 95 MMHG (ref 80–100)
POC BE: -1 MMOL/L
POC BE: -2 MMOL/L
POC SATURATED O2: 96 % (ref 95–100)
POC SATURATED O2: 97 % (ref 95–100)
POC TCO2: 26 MMOL/L (ref 23–27)
POC TCO2: 27 MMOL/L (ref 23–27)
POCT GLUCOSE: 160 MG/DL (ref 70–110)
POCT GLUCOSE: 164 MG/DL (ref 70–110)
POCT GLUCOSE: 176 MG/DL (ref 70–110)
POCT GLUCOSE: 92 MG/DL (ref 70–110)
POTASSIUM SERPL-SCNC: 5.2 MMOL/L (ref 3.5–5.1)
PROT SERPL-MCNC: 7.2 G/DL (ref 6–8.4)
PROTHROMBIN TIME: 10.3 SEC (ref 9–12.5)
PTH-INTACT SERPL-MCNC: 7 PG/ML (ref 9–77)
RBC # BLD AUTO: 3.61 M/UL (ref 4.6–6.2)
SAMPLE: ABNORMAL
SAMPLE: ABNORMAL
SITE: ABNORMAL
SITE: ABNORMAL
SODIUM SERPL-SCNC: 136 MMOL/L (ref 136–145)
VT: 450
VT: 450
WBC # BLD AUTO: 12.98 K/UL (ref 3.9–12.7)

## 2019-05-23 PROCEDURE — 85610 PROTHROMBIN TIME: CPT

## 2019-05-23 PROCEDURE — 25000003 PHARM REV CODE 250: Performed by: STUDENT IN AN ORGANIZED HEALTH CARE EDUCATION/TRAINING PROGRAM

## 2019-05-23 PROCEDURE — 83970 ASSAY OF PARATHORMONE: CPT

## 2019-05-23 PROCEDURE — 63600175 PHARM REV CODE 636 W HCPCS: Performed by: STUDENT IN AN ORGANIZED HEALTH CARE EDUCATION/TRAINING PROGRAM

## 2019-05-23 PROCEDURE — 82803 BLOOD GASES ANY COMBINATION: CPT

## 2019-05-23 PROCEDURE — 20000000 HC ICU ROOM

## 2019-05-23 PROCEDURE — 63600175 PHARM REV CODE 636 W HCPCS: Performed by: ANESTHESIOLOGY

## 2019-05-23 PROCEDURE — 85025 COMPLETE CBC W/AUTO DIFF WBC: CPT

## 2019-05-23 PROCEDURE — 80053 COMPREHEN METABOLIC PANEL: CPT

## 2019-05-23 PROCEDURE — 94003 VENT MGMT INPAT SUBQ DAY: CPT

## 2019-05-23 PROCEDURE — 85730 THROMBOPLASTIN TIME PARTIAL: CPT

## 2019-05-23 PROCEDURE — 84100 ASSAY OF PHOSPHORUS: CPT

## 2019-05-23 PROCEDURE — 63600175 PHARM REV CODE 636 W HCPCS

## 2019-05-23 PROCEDURE — 99900026 HC AIRWAY MAINTENANCE (STAT)

## 2019-05-23 PROCEDURE — 63600175 PHARM REV CODE 636 W HCPCS: Performed by: SURGERY

## 2019-05-23 PROCEDURE — 83735 ASSAY OF MAGNESIUM: CPT

## 2019-05-23 PROCEDURE — 37799 UNLISTED PX VASCULAR SURGERY: CPT

## 2019-05-23 PROCEDURE — 99223 1ST HOSP IP/OBS HIGH 75: CPT | Mod: 24,,, | Performed by: SURGERY

## 2019-05-23 PROCEDURE — 99900035 HC TECH TIME PER 15 MIN (STAT)

## 2019-05-23 PROCEDURE — 94761 N-INVAS EAR/PLS OXIMETRY MLT: CPT

## 2019-05-23 PROCEDURE — 82330 ASSAY OF CALCIUM: CPT

## 2019-05-23 PROCEDURE — 99223 PR INITIAL HOSPITAL CARE,LEVL III: ICD-10-PCS | Mod: 24,,, | Performed by: SURGERY

## 2019-05-23 RX ORDER — SODIUM CHLORIDE, SODIUM LACTATE, POTASSIUM CHLORIDE, CALCIUM CHLORIDE 600; 310; 30; 20 MG/100ML; MG/100ML; MG/100ML; MG/100ML
INJECTION, SOLUTION INTRAVENOUS CONTINUOUS
Status: ACTIVE | OUTPATIENT
Start: 2019-05-23 | End: 2019-05-24

## 2019-05-23 RX ORDER — FENTANYL CITRATE-0.9 % NACL/PF 10 MCG/ML
PLASTIC BAG, INJECTION (ML) INTRAVENOUS CONTINUOUS
Status: DISCONTINUED | OUTPATIENT
Start: 2019-05-23 | End: 2019-05-28

## 2019-05-23 RX ORDER — MAGNESIUM SULFATE HEPTAHYDRATE 40 MG/ML
2 INJECTION, SOLUTION INTRAVENOUS ONCE
Status: COMPLETED | OUTPATIENT
Start: 2019-05-23 | End: 2019-05-23

## 2019-05-23 RX ORDER — MIDAZOLAM HYDROCHLORIDE 1 MG/ML
0.5 INJECTION INTRAMUSCULAR; INTRAVENOUS ONCE
Status: DISCONTINUED | OUTPATIENT
Start: 2019-05-23 | End: 2019-05-23

## 2019-05-23 RX ORDER — ENOXAPARIN SODIUM 100 MG/ML
40 INJECTION SUBCUTANEOUS EVERY 24 HOURS
Status: DISCONTINUED | OUTPATIENT
Start: 2019-05-23 | End: 2019-06-03 | Stop reason: HOSPADM

## 2019-05-23 RX ORDER — MIDAZOLAM HYDROCHLORIDE 1 MG/ML
1 INJECTION INTRAMUSCULAR; INTRAVENOUS EVERY 4 HOURS PRN
Status: DISCONTINUED | OUTPATIENT
Start: 2019-05-23 | End: 2019-05-24

## 2019-05-23 RX ORDER — HYDRALAZINE HYDROCHLORIDE 20 MG/ML
10 INJECTION INTRAMUSCULAR; INTRAVENOUS EVERY 6 HOURS PRN
Status: DISCONTINUED | OUTPATIENT
Start: 2019-05-23 | End: 2019-05-26

## 2019-05-23 RX ORDER — AMLODIPINE BESYLATE 10 MG/1
10 TABLET ORAL DAILY
Status: DISCONTINUED | OUTPATIENT
Start: 2019-05-24 | End: 2019-05-24

## 2019-05-23 RX ORDER — PROPOFOL 10 MG/ML
INJECTION, EMULSION INTRAVENOUS
Status: COMPLETED
Start: 2019-05-23 | End: 2019-05-23

## 2019-05-23 RX ORDER — ATORVASTATIN CALCIUM 10 MG/1
10 TABLET, FILM COATED ORAL DAILY
Status: DISCONTINUED | OUTPATIENT
Start: 2019-05-23 | End: 2019-05-28

## 2019-05-23 RX ORDER — FAMOTIDINE 20 MG/1
20 TABLET, FILM COATED ORAL 2 TIMES DAILY
Status: DISCONTINUED | OUTPATIENT
Start: 2019-05-23 | End: 2019-05-28

## 2019-05-23 RX ORDER — FENTANYL CITRATE-0.9 % NACL/PF 10 MCG/ML
PLASTIC BAG, INJECTION (ML) INTRAVENOUS CONTINUOUS
Status: DISCONTINUED | OUTPATIENT
Start: 2019-05-23 | End: 2019-05-23

## 2019-05-23 RX ORDER — IRBESARTAN 150 MG/1
150 TABLET ORAL DAILY
Status: DISCONTINUED | OUTPATIENT
Start: 2019-05-23 | End: 2019-05-23

## 2019-05-23 RX ORDER — MIDAZOLAM HYDROCHLORIDE 1 MG/ML
0.5 INJECTION INTRAMUSCULAR; INTRAVENOUS ONCE
Status: COMPLETED | OUTPATIENT
Start: 2019-05-23 | End: 2019-05-23

## 2019-05-23 RX ORDER — PROPOFOL 10 MG/ML
5 INJECTION, EMULSION INTRAVENOUS CONTINUOUS
Status: DISCONTINUED | OUTPATIENT
Start: 2019-05-23 | End: 2019-05-24

## 2019-05-23 RX ORDER — CHLORHEXIDINE GLUCONATE ORAL RINSE 1.2 MG/ML
15 SOLUTION DENTAL 2 TIMES DAILY
Status: DISCONTINUED | OUTPATIENT
Start: 2019-05-23 | End: 2019-05-28

## 2019-05-23 RX ORDER — IRBESARTAN 150 MG/1
150 TABLET ORAL DAILY
Status: DISCONTINUED | OUTPATIENT
Start: 2019-05-24 | End: 2019-05-28

## 2019-05-23 RX ORDER — DEXAMETHASONE SODIUM PHOSPHATE 4 MG/ML
12 INJECTION, SOLUTION INTRA-ARTICULAR; INTRALESIONAL; INTRAMUSCULAR; INTRAVENOUS; SOFT TISSUE EVERY 8 HOURS
Status: DISCONTINUED | OUTPATIENT
Start: 2019-05-23 | End: 2019-05-29

## 2019-05-23 RX ADMIN — CALCIUM CARBONATE 1000 MG: 1250 SUSPENSION ORAL at 09:05

## 2019-05-23 RX ADMIN — PROPOFOL 50 MCG/KG/MIN: 10 INJECTION, EMULSION INTRAVENOUS at 01:05

## 2019-05-23 RX ADMIN — SODIUM CHLORIDE, SODIUM LACTATE, POTASSIUM CHLORIDE, AND CALCIUM CHLORIDE: .6; .31; .03; .02 INJECTION, SOLUTION INTRAVENOUS at 11:05

## 2019-05-23 RX ADMIN — HYDRALAZINE HYDROCHLORIDE 10 MG: 20 INJECTION INTRAMUSCULAR; INTRAVENOUS at 09:05

## 2019-05-23 RX ADMIN — CHLORHEXIDINE GLUCONATE 0.12% ORAL RINSE 15 ML: 1.2 LIQUID ORAL at 09:05

## 2019-05-23 RX ADMIN — CALCIUM CARBONATE 1000 MG: 1250 SUSPENSION ORAL at 06:05

## 2019-05-23 RX ADMIN — FAMOTIDINE 20 MG: 20 TABLET, FILM COATED ORAL at 09:05

## 2019-05-23 RX ADMIN — Medication: at 11:05

## 2019-05-23 RX ADMIN — MIDAZOLAM HYDROCHLORIDE 0.5 MG: 1 INJECTION, SOLUTION INTRAMUSCULAR; INTRAVENOUS at 01:05

## 2019-05-23 RX ADMIN — INSULIN ASPART 1 UNITS: 100 INJECTION, SOLUTION INTRAVENOUS; SUBCUTANEOUS at 11:05

## 2019-05-23 RX ADMIN — PROPOFOL 50 MCG/KG/MIN: 10 INJECTION, EMULSION INTRAVENOUS at 05:05

## 2019-05-23 RX ADMIN — DEXAMETHASONE SODIUM PHOSPHATE 12 MG: 4 INJECTION, SOLUTION INTRA-ARTICULAR; INTRALESIONAL; INTRAMUSCULAR; INTRAVENOUS; SOFT TISSUE at 09:05

## 2019-05-23 RX ADMIN — DEXAMETHASONE SODIUM PHOSPHATE 12 MG: 4 INJECTION, SOLUTION INTRA-ARTICULAR; INTRALESIONAL; INTRAMUSCULAR; INTRAVENOUS; SOFT TISSUE at 01:05

## 2019-05-23 RX ADMIN — Medication 150 MCG: at 07:05

## 2019-05-23 RX ADMIN — SODIUM CHLORIDE, SODIUM LACTATE, POTASSIUM CHLORIDE, AND CALCIUM CHLORIDE: .6; .31; .03; .02 INJECTION, SOLUTION INTRAVENOUS at 01:05

## 2019-05-23 RX ADMIN — MAGNESIUM SULFATE IN WATER 2 G: 40 INJECTION, SOLUTION INTRAVENOUS at 09:05

## 2019-05-23 RX ADMIN — ENOXAPARIN SODIUM 40 MG: 100 INJECTION SUBCUTANEOUS at 05:05

## 2019-05-23 RX ADMIN — MIDAZOLAM HYDROCHLORIDE 1 MG/HR: 5 INJECTION, SOLUTION INTRAMUSCULAR; INTRAVENOUS at 05:05

## 2019-05-23 RX ADMIN — DEXAMETHASONE SODIUM PHOSPHATE 8 MG: 4 INJECTION, SOLUTION INTRAMUSCULAR; INTRAVENOUS at 05:05

## 2019-05-23 RX ADMIN — ATORVASTATIN CALCIUM 10 MG: 10 TABLET, FILM COATED ORAL at 09:05

## 2019-05-23 RX ADMIN — CALCIUM CARBONATE 1000 MG: 1250 SUSPENSION ORAL at 12:05

## 2019-05-23 RX ADMIN — PROPOFOL 5 MCG/KG/MIN: 10 INJECTION, EMULSION INTRAVENOUS at 08:05

## 2019-05-23 RX ADMIN — LEVOTHYROXINE SODIUM 137 MCG: 25 TABLET ORAL at 05:05

## 2019-05-23 RX ADMIN — MIDAZOLAM HYDROCHLORIDE 1 MG: 1 INJECTION, SOLUTION INTRAMUSCULAR; INTRAVENOUS at 11:05

## 2019-05-23 RX ADMIN — PROPOFOL 50 MCG/KG/MIN: 10 INJECTION, EMULSION INTRAVENOUS at 10:05

## 2019-05-23 NOTE — PLAN OF CARE
Problem: Adult Inpatient Plan of Care  Goal: Plan of Care Review    5/22 Parathyroidectomy - PACU extubated - emergent intubation for stridor.   Recommendations  Recommendation/Intervention:   Recommend initiating TF of Impact Peptide 1.5 at a goal rate of 50 mL/hr - to provide 1800 kcal/day, 113g protein/day, and 924mL free fluid/day.   -However if patient to remain on large amount of propofol, recommend modofying to Peptamen Intense VHP at a goal rate of 50 mL/hr - to provide 1200 kcal/day (1815 kcal w/propofol), 110g protein/day, and 1008mL free fluid/day.   RD to monitor.    Goals: Patient to receive nutrition by RD follow-up  Nutrition Goal Status: new    Full assessment completed, see RD Note 5/23/2019.

## 2019-05-23 NOTE — H&P
"[facility]  Critical Care - Surgery  History & Physical      Code Status: No Order     SURGERY/PROCEDURE: Procedure(s) (LRB):  PARATHYROIDECTOMY (N/A)  RE-EXPLORATION, FOR BLEEDING    CC: Hyperparathyroidism    SUBJECTIVE:     History of Present Illness:  Patient is a 63 y.o. male w/ a significant PMHx of DM, HTN, Graves disease s/p total thyroidectomy, Hx of pheochromocytoma resection, and primary hyperparathyroidism now s/p parathyroidectomy. ENT left a drain for bleeding.     In PACU, pt experienced striderous breathing with worsening respiratory status. Pt intubated in PACU. Flexible laryngoscopy revealed paramedian and immobile VCs bilaterally indicative of bilateral RLN injury.     Pt is evaluated in PACU, HDS, sedated with prop and fent on minimal vent settings.     PTA Medications   Medication Sig    amLODIPine (NORVASC) 10 MG tablet TAKE 1 TABLET (10 MG TOTAL) BY MOUTH ONCE DAILY.    atorvastatin (LIPITOR) 10 MG tablet Take 1 tablet (10 mg total) by mouth once daily.    blood sugar diagnostic Strp Pt to check BG up to QID. Dispense strips compatible with pt brand meter    blood-glucose meter (FREESTYLE SYSTEM KIT) kit Please dispense per pt insurance formulary and pt choice Use as instructed    irbesartan (AVAPRO) 300 MG tablet Take 1 tablet (300 mg total) by mouth once daily. (Patient taking differently: Take 150 mg by mouth once daily. )    lancets (ONETOUCH ULTRASOFT LANCETS) [facility] Pt to check BG up to QID. Dispense lancets compatible with pt brand meter    levothyroxine (SYNTHROID) 137 MCG Tab tablet TAKE 1 TABLET (137 MCG TOTAL) BY MOUTH BEFORE BREAKFAST.    meloxicam (MOBIC) 15 MG tablet TAKE 1 TABLET (15 MG TOTAL) BY MOUTH DAILY AS NEEDED FOR PAIN.    metFORMIN (GLUCOPHAGE) 1000 MG tablet TAKE 1 TABLET BY MOUTH TWICE A DAY WITH FOOD    NOVOFINE 32 32 gauge x 1/4" Ndle 1 EACH BY MISC.(NON-DRUG COMBO ROUTE) ROUTE ONCE DAILY.    pen needle, diabetic (NOVOFINE PLUS) " "32 gauge x 1/6" Ndle 1 each by Misc.(Non-Drug; Combo Route) route once daily.    SITagliptin (JANUVIA) 100 MG Tab Take 1 tablet (100 mg total) by mouth once daily.    tamsulosin (FLOMAX) 0.4 mg Cp24 Take 1 capsule (0.4 mg total) by mouth once daily.    temazepam (RESTORIL) 30 mg capsule TAKE 1 CAPSULE BY MOUTH EVERY DAY AT BEDTIME AS NEEDED    vitamin D 1000 units Tab Take 185 mg by mouth once daily.    albuterol (ACCUNEB) 1.25 mg/3 mL Nebu Take 3 mLs (1.25 mg total) by nebulization every 6 (six) hours as needed (shortness of breath). Rescue    nitroGLYCERIN (NITROSTAT) 0.4 MG SL tablet Place 0.4 mg under the tongue every 5 (five) minutes as needed for Chest pain.       Continuous Infusions:    fentanyl Stopped (05/23/19 0057)    propofol 50 mcg/kg/min (05/23/19 0200)       Scheduled Meds:    amLODIPine  10 mg Oral Daily    atorvastatin  10 mg Oral Daily    calcium carbonate  1,000 mg Per NG tube TID    dexamethasone  8 mg Intravenous Q8H    levothyroxine  137 mcg Oral Before breakfast    propofol        rocuronium        succinylcholine           PRN Meds: albuterol-ipratropium, dextrose 50%, dextrose 50%, fentaNYL, glucagon (human recombinant), glucose, glucose, HYDROcodone-acetaminophen, insulin aspart U-100, ondansetron, ondansetron, oxyCODONE-acetaminophen, oxyCODONE-acetaminophen, sodium chloride 0.9%, sodium chloride 0.9%    Review of patient's allergies indicates:   Allergen Reactions    Tizanidine Shortness Of Breath       Past Medical History:   Diagnosis Date    Anxiety     Back pain     Cataract     Diabetes mellitus     History of hepatitis C; S/p RX with SVR 12 documented 06/2017 6/1/2017    Hypertension     Postoperative hypothyroidism 11/3/2016    Primary hyperparathyroidism 1/18/2017    Thyroid disease      Past Surgical History:   Procedure Laterality Date    BLOCK SELECTIVE NERVE ROOT TRANSFORAMINAL LUMBAR Left 6/21/2017    Performed by [facility] at [facility] " MGT    INJECTION-STEROID-EPIDURAL-CERVICAL N/A 1/17/2018    Performed by Christina Espinoza MD at [facility] [place], operated by [facility] MGT    INJECTION-STEROID-EPIDURAL-TRANSFORAMINAL Left 8/25/2017    Performed by Sulaiman Gudino MD at [facility] [place], operated by [facility] MGT    LUMBAR FUSION      THYROIDECTOMY      TONSILLECTOMY       Family History   Problem Relation Age of Onset    Cancer Mother         breast    Cataracts Father     No Known Problems Sister     No Known Problems Brother     No Known Problems Brother     Heart disease Brother     No Known Problems Sister     No Known Problems Sister     No Known Problems Sister     Glaucoma Paternal Uncle     Glaucoma Paternal Uncle      Social History     Tobacco Use    Smoking status: Current Every Day Smoker    Smokeless tobacco: Never Used   Substance Use Topics    Alcohol use: Yes    Drug use: No          Review of Systems:  Review of systems not obtained due to patient factors intubated.    OBJECTIVE:     Vital Signs (Most Recent)  Temp: 97.7 °F (36.5 °C) (05/22/19 2330)  Pulse: (!) 58 (05/23/19 0200)  Resp: 17 (05/23/19 0200)  BP: 132/74 (05/23/19 0200)  SpO2: 100 % (05/23/19 0200)    Ventilator Data (Last 24H):     Vent Mode: A/C  Oxygen Concentration (%):  [40-98] 50  Resp Rate Total:  [14 br/min-18 br/min] 17 br/min  Vt Set:  [450 mL] 450 mL  PEEP/CPAP:  [5 cmH20] 5 cmH20  Mean Airway Pressure:  [7.3 cmH20-9.9 cmH20] 9.3 cmH20    Hemodynamic Parameters (Last 24H):       Physical Exam:  Con: well developed, well nourished male  Neuro: sedated, MCDONALD purposefully  HEENT: drain with SS output, intubated  CVS: RRR  Pulm: intubated, CTAB  Abd: soft, NT, ND  Ext: non edematous  Skin: warm, dry, intact    Lines/Drains:       Peripheral IV - Single Lumen 05/22/19 1040 18 G Left Forearm (Active)   Site Assessment Clean;Dry;Intact 5/22/2019 11:30 PM   Line Status Infusing 5/22/2019 11:30 PM   Dressing Status Clean;Dry;Intact 5/22/2019 11:30 PM   Dressing Intervention New dressing 5/22/2019   6:01 PM   Dressing Change Due 05/26/19 5/22/2019 11:30 PM   Site Change Due 05/26/19 5/22/2019 11:30 PM   Reason Not Rotated Not due 5/22/2019 11:30 PM   Number of days: 0            Peripheral IV - Single Lumen 05/22/19 1137 18 G Right Forearm (Active)   Site Assessment Clean;Dry;Intact 5/22/2019 11:30 PM   Line Status Infusing 5/22/2019 11:30 PM   Dressing Status Clean;Dry;Intact 5/22/2019 11:30 PM   Dressing Intervention New dressing 5/22/2019  6:01 PM   Dressing Change Due 05/26/19 5/22/2019 11:30 PM   Site Change Due 05/26/19 5/22/2019 11:30 PM   Reason Not Rotated Not due 5/22/2019 11:30 PM   Number of days: 0            Arterial Line 05/22/19 1142 Left Radial (Active)   Site Assessment Clean;Intact;Dry;No redness;No swelling 5/22/2019 11:30 PM   Line Status Pulsatile blood flow 5/22/2019 11:30 PM   Color/Movement/Sensation Capillary refill less than 3 sec 5/22/2019 11:30 PM   Dressing Type Transparent 5/22/2019 11:30 PM   Dressing Status Clean;Dry;Intact 5/22/2019 11:30 PM   Number of days: 0            Closed/Suction Drain 05/22/19 1741 Left Neck Bulb 10 Fr. (Active)   Site Description Unable to view 5/22/2019 11:30 PM   Dressing Type Gauze 5/22/2019 11:30 PM   Dressing Status Clean;Dry;Intact 5/22/2019 11:30 PM   Drainage Serosanguineous 5/22/2019 11:30 PM   Status To bulb suction 5/22/2019 11:30 PM   Number of days: 0            NG/OG Tube 05/22/19 2015 orogastric (Active)   Placement Check placement verified by distal tube length measurement 5/22/2019 11:30 PM   Tolerance no signs/symptoms of discomfort 5/22/2019 11:30 PM   Intake (mL) 50 mL 5/23/2019 12:00 AM   Number of days: 0            Urethral Catheter 05/22/19 2245 (Active)   Site Assessment Clean;Intact 5/22/2019 11:30 PM   Collection Container Urimeter 5/22/2019 11:30 PM   Securement Method secured to top of thigh w/ adhesive device 5/22/2019 11:30 PM   Catheter Care Performed yes 5/22/2019 11:30 PM   Reason for Continuing Urinary Catheterization  "Post operative 5/22/2019 11:30 PM   CAUTI Prevention Bundle StatLock in place w 1" slack;Intact seal between catheter & drainage tubing;Green sheeting clip in use;Drainage bag off the floor;No dependent loops or kinks;Drainage bag not overfilled (<2/3 full);Drainage bag below bladder 5/22/2019 11:30 PM   Output (mL) 160 mL 5/23/2019  2:00 AM   Number of days: 0       Laboratory  ABG:   Recent Labs   Lab 05/22/19 1917   PH 7.272*   PO2 230*   PCO2 39.6   HCO3 18.3*   POCSATURATED 100   BE -9     BMP:  Recent Labs   Lab 05/22/19 2016      K 4.5      CO2 18*   BUN 18   CREATININE 1.1   *   PHOS 2.2*     LFT:   Lab Results   Component Value Date    AST 14 01/21/2019    ALT 11 01/21/2019    ALKPHOS 95 01/21/2019    BILITOT 0.6 01/21/2019    ALBUMIN 3.8 05/22/2019    PROT 8.0 01/21/2019     CBC:   Lab Results   Component Value Date    WBC 7.14 05/16/2019    HGB 12.6 (L) 05/16/2019    HCT 39.9 (L) 05/16/2019    MCV 98 05/16/2019     05/16/2019     Microbiology last 7d:   Microbiology Results (last 7 days)     ** No results found for the last 168 hours. **          Imaging/Diagnostic Results: Reviewed.    ASSESSMENT/PLAN:   63 y.o. male w/ a significant PMHx of primary hyperparathyroidism s/p parathyroidectomy on 5/22 c/b bilateral VC immobility ultimately requiring reintubation in the PACU.     Neuro:   - Sedation with propofol gtt  - Analgesia covered with fentanyl gtt, try to wean this   - Ok to add precedex in addition to propofol if needed    Pulmonary:   - Intubated  - ENT following for VC immobility  - serial ABGs  - Wean vent settings  - daily CXR    Cardiac:   - HDS  - Cont to monitor    Renal:    - Monitor UOP  - Monitor BUN/Cr    ID:   - AF  - Rebeca op ABX per primary    Hem/Onc:   - H/H stable   - Monitor CBC  - Transfuse as needed    Endocrine:    - Hx of DM  - Accuchecks  - SSI prn    Fluids/Electrolytes/Nutrition/GI:   - Replace electrolytes PRN  - NPO  - Monitor OG " output    PPx:  - DVT: SQH   - Bowel: docusate  - PUP: famotidine    DISPO:  - Cont SICU care      Critical care was time spent personally by me on the following activities: development of treatment plan with patient or surrogate and bedside caregivers, discussions with consultants, evaluation of patient's response to treatment, examination of patient, ordering and performing treatments and interventions, ordering and review of laboratory studies, ordering and review of radiographic studies, pulse oximetry, re-evaluation of patient's condition. This critical care time did not overlap with that of any other provider or involve time for any procedures.    Vera Joseph MD  Surgery - Critical Care

## 2019-05-23 NOTE — PROGRESS NOTES
HR remains SB. Temperature is afebrile. O2 requirements have remained stable. Versed infusion titrated for sedation.  Patient is currently alert. BP Is normotensive. Will continue to monitor patient's status. Please review flowsheet data for full assessment details.

## 2019-05-23 NOTE — PROGRESS NOTES
Pt arrived in SICU intubated with size 7.0ETT secured 22cm at lip. Currently on AC ventilatory support.

## 2019-05-23 NOTE — PROGRESS NOTES
Intubation done at the bedside by Dr. Darnell and Dr. Velásquez with the Respiratoy.    2005h: Bagging  2008h: Succ 100  2009h: Intubation  2011: Propofol infusion started @50mcgs/kg/min

## 2019-05-23 NOTE — ANESTHESIA POSTPROCEDURE EVALUATION
Anesthesia Post Evaluation    Patient: Rob Jones Jr.    Procedure(s) Performed: Procedure(s) (LRB):  PARATHYROIDECTOMY (N/A)  RE-EXPLORATION, FOR BLEEDING    Final Anesthesia Type: general  Patient location during evaluation: ICU  Patient participation: No - Unable to Participate, Sedation  Level of consciousness: sedated  Post-procedure vital signs: reviewed and stable  Pain management: adequate  Airway patency: patent  PONV status at discharge: No PONV  Anesthetic complications: no      Cardiovascular status: stable  Respiratory status: ETT and ventilator  Hydration status: euvolemic  Follow-up not needed.  Comments: Left intubated for vocal cord recovery          Vitals Value Taken Time   /67 5/23/2019 10:01 AM   Temp 36.9 °C (98.4 °F) 5/23/2019  7:00 AM   Pulse 39 5/23/2019 10:35 AM   Resp 16 5/23/2019 10:35 AM   SpO2 100 % 5/23/2019 10:35 AM   Vitals shown include unvalidated device data.      Event Time     Out of Recovery 05/23/2019 02:59:00          Pain/Isa Score: Pain Rating Prior to Med Admin: 0 (5/23/2019  5:31 AM)  Pain Rating Post Med Admin: 0 (5/23/2019  1:27 AM)  Isa Score: 7 (5/23/2019  2:30 AM)

## 2019-05-23 NOTE — PLAN OF CARE
Patient is a 63 year old male admitted from home 5/22/2019 and underwent Parathyroidectomy (with Vocal Cord Dysfunction).  Patient with stridor in PACU, intubated and transferred to ICU.   Patient expected to be intubated for 72 hours with discharge needs and date pending.    Patient sedated and intubated in ICU with no family/friends at bedside during visit.  Patient came in for surgery alone, noted preop Nurse placed belongings in DOSC locker.  Attempted to contact patient's daughter, Moise ([phone]), listed as emergency  in Epic with no answer.  Will continue to follow for needs.    PCP  Vasyl Jimenez MD  [address] / [facility] [id number] [phone]      [id number] - [place], [place] - [address]  [address]  [facility] [address]  Phone: [phone] Fax: [phone]    [facility] [address] [place], [place] - [address]  [address]  [facility] [address]  Phone: [phone] Fax: [phone]      Extended Emergency Contact Information  Primary Emergency Contact: moise gastelum  Mobile Phone: [phone]  Relation: Daughter       05/23/19 1429   Discharge Assessment   Assessment Type Discharge Planning Assessment   Assessment information obtained from? Medical Record   Prior to hospitilization cognitive status: Alert/Oriented   Prior to hospitalization functional status: Independent   Current cognitive status: Coma/Sedated/Intubated   Current Functional Status: Completely Dependent   Discharge Plan A Home   Discharge Plan B Home;Home Health

## 2019-05-23 NOTE — PROGRESS NOTES
Pt. intubated with a 7.0 ETT 22cm at lips at 20:09; currently on documented ventilator settings; tolerated well; will continue to monitor.

## 2019-05-23 NOTE — CONSULTS
"  [facility]  Adult Nutrition  Consult Note    SUMMARY     Recommendations  Recommendation/Intervention:   Recommend initiating TF of Impact Peptide 1.5 at a goal rate of 50 mL/hr - to provide 1800 kcal/day, 113g protein/day, and 924mL free fluid/day.   -However if patient to remain on large amount of propofol, recommend modofying to Peptamen Intense VHP at a goal rate of 50 mL/hr - to provide 1200 kcal/day (1815 kcal w/propofol), 110g protein/day, and 1008mL free fluid/day.   RD to monitor.    Goals: Patient to receive nutrition by RD follow-up  Nutrition Goal Status: new  Communication of RD Recs: reviewed with RN    Reason for Assessment  Reason For Assessment: consult  Diagnosis: surgery/postoperative complications(hyperparathyroidism s/p surgery 5/22)  Relevant Medical History: DM, HTN, graves disease s/p total thyroidectomy  General Information Comments: POD#1 s/p parathyroidectomy. Emergently reintubated in PACU. Currently intubated, sedated. Plan to start TF today. Patient with age appropriate muscle wasting with no other physical signs of malnutriton and no weight loss PTA.  Nutrition Discharge Planning: Unable to determine at this time.    Nutrition/Diet History  Spiritual, Cultural Beliefs, Cheondoism Practices, Values that Affect Care: no  Factors Affecting Nutritional Intake: NPO, on mechanical ventilation    Anthropometrics  Temp: 98.4 °F (36.9 °C)  Height Method: Stated  Height: 5' 10" (177.8 cm)  Height (inches): 70 in  Weight Method: Stated  Weight: 77.6 kg (171 lb)  Weight (lb): 171 lb  Ideal Body Weight (IBW), Male: 166 lb  % Ideal Body Weight, Male (lb): 103.01 lb  BMI (Calculated): 24.6  BMI Grade: 18.5-24.9 - normal    Lab/Procedures/Meds  Pertinent Labs Reviewed: reviewed  Pertinent Labs Comments: K 5.2, Glu 204, POCT Glu 124-232, HgbA1c 6.1  Pertinent Medications Reviewed: reviewed  Pertinent Medications Comments: famotidine, fentanyl, propofol    Estimated/Assessed " Needs  Weight Used For Calorie Calculations: 77.6 kg (171 lb 1.2 oz)  Energy Calorie Requirements (kcal): 1774 kcal/day  Energy Need Method: [facility]  Protein Requirements:  g/day(1.2-1.5 g/kg)  Weight Used For Protein Calculations: 77.6 kg (171 lb 1.2 oz)  Fluid Requirements (mL): 1 mL/kcal or per MD  Estimated Fluid Requirement Method: RDA Method  RDA Method (mL): 1774  CHO Requirement: 50% total kcal    Nutrition Prescription Ordered  Current Diet Order: NPO  Current Nutrition Support Formula Ordered: Impact Peptide 1.5  Current Nutrition Support Rate Ordered: 45 (ml)  Current Nutrition Support Frequency Ordered: mL/hr    Evaluation of Received Nutrient/Fluid Intake  Enteral Calories (kcal): 1620  Enteral Protein (gm): 102  Enteral (Free Water) Fluid (mL): 832  Other Calories (kcal): 615(propofol)  Total Calories (kcal): 2235  % Kcal Needs: 126  % Protein Needs: 100  I/O: +2.2L x 24hrs  Energy Calories Required: exceeds needs  Protein Required: meeting needs  Fluid Required: (per MD)  Comments: No BM recorded  Tolerance: (not started yet)  % Intake of Estimated Energy Needs: Other: > 100%  % Meal Intake: NPO    Nutrition Risk  Level of Risk/Frequency of Follow-up: high(2x/week)     Assessment and Plan  Nutrition Problem  Inadequate energy intake    Related to (etiology):   Decreased ability to consume sufficient energy    Signs and Symptoms (as evidenced by):   NPO and TF not started at this time    Interventions/Recommendations (treatment strategy):  Collaboration and referral of nutrition care    Nutrition Diagnosis Status:   New    Monitor and Evaluation  Food and Nutrient Intake: energy intake, enteral nutrition intake  Food and Nutrient Adminstration: enteral and parenteral nutrition administration  Anthropometric Measurements: weight, weight change  Biochemical Data, Medical Tests and Procedures: electrolyte and renal panel, gastrointestinal profile, glucose/endocrine profile, inflammatory  profile  Nutrition-Focused Physical Findings: overall appearance     Nutrition Follow-Up  RD Follow-up?: Yes

## 2019-05-23 NOTE — PLAN OF CARE
"Problem: Adult Inpatient Plan of Care  Goal: Plan of Care Review    5/22 Parathyroidectomy - PACU extubated - emergent intubation for stridor.  Outcome: Ongoing (interventions implemented as appropriate)  Dx: Hyperparathyroidism    Shift Events: Pt. Admitted from PACU. Pt. Currently @ 50% & 5 PEEP on vent. Sinus soy. BP stable. Propofol and fentanyl for sedation and comfort.     Neuro: Follows Commands and Moves All Extremities    Vital Signs: /60   Pulse (!) 53   Temp 97.7 °F (36.5 °C) (Oral)   Resp 14   Ht 5' 10" (1.778 m)   Wt 77.6 kg (171 lb)   SpO2 97%   BMI 24.54 kg/m²     Diet: NPO    Gtts: Propfol and Fentanyl    Urine Output: Urinary Catheter see flowsheets for further data.      Drains: BRICE Drain, total output 20 cc / shift    Restraints Initiated. No signs of injury. Relaxation techniques utlized.    Labs/Accuchecks: Labs reviewed with MD Jake K 5.2. No new orders at this time.    Skin: Elbows, heels, and sacrum free from breakdown.          "

## 2019-05-23 NOTE — PROGRESS NOTES
"Report received from RN. Pt transported to SICU [id number] with portable telemetryAmbubag" in use. Pt connected to ICU monitor Ventilator. MD Jake called and made aware of patient arrival. New orders received and implemented. Pt assessed, immediate needs met.    "

## 2019-05-23 NOTE — PROGRESS NOTES
[facility]  General Surgery  Progress Note    Subjective:     Interval History: Pt re-intubated in PACU for stridor and scoped by ENT with poor vocal chord movement consistent with at least neuropraxia.  Difficult to sedate and was maxed on propofol and fentanyl with some resultant bradycardia    Post-Op Info:  Procedure(s) (LRB):  PARATHYROIDECTOMY (N/A)  RE-EXPLORATION, FOR BLEEDING   1 Day Post-Op      Medications:  Continuous Infusions:   fentanyl 12.5 mL/hr at 05/23/19 0800    propofol 50 mcg/kg/min (05/23/19 0800)     Scheduled Meds:   atorvastatin  10 mg Per NG tube Daily    calcium carbonate  1,000 mg Per NG tube TID    chlorhexidine  15 mL Mouth/Throat BID    dexamethasone  12 mg Intravenous Q8H    famotidine  20 mg Per NG tube BID    [START ON 5/24/2019] levothyroxine  137 mcg Per NG tube Before breakfast    magnesium sulfate IVPB  2 g Intravenous Once     PRN Meds:dextrose 50%, dextrose 50%, glucagon (human recombinant), glucose, glucose, insulin aspart U-100, ondansetron, oxyCODONE-acetaminophen, oxyCODONE-acetaminophen, sodium chloride 0.9%     Objective:     Vital Signs (Most Recent):  Temp: 98.4 °F (36.9 °C) (05/23/19 0700)  Pulse: 60 (05/23/19 0907)  Resp: 16 (05/23/19 0907)  BP: (!) 144/69 (05/23/19 0800)  SpO2: 100 % (05/23/19 0907) Vital Signs (24h Range):  Temp:  [97.7 °F (36.5 °C)-98.8 °F (37.1 °C)] 98.4 °F (36.9 °C)  Pulse:  [] 60  Resp:  [12-55] 16  SpO2:  [95 %-100 %] 100 %  BP: ()/(52-85) 144/69  Arterial Line BP: ()/(51-77) 135/60       Intake/Output Summary (Last 24 hours) at 5/23/2019 1021  Last data filed at 5/23/2019 1000  Gross per 24 hour   Intake 4411.86 ml   Output 2810 ml   Net 1601.86 ml       Physical Exam  NAD  Neck is soft  Intubated  Drain in place with thin output  Incision CDI    Significant Labs:  CBC:   Recent Labs   Lab 05/23/19  0330   WBC 12.98*   RBC 3.61*   HGB 11.3*   HCT 34.2*      MCV 95   MCH 31.3*   MCHC 33.0      CMP:   Recent Labs   Lab 05/23/19  0330   *   CALCIUM 9.6   ALBUMIN 3.9   PROT 7.2      K 5.2*   CO2 21*      BUN 19   CREATININE 1.1   ALKPHOS 72   ALT 9*   AST 15   BILITOT 0.2       Significant Diagnostics:  CXR reviewed    Assessment/Plan:     Active Diagnoses:    Diagnosis Date Noted POA    PRINCIPAL PROBLEM:  Hyperparathyroidism [E21.3] 05/22/2019 Yes      Problems Resolved During this Admission:       POD1 B/L neck exploration for parathyroid adenom.  Very difficult dissection.  Complicated by vocal chord dysfunction, likely from stretch injury as nerve appeared intact on intraop NIMS monitoring and was grossly intact.  Pt intuabted in PACU for increasing stridor    Overnight stable.  Difficuly with adaquate sedation related to either agitation or bradycardia    Sedation per SICU.  Plan to keep pt intuabted for at least 72 hours to allow post op swelling and steroids to improve chord function.  Pt has Experel in incision, should not require significant doses of narcotics.  Ok to continue fentanyl for sedation but ok to switch to versed gtt or precedex as secondary agent as HR and BP allow  Daily sedation vacations  Do not extubate  HDS after volume  post op  Start tube feeds  Mcdaniel in place  Trend labs, continue Ca supplementation    Dispo: Continue ICU care, steroids, and intuabtion      Mo Sol MD  General Surgery  [facility]

## 2019-05-23 NOTE — PROGRESS NOTES
Racepinephrine 2.25% nebulizer solution: dose 0.5 mL pulled from pyxis and given to patient.   Breath sounds: inspiratory stridor.  Pt. placed humidified aerosol mask 10 lpm 98%  SpO2 remained at 100%; will wean O2  ABG obtained and notified Dr. LISA Sol  Will continue to monitor.

## 2019-05-23 NOTE — ANESTHESIA PROCEDURE NOTES
Intubation    Diagnosis: stridor  Doctor requesting consult: kristal  Patient location during procedure: post-op  Procedure start time: 5/22/2019 8:08 PM  Timeout: 5/22/2019 8:08 PM  Procedure end time: 5/22/2019 8:10 PM  Staffing  Anesthesiologist: Gabrielle Darnell MD  Performed: anesthesiologist   Anesthesiologist was present at the time of the procedure.  Preanesthetic Checklist  Completed: patient identified, site marked, surgical consent, pre-op evaluation, timeout performed, IV checked, risks and benefits discussed, monitors and equipment checked and anesthesia consent given  Intubation  Indication: respiratory failure, airway protection  Pre-oxygenation. Induction: intravenous, mask ventilation: easy mask.  Intubation: postinduction, laryngoscopy glidescope, Glidescope.  Endotracheal Tube: oral, 7.0 mm ID, cuffed (inflated to minimal occlusive pressure)  Attempts: 1, Grade I - full view of cords  Complicating Factors: none  Tube secured at 22 cm at the lips.  Findings post-intubation: bilateral breath sounds, atraumatic / condition of teeth unchanged  Position Confirmation: auscultation  Eye Care: lubricated without taped

## 2019-05-23 NOTE — SIGNIFICANT EVENT
ENT notified of 63M with significant stridor and moderate distress in PACU after parathyroidectomy. Pt emergently evaluated with flexible laryngoscopy which showed paramedian and immobile TVCs bilaterally. Pt was then re-intubated by anesthesia in the PACU with ent at bedside.     Suspect cord mobility may take weeks to recover and require temporary tracheostomy.     ENT available for further assistance if needed. Please call with questions or concerns.     Oscar Kevin,   Otorhinolaryngology-Head & Neck Surgery  [facility]  05/22/2019

## 2019-05-24 ENCOUNTER — TELEPHONE (OUTPATIENT)
Dept: SURGERY | Facility: CLINIC | Age: 64
End: 2019-05-24

## 2019-05-24 LAB
ALBUMIN SERPL BCP-MCNC: 3.4 G/DL (ref 3.5–5.2)
ALLENS TEST: ABNORMAL
ALP SERPL-CCNC: 65 U/L (ref 55–135)
ALT SERPL W/O P-5'-P-CCNC: 10 U/L (ref 10–44)
ANION GAP SERPL CALC-SCNC: 8 MMOL/L (ref 8–16)
AST SERPL-CCNC: 20 U/L (ref 10–40)
BASOPHILS # BLD AUTO: 0.02 K/UL (ref 0–0.2)
BASOPHILS NFR BLD: 0.1 % (ref 0–1.9)
BILIRUB SERPL-MCNC: 0.2 MG/DL (ref 0.1–1)
BUN SERPL-MCNC: 20 MG/DL (ref 8–23)
CA-I BLDV-SCNC: 1.2 MMOL/L (ref 1.06–1.42)
CA-I BLDV-SCNC: 1.21 MMOL/L (ref 1.06–1.42)
CA-I BLDV-SCNC: 1.26 MMOL/L (ref 1.06–1.42)
CALCIUM SERPL-MCNC: 10.2 MG/DL (ref 8.7–10.5)
CHLORIDE SERPL-SCNC: 105 MMOL/L (ref 95–110)
CO2 SERPL-SCNC: 25 MMOL/L (ref 23–29)
CREAT SERPL-MCNC: 0.8 MG/DL (ref 0.5–1.4)
DIFFERENTIAL METHOD: ABNORMAL
EOSINOPHIL # BLD AUTO: 0 K/UL (ref 0–0.5)
EOSINOPHIL NFR BLD: 0 % (ref 0–8)
ERYTHROCYTE [DISTWIDTH] IN BLOOD BY AUTOMATED COUNT: 13.2 % (ref 11.5–14.5)
EST. GFR  (AFRICAN AMERICAN): >60 ML/MIN/1.73 M^2
EST. GFR  (NON AFRICAN AMERICAN): >60 ML/MIN/1.73 M^2
GLUCOSE SERPL-MCNC: 247 MG/DL (ref 70–110)
HCO3 UR-SCNC: 28.4 MMOL/L (ref 24–28)
HCT VFR BLD AUTO: 33.9 % (ref 40–54)
HGB BLD-MCNC: 11.1 G/DL (ref 14–18)
IMM GRANULOCYTES # BLD AUTO: 0.08 K/UL (ref 0–0.04)
IMM GRANULOCYTES NFR BLD AUTO: 0.6 % (ref 0–0.5)
LYMPHOCYTES # BLD AUTO: 1.3 K/UL (ref 1–4.8)
LYMPHOCYTES NFR BLD: 9.2 % (ref 18–48)
MAGNESIUM SERPL-MCNC: 1.8 MG/DL (ref 1.6–2.6)
MCH RBC QN AUTO: 31.4 PG (ref 27–31)
MCHC RBC AUTO-ENTMCNC: 32.7 G/DL (ref 32–36)
MCV RBC AUTO: 96 FL (ref 82–98)
MONOCYTES # BLD AUTO: 0.4 K/UL (ref 0.3–1)
MONOCYTES NFR BLD: 2.9 % (ref 4–15)
NEUTROPHILS # BLD AUTO: 12.6 K/UL (ref 1.8–7.7)
NEUTROPHILS NFR BLD: 87.2 % (ref 38–73)
NRBC BLD-RTO: 0 /100 WBC
PCO2 BLDA: 38.2 MMHG (ref 35–45)
PH SMN: 7.48 [PH] (ref 7.35–7.45)
PHOSPHATE SERPL-MCNC: 3.5 MG/DL (ref 2.7–4.5)
PLATELET # BLD AUTO: 257 K/UL (ref 150–350)
PMV BLD AUTO: 9.7 FL (ref 9.2–12.9)
PO2 BLDA: 154 MMHG (ref 80–100)
POC BE: 5 MMOL/L
POC SATURATED O2: 100 % (ref 95–100)
POC TCO2: 30 MMOL/L (ref 23–27)
POCT GLUCOSE: 200 MG/DL (ref 70–110)
POCT GLUCOSE: 245 MG/DL (ref 70–110)
POCT GLUCOSE: 250 MG/DL (ref 70–110)
POCT GLUCOSE: 262 MG/DL (ref 70–110)
POTASSIUM SERPL-SCNC: 4.3 MMOL/L (ref 3.5–5.1)
PROT SERPL-MCNC: 6.5 G/DL (ref 6–8.4)
PTH-INTACT SERPL-MCNC: 9 PG/ML (ref 9–77)
RBC # BLD AUTO: 3.54 M/UL (ref 4.6–6.2)
SAMPLE: ABNORMAL
SITE: ABNORMAL
SODIUM SERPL-SCNC: 138 MMOL/L (ref 136–145)
WBC # BLD AUTO: 14.48 K/UL (ref 3.9–12.7)

## 2019-05-24 PROCEDURE — 25000003 PHARM REV CODE 250: Performed by: STUDENT IN AN ORGANIZED HEALTH CARE EDUCATION/TRAINING PROGRAM

## 2019-05-24 PROCEDURE — 82330 ASSAY OF CALCIUM: CPT

## 2019-05-24 PROCEDURE — 63600175 PHARM REV CODE 636 W HCPCS: Performed by: STUDENT IN AN ORGANIZED HEALTH CARE EDUCATION/TRAINING PROGRAM

## 2019-05-24 PROCEDURE — 82330 ASSAY OF CALCIUM: CPT | Mod: 91

## 2019-05-24 PROCEDURE — 20000000 HC ICU ROOM

## 2019-05-24 PROCEDURE — 94761 N-INVAS EAR/PLS OXIMETRY MLT: CPT

## 2019-05-24 PROCEDURE — 27000221 HC OXYGEN, UP TO 24 HOURS

## 2019-05-24 PROCEDURE — 80053 COMPREHEN METABOLIC PANEL: CPT

## 2019-05-24 PROCEDURE — 99900026 HC AIRWAY MAINTENANCE (STAT)

## 2019-05-24 PROCEDURE — 99900035 HC TECH TIME PER 15 MIN (STAT)

## 2019-05-24 PROCEDURE — 85025 COMPLETE CBC W/AUTO DIFF WBC: CPT

## 2019-05-24 PROCEDURE — 84100 ASSAY OF PHOSPHORUS: CPT

## 2019-05-24 PROCEDURE — 82803 BLOOD GASES ANY COMBINATION: CPT

## 2019-05-24 PROCEDURE — 94003 VENT MGMT INPAT SUBQ DAY: CPT

## 2019-05-24 PROCEDURE — 37799 UNLISTED PX VASCULAR SURGERY: CPT

## 2019-05-24 PROCEDURE — 83735 ASSAY OF MAGNESIUM: CPT

## 2019-05-24 PROCEDURE — 83970 ASSAY OF PARATHORMONE: CPT

## 2019-05-24 PROCEDURE — 36415 COLL VENOUS BLD VENIPUNCTURE: CPT

## 2019-05-24 RX ORDER — SODIUM CHLORIDE, SODIUM LACTATE, POTASSIUM CHLORIDE, CALCIUM CHLORIDE 600; 310; 30; 20 MG/100ML; MG/100ML; MG/100ML; MG/100ML
INJECTION, SOLUTION INTRAVENOUS CONTINUOUS
Status: DISCONTINUED | OUTPATIENT
Start: 2019-05-24 | End: 2019-05-25

## 2019-05-24 RX ORDER — PROPOFOL 10 MG/ML
5 INJECTION, EMULSION INTRAVENOUS CONTINUOUS
Status: DISCONTINUED | OUTPATIENT
Start: 2019-05-24 | End: 2019-05-28

## 2019-05-24 RX ORDER — GLUCAGON 1 MG
1 KIT INJECTION
Status: DISCONTINUED | OUTPATIENT
Start: 2019-05-24 | End: 2019-05-26

## 2019-05-24 RX ORDER — MAGNESIUM SULFATE HEPTAHYDRATE 40 MG/ML
2 INJECTION, SOLUTION INTRAVENOUS ONCE
Status: COMPLETED | OUTPATIENT
Start: 2019-05-24 | End: 2019-05-24

## 2019-05-24 RX ORDER — AMLODIPINE BESYLATE 10 MG/1
10 TABLET ORAL DAILY
Status: DISCONTINUED | OUTPATIENT
Start: 2019-05-25 | End: 2019-05-28

## 2019-05-24 RX ORDER — INSULIN ASPART 100 [IU]/ML
1-10 INJECTION, SOLUTION INTRAVENOUS; SUBCUTANEOUS EVERY 4 HOURS PRN
Status: DISCONTINUED | OUTPATIENT
Start: 2019-05-24 | End: 2019-05-26

## 2019-05-24 RX ADMIN — SODIUM CHLORIDE, SODIUM LACTATE, POTASSIUM CHLORIDE, AND CALCIUM CHLORIDE: .6; .31; .03; .02 INJECTION, SOLUTION INTRAVENOUS at 04:05

## 2019-05-24 RX ADMIN — HYDRALAZINE HYDROCHLORIDE 10 MG: 20 INJECTION INTRAMUSCULAR; INTRAVENOUS at 10:05

## 2019-05-24 RX ADMIN — LEVOTHYROXINE SODIUM 137 MCG: 25 TABLET ORAL at 06:05

## 2019-05-24 RX ADMIN — PROPOFOL 10 MCG/KG/MIN: 10 INJECTION, EMULSION INTRAVENOUS at 02:05

## 2019-05-24 RX ADMIN — CHLORHEXIDINE GLUCONATE 0.12% ORAL RINSE 15 ML: 1.2 LIQUID ORAL at 08:05

## 2019-05-24 RX ADMIN — CALCIUM CARBONATE 1000 MG: 1250 SUSPENSION ORAL at 09:05

## 2019-05-24 RX ADMIN — INSULIN ASPART 2 UNITS: 100 INJECTION, SOLUTION INTRAVENOUS; SUBCUTANEOUS at 11:05

## 2019-05-24 RX ADMIN — ENOXAPARIN SODIUM 40 MG: 100 INJECTION SUBCUTANEOUS at 04:05

## 2019-05-24 RX ADMIN — INSULIN ASPART 6 UNITS: 100 INJECTION, SOLUTION INTRAVENOUS; SUBCUTANEOUS at 07:05

## 2019-05-24 RX ADMIN — ATORVASTATIN CALCIUM 10 MG: 10 TABLET, FILM COATED ORAL at 09:05

## 2019-05-24 RX ADMIN — FAMOTIDINE 20 MG: 20 TABLET, FILM COATED ORAL at 08:05

## 2019-05-24 RX ADMIN — Medication 175 MCG: at 09:05

## 2019-05-24 RX ADMIN — MIDAZOLAM HYDROCHLORIDE 6 MG/HR: 5 INJECTION, SOLUTION INTRAMUSCULAR; INTRAVENOUS at 05:05

## 2019-05-24 RX ADMIN — CHLORHEXIDINE GLUCONATE 0.12% ORAL RINSE 15 ML: 1.2 LIQUID ORAL at 09:05

## 2019-05-24 RX ADMIN — FAMOTIDINE 20 MG: 20 TABLET, FILM COATED ORAL at 09:05

## 2019-05-24 RX ADMIN — MAGNESIUM SULFATE IN WATER 2 G: 40 INJECTION, SOLUTION INTRAVENOUS at 05:05

## 2019-05-24 RX ADMIN — IRBESARTAN 150 MG: 150 TABLET ORAL at 09:05

## 2019-05-24 RX ADMIN — INSULIN ASPART 4 UNITS: 100 INJECTION, SOLUTION INTRAVENOUS; SUBCUTANEOUS at 08:05

## 2019-05-24 RX ADMIN — CALCIUM CARBONATE 1000 MG: 1250 SUSPENSION ORAL at 02:05

## 2019-05-24 RX ADMIN — DEXAMETHASONE SODIUM PHOSPHATE 12 MG: 4 INJECTION, SOLUTION INTRA-ARTICULAR; INTRALESIONAL; INTRAMUSCULAR; INTRAVENOUS; SOFT TISSUE at 02:05

## 2019-05-24 RX ADMIN — HYDRALAZINE HYDROCHLORIDE 10 MG: 20 INJECTION INTRAMUSCULAR; INTRAVENOUS at 03:05

## 2019-05-24 RX ADMIN — CALCIUM CARBONATE 1000 MG: 1250 SUSPENSION ORAL at 08:05

## 2019-05-24 RX ADMIN — DEXAMETHASONE SODIUM PHOSPHATE 12 MG: 4 INJECTION, SOLUTION INTRA-ARTICULAR; INTRALESIONAL; INTRAMUSCULAR; INTRAVENOUS; SOFT TISSUE at 09:05

## 2019-05-24 RX ADMIN — Medication 175 MCG/HR: at 07:05

## 2019-05-24 RX ADMIN — AMLODIPINE BESYLATE 10 MG: 10 TABLET ORAL at 09:05

## 2019-05-24 RX ADMIN — PROPOFOL 15 MCG/KG/MIN: 10 INJECTION, EMULSION INTRAVENOUS at 11:05

## 2019-05-24 RX ADMIN — DEXAMETHASONE SODIUM PHOSPHATE 12 MG: 4 INJECTION, SOLUTION INTRA-ARTICULAR; INTRALESIONAL; INTRAMUSCULAR; INTRAVENOUS; SOFT TISSUE at 05:05

## 2019-05-24 NOTE — PROGRESS NOTES
[facility]  General Surgery  Progress Note    Subjective:     History of Present Illness:  No notes on file    Post-Op Info:  Procedure(s) (LRB):  PARATHYROIDECTOMY (N/A)  RE-EXPLORATION, FOR BLEEDING   2 Days Post-Op     Interval History: No acute events overnight. Some bradycardia throughout yesterday and overnight.    Medications:  Continuous Infusions:   fentanyl 175 mcg/hr (05/24/19 0737)    lactated ringers 100 mL/hr at 05/24/19 0700    midazolam (VERSED) infusion (titrating) 6 mg/hr (05/24/19 0700)     Scheduled Meds:   amLODIPine  10 mg Oral Daily    atorvastatin  10 mg Per NG tube Daily    calcium carbonate  1,000 mg Per NG tube TID    chlorhexidine  15 mL Mouth/Throat BID    dexamethasone  12 mg Intravenous Q8H    enoxaparin  40 mg Subcutaneous Daily    famotidine  20 mg Per NG tube BID    irbesartan  150 mg Oral Daily    levothyroxine  137 mcg Per NG tube Before breakfast     PRN Meds:dextrose 50%, glucagon (human recombinant), hydrALAZINE, insulin aspart U-100, ondansetron, sodium chloride 0.9%     Review of patient's allergies indicates:   Allergen Reactions    Tizanidine Shortness Of Breath     Objective:     Vital Signs (Most Recent):  Temp: 97.9 °F (36.6 °C) (05/24/19 0700)  Pulse: (!) 36 (05/24/19 0814)  Resp: (!) 21 (05/24/19 0814)  BP: (!) 166/75 (05/24/19 0600)  SpO2: 100 % (05/24/19 0814) Vital Signs (24h Range):  Temp:  [97.7 °F (36.5 °C)-98.1 °F (36.7 °C)] 97.9 °F (36.6 °C)  Pulse:  [33-66] 36  Resp:  [13-34] 21  SpO2:  [98 %-100 %] 100 %  BP: (140-221)/(67-95) 166/75  Arterial Line BP: (115-187)/(44-72) 146/44     Weight: 77.6 kg (171 lb)  Body mass index is 24.54 kg/m².    Intake/Output - Last 3 Shifts       05/22 0700 - 05/23 0659 05/23 0700 - 05/24 0659 05/24 0700 - 05/25 0659    I.V. (mL/kg) 3361.9 (43.3) 2157 (27.8)     NG/GT 50 575 50    IV Piggyback 1000      Total Intake(mL/kg) 4411.9 (56.9) 2732 (35.2) 50 (0.6)    Urine (mL/kg/hr) 1710 4295 (2.3) 125  (0.8)    Drains 170 373     Total Output 1880 4668 125    Net +2531.9 -1936.1 -75                 Physical Exam   Constitutional: He appears well-developed and well-nourished. He is sedated and intubated.   HENT:   Head: Normocephalic and atraumatic.   Eyes: Conjunctivae are normal.   Neck:   Incision clean, dry, and intact. Drain output thin. Neck is soft with no fluctuance or evidence of infection.    Cardiovascular: Regular rhythm. Bradycardia present.   Pulmonary/Chest: He is intubated.   Ventilated breath sounds.    Nursing note and vitals reviewed.      Significant Labs:  CBC:   Recent Labs   Lab 05/24/19  0352   WBC 14.48*   RBC 3.54*   HGB 11.1*   HCT 33.9*      MCV 96   MCH 31.4*   MCHC 32.7     CMP:   Recent Labs   Lab 05/24/19  0352   *   CALCIUM 10.2   ALBUMIN 3.4*   PROT 6.5      K 4.3   CO2 25      BUN 20   CREATININE 0.8   ALKPHOS 65   ALT 10   AST 20   BILITOT 0.2       Significant Diagnostics:  I have reviewed all pertinent imaging results/findings within the past 24 hours.    Assessment/Plan:     * Hyperparathyroidism  POD2 B/L neck exploration for parathyroid adenom.  Very difficult dissection.  Complicated by vocal chord dysfunction, likely from stretch injury as nerve appeared intact on intraop NIMS monitoring and was grossly intact.  Pt intuabted in PACU for increasing stridor.     Stable overnight with bradycardia related to sedation with fentanyl, versed, and propofol.      Sedation per SICU.  Plan to keep pt intuabted for at least 72 hours to allow post op swelling and steroids to improve chord function. Will likely be scoped with ENT at that point. Wean sedation as tolerated to improve bradycardic episodes.   Daily sedation vacations  Do not extubate  HDS after volume  post op  Continue tube feeds  Mcdaniel in place  Trend labs, continue Ca supplementation     Dispo: Continue ICU care, steroids, and intuabtion        Jimmie Lombardi MD  General Surgery  [facility] [place]

## 2019-05-24 NOTE — PROGRESS NOTES
Patient remains sedated/intubated. HR remains SB. BP remains normo/hypertensive. UOP remains >100mL/hr via Mcdaniel catheter. Ventilator settings remain constant. Pt responds to commands and moves all extremities purposefully. Will continue to monitor patient's status. Please review flowsheet data for full assessment details.

## 2019-05-24 NOTE — PLAN OF CARE
"Problem: Adult Inpatient Plan of Care  Goal: Plan of Care Review    5/22 Parathyroidectomy - PACU extubated - emergent intubation for stridor.   Outcome: Ongoing (interventions implemented as appropriate)  Dx: Hyperparathyroidism    Shift Events: No acute events over night. Pt. Remains SB in 30s. MD aware. Tube feeds @ goal rate of 45cc with minimal residuals.    Neuro: Follows Commands and Moves All Extremities    Vital Signs: BP (!) 166/75   Pulse (!) 37   Temp 97.9 °F (36.6 °C) (Oral)   Resp (!) 34   Ht 5' 10" (1.778 m)   Wt 77.6 kg (171 lb)   SpO2 100%   BMI 24.54 kg/m²     Diet: Tube Feeds    Gtts: Propfol and Fentanyl Versed, MIVF    Urine Output: Urinary Catheter ~250 cc/hour    Drains: BRICE Drain, total output 6 cc / hour    Labs/Accuchecks: All labs reviewed with MD Adithya.    Skin: Elbows, heels, and sacrum free from breakdown.    Pt. Updated on POC.          "

## 2019-05-24 NOTE — OP NOTE
Operative Report  Endocrine Surgery    Date: 5/22/2019    Indications: This patient presents with biochemical evidence of primary hyperparathyroidism. He has a remote history of total thyroidectomy for Graves disease.  Preoperative sestamibi imaging reveal possible though not typical increased uptake of radionuclide to right lower pole. Patient also underwent Parathyroid protocol CT which did reveal similar findings(possible but not diagnostic). An arterial line was placed.     Pre-Operative Diagnosis: primary hyperparathyroidism    Post-Operative Diagnosis: primary hyperparathyroidism    Procedure: Parathyroidectomy with IOPTH monitoring    Surgeon: Mounika Carmona M.D.    Assistants:   1) Todd Lopez MD - attending surgeon as no qualified resident was available  2) SADA Jensen MD - attending surgeon as no qualified resident was available  3) LISA Sol MD - Resident    Anesthesia: General     ASA Class: 2    Findings:  1) Moderately enlarged ectopic parathyroid gland found in posteriorly in a retro-esophageal location anterior to the spine and removed. FS confirmed parathyroid tissue  2) IOPTH levels as follows:   Baseline - 90   RIJ - 1223   LIJ - 128   10 minute post excision - 49    Estimated Blood Loss (EBL): 50mL    Drains: 10 Fr. round arron drain    Total IV Fluids: see anesthesia record     Specimens:  1. Parathyroid vs lymph node - FS consistent with lymph node  2. Parathyroid vs lymph node #2 - FS consistent with lymph node  3. Question right superior parathyroid tissue- FS demonstrated adipose tissue  4. Question right parathyroid tissue- FS revealed hypercellular parathyroid tissue        Procedure in Detail:    The patient was seen in the Holding Room. The risks, benefits, complications, treatment options, and expected outcomes were discussed with the patient. The possibilities of reaction to medication, pulmonary aspiration, bleeding, recurrent infection, possibility of normal findings,  recurrent laryngeal nerve damage, the need for additional procedures, failure to diagnose a condition, and creating a complication requiring transfusion or operation were discussed with the patient. The patient concurred with the proposed plan, giving informed consent.  The site of surgery properly noted/marked. The patient was taken to Operating Room, identified as Rob Jones Jr. and the procedure verified as Parathyroidectomy. A Time Out was held and the above information confirmed.    The patient was brought to the operating room and placed supine.  After induction of a general anesthetic, the neck was placed in extension and a pressure bag shoulder roll was placed between the scapulae. The neck was prepped and draped in standard fashion.  A 4.5 cm transverse cervical incision was created within a natural skin fold(this was in the area of the prior cervical collar incision).  Scar tissue was immediately noted.  Dissection was carried through the platysma and flaps were raised both superior and inferiorly. The strap muscles were identified and divided in the midline.  Again, extensive scarring was noted in this area.  The strap muscles were densely adherent to the trachea making dissection extremely difficult.  With slow continued dissection were finally able to expose the tracheoesophageal groove.  The right recurrent laryngeal nerve was noted and preserved during this dissection. Despite an extensive search we were unable to identified parathyroid tissue.  Based on this difficult dissection a second surgeon joined the procedure.  Continued exploration was unsuccessful.  Following discussion decision was made to explore the right neck.    Similar to the right the left strap muscles were densely adherent to the trachea.  Blunt dissection was used to open this space with great difficulty. Extensive exploration did not reveal parathyroid tissue.  At this point, PTH levels were drawn from both the right and  left internal jugular veins.  Resultant PTH levels were greatly elevation on the right and the right neck was again explored.  A third surgeon was aided in this exploration.  Dissection continued towards the mediastinum and an apparently abnormal parathyroid gland was found in a retro-esophageal location anterior to the spine, deep in the neck/anterior mediastinum.   Several additional frozen sections were performed during the operation in the course of identifying the parathyroid glands.  During this exploration, the thymus, carotid sheath and superior mediastinum were explored.  Both recurrent laryngeal nerves were identified and preserved during the dissection.    This was mobilized with sharp dissection.  The vascular pedicle was clipped and the specimen was submitted to pathology, revealing mild hypercellularity.  Parathyroid assay monitoring was performed intraoperatively revealing significant decrease compared to baseline levels. They decreased from 90(baseline) to 14(10minute post-excision) consistent with biochemical cure and a decision was made to terminate the exploration.    Hemostasis was achieved in the neck with bipolar cautery. Fibrillar was placed into the field of dissection to aid this process. 20 mL of Exparel mixed with Marcaine was placed into the strap muscles and subcutaneous tissues for post-op analgesia. Closure was performed by reapproximating the strap muscles in the midline with an interrupted 3-0 Vicryl suture.  The platysma was reapproximated with 3-0 Vicryl suture and the skin incision was closed with a 5-0 Monocryl knot-less, subcuticular closure. Sterile dressing were placed.  Examination of the neck prior to extubation revealed some acute swelling.  Based on this clinical finding, the neck was re-opened.  There was blood noted just under the platysmal layer.  It appeared that there was some oozing from an anterior jugular vein.  This was over-sewn.  Multiple valsalva maneuvers was  again done and hemostasis achieved.   A 10 Fr round arron drain was place and anchored to the skin using 3-0 Ethilon.  The platysma was reapproximated with 3-0 Vicryl suture and the skin incision was closed with a 5-0 Monocryl knot-less, subcuticular closure. Sterile dressing were placed.    The patient was extubated an transferred to PACU in stable condition.        Instrument, sponge, and needle counts were correct prior to closure and at the conclusion of the case.     Implants: None    Complications: At the end of the case(prior to extubation) the patient was noted to have some swelling of the anterior neck.  Neck re-opening a bleeding noted from the anterior jugular vein.  This was over-sewn and hemostasis was maintained    Condition: stable    Disposition: PACU - hemodynamically stable.    Attending Attestation: I was present and scrubbed for the entire procedure.

## 2019-05-24 NOTE — SUBJECTIVE & OBJECTIVE
Interval History: No acute events overnight. Some bradycardia throughout yesterday and overnight.    Medications:  Continuous Infusions:   fentanyl 175 mcg/hr (05/24/19 0737)    lactated ringers 100 mL/hr at 05/24/19 0700    midazolam (VERSED) infusion (titrating) 6 mg/hr (05/24/19 0700)     Scheduled Meds:   amLODIPine  10 mg Oral Daily    atorvastatin  10 mg Per NG tube Daily    calcium carbonate  1,000 mg Per NG tube TID    chlorhexidine  15 mL Mouth/Throat BID    dexamethasone  12 mg Intravenous Q8H    enoxaparin  40 mg Subcutaneous Daily    famotidine  20 mg Per NG tube BID    irbesartan  150 mg Oral Daily    levothyroxine  137 mcg Per NG tube Before breakfast     PRN Meds:dextrose 50%, glucagon (human recombinant), hydrALAZINE, insulin aspart U-100, ondansetron, sodium chloride 0.9%     Review of patient's allergies indicates:   Allergen Reactions    Tizanidine Shortness Of Breath     Objective:     Vital Signs (Most Recent):  Temp: 97.9 °F (36.6 °C) (05/24/19 0700)  Pulse: (!) 36 (05/24/19 0814)  Resp: (!) 21 (05/24/19 0814)  BP: (!) 166/75 (05/24/19 0600)  SpO2: 100 % (05/24/19 0814) Vital Signs (24h Range):  Temp:  [97.7 °F (36.5 °C)-98.1 °F (36.7 °C)] 97.9 °F (36.6 °C)  Pulse:  [33-66] 36  Resp:  [13-34] 21  SpO2:  [98 %-100 %] 100 %  BP: (140-221)/(67-95) 166/75  Arterial Line BP: (115-187)/(44-72) 146/44     Weight: 77.6 kg (171 lb)  Body mass index is 24.54 kg/m².    Intake/Output - Last 3 Shifts       05/22 0700 - 05/23 0659 05/23 0700 - 05/24 0659 05/24 0700 - 05/25 0659    I.V. (mL/kg) 3361.9 (43.3) 2157 (27.8)     NG/GT 50 575 50    IV Piggyback 1000      Total Intake(mL/kg) 4411.9 (56.9) 2732 (35.2) 50 (0.6)    Urine (mL/kg/hr) 1710 4295 (2.3) 125 (0.8)    Drains 170 373     Total Output 1880 4668 125    Net +2531.9 -1936.1 -75                 Physical Exam   Constitutional: He appears well-developed and well-nourished. He is sedated and intubated.   HENT:   Head: Normocephalic and  atraumatic.   Eyes: Conjunctivae are normal.   Neck:   Incision clean, dry, and intact. Drain output thin. Neck is soft with no fluctuance or evidence of infection.    Cardiovascular: Regular rhythm. Bradycardia present.   Pulmonary/Chest: He is intubated.   Ventilated breath sounds.    Nursing note and vitals reviewed.      Significant Labs:  CBC:   Recent Labs   Lab 05/24/19  0352   WBC 14.48*   RBC 3.54*   HGB 11.1*   HCT 33.9*      MCV 96   MCH 31.4*   MCHC 32.7     CMP:   Recent Labs   Lab 05/24/19  0352   *   CALCIUM 10.2   ALBUMIN 3.4*   PROT 6.5      K 4.3   CO2 25      BUN 20   CREATININE 0.8   ALKPHOS 65   ALT 10   AST 20   BILITOT 0.2       Significant Diagnostics:  I have reviewed all pertinent imaging results/findings within the past 24 hours.

## 2019-05-24 NOTE — SIGNIFICANT EVENT
Attending Surgery:    Patient monitored post-operatively (approximately 30 minutes) and noted to be conversant and stable.  Able to maintain sats without difficulty.  No stridor noted at that time.    Following leaving this area, notified by nurse that patient developed stridor which was worsening.  Upon repeat examination patient noted to have severe stridor.  No improvement with racemic epi nebulizer treatment. Though he was able to maintain saturations and was noted to be comfortable, decision was made to electively intubate patient.    I was able to discuss case with ENT attending who was in house at the time. Scope performed immediately prior to intubation, demonstrated vocal cord paralysis.  Present as attending anesthesia staff intubated patient using glide-scope.    Will transfer to ICU for continued monitoring.  I have spoken with patient's daughter concerning the situation.

## 2019-05-24 NOTE — ASSESSMENT & PLAN NOTE
POD2 B/L neck exploration for parathyroid adenom.  Very difficult dissection.  Complicated by vocal chord dysfunction, likely from stretch injury as nerve appeared intact on intraop NIMS monitoring and was grossly intact.  Pt intuabted in PACU for increasing stridor.     Stable overnight with bradycardia related to sedation with fentanyl, versed, and propofol.      Sedation per SICU.  Plan to keep pt intuabted for at least 72 hours to allow post op swelling and steroids to improve chord function. Will likely be scoped with ENT at that point. Wean sedation as tolerated to improve bradycardic episodes.   Daily sedation vacations  Do not extubate  HDS after volume  post op  Continue tube feeds  Mcdaniel in place  Trend labs, continue Ca supplementation     Dispo: Continue ICU care, steroids, and intuabtion

## 2019-05-25 PROBLEM — Z78.9 ON ENTERAL NUTRITION: Status: ACTIVE | Noted: 2019-05-25

## 2019-05-25 PROBLEM — T38.0X5A ADRENAL CORTICAL STEROIDS CAUSING ADVERSE EFFECT IN THERAPEUTIC USE: Status: ACTIVE | Noted: 2019-05-25

## 2019-05-25 LAB
ALBUMIN SERPL BCP-MCNC: 3.2 G/DL (ref 3.5–5.2)
ALLENS TEST: ABNORMAL
ALP SERPL-CCNC: 67 U/L (ref 55–135)
ALT SERPL W/O P-5'-P-CCNC: 8 U/L (ref 10–44)
ANION GAP SERPL CALC-SCNC: 7 MMOL/L (ref 8–16)
AST SERPL-CCNC: 14 U/L (ref 10–40)
BASOPHILS # BLD AUTO: 0 K/UL (ref 0–0.2)
BASOPHILS NFR BLD: 0 % (ref 0–1.9)
BILIRUB SERPL-MCNC: 0.2 MG/DL (ref 0.1–1)
BUN SERPL-MCNC: 28 MG/DL (ref 8–23)
CA-I BLDV-SCNC: 1.03 MMOL/L (ref 1.06–1.42)
CA-I BLDV-SCNC: 1.22 MMOL/L (ref 1.06–1.42)
CA-I BLDV-SCNC: 1.25 MMOL/L (ref 1.06–1.42)
CALCIUM SERPL-MCNC: 10.1 MG/DL (ref 8.7–10.5)
CHLORIDE SERPL-SCNC: 102 MMOL/L (ref 95–110)
CO2 SERPL-SCNC: 29 MMOL/L (ref 23–29)
CREAT SERPL-MCNC: 0.9 MG/DL (ref 0.5–1.4)
DELSYS: ABNORMAL
DIFFERENTIAL METHOD: ABNORMAL
EOSINOPHIL # BLD AUTO: 0 K/UL (ref 0–0.5)
EOSINOPHIL NFR BLD: 0 % (ref 0–8)
ERYTHROCYTE [DISTWIDTH] IN BLOOD BY AUTOMATED COUNT: 13.2 % (ref 11.5–14.5)
ERYTHROCYTE [SEDIMENTATION RATE] IN BLOOD BY WESTERGREN METHOD: 18 MM/H
EST. GFR  (AFRICAN AMERICAN): >60 ML/MIN/1.73 M^2
EST. GFR  (NON AFRICAN AMERICAN): >60 ML/MIN/1.73 M^2
FIO2: 40
GLUCOSE SERPL-MCNC: 305 MG/DL (ref 70–110)
HCO3 UR-SCNC: 29.3 MMOL/L (ref 24–28)
HCT VFR BLD AUTO: 37.1 % (ref 40–54)
HGB BLD-MCNC: 11.9 G/DL (ref 14–18)
IMM GRANULOCYTES # BLD AUTO: 0.11 K/UL (ref 0–0.04)
IMM GRANULOCYTES NFR BLD AUTO: 0.9 % (ref 0–0.5)
LYMPHOCYTES # BLD AUTO: 0.8 K/UL (ref 1–4.8)
LYMPHOCYTES NFR BLD: 7.1 % (ref 18–48)
MAGNESIUM SERPL-MCNC: 1.8 MG/DL (ref 1.6–2.6)
MCH RBC QN AUTO: 31.3 PG (ref 27–31)
MCHC RBC AUTO-ENTMCNC: 32.1 G/DL (ref 32–36)
MCV RBC AUTO: 98 FL (ref 82–98)
MIN VOL: 8.1
MODE: ABNORMAL
MONOCYTES # BLD AUTO: 0.4 K/UL (ref 0.3–1)
MONOCYTES NFR BLD: 3.6 % (ref 4–15)
NEUTROPHILS # BLD AUTO: 10.4 K/UL (ref 1.8–7.7)
NEUTROPHILS NFR BLD: 88.4 % (ref 38–73)
NRBC BLD-RTO: 0 /100 WBC
PCO2 BLDA: 44.9 MMHG (ref 35–45)
PEEP: 5
PH SMN: 7.42 [PH] (ref 7.35–7.45)
PHOSPHATE SERPL-MCNC: 3 MG/DL (ref 2.7–4.5)
PIP: 22
PLATELET # BLD AUTO: 262 K/UL (ref 150–350)
PMV BLD AUTO: 10.1 FL (ref 9.2–12.9)
PO2 BLDA: 128 MMHG (ref 80–100)
POC BE: 5 MMOL/L
POC SATURATED O2: 99 % (ref 95–100)
POC TCO2: 31 MMOL/L (ref 23–27)
POCT GLUCOSE: 273 MG/DL (ref 70–110)
POCT GLUCOSE: 293 MG/DL (ref 70–110)
POCT GLUCOSE: 293 MG/DL (ref 70–110)
POCT GLUCOSE: 298 MG/DL (ref 70–110)
POCT GLUCOSE: 328 MG/DL (ref 70–110)
POCT GLUCOSE: 328 MG/DL (ref 70–110)
POCT GLUCOSE: 356 MG/DL (ref 70–110)
POTASSIUM SERPL-SCNC: 4.5 MMOL/L (ref 3.5–5.1)
PROT SERPL-MCNC: 6.4 G/DL (ref 6–8.4)
RBC # BLD AUTO: 3.8 M/UL (ref 4.6–6.2)
SAMPLE: ABNORMAL
SITE: ABNORMAL
SODIUM SERPL-SCNC: 138 MMOL/L (ref 136–145)
SP02: 100
VT: 450
WBC # BLD AUTO: 11.79 K/UL (ref 3.9–12.7)

## 2019-05-25 PROCEDURE — 20000000 HC ICU ROOM

## 2019-05-25 PROCEDURE — 25000003 PHARM REV CODE 250: Performed by: STUDENT IN AN ORGANIZED HEALTH CARE EDUCATION/TRAINING PROGRAM

## 2019-05-25 PROCEDURE — 94003 VENT MGMT INPAT SUBQ DAY: CPT

## 2019-05-25 PROCEDURE — 27000221 HC OXYGEN, UP TO 24 HOURS

## 2019-05-25 PROCEDURE — 99222 PR INITIAL HOSPITAL CARE,LEVL II: ICD-10-PCS | Mod: ,,, | Performed by: NURSE PRACTITIONER

## 2019-05-25 PROCEDURE — 63600175 PHARM REV CODE 636 W HCPCS: Performed by: STUDENT IN AN ORGANIZED HEALTH CARE EDUCATION/TRAINING PROGRAM

## 2019-05-25 PROCEDURE — 36600 WITHDRAWAL OF ARTERIAL BLOOD: CPT

## 2019-05-25 PROCEDURE — 99233 SBSQ HOSP IP/OBS HIGH 50: CPT | Mod: 24,,, | Performed by: SURGERY

## 2019-05-25 PROCEDURE — 82330 ASSAY OF CALCIUM: CPT

## 2019-05-25 PROCEDURE — 80053 COMPREHEN METABOLIC PANEL: CPT

## 2019-05-25 PROCEDURE — 99233 PR SUBSEQUENT HOSPITAL CARE,LEVL III: ICD-10-PCS | Mod: 24,,, | Performed by: SURGERY

## 2019-05-25 PROCEDURE — 99222 1ST HOSP IP/OBS MODERATE 55: CPT | Mod: ,,, | Performed by: NURSE PRACTITIONER

## 2019-05-25 PROCEDURE — 82330 ASSAY OF CALCIUM: CPT | Mod: 91

## 2019-05-25 PROCEDURE — 85025 COMPLETE CBC W/AUTO DIFF WBC: CPT

## 2019-05-25 PROCEDURE — 94761 N-INVAS EAR/PLS OXIMETRY MLT: CPT

## 2019-05-25 PROCEDURE — 99900026 HC AIRWAY MAINTENANCE (STAT)

## 2019-05-25 PROCEDURE — 99900035 HC TECH TIME PER 15 MIN (STAT)

## 2019-05-25 PROCEDURE — 84100 ASSAY OF PHOSPHORUS: CPT

## 2019-05-25 PROCEDURE — 82803 BLOOD GASES ANY COMBINATION: CPT

## 2019-05-25 PROCEDURE — 83735 ASSAY OF MAGNESIUM: CPT

## 2019-05-25 PROCEDURE — 63600175 PHARM REV CODE 636 W HCPCS: Performed by: NURSE PRACTITIONER

## 2019-05-25 RX ORDER — MAGNESIUM SULFATE HEPTAHYDRATE 40 MG/ML
2 INJECTION, SOLUTION INTRAVENOUS ONCE
Status: COMPLETED | OUTPATIENT
Start: 2019-05-25 | End: 2019-05-25

## 2019-05-25 RX ORDER — INSULIN ASPART 100 [IU]/ML
4 INJECTION, SOLUTION INTRAVENOUS; SUBCUTANEOUS
Status: DISCONTINUED | OUTPATIENT
Start: 2019-05-25 | End: 2019-05-26

## 2019-05-25 RX ADMIN — INSULIN ASPART 6 UNITS: 100 INJECTION, SOLUTION INTRAVENOUS; SUBCUTANEOUS at 04:05

## 2019-05-25 RX ADMIN — CALCIUM CARBONATE 1000 MG: 1250 SUSPENSION ORAL at 09:05

## 2019-05-25 RX ADMIN — DEXAMETHASONE SODIUM PHOSPHATE 12 MG: 4 INJECTION, SOLUTION INTRA-ARTICULAR; INTRALESIONAL; INTRAMUSCULAR; INTRAVENOUS; SOFT TISSUE at 02:05

## 2019-05-25 RX ADMIN — CALCIUM CARBONATE 1000 MG: 1250 SUSPENSION ORAL at 02:05

## 2019-05-25 RX ADMIN — INSULIN ASPART 3 UNITS: 100 INJECTION, SOLUTION INTRAVENOUS; SUBCUTANEOUS at 04:05

## 2019-05-25 RX ADMIN — INSULIN ASPART 4 UNITS: 100 INJECTION, SOLUTION INTRAVENOUS; SUBCUTANEOUS at 07:05

## 2019-05-25 RX ADMIN — INSULIN ASPART 10 UNITS: 100 INJECTION, SOLUTION INTRAVENOUS; SUBCUTANEOUS at 08:05

## 2019-05-25 RX ADMIN — LABETALOL HYDROCHLORIDE 50 MG: 100 TABLET ORAL at 02:05

## 2019-05-25 RX ADMIN — FAMOTIDINE 20 MG: 20 TABLET, FILM COATED ORAL at 09:05

## 2019-05-25 RX ADMIN — INSULIN ASPART 6 UNITS: 100 INJECTION, SOLUTION INTRAVENOUS; SUBCUTANEOUS at 07:05

## 2019-05-25 RX ADMIN — DEXAMETHASONE SODIUM PHOSPHATE 12 MG: 4 INJECTION, SOLUTION INTRA-ARTICULAR; INTRALESIONAL; INTRAMUSCULAR; INTRAVENOUS; SOFT TISSUE at 05:05

## 2019-05-25 RX ADMIN — PROPOFOL 20 MCG/KG/MIN: 10 INJECTION, EMULSION INTRAVENOUS at 05:05

## 2019-05-25 RX ADMIN — HYDRALAZINE HYDROCHLORIDE 10 MG: 20 INJECTION INTRAMUSCULAR; INTRAVENOUS at 05:05

## 2019-05-25 RX ADMIN — ATORVASTATIN CALCIUM 10 MG: 10 TABLET, FILM COATED ORAL at 09:05

## 2019-05-25 RX ADMIN — INSULIN ASPART 3 UNITS: 100 INJECTION, SOLUTION INTRAVENOUS; SUBCUTANEOUS at 12:05

## 2019-05-25 RX ADMIN — DEXAMETHASONE SODIUM PHOSPHATE 12 MG: 4 INJECTION, SOLUTION INTRA-ARTICULAR; INTRALESIONAL; INTRAMUSCULAR; INTRAVENOUS; SOFT TISSUE at 09:05

## 2019-05-25 RX ADMIN — PROPOFOL 40 MCG/KG/MIN: 10 INJECTION, EMULSION INTRAVENOUS at 10:05

## 2019-05-25 RX ADMIN — Medication 150 MCG: at 01:05

## 2019-05-25 RX ADMIN — CHLORHEXIDINE GLUCONATE 0.12% ORAL RINSE 15 ML: 1.2 LIQUID ORAL at 09:05

## 2019-05-25 RX ADMIN — LEVOTHYROXINE SODIUM 137 MCG: 25 TABLET ORAL at 05:05

## 2019-05-25 RX ADMIN — INSULIN ASPART 8 UNITS: 100 INJECTION, SOLUTION INTRAVENOUS; SUBCUTANEOUS at 12:05

## 2019-05-25 RX ADMIN — MAGNESIUM SULFATE IN WATER 2 G: 40 INJECTION, SOLUTION INTRAVENOUS at 05:05

## 2019-05-25 RX ADMIN — ENOXAPARIN SODIUM 40 MG: 100 INJECTION SUBCUTANEOUS at 05:05

## 2019-05-25 RX ADMIN — PROPOFOL 30 MCG/KG/MIN: 10 INJECTION, EMULSION INTRAVENOUS at 01:05

## 2019-05-25 NOTE — SUBJECTIVE & OBJECTIVE
Interval History/Significant Events: Continued bradycardia overnight. UOP excellent. H/H stable. Minimal output from drain.    Follow-up For: Procedure(s) (LRB):  PARATHYROIDECTOMY (N/A)  RE-EXPLORATION, FOR BLEEDING    Post-Operative Day: 3 Days Post-Op    Objective:     Vital Signs (Most Recent):  Temp: 97.8 °F (36.6 °C) (05/25/19 0700)  Pulse: (!) 39 (05/25/19 0737)  Resp: (!) 22 (05/25/19 0737)  BP: (!) 107/56 (05/25/19 0737)  SpO2: 100 % (05/25/19 0737) Vital Signs (24h Range):  Temp:  [97.8 °F (36.6 °C)-98.6 °F (37 °C)] 97.8 °F (36.6 °C)  Pulse:  [33-78] 39  Resp:  [9-35] 22  SpO2:  [98 %-100 %] 100 %  BP: (107-204)/(56-95) 107/56  Arterial Line BP: (120-188)/(52-93) 128/52     Weight: 81.3 kg (179 lb 3.7 oz)  Body mass index is 25.72 kg/m².      Intake/Output Summary (Last 24 hours) at 5/25/2019 0829  Last data filed at 5/25/2019 0700  Gross per 24 hour   Intake 2818 ml   Output 3280 ml   Net -462 ml       Physical Exam   Constitutional: He is oriented to person, place, and time. He appears well-developed and well-nourished. No distress.   HENT:   Head: Normocephalic and atraumatic.   Scleral edema  Neck incision clean/dry/intact  BRICE drain neck draining serosanguinous   Eyes: No scleral icterus.   Cardiovascular: Normal rate and regular rhythm.   Pulmonary/Chest: Effort normal. No respiratory distress.   Abdominal: Soft. He exhibits no distension.   Neurological: He is alert and oriented to person, place, and time.   Skin: Skin is warm and dry.   Psychiatric: He has a normal mood and affect. His behavior is normal. Judgment and thought content normal.   Nursing note and vitals reviewed.      Vents:  Vent Mode: A/C (05/25/19 0737)  Ventilator Initiated: Yes (05/22/19 2109)  Set Rate: 18 bmp (05/25/19 0737)  Vt Set: 450 mL (05/25/19 0737)  Pressure Support: 10 cmH20 (05/23/19 1803)  PEEP/CPAP: 5 cmH20 (05/25/19 0737)  Oxygen Concentration (%): 40 (05/25/19 0737)  Peak Airway Pressure: 22 cmH2O (05/25/19  0737)  Plateau Pressure: 22 cmH20 (05/25/19 0737)  Total Ve: 8.17 mL (05/25/19 0737)  F/VT Ratio<105 (RSBI): (!) 48.35 (05/25/19 0737)    Lines/Drains/Airways     Drain                 Closed/Suction Drain 05/22/19 1741 Left Neck Bulb 10 Fr. 2 days         NG/OG Tube 05/22/19 2015 orogastric 2 days         Urethral Catheter 05/22/19 2245 2 days          Airway                 Airway - Non-Surgical 05/22/19 2008 Endotracheal Tube 2 days          Peripheral Intravenous Line                 Peripheral IV - Single Lumen 05/22/19 1040 18 G Left Forearm 2 days         Peripheral IV - Single Lumen 05/25/19 0200 20 G Right Antecubital less than 1 day                Significant Labs:    CBC/Anemia Profile:  Recent Labs   Lab 05/24/19  0352 05/25/19  0311   WBC 14.48* 11.79   HGB 11.1* 11.9*   HCT 33.9* 37.1*    262   MCV 96 98   RDW 13.2 13.2        Chemistries:  Recent Labs   Lab 05/24/19  0352 05/25/19  0311    138   K 4.3 4.5    102   CO2 25 29   BUN 20 28*   CREATININE 0.8 0.9   CALCIUM 10.2 10.1   ALBUMIN 3.4* 3.2*   PROT 6.5 6.4   BILITOT 0.2 0.2   ALKPHOS 65 67   ALT 10 8*   AST 20 14   MG 1.8 1.8   PHOS 3.5 3.0       All pertinent labs within the past 24 hours have been reviewed.    Significant Imaging:  I have reviewed all pertinent imaging results/findings within the past 24 hours.

## 2019-05-25 NOTE — PROGRESS NOTES
Patient seen and examined.    Stable overnight. On tube feeds and making good urine.  Sedated. Stable vent settings.    Labs and vitals reviewed    72h of steroids should be complete by tomorrow, plan extubation then with anesthesia and ENT.  +cuff leak today  Continue current care.  Tube feeds off midnight.

## 2019-05-25 NOTE — PLAN OF CARE
Problem: Adult Inpatient Plan of Care  Goal: Plan of Care Review    5/22 Parathyroidectomy - PACU extubated - emergent intubation for stridor.   Outcome: Ongoing (interventions implemented as appropriate)  Reviewed plan of care with Pt. Pt remains intubated on minimal vent settings, sedated with Fentanyl, Propofol and versed titrated for RASS and comfort. Versed weaned for extubation tomorrow. Pt remains agitated with stimulation, opening eyes, moving all extremities but will not follow commands. Pt frequently attempts to climb out of bed and reaches for ETT, able to be redirected with effective nursing communication and repositioning. Pt positioned for airway safety and comfort, restraints remain in place for safety, will address d/c criteria PRN. UO excellent. TF at goal. BG elevated, Pt on impact peptide TF, endocrine consulted for recs. TF to be held at midnight for extubation tomorrow. Pt remains SB with stable BP, HR increases while Pt is awake and agitated. No new skin breakdown noted, skin intact to heels, sacrum, elbows, back of head. VSS. Will continue to monitor.

## 2019-05-25 NOTE — CONSULTS
[facility]  Endocrinology  Diabetes Consult Note    Consult Requested by: Mounika Carmona MD   Reason for admit: Hyperparathyroidism    HISTORY OF PRESENT ILLNESS:  Reason for Consult: Management of T2DM, Hyperglycemia     Surgical Procedure and Date: Parathyroidectomy with IOPTH monitoring    Diabetes diagnosis year: NATALEE     Home Diabetes Medications:  metformin 100 mg BID; januvia 100 mg daily   Lab Results   Component Value Date    LABA1C 9.2 (H) 05/18/2016    HGBA1C 6.1 (H) 05/16/2019         How often checking glucose at home? NATALEE  BG readings on regimen: NATALEE  Hypoglycemia on the regimen?  NATALEE  Missed doses on regimen?  NATALEE    Diabetes Complications include:     NATALEE    Complicating diabetes co morbidities:   none      HPI:   Patient is a 63 y.o. male with a diagnosis of type 2 DM well controlled on dual oral agent per chart review. Also with HTN, Grave's disease (s/p total thyroidectomy, previous pheochromocytoma resection, and primary hyperparathyroidism (s/p parathyroidectomy). Endocrinology consulted for BG/ DM management.             Interval HPI:   Admitted to ICU. Remains intubated and sedated. On enteral feeds; plans to hold at midnight in anticipation of extubation tomorrow.   Eating:   NPO  Nausea: No  Hypoglycemia and intervention: No  Fever: No  TF: impact peptide at 50 cc/hr; to be held at midnight.     PMH, PSH, FH, SH  reviewed       Review of Systems   Unable to obtain due to: Sedation,Intubation,Altered mental status,Critical illness,Reviewed ROS from note dated 5/22/19 per Dr. Carmona      Current Medications and/or Treatments Impacting Glycemic Control  Immunotherapy:    Immunosuppressants     None        Steroids:   Hormones (From admission, onward)    Start     Stop Route Frequency Ordered    05/23/19 1400  dexamethasone injection 12 mg      -- IV Every 8 hours 05/23/19 0806        Pressors:    Autonomic Drugs (From admission, onward)    Start     Stop Route  Frequency Ordered    05/22/19 1958  succinylcholine (ANECTINE) 20 mg/mL injection     Note to Pharmacy:  Created by cabinet override    05/23 0759   05/22/19 1958 05/22/19 1926  rocuronium (ZEMURON) 10 mg/mL injection     Note to Pharmacy:  Created by cabinet override    05/23 0729   05/22/19 1926        Hyperglycemia/Diabetes Medications:   Antihyperglycemics (From admission, onward)    Start     Stop Route Frequency Ordered    05/25/19 1630  insulin regular (Humulin R) 100 Units in sodium chloride 0.9% 100 mL infusion     Question:  Insulin Rate Adjustment (DO NOT MODIFY ANSWER)  Answer:  \\[email or web address]epic\Images\Pharmacy\InsulinInfusions\InsulinRegAdj [id number].[email or web address]    -- IV Continuous 05/25/19 1615    05/24/19 0812  insulin aspart U-100 pen 1-10 Units      -- SubQ Every 4 hours PRN 05/24/19 0812             PHYSICAL EXAMINATION:  Vitals:    05/25/19 1600   BP: (!) 157/72   Pulse: (!) 36   Resp:    Temp:      Body mass index is 25.72 kg/m².    Physical Exam   Constitutional: Well developed, well nourished, NAD.  ENT: External ears no masses with nose patent; normal hearing.  Neck: Supple; trachea midline.  Cardiovascular: Normal heart sounds, no LE edema. DP +2 bilaterally.  Lungs: Intubated on ventilator; lungs anterior bilaterally clear to auscultation.  Abdomen: Soft, no masses, no hernias.  MS: No clubbing or cyanosis of nails noted; normal gait or unable to assess gait.  Skin: No rashes, lesions, or ulcers; no nodules. Transverse neck incision closed; MARGARET. BRICE.   Psychiatric: NATALEE  Neurological: NATALEE  Foot: nails in good condition, no amputations noted.          Labs Reviewed and Include   Recent Labs   Lab 05/25/19  0311   *   CALCIUM 10.1   ALBUMIN 3.2*   PROT 6.4      K 4.5   CO2 29      BUN 28*   CREATININE 0.9   ALKPHOS 67   ALT 8*   AST 14   BILITOT 0.2     Lab Results   Component Value Date    WBC 11.79 05/25/2019    HGB 11.9 (L) 05/25/2019    HCT 37.1 (L) 05/25/2019    MCV 98  05/25/2019     05/25/2019     No results for input(s): TSH, FREET4 in the last 168 hours.  Lab Results   Component Value Date    HGBA1C 6.1 (H) 05/16/2019       Nutritional status:   Body mass index is 25.72 kg/m².  Lab Results   Component Value Date    ALBUMIN 3.2 (L) 05/25/2019    ALBUMIN 3.4 (L) 05/24/2019    ALBUMIN 3.9 05/23/2019     No results found for: PREALBUMIN    Estimated Creatinine Clearance: 87.9 mL/min (based on SCr of 0.9 mg/dL).    Accu-Checks  Recent Labs     05/24/19  0736 05/24/19  1140 05/24/19  1627 05/24/19  2002 05/25/19  0015 05/25/19  0403 05/25/19  0808 05/25/19  0811 05/25/19  1214 05/25/19  1609   POCTGLUCOSE 262* 200* 245* 250* 293* 293* 328* 356* 328* 298*        ASSESSMENT and PLAN    * Hyperparathyroidism  Managed per primary.     Uncontrolled type 2 diabetes mellitus with complication, with long-term current use of insulin  BG goal 140-180 while inpatient.       novolog 4 units at 2000 if BG > 200; hold if 200 or less given BG would be trending down and TF to be turned off at 0000.   BG monitoring every 4 hours and moderate dose correction scale.   If TF resumed will need to add scheduled novolog every 4 hours or start IV insulin infusion. Please notify endocrine with any changed in nutrition.       On enteral nutrition  Elevating BG; to suspend at midnight.         Adrenal cortical steroids causing adverse effect in therapeutic use  Glucocorticoids markedly increase glucoses. Expect the steroid taper will help glucose control.             Plan discussed with RN at bedside.     Roseann Bolton, NP  Endocrinology  [facility]

## 2019-05-25 NOTE — ASSESSMENT & PLAN NOTE
BG goal 140-180 while inpatient.       novolog 4 units at 2000 if BG > 200; hold if 200 or less given BG would be trending down and TF to be turned off at 0000.   BG monitoring every 4 hours and moderate dose correction scale.   If TF resumed will need to add scheduled novolog every 4 hours or start IV insulin infusion. Please notify endocrine with any changed in nutrition.

## 2019-05-25 NOTE — PROGRESS NOTES
[facility]  Critical Care - Surgery  Progress Note    Patient Name: Rob Jones Jr.  MRN: [id number]  Admission Date: 5/22/2019  Hospital Length of Stay: 2 days  Code Status: No Order  Attending Provider: Mounika Carmona MD  Primary Care Provider: Vasyl Jimenez MD   Principal Problem: Hyperparathyroidism    Subjective:     Hospital/ICU Course:  No notes on file    Interval History/Significant Events: Continued bradycardia overnight. UOP excellent. H/H stable. Minimal output from drain.    Follow-up For: Procedure(s) (LRB):  PARATHYROIDECTOMY (N/A)  RE-EXPLORATION, FOR BLEEDING    Post-Operative Day: 3 Days Post-Op    Objective:     Vital Signs (Most Recent):  Temp: 97.8 °F (36.6 °C) (05/25/19 0700)  Pulse: (!) 39 (05/25/19 0737)  Resp: (!) 22 (05/25/19 0737)  BP: (!) 107/56 (05/25/19 0737)  SpO2: 100 % (05/25/19 0737) Vital Signs (24h Range):  Temp:  [97.8 °F (36.6 °C)-98.6 °F (37 °C)] 97.8 °F (36.6 °C)  Pulse:  [33-78] 39  Resp:  [9-35] 22  SpO2:  [98 %-100 %] 100 %  BP: (107-204)/(56-95) 107/56  Arterial Line BP: (120-188)/(52-93) 128/52     Weight: 81.3 kg (179 lb 3.7 oz)  Body mass index is 25.72 kg/m².      Intake/Output Summary (Last 24 hours) at 5/25/2019 0829  Last data filed at 5/25/2019 0700  Gross per 24 hour   Intake 2818 ml   Output 3280 ml   Net -462 ml       Physical Exam   Constitutional: He is oriented to person, place, and time. He appears well-developed and well-nourished. No distress.   HENT:   Head: Normocephalic and atraumatic.   Scleral edema  Neck incision clean/dry/intact  BRICE drain neck draining serosanguinous   Eyes: No scleral icterus.   Cardiovascular: Normal rate and regular rhythm.   Pulmonary/Chest: Effort normal. No respiratory distress.   Abdominal: Soft. He exhibits no distension.   Neurological: He is alert and oriented to person, place, and time.   Skin: Skin is warm and dry.   Psychiatric: He has a normal mood and affect. His behavior is normal.  Judgment and thought content normal.   Nursing note and vitals reviewed.      Vents:  Vent Mode: A/C (05/25/19 0737)  Ventilator Initiated: Yes (05/22/19 2109)  Set Rate: 18 bmp (05/25/19 0737)  Vt Set: 450 mL (05/25/19 0737)  Pressure Support: 10 cmH20 (05/23/19 1803)  PEEP/CPAP: 5 cmH20 (05/25/19 0737)  Oxygen Concentration (%): 40 (05/25/19 0737)  Peak Airway Pressure: 22 cmH2O (05/25/19 0737)  Plateau Pressure: 22 cmH20 (05/25/19 0737)  Total Ve: 8.17 mL (05/25/19 0737)  F/VT Ratio<105 (RSBI): (!) 48.35 (05/25/19 0737)    Lines/Drains/Airways     Drain                 Closed/Suction Drain 05/22/19 1741 Left Neck Bulb 10 Fr. 2 days         NG/OG Tube 05/22/19 2015 orogastric 2 days         Urethral Catheter 05/22/19 2245 2 days          Airway                 Airway - Non-Surgical 05/22/19 2008 Endotracheal Tube 2 days          Peripheral Intravenous Line                 Peripheral IV - Single Lumen 05/22/19 1040 18 G Left Forearm 2 days         Peripheral IV - Single Lumen 05/25/19 0200 20 G Right Antecubital less than 1 day                Significant Labs:    CBC/Anemia Profile:  Recent Labs   Lab 05/24/19  0352 05/25/19  0311   WBC 14.48* 11.79   HGB 11.1* 11.9*   HCT 33.9* 37.1*    262   MCV 96 98   RDW 13.2 13.2        Chemistries:  Recent Labs   Lab 05/24/19  0352 05/25/19  0311    138   K 4.3 4.5    102   CO2 25 29   BUN 20 28*   CREATININE 0.8 0.9   CALCIUM 10.2 10.1   ALBUMIN 3.4* 3.2*   PROT 6.5 6.4   BILITOT 0.2 0.2   ALKPHOS 65 67   ALT 10 8*   AST 20 14   MG 1.8 1.8   PHOS 3.5 3.0       All pertinent labs within the past 24 hours have been reviewed.    Significant Imaging:  I have reviewed all pertinent imaging results/findings within the past 24 hours.    Assessment/Plan:     * Hyperparathyroidism  63 y.o. male w/ a significant PMHx of primary hyperparathyroidism s/p parathyroidectomy on 5/22 c/b bilateral VC immobility ultimately requiring reintubation in the PACU.      Neuro:    - Sedation with propofol gtt  - Analgesia covered with fentanyl gtt     Pulmonary:   - Intubated  Vent Mode: A/C  Oxygen Concentration (%):  [40] 40  Resp Rate Total:  [18 br/min-24 br/min] 18 br/min  Vt Set:  [450 mL] 450 mL  PEEP/CPAP:  [5 cmH20] 5 cmH20  Mean Airway Pressure:  [8.2 dlZ38-70 cmH20] 8.6 cmH20  - ENT following for VC immobility  - serial ABGs  - daily CXR  - Per primary, plan to keep pt intuabted for at least 72 hours to allow post op swelling and steroids (dexamethasone) to improve cord function     Cardiac:   -  Bradycardic  - Amlodipine 10, Irbesartan 150  - Atorvastatin 10     Renal:    - Monitor UOP  - BN/Cr stable overnight  - Monitor BUN/Cr      ID:   - AF  - WBC 11.8 from 14.5 overnight  - Monitor WBC    Hem/Onc:   - H/H stable   - Monitor CBC  - Transfuse as needed     Endocrine:    - Hx of DM  - Accuchecks  - SSI prn  - Synthroid     Fluids/Electrolytes/Nutrition/GI:   - Replace electrolytes PRN  - NPO  - Tube feeds at 50 cc/hr (goal)     PPx:  - DVT:  Lovenox  - Bowel: docusate  - PUP: famotidine     DISPO:  - Cont SICU care         Critical care was time spent personally by me on the following activities: development of treatment plan with patient or surrogate and bedside caregivers, discussions with consultants, evaluation of patient's response to treatment, examination of patient, ordering and performing treatments and interventions, ordering and review of laboratory studies, ordering and review of radiographic studies, pulse oximetry, re-evaluation of patient's condition.  This critical care time did not overlap with that of any other provider or involve time for any procedures.     Brody Vazquez MD  Critical Care - Surgery  [facility]

## 2019-05-25 NOTE — HPI
Reason for Consult: Management of T2DM, Hyperglycemia     Surgical Procedure and Date: Parathyroidectomy with IOPTH monitoring    Diabetes diagnosis year: NATALEE     Home Diabetes Medications:  metformin 100 mg BID; januvia 100 mg daily   Lab Results   Component Value Date    LABA1C 9.2 (H) 05/18/2016    HGBA1C 6.1 (H) 05/16/2019         How often checking glucose at home? NATALEE  BG readings on regimen: NATALEE  Hypoglycemia on the regimen?  NATALEE  Missed doses on regimen?  NATALEE    Diabetes Complications include:     NATALEE    Complicating diabetes co morbidities:   none      HPI:   Patient is a 63 y.o. male with a diagnosis of type 2 DM well controlled on dual oral agent per chart review. Also with HTN, Grave's disease (s/p total thyroidectomy, previous pheochromocytoma resection, and primary hyperparathyroidism (s/p parathyroidectomy). Endocrinology consulted for BG/ DM management.

## 2019-05-25 NOTE — ASSESSMENT & PLAN NOTE
BG goal 140-180 while inpatient.       BG remains above goal off of TF.   Start IV insulin infusion protocol.  Requires intensive BG monitoring.

## 2019-05-25 NOTE — ASSESSMENT & PLAN NOTE
63 y.o. male w/ a significant PMHx of primary hyperparathyroidism s/p parathyroidectomy on 5/22 c/b bilateral VC immobility ultimately requiring reintubation in the PACU.      Neuro:   - Sedation with propofol gtt  - Analgesia covered with fentanyl gtt     Pulmonary:   - Intubated  Vent Mode: A/C  Oxygen Concentration (%):  [40] 40  Resp Rate Total:  [18 br/min-24 br/min] 18 br/min  Vt Set:  [450 mL] 450 mL  PEEP/CPAP:  [5 cmH20] 5 cmH20  Mean Airway Pressure:  [8.2 pbK52-40 cmH20] 8.6 cmH20  - ENT following for VC immobility  - serial ABGs  - daily CXR  - Per primary, plan to keep pt intuabted for at least 72 hours to allow post op swelling and steroids (dexamethasone) to improve cord function     Cardiac:   - Bradycardic  - Amlodipine 10, Irbesartan 150  - Atorvastatin 10     Renal:    - Monitor UOP  - BN/Cr stable overnight  - Monitor BUN/Cr      ID:   - AF  - WBC 11.8 from 14.5 overnight  - Monitor WBC    Hem/Onc:   - H/H stable   - Monitor CBC  - Transfuse as needed     Endocrine:    - Hx of DM  - Accuchecks  - SSI prn  - Synthroid     Fluids/Electrolytes/Nutrition/GI:   - Replace electrolytes PRN  - NPO  - Tube feeds at 50 cc/hr (goal)     PPx:  - DVT: Lovenox  - Bowel: docusate  - PUP: famotidine     DISPO:  - Cont SICU care

## 2019-05-25 NOTE — ASSESSMENT & PLAN NOTE
Glucocorticoids markedly increase glucoses. Expect the steroid taper will help glucose control.

## 2019-05-25 NOTE — PLAN OF CARE
"Problem: Adult Inpatient Plan of Care  Goal: Plan of Care Review    5/22 Parathyroidectomy - PACU extubated - emergent intubation for stridor.   Outcome: Ongoing (interventions implemented as appropriate)  Dx: Hyperparathyroidism    Shift Events: patient pulled out A line while in restraints    Neuro: Moves All Extremities    Vital Signs: BP (!) 158/74   Pulse (!) 34   Temp 97.8 °F (36.6 °C) (Oral)   Resp 18   Ht 5' 10" (1.778 m)   Wt 81.3 kg (179 lb 3.7 oz)   SpO2 100%   BMI 25.72 kg/m²     Diet: Tube Feeds    Gtts: Propfol and Fentanyl and versed    Urine Output: Urinary Catheter 100-175 cc  cc/hour    Drains: BRICE Drain, total output 20 cc / shift    Restraints soft wrist restraints    Labs/Accuchecks: accuchecks q4, ionized calcium q8 , daily labs.    Skin: no skin breakdown noted. Neck incision open to air with dermabond.          "

## 2019-05-25 NOTE — SUBJECTIVE & OBJECTIVE
Interval HPI:   Admitted to ICU. Remains intubated and sedated. On enteral feeds; plans to hold at midnight in anticipation of extubation tomorrow.   Eating:   NPO  Nausea: No  Hypoglycemia and intervention: No  Fever: No  TF: impact peptide at 50 cc/hr; to be held at midnight.     PMH, PSH, FH, SH  reviewed       Review of Systems   Unable to obtain due to: Sedation,Intubation,Altered mental status,Critical illness,Reviewed ROS from note dated 5/22/19 per Dr. Carmona      Current Medications and/or Treatments Impacting Glycemic Control  Immunotherapy:    Immunosuppressants     None        Steroids:   Hormones (From admission, onward)    Start     Stop Route Frequency Ordered    05/23/19 1400  dexamethasone injection 12 mg      -- IV Every 8 hours 05/23/19 0806        Pressors:    Autonomic Drugs (From admission, onward)    Start     Stop Route Frequency Ordered    05/22/19 1958  succinylcholine (ANECTINE) 20 mg/mL injection     Note to Pharmacy:  Created by cabinet override    05/23 0759   05/22/19 1958 05/22/19 1926  rocuronium (ZEMURON) 10 mg/mL injection     Note to Pharmacy:  Created by cabinet override    05/23 0729   05/22/19 1926        Hyperglycemia/Diabetes Medications:   Antihyperglycemics (From admission, onward)    Start     Stop Route Frequency Ordered    05/25/19 1630  insulin regular (Humulin R) 100 Units in sodium chloride 0.9% 100 mL infusion     Question:  Insulin Rate Adjustment (DO NOT MODIFY ANSWER)  Answer:  [email or web address]\Images\Pharmacy\InsulinInfusions\InsulinRegAdj [id number].pdf    -- IV Continuous 05/25/19 1615    05/24/19 0812  insulin aspart U-100 pen 1-10 Units      -- SubQ Every 4 hours PRN 05/24/19 0812             PHYSICAL EXAMINATION:  Vitals:    05/25/19 1600   BP: (!) 157/72   Pulse: (!) 36   Resp:    Temp:      Body mass index is 25.72 kg/m².    Physical Exam   Constitutional: Well developed, well nourished, NAD.  ENT: External ears no masses with nose patent; normal  hearing.  Neck: Supple; trachea midline.  Cardiovascular: Normal heart sounds, no LE edema. DP +2 bilaterally.  Lungs: Intubated on ventilator; lungs anterior bilaterally clear to auscultation.  Abdomen: Soft, no masses, no hernias.  MS: No clubbing or cyanosis of nails noted; normal gait or unable to assess gait.  Skin: No rashes, lesions, or ulcers; no nodules. Transverse neck incision closed; MARGARET. BRICE.   Psychiatric: NATALEE  Neurological: NATALEE  Foot: nails in good condition, no amputations noted.

## 2019-05-26 LAB
ALBUMIN SERPL BCP-MCNC: 3 G/DL (ref 3.5–5.2)
ALLENS TEST: ABNORMAL
ALP SERPL-CCNC: 69 U/L (ref 55–135)
ALT SERPL W/O P-5'-P-CCNC: 10 U/L (ref 10–44)
ANION GAP SERPL CALC-SCNC: 7 MMOL/L (ref 8–16)
AST SERPL-CCNC: 13 U/L (ref 10–40)
BASOPHILS # BLD AUTO: 0 K/UL (ref 0–0.2)
BASOPHILS NFR BLD: 0 % (ref 0–1.9)
BILIRUB SERPL-MCNC: 0.2 MG/DL (ref 0.1–1)
BUN SERPL-MCNC: 31 MG/DL (ref 8–23)
CA-I BLDV-SCNC: 0.91 MMOL/L (ref 1.06–1.42)
CA-I BLDV-SCNC: 1.23 MMOL/L (ref 1.06–1.42)
CALCIUM SERPL-MCNC: 10 MG/DL (ref 8.7–10.5)
CHLORIDE SERPL-SCNC: 103 MMOL/L (ref 95–110)
CO2 SERPL-SCNC: 29 MMOL/L (ref 23–29)
CREAT SERPL-MCNC: 1 MG/DL (ref 0.5–1.4)
DELSYS: ABNORMAL
DIFFERENTIAL METHOD: ABNORMAL
EOSINOPHIL # BLD AUTO: 0 K/UL (ref 0–0.5)
EOSINOPHIL NFR BLD: 0 % (ref 0–8)
ERYTHROCYTE [DISTWIDTH] IN BLOOD BY AUTOMATED COUNT: 13.2 % (ref 11.5–14.5)
ERYTHROCYTE [SEDIMENTATION RATE] IN BLOOD BY WESTERGREN METHOD: 18 MM/H
EST. GFR  (AFRICAN AMERICAN): >60 ML/MIN/1.73 M^2
EST. GFR  (NON AFRICAN AMERICAN): >60 ML/MIN/1.73 M^2
FIO2: 30
GLUCOSE SERPL-MCNC: 300 MG/DL (ref 70–110)
HCO3 UR-SCNC: 29.3 MMOL/L (ref 24–28)
HCT VFR BLD AUTO: 37.2 % (ref 40–54)
HGB BLD-MCNC: 12 G/DL (ref 14–18)
IMM GRANULOCYTES # BLD AUTO: 0.09 K/UL (ref 0–0.04)
IMM GRANULOCYTES NFR BLD AUTO: 0.7 % (ref 0–0.5)
LYMPHOCYTES # BLD AUTO: 1.1 K/UL (ref 1–4.8)
LYMPHOCYTES NFR BLD: 8.4 % (ref 18–48)
MAGNESIUM SERPL-MCNC: 1.8 MG/DL (ref 1.6–2.6)
MCH RBC QN AUTO: 31.2 PG (ref 27–31)
MCHC RBC AUTO-ENTMCNC: 32.3 G/DL (ref 32–36)
MCV RBC AUTO: 97 FL (ref 82–98)
MODE: ABNORMAL
MONOCYTES # BLD AUTO: 0.4 K/UL (ref 0.3–1)
MONOCYTES NFR BLD: 3.2 % (ref 4–15)
NEUTROPHILS # BLD AUTO: 11 K/UL (ref 1.8–7.7)
NEUTROPHILS NFR BLD: 87.7 % (ref 38–73)
NRBC BLD-RTO: 0 /100 WBC
PCO2 BLDA: 45.8 MMHG (ref 35–45)
PEEP: 5
PH SMN: 7.41 [PH] (ref 7.35–7.45)
PHOSPHATE SERPL-MCNC: 3.7 MG/DL (ref 2.7–4.5)
PLATELET # BLD AUTO: 241 K/UL (ref 150–350)
PMV BLD AUTO: 11 FL (ref 9.2–12.9)
PO2 BLDA: 87 MMHG (ref 80–100)
POC BE: 5 MMOL/L
POC SATURATED O2: 97 % (ref 95–100)
POC TCO2: 31 MMOL/L (ref 23–27)
POCT GLUCOSE: 142 MG/DL (ref 70–110)
POCT GLUCOSE: 157 MG/DL (ref 70–110)
POCT GLUCOSE: 163 MG/DL (ref 70–110)
POCT GLUCOSE: 163 MG/DL (ref 70–110)
POCT GLUCOSE: 171 MG/DL (ref 70–110)
POCT GLUCOSE: 171 MG/DL (ref 70–110)
POCT GLUCOSE: 177 MG/DL (ref 70–110)
POCT GLUCOSE: 193 MG/DL (ref 70–110)
POCT GLUCOSE: 202 MG/DL (ref 70–110)
POCT GLUCOSE: 205 MG/DL (ref 70–110)
POCT GLUCOSE: 209 MG/DL (ref 70–110)
POCT GLUCOSE: 225 MG/DL (ref 70–110)
POCT GLUCOSE: 237 MG/DL (ref 70–110)
POCT GLUCOSE: 241 MG/DL (ref 70–110)
POCT GLUCOSE: 264 MG/DL (ref 70–110)
POCT GLUCOSE: 313 MG/DL (ref 70–110)
POCT GLUCOSE: 327 MG/DL (ref 70–110)
POTASSIUM SERPL-SCNC: 4.4 MMOL/L (ref 3.5–5.1)
PROT SERPL-MCNC: 6.2 G/DL (ref 6–8.4)
RBC # BLD AUTO: 3.85 M/UL (ref 4.6–6.2)
SAMPLE: ABNORMAL
SITE: ABNORMAL
SODIUM SERPL-SCNC: 139 MMOL/L (ref 136–145)
VT: 450
WBC # BLD AUTO: 12.55 K/UL (ref 3.9–12.7)

## 2019-05-26 PROCEDURE — 25000003 PHARM REV CODE 250: Performed by: NURSE PRACTITIONER

## 2019-05-26 PROCEDURE — 82803 BLOOD GASES ANY COMBINATION: CPT

## 2019-05-26 PROCEDURE — 99900026 HC AIRWAY MAINTENANCE (STAT)

## 2019-05-26 PROCEDURE — 63600175 PHARM REV CODE 636 W HCPCS: Performed by: STUDENT IN AN ORGANIZED HEALTH CARE EDUCATION/TRAINING PROGRAM

## 2019-05-26 PROCEDURE — 99233 PR SUBSEQUENT HOSPITAL CARE,LEVL III: ICD-10-PCS | Mod: ,,, | Performed by: NURSE PRACTITIONER

## 2019-05-26 PROCEDURE — 99233 SBSQ HOSP IP/OBS HIGH 50: CPT | Mod: ,,, | Performed by: NURSE PRACTITIONER

## 2019-05-26 PROCEDURE — 82330 ASSAY OF CALCIUM: CPT

## 2019-05-26 PROCEDURE — 99233 PR SUBSEQUENT HOSPITAL CARE,LEVL III: ICD-10-PCS | Mod: 24,,, | Performed by: SURGERY

## 2019-05-26 PROCEDURE — 63600175 PHARM REV CODE 636 W HCPCS: Performed by: NURSE PRACTITIONER

## 2019-05-26 PROCEDURE — 84100 ASSAY OF PHOSPHORUS: CPT

## 2019-05-26 PROCEDURE — 94761 N-INVAS EAR/PLS OXIMETRY MLT: CPT

## 2019-05-26 PROCEDURE — 82330 ASSAY OF CALCIUM: CPT | Mod: 91

## 2019-05-26 PROCEDURE — 20000000 HC ICU ROOM

## 2019-05-26 PROCEDURE — 25000003 PHARM REV CODE 250: Performed by: STUDENT IN AN ORGANIZED HEALTH CARE EDUCATION/TRAINING PROGRAM

## 2019-05-26 PROCEDURE — 27000221 HC OXYGEN, UP TO 24 HOURS

## 2019-05-26 PROCEDURE — 36600 WITHDRAWAL OF ARTERIAL BLOOD: CPT

## 2019-05-26 PROCEDURE — 94003 VENT MGMT INPAT SUBQ DAY: CPT

## 2019-05-26 PROCEDURE — 99233 SBSQ HOSP IP/OBS HIGH 50: CPT | Mod: 24,,, | Performed by: SURGERY

## 2019-05-26 PROCEDURE — 27200966 HC CLOSED SUCTION SYSTEM

## 2019-05-26 PROCEDURE — 99900035 HC TECH TIME PER 15 MIN (STAT)

## 2019-05-26 PROCEDURE — 80053 COMPREHEN METABOLIC PANEL: CPT

## 2019-05-26 PROCEDURE — 85025 COMPLETE CBC W/AUTO DIFF WBC: CPT

## 2019-05-26 PROCEDURE — 83735 ASSAY OF MAGNESIUM: CPT

## 2019-05-26 RX ORDER — HYDRALAZINE HYDROCHLORIDE 20 MG/ML
10 INJECTION INTRAMUSCULAR; INTRAVENOUS EVERY 6 HOURS PRN
Status: DISCONTINUED | OUTPATIENT
Start: 2019-05-26 | End: 2019-06-03 | Stop reason: HOSPADM

## 2019-05-26 RX ORDER — DEXMEDETOMIDINE HYDROCHLORIDE 4 UG/ML
0.2 INJECTION, SOLUTION INTRAVENOUS CONTINUOUS
Status: CANCELLED | OUTPATIENT
Start: 2019-05-26

## 2019-05-26 RX ORDER — MAGNESIUM SULFATE HEPTAHYDRATE 40 MG/ML
2 INJECTION, SOLUTION INTRAVENOUS ONCE
Status: COMPLETED | OUTPATIENT
Start: 2019-05-26 | End: 2019-05-26

## 2019-05-26 RX ADMIN — DEXAMETHASONE SODIUM PHOSPHATE 12 MG: 4 INJECTION, SOLUTION INTRA-ARTICULAR; INTRALESIONAL; INTRAMUSCULAR; INTRAVENOUS; SOFT TISSUE at 01:05

## 2019-05-26 RX ADMIN — DEXAMETHASONE SODIUM PHOSPHATE 12 MG: 4 INJECTION, SOLUTION INTRA-ARTICULAR; INTRALESIONAL; INTRAMUSCULAR; INTRAVENOUS; SOFT TISSUE at 09:05

## 2019-05-26 RX ADMIN — DEXAMETHASONE SODIUM PHOSPHATE 12 MG: 4 INJECTION, SOLUTION INTRA-ARTICULAR; INTRALESIONAL; INTRAMUSCULAR; INTRAVENOUS; SOFT TISSUE at 05:05

## 2019-05-26 RX ADMIN — CALCIUM CARBONATE 1000 MG: 1250 SUSPENSION ORAL at 04:05

## 2019-05-26 RX ADMIN — FAMOTIDINE 20 MG: 20 TABLET, FILM COATED ORAL at 09:05

## 2019-05-26 RX ADMIN — INSULIN ASPART 4 UNITS: 100 INJECTION, SOLUTION INTRAVENOUS; SUBCUTANEOUS at 12:05

## 2019-05-26 RX ADMIN — CHLORHEXIDINE GLUCONATE 0.12% ORAL RINSE 15 ML: 1.2 LIQUID ORAL at 09:05

## 2019-05-26 RX ADMIN — Medication 150 MCG: at 03:05

## 2019-05-26 RX ADMIN — ENOXAPARIN SODIUM 40 MG: 100 INJECTION SUBCUTANEOUS at 04:05

## 2019-05-26 RX ADMIN — CALCIUM GLUCONATE 2 G: 98 INJECTION, SOLUTION INTRAVENOUS at 05:05

## 2019-05-26 RX ADMIN — LEVOTHYROXINE SODIUM 137 MCG: 25 TABLET ORAL at 05:05

## 2019-05-26 RX ADMIN — PROPOFOL 40 MCG/KG/MIN: 10 INJECTION, EMULSION INTRAVENOUS at 02:05

## 2019-05-26 RX ADMIN — HYDRALAZINE HYDROCHLORIDE 10 MG: 20 INJECTION INTRAMUSCULAR; INTRAVENOUS at 02:05

## 2019-05-26 RX ADMIN — Medication 150 MCG: at 09:05

## 2019-05-26 RX ADMIN — PROPOFOL 50 MCG/KG/MIN: 10 INJECTION, EMULSION INTRAVENOUS at 02:05

## 2019-05-26 RX ADMIN — CALCIUM CARBONATE 1000 MG: 1250 SUSPENSION ORAL at 09:05

## 2019-05-26 RX ADMIN — SODIUM CHLORIDE 1.5 UNITS/HR: 9 INJECTION, SOLUTION INTRAVENOUS at 10:05

## 2019-05-26 RX ADMIN — INSULIN ASPART 4 UNITS: 100 INJECTION, SOLUTION INTRAVENOUS; SUBCUTANEOUS at 04:05

## 2019-05-26 RX ADMIN — MAGNESIUM SULFATE IN WATER 2 G: 40 INJECTION, SOLUTION INTRAVENOUS at 06:05

## 2019-05-26 RX ADMIN — PROPOFOL 40 MCG/KG/MIN: 10 INJECTION, EMULSION INTRAVENOUS at 06:05

## 2019-05-26 RX ADMIN — ATORVASTATIN CALCIUM 10 MG: 10 TABLET, FILM COATED ORAL at 09:05

## 2019-05-26 NOTE — ASSESSMENT & PLAN NOTE
Managed per primary.   Sp B/L neck exploration for parathyroid adenoma Complicated by vocal chord dysfunction  Calcium is stable  On Calcium replacement

## 2019-05-26 NOTE — ASSESSMENT & PLAN NOTE
Frederickbin KHADAR Jones Jr. is a 63 y.o. male s/p parathyroidectomy with bilateral recurrent laryngeal nerve injuries necessitating reintubation    Now s/p 72 hr steroids  Remains intubated  Plan for extubation in OR tomorrow  Will coordinate with ENT and anesthesia  Remainder of care per SICU

## 2019-05-26 NOTE — ASSESSMENT & PLAN NOTE
63 y.o. male w/ a significant PMHx of primary hyperparathyroidism s/p parathyroidectomy on 5/22 c/b bilateral VC immobility ultimately requiring reintubation in the PACU.      Neuro:   - Sedation with propofol gtt, versed gtt  - Analgesia covered with fentanyl gtt     Pulmonary:   - Intubated  Vent Mode: A/C  Oxygen Concentration (%):  [30-40] 30  Resp Rate Total:  [18 br/min-24 br/min] 18 br/min  Vt Set:  [450 mL] 450 mL  PEEP/CPAP:  [5 cmH20] 5 cmH20  Mean Airway Pressure:  [8.2 awJ17-92 cmH20] 10 cmH20  - ENT following for VC immobility  - serial ABGs  - daily CXR  - S/p 72 hour steroids  - Per primary, plan to extubate tomorrow in OR     Cardiac:   - Bradycardic  - Amlodipine 10, Irbesartan 150  - Atorvastatin 10  - Hydralazine PRN     Renal:    - Monitor UOP  - BUN/Cr stable overnight  - Monitor BUN/Cr      ID:   - AF  - WBC stable overnight  - Monitor WBC    Hem/Onc:   - H/H stable   - Monitor CBC  - Transfuse as needed     Endocrine:    - Hx of DM  - Accuchecks  - Insulin gtt  - Endocrine following, appreciate recs  - Synthroid     Fluids/Electrolytes/Nutrition/GI:   - Replace electrolytes PRN  - NPO  - Tube feeds at 50 cc/hr (goal)     PPx:  - DVT: Lovenox  - Bowel: docusate  - PUP: famotidine     DISPO:  - Cont SICU care

## 2019-05-26 NOTE — PLAN OF CARE
"Problem: Adult Inpatient Plan of Care  Goal: Plan of Care Review    5/22 Parathyroidectomy - PACU extubated - emergent intubation for stridor.   Outcome: Ongoing (interventions implemented as appropriate)  Dx: Hyperparathyroidism    Neuro: Arouses to Voice and Moves All Extremities    Vital Signs: BP (!) 140/68   Pulse (!) 41   Temp 97.9 °F (36.6 °C) (Oral)   Resp (!) 21   Ht 5' 10" (1.778 m)   Wt 81.6 kg (179 lb 14.3 oz)   SpO2 96%   BMI 25.81 kg/m²     Diet: NPO    Gtts: Propfol and Fentanyl and versed    Urine Output: Urinary Catheter 100-175 cc/hour    Drains: BRICE Drain, total output 10 cc / shift    Non violent soft wrist restraints     Labs/Accuchecks: accuchecks q4 and daily labs.    Skin: neck incision approximated and open to air with dermabond. No new skin breakdown noted. Will continue to monitor.         "

## 2019-05-26 NOTE — PROGRESS NOTES
[facility]  Critical Care - Surgery  Progress Note    Patient Name: Rob Jones Jr.  MRN: [id number]  Admission Date: 5/22/2019  Hospital Length of Stay: 3 days  Code Status: No Order  Attending Provider: Mounika Carmona MD  Primary Care Provider: Vasyl Jimenez MD   Principal Problem: Hyperparathyroidism    Subjective:     Hospital/ICU Course:  No notes on file    Interval History/Significant Events: Continued bradycardia overnight. UOP excellent. H/H stable. Minimal output from drain. Per primary, plan to extubate in OR tomorrow. Sugars elevate, endocrine consulted, started on insulin gtt.    Follow-up For: Procedure(s) (LRB):  PARATHYROIDECTOMY (N/A)  RE-EXPLORATION, FOR BLEEDING    Post-Operative Day: 3 Days Post-Op    Objective:     Vital Signs (Most Recent):  Temp: 97.7 °F (36.5 °C) (05/26/19 0700)  Pulse: (!) 41 (05/26/19 1000)  Resp: 12 (05/26/19 1000)  BP: (!) 152/72 (05/26/19 1000)  SpO2: 98 % (05/26/19 1000) Vital Signs (24h Range):  Temp:  [97.7 °F (36.5 °C)-98.8 °F (37.1 °C)] 97.7 °F (36.5 °C)  Pulse:  [35-59] 41  Resp:  [12-29] 12  SpO2:  [95 %-100 %] 98 %  BP: (116-191)/(61-96) 152/72     Weight: 81.6 kg (179 lb 14.3 oz)  Body mass index is 25.81 kg/m².      Intake/Output Summary (Last 24 hours) at 5/26/2019 1046  Last data filed at 5/26/2019 1000  Gross per 24 hour   Intake 1953.07 ml   Output 2855 ml   Net -901.93 ml       Physical Exam   Constitutional: He is oriented to person, place, and time. He appears well-developed and well-nourished. No distress.   HENT:   Head: Normocephalic and atraumatic.   Scleral edema  Neck incision clean/dry/intact  BRICE drain neck draining serosanguinous   Eyes: No scleral icterus.   Cardiovascular: Normal rate and regular rhythm.   Pulmonary/Chest: Effort normal. No respiratory distress.   Abdominal: Soft. He exhibits no distension.   Neurological: He is alert and oriented to person, place, and time.   Skin: Skin is warm and dry.    Psychiatric: He has a normal mood and affect. His behavior is normal. Judgment and thought content normal.   Nursing note and vitals reviewed.      Vents:  Vent Mode: A/C (05/26/19 0918)  Ventilator Initiated: Yes (05/22/19 2109)  Set Rate: 18 bmp (05/26/19 0918)  Vt Set: 450 mL (05/26/19 0918)  Pressure Support: 10 cmH20 (05/23/19 1803)  PEEP/CPAP: 5 cmH20 (05/26/19 0918)  Oxygen Concentration (%): 30 (05/26/19 1000)  Peak Airway Pressure: 25 cmH2O (05/26/19 0918)  Plateau Pressure: 22 cmH20 (05/26/19 0918)  Total Ve: 8.15 mL (05/26/19 0918)  F/VT Ratio<105 (RSBI): (!) 52.63 (05/26/19 0918)    Lines/Drains/Airways     Drain                 Closed/Suction Drain 05/22/19 1741 Left Neck Bulb 10 Fr. 3 days         NG/OG Tube 05/22/19 2015 orogastric 3 days         Urethral Catheter 05/22/19 2245 3 days          Airway                 Airway - Non-Surgical 05/22/19 2008 Endotracheal Tube 3 days          Peripheral Intravenous Line                 Peripheral IV - Single Lumen 05/22/19 1040 18 G Left Forearm 4 days         Peripheral IV - Single Lumen 05/25/19 0200 20 G Right Antecubital 1 day                Significant Labs:    CBC/Anemia Profile:  Recent Labs   Lab 05/25/19  0311 05/26/19  0308   WBC 11.79 12.55   HGB 11.9* 12.0*   HCT 37.1* 37.2*    241   MCV 98 97   RDW 13.2 13.2        Chemistries:  Recent Labs   Lab 05/25/19  0311 05/26/19  0456    139   K 4.5 4.4    103   CO2 29 29   BUN 28* 31*   CREATININE 0.9 1.0   CALCIUM 10.1 10.0   ALBUMIN 3.2* 3.0*   PROT 6.4 6.2   BILITOT 0.2 0.2   ALKPHOS 67 69   ALT 8* 10   AST 14 13   MG 1.8 1.8   PHOS 3.0 3.7       All pertinent labs within the past 24 hours have been reviewed.    Significant Imaging:  I have reviewed all pertinent imaging results/findings within the past 24 hours.    Assessment/Plan:     * Hyperparathyroidism  63 y.o. male w/ a significant PMHx of primary hyperparathyroidism s/p parathyroidectomy on 5/22 c/b bilateral VC immobility  ultimately requiring reintubation in the PACU.      Neuro:   - Sedation with propofol gtt, versed gtt  - Analgesia covered with fentanyl gtt     Pulmonary:   - Intubated  Vent Mode: A/C  Oxygen Concentration (%):  [30-40] 30  Resp Rate Total:  [18 br/min-24 br/min] 18 br/min  Vt Set:  [450 mL] 450 mL  PEEP/CPAP:  [5 cmH20] 5 cmH20  Mean Airway Pressure:  [8.2 ncX75-32 cmH20] 10 cmH20  - ENT following for VC immobility  - serial ABGs  - daily CXR  - S/p 72 hour steroids  - Per primary, plan to extubate tomorrow in OR     Cardiac:   - Bradycardic  - Amlodipine 10, Irbesartan 150  - Atorvastatin 10  - Hydralazine PRN     Renal:    - Monitor UOP  - BUN/Cr stable overnight  - Monitor BUN/Cr      ID:   - AF  - WBC stable overnight  - Monitor WBC    Hem/Onc:   - H/H stable   - Monitor CBC  - Transfuse as needed     Endocrine:    - Hx of DM  - Accuchecks  - Insulin gtt  - Endocrine following, appreciate recs  - Synthroid     Fluids/Electrolytes/Nutrition/GI:   - Replace electrolytes PRN  - NPO  - Tube feeds at 50 cc/hr (goal)     PPx:  - DVT: Lovenox  - Bowel: docusate  - PUP: famotidine     DISPO:  - Cont SICU care         Critical care was time spent personally by me on the following activities: development of treatment plan with patient or surrogate and bedside caregivers, discussions with consultants, evaluation of patient's response to treatment, examination of patient, ordering and performing treatments and interventions, ordering and review of laboratory studies, ordering and review of radiographic studies, pulse oximetry, re-evaluation of patient's condition.  This critical care time did not overlap with that of any other provider or involve time for any procedures.     Brody Vazquez MD  Critical Care - Surgery  [facility]

## 2019-05-26 NOTE — SUBJECTIVE & OBJECTIVE
Interval History: No acute events overnight. Pt seen and examined at the bedside. Vital signs stable. Minimal vent settings. Difficulty with sedation continues. Remains on propofol, fentanyl, and versed gtts.     Medications:  Continuous Infusions:   fentanyl 125 mcg/hr (05/26/19 1000)    insulin (HUMAN R) infusion (adults) 1.5 Units/hr (05/26/19 1000)    midazolam (VERSED) infusion (titrating) 2 mg/hr (05/26/19 1000)    propofol 40 mcg/kg/min (05/26/19 1000)     Scheduled Meds:   amLODIPine  10 mg Per NG tube Daily    atorvastatin  10 mg Per NG tube Daily    calcium carbonate  1,000 mg Per NG tube TID    chlorhexidine  15 mL Mouth/Throat BID    dexamethasone  12 mg Intravenous Q8H    enoxaparin  40 mg Subcutaneous Daily    famotidine  20 mg Per NG tube BID    irbesartan  150 mg Oral Daily    levothyroxine  137 mcg Per NG tube Before breakfast     PRN Meds:dextrose 50%, dextrose 50%, hydrALAZINE, ondansetron, sodium chloride 0.9%     Review of patient's allergies indicates:   Allergen Reactions    Tizanidine Shortness Of Breath     Objective:     Vital Signs (Most Recent):  Temp: 97.7 °F (36.5 °C) (05/26/19 0700)  Pulse: (!) 38 (05/26/19 0918)  Resp: (!) 24 (05/26/19 0918)  BP: (!) 145/73 (05/26/19 0900)  SpO2: 99 % (05/26/19 0918) Vital Signs (24h Range):  Temp:  [97.7 °F (36.5 °C)-98.8 °F (37.1 °C)] 97.7 °F (36.5 °C)  Pulse:  [35-59] 38  Resp:  [12-29] 24  SpO2:  [95 %-100 %] 99 %  BP: (116-191)/(61-96) 145/73     Intake/Output - Last 3 Shifts       05/24 0700 - 05/25 0659 05/25 0700 - 05/26 0659 05/26 0700 - 05/27 0659    I.V. (mL/kg) 1701.3 (20.9) 2689.6 (33) 160 (2)    NG/GT 1270 1120     Total Intake(mL/kg) 2971.3 (36.5) 3809.6 (46.7) 160 (2)    Urine (mL/kg/hr) 3325 (1.7) 3060 (1.6) 290 (1.2)    Drains 148 15     Total Output 3473 3075 290    Net -501.7 +734.6 -130                 SpO2: 99 %  O2 Device (Oxygen Therapy): ventilator    Physical Exam   Constitutional: Vital signs are normal. He is  sedated and intubated.   HENT:   Incision healing well  No swelling/erythema   Pulmonary/Chest: He is intubated.   Intubated, minimal vent settings    Vent Mode: A/C  Oxygen Concentration (%):  (30-40) 30  Resp Rate Total:  (18 br/min-24 br/min) 18 br/min  Vt Set:  (450 mL) 450 mL  PEEP/CPAP:  (5 cmH20) 5 cmH20  Mean Airway Pressure:  (8.2 noH48-58 cmH20) 10 cmH20     Neurological:   sedated       Significant Labs:  CBC:   Recent Labs   Lab 05/26/19  0308   WBC 12.55   RBC 3.85*   HGB 12.0*   HCT 37.2*      MCV 97   MCH 31.2*   MCHC 32.3     CMP:   Recent Labs   Lab 05/26/19  0456   *   CALCIUM 10.0   ALBUMIN 3.0*   PROT 6.2      K 4.4   CO2 29      BUN 31*   CREATININE 1.0   ALKPHOS 69   ALT 10   AST 13   BILITOT 0.2       Significant Diagnostics:  I have reviewed all pertinent imaging results/findings within the past 24 hours.    VTE Risk Mitigation (From admission, onward)        Ordered     enoxaparin injection 40 mg  Daily      05/23/19 5500

## 2019-05-26 NOTE — PROGRESS NOTES
[facility]  Thoracic Surgery  Progress Note    Subjective:     History of Present Illness:  No notes on file    Post-Op Info:  Procedure(s) (LRB):  PARATHYROIDECTOMY (N/A)  RE-EXPLORATION, FOR BLEEDING   4 Days Post-Op     Interval History: No acute events overnight. Pt seen and examined at the bedside. Vital signs stable. Minimal vent settings. Difficulty with sedation continues. Remains on propofol, fentanyl, and versed gtts.     Medications:  Continuous Infusions:   fentanyl 125 mcg/hr (05/26/19 1000)    insulin (HUMAN R) infusion (adults) 1.5 Units/hr (05/26/19 1000)    midazolam (VERSED) infusion (titrating) 2 mg/hr (05/26/19 1000)    propofol 40 mcg/kg/min (05/26/19 1000)     Scheduled Meds:   amLODIPine  10 mg Per NG tube Daily    atorvastatin  10 mg Per NG tube Daily    calcium carbonate  1,000 mg Per NG tube TID    chlorhexidine  15 mL Mouth/Throat BID    dexamethasone  12 mg Intravenous Q8H    enoxaparin  40 mg Subcutaneous Daily    famotidine  20 mg Per NG tube BID    irbesartan  150 mg Oral Daily    levothyroxine  137 mcg Per NG tube Before breakfast     PRN Meds:dextrose 50%, dextrose 50%, hydrALAZINE, ondansetron, sodium chloride 0.9%     Review of patient's allergies indicates:   Allergen Reactions    Tizanidine Shortness Of Breath     Objective:     Vital Signs (Most Recent):  Temp: 97.7 °F (36.5 °C) (05/26/19 0700)  Pulse: (!) 38 (05/26/19 0918)  Resp: (!) 24 (05/26/19 0918)  BP: (!) 145/73 (05/26/19 0900)  SpO2: 99 % (05/26/19 0918) Vital Signs (24h Range):  Temp:  [97.7 °F (36.5 °C)-98.8 °F (37.1 °C)] 97.7 °F (36.5 °C)  Pulse:  [35-59] 38  Resp:  [12-29] 24  SpO2:  [95 %-100 %] 99 %  BP: (116-191)/(61-96) 145/73     Intake/Output - Last 3 Shifts       05/24 0700 - 05/25 0659 05/25 0700 - 05/26 0659 05/26 0700 - 05/27 0659    I.V. (mL/kg) 1701.3 (20.9) 2689.6 (33) 160 (2)    NG/GT 1270 1120     Total Intake(mL/kg) 2971.3 (36.5) 3809.6 (46.7) 160 (2)    Urine  (mL/kg/hr) 3325 (1.7) 3060 (1.6) 290 (1.2)    Drains 148 15     Total Output 3473 3075 290    Net -501.7 +734.6 -130                 SpO2: 99 %  O2 Device (Oxygen Therapy): ventilator    Physical Exam   Constitutional: Vital signs are normal. He is sedated and intubated.   HENT:   Incision healing well  No swelling/erythema   Pulmonary/Chest: He is intubated.   Intubated, minimal vent settings    Vent Mode: A/C  Oxygen Concentration (%):  (30-40) 30  Resp Rate Total:  (18 br/min-24 br/min) 18 br/min  Vt Set:  (450 mL) 450 mL  PEEP/CPAP:  (5 cmH20) 5 cmH20  Mean Airway Pressure:  (8.2 qlS72-38 cmH20) 10 cmH20     Neurological:   sedated       Significant Labs:  CBC:   Recent Labs   Lab 05/26/19  0308   WBC 12.55   RBC 3.85*   HGB 12.0*   HCT 37.2*      MCV 97   MCH 31.2*   MCHC 32.3     CMP:   Recent Labs   Lab 05/26/19  0456   *   CALCIUM 10.0   ALBUMIN 3.0*   PROT 6.2      K 4.4   CO2 29      BUN 31*   CREATININE 1.0   ALKPHOS 69   ALT 10   AST 13   BILITOT 0.2       Significant Diagnostics:  I have reviewed all pertinent imaging results/findings within the past 24 hours.    VTE Risk Mitigation (From admission, onward)        Ordered     enoxaparin injection 40 mg  Daily      05/23/19 1027        Assessment/Plan:     Respiratory distress  Rob Jones Jr. is a 63 y.o. male s/p parathyroidectomy with bilateral recurrent laryngeal nerve injuries necessitating reintubation    Now s/p 72 hr steroids  Remains intubated  Plan for extubation in OR tomorrow  Will coordinate with ENT and anesthesia  Remainder of care per SICU        Ezequiel Norman MD  Thoracic Surgery  [facility]

## 2019-05-26 NOTE — SUBJECTIVE & OBJECTIVE
"Interval HPI:   Overnight events: Remains in ICU, intubated. BG trending up with steroids even when TF discontinued. Dexamethasone 12 mg every 8 hours.   Eating:   NPO  Nausea: No  Hypoglycemia and intervention: No  Fever: No  TPN and/or TF: previously on TF; held at midnight    BP (!) 149/74   Pulse (!) 41   Temp 97.7 °F (36.5 °C) (Oral)   Resp 20   Ht 5' 10" (1.778 m)   Wt 81.6 kg (179 lb 14.3 oz)   SpO2 97%   BMI 25.81 kg/m²     Labs Reviewed and Include    Recent Labs   Lab 05/26/19  0456   *   CALCIUM 10.0   ALBUMIN 3.0*   PROT 6.2      K 4.4   CO2 29      BUN 31*   CREATININE 1.0   ALKPHOS 69   ALT 10   AST 13   BILITOT 0.2     Lab Results   Component Value Date    WBC 12.55 05/26/2019    HGB 12.0 (L) 05/26/2019    HCT 37.2 (L) 05/26/2019    MCV 97 05/26/2019     05/26/2019     No results for input(s): TSH, FREET4 in the last 168 hours.  Lab Results   Component Value Date    HGBA1C 6.1 (H) 05/16/2019       Nutritional status:   Body mass index is 25.81 kg/m².  Lab Results   Component Value Date    ALBUMIN 3.0 (L) 05/26/2019    ALBUMIN 3.2 (L) 05/25/2019    ALBUMIN 3.4 (L) 05/24/2019     No results found for: PREALBUMIN    Estimated Creatinine Clearance: 79.1 mL/min (based on SCr of 1 mg/dL).    Accu-Checks  Recent Labs     05/24/19 2002 05/25/19  0015 05/25/19  0403 05/25/19  0808 05/25/19  0811 05/25/19  1214 05/25/19  1609 05/25/19  1952 05/26/19  0036 05/26/19  0419   POCTGLUCOSE 250* 293* 293* 328* 356* 328* 298* 273* 313* 327*       Current Medications and/or Treatments Impacting Glycemic Control  Immunotherapy:    Immunosuppressants     None        Steroids:   Hormones (From admission, onward)    Start     Stop Route Frequency Ordered    05/23/19 1400  dexamethasone injection 12 mg      -- IV Every 8 hours 05/23/19 0806        Pressors:    Autonomic Drugs (From admission, onward)    Start     Stop Route Frequency Ordered    05/22/19 1958  succinylcholine (ANECTINE) 20 " mg/mL injection     Note to Pharmacy:  Created by cabinet override    05/23 0759   05/22/19 1958    05/22/19 1926  rocuronium (ZEMURON) 10 mg/mL injection     Note to Pharmacy:  Created by cabinet override    05/23 0729   05/22/19 1926        Hyperglycemia/Diabetes Medications:   Antihyperglycemics (From admission, onward)    Start     Stop Route Frequency Ordered    05/26/19 0900  insulin regular (Humulin R) 100 Units in sodium chloride 0.9% 100 mL infusion     Question:  Insulin Rate Adjustment (DO NOT MODIFY ANSWER)  Answer:  [email or web address]    -- IV Continuous 05/26/19 0800

## 2019-05-26 NOTE — PLAN OF CARE
Problem: Adult Inpatient Plan of Care  Goal: Plan of Care Review    5/22 Parathyroidectomy - PACU extubated - emergent intubation for stridor.   Outcome: Ongoing (interventions implemented as appropriate)  Pt intubated w/ O2 sats % on ventilator settings AC, 30%, 5 PEEP. Afebrile, HR in 30-40s throughout the shift, MD aware. MAP goal >65 met throughout the shift. One episode of HTN w/ systolic >180 after patient was turned, but resolved on its own once patient was repositioned without the need for prn antihypertensives. Pt withdraws from pain and moves all extremities well but unable to follow commands. Unable to wean sedation due to restlessness and extreme agitation. Gtts: propofol, fentanyl, versed, and insulin.  UOP adequate, no BM throughout the shift. Tube feeds restarted this afternoon at 1400 once determined that patient was no longer going to the OR today. Will relay to night shift RN to turn tube feeds off @ midnight tonight for prep for OR. Minimal output from BRICE drain. Repositioned Q2 to prevent skin breakdown. Plan for OR tomorrow for extubation and possible trach placement. Plan of care reviewed with pt but no evidence of learning/comprehension. MD updated on current condition, vitals, labs, and gtts.  No new orders received, will continue to monitor. See flowsheet for full assessment/details.

## 2019-05-26 NOTE — PROGRESS NOTES
"[facility]  Endocrinology  Progress Note    Admit Date: 5/22/2019     Reason for Consult: Management of T2DM, Hyperglycemia     Surgical Procedure and Date: Parathyroidectomy with IOPTH monitoring    Diabetes diagnosis year: NATALEE     Home Diabetes Medications:  metformin 100 mg BID; januvia 100 mg daily   Lab Results   Component Value Date    LABA1C 9.2 (H) 05/18/2016    HGBA1C 6.1 (H) 05/16/2019         How often checking glucose at home? NATALEE  BG readings on regimen: NATALEE  Hypoglycemia on the regimen?  NATALEE  Missed doses on regimen?  NATALEE    Diabetes Complications include:     NATALEE    Complicating diabetes co morbidities:   none      HPI:   Patient is a 63 y.o. male with a diagnosis of type 2 DM well controlled on dual oral agent per chart review. Also with HTN, Grave's disease (s/p total thyroidectomy, previous pheochromocytoma resection, and primary hyperparathyroidism (s/p parathyroidectomy). Endocrinology consulted for BG/ DM management.             Interval HPI:   Overnight events: Remains in ICU, intubated. BG trending up with steroids even when TF discontinued. Dexamethasone 12 mg every 8 hours.   Eating:   NPO  Nausea: No  Hypoglycemia and intervention: No  Fever: No  TPN and/or TF: previously on TF; held at midnight    BP (!) 149/74   Pulse (!) 41   Temp 97.7 °F (36.5 °C) (Oral)   Resp 20   Ht 5' 10" (1.778 m)   Wt 81.6 kg (179 lb 14.3 oz)   SpO2 97%   BMI 25.81 kg/m²      Labs Reviewed and Include    Recent Labs   Lab 05/26/19  0456   *   CALCIUM 10.0   ALBUMIN 3.0*   PROT 6.2      K 4.4   CO2 29      BUN 31*   CREATININE 1.0   ALKPHOS 69   ALT 10   AST 13   BILITOT 0.2     Lab Results   Component Value Date    WBC 12.55 05/26/2019    HGB 12.0 (L) 05/26/2019    HCT 37.2 (L) 05/26/2019    MCV 97 05/26/2019     05/26/2019     No results for input(s): TSH, FREET4 in the last 168 hours.  Lab Results   Component Value Date    HGBA1C 6.1 (H) 05/16/2019 "       Nutritional status:   Body mass index is 25.81 kg/m².  Lab Results   Component Value Date    ALBUMIN 3.0 (L) 05/26/2019    ALBUMIN 3.2 (L) 05/25/2019    ALBUMIN 3.4 (L) 05/24/2019     No results found for: PREALBUMIN    Estimated Creatinine Clearance: 79.1 mL/min (based on SCr of 1 mg/dL).    Accu-Checks  Recent Labs     05/24/19 2002 05/25/19  0015 05/25/19  0403 05/25/19  0808 05/25/19  0811 05/25/19  1214 05/25/19  1609 05/25/19  1952 05/26/19  0036 05/26/19  0419   POCTGLUCOSE 250* 293* 293* 328* 356* 328* 298* 273* 313* 327*       Current Medications and/or Treatments Impacting Glycemic Control  Immunotherapy:    Immunosuppressants     None        Steroids:   Hormones (From admission, onward)    Start     Stop Route Frequency Ordered    05/23/19 1400  dexamethasone injection 12 mg      -- IV Every 8 hours 05/23/19 0806        Pressors:    Autonomic Drugs (From admission, onward)    Start     Stop Route Frequency Ordered    05/22/19 1958  succinylcholine (ANECTINE) 20 mg/mL injection     Note to Pharmacy:  Created by cabinet override    05/23 0759   05/22/19 1958 05/22/19 1926  rocuronium (ZEMURON) 10 mg/mL injection     Note to Pharmacy:  Created by cabinet override    05/23 0729   05/22/19 1926        Hyperglycemia/Diabetes Medications:   Antihyperglycemics (From admission, onward)    Start     Stop Route Frequency Ordered    05/26/19 0900  insulin regular (Humulin R) 100 Units in sodium chloride 0.9% 100 mL infusion     Question:  Insulin Rate Adjustment (DO NOT MODIFY ANSWER)  Answer:  [email or web address] [id number]    -- IV Continuous 05/26/19 0800          ASSESSMENT and PLAN    * Hyperparathyroidism  Managed per primary.     Uncontrolled type 2 diabetes mellitus with complication, with long-term current use of insulin  BG goal 140-180 while inpatient.       BG remains above goal off of TF.   Start IV insulin infusion protocol.  Requires intensive BG  monitoring.       On enteral nutrition  Elevating BG; to suspend at midnight.         Adrenal cortical steroids causing adverse effect in therapeutic use  Glucocorticoids markedly increase glucoses. Expect the steroid taper will help glucose control.             Roseann Bolton NP  Endocrinology  [facility]

## 2019-05-26 NOTE — SUBJECTIVE & OBJECTIVE
Interval History/Significant Events: Continued bradycardia overnight. UOP excellent. H/H stable. Minimal output from drain. Per primary, plan to extubate in OR tomorrow. Sugars elevate, endocrine consulted, started on insulin gtt.    Follow-up For: Procedure(s) (LRB):  PARATHYROIDECTOMY (N/A)  RE-EXPLORATION, FOR BLEEDING    Post-Operative Day: 3 Days Post-Op    Objective:     Vital Signs (Most Recent):  Temp: 97.7 °F (36.5 °C) (05/26/19 0700)  Pulse: (!) 41 (05/26/19 1000)  Resp: 12 (05/26/19 1000)  BP: (!) 152/72 (05/26/19 1000)  SpO2: 98 % (05/26/19 1000) Vital Signs (24h Range):  Temp:  [97.7 °F (36.5 °C)-98.8 °F (37.1 °C)] 97.7 °F (36.5 °C)  Pulse:  [35-59] 41  Resp:  [12-29] 12  SpO2:  [95 %-100 %] 98 %  BP: (116-191)/(61-96) 152/72     Weight: 81.6 kg (179 lb 14.3 oz)  Body mass index is 25.81 kg/m².      Intake/Output Summary (Last 24 hours) at 5/26/2019 1046  Last data filed at 5/26/2019 1000  Gross per 24 hour   Intake 1953.07 ml   Output 2855 ml   Net -901.93 ml       Physical Exam   Constitutional: He is oriented to person, place, and time. He appears well-developed and well-nourished. No distress.   HENT:   Head: Normocephalic and atraumatic.   Scleral edema  Neck incision clean/dry/intact  BRICE drain neck draining serosanguinous   Eyes: No scleral icterus.   Cardiovascular: Normal rate and regular rhythm.   Pulmonary/Chest: Effort normal. No respiratory distress.   Abdominal: Soft. He exhibits no distension.   Neurological: He is alert and oriented to person, place, and time.   Skin: Skin is warm and dry.   Psychiatric: He has a normal mood and affect. His behavior is normal. Judgment and thought content normal.   Nursing note and vitals reviewed.      Vents:  Vent Mode: A/C (05/26/19 0918)  Ventilator Initiated: Yes (05/22/19 2109)  Set Rate: 18 bmp (05/26/19 0918)  Vt Set: 450 mL (05/26/19 0918)  Pressure Support: 10 cmH20 (05/23/19 1803)  PEEP/CPAP: 5 cmH20 (05/26/19 0918)  Oxygen Concentration (%):  30 (05/26/19 1000)  Peak Airway Pressure: 25 cmH2O (05/26/19 0918)  Plateau Pressure: 22 cmH20 (05/26/19 0918)  Total Ve: 8.15 mL (05/26/19 0918)  F/VT Ratio<105 (RSBI): (!) 52.63 (05/26/19 0918)    Lines/Drains/Airways     Drain                 Closed/Suction Drain 05/22/19 1741 Left Neck Bulb 10 Fr. 3 days         NG/OG Tube 05/22/19 2015 orogastric 3 days         Urethral Catheter 05/22/19 2245 3 days          Airway                 Airway - Non-Surgical 05/22/19 2008 Endotracheal Tube 3 days          Peripheral Intravenous Line                 Peripheral IV - Single Lumen 05/22/19 1040 18 G Left Forearm 4 days         Peripheral IV - Single Lumen 05/25/19 0200 20 G Right Antecubital 1 day                Significant Labs:    CBC/Anemia Profile:  Recent Labs   Lab 05/25/19  0311 05/26/19  0308   WBC 11.79 12.55   HGB 11.9* 12.0*   HCT 37.1* 37.2*    241   MCV 98 97   RDW 13.2 13.2        Chemistries:  Recent Labs   Lab 05/25/19  0311 05/26/19  0456    139   K 4.5 4.4    103   CO2 29 29   BUN 28* 31*   CREATININE 0.9 1.0   CALCIUM 10.1 10.0   ALBUMIN 3.2* 3.0*   PROT 6.4 6.2   BILITOT 0.2 0.2   ALKPHOS 67 69   ALT 8* 10   AST 14 13   MG 1.8 1.8   PHOS 3.0 3.7       All pertinent labs within the past 24 hours have been reviewed.    Significant Imaging:  I have reviewed all pertinent imaging results/findings within the past 24 hours.

## 2019-05-26 NOTE — PROGRESS NOTES
Patient seen and examined.  Stable on current sedation.  Patient on steroids for 72 hours.  Plan for extubation today deferred based on sedation wean concerns.  Case discussed again with ENT, SICU and anesthesia.    Will tentatively plan extubation in the OR, with exam under anesthesia and possible tracheostomy(5/28).  Spoke with patient's family and they will let us know of their final decision.

## 2019-05-26 NOTE — ASSESSMENT & PLAN NOTE
BG goal 140-180 while inpatient.  Was on TF now on hold  On IV steroid  BG remains at goal 151 as insulin has been on hold since 8AM    Plan  Stop IIP  Start Q4hr POCT glucose and low dose correction scale  Low threshold to restart insulin drip if glucose climbs or pt is restarted on TF

## 2019-05-27 ENCOUNTER — ANESTHESIA EVENT (OUTPATIENT)
Dept: SURGERY | Facility: HOSPITAL | Age: 64
DRG: 982 | End: 2019-05-27
Payer: MEDICARE

## 2019-05-27 PROBLEM — J38.02 VOCAL CORD PARALYSIS, BILATERAL PARTIAL: Status: ACTIVE | Noted: 2019-05-27

## 2019-05-27 LAB
ALBUMIN SERPL BCP-MCNC: 3.1 G/DL (ref 3.5–5.2)
ALLENS TEST: ABNORMAL
ALP SERPL-CCNC: 62 U/L (ref 55–135)
ALT SERPL W/O P-5'-P-CCNC: 17 U/L (ref 10–44)
ANION GAP SERPL CALC-SCNC: 7 MMOL/L (ref 8–16)
AST SERPL-CCNC: 15 U/L (ref 10–40)
BASOPHILS # BLD AUTO: 0.01 K/UL (ref 0–0.2)
BASOPHILS NFR BLD: 0.1 % (ref 0–1.9)
BILIRUB SERPL-MCNC: 0.2 MG/DL (ref 0.1–1)
BUN SERPL-MCNC: 31 MG/DL (ref 8–23)
CA-I BLDV-SCNC: 1.26 MMOL/L (ref 1.06–1.42)
CALCIUM SERPL-MCNC: 10.2 MG/DL (ref 8.7–10.5)
CHLORIDE SERPL-SCNC: 102 MMOL/L (ref 95–110)
CO2 SERPL-SCNC: 29 MMOL/L (ref 23–29)
CREAT SERPL-MCNC: 0.9 MG/DL (ref 0.5–1.4)
DELSYS: ABNORMAL
DIFFERENTIAL METHOD: ABNORMAL
EOSINOPHIL # BLD AUTO: 0 K/UL (ref 0–0.5)
EOSINOPHIL NFR BLD: 0 % (ref 0–8)
ERYTHROCYTE [DISTWIDTH] IN BLOOD BY AUTOMATED COUNT: 12.9 % (ref 11.5–14.5)
ERYTHROCYTE [SEDIMENTATION RATE] IN BLOOD BY WESTERGREN METHOD: 18 MM/H
EST. GFR  (AFRICAN AMERICAN): >60 ML/MIN/1.73 M^2
EST. GFR  (NON AFRICAN AMERICAN): >60 ML/MIN/1.73 M^2
FIO2: 30
GLUCOSE SERPL-MCNC: 176 MG/DL (ref 70–110)
HCO3 UR-SCNC: 31 MMOL/L (ref 24–28)
HCT VFR BLD AUTO: 36.3 % (ref 40–54)
HGB BLD-MCNC: 12.1 G/DL (ref 14–18)
IMM GRANULOCYTES # BLD AUTO: 0.03 K/UL (ref 0–0.04)
IMM GRANULOCYTES NFR BLD AUTO: 0.3 % (ref 0–0.5)
LYMPHOCYTES # BLD AUTO: 1.4 K/UL (ref 1–4.8)
LYMPHOCYTES NFR BLD: 12.6 % (ref 18–48)
MAGNESIUM SERPL-MCNC: 1.8 MG/DL (ref 1.6–2.6)
MCH RBC QN AUTO: 31.4 PG (ref 27–31)
MCHC RBC AUTO-ENTMCNC: 33.3 G/DL (ref 32–36)
MCV RBC AUTO: 94 FL (ref 82–98)
MIN VOL: 8.13
MODE: ABNORMAL
MONOCYTES # BLD AUTO: 0.4 K/UL (ref 0.3–1)
MONOCYTES NFR BLD: 3.9 % (ref 4–15)
NEUTROPHILS # BLD AUTO: 9.1 K/UL (ref 1.8–7.7)
NEUTROPHILS NFR BLD: 83.1 % (ref 38–73)
NRBC BLD-RTO: 0 /100 WBC
PCO2 BLDA: 42.4 MMHG (ref 35–45)
PEEP: 5
PH SMN: 7.47 [PH] (ref 7.35–7.45)
PHOSPHATE SERPL-MCNC: 4.4 MG/DL (ref 2.7–4.5)
PIP: 22
PLATELET # BLD AUTO: 299 K/UL (ref 150–350)
PMV BLD AUTO: 10.2 FL (ref 9.2–12.9)
PO2 BLDA: 105 MMHG (ref 80–100)
POC BE: 7 MMOL/L
POC SATURATED O2: 98 % (ref 95–100)
POC TCO2: 32 MMOL/L (ref 23–27)
POCT GLUCOSE: 149 MG/DL (ref 70–110)
POCT GLUCOSE: 154 MG/DL (ref 70–110)
POCT GLUCOSE: 157 MG/DL (ref 70–110)
POCT GLUCOSE: 160 MG/DL (ref 70–110)
POCT GLUCOSE: 162 MG/DL (ref 70–110)
POCT GLUCOSE: 163 MG/DL (ref 70–110)
POCT GLUCOSE: 165 MG/DL (ref 70–110)
POCT GLUCOSE: 168 MG/DL (ref 70–110)
POCT GLUCOSE: 170 MG/DL (ref 70–110)
POCT GLUCOSE: 170 MG/DL (ref 70–110)
POCT GLUCOSE: 171 MG/DL (ref 70–110)
POCT GLUCOSE: 171 MG/DL (ref 70–110)
POCT GLUCOSE: 178 MG/DL (ref 70–110)
POCT GLUCOSE: 185 MG/DL (ref 70–110)
POCT GLUCOSE: 192 MG/DL (ref 70–110)
POCT GLUCOSE: 195 MG/DL (ref 70–110)
POCT GLUCOSE: 197 MG/DL (ref 70–110)
POCT GLUCOSE: 201 MG/DL (ref 70–110)
POCT GLUCOSE: 202 MG/DL (ref 70–110)
POCT GLUCOSE: 205 MG/DL (ref 70–110)
POCT GLUCOSE: 211 MG/DL (ref 70–110)
POTASSIUM SERPL-SCNC: 4.3 MMOL/L (ref 3.5–5.1)
PROT SERPL-MCNC: 6.4 G/DL (ref 6–8.4)
RBC # BLD AUTO: 3.85 M/UL (ref 4.6–6.2)
SAMPLE: ABNORMAL
SITE: ABNORMAL
SODIUM SERPL-SCNC: 138 MMOL/L (ref 136–145)
SP02: 100
VT: 450
WBC # BLD AUTO: 10.89 K/UL (ref 3.9–12.7)

## 2019-05-27 PROCEDURE — 99900035 HC TECH TIME PER 15 MIN (STAT)

## 2019-05-27 PROCEDURE — 99900026 HC AIRWAY MAINTENANCE (STAT)

## 2019-05-27 PROCEDURE — 63600175 PHARM REV CODE 636 W HCPCS: Performed by: STUDENT IN AN ORGANIZED HEALTH CARE EDUCATION/TRAINING PROGRAM

## 2019-05-27 PROCEDURE — 25000003 PHARM REV CODE 250: Performed by: STUDENT IN AN ORGANIZED HEALTH CARE EDUCATION/TRAINING PROGRAM

## 2019-05-27 PROCEDURE — 27200966 HC CLOSED SUCTION SYSTEM

## 2019-05-27 PROCEDURE — 83735 ASSAY OF MAGNESIUM: CPT

## 2019-05-27 PROCEDURE — 20000000 HC ICU ROOM

## 2019-05-27 PROCEDURE — 82803 BLOOD GASES ANY COMBINATION: CPT

## 2019-05-27 PROCEDURE — 80053 COMPREHEN METABOLIC PANEL: CPT

## 2019-05-27 PROCEDURE — 25000003 PHARM REV CODE 250: Performed by: NURSE PRACTITIONER

## 2019-05-27 PROCEDURE — 99291 CRITICAL CARE FIRST HOUR: CPT | Mod: GC,,, | Performed by: SURGERY

## 2019-05-27 PROCEDURE — 82330 ASSAY OF CALCIUM: CPT

## 2019-05-27 PROCEDURE — 84100 ASSAY OF PHOSPHORUS: CPT

## 2019-05-27 PROCEDURE — 27000221 HC OXYGEN, UP TO 24 HOURS

## 2019-05-27 PROCEDURE — 85025 COMPLETE CBC W/AUTO DIFF WBC: CPT

## 2019-05-27 PROCEDURE — 82800 BLOOD PH: CPT

## 2019-05-27 PROCEDURE — 63600175 PHARM REV CODE 636 W HCPCS: Performed by: NURSE PRACTITIONER

## 2019-05-27 PROCEDURE — 36600 WITHDRAWAL OF ARTERIAL BLOOD: CPT

## 2019-05-27 PROCEDURE — 99291 PR CRITICAL CARE, E/M 30-74 MINUTES: ICD-10-PCS | Mod: GC,,, | Performed by: SURGERY

## 2019-05-27 PROCEDURE — 94003 VENT MGMT INPAT SUBQ DAY: CPT

## 2019-05-27 PROCEDURE — 94761 N-INVAS EAR/PLS OXIMETRY MLT: CPT

## 2019-05-27 RX ORDER — LORAZEPAM 2 MG/ML
0.5 INJECTION INTRAMUSCULAR
Status: DISCONTINUED | OUTPATIENT
Start: 2019-05-27 | End: 2019-05-28

## 2019-05-27 RX ADMIN — DEXAMETHASONE SODIUM PHOSPHATE 12 MG: 4 INJECTION, SOLUTION INTRA-ARTICULAR; INTRALESIONAL; INTRAMUSCULAR; INTRAVENOUS; SOFT TISSUE at 05:05

## 2019-05-27 RX ADMIN — CHLORHEXIDINE GLUCONATE 0.12% ORAL RINSE 15 ML: 1.2 LIQUID ORAL at 08:05

## 2019-05-27 RX ADMIN — LEVOTHYROXINE SODIUM 137 MCG: 25 TABLET ORAL at 05:05

## 2019-05-27 RX ADMIN — LORAZEPAM 0.5 MG: 2 INJECTION, SOLUTION INTRAMUSCULAR; INTRAVENOUS at 03:05

## 2019-05-27 RX ADMIN — ATORVASTATIN CALCIUM 10 MG: 10 TABLET, FILM COATED ORAL at 08:05

## 2019-05-27 RX ADMIN — CALCIUM CARBONATE 1000 MG: 1250 SUSPENSION ORAL at 08:05

## 2019-05-27 RX ADMIN — DEXAMETHASONE SODIUM PHOSPHATE 12 MG: 4 INJECTION, SOLUTION INTRA-ARTICULAR; INTRALESIONAL; INTRAMUSCULAR; INTRAVENOUS; SOFT TISSUE at 09:05

## 2019-05-27 RX ADMIN — PROPOFOL 45 MCG/KG/MIN: 10 INJECTION, EMULSION INTRAVENOUS at 03:05

## 2019-05-27 RX ADMIN — PROPOFOL 40 MCG/KG/MIN: 10 INJECTION, EMULSION INTRAVENOUS at 09:05

## 2019-05-27 RX ADMIN — PROPOFOL 50 MCG/KG/MIN: 10 INJECTION, EMULSION INTRAVENOUS at 07:05

## 2019-05-27 RX ADMIN — PROPOFOL 40 MCG/KG/MIN: 10 INJECTION, EMULSION INTRAVENOUS at 12:05

## 2019-05-27 RX ADMIN — FAMOTIDINE 20 MG: 20 TABLET, FILM COATED ORAL at 08:05

## 2019-05-27 RX ADMIN — DEXAMETHASONE SODIUM PHOSPHATE 12 MG: 4 INJECTION, SOLUTION INTRA-ARTICULAR; INTRALESIONAL; INTRAMUSCULAR; INTRAVENOUS; SOFT TISSUE at 02:05

## 2019-05-27 RX ADMIN — Medication 150 MCG: at 03:05

## 2019-05-27 RX ADMIN — CALCIUM CARBONATE 1000 MG: 1250 SUSPENSION ORAL at 02:05

## 2019-05-27 RX ADMIN — MAGNESIUM SULFATE HEPTAHYDRATE 1 G: 500 INJECTION, SOLUTION INTRAMUSCULAR; INTRAVENOUS at 05:05

## 2019-05-27 RX ADMIN — ENOXAPARIN SODIUM 40 MG: 100 INJECTION SUBCUTANEOUS at 04:05

## 2019-05-27 RX ADMIN — SODIUM CHLORIDE 1.7 UNITS/HR: 9 INJECTION, SOLUTION INTRAVENOUS at 03:05

## 2019-05-27 NOTE — SUBJECTIVE & OBJECTIVE
Interval History/Significant Events: Continued bradycardia overnight. UOP good. H/H stable. Minimal output from drain. Per primary, plan to extubate in OR today. On insulin gtt    Follow-up For: Procedure(s) (LRB):  PARATHYROIDECTOMY (N/A)  RE-EXPLORATION, FOR BLEEDING  5/22    Objective:     Vital Signs (Most Recent):  Temp: 97.9 °F (36.6 °C) (05/27/19 0700)  Pulse: (!) 38 (05/27/19 0730)  Resp: 18 (05/27/19 0730)  BP: (!) 121/58 (05/27/19 0730)  SpO2: 97 % (05/27/19 0730) Vital Signs (24h Range):  Temp:  [97.8 °F (36.6 °C)-98.3 °F (36.8 °C)] 97.9 °F (36.6 °C)  Pulse:  [33-52] 38  Resp:  [12-35] 18  SpO2:  [96 %-100 %] 97 %  BP: (121-200)/(58-95) 121/58     Weight: 83.1 kg (183 lb 3.2 oz)  Body mass index is 26.29 kg/m².      Intake/Output Summary (Last 24 hours) at 5/27/2019 0742  Last data filed at 5/27/2019 0700  Gross per 24 hour   Intake 1291.21 ml   Output 1385 ml   Net -93.79 ml       Physical Exam   Constitutional: He is oriented to person, place, and time. He appears well-developed and well-nourished. No distress.   HENT:   Head: Normocephalic and atraumatic.   Scleral edema  Neck incision clean/dry/intact  BRICE drain neck draining serosanguinous   Eyes: No scleral icterus.   Cardiovascular: Normal rate and regular rhythm.   Pulmonary/Chest: Effort normal. No respiratory distress.   Abdominal: Soft. He exhibits no distension.   Neurological: He is alert and oriented to person, place, and time.   Skin: Skin is warm and dry.   Psychiatric: He has a normal mood and affect. His behavior is normal. Judgment and thought content normal.   Nursing note and vitals reviewed.      Vents:  Vent Mode: A/C (05/27/19 0728)  Ventilator Initiated: Yes (05/22/19 2109)  Set Rate: 18 bmp (05/27/19 0728)  Vt Set: 450 mL (05/27/19 0728)  Pressure Support: 10 cmH20 (05/23/19 1803)  PEEP/CPAP: 5 cmH20 (05/27/19 0728)  Oxygen Concentration (%): 30 (05/27/19 0730)  Peak Airway Pressure: 24 cmH2O (05/27/19 0728)  Plateau Pressure: 16  cmH20 (05/27/19 0728)  Total Ve: 8.22 mL (05/27/19 0728)  F/VT Ratio<105 (RSBI): (!) 40.18 (05/27/19 0728)    Lines/Drains/Airways     Drain                 Closed/Suction Drain 05/22/19 1741 Left Neck Bulb 10 Fr. 4 days         NG/OG Tube 05/22/19 2015 orogastric 4 days         Urethral Catheter 05/22/19 2245 4 days          Airway                 Airway - Non-Surgical 05/22/19 2008 Endotracheal Tube 4 days          Peripheral Intravenous Line                 Peripheral IV - Single Lumen 05/22/19 1040 18 G Left Forearm 4 days         Peripheral IV - Single Lumen 05/25/19 0200 20 G Right Antecubital 2 days         Peripheral IV - Single Lumen 05/26/19 1200 Right Antecubital less than 1 day                Significant Labs:    CBC/Anemia Profile:  Recent Labs   Lab 05/26/19  0308 05/27/19  0322   WBC 12.55 10.89   HGB 12.0* 12.1*   HCT 37.2* 36.3*    299   MCV 97 94   RDW 13.2 12.9        Chemistries:  Recent Labs   Lab 05/26/19  0456 05/27/19  0322    138   K 4.4 4.3    102   CO2 29 29   BUN 31* 31*   CREATININE 1.0 0.9   CALCIUM 10.0 10.2   ALBUMIN 3.0* 3.1*   PROT 6.2 6.4   BILITOT 0.2 0.2   ALKPHOS 69 62   ALT 10 17   AST 13 15   MG 1.8 1.8   PHOS 3.7 4.4       All pertinent labs within the past 24 hours have been reviewed.    Significant Imaging:  I have reviewed all pertinent imaging results/findings within the past 24 hours.

## 2019-05-27 NOTE — SUBJECTIVE & OBJECTIVE
Interval History: No events.      Medications:  Continuous Infusions:   fentanyl 125 mcg/hr (05/27/19 1000)    insulin (HUMAN R) infusion (adults) 1.7 Units/hr (05/27/19 0900)    midazolam (VERSED) infusion (titrating) 2 mg/hr (05/27/19 1000)    propofol 40 mcg/kg/min (05/27/19 1000)     Scheduled Meds:   amLODIPine  10 mg Per NG tube Daily    atorvastatin  10 mg Per NG tube Daily    calcium carbonate  1,000 mg Per NG tube TID    chlorhexidine  15 mL Mouth/Throat BID    dexamethasone  12 mg Intravenous Q8H    enoxaparin  40 mg Subcutaneous Daily    famotidine  20 mg Per NG tube BID    irbesartan  150 mg Oral Daily    levothyroxine  137 mcg Per NG tube Before breakfast     PRN Meds:dextrose 50%, dextrose 50%, hydrALAZINE, ondansetron, sodium chloride 0.9%     Review of patient's allergies indicates:   Allergen Reactions    Tizanidine Shortness Of Breath     Objective:     Vital Signs (Most Recent):  Temp: 97.9 °F (36.6 °C) (05/27/19 0700)  Pulse: (!) 39 (05/27/19 1000)  Resp: 18 (05/27/19 1000)  BP: 134/64 (05/27/19 1000)  SpO2: 97 % (05/27/19 1000) Vital Signs (24h Range):  Temp:  [97.8 °F (36.6 °C)-98.3 °F (36.8 °C)] 97.9 °F (36.6 °C)  Pulse:  [33-52] 39  Resp:  [17-35] 18  SpO2:  [96 %-100 %] 97 %  BP: (112-200)/(58-95) 134/64     Weight: 83.1 kg (183 lb 3.2 oz)  Body mass index is 26.29 kg/m².    Intake/Output - Last 3 Shifts       05/25 0700 - 05/26 0659 05/26 0700 - 05/27 0659 05/27 0700 - 05/28 0659    I.V. (mL/kg) 2689.6 (33) 1041.2 (12.5)     NG/GT 1120 250 100    Total Intake(mL/kg) 3809.6 (46.7) 1291.2 (15.5) 100 (1.2)    Urine (mL/kg/hr) 3060 (1.6) 1405 (0.7) 209 (0.7)    Drains 15 6 4    Total Output 3075 1411 213    Net +734.6 -119.8 -113                 Physical Exam   NAD  Neck soft  Intubated  S/s drain output    Significant Labs:  CBC:   Recent Labs   Lab 05/27/19  0322   WBC 10.89   RBC 3.85*   HGB 12.1*   HCT 36.3*      MCV 94   MCH 31.4*   MCHC 33.3     CMP:   Recent Labs    Lab 05/27/19  0322   *   CALCIUM 10.2   ALBUMIN 3.1*   PROT 6.4      K 4.3   CO2 29      BUN 31*   CREATININE 0.9   ALKPHOS 62   ALT 17   AST 15   BILITOT 0.2       Significant Diagnostics:  CXR reviewed

## 2019-05-27 NOTE — ASSESSMENT & PLAN NOTE
ENT (Dr. Baez) available to assist with extubation in OR 5/28/19  Please page ENT resident on call with tentative timeline for surgery.

## 2019-05-27 NOTE — PROGRESS NOTES
Subjective/Objective  Interval History/Significant Events: Continued bradycardia overnight. UOP good. H/H stable. Minimal output from drain. Per primary, plan to extubate in OR today. On insulin gtt    Follow-up For: Procedure(s) (LRB):  PARATHYROIDECTOMY (N/A)  RE-EXPLORATION, FOR BLEEDING  5/22    Objective:     Vital Signs (Most Recent):  Temp: 97.9 °F (36.6 °C) (05/27/19 0700)  Pulse: (!) 38 (05/27/19 0730)  Resp: 18 (05/27/19 0730)  BP: (!) 121/58 (05/27/19 0730)  SpO2: 97 % (05/27/19 0730) Vital Signs (24h Range):  Temp:  [97.8 °F (36.6 °C)-98.3 °F (36.8 °C)] 97.9 °F (36.6 °C)  Pulse:  [33-52] 38  Resp:  [12-35] 18  SpO2:  [96 %-100 %] 97 %  BP: (121-200)/(58-95) 121/58     Weight: 83.1 kg (183 lb 3.2 oz)  Body mass index is 26.29 kg/m².      Intake/Output Summary (Last 24 hours) at 5/27/2019 0742  Last data filed at 5/27/2019 0700  Gross per 24 hour   Intake 1291.21 ml   Output 1385 ml   Net -93.79 ml       Physical Exam   Constitutional: He is oriented to person, place, and time. He appears well-developed and well-nourished. No distress.   HENT:   Head: Normocephalic and atraumatic.   Scleral edema  Neck incision clean/dry/intact  BRICE drain neck draining serosanguinous   Eyes: No scleral icterus.   Cardiovascular: Normal rate and regular rhythm.   Pulmonary/Chest: Effort normal. No respiratory distress.   Abdominal: Soft. He exhibits no distension.   Neurological: He is alert and oriented to person, place, and time.   Skin: Skin is warm and dry.   Psychiatric: He has a normal mood and affect. His behavior is normal. Judgment and thought content normal.   Nursing note and vitals reviewed.      Vents:  Vent Mode: A/C (05/27/19 0728)  Ventilator Initiated: Yes (05/22/19 2109)  Set Rate: 18 bmp (05/27/19 0728)  Vt Set: 450 mL (05/27/19 0728)  Pressure Support: 10 cmH20 (05/23/19 1803)  PEEP/CPAP: 5 cmH20 (05/27/19 0728)  Oxygen Concentration (%): 30 (05/27/19 0730)  Peak Airway Pressure: 24 cmH2O (05/27/19  0728)  Plateau Pressure: 16 cmH20 (05/27/19 0728)  Total Ve: 8.22 mL (05/27/19 0728)  F/VT Ratio<105 (RSBI): (!) 40.18 (05/27/19 0728)    Lines/Drains/Airways     Drain                 Closed/Suction Drain 05/22/19 1741 Left Neck Bulb 10 Fr. 4 days         NG/OG Tube 05/22/19 2015 orogastric 4 days         Urethral Catheter 05/22/19 2245 4 days          Airway                 Airway - Non-Surgical 05/22/19 2008 Endotracheal Tube 4 days          Peripheral Intravenous Line                 Peripheral IV - Single Lumen 05/22/19 1040 18 G Left Forearm 4 days         Peripheral IV - Single Lumen 05/25/19 0200 20 G Right Antecubital 2 days         Peripheral IV - Single Lumen 05/26/19 1200 Right Antecubital less than 1 day                Significant Labs:    CBC/Anemia Profile:  Recent Labs   Lab 05/26/19  0308 05/27/19  0322   WBC 12.55 10.89   HGB 12.0* 12.1*   HCT 37.2* 36.3*    299   MCV 97 94   RDW 13.2 12.9        Chemistries:  Recent Labs   Lab 05/26/19  0456 05/27/19  0322    138   K 4.4 4.3    102   CO2 29 29   BUN 31* 31*   CREATININE 1.0 0.9   CALCIUM 10.0 10.2   ALBUMIN 3.0* 3.1*   PROT 6.2 6.4   BILITOT 0.2 0.2   ALKPHOS 69 62   ALT 10 17   AST 13 15   MG 1.8 1.8   PHOS 3.7 4.4       All pertinent labs within the past 24 hours have been reviewed.    Significant Imaging:  I have reviewed all pertinent imaging results/findings within the past 24 hours.      Scheduled Meds:   amLODIPine  10 mg Per NG tube Daily    atorvastatin  10 mg Per NG tube Daily    calcium carbonate  1,000 mg Per NG tube TID    chlorhexidine  15 mL Mouth/Throat BID    dexamethasone  12 mg Intravenous Q8H    enoxaparin  40 mg Subcutaneous Daily    famotidine  20 mg Per NG tube BID    irbesartan  150 mg Oral Daily    levothyroxine  137 mcg Per NG tube Before breakfast     Continuous Infusions:   fentanyl 12.5 mL/hr at 05/27/19 0700    insulin (HUMAN R) infusion (adults) 1.8 Units/hr (05/27/19 0600)    midazolam  (VERSED) infusion (titrating) 2 mg/hr (05/27/19 0700)    propofol 40 mcg/kg/min (05/27/19 0700)     PRN Meds:dextrose 50%, dextrose 50%, hydrALAZINE, ondansetron, sodium chloride 0.9%    Vital signs in last 24 hours:  Temp:  [97.8 °F (36.6 °C)-98.3 °F (36.8 °C)] 97.9 °F (36.6 °C)  Pulse:  [33-52] 38  Resp:  [12-35] 18  SpO2:  [96 %-100 %] 97 %  BP: (121-200)/(58-95) 121/58    Intake/Output last 3 shifts:  I/O last 3 completed shifts:  In: 1953.4 [I.V.:1453.4; NG/GT:500]  Out: 2845 [Urine:2820; Drains:25]  Intake/Output this shift:  No intake/output data recorded.    Problem Assessment/Plan  Endocrine  * Hyperparathyroidism  Assessment & Plan  63 y.o. male w/ a significant PMHx of primary hyperparathyroidism s/p parathyroidectomy on 5/22 c/b bilateral VC immobility ultimately requiring reintubation in the PACU.      Neuro:   - Sedation with propofol gtt, versed gtt  - Analgesia covered with fentanyl gtt     Pulmonary:   - Intubated  Vent Mode: A/C  Oxygen Concentration (%):  [30-40] 30  Resp Rate Total:  [18 br/min-24 br/min] 18 br/min  Vt Set:  [450 mL] 450 mL  PEEP/CPAP:  [5 cmH20] 5 cmH20  Mean Airway Pressure:  [8.2 gaX27-15 cmH20] 10 cmH20  - ENT following for VC immobility  - serial ABGs  - daily CXR  - S/p 72 hour steroids  - Per primary, plan to extubate today in OR      Cardiac:   - Bradycardic  - Amlodipine 10, Irbesartan 150  - Atorvastatin 10  - Hydralazine PRN     Renal:    - Monitor UOP  - BUN/Cr stable overnight  - Monitor BUN/Cr      ID:   - AF  - WBC stable overnight  - Monitor WBC     Hem/Onc:   - H/H stable   - Monitor CBC  - Transfuse as needed     Endocrine:    - Hx of DM  - Accuchecks  - Insulin gtt  - Endocrine following, appreciate recs  - Synthroid     Fluids/Electrolytes/Nutrition/GI:   - Replace electrolytes PRN  - NPO  - Tube feeds held at MN pending extubation in OR today      PPx:  - DVT: Lovenox  - Bowel: docusate  - PUP: famotidine     DISPO:  - Cont SICU care, OR today for extubation

## 2019-05-27 NOTE — PROGRESS NOTES
[facility]  General Surgery  Progress Note    Subjective:     History of Present Illness:  No notes on file    Post-Op Info:  Procedure(s) (LRB):  PARATHYROIDECTOMY (N/A)  RE-EXPLORATION, FOR BLEEDING   5 Days Post-Op     Interval History: No events.      Medications:  Continuous Infusions:   fentanyl 125 mcg/hr (05/27/19 1000)    insulin (HUMAN R) infusion (adults) 1.7 Units/hr (05/27/19 0900)    midazolam (VERSED) infusion (titrating) 2 mg/hr (05/27/19 1000)    propofol 40 mcg/kg/min (05/27/19 1000)     Scheduled Meds:   amLODIPine  10 mg Per NG tube Daily    atorvastatin  10 mg Per NG tube Daily    calcium carbonate  1,000 mg Per NG tube TID    chlorhexidine  15 mL Mouth/Throat BID    dexamethasone  12 mg Intravenous Q8H    enoxaparin  40 mg Subcutaneous Daily    famotidine  20 mg Per NG tube BID    irbesartan  150 mg Oral Daily    levothyroxine  137 mcg Per NG tube Before breakfast     PRN Meds:dextrose 50%, dextrose 50%, hydrALAZINE, ondansetron, sodium chloride 0.9%     Review of patient's allergies indicates:   Allergen Reactions    Tizanidine Shortness Of Breath     Objective:     Vital Signs (Most Recent):  Temp: 97.9 °F (36.6 °C) (05/27/19 0700)  Pulse: (!) 39 (05/27/19 1000)  Resp: 18 (05/27/19 1000)  BP: 134/64 (05/27/19 1000)  SpO2: 97 % (05/27/19 1000) Vital Signs (24h Range):  Temp:  [97.8 °F (36.6 °C)-98.3 °F (36.8 °C)] 97.9 °F (36.6 °C)  Pulse:  [33-52] 39  Resp:  [17-35] 18  SpO2:  [96 %-100 %] 97 %  BP: (112-200)/(58-95) 134/64     Weight: 83.1 kg (183 lb 3.2 oz)  Body mass index is 26.29 kg/m².    Intake/Output - Last 3 Shifts       05/25 0700 - 05/26 0659 05/26 0700 - 05/27 0659 05/27 0700 - 05/28 0659    I.V. (mL/kg) 2689.6 (33) 1041.2 (12.5)     NG/GT 1120 250 100    Total Intake(mL/kg) 3809.6 (46.7) 1291.2 (15.5) 100 (1.2)    Urine (mL/kg/hr) 3060 (1.6) 1405 (0.7) 209 (0.7)    Drains 15 6 4    Total Output 3075 1411 213    Net +734.6 -119.8 -113                  Physical Exam   NAD  Neck soft  Intubated  S/s drain output    Significant Labs:  CBC:   Recent Labs   Lab 05/27/19  0322   WBC 10.89   RBC 3.85*   HGB 12.1*   HCT 36.3*      MCV 94   MCH 31.4*   MCHC 33.3     CMP:   Recent Labs   Lab 05/27/19  0322   *   CALCIUM 10.2   ALBUMIN 3.1*   PROT 6.4      K 4.3   CO2 29      BUN 31*   CREATININE 0.9   ALKPHOS 62   ALT 17   AST 15   BILITOT 0.2       Significant Diagnostics:  CXR reviewed    Assessment/Plan:     * Hyperparathyroidism  Post op B/L neck exploration for parathyroid adenom.  Very difficult dissection.  Complicated by vocal chord dysfunction, likely from stretch injury as nerve appeared intact on intraop NIMS monitoring and was grossly intact.  Pt intuabted in PACU for increasing stridor.     Stable overnight with persistent bradycardia related to sedation with fentanyl, versed, and propofol.      Wean sedation today.  Discuss with SICU ketamine infusion so pt can clear metabolically with plans to trial extubation in OR tomorrow.  Wean versed drip  Restart tube feeds.  NPO midnight  Mcdaniel in place  Trend labs, continue Ca supplementation     Dispo: Continue ICU care, steroids, and intubation, plan for extubation attempt in OR tomorrow        Mo Sol MD  General Surgery  [facility]

## 2019-05-27 NOTE — ASSESSMENT & PLAN NOTE
Post op B/L neck exploration for parathyroid adenom.  Very difficult dissection.  Complicated by vocal chord dysfunction, likely from stretch injury as nerve appeared intact on intraop NIMS monitoring and was grossly intact.  Pt intuabted in PACU for increasing stridor.     Stable overnight with persistent bradycardia related to sedation with fentanyl, versed, and propofol.      Wean sedation today.  Discuss with SICU ketamine infusion so pt can clear metabolically with plans to trial extubation in OR tomorrow.  Wean versed drip  Restart tube feeds.  NPO midnight  Mcdaniel in place  Trend labs, continue Ca supplementation     Dispo: Continue ICU care, steroids, and intubation, plan for extubation attempt in OR tomorrow

## 2019-05-27 NOTE — NURSING
SHIFT EVENTS: No acute events this shift     NEUROLOGICAL: RASS -2 maintained with fentanyl and propofol titration, Versed discontinued in preparation for extubation tomorrow, moves all extremities to noxious stimulation, follows commands when sedation paused.    CARDIOVASCULAR: SB without ectopy, SR when more awake, NIBP WNL without pressor support, pulses palpable in all extremities    PULMONARY: AC 30%/5.0, lung sounds clear, plan to extubate in OR tomorrow    GENITOURINARY: Adequate clear yellow urine via gee    GASTROINTESTINAL: +BS, no BM this shift, OGT unchanged    NUTRITION/ENDOCRINE: TF restarted and increased to goal rate 50ml, tolerating well, hourly accuchecks with insulin infusion titrated per protocol    SKIN/WOUNDS: Anterior neck incision open to air and well approximated, asymptomatic, BRICE measured q4h, no new pressure related breakdown    ACTIVITY: Bedrest maintained, q2h turn    SOCIAL SUPPORT: Daughter Nelda at bedside, updated of plan to extubate in OR tomorrow morning, verbalizes understanding.

## 2019-05-27 NOTE — SUBJECTIVE & OBJECTIVE
Interval History: plan for extubation in OR 5/28/19 with ENT assistance    Medications:  Continuous Infusions:   fentanyl 125 mcg/hr (05/27/19 1000)    insulin (HUMAN R) infusion (adults) 1.7 Units/hr (05/27/19 1000)    midazolam (VERSED) infusion (titrating) 2 mg/hr (05/27/19 1000)    propofol 40 mcg/kg/min (05/27/19 1000)     Scheduled Meds:   amLODIPine  10 mg Per NG tube Daily    atorvastatin  10 mg Per NG tube Daily    calcium carbonate  1,000 mg Per NG tube TID    chlorhexidine  15 mL Mouth/Throat BID    dexamethasone  12 mg Intravenous Q8H    enoxaparin  40 mg Subcutaneous Daily    famotidine  20 mg Per NG tube BID    irbesartan  150 mg Oral Daily    levothyroxine  137 mcg Per NG tube Before breakfast     PRN Meds:dextrose 50%, dextrose 50%, hydrALAZINE, ondansetron, sodium chloride 0.9%     Review of patient's allergies indicates:   Allergen Reactions    Tizanidine Shortness Of Breath     Objective:     Vital Signs (24h Range):  Temp:  [97.8 °F (36.6 °C)-98.3 °F (36.8 °C)] 97.9 °F (36.6 °C)  Pulse:  [33-52] 39  Resp:  [17-35] 18  SpO2:  [96 %-100 %] 97 %  BP: (112-200)/(58-95) 134/64     Date 05/27/19 0700 - 05/28/19 0659   Shift 6531-9486 4567-7205 2782-1879 24 Hour Total   INTAKE   NG/   100   Shift Total(mL/kg) 100(1.2)   100(1.2)   OUTPUT   Urine(mL/kg/hr) 274   274   Drains 4   4   Shift Total(mL/kg) 278(3.3)   278(3.3)   Weight (kg) 83.1 83.1 83.1 83.1     Lines/Drains/Airways     Drain                 Closed/Suction Drain 05/22/19 1741 Left Neck Bulb 10 Fr. 4 days         NG/OG Tube 05/22/19 2015 orogastric 4 days         Urethral Catheter 05/22/19 6695 4 days          Airway                 Airway - Non-Surgical 05/22/19 2008 Endotracheal Tube 4 days          Peripheral Intravenous Line                 Peripheral IV - Single Lumen 05/25/19 0200 20 G Right Antecubital 2 days         Peripheral IV - Single Lumen 05/26/19 1200 Right Antecubital less than 1 day         Peripheral IV -  Single Lumen 05/27/19 0942 22 G Left Antecubital less than 1 day                Physical Exam  Vent Mode: A/C  Oxygen Concentration (%):  [29-30] 30  Resp Rate Total:  [18 br/min-27 br/min] 18 br/min  Vt Set:  [450 mL] 450 mL  PEEP/CPAP:  [5 cmH20] 5 cmH20  Mean Airway Pressure:  [9.1 luX86-08 cmH20] 9.1 cmH20  Sedated on vent  Palpable neck landmarks    Significant Labs:  All pertinent labs from the last 24 hours have been reviewed.    Significant Diagnostics:  None

## 2019-05-27 NOTE — PLAN OF CARE
"Problem: Adult Inpatient Plan of Care  Goal: Plan of Care Review    5/22 Parathyroidectomy - PACU extubated - emergent intubation for stridor.   Outcome: Ongoing (interventions implemented as appropriate)  Dx: Hyperparathyroidism    Shift Events: plan to go to OR for extubation and possible trache    Neuro: Moves All Extremities    Vital Signs: BP (!) 179/80   Pulse (!) 40   Temp 97.8 °F (36.6 °C) (Oral)   Resp 18   Ht 5' 10" (1.778 m)   Wt (P) 83.1 kg (183 lb 3.2 oz)   SpO2 98%   BMI (P) 26.29 kg/m²     Diet: NPO    Gtts: Propfol, Fentanyl and Insulin and versed    Urine Output: Urinary Catheter 40-60 cc/hour    Drains: SOCO Drain, total output 5 cc / shift    Restraints non violent soft wrist restraints     Labs/Accuchecks: daily labs. Q1h accuchecks.    Skin: neck incision open to air with dermabond and soco drain open to air on left neck with No redness, swelling or drainage. No skin break down noted. Will continue to monitor.         "

## 2019-05-27 NOTE — ASSESSMENT & PLAN NOTE
63 y.o. male w/ a significant PMHx of primary hyperparathyroidism s/p parathyroidectomy on 5/22 c/b bilateral VC immobility ultimately requiring reintubation in the PACU.      Neuro:   - Sedation with propofol gtt, versed gtt  - Analgesia covered with fentanyl gtt     Pulmonary:   - Intubated  Vent Mode: A/C  Oxygen Concentration (%):  [30-40] 30  Resp Rate Total:  [18 br/min-24 br/min] 18 br/min  Vt Set:  [450 mL] 450 mL  PEEP/CPAP:  [5 cmH20] 5 cmH20  Mean Airway Pressure:  [8.2 biP14-65 cmH20] 10 cmH20  - ENT following for VC immobility  - serial ABGs  - daily CXR  - S/p 72 hour steroids  - Per primary, plan to extubate today in OR      Cardiac:   - Bradycardic  - Amlodipine 10, Irbesartan 150  - Atorvastatin 10  - Hydralazine PRN     Renal:    - Monitor UOP  - BUN/Cr stable overnight  - Monitor BUN/Cr      ID:   - AF  - WBC stable overnight  - Monitor WBC     Hem/Onc:   - H/H stable   - Monitor CBC  - Transfuse as needed     Endocrine:    - Hx of DM  - Accuchecks  - Insulin gtt  - Endocrine following, appreciate recs  - Synthroid     Fluids/Electrolytes/Nutrition/GI:   - Replace electrolytes PRN  - NPO  - Tube feeds held at MN pending extubation in OR today      PPx:  - DVT: Lovenox  - Bowel: docusate  - PUP: famotidine     DISPO:  - Cont SICU care, OR today for extubation

## 2019-05-27 NOTE — PLAN OF CARE
Met with patient's daughter and sister at patient's bedside.  Patient is expected to go to OR tomorrow extubation.  Patient's discharge date and plan pending outcome.  Will continue to follow for needs.       05/27/19 151   Discharge Reassessment   Assessment Type Discharge Planning Reassessment   Discharge Plan A Home with family   Discharge Plan B Home with family;Home Health

## 2019-05-27 NOTE — ANESTHESIA PREPROCEDURE EVALUATION
[facility]  Anesthesia Pre-Operative Evaluation         Patient Name: Rob Jones Jr.  YOB: 1955  MRN: [id number]    SUBJECTIVE:     05/27/2019    Pre-operative evaluation for Procedure(s) (LRB):  Exam under anesthesia; possible tracheostomy (N/A)  CREATION, TRACHEOSTOMY (N/A)    Rob Jones Jr. is a 63 y.o. male with DM, HTN, Graves disease s/p total thyroidectomy, Hx of pheochromocytoma resection, and primary hyperparathyroidism now s/p parathyroidectomy on 5/22/2019.  Post operatively patient was found to have stridor in the PACU necessitating reintubation.  He was transferred to the ICU for closer monitoring.     LDA:        Peripheral IV - Single Lumen 05/25/19 0200 20 G Right Antecubital (Active)   Site Assessment No redness;No swelling;No warmth;No drainage 5/27/2019 11:00 AM   Line Status Infusing 5/27/2019 11:00 AM   Dressing Status Clean;Dry;Intact 5/27/2019 11:00 AM   Dressing Intervention Dressing reinforced 5/27/2019  3:00 AM   Dressing Change Due 05/29/19 5/27/2019  3:00 AM   Site Change Due 05/29/19 5/27/2019  7:00 AM   Reason Not Rotated Not due 5/27/2019 11:00 AM   Number of days: 2            Peripheral IV - Single Lumen 05/26/19 1200 Right Antecubital (Active)   Site Assessment No redness;No swelling;No warmth;No drainage 5/27/2019 11:00 AM   Line Status Infusing 5/27/2019 11:00 AM   Dressing Status Clean;Dry;Intact 5/27/2019 11:00 AM   Dressing Intervention Dressing reinforced 5/27/2019  3:00 AM   Dressing Change Due 05/30/19 5/27/2019  3:00 AM   Site Change Due 05/30/19 5/27/2019  7:00 AM   Reason Not Rotated Not due 5/27/2019 11:00 AM   Number of days: 1            Peripheral IV - Single Lumen 05/27/19 0942 22 G Left Antecubital (Active)   Site Assessment No redness;No swelling;No warmth;No drainage 5/27/2019 11:00 AM   Line Status Infusing 5/27/2019 11:00 AM   Dressing Status Clean;Dry;Intact 5/27/2019 11:00 AM   Dressing Intervention New dressing  5/27/2019 11:00 AM   Site Change Due 05/31/19 5/27/2019 11:00 AM   Reason Not Rotated Not due 5/27/2019 11:00 AM   Number of days: 0            Closed/Suction Drain 05/22/19 1741 Left Neck Bulb 10 Fr. (Active)   Site Description Healing 5/27/2019  3:00 AM   Dressing Type No dressing 5/27/2019 11:00 AM   Dressing Status Clean;Dry;Intact 5/26/2019  3:00 PM   Dressing Intervention Dressing reinforced 5/25/2019  3:00 AM   Drainage Serosanguineous 5/27/2019 11:00 AM   Status To bulb suction 5/27/2019 11:00 AM   Output (mL) 2 mL 5/27/2019  3:00 PM   Number of days: 4            NG/OG Tube 05/22/19 2015 orogastric (Active)   Placement Check placement verified by aspirate characteristics 5/27/2019 11:00 AM   Tolerance no signs/symptoms of discomfort 5/27/2019 11:00 AM   Securement secured to commercial device 5/27/2019 11:00 AM   Clamp Status/Tolerance unclamped 5/27/2019 11:00 AM   Suction Setting/Drainage Method suction at;low;intermittent setting 5/27/2019  7:00 AM   Insertion Site Appearance no redness, warmth, tenderness, skin breakdown, drainage 5/27/2019 11:00 AM   Flush/Irrigation flushed w/;water;no resistance met 5/27/2019 11:00 AM   Feeding Method continuous 5/27/2019 11:00 AM   Feeding Action feeding restarted 5/27/2019 11:00 AM   Current Rate (mL/hr) 40 mL/hr 5/27/2019 11:00 AM   Goal Rate (mL/hr) 50 mL/hr 5/27/2019 11:00 AM   Intake (mL) 50 mL 5/27/2019  2:00 PM   Water Bolus (mL) 20 mL 5/24/2019 11:00 AM   Tube Output(mL)(Include Discarded Residual) 50 mL 5/24/2019 11:00 AM   Rate Formula Tube Feeding (mL/hr) 30 mL/hr 5/26/2019 11:00 PM   Formula Name impact peptide 1.5 5/26/2019  3:00 PM   Intake (mL) - Formula Tube Feeding 40 5/27/2019  3:00 PM   Residual Amount (ml) 0 ml 5/27/2019 11:00 AM   Number of days: 4            Urethral Catheter 05/22/19 2245 (Active)   Site Assessment Clean;Intact 5/27/2019 11:00 AM   Collection Container Urimeter 5/27/2019 11:00 AM   Securement Method secured to top of thigh w/  "adhesive device 5/27/2019 11:00 AM   Catheter Care Performed yes 5/27/2019  7:00 AM   Reason for Continuing Urinary Catheterization Critically ill in ICU requiring intensive monitoring 5/27/2019 11:00 AM   CAUTI Prevention Bundle StatLock in place w 1" slack;Intact seal between catheter & drainage tubing;Drainage bag off the floor;Green sheeting clip in use;No dependent loops or kinks;Drainage bag not overfilled (<2/3 full);Drainage bag below bladder 5/27/2019 11:00 AM   Output (mL) 30 mL 5/27/2019  3:00 PM   Number of days: 4       Previous airway:   Video Laryngoscopy; Inserted by: Anesthesia Resident; Airway Device: Endotracheal Tube, Other (Comment) (NIM); Mask Ventilation: Easy - oral; Intubated: Postinduction; Blade: Brittany #3; Airway Device Size: 8.0; Style: Cuffed; Cuff Inflation: Minimal occlusive pressure; Placement Verified By: Auscultation, Capnometry, ETT Condensation; Grade: Grade I      Drips:    fentanyl 175 mcg/hr (05/27/19 1515)    insulin (HUMAN R) infusion (adults) 1.7 Units/hr (05/27/19 1500)    propofol 50 mcg/kg/min (05/27/19 1515)       Patient Active Problem List   Diagnosis    HTN (hypertension)    DJD (degenerative joint disease)    Neuropathy    Tobacco use    Postoperative hypothyroidism    S/P lumbar fusion    Coronary artery disease involving native coronary artery of native heart without angina pectoris    History of pheochromocytoma    Anxiety    Primary hyperparathyroidism    Cervical stenosis of spinal canal    Cervical radiculopathy    Chronic left shoulder pain    Uncontrolled type 2 diabetes mellitus with complication, with long-term current use of insulin    Benign non-nodular prostatic hyperplasia without lower urinary tract symptoms    History of hepatitis C; S/p RX with SVR 12 documented 06/2017    Chronic pain    Primary insomnia    Atypical chest pain    Diabetes mellitus without complication    Thyroid disease    Hyperparathyroidism    " Respiratory distress    On enteral nutrition    Adrenal cortical steroids causing adverse effect in therapeutic use    Vocal cord paralysis, bilateral partial       Review of patient's allergies indicates:   Allergen Reactions    Tizanidine Shortness Of Breath       Current Inpatient Medications:   amLODIPine  10 mg Per NG tube Daily    atorvastatin  10 mg Per NG tube Daily    calcium carbonate  1,000 mg Per NG tube TID    chlorhexidine  15 mL Mouth/Throat BID    dexamethasone  12 mg Intravenous Q8H    enoxaparin  40 mg Subcutaneous Daily    famotidine  20 mg Per NG tube BID    irbesartan  150 mg Oral Daily    levothyroxine  137 mcg Per NG tube Before breakfast       No current facility-administered medications on file prior to encounter.      Current Outpatient Medications on File Prior to Encounter   Medication Sig Dispense Refill    amLODIPine (NORVASC) 10 MG tablet TAKE 1 TABLET (10 MG TOTAL) BY MOUTH ONCE DAILY. 90 tablet 2    atorvastatin (LIPITOR) 10 MG tablet Take 1 tablet (10 mg total) by mouth once daily. 90 tablet 3    blood sugar diagnostic Strp Pt to check BG up to QID. Dispense strips compatible with pt brand meter 100 each 11    blood-glucose meter (FREESTYLE SYSTEM KIT) kit Please dispense per pt insurance formulary and pt choice Use as instructed 1 each 0    irbesartan (AVAPRO) 300 MG tablet Take 1 tablet (300 mg total) by mouth once daily. (Patient taking differently: Take 150 mg by mouth once daily. ) 90 tablet 2    lancets (ONETOUCH ULTRASOFT LANCETS) [facility] [place] to check BG up to QID. Dispense lancets compatible with pt brand meter 100 each 11    levothyroxine (SYNTHROID) 137 MCG Tab tablet TAKE 1 TABLET (137 MCG TOTAL) BY MOUTH BEFORE BREAKFAST. 90 tablet 2    meloxicam (MOBIC) 15 MG tablet TAKE 1 TABLET (15 MG TOTAL) BY MOUTH DAILY AS NEEDED FOR PAIN. 90 tablet 1    metFORMIN (GLUCOPHAGE) 1000 MG tablet TAKE 1 TABLET BY MOUTH TWICE A DAY WITH FOOD 180 tablet 2    NOVOFINE 32  "32 gauge x 1/4" Ndle 1 EACH BY MISC.(NON-DRUG COMBO ROUTE) ROUTE ONCE DAILY.  3    pen needle, diabetic (NOVOFINE PLUS) 32 gauge x 1/6" Ndle 1 each by Misc.(Non-Drug; Combo Route) route once daily. 100 each 3    SITagliptin (JANUVIA) 100 MG Tab Take 1 tablet (100 mg total) by mouth once daily. 30 tablet 11    tamsulosin (FLOMAX) 0.4 mg Cp24 Take 1 capsule (0.4 mg total) by mouth once daily. 90 capsule 3    temazepam (RESTORIL) 30 mg capsule TAKE 1 CAPSULE BY MOUTH EVERY DAY AT BEDTIME AS NEEDED 60 capsule 1    vitamin D 1000 units Tab Take 185 mg by mouth once daily.      albuterol (ACCUNEB) 1.25 mg/3 mL Nebu Take 3 mLs (1.25 mg total) by nebulization every 6 (six) hours as needed (shortness of breath). Rescue 1 Box 2    nitroGLYCERIN (NITROSTAT) 0.4 MG SL tablet Place 0.4 mg under the tongue every 5 (five) minutes as needed for Chest pain.      [DISCONTINUED] gabapentin (NEURONTIN) 300 MG capsule Take 300 mg by mouth 2 (two) times daily.         Past Surgical History:   Procedure Laterality Date    BLOCK SELECTIVE NERVE ROOT TRANSFORAMINAL LUMBAR Left 6/21/2017    Performed by [facility]za [facility] at [facility] PAIN MGT    INJECTION-STEROID-EPIDURAL-CERVICAL N/A 1/17/2018    Performed by [facility] at [facility] PAIN MGT    INJECTION-STEROID-EPIDURAL-TRANSFORAMINAL Left 8/25/2017    Performed by Sulaiman Gudino MD at [facility] PAIN MGT    LUMBAR FUSION      PARATHYROIDECTOMY N/A 5/22/2019    Performed by [facility] at [facility] OR 2ND FLR    RE-EXPLORATION, FOR BLEEDING  5/22/2019    Performed by [facility] at [facility] OR 2ND FLR    THYROIDECTOMY      TONSILLECTOMY         Social History     Socioeconomic History    Marital status: Single     Spouse name: Not on file    Number of children: Not on file    Years of education: Not on file    Highest education level: Not on file   Occupational History    Occupation: Disabled, pt says from [employer] and now for back, [employer]   Social Needs "    Financial resource strain: Not on file    Food insecurity:     Worry: Not on file     Inability: Not on file    Transportation needs:     Medical: Not on file     Non-medical: Not on file   Tobacco Use    Smoking status: Current Every Day Smoker    Smokeless tobacco: Never Used   Substance and Sexual Activity    Alcohol use: Yes    Drug use: No    Sexual activity: Not on file   Lifestyle    Physical activity:     Days per week: Not on file     Minutes per session: Not on file    Stress: Not on file   Relationships    Social connections:     Talks on phone: Not on file     Gets together: Not on file     Attends Islam service: Not on file     Active member of club or organization: Not on file     Attends meetings of clubs or organizations: Not on file     Relationship status: Not on file   Other Topics Concern    Not on file   Social History Narrative    Lives alone.  He has 2 adult children who do not live here.         OBJECTIVE:     Vital Signs Range (Last 24H):  Temp:  [36.6 °C (97.8 °F)-36.8 °C (98.3 °F)]   Pulse:  [33-69]   Resp:  [17-35]   BP: (112-200)/(58-94)   SpO2:  [96 %-100 %]       Significant Labs:  Lab Results   Component Value Date    WBC 10.89 05/27/2019    HGB 12.1 (L) 05/27/2019    HCT 36.3 (L) 05/27/2019     05/27/2019    CHOL 129 01/21/2019    TRIG 47 01/21/2019    HDL 78 (H) 01/21/2019    ALT 17 05/27/2019    AST 15 05/27/2019     05/27/2019    K 4.3 05/27/2019     05/27/2019    CREATININE 0.9 05/27/2019    BUN 31 (H) 05/27/2019    CO2 29 05/27/2019    TSH 3.325 01/21/2019    INR 1.0 05/23/2019    HGBA1C 6.1 (H) 05/16/2019         Diagnostic Studies:  No relevant studies.      Cardiac Studies:  EKG:   Vent. Rate : 056 BPM     Atrial Rate : 056 BPM     P-R Int : 200 ms          QRS Dur : 104 ms      QT Int : 394 ms       P-R-T Axes : 067 057 027 degrees     QTc Int : 380 ms    Sinus bradycardia  Otherwise normal ECG    Confirmed by Mak Levine MD ([id number]) on  5/7/2019 2:28:54 PM      NM MULTI STUDY RX STRESS CARD COMPONENT 10/12/2018:  1. The EKG portion of this study is negative for ischemia at a peak heart rate of 73 bpm (46% of predicted).   2. Blood pressure remained stable throughout the protocol  (Presenting BP: 133/68 Peak BP: 152/63).   3. No significant arrhythmias were present.   4. There were no symptoms of chest discomfort or significant dyspnea throughout the protocol.     ASSESSMENT/PLAN:     Anesthesia Evaluation    I have reviewed the Patient Summary Reports.     I have reviewed the Medications.     Review of Systems  Anesthesia Hx:  History of prior surgery of interest to airway management or planning: Previous anesthesia: General shoulder surgery 2017 with general anesthesia.  Denies Family Hx of Anesthesia complications.   Denies Personal Hx of Anesthesia complications.   Social:  Smoker 5-8 cigarettes daily x 40 + yrs;  Social alcohol   Hematology/Oncology:  Hematology Normal   Oncology Normal     EENT/Dental:   glasses   Cardiovascular:   Hypertension Past MI (pt reports h/o 3 MI with last about 2005) CAD   hyperlipidemia ESCOBAR    Pulmonary:   COPD Denies Asthma. Shortness of breath  Denies Sleep Apnea.    Renal/:   BPH    Hepatic/GI:   Hepatitis (treated with Harvoni), C    Musculoskeletal:   Arthritis  S/p lumbar fusion Spine Disorders: lumbar Chronic Pain    Neurological:   Denies CVA. Denies Seizures.    Endocrine:   Diabetes (A1C 6.1 5/16/19), type 2 Hypothyroidism H/o Graves ds s/p thyroidectomy 2000; h/o   left adrenal pheochromocytoma resected 2005 Parathyroid Disease, Hyperparathyroidism, Hypercalcemia        Physical Exam  General:  Well nourished    Airway/Jaw/Neck:  Airway Findings: Mouth Opening: Normal Tongue: Normal  Pre-Existing Airway Tube(s): Oral Endotracheal tube  Size: 8.0 at 23 cm at the lip  General Airway Assessment: Adult  TM Distance: Normal, at least 6 cm  Jaw/Neck Findings:      Dental:  Dental Findings: Lower Dentures,  Upper Dentures   Chest/Lungs:  Chest/Lungs Findings: Clear to auscultation, Normal Respiratory Rate     Heart/Vascular:  Heart Findings: Rate: Bradycardia  Rhythm: Regular Rhythm  Sounds: Normal        Mental Status:  Mental Status Findings:  Unconscious         Anesthesia Plan  Type of Anesthesia, risks & benefits discussed:  Anesthesia Type:  general  Patient's Preference:   Intra-op Monitoring Plan: standard ASA monitors and arterial line  Intra-op Monitoring Plan Comments:   Post Op Pain Control Plan: per primary service following discharge from PACU  Post Op Pain Control Plan Comments:   Induction:   IV  Beta Blocker:  Patient is not currently on a Beta-Blocker (No further documentation required).       Informed Consent: Patient understands risks and agrees with Anesthesia plan.  Questions answered. Anesthesia consent signed with patient.  ASA Score: 3     Day of Surgery Review of History & Physical:    H&P update referred to the surgeon.     Anesthesia Plan Notes: Attempted to call patient's daughter Nelda at [phone] 3 times with no answer.          Ready For Surgery From Anesthesia Perspective.

## 2019-05-27 NOTE — PROGRESS NOTES
[facility]  Otorhinolaryngology-Head & Neck Surgery  Progress Note    Subjective:     Post-Op Info:  Procedure(s) (LRB):  PARATHYROIDECTOMY (N/A)  RE-EXPLORATION, FOR BLEEDING   5 Days Post-Op  Hospital Day: 6     Interval History: plan for extubation in OR 5/28/19 with ENT assistance    Medications:  Continuous Infusions:   fentanyl 125 mcg/hr (05/27/19 1000)    insulin (HUMAN R) infusion (adults) 1.7 Units/hr (05/27/19 1000)    midazolam (VERSED) infusion (titrating) 2 mg/hr (05/27/19 1000)    propofol 40 mcg/kg/min (05/27/19 1000)     Scheduled Meds:   amLODIPine  10 mg Per NG tube Daily    atorvastatin  10 mg Per NG tube Daily    calcium carbonate  1,000 mg Per NG tube TID    chlorhexidine  15 mL Mouth/Throat BID    dexamethasone  12 mg Intravenous Q8H    enoxaparin  40 mg Subcutaneous Daily    famotidine  20 mg Per NG tube BID    irbesartan  150 mg Oral Daily    levothyroxine  137 mcg Per NG tube Before breakfast     PRN Meds:dextrose 50%, dextrose 50%, hydrALAZINE, ondansetron, sodium chloride 0.9%     Review of patient's allergies indicates:   Allergen Reactions    Tizanidine Shortness Of Breath     Objective:     Vital Signs (24h Range):  Temp:  [97.8 °F (36.6 °C)-98.3 °F (36.8 °C)] 97.9 °F (36.6 °C)  Pulse:  [33-52] 39  Resp:  [17-35] 18  SpO2:  [96 %-100 %] 97 %  BP: (112-200)/(58-95) 134/64     Date 05/27/19 0700 - 05/28/19 0659   Shift 8579-7432 9675-8217 0079-1798 24 Hour Total   INTAKE   NG/   100   Shift Total(mL/kg) 100(1.2)   100(1.2)   OUTPUT   Urine(mL/kg/hr) 274   274   Drains 4   4   Shift Total(mL/kg) 278(3.3)   278(3.3)   Weight (kg) 83.1 83.1 83.1 83.1     Lines/Drains/Airways     Drain                 Closed/Suction Drain 05/22/19 1741 Left Neck Bulb 10 Fr. 4 days         NG/OG Tube 05/22/19 2015 orogastric 4 days         Urethral Catheter 05/22/19 2245 4 days          Airway                 Airway - Non-Surgical 05/22/19 2008 Endotracheal Tube 4 days           Peripheral Intravenous Line                 Peripheral IV - Single Lumen 05/25/19 0200 20 G Right Antecubital 2 days         Peripheral IV - Single Lumen 05/26/19 1200 Right Antecubital less than 1 day         Peripheral IV - Single Lumen 05/27/19 0942 22 G Left Antecubital less than 1 day                Physical Exam  Vent Mode: A/C  Oxygen Concentration (%):  [29-30] 30  Resp Rate Total:  [18 br/min-27 br/min] 18 br/min  Vt Set:  [450 mL] 450 mL  PEEP/CPAP:  [5 cmH20] 5 cmH20  Mean Airway Pressure:  [9.1 [id number] cmH20] 9.1 cmH20  Sedated on vent  Palpable neck landmarks    Significant Labs:  All pertinent labs from the last 24 hours have been reviewed.    Significant Diagnostics:  None    Assessment/Plan:     Vocal cord paralysis, bilateral partial  ENT (Dr. Baez) available to assist with extubation in OR 5/28/19  Please page ENT resident on call with tentative timeline for surgery.        Neal Mcdonnell MD  Otorhinolaryngology-Head & Neck Surgery  [facility]

## 2019-05-28 ENCOUNTER — ANESTHESIA (OUTPATIENT)
Dept: SURGERY | Facility: HOSPITAL | Age: 64
DRG: 982 | End: 2019-05-28
Payer: MEDICARE

## 2019-05-28 LAB
ALBUMIN SERPL BCP-MCNC: 2.8 G/DL (ref 3.5–5.2)
ALLENS TEST: ABNORMAL
ALP SERPL-CCNC: 63 U/L (ref 55–135)
ALT SERPL W/O P-5'-P-CCNC: 16 U/L (ref 10–44)
ANION GAP SERPL CALC-SCNC: 9 MMOL/L (ref 8–16)
AST SERPL-CCNC: 13 U/L (ref 10–40)
BASOPHILS # BLD AUTO: 0.02 K/UL (ref 0–0.2)
BASOPHILS NFR BLD: 0.2 % (ref 0–1.9)
BILIRUB SERPL-MCNC: 0.1 MG/DL (ref 0.1–1)
BUN SERPL-MCNC: 34 MG/DL (ref 8–23)
CA-I BLDV-SCNC: 1.16 MMOL/L (ref 1.06–1.42)
CALCIUM SERPL-MCNC: 10.3 MG/DL (ref 8.7–10.5)
CHLORIDE SERPL-SCNC: 104 MMOL/L (ref 95–110)
CO2 SERPL-SCNC: 24 MMOL/L (ref 23–29)
CREAT SERPL-MCNC: 0.9 MG/DL (ref 0.5–1.4)
DELSYS: ABNORMAL
DIFFERENTIAL METHOD: ABNORMAL
EOSINOPHIL # BLD AUTO: 0 K/UL (ref 0–0.5)
EOSINOPHIL NFR BLD: 0 % (ref 0–8)
ERYTHROCYTE [DISTWIDTH] IN BLOOD BY AUTOMATED COUNT: 12.6 % (ref 11.5–14.5)
ERYTHROCYTE [SEDIMENTATION RATE] IN BLOOD BY WESTERGREN METHOD: 18 MM/H
EST. GFR  (AFRICAN AMERICAN): >60 ML/MIN/1.73 M^2
EST. GFR  (NON AFRICAN AMERICAN): >60 ML/MIN/1.73 M^2
FIO2: 30
GLUCOSE SERPL-MCNC: 201 MG/DL (ref 70–110)
HCO3 UR-SCNC: 30 MMOL/L (ref 24–28)
HCT VFR BLD AUTO: 37.9 % (ref 40–54)
HGB BLD-MCNC: 12.4 G/DL (ref 14–18)
IMM GRANULOCYTES # BLD AUTO: 0.12 K/UL (ref 0–0.04)
IMM GRANULOCYTES NFR BLD AUTO: 0.9 % (ref 0–0.5)
LYMPHOCYTES # BLD AUTO: 1.4 K/UL (ref 1–4.8)
LYMPHOCYTES NFR BLD: 10.6 % (ref 18–48)
MAGNESIUM SERPL-MCNC: 1.9 MG/DL (ref 1.6–2.6)
MCH RBC QN AUTO: 31.3 PG (ref 27–31)
MCHC RBC AUTO-ENTMCNC: 32.7 G/DL (ref 32–36)
MCV RBC AUTO: 96 FL (ref 82–98)
MODE: ABNORMAL
MONOCYTES # BLD AUTO: 0.7 K/UL (ref 0.3–1)
MONOCYTES NFR BLD: 5.6 % (ref 4–15)
NEUTROPHILS # BLD AUTO: 10.7 K/UL (ref 1.8–7.7)
NEUTROPHILS NFR BLD: 82.7 % (ref 38–73)
NRBC BLD-RTO: 0 /100 WBC
PCO2 BLDA: 40.1 MMHG (ref 35–45)
PEEP: 5
PH SMN: 7.48 [PH] (ref 7.35–7.45)
PHOSPHATE SERPL-MCNC: 3.8 MG/DL (ref 2.7–4.5)
PLATELET # BLD AUTO: 312 K/UL (ref 150–350)
PMV BLD AUTO: 10.2 FL (ref 9.2–12.9)
PO2 BLDA: 105 MMHG (ref 80–100)
POC BE: 7 MMOL/L
POC SATURATED O2: 98 % (ref 95–100)
POC TCO2: 31 MMOL/L (ref 23–27)
POCT GLUCOSE: 101 MG/DL (ref 70–110)
POCT GLUCOSE: 106 MG/DL (ref 70–110)
POCT GLUCOSE: 122 MG/DL (ref 70–110)
POCT GLUCOSE: 151 MG/DL (ref 70–110)
POCT GLUCOSE: 156 MG/DL (ref 70–110)
POCT GLUCOSE: 166 MG/DL (ref 70–110)
POCT GLUCOSE: 187 MG/DL (ref 70–110)
POCT GLUCOSE: 192 MG/DL (ref 70–110)
POCT GLUCOSE: 193 MG/DL (ref 70–110)
POCT GLUCOSE: 195 MG/DL (ref 70–110)
POCT GLUCOSE: 197 MG/DL (ref 70–110)
POCT GLUCOSE: 198 MG/DL (ref 70–110)
POCT GLUCOSE: 199 MG/DL (ref 70–110)
POCT GLUCOSE: 199 MG/DL (ref 70–110)
POCT GLUCOSE: 68 MG/DL (ref 70–110)
POCT GLUCOSE: 73 MG/DL (ref 70–110)
POCT GLUCOSE: 78 MG/DL (ref 70–110)
POCT GLUCOSE: 89 MG/DL (ref 70–110)
POCT GLUCOSE: 99 MG/DL (ref 70–110)
POTASSIUM SERPL-SCNC: 4.6 MMOL/L (ref 3.5–5.1)
PROT SERPL-MCNC: 6.1 G/DL (ref 6–8.4)
RBC # BLD AUTO: 3.96 M/UL (ref 4.6–6.2)
SAMPLE: ABNORMAL
SITE: ABNORMAL
SODIUM SERPL-SCNC: 137 MMOL/L (ref 136–145)
SP02: 94
VT: 450
WBC # BLD AUTO: 12.95 K/UL (ref 3.9–12.7)

## 2019-05-28 PROCEDURE — 63600175 PHARM REV CODE 636 W HCPCS: Performed by: NURSE ANESTHETIST, CERTIFIED REGISTERED

## 2019-05-28 PROCEDURE — 99291 CRITICAL CARE FIRST HOUR: CPT | Mod: 24,GC,, | Performed by: SURGERY

## 2019-05-28 PROCEDURE — 99291 PR CRITICAL CARE, E/M 30-74 MINUTES: ICD-10-PCS | Mod: 24,GC,, | Performed by: SURGERY

## 2019-05-28 PROCEDURE — 83735 ASSAY OF MAGNESIUM: CPT

## 2019-05-28 PROCEDURE — 31575 PR LARYNGOSCOPY, FLEXIBLE; DIAGNOSTIC: ICD-10-PCS | Mod: ,,, | Performed by: OTOLARYNGOLOGY

## 2019-05-28 PROCEDURE — 36000709 HC OR TIME LEV III EA ADD 15 MIN: Performed by: SURGERY

## 2019-05-28 PROCEDURE — 25000003 PHARM REV CODE 250: Performed by: NURSE ANESTHETIST, CERTIFIED REGISTERED

## 2019-05-28 PROCEDURE — 99900035 HC TECH TIME PER 15 MIN (STAT)

## 2019-05-28 PROCEDURE — 63600175 PHARM REV CODE 636 W HCPCS: Performed by: STUDENT IN AN ORGANIZED HEALTH CARE EDUCATION/TRAINING PROGRAM

## 2019-05-28 PROCEDURE — 82330 ASSAY OF CALCIUM: CPT

## 2019-05-28 PROCEDURE — 63600175 PHARM REV CODE 636 W HCPCS: Performed by: SURGERY

## 2019-05-28 PROCEDURE — 99900026 HC AIRWAY MAINTENANCE (STAT)

## 2019-05-28 PROCEDURE — 25000003 PHARM REV CODE 250: Performed by: NURSE PRACTITIONER

## 2019-05-28 PROCEDURE — 27200966 HC CLOSED SUCTION SYSTEM

## 2019-05-28 PROCEDURE — 27000221 HC OXYGEN, UP TO 24 HOURS

## 2019-05-28 PROCEDURE — 99499 NO LOS: ICD-10-PCS | Mod: ,,, | Performed by: SURGERY

## 2019-05-28 PROCEDURE — 37000009 HC ANESTHESIA EA ADD 15 MINS: Performed by: SURGERY

## 2019-05-28 PROCEDURE — 99499 UNLISTED E&M SERVICE: CPT | Mod: ,,, | Performed by: SURGERY

## 2019-05-28 PROCEDURE — 25000003 PHARM REV CODE 250: Performed by: STUDENT IN AN ORGANIZED HEALTH CARE EDUCATION/TRAINING PROGRAM

## 2019-05-28 PROCEDURE — 36600 WITHDRAWAL OF ARTERIAL BLOOD: CPT

## 2019-05-28 PROCEDURE — 94761 N-INVAS EAR/PLS OXIMETRY MLT: CPT

## 2019-05-28 PROCEDURE — 36000708 HC OR TIME LEV III 1ST 15 MIN: Performed by: SURGERY

## 2019-05-28 PROCEDURE — D9220A PRA ANESTHESIA: ICD-10-PCS | Mod: CRNA,,, | Performed by: NURSE ANESTHETIST, CERTIFIED REGISTERED

## 2019-05-28 PROCEDURE — 99232 PR SUBSEQUENT HOSPITAL CARE,LEVL II: ICD-10-PCS | Mod: GC,,, | Performed by: INTERNAL MEDICINE

## 2019-05-28 PROCEDURE — 37000008 HC ANESTHESIA 1ST 15 MINUTES: Performed by: SURGERY

## 2019-05-28 PROCEDURE — D9220A PRA ANESTHESIA: Mod: CRNA,,, | Performed by: NURSE ANESTHETIST, CERTIFIED REGISTERED

## 2019-05-28 PROCEDURE — D9220A PRA ANESTHESIA: ICD-10-PCS | Mod: ANES,,, | Performed by: ANESTHESIOLOGY

## 2019-05-28 PROCEDURE — D9220A PRA ANESTHESIA: Mod: ANES,,, | Performed by: ANESTHESIOLOGY

## 2019-05-28 PROCEDURE — 99232 SBSQ HOSP IP/OBS MODERATE 35: CPT | Mod: GC,,, | Performed by: INTERNAL MEDICINE

## 2019-05-28 PROCEDURE — 84100 ASSAY OF PHOSPHORUS: CPT

## 2019-05-28 PROCEDURE — 85025 COMPLETE CBC W/AUTO DIFF WBC: CPT

## 2019-05-28 PROCEDURE — 31575 DIAGNOSTIC LARYNGOSCOPY: CPT | Mod: ,,, | Performed by: OTOLARYNGOLOGY

## 2019-05-28 PROCEDURE — 80053 COMPREHEN METABOLIC PANEL: CPT

## 2019-05-28 PROCEDURE — 94003 VENT MGMT INPAT SUBQ DAY: CPT

## 2019-05-28 PROCEDURE — 20000000 HC ICU ROOM

## 2019-05-28 PROCEDURE — 82803 BLOOD GASES ANY COMBINATION: CPT

## 2019-05-28 PROCEDURE — 25000003 PHARM REV CODE 250

## 2019-05-28 RX ORDER — HYDRALAZINE HYDROCHLORIDE 20 MG/ML
20 INJECTION INTRAMUSCULAR; INTRAVENOUS ONCE
Status: COMPLETED | OUTPATIENT
Start: 2019-05-28 | End: 2019-05-28

## 2019-05-28 RX ORDER — METOPROLOL TARTRATE 1 MG/ML
INJECTION, SOLUTION INTRAVENOUS
Status: COMPLETED
Start: 2019-05-28 | End: 2019-05-28

## 2019-05-28 RX ORDER — PROPOFOL 10 MG/ML
VIAL (ML) INTRAVENOUS
Status: DISCONTINUED | OUTPATIENT
Start: 2019-05-28 | End: 2019-05-28

## 2019-05-28 RX ORDER — KETOROLAC TROMETHAMINE 30 MG/ML
30 INJECTION, SOLUTION INTRAMUSCULAR; INTRAVENOUS ONCE
Status: COMPLETED | OUTPATIENT
Start: 2019-05-28 | End: 2019-05-28

## 2019-05-28 RX ORDER — KETOROLAC TROMETHAMINE 30 MG/ML
30 INJECTION, SOLUTION INTRAMUSCULAR; INTRAVENOUS EVERY 6 HOURS
Status: COMPLETED | OUTPATIENT
Start: 2019-05-28 | End: 2019-05-29

## 2019-05-28 RX ORDER — MAGNESIUM SULFATE HEPTAHYDRATE 40 MG/ML
2 INJECTION, SOLUTION INTRAVENOUS ONCE
Status: COMPLETED | OUTPATIENT
Start: 2019-05-28 | End: 2019-05-28

## 2019-05-28 RX ORDER — METOPROLOL TARTRATE 1 MG/ML
5 INJECTION, SOLUTION INTRAVENOUS EVERY 6 HOURS PRN
Status: DISCONTINUED | OUTPATIENT
Start: 2019-05-28 | End: 2019-06-03 | Stop reason: HOSPADM

## 2019-05-28 RX ORDER — NALOXONE HCL 0.4 MG/ML
VIAL (ML) INJECTION
Status: DISCONTINUED | OUTPATIENT
Start: 2019-05-28 | End: 2019-05-28

## 2019-05-28 RX ADMIN — DEXAMETHASONE SODIUM PHOSPHATE 12 MG: 4 INJECTION, SOLUTION INTRA-ARTICULAR; INTRALESIONAL; INTRAMUSCULAR; INTRAVENOUS; SOFT TISSUE at 05:05

## 2019-05-28 RX ADMIN — PROPOFOL 50 MCG/KG/MIN: 10 INJECTION, EMULSION INTRAVENOUS at 06:05

## 2019-05-28 RX ADMIN — METOPROLOL TARTRATE 5 MG: 1 INJECTION, SOLUTION INTRAVENOUS at 12:05

## 2019-05-28 RX ADMIN — LORAZEPAM 0.5 MG: 2 INJECTION, SOLUTION INTRAMUSCULAR; INTRAVENOUS at 12:05

## 2019-05-28 RX ADMIN — ENOXAPARIN SODIUM 40 MG: 100 INJECTION SUBCUTANEOUS at 05:05

## 2019-05-28 RX ADMIN — DEXTROSE MONOHYDRATE 12.5 G: 25 INJECTION, SOLUTION INTRAVENOUS at 09:05

## 2019-05-28 RX ADMIN — HYDRALAZINE HYDROCHLORIDE 10 MG: 20 INJECTION INTRAMUSCULAR; INTRAVENOUS at 01:05

## 2019-05-28 RX ADMIN — KETOROLAC TROMETHAMINE 30 MG: 30 INJECTION, SOLUTION INTRAMUSCULAR at 02:05

## 2019-05-28 RX ADMIN — Medication 20 MG: at 11:05

## 2019-05-28 RX ADMIN — CHLORHEXIDINE GLUCONATE 0.12% ORAL RINSE 15 ML: 1.2 LIQUID ORAL at 09:05

## 2019-05-28 RX ADMIN — DEXAMETHASONE SODIUM PHOSPHATE 12 MG: 4 INJECTION, SOLUTION INTRA-ARTICULAR; INTRALESIONAL; INTRAMUSCULAR; INTRAVENOUS; SOFT TISSUE at 09:05

## 2019-05-28 RX ADMIN — DEXTROSE MONOHYDRATE 12.5 G: 25 INJECTION, SOLUTION INTRAVENOUS at 08:05

## 2019-05-28 RX ADMIN — KETOROLAC TROMETHAMINE 30 MG: 30 INJECTION, SOLUTION INTRAMUSCULAR; INTRAVENOUS at 07:05

## 2019-05-28 RX ADMIN — CALCIUM GLUCONATE 1000 MG: 98 INJECTION, SOLUTION INTRAVENOUS at 10:05

## 2019-05-28 RX ADMIN — KETOROLAC TROMETHAMINE 30 MG: 30 INJECTION, SOLUTION INTRAMUSCULAR; INTRAVENOUS at 11:05

## 2019-05-28 RX ADMIN — Medication 200 MCG: at 04:05

## 2019-05-28 RX ADMIN — PROPOFOL 30 MG: 10 INJECTION, EMULSION INTRAVENOUS at 11:05

## 2019-05-28 RX ADMIN — DEXAMETHASONE SODIUM PHOSPHATE 12 MG: 4 INJECTION, SOLUTION INTRA-ARTICULAR; INTRALESIONAL; INTRAMUSCULAR; INTRAVENOUS; SOFT TISSUE at 02:05

## 2019-05-28 RX ADMIN — SODIUM CHLORIDE, SODIUM GLUCONATE, SODIUM ACETATE, POTASSIUM CHLORIDE, MAGNESIUM CHLORIDE, SODIUM PHOSPHATE, DIBASIC, AND POTASSIUM PHOSPHATE: .53; .5; .37; .037; .03; .012; .00082 INJECTION, SOLUTION INTRAVENOUS at 11:05

## 2019-05-28 RX ADMIN — HYDRALAZINE HYDROCHLORIDE 20 MG: 20 INJECTION INTRAMUSCULAR; INTRAVENOUS at 02:05

## 2019-05-28 RX ADMIN — PROPOFOL 50 MCG/KG/MIN: 10 INJECTION, EMULSION INTRAVENOUS at 01:05

## 2019-05-28 RX ADMIN — HYDRALAZINE HYDROCHLORIDE 10 MG: 20 INJECTION INTRAMUSCULAR; INTRAVENOUS at 03:05

## 2019-05-28 RX ADMIN — MAGNESIUM SULFATE 2 G: 2 INJECTION INTRAVENOUS at 04:05

## 2019-05-28 RX ADMIN — LEVOTHYROXINE SODIUM 137 MCG: 25 TABLET ORAL at 05:05

## 2019-05-28 RX ADMIN — NALOXONE HYDROCHLORIDE 40 MCG: 0.4 INJECTION, SOLUTION INTRAMUSCULAR; INTRAVENOUS; SUBCUTANEOUS at 11:05

## 2019-05-28 NOTE — PLAN OF CARE
Problem: Fall Injury Risk  Goal: Absence of Fall and Fall-Related Injury  Outcome: Ongoing (interventions implemented as appropriate)  .    Problem: Diabetes Comorbidity  Goal: Blood Glucose Level Within Desired Range  Outcome: Ongoing (interventions implemented as appropriate)  .    Problem: Infection  Goal: Infection Symptom Resolution  Outcome: Ongoing (interventions implemented as appropriate)  .    Problem: Communication Impairment (Mechanical Ventilation, Invasive)  Goal: Effective Communication  Outcome: Ongoing (interventions implemented as appropriate)  .    Problem: Device-Related Complication Risk (Mechanical Ventilation, Invasive)  Goal: Optimal Device Function  Outcome: Ongoing (interventions implemented as appropriate)  .    Problem: Inability to Wean (Mechanical Ventilation, Invasive)  Goal: Mechanical Ventilation Liberation  Outcome: Ongoing (interventions implemented as appropriate)  .    Problem: Nutrition Impairment (Mechanical Ventilation, Invasive)  Goal: Optimal Nutrition Delivery  Outcome: Ongoing (interventions implemented as appropriate)  .    Problem: Skin and Tissue Injury (Mechanical Ventilation, Invasive)  Goal: Absence of Device-Related Skin and Tissue Injury  Outcome: Ongoing (interventions implemented as appropriate)  .    Problem: Ventilator-Induced Lung Injury (Mechanical Ventilation, Invasive)  Goal: Absence of Ventilator-Induced Lung Injury  Outcome: Ongoing (interventions implemented as appropriate)  .    Problem: Communication Impairment (Artificial Airway)  Goal: Effective Communication  Outcome: Ongoing (interventions implemented as appropriate)  .    Problem: Device-Related Complication Risk (Artificial Airway)  Goal: Optimal Device Function  Outcome: Ongoing (interventions implemented as appropriate)  .    Problem: Skin and Tissue Injury (Artificial Airway)  Goal: Absence of Device-Related Skin or Tissue Injury  Outcome: Ongoing (interventions implemented as  appropriate)  .    Problem: Noninvasive Ventilation Acute  Goal: Effective Unassisted Ventilation and Oxygenation  Outcome: Ongoing (interventions implemented as appropriate)  .    Problem: Skin Injury Risk Increased  Goal: Skin Health and Integrity  Outcome: Ongoing (interventions implemented as appropriate)  .    Problem: Restraint, Nonbehavioral (Nonviolent)  Goal: Discontinuation Criteria Achieved  Outcome: Ongoing (interventions implemented as appropriate)    .  Goal: Personal Dignity and Safety Maintained  Outcome: Ongoing (interventions implemented as appropriate)  .

## 2019-05-28 NOTE — TRANSFER OF CARE
"Anesthesia Transfer of Care Note    Patient: Rob Jones Jr.    Procedure(s) Performed: Procedure(s) (LRB):  Exam under anesthesia; possible tracheostomy (N/A)    Patient location: ICU    Anesthesia Type: general    Transport from OR: Transported from OR on 100% O2 by closed face mask with adequate spontaneous ventilation    Post pain: adequate analgesia    Post assessment: no apparent anesthetic complications    Post vital signs: stable    Level of consciousness: confused and agitated    Nausea/Vomiting: no nausea/vomiting    Complications: none    Transfer of care protocol was followed      Last vitals:   Visit Vitals  BP (!) 160/74 (BP Location: Right arm, Patient Position: Lying)   Pulse (!) 45   Temp 36.6 °C (97.8 °F) (Oral)   Resp 18   Ht 5' 10" (1.778 m)   Wt 80 kg (176 lb 5.9 oz)   SpO2 98%   BMI 25.31 kg/m²     "

## 2019-05-28 NOTE — ASSESSMENT & PLAN NOTE
ENT (Dr. Baez) available to assist with extubation in OR 5/28/19  Please page ENT resident on call when case is rolling

## 2019-05-28 NOTE — NURSING
Patient taken to OR via bed with Anesthesia at bedside. ICU monitoring continued. Propofol and fentanyl continued during patient transfer. Patient chart and emergency equipment at bedside.

## 2019-05-28 NOTE — SUBJECTIVE & OBJECTIVE
Interval History: No events.  Off Versed.  Tube feeds held    Medications:  Continuous Infusions:   fentanyl 25 mcg/hr (05/28/19 1045)    insulin (HUMAN R) infusion (adults) Stopped (05/28/19 0800)    propofol 30 mcg/kg/min (05/28/19 1045)     Scheduled Meds:   amLODIPine  10 mg Per NG tube Daily    atorvastatin  10 mg Per NG tube Daily    calcium carbonate  1,000 mg Per NG tube TID    chlorhexidine  15 mL Mouth/Throat BID    dexamethasone  12 mg Intravenous Q8H    enoxaparin  40 mg Subcutaneous Daily    famotidine  20 mg Per NG tube BID    irbesartan  150 mg Oral Daily    levothyroxine  137 mcg Per NG tube Before breakfast     PRN Meds:dextrose 50%, dextrose 50%, hydrALAZINE, lorazepam, ondansetron, sodium chloride 0.9%     Review of patient's allergies indicates:   Allergen Reactions    Tizanidine Shortness Of Breath     Objective:     Vital Signs (Most Recent):  Temp: 97.7 °F (36.5 °C) (05/28/19 0700)  Pulse: (!) 35 (05/28/19 1000)  Resp: 18 (05/28/19 1000)  BP: 129/67 (05/28/19 1000)  SpO2: 95 % (05/28/19 1000) Vital Signs (24h Range):  Temp:  [97.7 °F (36.5 °C)-99.4 °F (37.4 °C)] 97.7 °F (36.5 °C)  Pulse:  [35-71] 35  Resp:  [11-18] 18  SpO2:  [92 %-99 %] 95 %  BP: ()/(55-91) 129/67     Weight: 80 kg (176 lb 5.9 oz)  Body mass index is 25.31 kg/m².    Intake/Output - Last 3 Shifts       05/26 0700 - 05/27 0659 05/27 0700 - 05/28 0659 05/28 0700 - 05/29 0659    I.V. (mL/kg) 1041.2 (12.5) 1092.9 (13.7) 323.5 (4)    NG/ 750     Total Intake(mL/kg) 1291.2 (15.5) 1842.9 (23) 323.5 (4)    Urine (mL/kg/hr) 1405 (0.7) 1672 (0.9) 250 (0.8)    Drains 6 10 3    Total Output 1411 1682 253    Net -119.8 +160.9 +70.5                 Physical Exam   NAD  Incision is CDI.  Minimal output    Significant Labs:  CBC:   Recent Labs   Lab 05/28/19  0325   WBC 12.95*   RBC 3.96*   HGB 12.4*   HCT 37.9*      MCV 96   MCH 31.3*   MCHC 32.7     CMP:   Recent Labs   Lab 05/28/19  0325   *   CALCIUM  10.3   ALBUMIN 2.8*   PROT 6.1      K 4.6   CO2 24      BUN 34*   CREATININE 0.9   ALKPHOS 63   ALT 16   AST 13   BILITOT 0.1     ABGs:   Recent Labs   Lab 05/28/19  0327   PH 7.482*   PCO2 40.1   PO2 105*   HCO3 30.0*   POCSATURATED 98   BE 7       Significant Diagnostics:  CXR: Stable

## 2019-05-28 NOTE — ANESTHESIA POSTPROCEDURE EVALUATION
Anesthesia Post Evaluation    Patient: Rob Jones Jr.    Procedure(s) Performed: Procedure(s) (LRB):  Exam under anesthesia; possible tracheostomy (N/A)    Final Anesthesia Type: general  Patient location during evaluation: ICU  Patient participation: No - Unable to Participate, Other Reason (see comments)  Level of consciousness: confused and agitated  Post-procedure vital signs: reviewed and stable  Pain management: adequate  Airway patency: patent  PONV status at discharge: No PONV  Anesthetic complications: no      Cardiovascular status: blood pressure returned to baseline  Respiratory status: face mask  Hydration status: euvolemic  Follow-up not needed.          Vitals Value Taken Time   /87 5/28/2019  1:01 PM   Temp 36.6 °C (97.8 °F) 5/28/2019 11:00 AM   Pulse 117 5/28/2019  1:08 PM   Resp 19 5/28/2019  1:08 PM   SpO2 93 % 5/28/2019  1:08 PM   Vitals shown include unvalidated device data.      No case tracking events are documented in the log.      Pain/Isa Score: Pain Rating Prior to Med Admin: 0 (5/27/2019  3:00 PM)  Pain Rating Post Med Admin: 0 (5/28/2019  4:58 AM)

## 2019-05-28 NOTE — SUBJECTIVE & OBJECTIVE
Interval History/Significant Events: Continued bradycardia overnight. UOP good. H/H stable. Minimal output from drain. Per primary, plan to extubate in OR today. On insulin gtt.    Follow-up For: Procedure(s) (LRB):  PARATHYROIDECTOMY (N/A)  RE-EXPLORATION, FOR BLEEDING  5/22    Objective:     Vital Signs (Most Recent):  Temp: 97.7 °F (36.5 °C) (05/28/19 0700)  Pulse: (!) 40 (05/28/19 0700)  Resp: 18 (05/28/19 0700)  BP: (!) 96/55 (05/28/19 0700)  SpO2: (!) 92 % (05/28/19 0700) Vital Signs (24h Range):  Temp:  [97.7 °F (36.5 °C)-99.4 °F (37.4 °C)] 97.7 °F (36.5 °C)  Pulse:  [35-71] 40  Resp:  [11-18] 18  SpO2:  [92 %-99 %] 92 %  BP: ()/(55-91) 96/55     Weight: 80 kg (176 lb 5.9 oz)  Body mass index is 25.31 kg/m².      Intake/Output Summary (Last 24 hours) at 5/28/2019 0725  Last data filed at 5/28/2019 0700  Gross per 24 hour   Intake 1842.93 ml   Output 1641 ml   Net 201.93 ml       Physical Exam   Constitutional: He is oriented to person, place, and time. He appears well-developed and well-nourished. No distress.   HENT:   Head: Normocephalic and atraumatic.   Neck incision clean/dry/intact  Left chest BRICE drain neck draining serous   Eyes: No scleral icterus.   Cardiovascular: Normal rate and regular rhythm.   Pulmonary/Chest: Effort normal. No respiratory distress.   Abdominal: Soft. He exhibits no distension.   Neurological: He is alert and oriented to person, place, and time.   Skin: Skin is warm and dry.   Psychiatric: He has a normal mood and affect. His behavior is normal. Judgment and thought content normal.   Nursing note and vitals reviewed.      Vents:  Vent Mode: A/C (05/28/19 0700)  Ventilator Initiated: Yes (05/22/19 2109)  Set Rate: 18 bmp (05/28/19 0700)  Vt Set: 450 mL (05/28/19 0700)  Pressure Support: 10 cmH20 (05/23/19 1803)  PEEP/CPAP: 5 cmH20 (05/28/19 0700)  Oxygen Concentration (%): 30 (05/28/19 0700)  Peak Airway Pressure: 26 cmH2O (05/28/19 0700)  Plateau Pressure: 17 cmH20  (05/28/19 0700)  Total Ve: 8.14 mL (05/28/19 0700)  F/VT Ratio<105 (RSBI): (!) 39.82 (05/28/19 0700)    Lines/Drains/Airways     Drain                 Closed/Suction Drain 05/22/19 1741 Left Neck Bulb 10 Fr. 5 days         NG/OG Tube 05/22/19 2015 orogastric 5 days         Urethral Catheter 05/22/19 2245 5 days          Airway                 Airway - Non-Surgical 05/22/19 2008 Endotracheal Tube 5 days          Peripheral Intravenous Line                 Peripheral IV - Single Lumen 05/25/19 0200 20 G Right Antecubital 3 days         Peripheral IV - Single Lumen 05/26/19 1200 Right Antecubital 1 day         Peripheral IV - Single Lumen 05/27/19 0942 22 G Left Antecubital less than 1 day                Significant Labs:    CBC/Anemia Profile:  Recent Labs   Lab 05/27/19  0322 05/28/19  0325   WBC 10.89 12.95*   HGB 12.1* 12.4*   HCT 36.3* 37.9*    312   MCV 94 96   RDW 12.9 12.6        Chemistries:  Recent Labs   Lab 05/27/19  0322 05/28/19  0325    137   K 4.3 4.6    104   CO2 29 24   BUN 31* 34*   CREATININE 0.9 0.9   CALCIUM 10.2 10.3   ALBUMIN 3.1* 2.8*   PROT 6.4 6.1   BILITOT 0.2 0.1   ALKPHOS 62 63   ALT 17 16   AST 15 13   MG 1.8 1.9   PHOS 4.4 3.8       All pertinent labs within the past 24 hours have been reviewed.    Significant Imaging:  I have reviewed all pertinent imaging results/findings within the past 24 hours.

## 2019-05-28 NOTE — PROGRESS NOTES
"[facility]  Adult Nutrition  Progress Note    SUMMARY       Recommendations  Recommendation/Intervention:   When able to resume TF, recommend resuming Impact Peptide 1.5 at 50 mL/hr - meets needs.   RD to monitor.    Goals: Patient to receive nutrition by RD follow-up  Nutrition Goal Status: new  Communication of RD Recs: reviewed with RN    Reason for Assessment  Reason For Assessment: RD follow-up  Diagnosis: surgery/postoperative complications(hyperparathyroidism s/p surgery 5/22)  Relevant Medical History: DM, HTN, graves disease s/p total thyroidectomy  General Information Comments: Currently intubated, sedated. TF held for OR today, possible trach placement. Was tolerating TF at goal. Patient continues with age appropriate muscle wasting with no other physical signs of malnutriton.  Nutrition Discharge Planning: Unable to determine at this time.    Nutrition Risk Screen  Nutrition Risk Screen: tube feeding or parenteral nutrition    Nutrition/Diet History  Spiritual, Cultural Beliefs, Gnosticist Practices, Values that Affect Care: no  Factors Affecting Nutritional Intake: NPO, on mechanical ventilation    Anthropometrics  Temp: 97.7 °F (36.5 °C)  Height Method: Stated  Height: 5' 10" (177.8 cm)  Height (inches): 70 in  Weight Method: Stated  Weight: 80 kg (176 lb 5.9 oz)  Weight (lb): 176.37 lb  Ideal Body Weight (IBW), Male: 166 lb  % Ideal Body Weight, Male (lb): 103.01 lb  BMI (Calculated): 24.6  BMI Grade: 18.5-24.9 - normal    Lab/Procedures/Meds  Pertinent Labs Reviewed: reviewed  Pertinent Labs Comments: BUN 34, Glu 201, Alb 2.8  Pertinent Medications Reviewed: reviewed  Pertinent Medications Comments: famotidine, fentnayl, insulin drip, propofol    Estimated/Assessed Needs  Weight Used For Calorie Calculations: 77.6 kg (171 lb 1.2 oz)(admit weight)  Energy Calorie Requirements (kcal): 1814 kcal/day  Energy Need Method: [facility]  Protein Requirements:  g/day(1.2-1.5 " g/kg)  Weight Used For Protein Calculations: 77.6 kg (171 lb 1.2 oz)  Fluid Requirements (mL): 1 mL/kcal or per MD  Estimated Fluid Requirement Method: RDA Method  RDA Method (mL): 1814  CHO Requirement: 50% total kcal    Nutrition Prescription Ordered  Current Diet Order: NPO  Current Nutrition Support Formula Ordered: Impact Peptide 1.5  Current Nutrition Support Rate Ordered: 50 (ml)  Current Nutrition Support Frequency Ordered: mL/hr    Evaluation of Received Nutrient/Fluid Intake  Enteral Calories (kcal): 1800  Enteral Protein (gm): 113  Enteral (Free Water) Fluid (mL): 924  Other Calories (kcal): 370(propofol)  Total Calories (kcal): 2170  % Kcal Needs: 120  % Protein Needs: 100  I/O: Noted  Energy Calories Required: exceeds needs  Protein Required: meeting needs  Fluid Required: (per MD)  Comments: No BM recorded  Tolerance: (on hold)  % Intake of Estimated Energy Needs: 0 - 25 % (TF on hold)  % Meal Intake: NPO    Nutrition Risk  Level of Risk/Frequency of Follow-up: high(2x/week)     Assessment and Plan  Nutrition Problem  Inadequate energy intake     Related to (etiology):   Decreased ability to consume sufficient energy     Signs and Symptoms (as evidenced by):   NPO and TF not started at this time     Interventions/Recommendations (treatment strategy):  Collaboration and referral of nutrition care     Nutrition Diagnosis Status:   Continues    Monitor and Evaluation  Food and Nutrient Intake: energy intake, enteral nutrition intake  Food and Nutrient Adminstration: enteral and parenteral nutrition administration  Anthropometric Measurements: weight, weight change  Biochemical Data, Medical Tests and Procedures: electrolyte and renal panel, gastrointestinal profile, glucose/endocrine profile, inflammatory profile  Nutrition-Focused Physical Findings: overall appearance     Nutrition Follow-Up  RD Follow-up?: Yes

## 2019-05-28 NOTE — ASSESSMENT & PLAN NOTE
Post op B/L neck exploration for parathyroid adenom.  Very difficult dissection.  Complicated by vocal chord dysfunction, likely from stretch injury as nerve appeared intact on intraop NIMS monitoring and was grossly intact.  Pt intuabted in PACU for increasing stridor.     Stable overnight.  Versed off.  One dose of ativan     Wean sedation as tolerated  NPO  Mcdaniel in place  Trend labs, continue Ca supplementation     Dispo: OR today for extubation vs trach

## 2019-05-28 NOTE — SUBJECTIVE & OBJECTIVE
Interval History: plan for extubation in OR today, scheduled for 1145AM    Medications:  Continuous Infusions:   fentanyl 200 mcg/hr (05/28/19 0700)    insulin (HUMAN R) infusion (adults) 5.4 Units/hr (05/28/19 0700)    propofol 50 mcg/kg/min (05/28/19 0700)     Scheduled Meds:   amLODIPine  10 mg Per NG tube Daily    atorvastatin  10 mg Per NG tube Daily    calcium carbonate  1,000 mg Per NG tube TID    chlorhexidine  15 mL Mouth/Throat BID    dexamethasone  12 mg Intravenous Q8H    enoxaparin  40 mg Subcutaneous Daily    famotidine  20 mg Per NG tube BID    irbesartan  150 mg Oral Daily    levothyroxine  137 mcg Per NG tube Before breakfast     PRN Meds:dextrose 50%, dextrose 50%, hydrALAZINE, lorazepam, ondansetron, sodium chloride 0.9%     Review of patient's allergies indicates:   Allergen Reactions    Tizanidine Shortness Of Breath     Objective:     Vital Signs (24h Range):  Temp:  [97.7 °F (36.5 °C)-99.4 °F (37.4 °C)] 97.7 °F (36.5 °C)  Pulse:  [35-71] 40  Resp:  [11-18] 18  SpO2:  [92 %-99 %] 92 %  BP: ()/(55-91) 96/55     Date 05/28/19 0700 - 05/29/19 0659   Shift 8962-5791 2250-2641 8410-4710 24 Hour Total   INTAKE   Shift Total(mL/kg)       OUTPUT   Urine(mL/kg/hr) 90   90   Drains 3   3   Shift Total(mL/kg) 93(1.2)   93(1.2)   Weight (kg) 80 80 80 80     Lines/Drains/Airways     Drain                 Closed/Suction Drain 05/22/19 1741 Left Neck Bulb 10 Fr. 5 days         NG/OG Tube 05/22/19 2015 orogastric 5 days         Urethral Catheter 05/22/19 2245 5 days          Airway                 Airway - Non-Surgical 05/22/19 2008 Endotracheal Tube 5 days          Peripheral Intravenous Line                 Peripheral IV - Single Lumen 05/25/19 0200 20 G Right Antecubital 3 days         Peripheral IV - Single Lumen 05/26/19 1200 Right Antecubital 1 day         Peripheral IV - Single Lumen 05/27/19 0942 22 G Left Antecubital less than 1 day                Physical Exam    Vent Mode:  A/C  Oxygen Concentration (%):  [30-31] 30  Resp Rate Total:  [3 br/min-21 br/min] 18 br/min  Vt Set:  [450 mL] 450 mL  PEEP/CPAP:  [5 cmH20] 5 cmH20  Mean Airway Pressure:  [8.6 eoA01-07 cmH20] 9.8 cmH20  Sedated on vent  Palpable neck landmarks     Significant Labs:  All pertinent labs from the last 24 hours have been reviewed.    Significant Diagnostics:  None

## 2019-05-28 NOTE — ASSESSMENT & PLAN NOTE
63 y.o. male w/ a significant PMHx of primary hyperparathyroidism s/p parathyroidectomy on 5/22 c/b bilateral VC immobility ultimately requiring reintubation in the PACU.      Neuro:   - Sedation with propofol gtt  - Analgesia covered with fentanyl gtt     Pulmonary:   - Intubated  Vent Mode: A/C  Oxygen Concentration (%):  [30-31] 30  Resp Rate Total:  [3 br/min-21 br/min] 18 br/min  Vt Set:  [450 mL] 450 mL  PEEP/CPAP:  [5 cmH20] 5 cmH20  Mean Airway Pressure:  [8.6 ktQ42-01 cmH20] 9.8 cmH20  - ENT following for VC immobility  - serial ABGs  - daily CXR  - S/p 72 hour steroids  - Per primary, plan to extubate today in OR      Cardiac:   - Bradycardic  - Amlodipine 10, Irbesartan 150  - Atorvastatin 10  - Hydralazine PRN     Renal:    - Monitor UOP  - BUN/Cr stable overnight  - Monitor BUN/Cr      ID:   - AF  - WBC 13 from 10.9 overnight  - Monitor WBC     Hem/Onc:   - H/H stable   - Monitor CBC  - Transfuse as needed     Endocrine:    - Hx of DM  - Accuchecks  - Insulin gtt  - Endocrine following, appreciate recs  - Synthroid     Fluids/Electrolytes/Nutrition/GI:   - Replace electrolytes PRN  - NPO  - Tube feeds held at MN pending extubation in OR today      PPx:  - DVT: Lovenox  - Bowel: docusate  - PUP: famotidine     DISPO:  - Cont SICU care, OR today for extubation

## 2019-05-28 NOTE — PROGRESS NOTES
[facility]  Critical Care - Surgery  Progress Note    Patient Name: Rob Jones Jr.  MRN: [id number]  Admission Date: 5/22/2019  Hospital Length of Stay: 5 days  Code Status: No Order  Attending Provider: Mounika Carmona MD  Primary Care Provider: Vasyl Jimenez MD   Principal Problem: Hyperparathyroidism    Subjective:     Hospital/ICU Course:  No notes on file    Interval History/Significant Events: Continued bradycardia overnight. UOP good. H/H stable. Minimal output from drain. Per primary, plan to extubate in OR today. On insulin gtt.    Follow-up For: Procedure(s) (LRB):  PARATHYROIDECTOMY (N/A)  RE-EXPLORATION, FOR BLEEDING  5/22    Objective:     Vital Signs (Most Recent):  Temp: 97.7 °F (36.5 °C) (05/28/19 0700)  Pulse: (!) 40 (05/28/19 0700)  Resp: 18 (05/28/19 0700)  BP: (!) 96/55 (05/28/19 0700)  SpO2: (!) 92 % (05/28/19 0700) Vital Signs (24h Range):  Temp:  [97.7 °F (36.5 °C)-99.4 °F (37.4 °C)] 97.7 °F (36.5 °C)  Pulse:  [35-71] 40  Resp:  [11-18] 18  SpO2:  [92 %-99 %] 92 %  BP: ()/(55-91) 96/55     Weight: 80 kg (176 lb 5.9 oz)  Body mass index is 25.31 kg/m².      Intake/Output Summary (Last 24 hours) at 5/28/2019 0725  Last data filed at 5/28/2019 0700  Gross per 24 hour   Intake 1842.93 ml   Output 1641 ml   Net 201.93 ml       Physical Exam   Constitutional: He is oriented to person, place, and time. He appears well-developed and well-nourished. No distress.   HENT:   Head: Normocephalic and atraumatic.   Neck incision clean/dry/intact  Left chest BRICE drain draining serous   Eyes: No scleral icterus.   Cardiovascular: Normal rate and regular rhythm.   Pulmonary/Chest: Effort normal. No respiratory distress.   Abdominal: Soft. He exhibits no distension.   Neurological: He is alert and oriented to person, place, and time.   Skin: Skin is warm and dry.   Psychiatric: He has a normal mood and affect. His behavior is normal. Judgment and thought content normal.    Nursing note and vitals reviewed.      Vents:  Vent Mode: A/C (05/28/19 0700)  Ventilator Initiated: Yes (05/22/19 2109)  Set Rate: 18 bmp (05/28/19 0700)  Vt Set: 450 mL (05/28/19 0700)  Pressure Support: 10 cmH20 (05/23/19 1803)  PEEP/CPAP: 5 cmH20 (05/28/19 0700)  Oxygen Concentration (%): 30 (05/28/19 0700)  Peak Airway Pressure: 26 cmH2O (05/28/19 0700)  Plateau Pressure: 17 cmH20 (05/28/19 0700)  Total Ve: 8.14 mL (05/28/19 0700)  F/VT Ratio<105 (RSBI): (!) 39.82 (05/28/19 0700)    Lines/Drains/Airways     Drain                 Closed/Suction Drain 05/22/19 1741 Left Neck Bulb 10 Fr. 5 days         NG/OG Tube 05/22/19 2015 orogastric 5 days         Urethral Catheter 05/22/19 2245 5 days          Airway                 Airway - Non-Surgical 05/22/19 2008 Endotracheal Tube 5 days          Peripheral Intravenous Line                 Peripheral IV - Single Lumen 05/25/19 0200 20 G Right Antecubital 3 days         Peripheral IV - Single Lumen 05/26/19 1200 Right Antecubital 1 day         Peripheral IV - Single Lumen 05/27/19 0942 22 G Left Antecubital less than 1 day                Significant Labs:    CBC/Anemia Profile:  Recent Labs   Lab 05/27/19  0322 05/28/19  0325   WBC 10.89 12.95*   HGB 12.1* 12.4*   HCT 36.3* 37.9*    312   MCV 94 96   RDW 12.9 12.6        Chemistries:  Recent Labs   Lab 05/27/19  0322 05/28/19  0325    137   K 4.3 4.6    104   CO2 29 24   BUN 31* 34*   CREATININE 0.9 0.9   CALCIUM 10.2 10.3   ALBUMIN 3.1* 2.8*   PROT 6.4 6.1   BILITOT 0.2 0.1   ALKPHOS 62 63   ALT 17 16   AST 15 13   MG 1.8 1.9   PHOS 4.4 3.8       All pertinent labs within the past 24 hours have been reviewed.    Significant Imaging:  I have reviewed all pertinent imaging results/findings within the past 24 hours.    Assessment/Plan:     * Hyperparathyroidism  63 y.o. male w/ a significant PMHx of primary hyperparathyroidism s/p parathyroidectomy on 5/22 c/b bilateral VC immobility ultimately  requiring reintubation in the PACU.      Neuro:   - Sedation with propofol gtt  - Analgesia covered with fentanyl gtt     Pulmonary:   - Intubated  Vent Mode: A/C  Oxygen Concentration (%):  [30-31] 30  Resp Rate Total:  [3 br/min-21 br/min] 18 br/min  Vt Set:  [450 mL] 450 mL  PEEP/CPAP:  [5 cmH20] 5 cmH20  Mean Airway Pressure:  [8.6 tcL36-47 cmH20] 9.8 cmH20  - ENT following for VC immobility  - serial ABGs  - daily CXR  - S/p 72 hour steroids  - Per primary, plan to extubate today in OR      Cardiac:   - Bradycardic  - Amlodipine 10, Irbesartan 150  - Atorvastatin 10  - Hydralazine PRN     Renal:    - Monitor UOP  - BUN/Cr stable overnight  - Monitor BUN/Cr      ID:   - AF  - WBC 13 from 10.9 overnight  - Monitor WBC     Hem/Onc:   - H/H stable   - Monitor CBC  - Transfuse as needed     Endocrine:    - Hx of DM  - Accuchecks  - Insulin gtt  - Endocrine following, appreciate recs  - Synthroid     Fluids/Electrolytes/Nutrition/GI:   - Replace electrolytes PRN  - NPO  - Tube feeds held at MN pending extubation in OR today      PPx:  - DVT: Lovenox  - Bowel: docusate  - PUP: famotidine     DISPO:  - Cont SICU care, OR today for extubation       Critical care was time spent personally by me on the following activities: development of treatment plan with patient or surrogate and bedside caregivers, discussions with consultants, evaluation of patient's response to treatment, examination of patient, ordering and performing treatments and interventions, ordering and review of laboratory studies, ordering and review of radiographic studies, pulse oximetry, re-evaluation of patient's condition.  This critical care time did not overlap with that of any other provider or involve time for any procedures.     Brody Vazquez MD  Critical Care - Surgery  [facility]

## 2019-05-28 NOTE — NURSING
Dr. Shearer at bedside for rounding. Updated of patient condition and events overnight. Plan for OR today around 10:45 for extubation vs. trach placement. Patient's daughter contacted for phone consent without answer. Dr. Shearer states she will keep nursing updated about sedation weaning prior to procedure.

## 2019-05-28 NOTE — PROGRESS NOTES
"[facility]  Endocrinology  Progress Note    Admit Date: 5/22/2019     Reason for Consult: Management of T2DM, Hyperglycemia     Surgical Procedure and Date: Parathyroidectomy with IOPTH monitoring    Diabetes diagnosis year: NATALEE     Home Diabetes Medications:  metformin 100 mg BID; januvia 100 mg daily   Lab Results   Component Value Date    LABA1C 9.2 (H) 05/18/2016    HGBA1C 6.1 (H) 05/16/2019         How often checking glucose at home? NATALEE  BG readings on regimen: NATALEE  Hypoglycemia on the regimen?  NATALEE  Missed doses on regimen?  NATALEE    Diabetes Complications include:     NATALEE    Complicating diabetes co morbidities:   none      HPI:   Patient is a 63 y.o. male with a diagnosis of type 2 DM well controlled on dual oral agent per chart review. Also with HTN, Grave's disease (s/p total thyroidectomy, previous pheochromocytoma resection, and primary hyperparathyroidism (s/p parathyroidectomy). Endocrinology consulted for BG/ DM management.     Interval HPI:   Overnight events: Tube feed was placed on hold in anticipation of controlled extubation in OR. On IIP, this lead to hypoglycemia, that resolved with D50. Glucose now 160s. Back from OR extubated. Off insulin drip since 8 AM  Eating:   NPO  Nausea: No  Hypoglycemia and intervention: No  Fever: No  TPN and/or TF: previously on TF; held at this time      BP (!) 169/93   Pulse 110   Temp 97.8 °F (36.6 °C) (Oral)   Resp 20   Ht 5' 10" (1.778 m)   Wt 80 kg (176 lb 5.9 oz)   SpO2 (!) 94%   BMI 25.31 kg/m²      Labs Reviewed and Include    Recent Labs   Lab 05/28/19  0325   *   CALCIUM 10.3   ALBUMIN 2.8*   PROT 6.1      K 4.6   CO2 24      BUN 34*   CREATININE 0.9   ALKPHOS 63   ALT 16   AST 13   BILITOT 0.1     Lab Results   Component Value Date    WBC 12.95 (H) 05/28/2019    HGB 12.4 (L) 05/28/2019    HCT 37.9 (L) 05/28/2019    MCV 96 05/28/2019     05/28/2019     No results for input(s): TSH, FREET4 in the last 168 " hours.  Lab Results   Component Value Date    HGBA1C 6.1 (H) 05/16/2019       Nutritional status:   Body mass index is 25.31 kg/m².  Lab Results   Component Value Date    ALBUMIN 2.8 (L) 05/28/2019    ALBUMIN 3.1 (L) 05/27/2019    ALBUMIN 3.0 (L) 05/26/2019     No results found for: PREALBUMIN    Estimated Creatinine Clearance: 87.9 mL/min (based on SCr of 0.9 mg/dL).    Accu-Checks  Recent Labs     05/28/19  0458 05/28/19  0459 05/28/19  0559 05/28/19  0711 05/28/19  0816 05/28/19  0833 05/28/19  0902 05/28/19  0916 05/28/19  1001 05/28/19  1248   POCTGLUCOSE 198* 195* 187* 122* 78 68* 73 166* 156* 151*       Current Medications and/or Treatments Impacting Glycemic Control  Immunotherapy:    Immunosuppressants     None        Steroids:   Hormones (From admission, onward)    Start     Stop Route Frequency Ordered    05/23/19 1400  dexamethasone injection 12 mg      -- IV Every 8 hours 05/23/19 0806        Pressors:    Autonomic Drugs (From admission, onward)    Start     Stop Route Frequency Ordered    05/22/19 1958  succinylcholine (ANECTINE) 20 mg/mL injection     Note to Pharmacy:  Created by cabinet override    05/23 0759   05/22/19 1958 05/22/19 1926  rocuronium (ZEMURON) 10 mg/mL injection     Note to Pharmacy:  Created by cabinet override    05/23 0729   05/22/19 1926        Hyperglycemia/Diabetes Medications:   Antihyperglycemics (From admission, onward)    Start     Stop Route Frequency Ordered    05/26/19 0900  insulin regular (Humulin R) 100 Units in sodium chloride 0.9% 100 mL infusion     Question:  Insulin Rate Adjustment (DO NOT MODIFY ANSWER)  Answer:  [email or web address]    -- IV Continuous 05/26/19 0800          ASSESSMENT and PLAN    * Hyperparathyroidism  Managed per primary.   Sp B/L neck exploration for parathyroid adenoma Complicated by vocal chord dysfunction  Calcium is stable  On Calcium replacement    Vocal cord paralysis,  bilateral partial  - s/p controlled extubation in OR today      Adrenal cortical steroids causing adverse effect in therapeutic use  Glucocorticoids markedly increase glucoses. Expect the steroid taper will help glucose control.         On enteral nutrition  suspend at this time        Respiratory distress  - s/p extubation today in OR  - manage per primary      Uncontrolled type 2 diabetes mellitus with complication, with long-term current use of insulin  BG goal 140-180 while inpatient.  Was on TF now on hold  On IV steroid  BG remains at goal 151 as insulin has been on hold since 8AM    Plan  Stop IIP for now as pt has not need insulin  Discuss with attending, will place parameter for when to restart IIP, as we suspect glucose will be elevated with IV steroid.  Check glucose Q2 hr for now         Sunday Faulkner MD  Endocrinology  [facility]

## 2019-05-28 NOTE — PLAN OF CARE
"Problem: Adult Inpatient Plan of Care  Goal: Patient-Specific Goal (Individualization)  Outcome: Ongoing (interventions implemented as appropriate)  Dx: Hyperparathyroidism    Shift Events: Patient bradycardic throughout shift. MD aware. Vent settings A/C 30 % 5 PEEP. No issues with ventilator. Tube feedings turned off at 0000 per MD order. PRN lorazepam given x1 to maintain adequate anxiety control. Plan to go to OR today for extubation and possible tracheostomy placement. Plan of care reviewed with patient. Will continue to monitor.     Neuro: Sedated, Arouses to Voice and Moves All Extremities    Vital Signs: /73 (BP Location: Right arm, Patient Position: Lying)   Pulse (!) 47   Temp 99.1 °F (37.3 °C) (Oral)   Resp 18   Ht 5' 10" (1.778 m)   Wt 80 kg (176 lb 5.9 oz)   SpO2 (!) 94%   BMI 25.31 kg/m²     Diet: NPO and Tube Feeds    Gtts: Propfol, Fentanyl and Insulin    Urine Output: Urinary Catheter 920 cc/shift    Drains: BRICE Drain, total output 2 cc / shift     Labs/Accuchecks: Accuchecks q 1 hr. Daily labs.     Skin: Anterior neck incision open to air with Dermabond. BRICE drain open to air on left neck with no redness, swelling or drainage. No skin break down noted. Will continue to monitor.      "

## 2019-05-28 NOTE — PROGRESS NOTES
[facility]  Otorhinolaryngology-Head & Neck Surgery  Progress Note    Subjective:     Post-Op Info:  Procedure(s) (LRB):  PARATHYROIDECTOMY (N/A)  RE-EXPLORATION, FOR BLEEDING   6 Days Post-Op  Hospital Day: 7     Interval History: plan for extubation in OR today, scheduled for 1145AM    Medications:  Continuous Infusions:   fentanyl 200 mcg/hr (05/28/19 0700)    insulin (HUMAN R) infusion (adults) 5.4 Units/hr (05/28/19 0700)    propofol 50 mcg/kg/min (05/28/19 0700)     Scheduled Meds:   amLODIPine  10 mg Per NG tube Daily    atorvastatin  10 mg Per NG tube Daily    calcium carbonate  1,000 mg Per NG tube TID    chlorhexidine  15 mL Mouth/Throat BID    dexamethasone  12 mg Intravenous Q8H    enoxaparin  40 mg Subcutaneous Daily    famotidine  20 mg Per NG tube BID    irbesartan  150 mg Oral Daily    levothyroxine  137 mcg Per NG tube Before breakfast     PRN Meds:dextrose 50%, dextrose 50%, hydrALAZINE, lorazepam, ondansetron, sodium chloride 0.9%     Review of patient's allergies indicates:   Allergen Reactions    Tizanidine Shortness Of Breath     Objective:     Vital Signs (24h Range):  Temp:  [97.7 °F (36.5 °C)-99.4 °F (37.4 °C)] 97.7 °F (36.5 °C)  Pulse:  [35-71] 40  Resp:  [11-18] 18  SpO2:  [92 %-99 %] 92 %  BP: ()/(55-91) 96/55     Date 05/28/19 0700 - 05/29/19 0659   Shift 9043-5046 4685-0948 3155-2531 24 Hour Total   INTAKE   Shift Total(mL/kg)       OUTPUT   Urine(mL/kg/hr) 90   90   Drains 3   3   Shift Total(mL/kg) 93(1.2)   93(1.2)   Weight (kg) 80 80 80 80     Lines/Drains/Airways     Drain                 Closed/Suction Drain 05/22/19 1741 Left Neck Bulb 10 Fr. 5 days         NG/OG Tube 05/22/19 2015 orogastric 5 days         Urethral Catheter 05/22/19 9195 5 days          Airway                 Airway - Non-Surgical 05/22/19 2008 Endotracheal Tube 5 days          Peripheral Intravenous Line                 Peripheral IV - Single Lumen 05/25/19 0200 20 G Right  Antecubital 3 days         Peripheral IV - Single Lumen 05/26/19 1200 Right Antecubital 1 day         Peripheral IV - Single Lumen 05/27/19 0942 22 G Left Antecubital less than 1 day                Physical Exam    Vent Mode: A/C  Oxygen Concentration (%):  [30-31] 30  Resp Rate Total:  [3 br/min-21 br/min] 18 br/min  Vt Set:  [450 mL] 450 mL  PEEP/CPAP:  [5 cmH20] 5 cmH20  Mean Airway Pressure:  [8.6 knU96-13 cmH20] 9.8 cmH20  Sedated on vent  Palpable neck landmarks     Significant Labs:  All pertinent labs from the last 24 hours have been reviewed.    Significant Diagnostics:  None    Assessment/Plan:     Vocal cord paralysis, bilateral partial  ENT (Dr. Baez) available to assist with extubation in OR 5/28/19  Please page ENT resident on call when case is rolling        David Espana MD  Otorhinolaryngology-Head & Neck Surgery  [facility]

## 2019-05-28 NOTE — PROGRESS NOTES
[facility]  General Surgery  Progress Note    Subjective:     History of Present Illness:  No notes on file    Post-Op Info:  Procedure(s) (LRB):  PARATHYROIDECTOMY (N/A)  RE-EXPLORATION, FOR BLEEDING   6 Days Post-Op     Interval History: No events.  Off Versed.  Tube feeds held    Medications:  Continuous Infusions:   fentanyl 25 mcg/hr (05/28/19 1045)    insulin (HUMAN R) infusion (adults) Stopped (05/28/19 0800)    propofol 30 mcg/kg/min (05/28/19 1045)     Scheduled Meds:   amLODIPine  10 mg Per NG tube Daily    atorvastatin  10 mg Per NG tube Daily    calcium carbonate  1,000 mg Per NG tube TID    chlorhexidine  15 mL Mouth/Throat BID    dexamethasone  12 mg Intravenous Q8H    enoxaparin  40 mg Subcutaneous Daily    famotidine  20 mg Per NG tube BID    irbesartan  150 mg Oral Daily    levothyroxine  137 mcg Per NG tube Before breakfast     PRN Meds:dextrose 50%, dextrose 50%, hydrALAZINE, lorazepam, ondansetron, sodium chloride 0.9%     Review of patient's allergies indicates:   Allergen Reactions    Tizanidine Shortness Of Breath     Objective:     Vital Signs (Most Recent):  Temp: 97.7 °F (36.5 °C) (05/28/19 0700)  Pulse: (!) 35 (05/28/19 1000)  Resp: 18 (05/28/19 1000)  BP: 129/67 (05/28/19 1000)  SpO2: 95 % (05/28/19 1000) Vital Signs (24h Range):  Temp:  [97.7 °F (36.5 °C)-99.4 °F (37.4 °C)] 97.7 °F (36.5 °C)  Pulse:  [35-71] 35  Resp:  [11-18] 18  SpO2:  [92 %-99 %] 95 %  BP: ()/(55-91) 129/67     Weight: 80 kg (176 lb 5.9 oz)  Body mass index is 25.31 kg/m².    Intake/Output - Last 3 Shifts       05/26 0700 - 05/27 0659 05/27 0700 - 05/28 0659 05/28 0700 - 05/29 0659    I.V. (mL/kg) 1041.2 (12.5) 1092.9 (13.7) 323.5 (4)    NG/ 750     Total Intake(mL/kg) 1291.2 (15.5) 1842.9 (23) 323.5 (4)    Urine (mL/kg/hr) 1405 (0.7) 1672 (0.9) 250 (0.8)    Drains 6 10 3    Total Output 1411 1682 253    Net -119.8 +160.9 +70.5                 Physical Exam   NAD  Incision  is CDI.  Minimal output    Significant Labs:  CBC:   Recent Labs   Lab 05/28/19 0325   WBC 12.95*   RBC 3.96*   HGB 12.4*   HCT 37.9*      MCV 96   MCH 31.3*   MCHC 32.7     CMP:   Recent Labs   Lab 05/28/19 0325   *   CALCIUM 10.3   ALBUMIN 2.8*   PROT 6.1      K 4.6   CO2 24      BUN 34*   CREATININE 0.9   ALKPHOS 63   ALT 16   AST 13   BILITOT 0.1     ABGs:   Recent Labs   Lab 05/28/19 0327   PH 7.482*   PCO2 40.1   PO2 105*   HCO3 30.0*   POCSATURATED 98   BE 7       Significant Diagnostics:  CXR: Stable    Assessment/Plan:     * Hyperparathyroidism  Post op B/L neck exploration for parathyroid adenom.  Very difficult dissection.  Complicated by vocal chord dysfunction, likely from stretch injury as nerve appeared intact on intraop NIMS monitoring and was grossly intact.  Pt intuabted in PACU for increasing stridor.     Stable overnight.  Versed off.  One dose of ativan     Wean sedation as tolerated  NPO  Mcdaniel in place  Trend labs, continue Ca supplementation     Dispo: OR today for extubation vs trach        Mo Sol MD  General Surgery  [facility]

## 2019-05-28 NOTE — SUBJECTIVE & OBJECTIVE
"Interval HPI:   Overnight events: Tube feed was placed on hold in anticipation of controlled extubation in OR. On IIP, this lead to hypoglycemia, that resolved with D50. Glucose now 160s. Back from OR extubated. Off insulin drip since 8 AM  Eating:   NPO  Nausea: No  Hypoglycemia and intervention: No  Fever: No  TPN and/or TF: previously on TF; held at this time      BP (!) 169/93   Pulse 110   Temp 97.8 °F (36.6 °C) (Oral)   Resp 20   Ht 5' 10" (1.778 m)   Wt 80 kg (176 lb 5.9 oz)   SpO2 (!) 94%   BMI 25.31 kg/m²     Labs Reviewed and Include    Recent Labs   Lab 05/28/19  0325   *   CALCIUM 10.3   ALBUMIN 2.8*   PROT 6.1      K 4.6   CO2 24      BUN 34*   CREATININE 0.9   ALKPHOS 63   ALT 16   AST 13   BILITOT 0.1     Lab Results   Component Value Date    WBC 12.95 (H) 05/28/2019    HGB 12.4 (L) 05/28/2019    HCT 37.9 (L) 05/28/2019    MCV 96 05/28/2019     05/28/2019     No results for input(s): TSH, FREET4 in the last 168 hours.  Lab Results   Component Value Date    HGBA1C 6.1 (H) 05/16/2019       Nutritional status:   Body mass index is 25.31 kg/m².  Lab Results   Component Value Date    ALBUMIN 2.8 (L) 05/28/2019    ALBUMIN 3.1 (L) 05/27/2019    ALBUMIN 3.0 (L) 05/26/2019     No results found for: PREALBUMIN    Estimated Creatinine Clearance: 87.9 mL/min (based on SCr of 0.9 mg/dL).    Accu-Checks  Recent Labs     05/28/19  0458 05/28/19  0459 05/28/19  0559 05/28/19  0711 05/28/19  0816 05/28/19  0833 05/28/19  0902 05/28/19  0916 05/28/19  1001 05/28/19  1248   POCTGLUCOSE 198* 195* 187* 122* 78 68* 73 166* 156* 151*       Current Medications and/or Treatments Impacting Glycemic Control  Immunotherapy:    Immunosuppressants     None        Steroids:   Hormones (From admission, onward)    Start     Stop Route Frequency Ordered    05/23/19 1400  dexamethasone injection 12 mg      -- IV Every 8 hours 05/23/19 0806        Pressors:    Autonomic Drugs (From admission, onward)    " Start     Stop Route Frequency Ordered    05/22/19 1958  succinylcholine (ANECTINE) 20 mg/mL injection     Note to Pharmacy:  Created by cabinet override    05/23 0759   05/22/19 1958 05/22/19 1926  rocuronium (ZEMURON) 10 mg/mL injection     Note to Pharmacy:  Created by cabinet override    05/23 0729   05/22/19 1926        Hyperglycemia/Diabetes Medications:   Antihyperglycemics (From admission, onward)    Start     Stop Route Frequency Ordered    05/26/19 0900  insulin regular (Humulin R) 100 Units in sodium chloride 0.9% 100 mL infusion     Question:  Insulin Rate Adjustment (DO NOT MODIFY ANSWER)  Answer:  [email or web address]    -- IV Continuous 05/26/19 0800

## 2019-05-28 NOTE — OP NOTE
DATE OF PROCEDURE: 5/28/2019     PREOPERATIVE DIAGNOSES:   1. Status post parathyroidectomy  2. Bilateral vocal cord paralysis    POSTOPERATIVE DIAGNOSES:   Same    SURGEON:  Surgeon(s) and Role:    Wili Kumar MD      PROCEDURES PERFORMED:   Flexible laryngoscopy    ANESTHESIA: General      INDICATIONS FOR PROCEDURE:   Rob Jones Jr. is a 63 y.o. man status post parathyroidectomy last week.  Unfortunately, he was found have evidence of bilateral vocal cord paralysis after surgery and required repeat intubation in PACU.  He presents to the operating room today for controlled extubation and assessment of his vocal cords with flexible laryngoscopy.    PROCEDURE IN DETAIL:  The patient was taken to the operating room and placed on the operating table in the supine position.  He was extubated by the anesthesia team.  The flexible laryngoscope was advanced through the right nasal cavity to the level of larynx.  It was quite difficult to assess the mobility of his vocal cords given marked supraglottic collapse with inspiration.  The cords did appear to be somewhat  with a significant glottic gap in the midline. After a period of observation, he was noted to be breathing comfortably and was transferred to the ICU for observation.    There were no intraoperative complications.  I was present for and participated in the entire procedure as dictated above.       ESTIMATED BLOOD LOSS: None    SPECIMENS:   Specimen (12h ago, onward)    None

## 2019-05-28 NOTE — OP NOTE
[facility]  Surgery Department  Operative Note    SUMMARY     Date of Procedure: 5/28/2019     Procedure: Procedure(s) (LRB):  Exam under anesthesia; possible tracheostomy (N/A)     Surgeon(s) and Role:     * Mounika Carmona MD - Primary     * Wili Kumar MD - Assisting     * Mo Sol MD - Resident - Assisting    Pre-Operative Diagnosis: Hyperparathyroidism [E21.3]  Respiratory distress [R06.03] d/t vocal cord paralysis    Post-Operative Diagnosis: Post-Op Diagnosis Codes:     * Hyperparathyroidism [E21.3]     * Respiratory distress [R06.03] d/t vocal cord paralysis    Anesthesia: General    Procedures in Detail: After risks and benefits were explained to the patient and patient's daughters expressed there wish to proceed and consent was obtained.  The patient was taken to the operating room.  Once all sedation was taken off, the patient was extubated per anesthesia.  The patient underwent flexible laryngoscopy per ENT.  Due to sedated state, full exam was difficult.    For full details see there report.  He was monitored in the OR for approximately 1 hour without evidence of stridor.  Following discussion with ENT and anesthesia, the decision was made to defer tracheostomy and continue observation in the ICU.  The patient tolerated the procedure without complication.  He was successfully transferred to the ICU extubated and in stable condition.    Findings of the Procedure: Extubated in the OR.  ENT present for assessment though this was difficult related to the the patient's sedated state.  Will continue to monitor and re-assess patient in ICU.    Significant Surgical Tasks Conducted by the Assistant(s), if Applicable: none    Complications: No    Estimated Blood Loss (EBL): None           Implants: * No implants in log *    Specimens:   Specimen (12h ago, onward)    None                  Condition: Good    Disposition: ICU - extubated and stable.    Attestation: I was  present for the entire procedure.

## 2019-05-28 NOTE — NURSING
Patient returned to room from OR via bed with Dr. Shearer, anesthesia, and surgical residents. Charge RN and SICU residents called to bedside. ICU monitoring continued. All sedation had been discontinued and patient extubated prior to arrival.  Patient appears disoriented, grunts to name but not able to verbally respond to orientation questions. Clearly agitated, attempting to exit bed and remove medical devices; restraints continued for safety. Hypertensive and tachycardic to 140s; PRN metoprolol ordered and given. Breath sounds are snoring in upper airway but clear to all lung fields; adequate oxygenation on face mask. Attempted to reorient and bed alarm set. Will continue to monitor closely and maintain patient safety.

## 2019-05-28 NOTE — NURSING
Hourly blood sugar levels decreased below goal range. Protocol followed, insulin infusion suspended, and 12.5g D50% given x2. Last blood sugar 166 and hourly monitoring resumed. Insulin infusion to be restarted pending x2 blood sugars >180.

## 2019-05-29 LAB
ALBUMIN SERPL BCP-MCNC: 3.3 G/DL (ref 3.5–5.2)
ALLENS TEST: ABNORMAL
ALP SERPL-CCNC: 62 U/L (ref 55–135)
ALT SERPL W/O P-5'-P-CCNC: 53 U/L (ref 10–44)
ANION GAP SERPL CALC-SCNC: 14 MMOL/L (ref 8–16)
ANISOCYTOSIS BLD QL SMEAR: SLIGHT
AST SERPL-CCNC: 127 U/L (ref 10–40)
BASOPHILS # BLD AUTO: 0.04 K/UL (ref 0–0.2)
BASOPHILS NFR BLD: 0.2 % (ref 0–1.9)
BILIRUB SERPL-MCNC: 0.4 MG/DL (ref 0.1–1)
BUN SERPL-MCNC: 45 MG/DL (ref 8–23)
CA-I BLDV-SCNC: 1.03 MMOL/L (ref 1.06–1.42)
CALCIUM SERPL-MCNC: 9.7 MG/DL (ref 8.7–10.5)
CHLORIDE SERPL-SCNC: 105 MMOL/L (ref 95–110)
CO2 SERPL-SCNC: 24 MMOL/L (ref 23–29)
CREAT SERPL-MCNC: 1.2 MG/DL (ref 0.5–1.4)
DELSYS: ABNORMAL
DIFFERENTIAL METHOD: ABNORMAL
EOSINOPHIL # BLD AUTO: 0 K/UL (ref 0–0.5)
EOSINOPHIL NFR BLD: 0 % (ref 0–8)
ERYTHROCYTE [DISTWIDTH] IN BLOOD BY AUTOMATED COUNT: 12.8 % (ref 11.5–14.5)
EST. GFR  (AFRICAN AMERICAN): >60 ML/MIN/1.73 M^2
EST. GFR  (NON AFRICAN AMERICAN): >60 ML/MIN/1.73 M^2
FLOW: 3
GLUCOSE SERPL-MCNC: 126 MG/DL (ref 70–110)
HCO3 UR-SCNC: 31.5 MMOL/L (ref 24–28)
HCT VFR BLD AUTO: 36.9 % (ref 40–54)
HGB BLD-MCNC: 12.4 G/DL (ref 14–18)
HYPOCHROMIA BLD QL SMEAR: ABNORMAL
IMM GRANULOCYTES # BLD AUTO: 0.15 K/UL (ref 0–0.04)
IMM GRANULOCYTES NFR BLD AUTO: 0.6 % (ref 0–0.5)
LYMPHOCYTES # BLD AUTO: 1.6 K/UL (ref 1–4.8)
LYMPHOCYTES NFR BLD: 6.7 % (ref 18–48)
MAGNESIUM SERPL-MCNC: 1.9 MG/DL (ref 1.6–2.6)
MCH RBC QN AUTO: 31.1 PG (ref 27–31)
MCHC RBC AUTO-ENTMCNC: 33.6 G/DL (ref 32–36)
MCV RBC AUTO: 93 FL (ref 82–98)
MODE: ABNORMAL
MONOCYTES # BLD AUTO: 1.2 K/UL (ref 0.3–1)
MONOCYTES NFR BLD: 5 % (ref 4–15)
NEUTROPHILS # BLD AUTO: 21.1 K/UL (ref 1.8–7.7)
NEUTROPHILS NFR BLD: 87.5 % (ref 38–73)
NRBC BLD-RTO: 0 /100 WBC
OVALOCYTES BLD QL SMEAR: ABNORMAL
PCO2 BLDA: 38 MMHG (ref 35–45)
PH SMN: 7.53 [PH] (ref 7.35–7.45)
PHOSPHATE SERPL-MCNC: 3.5 MG/DL (ref 2.7–4.5)
PLATELET # BLD AUTO: 322 K/UL (ref 150–350)
PMV BLD AUTO: 10.3 FL (ref 9.2–12.9)
PO2 BLDA: 81 MMHG (ref 80–100)
POC BE: 9 MMOL/L
POC SATURATED O2: 97 % (ref 95–100)
POC TCO2: 33 MMOL/L (ref 23–27)
POCT GLUCOSE: 112 MG/DL (ref 70–110)
POCT GLUCOSE: 121 MG/DL (ref 70–110)
POCT GLUCOSE: 122 MG/DL (ref 70–110)
POCT GLUCOSE: 128 MG/DL (ref 70–110)
POCT GLUCOSE: 136 MG/DL (ref 70–110)
POCT GLUCOSE: 151 MG/DL (ref 70–110)
POCT GLUCOSE: 153 MG/DL (ref 70–110)
POCT GLUCOSE: 155 MG/DL (ref 70–110)
POCT GLUCOSE: 179 MG/DL (ref 70–110)
POIKILOCYTOSIS BLD QL SMEAR: SLIGHT
POLYCHROMASIA BLD QL SMEAR: ABNORMAL
POTASSIUM SERPL-SCNC: 4 MMOL/L (ref 3.5–5.1)
PROT SERPL-MCNC: 6.4 G/DL (ref 6–8.4)
RBC # BLD AUTO: 3.99 M/UL (ref 4.6–6.2)
SAMPLE: ABNORMAL
SITE: ABNORMAL
SODIUM SERPL-SCNC: 143 MMOL/L (ref 136–145)
SP02: 100
WBC # BLD AUTO: 24.16 K/UL (ref 3.9–12.7)

## 2019-05-29 PROCEDURE — 25000003 PHARM REV CODE 250: Performed by: STUDENT IN AN ORGANIZED HEALTH CARE EDUCATION/TRAINING PROGRAM

## 2019-05-29 PROCEDURE — 82800 BLOOD PH: CPT

## 2019-05-29 PROCEDURE — 83735 ASSAY OF MAGNESIUM: CPT

## 2019-05-29 PROCEDURE — 97165 OT EVAL LOW COMPLEX 30 MIN: CPT

## 2019-05-29 PROCEDURE — 63600175 PHARM REV CODE 636 W HCPCS: Performed by: SURGERY

## 2019-05-29 PROCEDURE — 82330 ASSAY OF CALCIUM: CPT

## 2019-05-29 PROCEDURE — 97530 THERAPEUTIC ACTIVITIES: CPT

## 2019-05-29 PROCEDURE — 92610 EVALUATE SWALLOWING FUNCTION: CPT

## 2019-05-29 PROCEDURE — 36600 WITHDRAWAL OF ARTERIAL BLOOD: CPT

## 2019-05-29 PROCEDURE — 84100 ASSAY OF PHOSPHORUS: CPT

## 2019-05-29 PROCEDURE — 63600175 PHARM REV CODE 636 W HCPCS: Performed by: STUDENT IN AN ORGANIZED HEALTH CARE EDUCATION/TRAINING PROGRAM

## 2019-05-29 PROCEDURE — 25000003 PHARM REV CODE 250: Performed by: SURGERY

## 2019-05-29 PROCEDURE — 97535 SELF CARE MNGMENT TRAINING: CPT

## 2019-05-29 PROCEDURE — S0028 INJECTION, FAMOTIDINE, 20 MG: HCPCS | Performed by: STUDENT IN AN ORGANIZED HEALTH CARE EDUCATION/TRAINING PROGRAM

## 2019-05-29 PROCEDURE — 82803 BLOOD GASES ANY COMBINATION: CPT

## 2019-05-29 PROCEDURE — 85025 COMPLETE CBC W/AUTO DIFF WBC: CPT

## 2019-05-29 PROCEDURE — 20000000 HC ICU ROOM

## 2019-05-29 PROCEDURE — 80053 COMPREHEN METABOLIC PANEL: CPT

## 2019-05-29 PROCEDURE — 99900035 HC TECH TIME PER 15 MIN (STAT)

## 2019-05-29 RX ORDER — GLUCAGON 1 MG
1 KIT INJECTION
Status: DISCONTINUED | OUTPATIENT
Start: 2019-05-29 | End: 2019-06-01

## 2019-05-29 RX ORDER — LORAZEPAM 2 MG/ML
0.5 INJECTION INTRAMUSCULAR ONCE
Status: COMPLETED | OUTPATIENT
Start: 2019-05-29 | End: 2019-05-29

## 2019-05-29 RX ORDER — AMLODIPINE BESYLATE 10 MG/1
10 TABLET ORAL DAILY
Status: DISCONTINUED | OUTPATIENT
Start: 2019-05-29 | End: 2019-05-29

## 2019-05-29 RX ORDER — SODIUM CHLORIDE AND POTASSIUM CHLORIDE 150; 450 MG/100ML; MG/100ML
INJECTION, SOLUTION INTRAVENOUS CONTINUOUS
Status: DISCONTINUED | OUTPATIENT
Start: 2019-05-29 | End: 2019-05-29

## 2019-05-29 RX ORDER — FAMOTIDINE 10 MG/ML
20 INJECTION INTRAVENOUS 2 TIMES DAILY
Status: DISCONTINUED | OUTPATIENT
Start: 2019-05-29 | End: 2019-06-01

## 2019-05-29 RX ORDER — DEXAMETHASONE SODIUM PHOSPHATE 4 MG/ML
8 INJECTION, SOLUTION INTRA-ARTICULAR; INTRALESIONAL; INTRAMUSCULAR; INTRAVENOUS; SOFT TISSUE EVERY 8 HOURS
Status: DISCONTINUED | OUTPATIENT
Start: 2019-05-29 | End: 2019-05-30

## 2019-05-29 RX ORDER — SODIUM CHLORIDE, SODIUM LACTATE, POTASSIUM CHLORIDE, CALCIUM CHLORIDE 600; 310; 30; 20 MG/100ML; MG/100ML; MG/100ML; MG/100ML
INJECTION, SOLUTION INTRAVENOUS CONTINUOUS
Status: DISCONTINUED | OUTPATIENT
Start: 2019-05-29 | End: 2019-06-01

## 2019-05-29 RX ORDER — HYDRALAZINE HYDROCHLORIDE 20 MG/ML
10 INJECTION INTRAMUSCULAR; INTRAVENOUS ONCE
Status: COMPLETED | OUTPATIENT
Start: 2019-05-29 | End: 2019-05-29

## 2019-05-29 RX ORDER — INSULIN ASPART 100 [IU]/ML
0-5 INJECTION, SOLUTION INTRAVENOUS; SUBCUTANEOUS EVERY 4 HOURS PRN
Status: DISCONTINUED | OUTPATIENT
Start: 2019-05-29 | End: 2019-06-01

## 2019-05-29 RX ADMIN — HYDRALAZINE HYDROCHLORIDE 10 MG: 20 INJECTION INTRAMUSCULAR; INTRAVENOUS at 12:05

## 2019-05-29 RX ADMIN — SODIUM CHLORIDE, SODIUM LACTATE, POTASSIUM CHLORIDE, AND CALCIUM CHLORIDE: .6; .31; .03; .02 INJECTION, SOLUTION INTRAVENOUS at 06:05

## 2019-05-29 RX ADMIN — KETOROLAC TROMETHAMINE 30 MG: 30 INJECTION, SOLUTION INTRAMUSCULAR; INTRAVENOUS at 05:05

## 2019-05-29 RX ADMIN — LORAZEPAM 0.5 MG: 2 INJECTION, SOLUTION INTRAMUSCULAR; INTRAVENOUS at 11:05

## 2019-05-29 RX ADMIN — FAMOTIDINE 20 MG: 10 INJECTION INTRAVENOUS at 11:05

## 2019-05-29 RX ADMIN — DEXAMETHASONE SODIUM PHOSPHATE 8 MG: 4 INJECTION, SOLUTION INTRAMUSCULAR; INTRAVENOUS at 09:05

## 2019-05-29 RX ADMIN — DEXAMETHASONE SODIUM PHOSPHATE 12 MG: 4 INJECTION, SOLUTION INTRA-ARTICULAR; INTRALESIONAL; INTRAMUSCULAR; INTRAVENOUS; SOFT TISSUE at 05:05

## 2019-05-29 RX ADMIN — CALCIUM GLUCONATE 1000 MG: 98 INJECTION, SOLUTION INTRAVENOUS at 03:05

## 2019-05-29 RX ADMIN — KETOROLAC TROMETHAMINE 30 MG: 30 INJECTION, SOLUTION INTRAMUSCULAR; INTRAVENOUS at 01:05

## 2019-05-29 RX ADMIN — HYDRALAZINE HYDROCHLORIDE 10 MG: 20 INJECTION INTRAMUSCULAR; INTRAVENOUS at 04:05

## 2019-05-29 RX ADMIN — HYDRALAZINE HYDROCHLORIDE 10 MG: 20 INJECTION INTRAMUSCULAR; INTRAVENOUS at 06:05

## 2019-05-29 RX ADMIN — CALCIUM GLUCONATE 1000 MG: 98 INJECTION, SOLUTION INTRAVENOUS at 05:05

## 2019-05-29 RX ADMIN — ENOXAPARIN SODIUM 40 MG: 100 INJECTION SUBCUTANEOUS at 04:05

## 2019-05-29 RX ADMIN — POTASSIUM CHLORIDE AND SODIUM CHLORIDE: 450; 150 INJECTION, SOLUTION INTRAVENOUS at 08:05

## 2019-05-29 RX ADMIN — FAMOTIDINE 20 MG: 10 INJECTION INTRAVENOUS at 09:05

## 2019-05-29 RX ADMIN — DEXAMETHASONE SODIUM PHOSPHATE 8 MG: 4 INJECTION, SOLUTION INTRAMUSCULAR; INTRAVENOUS at 01:05

## 2019-05-29 RX ADMIN — CALCIUM GLUCONATE 1000 MG: 98 INJECTION, SOLUTION INTRAVENOUS at 11:05

## 2019-05-29 RX ADMIN — SODIUM CHLORIDE, SODIUM LACTATE, POTASSIUM CHLORIDE, AND CALCIUM CHLORIDE: .6; .31; .03; .02 INJECTION, SOLUTION INTRAVENOUS at 09:05

## 2019-05-29 RX ADMIN — HYDRALAZINE HYDROCHLORIDE 10 MG: 20 INJECTION INTRAMUSCULAR; INTRAVENOUS at 01:05

## 2019-05-29 NOTE — PLAN OF CARE
Problem: Occupational Therapy Goal  Goal: Occupational Therapy Goal  Goals to be met by: 6/8/19     Patient will increase functional independence with ADLs by performing:    Feeding with Modified [place].  UE Dressing with Supervision.  LE Dressing with Supervision.  Grooming while standing at sink with Supervision.  Toileting from toilet with Supervision for hygiene and clothing management.   Toilet transfer to toilet with Supervision.    Outcome: Ongoing (interventions implemented as appropriate)  OT eval completed; no goals established as pt is performing ADL safely and without A. FELICIA Nielsen  5/29/2019

## 2019-05-29 NOTE — PROGRESS NOTES
[facility]  General Surgery  Progress Note    Subjective:     History of Present Illness:  No notes on file    Post-Op Info:  Procedure(s) (LRB):  Exam under anesthesia; (N/A)  LARYNGOSCOPY, flexible   1 Day Post-Op     Interval History: Extubated yesterday.  Confusion/ delirium improving slowly.      Medications:  Continuous Infusions:   insulin (HUMAN R) infusion (adults)      lactated ringers       Scheduled Meds:   calcium gluconate IVPB  1,000 mg Intravenous Q8H    dexamethasone  12 mg Intravenous Q8H    enoxaparin  40 mg Subcutaneous Daily    ketorolac  30 mg Intravenous Q6H     PRN Meds:dextrose 50%, dextrose 50%, hydrALAZINE, metoprolol, ondansetron, sodium chloride 0.9%     Review of patient's allergies indicates:   Allergen Reactions    Tizanidine Shortness Of Breath     Objective:     Vital Signs (Most Recent):  Temp: 99.4 °F (37.4 °C) (05/29/19 0700)  Pulse: 80 (05/29/19 0745)  Resp: (!) 24 (05/29/19 0745)  BP: (!) 186/86 (05/29/19 0700)  SpO2: 100 % (05/29/19 0745) Vital Signs (24h Range):  Temp:  [97.8 °F (36.6 °C)-99.6 °F (37.6 °C)] 99.4 °F (37.4 °C)  Pulse:  [] 80  Resp:  [6-35] 24  SpO2:  [92 %-100 %] 100 %  BP: (116-222)/(7-103) 186/86     Weight: 80 kg (176 lb 5.9 oz)  Body mass index is 25.31 kg/m².    Intake/Output - Last 3 Shifts       05/27 0700 - 05/28 0659 05/28 0700 - 05/29 0659 05/29 0700 - 05/30 0659    I.V. (mL/kg) 1092.9 (13.7) 651.9 (8.1)     NG/      Total Intake(mL/kg) 1842.9 (23) 651.9 (8.1)     Urine (mL/kg/hr) 1672 (0.9) 1115 (0.6)     Drains 10 317     Total Output 1682 1432     Net +160.9 -780.1                  Physical Exam   NAD  Follows commands  Neck is soft    Significant Labs:  CBC:   Recent Labs   Lab 05/29/19 0334   WBC 24.16*   RBC 3.99*   HGB 12.4*   HCT 36.9*      MCV 93   MCH 31.1*   MCHC 33.6     CMP:   Recent Labs   Lab 05/29/19 0334   *   CALCIUM 9.7   ALBUMIN 3.3*   PROT 6.4      K 4.0   CO2 24   CL  105   BUN 45*   CREATININE 1.2   ALKPHOS 62   ALT 53*   *   BILITOT 0.4           Assessment/Plan:     * Hyperparathyroidism  Post op B/L neck exploration for parathyroid adenom.  Very difficult dissection.  Complicated by vocal chord dysfunction, likely from stretch injury as nerve appeared intact on intraop NIMS monitoring and was grossly intact.  Pt intuabted in PACU for increasing stridor.     Extubated 5/28     No sedatives.  ICU delirium precautions, awake during the day, dark at night.  Would be very cautious with use of haldol if pt is a danger to himself  NPO.  Still slightly confused, may be ok for swallow study later today vs tomorrow.  Speech consult placed  DC gee  Trend labs, continue Ca supplementation  PT consult.  Ok to be OOB to chair with help  Tapering Dexamethasone, decreased to 8mg  IVF while NPO    Dispo: Observe in ICU today given frequent nursing checks.  May be able to step down with a sitter later this evening. Will need close observation till delirium resolved        Mo Sol MD  General Surgery  [facility]

## 2019-05-29 NOTE — PROGRESS NOTES
[facility]  Critical Care - Surgery  Progress Note    Patient Name: Rob Jones Jr.  MRN: [id number]  Admission Date: 5/22/2019  Hospital Length of Stay: 6 days  Code Status: No Order  Attending Provider: Mounika Carmona MD  Primary Care Provider: Vasyl Jimenez MD   Principal Problem: Hyperparathyroidism    Subjective:     Hospital/ICU Course:  No notes on file    Interval History/Significant Events: Extubated in OR yesterday. Coming down on supplemental O2 requirements, currently saturating well on 3 L NC. Endorsing some SOB. Bun/Cr 45/1.2 from 34/0.9 overnight, UOP adequate. H/H stable. AST and ALT have become elevated overnight. WBC 24.2 from 13 overnight, afebrile. CXR shows increasing edema on right and possible infiltrate on left. Minimal output from drain. Alert and oriented to person, place, and situation but not time.    Follow-up For: Procedure(s) (LRB):  PARATHYROIDECTOMY (N/A)  RE-EXPLORATION, FOR BLEEDING  5/22    Objective:     Vital Signs (Most Recent):  Temp: 99.4 °F (37.4 °C) (05/29/19 0700)  Pulse: 80 (05/29/19 0745)  Resp: (!) 24 (05/29/19 0745)  BP: (!) 186/86 (05/29/19 0700)  SpO2: 100 % (05/29/19 0745) Vital Signs (24h Range):  Temp:  [97.8 °F (36.6 °C)-99.6 °F (37.6 °C)] 99.4 °F (37.4 °C)  Pulse:  [] 80  Resp:  [6-35] 24  SpO2:  [92 %-100 %] 100 %  BP: (100-222)/(7-103) 186/86     Weight: 80 kg (176 lb 5.9 oz)  Body mass index is 25.31 kg/m².      Intake/Output Summary (Last 24 hours) at 5/29/2019 0819  Last data filed at 5/29/2019 0600  Gross per 24 hour   Intake 576.09 ml   Output 1279 ml   Net -702.91 ml       Physical Exam   Constitutional: He is oriented to person, place, and time. He appears well-developed and well-nourished. No distress.   HENT:   Head: Normocephalic and atraumatic.   On NC  Neck incision clean/dry/intact  Left chest BRICE drain neck draining seroserous   Eyes: No scleral icterus.   Cardiovascular: Normal rate and regular rhythm.    Pulmonary/Chest: Effort normal. No respiratory distress.   Abdominal: Soft. He exhibits no distension.   Neurological: He is alert and oriented to person, place, and time.   Skin: Skin is warm and dry.   Psychiatric: He has a normal mood and affect. His behavior is normal. Judgment and thought content normal.   Nursing note and vitals reviewed.      Vents:  Vent Mode: A/C (05/28/19 1241)  Ventilator Initiated: Yes (05/22/19 2109)  Set Rate: 18 bmp (05/28/19 1241)  Vt Set: 450 mL (05/28/19 1241)  Pressure Support: 10 cmH20 (05/23/19 1803)  PEEP/CPAP: 5 cmH20 (05/28/19 1241)  Oxygen Concentration (%): 40 (05/28/19 1905)  Peak Airway Pressure: 23 cmH2O (05/28/19 1241)  Plateau Pressure: 17 cmH20 (05/28/19 1241)  Total Ve: 7.72 mL (05/28/19 1241)  F/VT Ratio<105 (RSBI): (!) 37.19 (05/28/19 1101)    Lines/Drains/Airways     Drain                 Closed/Suction Drain 05/22/19 1741 Left Neck Bulb 10 Fr. 6 days         Urethral Catheter 05/22/19 2245 6 days          Peripheral Intravenous Line                 Peripheral IV - Single Lumen 05/28/19 1700 20 G Left Antecubital less than 1 day         Peripheral IV - Single Lumen 05/28/19 1700 20 G Left Forearm less than 1 day                Significant Labs:    CBC/Anemia Profile:  Recent Labs   Lab 05/28/19 0325 05/29/19  0334   WBC 12.95* 24.16*   HGB 12.4* 12.4*   HCT 37.9* 36.9*    322   MCV 96 93   RDW 12.6 12.8        Chemistries:  Recent Labs   Lab 05/28/19 0325 05/29/19  0334    143   K 4.6 4.0    105   CO2 24 24   BUN 34* 45*   CREATININE 0.9 1.2   CALCIUM 10.3 9.7   ALBUMIN 2.8* 3.3*   PROT 6.1 6.4   BILITOT 0.1 0.4   ALKPHOS 63 62   ALT 16 53*   AST 13 127*   MG 1.9 1.9   PHOS 3.8 3.5       All pertinent labs within the past 24 hours have been reviewed.    Significant Imaging:  I have reviewed all pertinent imaging results/findings within the past 24 hours.    Assessment/Plan:     * Hyperparathyroidism  63 y.o. male w/ a significant PMHx of  primary hyperparathyroidism s/p parathyroidectomy on 5/22 c/b bilateral VC immobility ultimately requiring reintubation in the PACU.      Neuro:   - Off sedation  - Avoid narcotics  - Toradol     Pulmonary:   - Extubated in OR 5/28  - Wean supplemental O2 as tolerated  - ABG PRN  - daily CXR  - Dexamethasone 12 mg -> 8 mg IV q8h  - Per primary, plan to extubate today in OR    Cardiac:   - Holding PO BP meds until passes swallow study  - Hydralazine PRN     Renal:    - BUN/Cr 45/1.2 from 34/0.9 overnight  - UOP adequate  - Monitor UOP  - Monitor BUN/Cr      ID:   - AF  - WBC 24.2 from 13 overnight  - Monitor WBC     Hem/Onc:   - H/H stable   - Monitor CBC  - Transfuse as needed     Endocrine:    - Hx of DM  - Accuchecks  - Insulin gtt  - Endocrine following, appreciate recs  - Synthroid     Fluids/Electrolytes/Nutrition/GI:   - Replace electrolytes PRN  - Calcium gluconate 1 g IV q8h  - Strict NPO until passes swallow study, will advance to clears if passes  - MIVF  - Trend LFTs     DISPO:  - Cont SICU care  - Monitor respiratory status     Critical care was time spent personally by me on the following activities: development of treatment plan with patient or surrogate and bedside caregivers, discussions with consultants, evaluation of patient's response to treatment, examination of patient, ordering and performing treatments and interventions, ordering and review of laboratory studies, ordering and review of radiographic studies, pulse oximetry, re-evaluation of patient's condition.  This critical care time did not overlap with that of any other provider or involve time for any procedures.     Brody Vazquez MD  Critical Care - Surgery  [facility]

## 2019-05-29 NOTE — PLAN OF CARE
Problem: Adult Inpatient Plan of Care  Goal: Patient-Specific Goal (Individualization)  Outcome: Ongoing (interventions implemented as appropriate)     Shift Events: Patient normal sinus to sinus tachycardic throughout shift. PRN hydralazine given x 2 for elevated SBP. Patient agitated at the beginning of shift. Patient calmed down throughout the night with episodes of agitation. Keterolac given q 6 to maintain adequate pain control. Nasal cannula @ 3 L. O2 sats 99 % and above. Restraints discontinued. Insulin gtt not restarted. Plan for PT to see patient. Plan of care reviewed with patient. Will continue to monitor.      Neuro: Disoriented to time      Diet: NPO     Urine Output: Urinary Catheter 515 cc/shift     Drains: BRICE Drain, total output 4 cc / shift (Sanguinous Drainage)     Labs/Accuchecks: Accuchecks q 2 hr. Daily labs.      Skin: Anterior neck incision open to air with Dermabond. BRICE drain dressed with gauze for bleeding at the site. Foam dressing applied to heels and sacrum to prevent potential skin breakdown. No skin break down noted. Will continue to monitor.

## 2019-05-29 NOTE — SUBJECTIVE & OBJECTIVE
Interval History: extubated in OR yesterday, no stridor. Phonating well.     Medications:  Continuous Infusions:   0.45 % NaCl with KCl 20 mEq 100 mL/hr at 05/29/19 0815    insulin (HUMAN R) infusion (adults)       Scheduled Meds:   calcium gluconate IVPB  1,000 mg Intravenous Q8H    dexamethasone  12 mg Intravenous Q8H    enoxaparin  40 mg Subcutaneous Daily    ketorolac  30 mg Intravenous Q6H     PRN Meds:dextrose 50%, dextrose 50%, hydrALAZINE, metoprolol, ondansetron, sodium chloride 0.9%     Review of patient's allergies indicates:   Allergen Reactions    Tizanidine Shortness Of Breath     Objective:     Vital Signs (24h Range):  Temp:  [97.8 °F (36.6 °C)-99.6 °F (37.6 °C)] 99.4 °F (37.4 °C)  Pulse:  [] 80  Resp:  [6-35] 24  SpO2:  [92 %-100 %] 100 %  BP: (100-222)/(7-103) 186/86       Lines/Drains/Airways     Drain                 Closed/Suction Drain 05/22/19 1741 Left Neck Bulb 10 Fr. 6 days         Urethral Catheter 05/22/19 2245 6 days          Peripheral Intravenous Line                 Peripheral IV - Single Lumen 05/28/19 1700 20 G Left Antecubital less than 1 day         Peripheral IV - Single Lumen 05/28/19 1700 20 G Left Forearm less than 1 day                Physical Exam   AAO, nAD  On 3L NC  No stridor, no stertor, no increased WOB  Neck incision c/d/i, dermabond in place  JPs in chest    Significant Labs:  None    Significant Diagnostics:  None

## 2019-05-29 NOTE — ASSESSMENT & PLAN NOTE
Post op B/L neck exploration for parathyroid adenom.  Very difficult dissection.  Complicated by vocal chord dysfunction, likely from stretch injury as nerve appeared intact on intraop NIMS monitoring and was grossly intact.  Pt intuabted in PACU for increasing stridor.     Extubated 5/28     No sedatives.  ICU delirium precautions, awake during the day, dark at night.  Would be very cautious with use of haldol if pt is a danger to himself  NPO.  Still slightly confused, may be ok for swallow study later today vs tomorrow.  Speech consult placed  DC gee  Trend labs, continue Ca supplementation  PT consult.  Ok to be OOB to chair with help  Tapering Dexamethasone, decreased to 8mg  IVF while NPO    Dispo: Observe in ICU today given frequent nursing checks.  May be able to step down with a sitter later this evening. Will need close observation till delirium resolved

## 2019-05-29 NOTE — ASSESSMENT & PLAN NOTE
-defer swallowing to primary team, ok to attempt at bedside  -no evidence of stridor  -call with questions or concerns

## 2019-05-29 NOTE — NURSING
Dr. Faulkner with endocrine contacted me via phone to update me of his plan for patient moving forward. Accuchecks reordered to q2 hours. Intensive insulin infusion order updated. Ordered to restart infusion following two blood sugar readings >180. All new orders acknowledged.

## 2019-05-29 NOTE — PT/OT/SLP EVAL
"Occupational Therapy   Evaluation    Name: Rob Jones Jr.  MRN: [id number]  Admitting Diagnosis:  Hyperparathyroidism s/p parathyroidectomy last week now s/p re-exploration and laryngoscopy 2* bleeding    Recommendations:     Discharge Recommendations: home with home health  Discharge Equipment Recommendations:  none  Barriers to discharge:  None    Assessment:     Rob Jones Jr. is a 63 y.o. male with a medical diagnosis of Hyperparathyroidism.  He presents with generalized weakness and decreased safety awareness. Performance deficits affecting function: weakness, impaired self care skills, impaired endurance, impaired functional mobilty, impaired balance, gait instability, impaired cardiopulmonary response to activity, decreased safety awareness.      Rehab Prognosis: Good; patient would benefit from acute skilled OT services to address these deficits and reach maximum level of function.       Plan:     Patient to be seen 3 x/week to address the above listed problems via therapeutic activities, self-care/home management, therapeutic exercises  · Plan of Care Expires: 06/28/19  · Plan of Care Reviewed with: patient, father    Subjective     Chief Complaint: pt emotionally labile during session  Patient/Family Comments/goals: to go home    Occupational Profile:  Living Environment: lives alone in [facility] with 4 DREA and no HR  Previous level of function: (I) with ADL and ambulation  Roles and Routines: retired; drives; cares for his house  Equipment Used at Home:  none  Assistance upon Discharge: daughters can assist    Pain/Comfort:  · Pain Rating 1: (pt reports pain in his "lower extremities" but does not rate)    Patients cultural, spiritual, Hinduism conflicts given the current situation: no    Objective:     Communicated with: RN prior to session.  Patient found supine with blood pressure cuff, telemetry, pulse ox (continuous), oxygen, peripheral IV, BRICE drain upon OT entry to room.    General Precautions: " "Standard, fall, aspiration   Orthopedic Precautions:    Braces:       Occupational Performance:    Bed Mobility:    · Patient completed Rolling/Turning to Left with  contact guard assistance  · Patient completed Scooting/Bridging with contact guard assistance  · Patient completed Supine to Sit with minimum assistance    Functional Mobility/Transfers:  · Patient completed Sit <> Stand Transfer with contact guard assistance  with  no assistive device   · Functional Mobility: MInimal A x 6 steps to chair    Activities of Daily Living:  · Grooming: minimum assistance seated in b/s chair  · Upper Body Dressing: moderate assistance    · Lower Body Dressing: maximal assistance      Cognitive/Visual Perceptual:  Pt alert and following commands  Pt emotionally labile during session    Physical Exam:  Postural examination/scapula alignment:    -       No postural abnormalities identified  Sensation:    -       Intact  Upper Extremity Range of Motion:     -       Right Upper Extremity: AAROM WFL  -       Left Upper Extremity: AAROM WFL  Upper Extremity Strength:    -       Right Upper Extremity: 3-/5  -       Left Upper Extremity: 3-/5   Strength:    -       Right Upper Extremity: WFL  -       Left Upper Extremity: WFL      AMPAC 6 Click ADL:  AMPAC Total Score: 15    Treatment & Education:  Pt ed on OT POC  Pt in agreement with treatment plan  Pt sat EOB x 8 min with CGA<> close SBA 2* "swaying"  Pt stood EOB with minimal A and completed MIP and cueing for upright posture prior to mobility  Education:    Patient left up in chair with all lines intact, call button in reach, RN notified and father present    GOALS:   Multidisciplinary Problems     Occupational Therapy Goals        Problem: Occupational Therapy Goal    Goal Priority Disciplines Outcome Interventions   Occupational Therapy Goal     OT, PT/OT Ongoing (interventions implemented as appropriate)    Description:  Goals to be met by: 6/8/19     Patient will " increase functional independence with ADLs by performing:    Feeding with Modified [place].  UE Dressing with Supervision.  LE Dressing with Supervision.  Grooming while standing at sink with Supervision.  Toileting from toilet with Supervision for hygiene and clothing management.   Toilet transfer to toilet with Supervision.                      History:     Past Medical History:   Diagnosis Date    Anxiety     Back pain     Cataract     Diabetes mellitus     History of hepatitis C; S/p RX with SVR 12 documented 06/2017 6/1/2017    Hypertension     Postoperative hypothyroidism 11/3/2016    Primary hyperparathyroidism 1/18/2017    Thyroid disease        Past Surgical History:   Procedure Laterality Date    BLOCK SELECTIVE NERVE ROOT TRANSFORAMINAL LUMBAR Left 6/21/2017    [facility]    Exam under anesthesia; N/A 5/28/2019    Performed [facility] at [facility]    INJECTION-STEROID-EPIDURAL-CERVICAL N/A 1/17/2018    [facility]    INJECTION-STEROID-EPIDURAL-TRANSFORAMINAL Left 8/25/2017    [facility] at [facility]    LARYNGOSCOPY, flexible  5/28/2019    Per[facility] at [facility]    LUMBAR FUSION      PARATHYROIDECTOMY N/A 5/22/2019    Per[facility] at [facility]    RE-EXPLORATION, FOR BLEEDING  5/22/2019    Performed by [facility] at [facility]    THYROIDECTOMY      TONSILLECTOMY         Time Tracking:     OT Date of Treatment: 05/29/19  OT Start Time: 1306  OT Stop Time: 1323  OT Total Time (min): 17 min    Billable Minutes:Evaluation 8  Therapeutic Activity 9    FELICIA Nielsen  5/29/2019

## 2019-05-29 NOTE — ASSESSMENT & PLAN NOTE
63 y.o. male w/ a significant PMHx of primary hyperparathyroidism s/p parathyroidectomy on 5/22 c/b bilateral VC immobility ultimately requiring reintubation in the PACU.      Neuro:   - Off sedation  - Avoid narcotics  - Toradol     Pulmonary:   - Extubated in OR 5/28  - Wean supplemental O2 as tolerated  - ABG PRN  - daily CXR  - Dexamethasone 12 mg IV q8h  - Per primary, plan to extubate today in OR    Cardiac:   - Holding PO BP meds until passes swallow study  - Hydralazine PRN     Renal:    - BUN/Cr 45/1.2 from 34/0.9 overnight  - UOP adequate  - Monitor UOP  - Monitor BUN/Cr      ID:   - AF  - WBC 24.2 from 13 overnight  - Monitor WBC     Hem/Onc:   - H/H stable   - Monitor CBC  - Transfuse as needed     Endocrine:    - Hx of DM  - Accuchecks  - Insulin gtt  - Endocrine following, appreciate recs  - Synthroid     Fluids/Electrolytes/Nutrition/GI:   - Replace electrolytes PRN  - Calcium gluconate 1 g IV q8h  - Strict NPO until passes swallow study, will advance to clears if passes  - MIVF  - Trend LFTs     DISPO:  - Cont SICU care  - Monitor respiratory status

## 2019-05-29 NOTE — PT/OT/SLP EVAL
Speech Language Pathology Evaluation  Bedside Swallow    Patient Name:  Rob Jones Jr.   MRN:  [id number]  Admitting Diagnosis: Hyperparathyroidism    Recommendations:                 General Recommendations:  ongoing swallowing assessment; if team wishes to fully rule out aspiration, Modified Barium Swallow Study may be beneficial  Diet recommendations:  Pleasure Feeding,   Pt may receive ice chips, small cup sips of water, and small bites of puree for pleasure/comfort/oropharyngeal stimulation sparingly.  STOP feeding if s/s of aspiration are observed, including coughing/choking, change in vocal quality, desaturation, changes in breathing patterns, reddened face, and/or watering eyes.  Aspiration Precautions: 1 bite/sip at a time, Alternating bites/sips, Eliminate distractions, Feed only when awake/alert, Frequent oral care, HOB to 90 degrees, Ice chips sparingly, Meds crushed in puree, Monitor for s/s of aspiration, No straws, Puree for pleasure, Remain upright 30 minutes post meal, Small bites/sips and Strict aspiration precautions   General Precautions: Standard, aspiration, fall  Communication strategies:  provide increased time to answer and go to room if call light pushed    History:     Past Medical History:   Diagnosis Date    Anxiety     Back pain     Cataract     Diabetes mellitus     History of hepatitis C; S/p RX with SVR 12 documented 06/2017 6/1/2017    Hypertension     Postoperative hypothyroidism 11/3/2016    Primary hyperparathyroidism 1/18/2017    Thyroid disease        Past Surgical History:   Procedure Laterality Date    BLOCK SELECTIVE NERVE ROOT TRANSFORAMINAL LUMBAR Left 6/21/2017    [facility]    INJECTION-STEROID-EPIDURAL-CERVICAL N/A 1/17/2018    [facility]    INJECTION-STEROID-EPIDURAL-TRANSFORAMINAL Left 8/25/2017    [facility]    LUMBAR FUSION       "PARATHYROIDECTOMY N/A 5/22/2019    [facility]    RE-EXPLORATION, FOR BLEEDING  5/22/2019    [facility]    THYROIDECTOMY      TONSILLECTOMY     History of Present Illness:  Patient is a 63 y.o. male w/ a significant PMHx of DM, HTN, Graves disease s/p total thyroidectomy, Hx of pheochromocytoma resection, and primary hyperparathyroidism now s/p parathyroidectomy. ENT left a drain for bleeding.      In PACU, pt experienced striderous breathing with worsening respiratory status. Pt intubated in PACU. Flexible laryngoscopy revealed paramedian and immobile VCs bilaterally indicative of bilateral RLN injury.     Prior Intubation HX:  Re-intubated post op on 5/22; extubated 5/28    Modified Barium Swallow: none on file    Chest X-Rays: 5/29/19: pulmonary edema, aspiration, or pneumonia.    Prior diet: currently NPO    Subjective     "I sure would love it." pt stated when offered applesauce during swallowing assessment.     Pain/Comfort:  Pain Rating 1: (no indications or c/o pain)    Objective:     Oral Musculature Evaluation  Oral Musculature: WFL, general weakness  Dentition: edentulous(dentures at home)  Secretion Management: (nurse reports pt coughing/expectorating secretions as needed; able to manage saliva)  Mucosal Quality: adequate  Mandibular Strength and Mobility: WFL  Oral Labial Strength and Mobility: WFL  Lingual Strength and Mobility: WFL  Velar Elevation: WFL  Buccal Strength and Mobility: WFL  Volitional Cough: fair volitional; strong reflexive  Volitional Swallow: elicited after a mild delay; elevation and excursion were adequate upon palpation  Voice Prior to PO Intake: dry, adequate volume and intensity, rough quality at times    Bedside Swallow Eval:   Consistencies Assessed:  · Thin liquids ice chip x 1, 1/2 tsp x 1, full tsp x 1, cup sips x 6, straw sips x 2  · Puree 1/2 tsp x 1, full tsp x 3   · Pt declined " solid trials due to absence of dentures  · Pt demonstrated decreased endurance as he did not wish to receive any further PO trials    Oral Phase:   · WNL    Pharyngeal Phase:   · sligth throat clear x 1 and delayed productive cough x 1 with straw sips of thin water   · Watery eyes were observed after all PO trials (final trial being small cup sip of water) - however, pt was noted to have watery eyes upon entry and after yawning (prior to any PO trials)    Compensatory Strategies  · None    Treatment: Education provided to pt regarding role of SLP, purpose of swallowing evaluation, aspiration, overt and subtle s/s of aspiration, recommendations for limited PO trials for pleasure/comfort/stimulation at this time, possible need for MBSS to r/o aspiration, and SLP POC.  Pt demonstrated some understanding, but full understanding may be decreased.  White board updated. SLP discussed assessment and recommendations with nurse. Nurse stated team may want pt to undergo MBSS to fully r/o aspiration.     Assessment:     Rob Jones Jr. is a 63 y.o. male with an SLP diagnosis of Dysphagia.      Goals:   Multidisciplinary Problems     SLP Goals        Problem: SLP Goal    Goal Priority Disciplines Outcome   SLP Goal     SLP    Description:  Speech Language Pathology Goals  Goals expected to be met by 6/5:  1. Pt will participate in ongoing swallowing assessment to determine safest PO diet.                         Plan:     · Patient to be seen:  5 x/week   · Plan of Care expires:  06/29/19  · Plan of Care reviewed with:  patient   · SLP Follow-Up:  Yes       Discharge recommendations:  (tbd)     Time Tracking:     SLP Treatment Date:   05/29/19  Speech Start Time:  0959  Speech Stop Time:  1025     Speech Total Time (min):  26 min    Billable Minutes: Eval Swallow and Oral Function 11 and Seld Care/Home Management Training 15    LALA Boyer, CCC-SLP  05/29/2019     LALA Boyer, CCC-SLP  Speech Language  Pathologist  [phone]  5/29/2019

## 2019-05-29 NOTE — NURSING
MD made aware of patient's Mg level of 1.9, WBC count of 24.16, , and ALT 53. No orders given at this time. Will continue to monitor.

## 2019-05-29 NOTE — SUBJECTIVE & OBJECTIVE
Interval History: Extubated yesterday.  Confusion/ delirium improving slowly.      Medications:  Continuous Infusions:   insulin (HUMAN R) infusion (adults)      lactated ringers       Scheduled Meds:   calcium gluconate IVPB  1,000 mg Intravenous Q8H    dexamethasone  12 mg Intravenous Q8H    enoxaparin  40 mg Subcutaneous Daily    ketorolac  30 mg Intravenous Q6H     PRN Meds:dextrose 50%, dextrose 50%, hydrALAZINE, metoprolol, ondansetron, sodium chloride 0.9%     Review of patient's allergies indicates:   Allergen Reactions    Tizanidine Shortness Of Breath     Objective:     Vital Signs (Most Recent):  Temp: 99.4 °F (37.4 °C) (05/29/19 0700)  Pulse: 80 (05/29/19 0745)  Resp: (!) 24 (05/29/19 0745)  BP: (!) 186/86 (05/29/19 0700)  SpO2: 100 % (05/29/19 0745) Vital Signs (24h Range):  Temp:  [97.8 °F (36.6 °C)-99.6 °F (37.6 °C)] 99.4 °F (37.4 °C)  Pulse:  [] 80  Resp:  [6-35] 24  SpO2:  [92 %-100 %] 100 %  BP: (116-222)/(7-103) 186/86     Weight: 80 kg (176 lb 5.9 oz)  Body mass index is 25.31 kg/m².    Intake/Output - Last 3 Shifts       05/27 0700 - 05/28 0659 05/28 0700 - 05/29 0659 05/29 0700 - 05/30 0659    I.V. (mL/kg) 1092.9 (13.7) 651.9 (8.1)     NG/      Total Intake(mL/kg) 1842.9 (23) 651.9 (8.1)     Urine (mL/kg/hr) 1672 (0.9) 1115 (0.6)     Drains 10 317     Total Output 1682 1432     Net +160.9 -780.1                  Physical Exam   NAD  Follows commands  Neck is soft    Significant Labs:  CBC:   Recent Labs   Lab 05/29/19  0334   WBC 24.16*   RBC 3.99*   HGB 12.4*   HCT 36.9*      MCV 93   MCH 31.1*   MCHC 33.6     CMP:   Recent Labs   Lab 05/29/19  0334   *   CALCIUM 9.7   ALBUMIN 3.3*   PROT 6.4      K 4.0   CO2 24      BUN 45*   CREATININE 1.2   ALKPHOS 62   ALT 53*   *   BILITOT 0.4

## 2019-05-29 NOTE — PLAN OF CARE
Reviewed BG levels and insulin regimen.      Goal BG while inpatient: 140-180 mg/dl  Current BG levels are at goal  On IV steroid  Not on diet  Resp status appear stable       Recommendations:   No need for insulin drip  Q4 POCT glucose  Low dose correction scale      We will follow with you, call with any questions    Sunday Faulkner MD  Endocrine Fellow  5/29/2019

## 2019-05-29 NOTE — NURSING
SICU resident notified of right arm swelling and warmth. States to just monitor for changes, possibly ultrasound in future. All IVs removed from this arm.

## 2019-05-29 NOTE — PLAN OF CARE
Problem: Fall Injury Risk  Goal: Absence of Fall and Fall-Related Injury  Outcome: Ongoing (interventions implemented as appropriate)  .      Problem: Diabetes Comorbidity  Goal: Blood Glucose Level Within Desired Range  Outcome: Ongoing (interventions implemented as appropriate)  .      Problem: Infection  Goal: Infection Symptom Resolution  Outcome: Ongoing (interventions implemented as appropriate)  .    Problem: Skin Injury Risk Increased  Goal: Skin Health and Integrity  Outcome: Ongoing (interventions implemented as appropriate)  .      Problem: Restraint, Nonbehavioral (Nonviolent)  Goal: Discontinuation Criteria Achieved  Outcome: Outcome(s) achieved Date Met: 05/29/19  .

## 2019-05-29 NOTE — PROGRESS NOTES
[facility]  Otorhinolaryngology-Head & Neck Surgery  Progress Note    Subjective:     Post-Op Info:  Procedure(s) (LRB):  Exam under anesthesia; (N/A)  LARYNGOSCOPY, flexible   1 Day Post-Op  Hospital Day: 8     Interval History: extubated in OR yesterday, no stridor. Phonating well.     Medications:  Continuous Infusions:   0.45 % NaCl with KCl 20 mEq 100 mL/hr at 05/29/19 0815    insulin (HUMAN R) infusion (adults)       Scheduled Meds:   calcium gluconate IVPB  1,000 mg Intravenous Q8H    dexamethasone  12 mg Intravenous Q8H    enoxaparin  40 mg Subcutaneous Daily    ketorolac  30 mg Intravenous Q6H     PRN Meds:dextrose 50%, dextrose 50%, hydrALAZINE, metoprolol, ondansetron, sodium chloride 0.9%     Review of patient's allergies indicates:   Allergen Reactions    Tizanidine Shortness Of Breath     Objective:     Vital Signs (24h Range):  Temp:  [97.8 °F (36.6 °C)-99.6 °F (37.6 °C)] 99.4 °F (37.4 °C)  Pulse:  [] 80  Resp:  [6-35] 24  SpO2:  [92 %-100 %] 100 %  BP: (100-222)/(7-103) 186/86       Lines/Drains/Airways     Drain                 Closed/Suction Drain 05/22/19 1741 Left Neck Bulb 10 Fr. 6 days         Urethral Catheter 05/22/19 2245 6 days          Peripheral Intravenous Line                 Peripheral IV - Single Lumen 05/28/19 1700 20 G Left Antecubital less than 1 day         Peripheral IV - Single Lumen 05/28/19 1700 20 G Left Forearm less than 1 day                Physical Exam   AAO, nAD  On 3L NC  No stridor, no stertor, no increased WOB  Neck incision c/d/i, dermabond in place  JPs in chest    Significant Labs:  None    Significant Diagnostics:  None    Assessment/Plan:     Vocal cord paralysis, bilateral partial  -defer swallowing to primary team, ok to attempt at bedside  -no evidence of stridor  -call with questions or concerns        Syeda Trujillo MD  Otorhinolaryngology-Head & Neck Surgery  [facility]

## 2019-05-29 NOTE — SUBJECTIVE & OBJECTIVE
Interval History/Significant Events: Extubated in OR yesterday. Coming down on supplemental O2 requirements, currently saturating well on 3 L NC. Endorsing some SOB. Bun/Cr 45/1.2 from 34/0.9 overnight, UOP adequate. H/H stable. AST and ALT have become elevated overnight. WBC 24.2 from 13 overnight, afebrile. CXR shows increasing edema on right and possible infiltrate on left. Minimal output from drain. Alert and oriented to person, place, and situation but not time.    Follow-up For: Procedure(s) (LRB):  PARATHYROIDECTOMY (N/A)  RE-EXPLORATION, FOR BLEEDING  5/22    Objective:     Vital Signs (Most Recent):  Temp: 99.4 °F (37.4 °C) (05/29/19 0700)  Pulse: 80 (05/29/19 0745)  Resp: (!) 24 (05/29/19 0745)  BP: (!) 186/86 (05/29/19 0700)  SpO2: 100 % (05/29/19 0745) Vital Signs (24h Range):  Temp:  [97.8 °F (36.6 °C)-99.6 °F (37.6 °C)] 99.4 °F (37.4 °C)  Pulse:  [] 80  Resp:  [6-35] 24  SpO2:  [92 %-100 %] 100 %  BP: (100-222)/(7-103) 186/86     Weight: 80 kg (176 lb 5.9 oz)  Body mass index is 25.31 kg/m².      Intake/Output Summary (Last 24 hours) at 5/29/2019 0819  Last data filed at 5/29/2019 0600  Gross per 24 hour   Intake 576.09 ml   Output 1279 ml   Net -702.91 ml       Physical Exam   Constitutional: He is oriented to person, place, and time. He appears well-developed and well-nourished. No distress.   HENT:   Head: Normocephalic and atraumatic.   On NC  Neck incision clean/dry/intact  Left chest BRICE drain neck draining seroserous   Eyes: No scleral icterus.   Cardiovascular: Normal rate and regular rhythm.   Pulmonary/Chest: Effort normal. No respiratory distress.   Abdominal: Soft. He exhibits no distension.   Neurological: He is alert and oriented to person, place, and time.   Skin: Skin is warm and dry.   Psychiatric: He has a normal mood and affect. His behavior is normal. Judgment and thought content normal.   Nursing note and vitals reviewed.      Vents:  Vent Mode: A/C (05/28/19 1241)  Ventilator  Initiated: Yes (05/22/19 2109)  Set Rate: 18 bmp (05/28/19 1241)  Vt Set: 450 mL (05/28/19 1241)  Pressure Support: 10 cmH20 (05/23/19 1803)  PEEP/CPAP: 5 cmH20 (05/28/19 1241)  Oxygen Concentration (%): 40 (05/28/19 1905)  Peak Airway Pressure: 23 cmH2O (05/28/19 1241)  Plateau Pressure: 17 cmH20 (05/28/19 1241)  Total Ve: 7.72 mL (05/28/19 1241)  F/VT Ratio<105 (RSBI): (!) 37.19 (05/28/19 1101)    Lines/Drains/Airways     Drain                 Closed/Suction Drain 05/22/19 1741 Left Neck Bulb 10 Fr. 6 days         Urethral Catheter 05/22/19 2245 6 days          Peripheral Intravenous Line                 Peripheral IV - Single Lumen 05/28/19 1700 20 G Left Antecubital less than 1 day         Peripheral IV - Single Lumen 05/28/19 1700 20 G Left Forearm less than 1 day                Significant Labs:    CBC/Anemia Profile:  Recent Labs   Lab 05/28/19  0325 05/29/19  0334   WBC 12.95* 24.16*   HGB 12.4* 12.4*   HCT 37.9* 36.9*    322   MCV 96 93   RDW 12.6 12.8        Chemistries:  Recent Labs   Lab 05/28/19  0325 05/29/19  0334    143   K 4.6 4.0    105   CO2 24 24   BUN 34* 45*   CREATININE 0.9 1.2   CALCIUM 10.3 9.7   ALBUMIN 2.8* 3.3*   PROT 6.1 6.4   BILITOT 0.1 0.4   ALKPHOS 63 62   ALT 16 53*   AST 13 127*   MG 1.9 1.9   PHOS 3.8 3.5       All pertinent labs within the past 24 hours have been reviewed.    Significant Imaging:  I have reviewed all pertinent imaging results/findings within the past 24 hours.

## 2019-05-29 NOTE — NURSING
SHIFT EVENTS: Sedation off, Extubated in OR    NEUROLOGICAL: Oriented to person and place, moves all extremities purposefully with great strength, agitated and attempting to get out of bed, restraints continued as ordered for safety, distracted and reoriented, following all commands    CARDIOVASCULAR: SR-ST without ectopy, NIBP WNL without pressure support, pulses palpable in all extremities    PULMONARY: 10L 40% face tent with adequate saturations    GENITOURINARY: adequate clear yellow urine output    GASTROINTESTINAL: +BS no BM this shift    NUTRITION/ENDOCRINE: NPO pending swallow evaluation, accuchecks decreased to Q2, off insulin infusion    SKIN/WOUNDS: anterior neck incision remains asymptomatic, no new pressure related breakdown    ACTIVITY: Bedrest maintained, patient changing position frequently in bed    SOCIAL SUPPORT: Sister and father at bedside, updated of condition, helpful in reorienting patient

## 2019-05-30 LAB
ALBUMIN SERPL BCP-MCNC: 3.1 G/DL (ref 3.5–5.2)
ALP SERPL-CCNC: 65 U/L (ref 55–135)
ALT SERPL W/O P-5'-P-CCNC: 53 U/L (ref 10–44)
ANION GAP SERPL CALC-SCNC: 11 MMOL/L (ref 8–16)
AST SERPL-CCNC: 61 U/L (ref 10–40)
BASOPHILS # BLD AUTO: 0.03 K/UL (ref 0–0.2)
BASOPHILS NFR BLD: 0.2 % (ref 0–1.9)
BILIRUB SERPL-MCNC: 0.6 MG/DL (ref 0.1–1)
BUN SERPL-MCNC: 46 MG/DL (ref 8–23)
CA-I BLDV-SCNC: 1.14 MMOL/L (ref 1.06–1.42)
CALCIUM SERPL-MCNC: 9.5 MG/DL (ref 8.7–10.5)
CHLORIDE SERPL-SCNC: 104 MMOL/L (ref 95–110)
CO2 SERPL-SCNC: 25 MMOL/L (ref 23–29)
CREAT SERPL-MCNC: 1.1 MG/DL (ref 0.5–1.4)
DIFFERENTIAL METHOD: ABNORMAL
EOSINOPHIL # BLD AUTO: 0 K/UL (ref 0–0.5)
EOSINOPHIL NFR BLD: 0 % (ref 0–8)
ERYTHROCYTE [DISTWIDTH] IN BLOOD BY AUTOMATED COUNT: 12.7 % (ref 11.5–14.5)
EST. GFR  (AFRICAN AMERICAN): >60 ML/MIN/1.73 M^2
EST. GFR  (NON AFRICAN AMERICAN): >60 ML/MIN/1.73 M^2
GLUCOSE SERPL-MCNC: 171 MG/DL (ref 70–110)
HCT VFR BLD AUTO: 37 % (ref 40–54)
HGB BLD-MCNC: 12.4 G/DL (ref 14–18)
IMM GRANULOCYTES # BLD AUTO: 0.12 K/UL (ref 0–0.04)
IMM GRANULOCYTES NFR BLD AUTO: 0.7 % (ref 0–0.5)
LYMPHOCYTES # BLD AUTO: 1.5 K/UL (ref 1–4.8)
LYMPHOCYTES NFR BLD: 9.5 % (ref 18–48)
MAGNESIUM SERPL-MCNC: 1.8 MG/DL (ref 1.6–2.6)
MCH RBC QN AUTO: 31.2 PG (ref 27–31)
MCHC RBC AUTO-ENTMCNC: 33.5 G/DL (ref 32–36)
MCV RBC AUTO: 93 FL (ref 82–98)
MONOCYTES # BLD AUTO: 0.8 K/UL (ref 0.3–1)
MONOCYTES NFR BLD: 4.8 % (ref 4–15)
NEUTROPHILS # BLD AUTO: 13.8 K/UL (ref 1.8–7.7)
NEUTROPHILS NFR BLD: 84.8 % (ref 38–73)
NRBC BLD-RTO: 0 /100 WBC
PHOSPHATE SERPL-MCNC: 3 MG/DL (ref 2.7–4.5)
PLATELET # BLD AUTO: 323 K/UL (ref 150–350)
PMV BLD AUTO: 10.3 FL (ref 9.2–12.9)
POCT GLUCOSE: 158 MG/DL (ref 70–110)
POCT GLUCOSE: 168 MG/DL (ref 70–110)
POCT GLUCOSE: 178 MG/DL (ref 70–110)
POCT GLUCOSE: 182 MG/DL (ref 70–110)
POCT GLUCOSE: 183 MG/DL (ref 70–110)
POCT GLUCOSE: 187 MG/DL (ref 70–110)
POCT GLUCOSE: 207 MG/DL (ref 70–110)
POTASSIUM SERPL-SCNC: 4.2 MMOL/L (ref 3.5–5.1)
PROT SERPL-MCNC: 6.1 G/DL (ref 6–8.4)
RBC # BLD AUTO: 3.98 M/UL (ref 4.6–6.2)
SODIUM SERPL-SCNC: 140 MMOL/L (ref 136–145)
WBC # BLD AUTO: 16.26 K/UL (ref 3.9–12.7)

## 2019-05-30 PROCEDURE — 97535 SELF CARE MNGMENT TRAINING: CPT

## 2019-05-30 PROCEDURE — 83735 ASSAY OF MAGNESIUM: CPT

## 2019-05-30 PROCEDURE — S0028 INJECTION, FAMOTIDINE, 20 MG: HCPCS | Performed by: STUDENT IN AN ORGANIZED HEALTH CARE EDUCATION/TRAINING PROGRAM

## 2019-05-30 PROCEDURE — 25000003 PHARM REV CODE 250: Performed by: STUDENT IN AN ORGANIZED HEALTH CARE EDUCATION/TRAINING PROGRAM

## 2019-05-30 PROCEDURE — 63600175 PHARM REV CODE 636 W HCPCS: Performed by: STUDENT IN AN ORGANIZED HEALTH CARE EDUCATION/TRAINING PROGRAM

## 2019-05-30 PROCEDURE — 25000003 PHARM REV CODE 250: Performed by: SURGERY

## 2019-05-30 PROCEDURE — 94761 N-INVAS EAR/PLS OXIMETRY MLT: CPT

## 2019-05-30 PROCEDURE — 82330 ASSAY OF CALCIUM: CPT

## 2019-05-30 PROCEDURE — 84100 ASSAY OF PHOSPHORUS: CPT

## 2019-05-30 PROCEDURE — 99232 PR SUBSEQUENT HOSPITAL CARE,LEVL II: ICD-10-PCS | Mod: GC,,, | Performed by: SURGERY

## 2019-05-30 PROCEDURE — 85025 COMPLETE CBC W/AUTO DIFF WBC: CPT

## 2019-05-30 PROCEDURE — 92526 ORAL FUNCTION THERAPY: CPT

## 2019-05-30 PROCEDURE — 97162 PT EVAL MOD COMPLEX 30 MIN: CPT

## 2019-05-30 PROCEDURE — 63600175 PHARM REV CODE 636 W HCPCS: Performed by: HOSPITALIST

## 2019-05-30 PROCEDURE — 99232 SBSQ HOSP IP/OBS MODERATE 35: CPT | Mod: GC,,, | Performed by: SURGERY

## 2019-05-30 PROCEDURE — 80053 COMPREHEN METABOLIC PANEL: CPT

## 2019-05-30 PROCEDURE — 27000221 HC OXYGEN, UP TO 24 HOURS

## 2019-05-30 PROCEDURE — 20000000 HC ICU ROOM

## 2019-05-30 RX ORDER — DEXAMETHASONE SODIUM PHOSPHATE 4 MG/ML
4 INJECTION, SOLUTION INTRA-ARTICULAR; INTRALESIONAL; INTRAMUSCULAR; INTRAVENOUS; SOFT TISSUE EVERY 8 HOURS
Status: DISCONTINUED | OUTPATIENT
Start: 2019-05-30 | End: 2019-06-01

## 2019-05-30 RX ORDER — AMLODIPINE BESYLATE 10 MG/1
10 TABLET ORAL DAILY
Status: DISCONTINUED | OUTPATIENT
Start: 2019-05-30 | End: 2019-06-03 | Stop reason: HOSPADM

## 2019-05-30 RX ORDER — IRBESARTAN 300 MG/1
300 TABLET ORAL DAILY
Status: DISCONTINUED | OUTPATIENT
Start: 2019-05-30 | End: 2019-06-03 | Stop reason: HOSPADM

## 2019-05-30 RX ORDER — MAGNESIUM SULFATE HEPTAHYDRATE 40 MG/ML
2 INJECTION, SOLUTION INTRAVENOUS ONCE
Status: COMPLETED | OUTPATIENT
Start: 2019-05-30 | End: 2019-05-30

## 2019-05-30 RX ORDER — HALOPERIDOL 5 MG/ML
2 INJECTION INTRAMUSCULAR NIGHTLY
Status: DISCONTINUED | OUTPATIENT
Start: 2019-05-30 | End: 2019-06-01

## 2019-05-30 RX ADMIN — DEXAMETHASONE SODIUM PHOSPHATE 4 MG: 4 INJECTION, SOLUTION INTRAMUSCULAR; INTRAVENOUS at 01:05

## 2019-05-30 RX ADMIN — CALCIUM GLUCONATE 1000 MG: 98 INJECTION, SOLUTION INTRAVENOUS at 01:05

## 2019-05-30 RX ADMIN — FAMOTIDINE 20 MG: 10 INJECTION INTRAVENOUS at 08:05

## 2019-05-30 RX ADMIN — INSULIN ASPART 2 UNITS: 100 INJECTION, SOLUTION INTRAVENOUS; SUBCUTANEOUS at 03:05

## 2019-05-30 RX ADMIN — CALCIUM GLUCONATE 1000 MG: 98 INJECTION, SOLUTION INTRAVENOUS at 05:05

## 2019-05-30 RX ADMIN — CALCIUM GLUCONATE 1000 MG: 98 INJECTION, SOLUTION INTRAVENOUS at 10:05

## 2019-05-30 RX ADMIN — HYDRALAZINE HYDROCHLORIDE 10 MG: 20 INJECTION INTRAMUSCULAR; INTRAVENOUS at 01:05

## 2019-05-30 RX ADMIN — IRBESARTAN 300 MG: 150 TABLET ORAL at 03:05

## 2019-05-30 RX ADMIN — HALOPERIDOL LACTATE 2 MG: 5 INJECTION, SOLUTION INTRAMUSCULAR at 08:05

## 2019-05-30 RX ADMIN — SODIUM CHLORIDE, SODIUM LACTATE, POTASSIUM CHLORIDE, AND CALCIUM CHLORIDE: .6; .31; .03; .02 INJECTION, SOLUTION INTRAVENOUS at 04:05

## 2019-05-30 RX ADMIN — DEXAMETHASONE SODIUM PHOSPHATE 4 MG: 4 INJECTION, SOLUTION INTRAMUSCULAR; INTRAVENOUS at 10:05

## 2019-05-30 RX ADMIN — MAGNESIUM SULFATE IN WATER 2 G: 40 INJECTION, SOLUTION INTRAVENOUS at 07:05

## 2019-05-30 RX ADMIN — FAMOTIDINE 20 MG: 10 INJECTION INTRAVENOUS at 09:05

## 2019-05-30 RX ADMIN — AMLODIPINE BESYLATE 10 MG: 10 TABLET ORAL at 03:05

## 2019-05-30 RX ADMIN — ENOXAPARIN SODIUM 40 MG: 100 INJECTION SUBCUTANEOUS at 04:05

## 2019-05-30 RX ADMIN — DEXAMETHASONE SODIUM PHOSPHATE 8 MG: 4 INJECTION, SOLUTION INTRAMUSCULAR; INTRAVENOUS at 05:05

## 2019-05-30 NOTE — PT/OT/SLP PROGRESS
"Speech Language Pathology Treatment    Patient Name:  Rob Jones Jr.   MRN:  [id number]  Admitting Diagnosis: Hyperparathyroidism    Recommendations:                 General Recommendations:  monitor diet tolerance. Team ordered MBSS. Scheduled for 5/31.    Diet recommendations:  Regular, Liquid Diet Level: Thin   Aspiration Precautions: 1 bite/sip at a time, Alternating bites/sips, Avoid talking while eating, Frequent oral care, HOB to 90 degrees, Meds whole 1 at a time (consider crushing/burying in puree if difficulty meds whole is noted), Monitor for s/s of aspiration, Small bites/sips, Avoid with mixed consistencies, and Strict aspiration precautions   General Precautions: Standard, aspiration, fall  Communication strategies:  go to room if call light pushed    Subjective     "ah, good." pt stated after PO trials of thin water.     Pain/Comfort:  Pain Rating 1: 0/10    Objective:     Has the patient been evaluated by SLP for swallowing?   Yes  Keep patient NPO? No   Current Respiratory Status: room air(SPO2 100%)      Pt seen sitting up in bedside chair this afternoon, awake/alert and in NAD.  Pt on room air with SPO2 100%.  Pt with dentures in place.  Pt observed with the following PO trials: ice chip in isolation x 1, bites of multiple ice chips mixed with water, 1/2 tsp x 1, full tsp x 1, cup sip x 2, straw sips x 5 (4oz) , 1/2 tsp puree x 1, full tsp puree x 4, and bites of a cracker x 4.  Pt only demonstrated throat clear x 1 after taking multiple ice chips mixed with thin water.  Education provided to pt regarding improved tolerance of PO, diet recommendations, safe swallowing strategies, and SLP POC.  SLP also explained that the team may want to proceed with original plan to undergo MBSS to fully r/o aspiration. Pt expressed understanding, but mild confusion persists, therefore, full understanding may be decreased.       Assessment:     Rob Jones Jr. is a 63 y.o. male with an SLP diagnosis of " mild Dysphagia (improving).      Goals:   Multidisciplinary Problems     SLP Goals        Problem: SLP Goal    Goal Priority Disciplines Outcome   SLP Goal     SLP Ongoing (interventions implemented as appropriate)   Description:  Speech Language Pathology Goals  Goals expected to be met by 6/5:  1. Pt will participate in ongoing swallowing assessment to determine safest PO diet.                         Plan:     · Patient to be seen:  4 x/week   · Plan of Care expires:  06/29/19  · Plan of Care reviewed with:  patient   · SLP Follow-Up:  Yes       Discharge recommendations:  (tbd)     Time Tracking:     SLP Treatment Date:   05/30/19  Speech Start Time:  1225  Speech Stop Time:  1242     Speech Total Time (min):  17 min    Billable Minutes: Treatment Swallowing Dysfunction 9 and Seld Care/Home Management Training 8    LALA Boyer, CCC-SLP  05/30/2019     LALA Boyer, CCC-SLP  Speech Language Pathologist  [phone]  5/30/2019

## 2019-05-30 NOTE — PLAN OF CARE
Problem: Physical Therapy Goal  Goal: Physical Therapy Goal  Goals to be met by: 19     Patient will increase functional independence with mobility by performin. Supine to sit with supervision  2. Sit to supine with Supervision  3. Sit to stand transfer with Supervision  4. Gait  x 200 feet with Stand-by Assistance using least restrictive assistive device ( RW vs SC).   5. Ascend/descend 4 stair with no Handrails Stand-by Assistance using HHA or SC.   6. Lower extremity exercise program x 20 reps per handout, with supervision to increase his endurance and improve balance with gait.    Outcome: Ongoing (interventions implemented as appropriate)  PT eval completed and POC intitiated.  PT goals set as above.   DME:  Possible RW or SC   D/C:  HHPT

## 2019-05-30 NOTE — PROGRESS NOTES
SLP recommending initiating regular consistency diet with thin liquids.  Noted order for MBSS which radiology scheduled for 5/31.  If team no longer wishes for MBSS to be performed, please cancel orders.  However, if team wishes to fully r/o aspiration, will perform MBSS 5/31. Formal note to follow. Thank you.  LALA Boyer, CCC-SLP  Speech Language Pathologist  [phone]  5/30/2019

## 2019-05-30 NOTE — PLAN OF CARE
Problem: Adult Inpatient Plan of Care  Goal: Plan of Care Review  63yoM Hx: Graves Dz, total thyroidectomy, DM, HTN, Hyperparathyroid    5/22 Parathyroidectomy - PACU extubated - emergent reintubation for stridor  5/26 72h steroid treatment completed  5/28 OR extubation. Insulin gtt stopped    Nursing:  -daily labs      Outcome: Ongoing (interventions implemented as appropriate)  Pt VVS overnight. Gtts: MIVF. Pt follows commands and moves all extremities purposefully. 200cc measured UOP, 1 large urine occurrence on the pad also. Pt did not sleep overnight, and had brief periods of confusion. Pt assisted in turns Q2hrs. Pt free of falls and skin breakdown at this time. Plan of care reviewed with pt and family.

## 2019-05-30 NOTE — PLAN OF CARE
Problem: SLP Goal  Goal: SLP Goal  Speech Language Pathology Goals  Goals expected to be met by 6/5:  1. Pt will participate in ongoing swallowing assessment to determine safest PO diet.        Outcome: Ongoing (interventions implemented as appropriate)  Pt appears safe to initiate regular consistency diet and thin liquids.  Team ordered MBSS. May still want MBSS to fully r/o aspiration.   LALA Boyer, CCC-SLP  Speech Language Pathologist  [phone]  5/30/2019

## 2019-05-30 NOTE — SUBJECTIVE & OBJECTIVE
Interval History/Significant Events: Alert and oriented to person, place, and time. But still a bit confused upon further questioning. Reoriented easily. Sitting up in chair this morning. Weaning steroids. WBC downtrending. Hypertensive, will start home BP meds.     Follow-up For: Procedure(s) (LRB):  PARATHYROIDECTOMY (N/A)  RE-EXPLORATION, FOR BLEEDING  5/22    Objective:     Vital Signs (Most Recent):  Temp: 97.7 °F (36.5 °C) (05/30/19 0700)  Pulse: 61 (05/30/19 1245)  Resp: 16 (05/30/19 1245)  BP: (!) 177/81 (05/30/19 1230)  SpO2: 100 % (05/30/19 1245) Vital Signs (24h Range):  Temp:  [97.7 °F (36.5 °C)-98 °F (36.7 °C)] 97.7 °F (36.5 °C)  Pulse:  [49-89] 61  Resp:  [5-38] 16  SpO2:  [77 %-100 %] 100 %  BP: (137-192)/() 177/81     Weight: 73.7 kg (162 lb 7.7 oz)  Body mass index is 23.31 kg/m².      Intake/Output Summary (Last 24 hours) at 5/30/2019 1308  Last data filed at 5/30/2019 1200  Gross per 24 hour   Intake 2450 ml   Output 700 ml   Net 1750 ml       Physical Exam   Constitutional: He is oriented to person, place, and time. He appears well-developed and well-nourished. No distress.   HENT:   Head: Normocephalic and atraumatic.   On NC  Neck incision clean/dry/intact  Left chest BRICE drain neck draining seroserous   Eyes: No scleral icterus.   Cardiovascular: Normal rate and regular rhythm.   Pulmonary/Chest: Effort normal. No respiratory distress.   Abdominal: Soft. He exhibits no distension.   Neurological: He is alert and oriented to person, place, and time.   Skin: Skin is warm and dry.   Psychiatric: He has a normal mood and affect. His behavior is normal. Judgment and thought content normal.   Nursing note and vitals reviewed.      Lines/Drains/Airways     Peripheral Intravenous Line                 Peripheral IV - Single Lumen 05/28/19 1700 20 G Left Antecubital 1 day         Peripheral IV - Single Lumen 05/28/19 1700 20 G Left Forearm 1 day                Significant Labs:    CBC/Anemia  Profile:  Recent Labs   Lab 05/29/19  0334 05/30/19  0400   WBC 24.16* 16.26*   HGB 12.4* 12.4*   HCT 36.9* 37.0*    323   MCV 93 93   RDW 12.8 12.7        Chemistries:  Recent Labs   Lab 05/29/19  0334 05/30/19  0400    140   K 4.0 4.2    104   CO2 24 25   BUN 45* 46*   CREATININE 1.2 1.1   CALCIUM 9.7 9.5   ALBUMIN 3.3* 3.1*   PROT 6.4 6.1   BILITOT 0.4 0.6   ALKPHOS 62 65   ALT 53* 53*   * 61*   MG 1.9 1.8   PHOS 3.5 3.0       All pertinent labs within the past 24 hours have been reviewed.    Significant Imaging:  I have reviewed all pertinent imaging results/findings within the past 24 hours.

## 2019-05-30 NOTE — ASSESSMENT & PLAN NOTE
63 y.o. male w/ a significant PMHx of primary hyperparathyroidism s/p parathyroidectomy on 5/22 c/b bilateral VC immobility ultimately requiring reintubation in the PACU. Extubated in or 5/28, tolerated well.      Neuro:   - Off sedation   - Avoid narcotics  - Toradol     Pulmonary:   - Extubated in OR 5/28  - Wean supplemental O2 as tolerated  - ABG PRN  - Dexamethasone 4 mg IV q8h - continue to wean per primary     Cardiac:   - Holding PO BP meds until passes swallow study  - barium swallow study ordered for 5/31  - restarted home amlodipine and ibersartan        Renal:    - BUN/Cr 46/1.1  - UOP adequate  - Monitor UOP     ID:   - AF  - WBC 16 from 26 overnight  - Monitor WBC  - wean steroids per primary      Hem/Onc:   - H/H stable   - Monitor CBC  - Transfuse as needed     Endocrine:    - Hx of DM  - Accuchecks  - Endocrine following, appreciate recs  - Synthroid per primary      Fluids/Electrolytes/Nutrition/GI:   - Replace electrolytes PRN  - Calcium gluconate 1 g IV q8h  - [facility] 5/31  - diet: reg consistency with thin liquids per SLP   - MIVF dc once can tolerate PO   - Trend LFTs     DISPO:  - stepdown with sitter

## 2019-05-30 NOTE — PLAN OF CARE
Problem: Adult Inpatient Plan of Care  Goal: Plan of Care Review  63yoM Hx: Graves Dz, total thyroidectomy, DM, HTN, Hyperparathyroid    5/22 Parathyroidectomy - PACU extubated - emergent reintubation for stridor  5/26 72h steroid treatment completed  5/28 OR extubation. Insulin gtt stopped  5/29: BRICE drain removed.  OOBTC.  5/30: transfer orders placed    Nursing:  -daily labs      Outcome: Ongoing (interventions implemented as appropriate)  Patient AAOx4 with intermittent episodes of confusion.  VSS.  Has remained afebrile this shift.  Respiratory status maintained on room air.  POC reviewed with patient.  Open discussion was facilitated.  Patient verbalized understanding.  Bed in lowest position, side rails up x2, call light within reach, and safety measures maintained throughout shift.  Patient has remained free of falls, trauma, and injury this shift.  Patient denies any needs at this time.  Will continue to monitor.

## 2019-05-30 NOTE — PT/OT/SLP EVAL
Physical Therapy Evaluation    Patient Name:  Rob Jones Jr.   MRN:  [id number]    Recommendations:     Discharge Recommendations:  home health PT   Discharge Equipment Recommendations:     Barriers to discharge: lives alone but his dtr will assist during the day    Assessment:     Rob Jones Jr. is a 63 y.o. male admitted with a medical diagnosis of Hyperparathyroidism.  He presents with the following impairments/functional limitations:  impaired endurance, impaired functional mobilty, impaired self care skills, gait instability, impaired balance, impaired cardiopulmonary response to activity, decreased safety awareness  Pt with prolonged intubation after being re-intubated in PACU.  Pt presents with significant deconditioning and mild deficits in (I) with mobility.  He is a fall risk at this time and should not be getting up without assistance.  Pt is motivated and participates well with PT, expect pt to progress well with PT.     Rehab Prognosis: Good; patient would benefit from acute skilled PT services to address these deficits and reach maximum level of function.    Recent Surgery: Procedure(s) (LRB):  Exam under anesthesia; (N/A)  LARYNGOSCOPY, flexible 2 Days Post-Op    Plan:     During this hospitalization, patient to be seen 4 x/week to address the identified rehab impairments via gait training, therapeutic activities, therapeutic exercises, neuromuscular re-education and progress toward the following goals:    · Plan of Care Expires:  06/29/19    Subjective     Chief Complaint: he keeps needing to suction his secretions  Patient/Family Comments/goals: to go home  Pain/Comfort:  · Pain Rating 1: 5/10  · Location - Orientation 1: anterior  · Location 1: neck  · Pain Addressed 1: Pre-medicate for activity  · Pain Rating Post-Intervention 1: 5/10    Patients cultural, spiritual, Sikhism conflicts given the current situation: no    Living Environment:  Pt lives alone in 1 level home with 4 steps to  enter, no rails.  Pt's dtr can come assist during the day but pt will be home alone at times.  Prior to admission, patients level of function was (I) with gait and ADL's.  Equipment used at home: none.  DME owned (not currently used): none.  Upon discharge, patient will have assistance from his dtr during the day.    Objective:     Communicated with Katlin garcia,  prior to session.  Patient found supine with blood pressure cuff, central line, oxygen, peripheral IV, pulse ox (continuous), telemetry  upon PT entry to room.    General Precautions: Standard, aspiration, fall   Orthopedic Precautions:N/A   Braces: N/A     Exams:  · Cognitive Exam:  Patient is oriented to Person, Place, Time and Situation  · Fine Motor Coordination:    · -       Impaired  RLE heel shin mild difficulty noted and LLE heel shin mild difficulty noted  · Gross Motor Coordination:  WFL  · Postural Exam:  Patient presented with the following abnormalities:    · -       Rounded shoulders  · -       Forward head  · Sensation:    · -       Intact  · RLE ROM: WFL  · RLE Strength: WFL  · LLE ROM: WFL  · LLE Strength: WFL    Functional Mobility:  · Bed Mobility:     · Rolling Right: contact guard assistance  · Scooting: contact guard assistance  · Supine to Sit: contact guard assistance  · Transfers:     · Sit to Stand:  contact guard assistance with no AD  · Gait: Pt amb 10' with HHA and CGA for balance with noted unsteadiness but no LOB.  Decreased step lenght, krystina and heel strike noted.  · Balance: sitting static:  SBA  dynamic: CGA  stand static: CGA  dynamic: CGA      Therapeutic Activities and Exercises:   PT ed pt on PT POC, importance of mobility, safety awareness, proper breathing techniques and he verbalized /demonstrated good understanding back to PT.  Pt was able to esau sitting EOB x 10 min with SBA .  Pt's VSS during eval.  White board updated in pt's room.     AM-PAC 6 CLICK MOBILITY  Total Score:17     Patient left up in chair with  all lines intact, call button in reach, nsg, Laura (covering for Katlin),  notified and visitor present.    GOALS:   Multidisciplinary Problems     Physical Therapy Goals        Problem: Physical Therapy Goal    Goal Priority Disciplines Outcome Goal Variances Interventions   Physical Therapy Goal     PT, PT/OT Ongoing (interventions implemented as appropriate)     Description:  Goals to be met by: 19     Patient will increase functional independence with mobility by performin. Supine to sit with supervision  2. Sit to supine with Supervision  3. Sit to stand transfer with Supervision  4. Gait  x 200 feet with Stand-by Assistance using least restrictive assistive device ( RW vs SC).   5. Ascend/descend 4 stair with no Handrails Stand-by Assistance using HHA or SC.   6. Lower extremity exercise program x 20 reps per handout, with supervision to increase his endurance and improve balance with gait.                      History:     Past Medical History:   Diagnosis Date    Anxiety     Back pain     Cataract     Diabetes mellitus     History of hepatitis C; S/p RX with SVR 12 documented 2017    Hypertension     Postoperative hypothyroidism 11/3/2016    Primary hyperparathyroidism 2017    Thyroid disease        Past Surgical History:   Procedure Laterality Date    BLOCK SELECTIVE NERVE ROOT TRANSFORAMINAL LUMBAR Left 2017    [facility]    Exam under anesthesia; N/A 2019    Performed by [facility] at [facility] OR 2ND FLR    INJECTION-STEROID-EPIDURAL-CERVICAL N/A 2018    [facility]    INJECTION-STEROID-EPIDURAL-TRANSFORAMINAL Left 2017    [facility]    LARYNGOSCOPY, flexible  2019    Performed by [facility] at [facility] OR 2ND FLR    LUMBAR FUSION      PARATHYROIDECTOMY N/A 2019    [facility] Mounika  [facility]    RE-EXPLORATION, FOR BLEEDING  5/22/2019    Performed by [facility] at [facility] OR 2ND FLR    THYROIDECTOMY      TONSILLECTOMY         Time Tracking:     PT Received On: 05/30/19  PT Start Time: 0920     PT Stop Time: 0944  PT Total Time (min): 24 min     Billable Minutes: Evaluation 24      Migdalia Santiago, PT  05/30/2019

## 2019-05-30 NOTE — SUBJECTIVE & OBJECTIVE
Interval History: Stale overnight.  Didn't sleep again.      Medications:  Continuous Infusions:   lactated ringers 100 mL/hr at 05/30/19 1100     Scheduled Meds:   calcium gluconate IVPB  1,000 mg Intravenous Q8H    dexamethasone  4 mg Intravenous Q8H    enoxaparin  40 mg Subcutaneous Daily    famotidine (PF)  20 mg Intravenous BID     PRN Meds:dextrose 50%, glucagon (human recombinant), hydrALAZINE, insulin aspart U-100, metoprolol, ondansetron, sodium chloride 0.9%     Review of patient's allergies indicates:   Allergen Reactions    Tizanidine Shortness Of Breath     Objective:     Vital Signs (Most Recent):  Temp: 97.7 °F (36.5 °C) (05/30/19 0700)  Pulse: (!) 54 (05/30/19 1115)  Resp: 10 (05/30/19 1115)  BP: (!) 155/119 (05/30/19 1100)  SpO2: 100 % (05/30/19 1115) Vital Signs (24h Range):  Temp:  [97.7 °F (36.5 °C)-98 °F (36.7 °C)] 97.7 °F (36.5 °C)  Pulse:  [49-89] 54  Resp:  [5-38] 10  SpO2:  [77 %-100 %] 100 %  BP: (137-199)/() 155/119     Weight: 73.7 kg (162 lb 7.7 oz)  Body mass index is 23.31 kg/m².    Intake/Output - Last 3 Shifts       05/28 0700 - 05/29 0659 05/29 0700 - 05/30 0659 05/30 0700 - 05/31 0659    I.V. (mL/kg) 651.9 (8.1) 2150 (29.2)     NG/GT       IV Piggyback  300     Total Intake(mL/kg) 651.9 (8.1) 2450 (33.2)     Urine (mL/kg/hr) 1115 (0.6) 495 (0.3) 300 (0.9)    Drains 317 0     Total Output 1432 495 300    Net -780.1 +1955 -300           Urine Occurrence  1 x           Physical Exam  NAD  Neck is soft.  Drain is out    Significant Labs:  CBC:   Recent Labs   Lab 05/30/19  0400   WBC 16.26*   RBC 3.98*   HGB 12.4*   HCT 37.0*      MCV 93   MCH 31.2*   MCHC 33.5     CMP:   Recent Labs   Lab 05/30/19  0400   *   CALCIUM 9.5   ALBUMIN 3.1*   PROT 6.1      K 4.2   CO2 25      BUN 46*   CREATININE 1.1   ALKPHOS 65   ALT 53*   AST 61*   BILITOT 0.6

## 2019-05-30 NOTE — PROGRESS NOTES
[facility]  General Surgery  Progress Note    Subjective:     History of Present Illness:  No notes on file    Post-Op Info:  Procedure(s) (LRB):  Exam under anesthesia; (N/A)  LARYNGOSCOPY, flexible   2 Days Post-Op     Interval History: Stale overnight.  Didn't sleep again.      Medications:  Continuous Infusions:   lactated ringers 100 mL/hr at 05/30/19 1100     Scheduled Meds:   calcium gluconate IVPB  1,000 mg Intravenous Q8H    dexamethasone  4 mg Intravenous Q8H    enoxaparin  40 mg Subcutaneous Daily    famotidine (PF)  20 mg Intravenous BID     PRN Meds:dextrose 50%, glucagon (human recombinant), hydrALAZINE, insulin aspart U-100, metoprolol, ondansetron, sodium chloride 0.9%     Review of patient's allergies indicates:   Allergen Reactions    Tizanidine Shortness Of Breath     Objective:     Vital Signs (Most Recent):  Temp: 97.7 °F (36.5 °C) (05/30/19 0700)  Pulse: (!) 54 (05/30/19 1115)  Resp: 10 (05/30/19 1115)  BP: (!) 155/119 (05/30/19 1100)  SpO2: 100 % (05/30/19 1115) Vital Signs (24h Range):  Temp:  [97.7 °F (36.5 °C)-98 °F (36.7 °C)] 97.7 °F (36.5 °C)  Pulse:  [49-89] 54  Resp:  [5-38] 10  SpO2:  [77 %-100 %] 100 %  BP: (137-199)/() 155/119     Weight: 73.7 kg (162 lb 7.7 oz)  Body mass index is 23.31 kg/m².    Intake/Output - Last 3 Shifts       05/28 0700 - 05/29 0659 05/29 0700 - 05/30 0659 05/30 0700 - 05/31 0659    I.V. (mL/kg) 651.9 (8.1) 2150 (29.2)     NG/GT       IV Piggyback  300     Total Intake(mL/kg) 651.9 (8.1) 2450 (33.2)     Urine (mL/kg/hr) 1115 (0.6) 495 (0.3) 300 (0.9)    Drains 317 0     Total Output 1432 495 300    Net -780.1 +1955 -300           Urine Occurrence  1 x           Physical Exam  NAD  Neck is soft.  Drain is out    Significant Labs:  CBC:   Recent Labs   Lab 05/30/19  0400   WBC 16.26*   RBC 3.98*   HGB 12.4*   HCT 37.0*      MCV 93   MCH 31.2*   MCHC 33.5     CMP:   Recent Labs   Lab 05/30/19  0400   *   CALCIUM 9.5    ALBUMIN 3.1*   PROT 6.1      K 4.2   CO2 25      BUN 46*   CREATININE 1.1   ALKPHOS 65   ALT 53*   AST 61*   BILITOT 0.6     Assessment/Plan:     * Hyperparathyroidism  Post op B/L neck exploration for parathyroid adenom.  Very difficult dissection.  Complicated by vocal chord dysfunction, likely from stretch injury as nerve appeared intact on intraop NIMS monitoring and was grossly intact.  Pt intuabted in PACU for increasing stridor.     Extubated 5/28     ICU delirium precautions, awake during the day, dark at night.  Pt received ativan x1 for possible withdrawal.  No improvement in confusion.  More likely multifactorial related to prolonged sedation on high doses of multiple sedatives and long surgery.  Will schedule haldol for tonight   Very sparing ice chips for comfort.  Swallow study today  Follow UOP, pt on flomax at home.  Voided after gee removed yesterday  Trend labs, continue Ca supplementation  OOB / walking as tolerated  Tapering Dexamethasone, decreased to 4mg  IVF while NPO    Dispo: Ok to step down with sitter        Mo Sol MD  General Surgery  [facility]

## 2019-05-30 NOTE — ASSESSMENT & PLAN NOTE
Post op B/L neck exploration for parathyroid adenom.  Very difficult dissection.  Complicated by vocal chord dysfunction, likely from stretch injury as nerve appeared intact on intraop NIMS monitoring and was grossly intact.  Pt intuabted in PACU for increasing stridor.     Extubated 5/28     ICU delirium precautions, awake during the day, dark at night.  Pt received ativan x1 for possible withdrawal.  No improvement in confusion.  More likely multifactorial related to prolonged sedation on high doses of multiple sedatives and long surgery.  Will schedule haldol for tonight   Very sparing ice chips for comfort.  Swallow study today  Follow UOP, pt on flomax at home.  Voided after gee removed yesterday  Trend labs, continue Ca supplementation  OOB / walking as tolerated  Tapering Dexamethasone, decreased to 4mg  IVF while NPO    Dispo: Ok to step down with sitter

## 2019-05-30 NOTE — PROGRESS NOTES
[facility]  Critical Care - Surgery  Progress Note    Patient Name: Rob Jones Jr.  MRN: [id number]  Admission Date: 5/22/2019  Hospital Length of Stay: 7 days  Code Status: No Order  Attending Provider: Mounika Carmona MD  Primary Care Provider: Vasyl Jimenez MD   Principal Problem: Hyperparathyroidism    Subjective:     Interval History/Significant Events: Alert and oriented to person, place, and time. But still a bit confused upon further questioning. Reoriented easily. Sitting up in chair this morning. Weaning steroids. WBC downtrending. Hypertensive, will start home BP meds.     Follow-up For: Procedure(s) (LRB):  PARATHYROIDECTOMY (N/A)  RE-EXPLORATION, FOR BLEEDING  5/22    Objective:     Vital Signs (Most Recent):  Temp: 97.7 °F (36.5 °C) (05/30/19 0700)  Pulse: 61 (05/30/19 1245)  Resp: 16 (05/30/19 1245)  BP: (!) 177/81 (05/30/19 1230)  SpO2: 100 % (05/30/19 1245) Vital Signs (24h Range):  Temp:  [97.7 °F (36.5 °C)-98 °F (36.7 °C)] 97.7 °F (36.5 °C)  Pulse:  [49-89] 61  Resp:  [5-38] 16  SpO2:  [77 %-100 %] 100 %  BP: (137-192)/() 177/81     Weight: 73.7 kg (162 lb 7.7 oz)  Body mass index is 23.31 kg/m².      Intake/Output Summary (Last 24 hours) at 5/30/2019 1308  Last data filed at 5/30/2019 1200  Gross per 24 hour   Intake 2450 ml   Output 700 ml   Net 1750 ml       Physical Exam   Constitutional: He is oriented to person, place, and time. He appears well-developed and well-nourished. No distress.   HENT:   Head: Normocephalic and atraumatic.   On NC  Neck incision clean/dry/intact  Left chest BRICE drain neck draining seroserous   Eyes: No scleral icterus.   Cardiovascular: Normal rate and regular rhythm.   Pulmonary/Chest: Effort normal. No respiratory distress.   Abdominal: Soft. He exhibits no distension.   Neurological: He is alert and oriented to person, place, and time.   Skin: Skin is warm and dry.   Psychiatric: He has a normal mood and affect. His behavior  is normal. Judgment and thought content normal.   Nursing note and vitals reviewed.      Lines/Drains/Airways     Peripheral Intravenous Line                 Peripheral IV - Single Lumen 05/28/19 1700 20 G Left Antecubital 1 day         Peripheral IV - Single Lumen 05/28/19 1700 20 G Left Forearm 1 day                Significant Labs:    CBC/Anemia Profile:  Recent Labs   Lab 05/29/19  0334 05/30/19  0400   WBC 24.16* 16.26*   HGB 12.4* 12.4*   HCT 36.9* 37.0*    323   MCV 93 93   RDW 12.8 12.7        Chemistries:  Recent Labs   Lab 05/29/19  0334 05/30/19  0400    140   K 4.0 4.2    104   CO2 24 25   BUN 45* 46*   CREATININE 1.2 1.1   CALCIUM 9.7 9.5   ALBUMIN 3.3* 3.1*   PROT 6.4 6.1   BILITOT 0.4 0.6   ALKPHOS 62 65   ALT 53* 53*   * 61*   MG 1.9 1.8   PHOS 3.5 3.0       All pertinent labs within the past 24 hours have been reviewed.    Significant Imaging:  I have reviewed all pertinent imaging results/findings within the past 24 hours.    Assessment/Plan:     * Hyperparathyroidism  63 y.o. male w/ a significant PMHx of primary hyperparathyroidism s/p parathyroidectomy on 5/22 c/b bilateral VC immobility ultimately requiring reintubation in the PACU. Extubated in or 5/28, tolerated well.      Neuro:   - Off sedation   - Avoid narcotics  - Toradol     Pulmonary:   - Extubated in OR 5/28  - Wean supplemental O2 as tolerated  - ABG PRN  - Dexamethasone 4 mg IV q8h - continue to wean per primary     Cardiac:   - Holding PO BP meds until passes swallow study  - barium swallow study ordered for 5/31  - restarted home amlodipine and ibersartan        Renal:    - BUN/Cr 46/1.1  - UOP adequate  - Monitor UOP     ID:   - AF  - WBC 16 from 26 overnight  - Monitor WBC  - wean steroids per primary      Hem/Onc:   - H/H stable   - Monitor CBC  - Transfuse as needed     Endocrine:    - Hx of DM  - Accuchecks  - Endocrine following, appreciate recs  - Synthroid per primary       Fluids/Electrolytes/Nutrition/GI:   - Replace electrolytes PRN  - Calcium gluconate 1 g IV q8h  - [facility] [place] [place] 5/31  - diet: reg consistency with thin liquids per SLP   - MIVF dc once can tolerate PO   - Trend LFTs     DISPO:  - stepdown with sitter        Critical care was time spent personally by me on the following activities: development of treatment plan with patient or surrogate and bedside caregivers, discussions with consultants, evaluation of patient's response to treatment, examination of patient, ordering and performing treatments and interventions, ordering and review of laboratory studies, ordering and review of radiographic studies, pulse oximetry, re-evaluation of patient's condition.  This critical care time did not overlap with that of any other provider or involve time for any procedures.     Di Young MD  Critical Care - Surgery  [facility]

## 2019-05-30 NOTE — PLAN OF CARE
Problem: Adult Inpatient Plan of Care  Goal: Patient-Specific Goal (Individualization)  Outcome: Ongoing (interventions implemented as appropriate)  Patient AAOx, disoriented to time but reorients easily.  VSS.  Has remained afebrile this shift.  Respiratory status maintained on 3L NC.  POC reviewed with patient.  Open discussion was facilitated.  Patient verbalized understanding.  Bed in lowest position, side rails up x2, call light within reach, and safety measures maintained throughout shift.  Patient denies any needs at this time.  Will continue to monitor.

## 2019-05-31 LAB
ALBUMIN SERPL BCP-MCNC: 3.1 G/DL (ref 3.5–5.2)
ALP SERPL-CCNC: 65 U/L (ref 55–135)
ALT SERPL W/O P-5'-P-CCNC: 45 U/L (ref 10–44)
ANION GAP SERPL CALC-SCNC: 12 MMOL/L (ref 8–16)
AST SERPL-CCNC: 36 U/L (ref 10–40)
BASOPHILS # BLD AUTO: 0.02 K/UL (ref 0–0.2)
BASOPHILS NFR BLD: 0.2 % (ref 0–1.9)
BILIRUB SERPL-MCNC: 0.7 MG/DL (ref 0.1–1)
BUN SERPL-MCNC: 27 MG/DL (ref 8–23)
CA-I BLDV-SCNC: 1.13 MMOL/L (ref 1.06–1.42)
CALCIUM SERPL-MCNC: 9.4 MG/DL (ref 8.7–10.5)
CHLORIDE SERPL-SCNC: 101 MMOL/L (ref 95–110)
CO2 SERPL-SCNC: 26 MMOL/L (ref 23–29)
CREAT SERPL-MCNC: 0.9 MG/DL (ref 0.5–1.4)
DIFFERENTIAL METHOD: ABNORMAL
EOSINOPHIL # BLD AUTO: 0.1 K/UL (ref 0–0.5)
EOSINOPHIL NFR BLD: 0.8 % (ref 0–8)
ERYTHROCYTE [DISTWIDTH] IN BLOOD BY AUTOMATED COUNT: 12.3 % (ref 11.5–14.5)
EST. GFR  (AFRICAN AMERICAN): >60 ML/MIN/1.73 M^2
EST. GFR  (NON AFRICAN AMERICAN): >60 ML/MIN/1.73 M^2
GLUCOSE SERPL-MCNC: 126 MG/DL (ref 70–110)
HCT VFR BLD AUTO: 41.4 % (ref 40–54)
HGB BLD-MCNC: 13.6 G/DL (ref 14–18)
IMM GRANULOCYTES # BLD AUTO: 0.1 K/UL (ref 0–0.04)
IMM GRANULOCYTES NFR BLD AUTO: 0.8 % (ref 0–0.5)
LYMPHOCYTES # BLD AUTO: 3.1 K/UL (ref 1–4.8)
LYMPHOCYTES NFR BLD: 23.7 % (ref 18–48)
MAGNESIUM SERPL-MCNC: 1.6 MG/DL (ref 1.6–2.6)
MCH RBC QN AUTO: 30.5 PG (ref 27–31)
MCHC RBC AUTO-ENTMCNC: 32.9 G/DL (ref 32–36)
MCV RBC AUTO: 93 FL (ref 82–98)
MONOCYTES # BLD AUTO: 0.7 K/UL (ref 0.3–1)
MONOCYTES NFR BLD: 5.6 % (ref 4–15)
NEUTROPHILS # BLD AUTO: 9.1 K/UL (ref 1.8–7.7)
NEUTROPHILS NFR BLD: 68.9 % (ref 38–73)
NRBC BLD-RTO: 0 /100 WBC
PHOSPHATE SERPL-MCNC: 1.8 MG/DL (ref 2.7–4.5)
PLATELET # BLD AUTO: 305 K/UL (ref 150–350)
PMV BLD AUTO: 9.8 FL (ref 9.2–12.9)
POCT GLUCOSE: 129 MG/DL (ref 70–110)
POCT GLUCOSE: 142 MG/DL (ref 70–110)
POCT GLUCOSE: 168 MG/DL (ref 70–110)
POCT GLUCOSE: 170 MG/DL (ref 70–110)
POCT GLUCOSE: 170 MG/DL (ref 70–110)
POCT GLUCOSE: 244 MG/DL (ref 70–110)
POTASSIUM SERPL-SCNC: 4 MMOL/L (ref 3.5–5.1)
PROT SERPL-MCNC: 6.5 G/DL (ref 6–8.4)
RBC # BLD AUTO: 4.46 M/UL (ref 4.6–6.2)
SODIUM SERPL-SCNC: 139 MMOL/L (ref 136–145)
WBC # BLD AUTO: 13.14 K/UL (ref 3.9–12.7)

## 2019-05-31 PROCEDURE — 63600175 PHARM REV CODE 636 W HCPCS: Performed by: STUDENT IN AN ORGANIZED HEALTH CARE EDUCATION/TRAINING PROGRAM

## 2019-05-31 PROCEDURE — 83735 ASSAY OF MAGNESIUM: CPT

## 2019-05-31 PROCEDURE — 84100 ASSAY OF PHOSPHORUS: CPT

## 2019-05-31 PROCEDURE — 25000003 PHARM REV CODE 250: Performed by: STUDENT IN AN ORGANIZED HEALTH CARE EDUCATION/TRAINING PROGRAM

## 2019-05-31 PROCEDURE — 99231 PR SUBSEQUENT HOSPITAL CARE,LEVL I: ICD-10-PCS | Mod: GC,,, | Performed by: SURGERY

## 2019-05-31 PROCEDURE — 85025 COMPLETE CBC W/AUTO DIFF WBC: CPT

## 2019-05-31 PROCEDURE — 25000003 PHARM REV CODE 250: Performed by: SURGERY

## 2019-05-31 PROCEDURE — 82330 ASSAY OF CALCIUM: CPT

## 2019-05-31 PROCEDURE — 92611 MOTION FLUOROSCOPY/SWALLOW: CPT

## 2019-05-31 PROCEDURE — 20600001 HC STEP DOWN PRIVATE ROOM

## 2019-05-31 PROCEDURE — S0028 INJECTION, FAMOTIDINE, 20 MG: HCPCS | Performed by: STUDENT IN AN ORGANIZED HEALTH CARE EDUCATION/TRAINING PROGRAM

## 2019-05-31 PROCEDURE — 80053 COMPREHEN METABOLIC PANEL: CPT

## 2019-05-31 PROCEDURE — 94761 N-INVAS EAR/PLS OXIMETRY MLT: CPT

## 2019-05-31 PROCEDURE — 99231 SBSQ HOSP IP/OBS SF/LOW 25: CPT | Mod: GC,,, | Performed by: SURGERY

## 2019-05-31 PROCEDURE — 97535 SELF CARE MNGMENT TRAINING: CPT

## 2019-05-31 RX ORDER — MAGNESIUM SULFATE HEPTAHYDRATE 40 MG/ML
2 INJECTION, SOLUTION INTRAVENOUS ONCE
Status: COMPLETED | OUTPATIENT
Start: 2019-05-31 | End: 2019-05-31

## 2019-05-31 RX ADMIN — CALCIUM GLUCONATE 1000 MG: 98 INJECTION, SOLUTION INTRAVENOUS at 04:05

## 2019-05-31 RX ADMIN — INSULIN ASPART 2 UNITS: 100 INJECTION, SOLUTION INTRAVENOUS; SUBCUTANEOUS at 12:05

## 2019-05-31 RX ADMIN — SODIUM PHOSPHATE, MONOBASIC, MONOHYDRATE 39.99 MMOL: 276; 142 INJECTION, SOLUTION INTRAVENOUS at 07:05

## 2019-05-31 RX ADMIN — FAMOTIDINE 20 MG: 10 INJECTION INTRAVENOUS at 12:05

## 2019-05-31 RX ADMIN — FAMOTIDINE 20 MG: 10 INJECTION INTRAVENOUS at 09:05

## 2019-05-31 RX ADMIN — CALCIUM GLUCONATE 1000 MG: 98 INJECTION, SOLUTION INTRAVENOUS at 09:05

## 2019-05-31 RX ADMIN — SODIUM CHLORIDE, SODIUM LACTATE, POTASSIUM CHLORIDE, AND CALCIUM CHLORIDE: .6; .31; .03; .02 INJECTION, SOLUTION INTRAVENOUS at 04:05

## 2019-05-31 RX ADMIN — DEXAMETHASONE SODIUM PHOSPHATE 4 MG: 4 INJECTION, SOLUTION INTRAMUSCULAR; INTRAVENOUS at 05:05

## 2019-05-31 RX ADMIN — HALOPERIDOL LACTATE 2 MG: 5 INJECTION, SOLUTION INTRAMUSCULAR at 09:05

## 2019-05-31 RX ADMIN — AMLODIPINE BESYLATE 10 MG: 10 TABLET ORAL at 12:05

## 2019-05-31 RX ADMIN — DEXAMETHASONE SODIUM PHOSPHATE 4 MG: 4 INJECTION, SOLUTION INTRAMUSCULAR; INTRAVENOUS at 09:05

## 2019-05-31 RX ADMIN — ENOXAPARIN SODIUM 40 MG: 100 INJECTION SUBCUTANEOUS at 04:05

## 2019-05-31 RX ADMIN — MAGNESIUM SULFATE IN WATER 2 G: 40 INJECTION, SOLUTION INTRAVENOUS at 06:05

## 2019-05-31 RX ADMIN — CALCIUM GLUCONATE 1000 MG: 98 INJECTION, SOLUTION INTRAVENOUS at 05:05

## 2019-05-31 RX ADMIN — IRBESARTAN 300 MG: 150 TABLET ORAL at 12:05

## 2019-05-31 RX ADMIN — DEXAMETHASONE SODIUM PHOSPHATE 4 MG: 4 INJECTION, SOLUTION INTRAMUSCULAR; INTRAVENOUS at 04:05

## 2019-05-31 NOTE — PROCEDURES
Modified Barium Swallow    Patient Name:  Rbo Jones Jr.   MRN:  [id number]      Recommendations:     Recommendations:               General Recommendations:  Dysphagia therapy  Diet recommendations:  Regular, Nectar Thick   Aspiration Precautions: Assistance with all PO & with thickening liquids  3 SWALLOWS PER BOLUS & SMALL SINGLE SIPS/BITES ONLY, no consecutive SIPS OR BITES  Feed only when awake/alert, HOB to 90 degrees, Meds crushed in puree, Monitor for s/s of aspiration, Small bites/sips and Strict aspiration precautions, no straws  Recs d/w general surgery team   General Precautions: Standard, fall, aspiration, nectar thick  Communication strategies:  none    Referral     Reason for Referral  Patient was referred for a Modified Barium Swallow Study to assess the efficiency of his/her swallow function, rule out aspiration and make recommendations regarding safe dietary consistencies, effective compensatory strategies, and safe eating environment.     Diagnosis: Hyperparathyroidism       History:     Past Medical History:   Diagnosis Date    Anxiety     Back pain     Cataract     Diabetes mellitus     History of hepatitis C; S/p RX with SVR 12 documented 06/2017 6/1/2017    Hypertension     Postoperative hypothyroidism 11/3/2016    Primary hyperparathyroidism 1/18/2017    Thyroid disease        s/p parathyroidectomy on 5/22 c/b bilateral VC immobility ultimately requiring reintubation in the PACU. Extubated in or 5/28, tolerated well.     Objective:     Current Respiratory Status: 05/31/19    Alert: yes    Cooperative: yes    Follows Directions: yes    Visualization  · Patient was seen in the lateral view    Oral Peripheral Examination  · Oral Musculature: WFL, general weakness  · Dentition: edentulous(dentures at home)  · Secretion Management: (nurse reports pt coughing/expectorating secretions as needed; able to manage saliva)  · Mucosal Quality: adequate  · Mandibular Strength and Mobility:  WFL  · Oral Labial Strength and Mobility: WFL  · Lingual Strength and Mobility: WFL  · Velar Elevation: WFL  · Buccal Strength and Mobility: WFL  · Volitional Cough: fair volitional; strong reflexive  · Volitional Swallow: elicited after a mild delay; elevation and excursion were adequate upon palpation  · Voice Prior to PO Intake: dry, adequate volume and intensity, rough quality at times    Consistencies Assessed  · Thin 1 tsp via spoon x3, cup sips self fed- single sips x5, consecutive sips x3  · Nectar thick 1 tsp x1, via cup sips self fed - single sips x4, consecutive sips x4  · Solids 1 saltine cracker    Oral Preparation/Oral Phase  · Decreased base of tongue mobility    Pharyngeal Phase   Pharyngeal phase c/b mildly delayed swallow initiation, mildly decreased hyolaryngeal excursion & rise, decreased base of tongue retraction & decreased pharyngeal constriction.   Thin:   Tsp sips- no penetration/aspiration, mild vallecular stasis, cleared with +2 dry swallows   Single cup sips - flash penetration & mild vallecular stasis, cleared with +2 dry swallows  Consecutive cup sips - consistent penetration, moderate vallecular & pyriform stasis accumulates. By last sip, evidence of thin line of barium across cords. Pt cued to cough, unclear if small amount of material aspirated or expectorated from level of cords. Pt was able to clear stasis with 3 dry swallows & no further penetration/aspiration.     Nectar:   Tsp sips& single cup sips - no penetration/aspiration, mild vallecular stasis, cleared with +2 dry swallows   Consecutive cup sips - flash penetration, accumulated moderate vallecular & pyriform stasis, cleared with 3 dry swallows, no evidence of deep penetration or aspiration    Solid: vallecular stasis, cleared with +2 dry swallows    Cervical Esophageal Phase  · UES appeared to accommodate all bolus types without stasis or retrograde movement observed     Assessment:     Impressions  ·   Patient  demonstrates mild to mod Oropharyngeal dysphagia. Pt is at risk of aspiration with thin liquids. Pt appears safe to tolerate solids & nectar thick liquids when/if able to follow all strict aspiration precautions & specified strategies. SLP recs supervision during meals to ensure pt is following all.     Prognosis: Good    Barriers:  · None    Plan  Continue ST service    Education  Results were discussed with patient. Results were discussed with Medical Team who was in agreement with plan.     Goals:   Multidisciplinary Problems     SLP Goals        Problem: SLP Goal    Goal Priority Disciplines Outcome   SLP Goal     SLP Ongoing (interventions implemented as appropriate)   Description:  Speech Language Pathology Goals  Goals expected to be met by 6/5:  1. Pt will participate in ongoing swallowing assessment to determine safest PO diet. REVISE                        Plan:   · Patient to be seen:  Therapy Frequency: 4 x/week   · Plan of Care expires:  06/29/19  · Plan of Care reviewed with:  patient        Discharge recommendations:  home with home health     Time Tracking:   SLP Treatment Date:   05/31/19  Speech Start Time:  1000  Speech Stop Time:  1030     Speech Total Time (min):  30 min    Hoa Lua CCC-SLP  05/31/2019

## 2019-05-31 NOTE — PLAN OF CARE
Pt stepped down to the floor this morning. Home Health is being recommended. IAN paged Surgery Resident to discuss discharge plan and request orders. IAN's page was returned by a nurse in the OR at [facility]. IAN relayed her request for home health orders and it was relayed to her that Pt would not be discharging home over the weekend. SW to send request for home health to Pt's insurer, [employer] [facility] once received. IAN to continue to follow.     Sheela Valdovinos LCSW        05/31/19 1120   Post-Acute Status   Post-Acute Authorization Home Health/Hospice   Home Health/Hospice Status Awaiting Orders for HH

## 2019-05-31 NOTE — SUBJECTIVE & OBJECTIVE
Interval History/Significant Events: NAEO. VSS.    Follow-up For: Procedure(s) (LRB):  Exam under anesthesia; (N/A)  LARYNGOSCOPY, flexible    Post-Operative Day: 3 Days Post-Op    Objective:     Vital Signs (Most Recent):  Temp: 99.5 °F (37.5 °C) (05/31/19 0745)  Pulse: (!) 58 (05/31/19 0900)  Resp: (!) 22 (05/31/19 0900)  BP: (!) 155/81 (05/31/19 0800)  SpO2: 100 % (05/31/19 0900) Vital Signs (24h Range):  Temp:  [97.7 °F (36.5 °C)-99.5 °F (37.5 °C)] 99.5 °F (37.5 °C)  Pulse:  [42-67] 58  Resp:  [5-26] 22  SpO2:  [95 %-100 %] 100 %  BP: (130-188)/() 155/81     Weight: 74.7 kg (164 lb 10.9 oz)  Body mass index is 23.63 kg/m².      Intake/Output Summary (Last 24 hours) at 5/31/2019 0921  Last data filed at 5/31/2019 0900  Gross per 24 hour   Intake 1243.6 ml   Output 1675 ml   Net -431.4 ml       Physical Exam  Constitutional: He is oriented to person, place, and time. He appears well-developed and well-nourished. No distress.   HENT:   Head: Normocephalic and atraumatic.   On NC  Neck incision clean/dry/intact  Eyes: No scleral icterus.   Cardiovascular: Normal rate and regular rhythm.   Pulmonary/Chest: Effort normal. No respiratory distress.   Abdominal: Soft. He exhibits no distension.   Neurological: He is alert and oriented to person, place, and time.   Skin: Skin is warm and dry.   Psychiatric: He has a normal mood and affect. His behavior is normal. Judgment and thought content normal.         Lines/Drains/Airways     Peripheral Intravenous Line                 Peripheral IV - Single Lumen 05/28/19 1700 20 G Left Antecubital 2 days         Peripheral IV - Single Lumen 05/28/19 1700 20 G Left Forearm 2 days                Significant Labs:    CBC/Anemia Profile:  Recent Labs   Lab 05/30/19  0400 05/31/19  0330   WBC 16.26* 13.14*   HGB 12.4* 13.6*   HCT 37.0* 41.4    305   MCV 93 93   RDW 12.7 12.3        Chemistries:  Recent Labs   Lab 05/30/19  0400 05/31/19  0330    139   K 4.2 4.0   CL  104 101   CO2 25 26   BUN 46* 27*   CREATININE 1.1 0.9   CALCIUM 9.5 9.4   ALBUMIN 3.1* 3.1*   PROT 6.1 6.5   BILITOT 0.6 0.7   ALKPHOS 65 65   ALT 53* 45*   AST 61* 36   MG 1.8 1.6   PHOS 3.0 1.8*           Significant Imaging:  I have reviewed all pertinent imaging results/findings within the past 24 hours.

## 2019-05-31 NOTE — PROGRESS NOTES
[facility]  General Surgery  Progress Note    Subjective:     History of Present Illness:  No notes on file    Post-Op Info:  Procedure(s) (LRB):  Exam under anesthesia; (N/A)  LARYNGOSCOPY, flexible   3 Days Post-Op     Interval History: No events.  More lucid this morning    Medications:  Continuous Infusions:   lactated ringers 100 mL/hr at 05/31/19 0600     Scheduled Meds:   amLODIPine  10 mg Oral Daily    calcium gluconate IVPB  1,000 mg Intravenous Q8H    dexamethasone  4 mg Intravenous Q8H    enoxaparin  40 mg Subcutaneous Daily    famotidine (PF)  20 mg Intravenous BID    haloperidol lactate  2 mg Intravenous QHS    irbesartan  300 mg Oral Daily    magnesium sulfate IVPB  2 g Intravenous Once    sodium phosphate IVPB  39.99 mmol Intravenous Once     PRN Meds:dextrose 50%, glucagon (human recombinant), hydrALAZINE, insulin aspart U-100, metoprolol, ondansetron, sodium chloride 0.9%     Review of patient's allergies indicates:   Allergen Reactions    Tizanidine Shortness Of Breath     Objective:     Vital Signs (Most Recent):  Temp: 99.1 °F (37.3 °C) (05/31/19 0300)  Pulse: (!) 49 (05/31/19 0630)  Resp: 15 (05/31/19 0630)  BP: (!) 166/78 (05/31/19 0600)  SpO2: 100 % (05/31/19 0630) Vital Signs (24h Range):  Temp:  [97.7 °F (36.5 °C)-99.1 °F (37.3 °C)] 99.1 °F (37.3 °C)  Pulse:  [42-71] 49  Resp:  [5-25] 15  SpO2:  [86 %-100 %] 100 %  BP: (130-188)/() 166/78     Weight: 74.7 kg (164 lb 10.9 oz)  Body mass index is 23.63 kg/m².    Intake/Output - Last 3 Shifts       05/29 0700 - 05/30 0659 05/30 0700 - 05/31 0659 05/31 0700 - 06/01 0659    I.V. (mL/kg) 2150 (29.2) 1093.6 (14.6)     IV Piggyback 300 150     Total Intake(mL/kg) 2450 (33.2) 1243.6 (16.6)     Urine (mL/kg/hr) 495 (0.3) 1625 (0.9)     Drains 0      Total Output 495 1625     Net +1955 -381.4            Urine Occurrence 1 x 3 x           Physical Exam  NAD  Slightly confused intermittently still  Neck is  soft    Significant Labs:  CBC:   Recent Labs   Lab 05/31/19  0330   WBC 13.14*   RBC 4.46*   HGB 13.6*   HCT 41.4      MCV 93   MCH 30.5   MCHC 32.9     CMP:   Recent Labs   Lab 05/31/19  0330   *   CALCIUM 9.4   ALBUMIN 3.1*   PROT 6.5      K 4.0   CO2 26      BUN 27*   CREATININE 0.9   ALKPHOS 65   ALT 45*   AST 36   BILITOT 0.7           Assessment/Plan:     * Hyperparathyroidism  Post op B/L neck exploration for parathyroid adenom.  Very difficult dissection.  Complicated by vocal chord dysfunction, likely from stretch injury as nerve appeared intact on intraop NIMS monitoring and was grossly intact.  Pt intuabted in PACU for increasing stridor.     Extubated 5/28     ICU delirium precautions, awake during the day, dark at night.   haldol QHS  Very sparing ice chips for comfort.  Swallow study today  Trend labs, continue Ca supplementation  OOB / walking as tolerated  Tapering Dexamethasone, continue 4mg  IVF while NPO    Dispo: Ok to step down        Mo Sol MD  General Surgery  [facility]

## 2019-05-31 NOTE — PLAN OF CARE
"Problem: Adult Inpatient Plan of Care  Goal: Plan of Care Review  63yoM Hx: Graves Dz, total thyroidectomy, DM, HTN, Hyperparathyroid    5/22 Parathyroidectomy - PACU extubated - emergent reintubation for stridor  5/26 72h steroid treatment completed  5/28 OR extubation. Insulin gtt stopped  5/29: BRICE drain removed.  OOBTC.  5/30: transfer orders placed    Nursing:  -daily labs       Outcome: Ongoing (interventions implemented as appropriate)  Patient is alert and oriented. Patient has some delirium noted. Able to make needs known to staff. No c/o pain or discomfort noted. No s/s of respiratory/cardiac distress noted. Telemetry monitoring continues with bradycardia noted. Neck incision is clean, dry, and dermabond is intact. Patient pulled both of his IVs out, stating that he did it because "they were empty". A new PIV was placed by the midline team in his left forearm. LR runs continuously at 100 ml/hr. Patient remains NPO. Blood sugar checks continue every 4 hours with PRN sliding scale ordered. An KAITLYNN camera will be placed in patient's room. VS stable. No issues or concerns at this time. Continue to monitor.       "

## 2019-05-31 NOTE — PLAN OF CARE
Problem: SLP Goal  Goal: SLP Goal    Speech Language Pathology Goals  Goals expected to be met by 6/7  1. Pt will tolerate regular solids & nectar thick liquid without any s/s aspiration  2. Pt will utilize 3 swallows per bolus INDly  3. Pt will utilize all swallow precautions & strategies INDly to ensure safety   4. Pt will complete dysphagia exercises given min A  5. Pt will tolerate trials of thin with SLP supervision only    Speech Language Pathology Goals  Goals expected to be met by 6/5:  1. Pt will participate in ongoing swallowing assessment to determine safest PO diet. REVISE       Modified Barium Swallow Study completed. See note for details.

## 2019-05-31 NOTE — PLAN OF CARE
Reviewed BG levels and insulin regimen.      Goal BG while inpatient: 140-180 mg/dl  Current BG levels are at goal  On IV steroid  Not on diet yet, awaiting swallowing work up  Resp status appear stable       Recommendations:   No need for insulin drip  Q4 POCT glucose  Low dose correction scale      We will follow with you, call with any questions    Sunday Faulkner MD  Endocrine Fellow  5/31/2019

## 2019-05-31 NOTE — PROGRESS NOTES
"[facility]  Adult Nutrition  Progress Note    SUMMARY       Recommendations  Recommendation/Intervention:   1. As medically able, ADAT to Diabetic with texture per SLP.   2. If unable to advance diet, recommend resuming TF of Impact Peptide 1.5 at 50 mL/hr.   RD to monitor.    Goals: Patient to receive nutrition by RD follow-up  Nutrition Goal Status: goal not met  Communication of RD Recs: reviewed with RN    Reason for Assessment  Reason For Assessment: RD follow-up  Diagnosis: surgery/postoperative complications(hyperparathyroidism s/p surgery 5/22)  Relevant Medical History: DM, HTN, graves disease s/p total thyroidectomy  General Information Comments: Extubated 5/28. Still some confusion. No longer on TF. Plan for MBSS today. Patient continues with age appropriate muscle wasting with no other physical signs of malnutriton.  Nutrition Discharge Planning: Adequate nutrition via PO intake.    Nutrition Risk Screen  Nutrition Risk Screen: tube feeding or parenteral nutrition    Nutrition/Diet History  Spiritual, Cultural Beliefs, Methodist Practices, Values that Affect Care: no  Factors Affecting Nutritional Intake: NPO    Anthropometrics  Temp: 99.5 °F (37.5 °C)  Height Method: Stated  Height: 5' 10" (177.8 cm)  Height (inches): 70 in  Weight Method: Stated  Weight: 74.7 kg (164 lb 10.9 oz)  Weight (lb): 164.69 lb  Ideal Body Weight (IBW), Male: 166 lb  % Ideal Body Weight, Male (lb): 99.21 lb  BMI (Calculated): 23.7  BMI Grade: 18.5-24.9 - normal    Lab/Procedures/Meds  Pertinent Labs Reviewed: reviewed  Pertinent Labs Comments: BUN 27, Glu 126, POCT Glu 129-207, HgbA1c 6.1, Phos 1.8, Alb 3.1  Pertinent Medications Reviewed: reviewed  Pertinent Medications Comments: famotidine, IVF    Estimated/Assessed Needs  Weight Used For Calorie Calculations: 74.7 kg (164 lb 10.9 oz)  Energy Calorie Requirements (kcal): 1941 kcal/day  Energy Need Method: [place](x 1.25)  Protein Requirements: "  g/day(1.2-1.4 g/kg)  Weight Used For Protein Calculations: 74.7 kg (164 lb 10.9 oz)  Fluid Requirements (mL): 1 mL/kcal or per MD  Estimated Fluid Requirement Method: RDA Method  RDA Method (mL): 1941  CHO Requirement: 50% total kcal    Nutrition Prescription Ordered  Current Diet Order: NPO    Evaluation of Received Nutrient/Fluid Intake  I/O: +1.7L since admit  Comments: No BM recorded  % Intake of Estimated Energy Needs: 0 - 25 %  % Meal Intake: NPO    Nutrition Risk  Level of Risk/Frequency of Follow-up: high(2x/week)     Assessment and Plan  Nutrition Problem  Inadequate energy intake     Related to (etiology):   Decreased ability to consume sufficient energy     Signs and Symptoms (as evidenced by):   NPO with no alternative means of nutrition at this time     Interventions/Recommendations (treatment strategy):  Collaboration and referral of nutrition care     Nutrition Diagnosis Status:   Continues    Monitor and Evaluation  Food and Nutrient Intake: energy intake, food and beverage intake  Food and Nutrient Adminstration: diet order  Anthropometric Measurements: weight, weight change  Biochemical Data, Medical Tests and Procedures: electrolyte and renal panel, gastrointestinal profile, glucose/endocrine profile, inflammatory profile  Nutrition-Focused Physical Findings: overall appearance     Nutrition Follow-Up  RD Follow-up?: Yes

## 2019-05-31 NOTE — PROGRESS NOTES
[facility]  Critical Care - Surgery  Progress Note    Patient Name: Rob Jones Jr.  MRN: [id number]  Admission Date: 5/22/2019  Hospital Length of Stay: 8 days  Code Status: No Order  Attending Provider: Mounika Carmona MD  Primary Care Provider: Vasyl Jimenez MD   Principal Problem: Hyperparathyroidism    Subjective:     Hospital/ICU Course:  No notes on file    Interval History/Significant Events: NAEO. VSS.    Follow-up For: Procedure(s) (LRB):  Exam under anesthesia; (N/A)  LARYNGOSCOPY, flexible    Post-Operative Day: 3 Days Post-Op    Objective:     Vital Signs (Most Recent):  Temp: 99.5 °F (37.5 °C) (05/31/19 0745)  Pulse: (!) 58 (05/31/19 0900)  Resp: (!) 22 (05/31/19 0900)  BP: (!) 155/81 (05/31/19 0800)  SpO2: 100 % (05/31/19 0900) Vital Signs (24h Range):  Temp:  [97.7 °F (36.5 °C)-99.5 °F (37.5 °C)] 99.5 °F (37.5 °C)  Pulse:  [42-67] 58  Resp:  [5-26] 22  SpO2:  [95 %-100 %] 100 %  BP: (130-188)/() 155/81     Weight: 74.7 kg (164 lb 10.9 oz)  Body mass index is 23.63 kg/m².      Intake/Output Summary (Last 24 hours) at 5/31/2019 0921  Last data filed at 5/31/2019 0900  Gross per 24 hour   Intake 1243.6 ml   Output 1675 ml   Net -431.4 ml       Physical Exam  Constitutional: He is oriented to person, place, and time. He appears well-developed and well-nourished. No distress.   HENT:   Head: Normocephalic and atraumatic.   On NC  Neck incision clean/dry/intact  Eyes: No scleral icterus.   Cardiovascular: Normal rate and regular rhythm.   Pulmonary/Chest: Effort normal. No respiratory distress.   Abdominal: Soft. He exhibits no distension.   Neurological: He is alert and oriented to person, place, and time.   Skin: Skin is warm and dry.   Psychiatric: He has a normal mood and affect. His behavior is normal. Judgment and thought content normal.          Lines/Drains/Airways     Peripheral Intravenous Line                 Peripheral IV - Single Lumen 05/28/19 1700 20 G  Left Antecubital 2 days         Peripheral IV - Single Lumen 05/28/19 1700 20 G Left Forearm 2 days                Significant Labs:    CBC/Anemia Profile:  Recent Labs   Lab 05/30/19  0400 05/31/19  0330   WBC 16.26* 13.14*   HGB 12.4* 13.6*   HCT 37.0* 41.4    305   MCV 93 93   RDW 12.7 12.3        Chemistries:  Recent Labs   Lab 05/30/19  0400 05/31/19  0330    139   K 4.2 4.0    101   CO2 25 26   BUN 46* 27*   CREATININE 1.1 0.9   CALCIUM 9.5 9.4   ALBUMIN 3.1* 3.1*   PROT 6.1 6.5   BILITOT 0.6 0.7   ALKPHOS 65 65   ALT 53* 45*   AST 61* 36   MG 1.8 1.6   PHOS 3.0 1.8*           Significant Imaging:  I have reviewed all pertinent imaging results/findings within the past 24 hours.    Assessment/Plan:     * Hyperparathyroidism  63 y.o. male w/ a significant PMHx of primary hyperparathyroidism s/p parathyroidectomy on 5/22 c/b bilateral VC immobility ultimately requiring reintubation in the PACU. Extubated in or 5/28, tolerated well.      Neuro:   - Off sedation   - Avoid narcotics  - Toradol     Pulmonary:   - Extubated in OR 5/28  - Wean supplemental O2 as tolerated  - ABG PRN  - Dexamethasone 4 mg IV q8h - continue to wean per primary     Cardiac:   - Holding PO BP meds until passes swallow study  - barium swallow study ordered for 5/31  - restarted home amlodipine and ibersartan        Renal:    - monitor BUN/Cr  - UOP adequate  - Monitor UOP     ID:   - AF  - WBC wnl  - Monitor WBC  - wean steroids per primary      Hem/Onc:   - H/H stable   - Monitor CBC  - Transfuse as needed     Endocrine:    - Hx of DM  - Accuchecks  - Endocrine following, appreciate recs  - Synthroid per primary      Fluids/Electrolytes/Nutrition/GI:   - Replace electrolytes PRN  - Calcium gluconate 1 g IV q8h  - MBSS 5/31  - diet: reg consistency with thin liquids per SLP   - MIVF dc once can tolerate PO   - Trend LFTs     DISPO:  - stepdown today           Critical care was time spent personally by me on the following  activities: development of treatment plan with patient or surrogate and bedside caregivers, discussions with consultants, evaluation of patient's response to treatment, examination of patient, ordering and performing treatments and interventions, ordering and review of laboratory studies, ordering and review of radiographic studies, pulse oximetry, re-evaluation of patient's condition.  This critical care time did not overlap with that of any other provider or involve time for any procedures.     Lonny Haji MD  Critical Care - Surgery  [facility]

## 2019-05-31 NOTE — ASSESSMENT & PLAN NOTE
63 y.o. male w/ a significant PMHx of primary hyperparathyroidism s/p parathyroidectomy on 5/22 c/b bilateral VC immobility ultimately requiring reintubation in the PACU. Extubated in or 5/28, tolerated well.      Neuro:   - Off sedation   - Avoid narcotics  - Toradol     Pulmonary:   - Extubated in OR 5/28  - Wean supplemental O2 as tolerated  - ABG PRN  - Dexamethasone 4 mg IV q8h - continue to wean per primary     Cardiac:   - Holding PO BP meds until passes swallow study  - barium swallow study ordered for 5/31  - restarted home amlodipine and ibersartan        Renal:    - monitor BUN/Cr  - UOP adequate  - Monitor UOP     ID:   - AF  - WBC wnl  - Monitor WBC  - wean steroids per primary      Hem/Onc:   - H/H stable   - Monitor CBC  - Transfuse as needed     Endocrine:    - Hx of DM  - Accuchecks  - Endocrine following, appreciate recs  - Synthroid per primary      Fluids/Electrolytes/Nutrition/GI:   - Replace electrolytes PRN  - Calcium gluconate 1 g IV q8h  - [facility]  - diet: reg consistency with thin liquids per SLP   - MIVF dc once can tolerate PO   - Trend LFTs     DISPO:  - stepdown today

## 2019-05-31 NOTE — NURSING TRANSFER
Nursing Transfer Note      5/31/2019     Transfer To: 1062, after barium swallow study    Transfer via stretcher    Transfer with cardiac monitoring, on RA    Transported by RN    Medicines sent: LR gtt, sodium phos infusion; morning meds sent and given to JEREMÍAS Scott    Chart send with patient: Yes    Notified: n/a    Patient reassessed at: 5/31/19, 2245 (date, time)    Upon arrival to floor: cardiac monitor applied, patient oriented to room, call bell in reach and bed in lowest position. RN notified of pt's arrival and meds given to RN. Pt notified to use call bell for assistance.

## 2019-05-31 NOTE — ASSESSMENT & PLAN NOTE
Post op B/L neck exploration for parathyroid adenom.  Very difficult dissection.  Complicated by vocal chord dysfunction, likely from stretch injury as nerve appeared intact on intraop NIMS monitoring and was grossly intact.  Pt intuabted in PACU for increasing stridor.     Extubated 5/28     ICU delirium precautions, awake during the day, dark at night.   haldol QHS  Very sparing ice chips for comfort.  Swallow study today  Trend labs, continue Ca supplementation  OOB / walking as tolerated  Tapering Dexamethasone, continue 4mg  IVF while NPO    Dispo: Ok to step down

## 2019-05-31 NOTE — PROGRESS NOTES
Pt transferred to barium swallow study with RN and PCT. Connected to monitor. No acute events during study.

## 2019-05-31 NOTE — SUBJECTIVE & OBJECTIVE
Interval History: No events.  More lucid this morning    Medications:  Continuous Infusions:   lactated ringers 100 mL/hr at 05/31/19 0600     Scheduled Meds:   amLODIPine  10 mg Oral Daily    calcium gluconate IVPB  1,000 mg Intravenous Q8H    dexamethasone  4 mg Intravenous Q8H    enoxaparin  40 mg Subcutaneous Daily    famotidine (PF)  20 mg Intravenous BID    haloperidol lactate  2 mg Intravenous QHS    irbesartan  300 mg Oral Daily    magnesium sulfate IVPB  2 g Intravenous Once    sodium phosphate IVPB  39.99 mmol Intravenous Once     PRN Meds:dextrose 50%, glucagon (human recombinant), hydrALAZINE, insulin aspart U-100, metoprolol, ondansetron, sodium chloride 0.9%     Review of patient's allergies indicates:   Allergen Reactions    Tizanidine Shortness Of Breath     Objective:     Vital Signs (Most Recent):  Temp: 99.1 °F (37.3 °C) (05/31/19 0300)  Pulse: (!) 49 (05/31/19 0630)  Resp: 15 (05/31/19 0630)  BP: (!) 166/78 (05/31/19 0600)  SpO2: 100 % (05/31/19 0630) Vital Signs (24h Range):  Temp:  [97.7 °F (36.5 °C)-99.1 °F (37.3 °C)] 99.1 °F (37.3 °C)  Pulse:  [42-71] 49  Resp:  [5-25] 15  SpO2:  [86 %-100 %] 100 %  BP: (130-188)/() 166/78     Weight: 74.7 kg (164 lb 10.9 oz)  Body mass index is 23.63 kg/m².    Intake/Output - Last 3 Shifts       05/29 0700 - 05/30 0659 05/30 0700 - 05/31 0659 05/31 0700 - 06/01 0659    I.V. (mL/kg) 2150 (29.2) 1093.6 (14.6)     IV Piggyback 300 150     Total Intake(mL/kg) 2450 (33.2) 1243.6 (16.6)     Urine (mL/kg/hr) 495 (0.3) 1625 (0.9)     Drains 0      Total Output 495 1625     Net +1955 -381.4            Urine Occurrence 1 x 3 x           Physical Exam  NAD  Slightly confused intermittently still  Neck is soft    Significant Labs:  CBC:   Recent Labs   Lab 05/31/19  0330   WBC 13.14*   RBC 4.46*   HGB 13.6*   HCT 41.4      MCV 93   MCH 30.5   MCHC 32.9     CMP:   Recent Labs   Lab 05/31/19 0330   *   CALCIUM 9.4   ALBUMIN 3.1*   PROT 6.5       K 4.0   CO2 26      BUN 27*   CREATININE 0.9   ALKPHOS 65   ALT 45*   AST 36   BILITOT 0.7

## 2019-05-31 NOTE — PT/OT/SLP PROGRESS
Occupational Therapy   Treatment    Name: Rob Jones Jr.  MRN: [id number]  Admitting Diagnosis:  Hyperparathyroidism s/p parathyroidectomy    Recommendations:     Discharge Recommendations: home with home health  Discharge Equipment Recommendations:  (TBD closer to d/c)  Barriers to discharge:  None    Assessment:     Rob Jones Jr. is a 63 y.o. male with a medical diagnosis of Hyperparathyroidism.  Performance deficits affecting function are weakness, impaired self care skills, impaired balance, impaired functional mobilty, impaired endurance, gait instability, impaired cardiopulmonary response to activity, pain, impaired skin, decreased safety awareness.     Rehab Prognosis:  Good; patient would benefit from acute skilled OT services to address these deficits and reach maximum level of function.       Plan:     Patient to be seen 3 x/week to address the above listed problems via self-care/home management, therapeutic activities, therapeutic exercises  · Plan of Care Expires: 06/28/19  · Plan of Care Reviewed with: patient    Subjective     Pain/Comfort:  · Pain Rating 1: 6/10  · Location - Orientation 1: generalized  · Location 1: neck  · Pain Addressed 1: Pre-medicate for activity  · Pain Rating Post-Intervention 1: 0/10    Objective:     Communicated with: RN prior to session.  Patient found supine with blood pressure cuff, telemetry, pulse ox (continuous), peripheral IV upon OT entry to room.    General Precautions: Standard, fall, aspiration   Orthopedic Precautions:    Braces:       Occupational Performance:     Bed Mobility:    · Patient completed Rolling/Turning to Left with  stand by assistance  · Patient completed Scooting/Bridging with stand by assistance  · Patient completed Supine to Sit with stand by assistance     Functional Mobility/Transfers:  · Patient completed Sit <> Stand Transfer with stand by assistance  with  no assistive device   · Patient completed Bed <> Chair Transfer using Step  Transfer technique with contact guard assistance with no assistive device  · Functional Mobility: CGA to/from bathroom    Activities of Daily Living:  · Grooming: contact guard assistance standing at sink  · Upper Body Dressing: contact guard assistance seated EOB  · Lower Body Dressing: minimum assistance donning socks seated EOB  · Toileting: minimum assistance for pericare in sitting/standing after urine incontinence      AMPA 6 Click ADL: 18    Treatment & Education:  Pt ed on OT POC  Pt sat EOB with SBA for static and dynamic activities  Pt performed grooming while standing at sink with CGA, however occasionally leaned on sink 2* fatigue  Pt completed B UE/LE AROM ex's x 10 reps in all planes    Patient left up in chair with all lines intact, call button in reach and RN notifiedEducation:      GOALS:   Multidisciplinary Problems     Occupational Therapy Goals        Problem: Occupational Therapy Goal    Goal Priority Disciplines Outcome Interventions   Occupational Therapy Goal     OT, PT/OT Ongoing (interventions implemented as appropriate)    Description:  Goals to be met by: 6/8/19     Patient will increase functional independence with ADLs by performing:    Feeding with Modified [place].  UE Dressing with Supervision.  LE Dressing with Supervision.  Grooming while standing at sink with Supervision.  Toileting from toilet with Supervision for hygiene and clothing management.   Toilet transfer to toilet with Supervision.                      Time Tracking:     OT Date of Treatment: 05/31/19  OT Start Time: 0823  OT Stop Time: 0849  OT Total Time (min): 26 min    Billable Minutes:Self Care/Home Management 26    FELICIA Nielsen  5/31/2019

## 2019-05-31 NOTE — PLAN OF CARE
Patient extubated in OR 5/28/2019 with no stridor noted, patient stepped down from ICU today, doing well on 2L NC.  Patient is not expected to discharge home until next week.  Will continue to follow for needs.       05/31/19 1340   Discharge Reassessment   Assessment Type Discharge Planning Reassessment   Discharge Plan A Home with family;Home Health   Discharge Plan B Home with family

## 2019-05-31 NOTE — PLAN OF CARE
Problem: Occupational Therapy Goal  Goal: Occupational Therapy Goal  Goals to be met by: 6/8/19     Patient will increase functional independence with ADLs by performing:    Feeding with Modified [place].  UE Dressing with Supervision.  LE Dressing with Supervision.  Grooming while standing at sink with Supervision.  Toileting from toilet with Supervision for hygiene and clothing management.   Toilet transfer to toilet with Supervision.     Outcome: Ongoing (interventions implemented as appropriate)  The above goals remain appropriate. FELICIA Nielsen  5/31/2019

## 2019-06-01 LAB
ALBUMIN SERPL BCP-MCNC: 2.8 G/DL (ref 3.5–5.2)
ALP SERPL-CCNC: 55 U/L (ref 55–135)
ALT SERPL W/O P-5'-P-CCNC: 35 U/L (ref 10–44)
ANION GAP SERPL CALC-SCNC: 8 MMOL/L (ref 8–16)
ANISOCYTOSIS BLD QL SMEAR: SLIGHT
AST SERPL-CCNC: 28 U/L (ref 10–40)
BASOPHILS # BLD AUTO: 0.01 K/UL (ref 0–0.2)
BASOPHILS NFR BLD: 0.1 % (ref 0–1.9)
BILIRUB SERPL-MCNC: 0.5 MG/DL (ref 0.1–1)
BUN SERPL-MCNC: 18 MG/DL (ref 8–23)
CA-I BLDV-SCNC: 1.1 MMOL/L (ref 1.06–1.42)
CALCIUM SERPL-MCNC: 9.4 MG/DL (ref 8.7–10.5)
CHLORIDE SERPL-SCNC: 102 MMOL/L (ref 95–110)
CO2 SERPL-SCNC: 27 MMOL/L (ref 23–29)
CREAT SERPL-MCNC: 0.9 MG/DL (ref 0.5–1.4)
DIFFERENTIAL METHOD: ABNORMAL
EOSINOPHIL # BLD AUTO: 0 K/UL (ref 0–0.5)
EOSINOPHIL NFR BLD: 0.1 % (ref 0–8)
ERYTHROCYTE [DISTWIDTH] IN BLOOD BY AUTOMATED COUNT: 12.3 % (ref 11.5–14.5)
EST. GFR  (AFRICAN AMERICAN): >60 ML/MIN/1.73 M^2
EST. GFR  (NON AFRICAN AMERICAN): >60 ML/MIN/1.73 M^2
GLUCOSE SERPL-MCNC: 216 MG/DL (ref 70–110)
HCT VFR BLD AUTO: 40.1 % (ref 40–54)
HGB BLD-MCNC: 13.2 G/DL (ref 14–18)
IMM GRANULOCYTES # BLD AUTO: 0.08 K/UL (ref 0–0.04)
IMM GRANULOCYTES NFR BLD AUTO: 0.8 % (ref 0–0.5)
LYMPHOCYTES # BLD AUTO: 1.4 K/UL (ref 1–4.8)
LYMPHOCYTES NFR BLD: 13.4 % (ref 18–48)
MAGNESIUM SERPL-MCNC: 1.5 MG/DL (ref 1.6–2.6)
MCH RBC QN AUTO: 30.7 PG (ref 27–31)
MCHC RBC AUTO-ENTMCNC: 32.9 G/DL (ref 32–36)
MCV RBC AUTO: 93 FL (ref 82–98)
MONOCYTES # BLD AUTO: 0.5 K/UL (ref 0.3–1)
MONOCYTES NFR BLD: 4.9 % (ref 4–15)
NEUTROPHILS # BLD AUTO: 8.2 K/UL (ref 1.8–7.7)
NEUTROPHILS NFR BLD: 80.7 % (ref 38–73)
NRBC BLD-RTO: 0 /100 WBC
PHOSPHATE SERPL-MCNC: 3.1 MG/DL (ref 2.7–4.5)
PLATELET # BLD AUTO: 156 K/UL (ref 150–350)
PLATELET BLD QL SMEAR: ABNORMAL
PMV BLD AUTO: 12.2 FL (ref 9.2–12.9)
POCT GLUCOSE: 187 MG/DL (ref 70–110)
POCT GLUCOSE: 211 MG/DL (ref 70–110)
POCT GLUCOSE: 227 MG/DL (ref 70–110)
POCT GLUCOSE: 237 MG/DL (ref 70–110)
POCT GLUCOSE: 247 MG/DL (ref 70–110)
POCT GLUCOSE: 316 MG/DL (ref 70–110)
POLYCHROMASIA BLD QL SMEAR: ABNORMAL
POTASSIUM SERPL-SCNC: 4.2 MMOL/L (ref 3.5–5.1)
PROT SERPL-MCNC: 6 G/DL (ref 6–8.4)
RBC # BLD AUTO: 4.3 M/UL (ref 4.6–6.2)
SODIUM SERPL-SCNC: 137 MMOL/L (ref 136–145)
WBC # BLD AUTO: 10.11 K/UL (ref 3.9–12.7)

## 2019-06-01 PROCEDURE — 82330 ASSAY OF CALCIUM: CPT

## 2019-06-01 PROCEDURE — 25000003 PHARM REV CODE 250: Performed by: STUDENT IN AN ORGANIZED HEALTH CARE EDUCATION/TRAINING PROGRAM

## 2019-06-01 PROCEDURE — 63600175 PHARM REV CODE 636 W HCPCS: Performed by: HOSPITALIST

## 2019-06-01 PROCEDURE — 63600175 PHARM REV CODE 636 W HCPCS: Performed by: STUDENT IN AN ORGANIZED HEALTH CARE EDUCATION/TRAINING PROGRAM

## 2019-06-01 PROCEDURE — 36415 COLL VENOUS BLD VENIPUNCTURE: CPT

## 2019-06-01 PROCEDURE — 25000003 PHARM REV CODE 250: Performed by: SURGERY

## 2019-06-01 PROCEDURE — 84100 ASSAY OF PHOSPHORUS: CPT

## 2019-06-01 PROCEDURE — S0028 INJECTION, FAMOTIDINE, 20 MG: HCPCS | Performed by: STUDENT IN AN ORGANIZED HEALTH CARE EDUCATION/TRAINING PROGRAM

## 2019-06-01 PROCEDURE — 83735 ASSAY OF MAGNESIUM: CPT

## 2019-06-01 PROCEDURE — 20600001 HC STEP DOWN PRIVATE ROOM

## 2019-06-01 PROCEDURE — 85025 COMPLETE CBC W/AUTO DIFF WBC: CPT

## 2019-06-01 PROCEDURE — 80053 COMPREHEN METABOLIC PANEL: CPT

## 2019-06-01 RX ORDER — IBUPROFEN 200 MG
24 TABLET ORAL
Status: DISCONTINUED | OUTPATIENT
Start: 2019-06-01 | End: 2019-06-03 | Stop reason: HOSPADM

## 2019-06-01 RX ORDER — CALCIUM CARBONATE 500(1250)
1000 TABLET ORAL 3 TIMES DAILY
Status: DISCONTINUED | OUTPATIENT
Start: 2019-06-01 | End: 2019-06-03

## 2019-06-01 RX ORDER — CALCIUM CARBONATE 500(1250)
1000 TABLET ORAL 3 TIMES DAILY
Status: DISCONTINUED | OUTPATIENT
Start: 2019-06-01 | End: 2019-06-01

## 2019-06-01 RX ORDER — GLUCAGON 1 MG
1 KIT INJECTION
Status: DISCONTINUED | OUTPATIENT
Start: 2019-06-01 | End: 2019-06-03 | Stop reason: HOSPADM

## 2019-06-01 RX ORDER — IBUPROFEN 200 MG
16 TABLET ORAL
Status: DISCONTINUED | OUTPATIENT
Start: 2019-06-01 | End: 2019-06-03 | Stop reason: HOSPADM

## 2019-06-01 RX ORDER — INSULIN ASPART 100 [IU]/ML
0-5 INJECTION, SOLUTION INTRAVENOUS; SUBCUTANEOUS
Status: DISCONTINUED | OUTPATIENT
Start: 2019-06-01 | End: 2019-06-03 | Stop reason: HOSPADM

## 2019-06-01 RX ORDER — DEXAMETHASONE SODIUM PHOSPHATE 4 MG/ML
2 INJECTION, SOLUTION INTRA-ARTICULAR; INTRALESIONAL; INTRAMUSCULAR; INTRAVENOUS; SOFT TISSUE EVERY 8 HOURS
Status: DISCONTINUED | OUTPATIENT
Start: 2019-06-01 | End: 2019-06-03 | Stop reason: HOSPADM

## 2019-06-01 RX ORDER — FAMOTIDINE 20 MG/1
20 TABLET, FILM COATED ORAL 2 TIMES DAILY
Status: DISCONTINUED | OUTPATIENT
Start: 2019-06-01 | End: 2019-06-01

## 2019-06-01 RX ORDER — INSULIN ASPART 100 [IU]/ML
3 INJECTION, SOLUTION INTRAVENOUS; SUBCUTANEOUS
Status: DISCONTINUED | OUTPATIENT
Start: 2019-06-01 | End: 2019-06-03 | Stop reason: HOSPADM

## 2019-06-01 RX ORDER — FAMOTIDINE 10 MG/ML
20 INJECTION INTRAVENOUS 2 TIMES DAILY
Status: DISCONTINUED | OUTPATIENT
Start: 2019-06-01 | End: 2019-06-03

## 2019-06-01 RX ADMIN — CALCIUM GLUCONATE 1000 MG: 98 INJECTION, SOLUTION INTRAVENOUS at 05:06

## 2019-06-01 RX ADMIN — ENOXAPARIN SODIUM 40 MG: 100 INJECTION SUBCUTANEOUS at 05:06

## 2019-06-01 RX ADMIN — SODIUM CHLORIDE, SODIUM LACTATE, POTASSIUM CHLORIDE, AND CALCIUM CHLORIDE: .6; .31; .03; .02 INJECTION, SOLUTION INTRAVENOUS at 03:06

## 2019-06-01 RX ADMIN — IRBESARTAN 300 MG: 150 TABLET ORAL at 08:06

## 2019-06-01 RX ADMIN — AMLODIPINE BESYLATE 10 MG: 10 TABLET ORAL at 08:06

## 2019-06-01 RX ADMIN — INSULIN ASPART 2 UNITS: 100 INJECTION, SOLUTION INTRAVENOUS; SUBCUTANEOUS at 12:06

## 2019-06-01 RX ADMIN — CALCIUM 1000 MG: 500 TABLET ORAL at 02:06

## 2019-06-01 RX ADMIN — INSULIN ASPART 2 UNITS: 100 INJECTION, SOLUTION INTRAVENOUS; SUBCUTANEOUS at 03:06

## 2019-06-01 RX ADMIN — INSULIN ASPART 3 UNITS: 100 INJECTION, SOLUTION INTRAVENOUS; SUBCUTANEOUS at 05:06

## 2019-06-01 RX ADMIN — DEXAMETHASONE SODIUM PHOSPHATE 4 MG: 4 INJECTION, SOLUTION INTRAMUSCULAR; INTRAVENOUS at 05:06

## 2019-06-01 RX ADMIN — INSULIN ASPART 2 UNITS: 100 INJECTION, SOLUTION INTRAVENOUS; SUBCUTANEOUS at 05:06

## 2019-06-01 RX ADMIN — ONDANSETRON 4 MG: 2 INJECTION INTRAMUSCULAR; INTRAVENOUS at 02:06

## 2019-06-01 RX ADMIN — INSULIN ASPART 2 UNITS: 100 INJECTION, SOLUTION INTRAVENOUS; SUBCUTANEOUS at 07:06

## 2019-06-01 RX ADMIN — FAMOTIDINE 20 MG: 10 INJECTION INTRAVENOUS at 08:06

## 2019-06-01 RX ADMIN — INSULIN ASPART 3 UNITS: 100 INJECTION, SOLUTION INTRAVENOUS; SUBCUTANEOUS at 12:06

## 2019-06-01 RX ADMIN — DEXAMETHASONE SODIUM PHOSPHATE 2 MG: 4 INJECTION, SOLUTION INTRA-ARTICULAR; INTRALESIONAL; INTRAMUSCULAR; INTRAVENOUS; SOFT TISSUE at 02:06

## 2019-06-01 NOTE — PLAN OF CARE
Plan of care reviewed with pt: AAOx4, on RA, VS stable. On tele and run NSR with HR in the 70s. Tranverse incision on neck with Demabond CDI. No complains of pain or nausea. Regular with nectar thick diet maintained. Pt was educated to sit up straight when eating and not to eat too fast. Blood sugar check before meal and both scheduled and correct dose given per order. Got out of bed twice with walker and 1 person assist since pt was very unstable while standing up. Camera in room. Bed alarm on. WCTM

## 2019-06-01 NOTE — PROGRESS NOTES
[facility]  General Surgery  Progress Note    Subjective:       Post-Op Info:  Procedure(s) (LRB):  Exam under anesthesia; (N/A)  LARYNGOSCOPY, flexible   4 Days Post-Op     Interval History: No acute events.  Reports some nausea and episode of emesis. Has been passing flatus, last BM yesterday. Described as black, hgb stable. Denies abdominal pain. States may be related to hunger or pain medication. Swallow study completed yesterday.     Medications:  Continuous Infusions:   lactated ringers 100 mL/hr at 06/01/19 0350     Scheduled Meds:   amLODIPine  10 mg Oral Daily    calcium gluconate IVPB  1,000 mg Intravenous Q8H    dexamethasone  4 mg Intravenous Q8H    enoxaparin  40 mg Subcutaneous Daily    famotidine (PF)  20 mg Intravenous BID    haloperidol lactate  2 mg Intravenous QHS    irbesartan  300 mg Oral Daily     PRN Meds:dextrose 50%, glucagon (human recombinant), hydrALAZINE, insulin aspart U-100, metoprolol, ondansetron, sodium chloride 0.9%     Review of patient's allergies indicates:   Allergen Reactions    Tizanidine Shortness Of Breath     Objective:     Vital Signs (Most Recent):  Temp: 98.1 °F (36.7 °C) (06/01/19 0735)  Pulse: (!) 48 (06/01/19 0735)  Resp: 18 (06/01/19 0735)  BP: (!) 169/86 (06/01/19 0735)  SpO2: (!) 92 % (06/01/19 0735) Vital Signs (24h Range):  Temp:  [97.5 °F (36.4 °C)-98.4 °F (36.9 °C)] 98.1 °F (36.7 °C)  Pulse:  [48-72] 48  Resp:  [14-20] 18  SpO2:  [92 %-100 %] 92 %  BP: (148-169)/(72-86) 169/86     Weight: 74.3 kg (163 lb 12.8 oz)  Body mass index is 23.5 kg/m².    Intake/Output - Last 3 Shifts       05/30 0700 - 05/31 0659 05/31 0700 - 06/01 0659 06/01 0700 - 06/02 0659    P.O.  180     I.V. (mL/kg) 1093.6 (14.6) 2500 (33.6)     IV Piggyback 150 300     Total Intake(mL/kg) 1243.6 (16.6) 2980 (40.1)     Urine (mL/kg/hr) 1625 (0.9) 1800 (1) 300 (1.9)    Drains       Stool  0     Total Output 1625 1800 300    Net -381.4 +1180 -300           Urine  Occurrence 3 x      Stool Occurrence  2 x           Physical Exam    NAD  Lucid and appropriate this morning  Neck is soft  Abdomen soft, NT, ND    Significant Labs:  CBC:   Recent Labs   Lab 06/01/19  0348   WBC 10.11   RBC 4.30*   HGB 13.2*   HCT 40.1      MCV 93   MCH 30.7   MCHC 32.9     CMP:   Recent Labs   Lab 06/01/19  0348   *   CALCIUM 9.4   ALBUMIN 2.8*   PROT 6.0      K 4.2   CO2 27      BUN 18   CREATININE 0.9   ALKPHOS 55   ALT 35   AST 28   BILITOT 0.5     MBSS 5/31/19: Transit: Normal oral transit time.    Penetration/Aspiration: Flash laryngeal penetration identified with both thin liquid and nectar thick liquid consistencies.    Miscellaneous: Residuals observed in the vallecular and piriform sinuses.  Visualized cervical spine demonstrates degenerative changes.      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  Very difficult dissection.  Complicated by vocal chord dysfunction, likely from stretch injury as nerve appeared intact on intraop NIMS monitoring and was grossly intact.  Pt intubated in PACU for increasing stridor. Extubated 5/28. Stepped down 5/30/19.    If continues to have nausea, emesis will obtain a KUB  Delirium precautions in place; awake during the day, dark at night.   Haldol IV QHS stopped 6/1/19, improved mental status  MBSS 5/31/19; recommendations in place   Setting up home health speech therapy  Trend labs, continue Ca supplementation  Medications switched to oral; crushed in puree  OOB / walking as tolerated  Tapering Dexamethasone, continue 2mg  D/c mIVF    Dispo: planning for Monday 6/3/19        Laura Shelton MD  General Surgery  [facility]

## 2019-06-01 NOTE — ASSESSMENT & PLAN NOTE
Post op B/L neck exploration for parathyroid adenom.  Very difficult dissection.  Complicated by vocal chord dysfunction, likely from stretch injury as nerve appeared intact on intraop NIMS monitoring and was grossly intact.  Pt intubated in PACU for increasing stridor. Extubated 5/28. Stepped down 5/30/19.    Extubated 5/28  Delirium precautions in place; awake during the day, dark at night.   Haldol IV QHS stopped 6/1/19, improved mental status  MBSS 5/31/19; recommendations in place   Setting up home health speech therapy  Trend labs, continue Ca supplementation  Medications switched to oral; crushed in puree  OOB / walking as tolerated  Tapering Dexamethasone, continue 2mg  D/c mIVF    Dispo: planning for Monday 6/3/19

## 2019-06-01 NOTE — PLAN OF CARE
Problem: Adult Inpatient Plan of Care  Goal: Plan of Care Review  63yoM Hx: Graves Dz, total thyroidectomy, DM, HTN, Hyperparathyroid    5/22 Parathyroidectomy - PACU extubated - emergent reintubation for stridor  5/26 72h steroid treatment completed  5/28 OR extubation. Insulin gtt stopped  5/29: BRICE drain removed.  OOBTC.  5/30: transfer orders placed    Nursing:  -daily labs       Outcome: Ongoing (interventions implemented as appropriate)  Pt AAOx4 with intermittent episodes of confusion.  Able to reorient patient easily.  VSS, afebrile.  Currently denies c/o pain.  Pt diet changed from NPO to regular to nectar thick liquids.  Pt having 1 episode of emesis with moderate relief to prn zofran.  [employer] camera in use and bed alarm on.  Fall risk precautions initiated.  Pt in lowest bed position setting, lighting adjusted, pt to wear nonskid socks when ambulating, side rails up x2.  Pt remain free from falls during shift.  Pt verbalize understanding to call when needed assistance. Call light within reach.  Will continue to monitor.

## 2019-06-01 NOTE — PLAN OF CARE
Weekend SW received call from team to begin arranging HH for patient who will discharge on Monday. SW contacted [facility] to have HH arranged. SW informed by Pat with [facility] that HH can not be arranged until the discharge order has been placed. SW will follow up on Monday to have HH arranged once discharge order is in.    Rosmery Brush, JONATHAN  [facility]

## 2019-06-01 NOTE — SUBJECTIVE & OBJECTIVE
Interval History: No acute events.  Reports some nausea and episode of emesis. Has been passing flatus, last BM yesterday. Described as black, hgb stable. Denies abdominal pain. States may be related to hunger or pain medication. Swallow study completed yesterday.     Medications:  Continuous Infusions:   lactated ringers 100 mL/hr at 06/01/19 0350     Scheduled Meds:   amLODIPine  10 mg Oral Daily    calcium gluconate IVPB  1,000 mg Intravenous Q8H    dexamethasone  4 mg Intravenous Q8H    enoxaparin  40 mg Subcutaneous Daily    famotidine (PF)  20 mg Intravenous BID    haloperidol lactate  2 mg Intravenous QHS    irbesartan  300 mg Oral Daily     PRN Meds:dextrose 50%, glucagon (human recombinant), hydrALAZINE, insulin aspart U-100, metoprolol, ondansetron, sodium chloride 0.9%     Review of patient's allergies indicates:   Allergen Reactions    Tizanidine Shortness Of Breath     Objective:     Vital Signs (Most Recent):  Temp: 98.1 °F (36.7 °C) (06/01/19 0735)  Pulse: (!) 48 (06/01/19 0735)  Resp: 18 (06/01/19 0735)  BP: (!) 169/86 (06/01/19 0735)  SpO2: (!) 92 % (06/01/19 0735) Vital Signs (24h Range):  Temp:  [97.5 °F (36.4 °C)-98.4 °F (36.9 °C)] 98.1 °F (36.7 °C)  Pulse:  [48-72] 48  Resp:  [14-20] 18  SpO2:  [92 %-100 %] 92 %  BP: (148-169)/(72-86) 169/86     Weight: 74.3 kg (163 lb 12.8 oz)  Body mass index is 23.5 kg/m².    Intake/Output - Last 3 Shifts       05/30 0700 - 05/31 0659 05/31 0700 - 06/01 0659 06/01 0700 - 06/02 0659    P.O.  180     I.V. (mL/kg) 1093.6 (14.6) 2500 (33.6)     IV Piggyback 150 300     Total Intake(mL/kg) 1243.6 (16.6) 2980 (40.1)     Urine (mL/kg/hr) 1625 (0.9) 1800 (1) 300 (1.9)    Drains       Stool  0     Total Output 1625 1800 300    Net -381.4 +1180 -300           Urine Occurrence 3 x      Stool Occurrence  2 x           Physical Exam    NAD  Lucid and appropriate this morning  Neck is soft  Abdomen soft, NT, ND    Significant Labs:  CBC:   Recent Labs   Lab  06/01/19  0348   WBC 10.11   RBC 4.30*   HGB 13.2*   HCT 40.1      MCV 93   MCH 30.7   MCHC 32.9     CMP:   Recent Labs   Lab 06/01/19  0348   *   CALCIUM 9.4   ALBUMIN 2.8*   PROT 6.0      K 4.2   CO2 27      BUN 18   CREATININE 0.9   ALKPHOS 55   ALT 35   AST 28   BILITOT 0.5     MBSS 5/31/19: Transit: Normal oral transit time.    Penetration/Aspiration: Flash laryngeal penetration identified with both thin liquid and nectar thick liquid consistencies.    Miscellaneous: Residuals observed in the vallecular and piriform sinuses.  Visualized cervical spine demonstrates degenerative changes.

## 2019-06-01 NOTE — PLAN OF CARE
[facility]    HOME HEALTH ORDERS  FACE TO FACE ENCOUNTER    Patient Name: Rob Jones Jr.  YOB: 1955    PCP: Vasyl Jimenez MD   PCP Address: [facility] [address] / [facility] [address]  PCP Phone Number: [phone]  PCP Fax: [phone]    Encounter Date: 06/01/2019    Admit to Home Health    Diagnoses:  Active Hospital Problems    Diagnosis  POA    *Hyperparathyroidism [E21.3]  Yes    Hyperglycemia [R73.9]  No    Vocal cord paralysis, bilateral partial [J38.02]  Unknown    Acute respiratory insufficiency [R06.89]  No    On enteral nutrition [Z78.9]  Unknown    Adrenal cortical steroids causing adverse effect in therapeutic use [T38.0X5A]  Unknown    Respiratory distress [R06.03]  No    Uncontrolled type 2 diabetes mellitus with complication, with long-term current use of insulin [E11.8, E11.65, Z79.4]  Not Applicable      Resolved Hospital Problems   No resolved problems to display.       No future appointments.    I have seen and examined this patient face to face today. My clinical findings that support the need for the home health skilled services and home bound status are the following:  Medical restrictions requiring assistance of another human to leave home due to Post surgery monitoring.    Allergies:  Review of patient's allergies indicates:   Allergen Reactions    Tizanidine Shortness Of Breath       Diet:   Recommendations:               General Recommendations:  Dysphagia therapy  Diet recommendations:  Regular, Nectar Thick   Aspiration Precautions: Assistance with all PO & with thickening liquids  3 SWALLOWS PER BOLUS & SMALL SINGLE SIPS/BITES ONLY, no consecutive SIPS OR BITES  Feed only when awake/alert, HOB to 90 degrees, Meds crushed in puree, Monitor for s/s of aspiration, Small bites/sips and Strict aspiration precautions, no straws  Recs d/w general surgery team   General Precautions: Standard, fall, aspiration, nectar  thick   Communication strategies:  none    Activities: activity as tolerated    Nursing:   SN to complete comprehensive assessment including routine vital signs. Instruct on disease process and s/s of complications to report to MD. Review/verify medication list sent home with the patient at time of discharge  and instruct patient/caregiver as needed. Frequency may be adjusted depending on start of care date. If patient has enteral feeding tube (NG, PEG, J-tube, G-tube), flush tube before and after feeding and/or medication administration with 20-30 mL of water.    Notify MD if SBP > 160 or < 90; DBP > 90 or < 50; HR > 120 or < 50; Temp > 101       CONSULTS:    Speech Therapy  to evaluate and treat for swallowing dysphagia.    MISCELLANEOUS CARE:  N/A    WOUND CARE ORDERS  no      Medications: Review discharge medications with patient and family and provide education.      Current Discharge Medication List      CONTINUE these medications which have NOT CHANGED    Details   amLODIPine (NORVASC) 10 MG tablet TAKE 1 TABLET (10 MG TOTAL) BY MOUTH ONCE DAILY.  Qty: 90 tablet, Refills: 2    Associated Diagnoses: Hypertension, unspecified type      atorvastatin (LIPITOR) 10 MG tablet Take 1 tablet (10 mg total) by mouth once daily.  Qty: 90 tablet, Refills: 3      blood sugar diagnostic Strp Pt to check BG up to QID. Dispense strips compatible with pt brand meter  Qty: 100 each, Refills: 11      blood-glucose meter (FREESTYLE SYSTEM KIT) kit Please dispense per pt insurance formulary and pt choice Use as instructed  Qty: 1 each, Refills: 0      irbesartan (AVAPRO) 300 MG tablet Take 1 tablet (300 mg total) by mouth once daily.  Qty: 90 tablet, Refills: 2    Associated Diagnoses: Hypertension, unspecified type      lancets (ONETOUCH ULTRASOFT LANCETS) Misc Pt to check BG up to QID. Dispense lancets compatible with pt brand meter  Qty: 100 each, Refills: 11      levothyroxine (SYNTHROID) 137 MCG Tab tablet TAKE 1 TABLET (137  "MCG TOTAL) BY MOUTH BEFORE BREAKFAST.  Qty: 90 tablet, Refills: 2      meloxicam (MOBIC) 15 MG tablet TAKE 1 TABLET (15 MG TOTAL) BY MOUTH DAILY AS NEEDED FOR PAIN.  Qty: 90 tablet, Refills: 1      metFORMIN (GLUCOPHAGE) 1000 MG tablet TAKE 1 TABLET BY MOUTH TWICE A DAY WITH FOOD  Qty: 180 tablet, Refills: 2      NOVOFINE 32 32 gauge x 1/4" Ndle 1 EACH BY MISC.(NON-DRUG COMBO ROUTE) ROUTE ONCE DAILY.  Refills: 3      pen needle, diabetic (NOVOFINE PLUS) 32 gauge x 1/6" Ndle 1 each by Misc.(Non-Drug; Combo Route) route once daily.  Qty: 100 each, Refills: 3    Associated Diagnoses: Diabetes mellitus without complication      SITagliptin (JANUVIA) 100 MG Tab Take 1 tablet (100 mg total) by mouth once daily.  Qty: 30 tablet, Refills: 11      tamsulosin (FLOMAX) 0.4 mg Cp24 Take 1 capsule (0.4 mg total) by mouth once daily.  Qty: 90 capsule, Refills: 3    Associated Diagnoses: Benign non-nodular prostatic hyperplasia without lower urinary tract symptoms      temazepam (RESTORIL) 30 mg capsule TAKE 1 CAPSULE BY MOUTH EVERY DAY AT BEDTIME AS NEEDED  Qty: 60 capsule, Refills: 1    Comments: This request is for a new prescription for a controlled substance as required by Federal/State law.      vitamin D 1000 units Tab Take 185 mg by mouth once daily.      albuterol (ACCUNEB) 1.25 mg/3 mL Nebu Take 3 mLs (1.25 mg total) by nebulization every 6 (six) hours as needed (shortness of breath). Rescue  Qty: 1 Box, Refills: 2      nitroGLYCERIN (NITROSTAT) 0.4 MG SL tablet Place 0.4 mg under the tongue every 5 (five) minutes as needed for Chest pain.           I certify that this patient is confined to his home and needs speech therapy.      "

## 2019-06-01 NOTE — PLAN OF CARE
Reviewed BG levels and insulin regimen.      Goal BG while inpatient: 140-180 mg/dl  Current BG levels are above goal  On PO steroid per primary  On regular diet       Recommendations:   Hyperglycemia due to steroid  Low weight base Novolog 3 U TIDWM started  Low dose correction scale  POCT AC/HS      Hyperparathyroidism  Managed per primary.   Sp B/L neck exploration for parathyroid adenoma Complicated by vocal chord dysfunction  Calcium is stable  On Calcium replacement      We will follow with you, call with any questions    Sunday Faulkner MD  Endocrine Fellow  6/1/2019

## 2019-06-02 LAB
POCT GLUCOSE: 176 MG/DL (ref 70–110)
POCT GLUCOSE: 185 MG/DL (ref 70–110)
POCT GLUCOSE: 196 MG/DL (ref 70–110)
POCT GLUCOSE: 200 MG/DL (ref 70–110)

## 2019-06-02 PROCEDURE — 94761 N-INVAS EAR/PLS OXIMETRY MLT: CPT

## 2019-06-02 PROCEDURE — 63600175 PHARM REV CODE 636 W HCPCS: Performed by: STUDENT IN AN ORGANIZED HEALTH CARE EDUCATION/TRAINING PROGRAM

## 2019-06-02 PROCEDURE — 20600001 HC STEP DOWN PRIVATE ROOM

## 2019-06-02 PROCEDURE — 25000003 PHARM REV CODE 250: Performed by: STUDENT IN AN ORGANIZED HEALTH CARE EDUCATION/TRAINING PROGRAM

## 2019-06-02 PROCEDURE — S0028 INJECTION, FAMOTIDINE, 20 MG: HCPCS | Performed by: STUDENT IN AN ORGANIZED HEALTH CARE EDUCATION/TRAINING PROGRAM

## 2019-06-02 RX ADMIN — INSULIN ASPART 3 UNITS: 100 INJECTION, SOLUTION INTRAVENOUS; SUBCUTANEOUS at 11:06

## 2019-06-02 RX ADMIN — CALCIUM 1000 MG: 500 TABLET ORAL at 02:06

## 2019-06-02 RX ADMIN — ENOXAPARIN SODIUM 40 MG: 100 INJECTION SUBCUTANEOUS at 05:06

## 2019-06-02 RX ADMIN — IRBESARTAN 300 MG: 150 TABLET ORAL at 08:06

## 2019-06-02 RX ADMIN — AMLODIPINE BESYLATE 10 MG: 10 TABLET ORAL at 08:06

## 2019-06-02 RX ADMIN — CALCIUM 1000 MG: 500 TABLET ORAL at 08:06

## 2019-06-02 RX ADMIN — FAMOTIDINE 20 MG: 10 INJECTION, SOLUTION INTRAVENOUS at 08:06

## 2019-06-02 RX ADMIN — INSULIN ASPART 3 UNITS: 100 INJECTION, SOLUTION INTRAVENOUS; SUBCUTANEOUS at 07:06

## 2019-06-02 RX ADMIN — DEXAMETHASONE SODIUM PHOSPHATE 2 MG: 4 INJECTION, SOLUTION INTRA-ARTICULAR; INTRALESIONAL; INTRAMUSCULAR; INTRAVENOUS; SOFT TISSUE at 02:06

## 2019-06-02 RX ADMIN — ONDANSETRON 4 MG: 2 INJECTION INTRAMUSCULAR; INTRAVENOUS at 08:06

## 2019-06-02 RX ADMIN — INSULIN ASPART 3 UNITS: 100 INJECTION, SOLUTION INTRAVENOUS; SUBCUTANEOUS at 05:06

## 2019-06-02 NOTE — ASSESSMENT & PLAN NOTE
Post op B/L neck exploration for parathyroid adenom.  Very difficult dissection.  Complicated by vocal chord dysfunction, likely from stretch injury as nerve appeared intact on intraop NIMS monitoring and was grossly intact.  Pt intubated in PACU for increasing stridor. Extubated 5/28. Stepped down 5/30/19.    Extubated 5/28  Work-up emesis; KUB this morning  Delirium precautions in place; awake during the day, dark at night.   Haldol IV QHS stopped 6/1/19, improved mental status  MBSS 5/31/19; recommendations in place   Setting up home health speech therapy  Continue Ca supplementation  Medications switched to oral; crushed in puree  OOB / walking as tolerated  Tapering Dexamethasone, continue 2mg    Dispo: planning for Monday 6/3/19

## 2019-06-02 NOTE — PLAN OF CARE
"Plan of care reviewed with pt: AAOx4, on RA, VS stable. Transverse incision on neck CDI. On Tele, running normal soy to NSR with HR ran at 50s-60s most of the shift. No complains of nausea. Early this morning, pt stated he felt depressed and not "feeling well", was very restless and agitated, refused to eat breakfast, but after talking to family members, pt was calm and cooperative. No complains of pain. Complained of nausea only once, relieved with Zofran. Pt was instructed to increase fluid intake by mouth. Voided with no difficulty, no bowel movement. Call light in reach. Camera in room. WCTM.   "

## 2019-06-02 NOTE — SUBJECTIVE & OBJECTIVE
Interval History: No acute events. Reports an episode of emesis several hours after dinner. Has been passing flatus, normal BM. Lower abdominal cramping.    Medications:  Continuous Infusions:    Scheduled Meds:   amLODIPine  10 mg Oral Daily    calcium carbonate  1,000 mg Oral TID    dexamethasone  2 mg Intravenous Q8H    enoxaparin  40 mg Subcutaneous Daily    famotidine (PF)  20 mg Intravenous BID    insulin aspart U-100  3 Units Subcutaneous TIDWM    irbesartan  300 mg Oral Daily     PRN Meds:dextrose 50%, dextrose 50%, glucagon (human recombinant), glucose, glucose, hydrALAZINE, insulin aspart U-100, metoprolol, ondansetron, sodium chloride 0.9%     Review of patient's allergies indicates:   Allergen Reactions    Tizanidine Shortness Of Breath     Objective:     Vital Signs (Most Recent):  Temp: 97.5 °F (36.4 °C) (06/02/19 0705)  Pulse: (!) 49 (06/02/19 0705)  Resp: 18 (06/02/19 0705)  BP: (!) 147/74 (06/02/19 0705)  SpO2: (!) 93 % (06/02/19 0705) Vital Signs (24h Range):  Temp:  [97.5 °F (36.4 °C)-99 °F (37.2 °C)] 97.5 °F (36.4 °C)  Pulse:  [48-78] 49  Resp:  [16-20] 18  SpO2:  [89 %-96 %] 93 %  BP: (126-147)/(61-80) 147/74     Weight: 74.3 kg (163 lb 12.8 oz)  Body mass index is 23.5 kg/m².    Intake/Output - Last 3 Shifts       05/31 0700 - 06/01 0659 06/01 0700 - 06/02 0659 06/02 0700 - 06/03 0659    P.O. 180      I.V. (mL/kg) 2500 (33.6)      IV Piggyback 300      Total Intake(mL/kg) 2980 (40.1)      Urine (mL/kg/hr) 1800 (1) 1300 (0.7)     Stool 0 0     Total Output 1800 1300     Net +1180 -1300            Stool Occurrence 2 x 1 x         Physical Exam  NAD  Lucid and appropriate this morning  Neck is soft  Abdomen soft, NT, ND, good bowel sounds    Significant Labs:  CBC:   Recent Labs   Lab 06/01/19  0348   WBC 10.11   RBC 4.30*   HGB 13.2*   HCT 40.1      MCV 93   MCH 30.7   MCHC 32.9     CMP:   Recent Labs   Lab 06/01/19 0348   *   CALCIUM 9.4   ALBUMIN 2.8*   PROT 6.0       K 4.2   CO2 27      BUN 18   CREATININE 0.9   ALKPHOS 55   ALT 35   AST 28   BILITOT 0.5     MBSS 5/31/19: Transit: Normal oral transit time.    Penetration/Aspiration: Flash laryngeal penetration identified with both thin liquid and nectar thick liquid consistencies.    Miscellaneous: Residuals observed in the vallecular and piriform sinuses.  Visualized cervical spine demonstrates degenerative changes.

## 2019-06-02 NOTE — PROGRESS NOTES
[facility]  General Surgery  Progress Note    Subjective:     Post-Op Info:  Procedure(s) (LRB):  Exam under anesthesia; (N/A)  LARYNGOSCOPY, flexible   5 Days Post-Op     Interval History: No acute events. Reports an episode of emesis several hours after dinner. Has been passing flatus, normal BM. Lower abdominal cramping.    Medications:  Continuous Infusions:    Scheduled Meds:   amLODIPine  10 mg Oral Daily    calcium carbonate  1,000 mg Oral TID    dexamethasone  2 mg Intravenous Q8H    enoxaparin  40 mg Subcutaneous Daily    famotidine (PF)  20 mg Intravenous BID    insulin aspart U-100  3 Units Subcutaneous TIDWM    irbesartan  300 mg Oral Daily     PRN Meds:dextrose 50%, dextrose 50%, glucagon (human recombinant), glucose, glucose, hydrALAZINE, insulin aspart U-100, metoprolol, ondansetron, sodium chloride 0.9%     Review of patient's allergies indicates:   Allergen Reactions    Tizanidine Shortness Of Breath     Objective:     Vital Signs (Most Recent):  Temp: 97.5 °F (36.4 °C) (06/02/19 0705)  Pulse: (!) 49 (06/02/19 0705)  Resp: 18 (06/02/19 0705)  BP: (!) 147/74 (06/02/19 0705)  SpO2: (!) 93 % (06/02/19 0705) Vital Signs (24h Range):  Temp:  [97.5 °F (36.4 °C)-99 °F (37.2 °C)] 97.5 °F (36.4 °C)  Pulse:  [48-78] 49  Resp:  [16-20] 18  SpO2:  [89 %-96 %] 93 %  BP: (126-147)/(61-80) 147/74     Weight: 74.3 kg (163 lb 12.8 oz)  Body mass index is 23.5 kg/m².    Intake/Output - Last 3 Shifts       05/31 0700 - 06/01 0659 06/01 0700 - 06/02 0659 06/02 0700 - 06/03 0659    P.O. 180      I.V. (mL/kg) 2500 (33.6)      IV Piggyback 300      Total Intake(mL/kg) 2980 (40.1)      Urine (mL/kg/hr) 1800 (1) 1300 (0.7)     Stool 0 0     Total Output 1800 1300     Net +1180 -1300            Stool Occurrence 2 x 1 x         Physical Exam  NAD  Lucid and appropriate this morning  Neck is soft  Abdomen soft, NT, ND, good bowel sounds    Significant Labs:  CBC:   Recent Labs   Lab 06/01/19  0345    WBC 10.11   RBC 4.30*   HGB 13.2*   HCT 40.1      MCV 93   MCH 30.7   MCHC 32.9     CMP:   Recent Labs   Lab 06/01/19  0348   *   CALCIUM 9.4   ALBUMIN 2.8*   PROT 6.0      K 4.2   CO2 27      BUN 18   CREATININE 0.9   ALKPHOS 55   ALT 35   AST 28   BILITOT 0.5     MBSS 5/31/19: Transit: Normal oral transit time.    Penetration/Aspiration: Flash laryngeal penetration identified with both thin liquid and nectar thick liquid consistencies.    Miscellaneous: Residuals observed in the vallecular and piriform sinuses.  Visualized cervical spine demonstrates degenerative changes.      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  Very difficult dissection.  Complicated by vocal chord dysfunction, likely from stretch injury as nerve appeared intact on intraop NIMS monitoring and was grossly intact.  Pt intubated in PACU for increasing stridor. Extubated 5/28. Stepped down 5/30/19.    Extubated 5/28  Work-up emesis; KUB this morning  Delirium precautions in place; awake during the day, dark at night.   Haldol IV QHS stopped 6/1/19, improved mental status  MBSS 5/31/19; recommendations in place   Setting up home health speech therapy  Continue Ca supplementation  Medications switched to oral; crushed in puree  OOB / walking as tolerated  Tapering Dexamethasone, continue 2mg    Dispo: planning for Monday 6/3/19        Laura Shelton MD  General Surgery  [facility]

## 2019-06-02 NOTE — PLAN OF CARE
Problem: Adult Inpatient Plan of Care  Goal: Plan of Care Review  63yoM Hx: Graves Dz, total thyroidectomy, DM, HTN, Hyperparathyroid    5/22 Parathyroidectomy - PACU extubated - emergent reintubation for stridor  5/26 72h steroid treatment completed  5/28 OR extubation. Insulin gtt stopped  5/29: BRICE drain removed.  OOBTC.  5/30: transfer orders placed    Nursing:  -daily labs       Outcome: Ongoing (interventions implemented as appropriate)  POC reviewed with pt. Pt  AAOX'4. Pt on room air.VSS.  Tranverse incision on neck clean and intact. No complaints of pain. Pt refused all meds,states meds make him feel sick. MD notified. Bed in lowest position with call light within reach.No acute events.  Camera in room. Bed alarm on. WCTM

## 2019-06-02 NOTE — PLAN OF CARE
Reviewed BG levels and insulin regimen.      Goal BG while inpatient: 140-180 mg/dl  Current BG levels are above goal  On PO steroid per primary  On regular diet  Hyperglycemia due to steroid      Recommendations:   Novolog 3 U TIDWM started  Low dose correction scale  POCT AC/HS      Hyperparathyroidism  Sp B/L neck exploration for parathyroid adenoma Complicated by vocal chord dysfunction  Calcium is stable  Managed per primary  On Calcium replacement      We will follow with you, call with any questions    Sunday Faulkner MD  Endocrine Fellow  6/2/2019

## 2019-06-03 VITALS
HEIGHT: 70 IN | DIASTOLIC BLOOD PRESSURE: 71 MMHG | RESPIRATION RATE: 20 BRPM | BODY MASS INDEX: 23.45 KG/M2 | HEART RATE: 61 BPM | SYSTOLIC BLOOD PRESSURE: 136 MMHG | TEMPERATURE: 99 F | WEIGHT: 163.81 LBS | OXYGEN SATURATION: 98 %

## 2019-06-03 PROBLEM — Z78.9 ON ENTERAL NUTRITION: Status: RESOLVED | Noted: 2019-05-25 | Resolved: 2019-06-03

## 2019-06-03 LAB
POCT GLUCOSE: 161 MG/DL (ref 70–110)
POCT GLUCOSE: 171 MG/DL (ref 70–110)
POCT GLUCOSE: 185 MG/DL (ref 70–110)

## 2019-06-03 PROCEDURE — 25000003 PHARM REV CODE 250: Performed by: STUDENT IN AN ORGANIZED HEALTH CARE EDUCATION/TRAINING PROGRAM

## 2019-06-03 PROCEDURE — 63600175 PHARM REV CODE 636 W HCPCS: Performed by: STUDENT IN AN ORGANIZED HEALTH CARE EDUCATION/TRAINING PROGRAM

## 2019-06-03 PROCEDURE — 25000003 PHARM REV CODE 250: Performed by: SURGERY

## 2019-06-03 PROCEDURE — 25000003 PHARM REV CODE 250: Performed by: HOSPITALIST

## 2019-06-03 PROCEDURE — S0028 INJECTION, FAMOTIDINE, 20 MG: HCPCS | Performed by: STUDENT IN AN ORGANIZED HEALTH CARE EDUCATION/TRAINING PROGRAM

## 2019-06-03 PROCEDURE — 99232 PR SUBSEQUENT HOSPITAL CARE,LEVL II: ICD-10-PCS | Mod: ,,, | Performed by: INTERNAL MEDICINE

## 2019-06-03 PROCEDURE — 99232 SBSQ HOSP IP/OBS MODERATE 35: CPT | Mod: ,,, | Performed by: INTERNAL MEDICINE

## 2019-06-03 RX ORDER — HYDROCODONE BITARTRATE AND ACETAMINOPHEN 5; 325 MG/1; MG/1
1 TABLET ORAL EVERY 6 HOURS PRN
Qty: 15 TABLET | Refills: 0 | Status: SHIPPED | OUTPATIENT
Start: 2019-06-03 | End: 2019-09-10

## 2019-06-03 RX ORDER — CALCIUM CARBONATE 500(1250)
1000 TABLET ORAL 2 TIMES DAILY
Status: DISCONTINUED | OUTPATIENT
Start: 2019-06-03 | End: 2019-06-03 | Stop reason: HOSPADM

## 2019-06-03 RX ORDER — CALCIUM CARBONATE 500(1250)
1000 TABLET ORAL 2 TIMES DAILY
Refills: 0 | COMMUNITY
Start: 2019-06-03 | End: 2019-07-15

## 2019-06-03 RX ORDER — FAMOTIDINE 20 MG/1
20 TABLET, FILM COATED ORAL 2 TIMES DAILY
Status: DISCONTINUED | OUTPATIENT
Start: 2019-06-03 | End: 2019-06-03 | Stop reason: HOSPADM

## 2019-06-03 RX ADMIN — INSULIN ASPART 3 UNITS: 100 INJECTION, SOLUTION INTRAVENOUS; SUBCUTANEOUS at 11:06

## 2019-06-03 RX ADMIN — FAMOTIDINE 20 MG: 10 INJECTION, SOLUTION INTRAVENOUS at 08:06

## 2019-06-03 RX ADMIN — INSULIN ASPART 3 UNITS: 100 INJECTION, SOLUTION INTRAVENOUS; SUBCUTANEOUS at 07:06

## 2019-06-03 RX ADMIN — IRBESARTAN 300 MG: 150 TABLET ORAL at 08:06

## 2019-06-03 RX ADMIN — LEVOTHYROXINE SODIUM 137 MCG: 25 TABLET ORAL at 05:06

## 2019-06-03 RX ADMIN — CALCIUM 1000 MG: 500 TABLET ORAL at 08:06

## 2019-06-03 RX ADMIN — AMLODIPINE BESYLATE 10 MG: 10 TABLET ORAL at 08:06

## 2019-06-03 RX ADMIN — DEXAMETHASONE SODIUM PHOSPHATE 2 MG: 4 INJECTION, SOLUTION INTRA-ARTICULAR; INTRALESIONAL; INTRAMUSCULAR; INTRAVENOUS; SOFT TISSUE at 06:06

## 2019-06-03 NOTE — ASSESSMENT & PLAN NOTE
BG goal 140-180 while inpatient.    Novolog 3 units with meals  POC AC/HS  LOw dose correction    Discharge plans:  Resume metformin + Januvia - should be enough to maintain euglycemic (even if he goes on steroids) given the current dose of insulin he's on now.  Follow-up with Dr. Jimenez and Dr. Brown for outpatient management.

## 2019-06-03 NOTE — ASSESSMENT & PLAN NOTE
Post op B/L neck exploration for parathyroid adenom.  Very difficult dissection.  Complicated by vocal chord dysfunction, likely from stretch injury as nerve appeared intact on intraop NIMS monitoring and was grossly intact.  Pt intubated in PACU for increasing stridor. Extubated 5/28. Stepped down 5/30/19.    Extubated 5/28  MBSS 5/31/19; recommendations in place   Setting up home health speech therapy  Continue Ca supplementation  Medications switched to oral; crushed in puree  OOB / walking as tolerated  Tapering Dexamethasone, continue 2mg  Likely discharge today

## 2019-06-03 NOTE — PROGRESS NOTES
"[facility]  Endocrinology  Progress Note    Admit Date: 5/22/2019     Reason for Consult: Management of T2DM, Hyperglycemia     Surgical Procedure and Date: Parathyroidectomy with IOPTH monitoring    Diabetes diagnosis year: NATALEE     Home Diabetes Medications:  metformin 100 mg BID; januvia 100 mg daily   Lab Results   Component Value Date    LABA1C 9.2 (H) 05/18/2016    HGBA1C 6.1 (H) 05/16/2019         How often checking glucose at home? NATALEE  BG readings on regimen: NATALEE  Hypoglycemia on the regimen?  NATALEE  Missed doses on regimen?  NATALEE    Diabetes Complications include:     NATALEE    Complicating diabetes co morbidities:   none      HPI:   Patient is a 63 y.o. male with a diagnosis of type 2 DM well controlled on dual oral agent per chart review. Also with HTN, Grave's disease (s/p total thyroidectomy, previous pheochromocytoma resection, and primary hyperparathyroidism (s/p parathyroidectomy). Endocrinology consulted for BG/ DM management.     Interval HPI:   Overnight events: To be discharged home today. BG reasonably controlled with minimal steroids. Primary team still deciding on need for steroids at discharge or not. Back on home dose of levothyroxine.    /71   Pulse 61   Temp 99.1 °F (37.3 °C)   Resp 20   Ht 5' 10" (1.778 m)   Wt 74.3 kg (163 lb 12.8 oz)   SpO2 98%   BMI 23.50 kg/m²      Labs Reviewed and Include    No results for input(s): GLU, CALCIUM, ALBUMIN, PROT, NA, K, CO2, CL, BUN, CREATININE, ALKPHOS, ALT, AST, BILITOT in the last 24 hours.  Lab Results   Component Value Date    WBC 10.11 06/01/2019    HGB 13.2 (L) 06/01/2019    HCT 40.1 06/01/2019    MCV 93 06/01/2019     06/01/2019     No results for input(s): TSH, FREET4 in the last 168 hours.  Lab Results   Component Value Date    HGBA1C 6.1 (H) 05/16/2019       Nutritional status:   Body mass index is 23.5 kg/m².  Lab Results   Component Value Date    ALBUMIN 2.8 (L) 06/01/2019    ALBUMIN 3.1 (L) 05/31/2019    " ALBUMIN 3.1 (L) 05/30/2019     No results found for: PREALBUMIN    Estimated Creatinine Clearance: 87.9 mL/min (based on SCr of 0.9 mg/dL).    Accu-Checks  Recent Labs     06/01/19  1233 06/01/19  1707 06/01/19  2050 06/02/19  0707 06/02/19  1144 06/02/19  1700 06/02/19  2140 06/03/19  0522 06/03/19  0744 06/03/19  1140   POCTGLUCOSE 227* 247* 187* 185* 176* 196* 200* 161* 171* 185*       Current Medications and/or Treatments Impacting Glycemic Control  Immunotherapy:    Immunosuppressants     None        Steroids:   Hormones (From admission, onward)    Start     Stop Route Frequency Ordered    06/01/19 1400  dexamethasone injection 2 mg      -- IV Every 8 hours 06/01/19 0910        Pressors:    Autonomic Drugs (From admission, onward)    Start     Stop Route Frequency Ordered    05/22/19 1958  succinylcholine (ANECTINE) 20 mg/mL injection     Note to Pharmacy:  Created by [email or web address] override    05/23 0759   05/22/19 1958 05/22/19 1926  rocuronium (ZEMURON) 10 mg/mL injection     Note to Pharmacy:  Created by [email or web address] override    05/23 0729   05/22/19 1926        Hyperglycemia/Diabetes Medications:   Antihyperglycemics (From admission, onward)    Start     Stop Route Frequency Ordered    06/01/19 1750  insulin aspart U-100 pen 0-5 Units      -- SubQ Before meals & nightly PRN 06/01/19 1650    06/01/19 1200  insulin aspart U-100 pen 3 Units      -- SubQ 3 times daily with meals 06/01/19 1051          ASSESSMENT and PLAN    * Hyperparathyroidism  Managed per primary.   Sp B/L neck exploration for parathyroid adenoma Complicated by vocal chord dysfunction  Calcium is stable  On Calcium carbonate 1000 mg twice daily - can be weaned if calcium remains stable at follow-up.    Vocal cord paralysis, bilateral partial  Per primary team.    Adrenal cortical steroids causing adverse effect in therapeutic use  Glucocorticoids markedly increase glucoses.   Glucose improving with tapering steroids.  Primary team deciding on need  for ongoing steroid taper at home.    Uncontrolled type 2 diabetes mellitus with complication, with long-term current use of insulin  BG goal 140-180 while inpatient.    Novolog 3 units with meals  POC AC/HS  LOw dose correction    Discharge plans:  Resume metformin + Januvia - should be enough to maintain euglycemic (even if he goes on steroids) given the current dose of insulin he's on now.  Follow-up with Dr. Jimenez and Dr. Brown for outpatient management.          Kenroy Melissa MD  Endocrinology  [facility], Malka Michael MD,  have personally taken the history and examined the patient and agree with the resident's note as stated above.'

## 2019-06-03 NOTE — PLAN OF CARE
Problem: Adult Inpatient Plan of Care  Goal: Plan of Care Review  63yoM Hx: Graves Dz, total thyroidectomy, DM, HTN, Hyperparathyroid    5/22 Parathyroidectomy - PACU extubated - emergent reintubation for stridor  5/26 72h steroid treatment completed  5/28 OR extubation. Insulin gtt stopped  5/29: BRICE drain removed.  OOBTC.  5/30: transfer orders placed    Nursing:  -daily labs       Plan of care reviewed with pt/verbalized understanding, vss, patient denies pain, patient being monitored by camera, patient up to bathroom with assistance, will continue to monitor.

## 2019-06-03 NOTE — PLAN OF CARE
SW was informed in morning huddle that Pt is medically stable for discharge today. He will need home health. Per the protocol of Pt's [facility] ([facility]), IAN sent all necessary referral information to [facility] and asked that she be notified of the agency to which Pt is assigned. IAN notified [facility] that Pt was discharge ready today. SW to continue to follow.     Sheela Valdovinos, [facility]        06/03/19 3749   Post-Acute Status   Post-Acute Authorization Home Health/Hospice   Home Health/Hospice Status Referrals Sent     Update: IAN followed up with [facility] and was informed Pt had been set up with [facility]. Pt has already left the hospital. Home Health to come see Pt tomorrow.

## 2019-06-03 NOTE — PROGRESS NOTES
[facility]  General Surgery  Progress Note    Subjective:     History of Present Illness:  No notes on file    Post-Op Info:  Procedure(s) (LRB):  Exam under anesthesia; (N/A)  LARYNGOSCOPY, flexible   6 Days Post-Op     Interval History: No acute events overnight. Alert and oriented. Breathing on room air. Talking normally. Eager to go home.     Medications:  Continuous Infusions:  Scheduled Meds:   amLODIPine  10 mg Oral Daily    calcium carbonate  1,000 mg Oral BID    dexamethasone  2 mg Intravenous Q8H    enoxaparin  40 mg Subcutaneous Daily    famotidine (PF)  20 mg Intravenous BID    insulin aspart U-100  3 Units Subcutaneous TIDWM    irbesartan  300 mg Oral Daily    levothyroxine  137 mcg Oral Before breakfast     PRN Meds:dextrose 50%, dextrose 50%, glucagon (human recombinant), glucose, glucose, hydrALAZINE, insulin aspart U-100, metoprolol, ondansetron, sodium chloride 0.9%     Review of patient's allergies indicates:   Allergen Reactions    Tizanidine Shortness Of Breath     Objective:     Vital Signs (Most Recent):  Temp: 98 °F (36.7 °C) (06/03/19 0521)  Pulse: (!) 57 (06/03/19 0719)  Resp: 18 (06/03/19 0521)  BP: (!) 151/77 (06/03/19 0521)  SpO2: 98 % (06/03/19 0521) Vital Signs (24h Range):  Temp:  [97.2 °F (36.2 °C)-98.1 °F (36.7 °C)] 98 °F (36.7 °C)  Pulse:  [44-68] 57  Resp:  [18-20] 18  SpO2:  [93 %-98 %] 98 %  BP: (135-178)/(65-84) 151/77     Weight: 74.3 kg (163 lb 12.8 oz)  Body mass index is 23.5 kg/m².    Intake/Output - Last 3 Shifts       06/01 0700 - 06/02 0659 06/02 0700 - 06/03 0659 06/03 0700 - 06/04 0659    P.O.       I.V. (mL/kg)       IV Piggyback       Total Intake(mL/kg)       Urine (mL/kg/hr) 1300 (0.7)      Stool 0      Total Output 1300      Net -1300             Urine Occurrence  2 x     Stool Occurrence 1 x 1 x     Emesis Occurrence  0 x           Physical Exam   Constitutional: He is oriented to person, place, and time.   HENT:   Normal phonation.     Neck:   Incision clean, dry, and intact.    Cardiovascular: Normal rate.   Pulmonary/Chest: Effort normal.   Neurological: He is alert and oriented to person, place, and time.   Nursing note and vitals reviewed.      Significant Labs:  CBC:   Recent Labs   Lab 06/01/19  0348   WBC 10.11   RBC 4.30*   HGB 13.2*   HCT 40.1      MCV 93   MCH 30.7   MCHC 32.9     CMP:   Recent Labs   Lab 06/01/19  0348   *   CALCIUM 9.4   ALBUMIN 2.8*   PROT 6.0      K 4.2   CO2 27      BUN 18   CREATININE 0.9   ALKPHOS 55   ALT 35   AST 28   BILITOT 0.5       Significant Diagnostics:  I have reviewed all pertinent imaging results/findings within the past 24 hours.    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  Very difficult dissection.  Complicated by vocal chord dysfunction, likely from stretch injury as nerve appeared intact on intraop NIMS monitoring and was grossly intact.  Pt intubated in PACU for increasing stridor. Extubated 5/28. Stepped down 5/30/19.    Extubated 5/28  MBSS 5/31/19; recommendations in place   Setting up home health speech therapy  Continue Ca supplementation  Medications switched to oral; crushed in puree  OOB / walking as tolerated  Tapering Dexamethasone, continue 2mg  Likely discharge today        Jimmie Lombardi MD  General Surgery  [facility]

## 2019-06-03 NOTE — SUBJECTIVE & OBJECTIVE
Interval History: No acute events overnight. Alert and oriented. Breathing on room air. Talking normally. Eager to go home.     Medications:  Continuous Infusions:  Scheduled Meds:   amLODIPine  10 mg Oral Daily    calcium carbonate  1,000 mg Oral BID    dexamethasone  2 mg Intravenous Q8H    enoxaparin  40 mg Subcutaneous Daily    famotidine (PF)  20 mg Intravenous BID    insulin aspart U-100  3 Units Subcutaneous TIDWM    irbesartan  300 mg Oral Daily    levothyroxine  137 mcg Oral Before breakfast     PRN Meds:dextrose 50%, dextrose 50%, glucagon (human recombinant), glucose, glucose, hydrALAZINE, insulin aspart U-100, metoprolol, ondansetron, sodium chloride 0.9%     Review of patient's allergies indicates:   Allergen Reactions    Tizanidine Shortness Of Breath     Objective:     Vital Signs (Most Recent):  Temp: 98 °F (36.7 °C) (06/03/19 0521)  Pulse: (!) 57 (06/03/19 0719)  Resp: 18 (06/03/19 0521)  BP: (!) 151/77 (06/03/19 0521)  SpO2: 98 % (06/03/19 0521) Vital Signs (24h Range):  Temp:  [97.2 °F (36.2 °C)-98.1 °F (36.7 °C)] 98 °F (36.7 °C)  Pulse:  [44-68] 57  Resp:  [18-20] 18  SpO2:  [93 %-98 %] 98 %  BP: (135-178)/(65-84) 151/77     Weight: 74.3 kg (163 lb 12.8 oz)  Body mass index is 23.5 kg/m².    Intake/Output - Last 3 Shifts       06/01 0700 - 06/02 0659 06/02 0700 - 06/03 0659 06/03 0700 - 06/04 0659    P.O.       I.V. (mL/kg)       IV Piggyback       Total Intake(mL/kg)       Urine (mL/kg/hr) 1300 (0.7)      Stool 0      Total Output 1300      Net -1300             Urine Occurrence  2 x     Stool Occurrence 1 x 1 x     Emesis Occurrence  0 x           Physical Exam   Constitutional: He is oriented to person, place, and time.   HENT:   Normal phonation.    Neck:   Incision clean, dry, and intact.    Cardiovascular: Normal rate.   Pulmonary/Chest: Effort normal.   Neurological: He is alert and oriented to person, place, and time.   Nursing note and vitals reviewed.      Significant  Labs:  CBC:   Recent Labs   Lab 06/01/19  0348   WBC 10.11   RBC 4.30*   HGB 13.2*   HCT 40.1      MCV 93   MCH 30.7   MCHC 32.9     CMP:   Recent Labs   Lab 06/01/19  0348   *   CALCIUM 9.4   ALBUMIN 2.8*   PROT 6.0      K 4.2   CO2 27      BUN 18   CREATININE 0.9   ALKPHOS 55   ALT 35   AST 28   BILITOT 0.5       Significant Diagnostics:  I have reviewed all pertinent imaging results/findings within the past 24 hours.

## 2019-06-03 NOTE — NURSING
Discharge instructions reviewed with patient. Prescription for Norco given. Pt. Verbalizes understanding. PIV removed with catheter tip intact. Wheelchair offered. Pt. Awaiting transport personnel to arrive.

## 2019-06-03 NOTE — SUBJECTIVE & OBJECTIVE
"Interval HPI:   Overnight events: To be discharged home today. BG reasonably controlled with minimal steroids. Primary team still deciding on need for steroids at discharge or not. Back on home dose of levothyroxine.    /71   Pulse 61   Temp 99.1 °F (37.3 °C)   Resp 20   Ht 5' 10" (1.778 m)   Wt 74.3 kg (163 lb 12.8 oz)   SpO2 98%   BMI 23.50 kg/m²     Labs Reviewed and Include    No results for input(s): GLU, CALCIUM, ALBUMIN, PROT, NA, K, CO2, CL, BUN, CREATININE, ALKPHOS, ALT, AST, BILITOT in the last 24 hours.  Lab Results   Component Value Date    WBC 10.11 06/01/2019    HGB 13.2 (L) 06/01/2019    HCT 40.1 06/01/2019    MCV 93 06/01/2019     06/01/2019     No results for input(s): TSH, FREET4 in the last 168 hours.  Lab Results   Component Value Date    HGBA1C 6.1 (H) 05/16/2019       Nutritional status:   Body mass index is 23.5 kg/m².  Lab Results   Component Value Date    ALBUMIN 2.8 (L) 06/01/2019    ALBUMIN 3.1 (L) 05/31/2019    ALBUMIN 3.1 (L) 05/30/2019     No results found for: PREALBUMIN    Estimated Creatinine Clearance: 87.9 mL/min (based on SCr of 0.9 mg/dL).    Accu-Checks  Recent Labs     06/01/19  1233 06/01/19  1707 06/01/19  2050 06/02/19  0707 06/02/19  1144 06/02/19  1700 06/02/19  2140 06/03/19  0522 06/03/19  0744 06/03/19  1140   POCTGLUCOSE 227* 247* 187* 185* 176* 196* 200* 161* 171* 185*       Current Medications and/or Treatments Impacting Glycemic Control  Immunotherapy:    Immunosuppressants     None        Steroids:   Hormones (From admission, onward)    Start     Stop Route Frequency Ordered    06/01/19 1400  dexamethasone injection 2 mg      -- IV Every 8 hours 06/01/19 0910        Pressors:    Autonomic Drugs (From admission, onward)    Start     Stop Route Frequency Ordered    05/22/19 1958  succinylcholine (ANECTINE) 20 mg/mL injection     Note to Pharmacy:  Created by cabinet override    05/23 0759   05/22/19 1958    05/22/19 1926  rocuronium (ZEMURON) 10 " mg/mL injection     Note to Pharmacy:  Created by cabinet override    05/23 0729   05/22/19 1926        Hyperglycemia/Diabetes Medications:   Antihyperglycemics (From admission, onward)    Start     Stop Route Frequency Ordered    06/01/19 1750  insulin aspart U-100 pen 0-5 Units      -- SubQ Before meals & nightly PRN 06/01/19 1650    06/01/19 1200  insulin aspart U-100 pen 3 Units      -- SubQ 3 times daily with meals 06/01/19 1051

## 2019-06-03 NOTE — DISCHARGE INSTRUCTIONS
After Parathyroidectomy    Parathyroidectomy is to correct primary hyperparathyroidism, which leads to normal calcium levels in the body. Normally bones have an ongoing process of bone breakdown and new bone growth. The terms osteoporosis and osteopenia describe bones that are thin and at risk for fractures or breaks.    Calcium and vitamin D are the keys to promoting healthy bones. 99% of the calcium in the body is stored in the bones and teeth. Most people do not receive enough calcium with their daily food.    Vitamin D is needed to absorb calcium from food and maintain normal levels. When tested, many people will have low vitamin D levels in the blood.    It is healthy practice to maintain a good intake of both calcium and vitamin D.    Your vitamin D level is:    Lab Results   Component Value Date    [id number] 32 02/25/2019       A normal vitamin D level is greater than 32.    The recommended daily intake is:    Calcium 1545-3321 mg  Vitamin D 800-1000 IU    A multivitamin pill may provide 162 mg of calcium and 400 IU of vitamin D.    It is recommended that an additional vitamin D with calcium supplement be used. A healthy goal is to aim for 1000 mg of calcium and 800 IU of vitamin D daily.    Blood tests after parathyroid surgery will recheck your calcium and PTH or parathyroid hormone levels. Today at your visit after surgery, your blood tests will be completed. In addition you should have a calcium and PTH blood test at 6 and 12 months after your surgery. You can have these tests at your regular doctor's office, or we can order these tests.    After parathyroid surgery your calcium should be in the normal range. A normal calcium level indicates a curative operation. For unclear reasons, the PTH may be elevated in up to 40% of patients with normal calcium.

## 2019-06-03 NOTE — PROGRESS NOTES
Pharmacist Intervention IV to PO Note    Rob Jones Jr. is a 63 y.o. male being treated with IV medication famotidine    Patient Data:    Vital Signs (Most Recent):  Temp: 98.5 °F (36.9 °C) (06/03/19 0817)  Pulse: 60 (06/03/19 0817)  Resp: 20 (06/03/19 0817)  BP: (!) 149/80 (06/03/19 0817)  SpO2: 98 % (06/03/19 0817)   Vital Signs (72h Range):  Temp:  [97.2 °F (36.2 °C)-99 °F (37.2 °C)]   Pulse:  [44-78]   Resp:  [14-20]   BP: (126-178)/(61-86)   SpO2:  [89 %-99 %]      CBC:  Recent Labs   Lab 05/30/19  0400 05/31/19  0330 06/01/19  0348   WBC 16.26* 13.14* 10.11   RBC 3.98* 4.46* 4.30*   HGB 12.4* 13.6* 13.2*   HCT 37.0* 41.4 40.1    305 156   MCV 93 93 93   MCH 31.2* 30.5 30.7   MCHC 33.5 32.9 32.9     CMP:     Recent Labs   Lab 05/30/19  0400 05/31/19  0330 06/01/19  0348   * 126* 216*   CALCIUM 9.5 9.4 9.4   ALBUMIN 3.1* 3.1* 2.8*   PROT 6.1 6.5 6.0    139 137   K 4.2 4.0 4.2   CO2 25 26 27    101 102   BUN 46* 27* 18   CREATININE 1.1 0.9 0.9   ALKPHOS 65 65 55   ALT 53* 45* 35   AST 61* 36 28   BILITOT 0.6 0.7 0.5       Dietary Orders:  Diet Orders            Diet Adult Regular (IDDSI Level 7) [facility]; [place] [facility]: Regular starting at 06/01 0910            Based on the following criteria, this patient qualifies for intravenous to oral conversion:  [x] The patients gastrointestinal tract is functioning (tolerating medications via oral or enteral route for 24 hours and tolerating food or enteral feeds for 24 hours).      IV medication famotidine 20 mg bid will be changed to oral medication famotidine 20 mg bid    Pharmacist's Name: Brendon Quijano  Pharmacist's Extension: [phone]

## 2019-06-03 NOTE — ASSESSMENT & PLAN NOTE
Glucocorticoids markedly increase glucoses.   Glucose improving with tapering steroids.  Primary team deciding on need for ongoing steroid taper at home.

## 2019-06-03 NOTE — ASSESSMENT & PLAN NOTE
Managed per primary.   Sp B/L neck exploration for parathyroid adenoma Complicated by vocal chord dysfunction  Calcium is stable  On Calcium carbonate 1000 mg twice daily - can be weaned if calcium remains stable at follow-up.

## 2019-06-03 NOTE — PLAN OF CARE
Patient discharging home today with home health to be assigned by [facility].  Patient instructed to call Dr Carmona's office for date and time of follow up appointment.    Noted patient set up with [facility].       06/03/19 1353   Final Note   Assessment Type Final Discharge Note   Anticipated Discharge Disposition Home-Health   Right Care Referral Info   Post Acute Recommendation Home-care

## 2019-06-04 NOTE — PT/OT/SLP DISCHARGE
Occupational Therapy Discharge Summary    Rob Jones Jr.  MRN: [id number]   Principal Problem: Hyperparathyroidism      Patient Discharged from acute Occupational Therapy on 6/4/19.      Assessment:      Patient was discharged unexpectedly.  Information required to complete an accurate discharge summary is unknown.  Refer to therapy initial evaluation and last progress note for initial and most recent functional status and goal achievement.  Recommendations made may be found in medical record.    Objective:     GOALS:   Multidisciplinary Problems     Occupational Therapy Goals        Problem: Occupational Therapy Goal    Goal Priority Disciplines Outcome Interventions   Occupational Therapy Goal     OT, PT/OT Ongoing (interventions implemented as appropriate)    Description:  Goals to be met by: 6/8/19     Patient will increase functional independence with ADLs by performing:    Feeding with Modified [place].  UE Dressing with Supervision.  LE Dressing with Supervision.  Grooming while standing at sink with Supervision.  Toileting from toilet with Supervision for hygiene and clothing management.   Toilet transfer to toilet with Supervision.                      Reasons for Discontinuation of Therapy Services  Transfer to alternate level of care.      Plan:     Patient Discharged to: Home with Home Health Service    FELICIA Nielsen  6/4/2019

## 2019-06-04 NOTE — HPI
Patient is a 63 y.o. male who was referred by Dr. Min Brown and is here unaccompanied and presents for evaluation of primary hyperparathyroidism. The patient has had complaints of elevation of the serum calcium and parathormone. There is no history of lithium or thiazide medication use. He has not had previous history of head or neck radiation. He admits sleep disturbance, mood changes, constipation and lethargy. He denies joint pain, abdominal pain, increased urination and increased thirst. Furthermore, there is no history of pathologic fractures. The patient is vitamin D deficient. He is not on calcium supplementation. He does not have familial history of endocrinopathies. Of note, the patient does have a hx of graves disease which was treated surgically with a total thyroidectomy in 2000 at [facility], reportedly the pathology was benign at that time. 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 His history in light of hyperparathyroidism raises the suspicioun for MEN2a syndrome, despite benign thyroid pathology was benign. He presents today for evaluation of hyperparathyroidism with elevated calcium, unknown hypercalciuria, and some osteopenia. He does have to localization studies indicating a possible adenoma on the right side. He feels well today.

## 2019-06-05 ENCOUNTER — PATIENT OUTREACH (OUTPATIENT)
Dept: ADMINISTRATIVE | Facility: CLINIC | Age: 64
End: 2019-06-05

## 2019-06-05 NOTE — TELEPHONE ENCOUNTER
----- Message from Shy Oneill sent at 6/5/2019  3:15 PM CDT -----  Contact: Omi with [facility]             Name of Who is Calling: Omi with [facility]       What is the request in detail: Omi states the pt was admitted to  today and states the pt's current glucometer is broken and he needs an order for a new glucometer and all the supplies that go with it. Please send to [facility] at [phone]. Please contact to further discuss and advise.      Can the clinic reply by [facility]: N      What Number to Call Back [facility]: [phone]

## 2019-06-05 NOTE — HOSPITAL COURSE
Patient underwent parathyroidectomy. No obvious complications while in OR. In PACU, patient had loud, audible stridor and was emergently intubated and examined by ENT who found paramedian and immobile TVCs bilaterally. He was started on steroids and left intubated in ICU for a few days. He was then taken to the OR for extubation for possible tracheostomy. ENT examined patient at that time which showed no indication for tracheostomy at that time. Patient was extubated successfully. He was then discharged after continued monitoring of respiratory status and until his clinical status was stable.

## 2019-06-05 NOTE — DISCHARGE SUMMARY
[facility]  General Surgery  Discharge Summary      Patient Name: Rob Jones Jr.  MRN: [id number]  Admission Date: 5/22/2019  Hospital Length of Stay: 11 days  Discharge Date and Time:  06/04/2019 7:14 PM  Attending Physician: Afshan att. providers found   Discharging Provider: iJmmie Lombardi MD  Primary Care Provider: Vasyl Jimenez MD    HPI:   Patient is a 63 y.o. male who was referred by Dr. Min Brown and is here unaccompanied and presents for evaluation of primary hyperparathyroidism. The patient has had complaints of elevation of the serum calcium and parathormone. There is no history of lithium or thiazide medication use. He has not had previous history of head or neck radiation. He admits sleep disturbance, mood changes, constipation and lethargy. He denies joint pain, abdominal pain, increased urination and increased thirst. Furthermore, there is no history of pathologic fractures. The patient is vitamin D deficient. He is not on calcium supplementation. He does not have familial history of endocrinopathies. Of note, the patient does have a hx of graves disease which was treated surgically with a total thyroidectomy in 2000 at [facility], reportedly the pathology was benign at that time. He also had a pheochromocytoma removed in [place] in about 2005, this was reportedly benign as well. His history in light of hyperparathyroidism raises the suspicioun for MEN2a syndrome, despite benign thyroid pathology was benign. He presents today for evaluation of hyperparathyroidism with elevated calcium, unknown hypercalciuria, and some osteopenia. He does have to localization studies indicating a possible adenoma on the right side. He feels well today.     Procedure(s) (LRB):  Exam under anesthesia; (N/A)  LARYNGOSCOPY, flexible      Indwelling Lines/Drains at time of discharge:   Lines/Drains/Airways          None        Hospital Course:    Patient underwent parathyroidectomy. No  obvious complications while in OR.   In PACU (approximately 45 min postoperatively), patient began with audible stridor.  Though he was hemodynamically stable, able to protect his airway, and maintaining oxygenation decision was made to electively intubate patient.  ENT was available and performed flexible laryngoscopy at the bedside.  At that time findings were paramedian and immobile TVCs bilaterally.  Following discussion with ENT,  he was started on steroids and left intubated in ICU.  He was taken to the OR for extubation, exam under anesthesia, and possible tracheostomy.   The patient was extubated without incident.  ENT was available for exam under anesthesia. It was determined that tracheostomy placement was not necessary and patient was transferred back to the ICU for continued observation.  Once stable, he was transferred to the floor.  Swallow study demonstrated mild dysphagia and patient was treated  under the care of speech therapy.  Once he was felt to have  reached maximal hospital benefit, he was then discharged with home health.      Consults:   Consults (From admission, onward)        Status Ordering Provider     Inpatient consult to Endocrinology  Once     Provider:  (Not yet assigned)    Completed ANABELLE FLORES     Inpatient consult to Midline team  Once     Provider:  (Not yet assigned)    Completed SHERRIE HENSLEY     Inpatient consult to Registered Dietitian/Nutritionist  Once     Provider:  (Not yet assigned)    Completed ADRIAN GRAF          Significant Diagnostic Studies: Labs: CMP No results for input(s): NA, K, CL, CO2, GLU, BUN, CREATININE, CALCIUM, PROT, ALBUMIN, BILITOT, ALKPHOS, AST, ALT, ANIONGAP, ESTGFRAFRICA, EGFRNONAA in the last 48 hours. and CBC No results for input(s): WBC, HGB, HCT, PLT in the last 48 hours.    Pending Diagnostic Studies:     None        Final Active Diagnoses:    Diagnosis Date Noted POA    PRINCIPAL PROBLEM:  Hyperparathyroidism [E21.3]  05/22/2019 Yes    Hyperglycemia [R73.9]  No    Vocal cord paralysis, bilateral partial [J38.02] 05/27/2019 Unknown    Acute respiratory insufficiency [R06.89]  No    Adrenal cortical steroids causing adverse effect in therapeutic use [T38.0X5A] 05/25/2019 Unknown    Respiratory distress [R06.03]  No    Uncontrolled type 2 diabetes mellitus with complication, with long-term current use of insulin [E11.8, E11.65, Z79.4] 05/08/2017 Not Applicable      Problems Resolved During this Admission:    Diagnosis Date Noted Date Resolved POA    On enteral nutrition [Z78.9] 05/25/2019 06/03/2019 Unknown      Discharged Condition: good    Disposition: Home or Self Care    Follow Up:  Follow-up Information     Mounika Carmona MD In 2 weeks.    Specialty:  General Surgery  Why:  As needed  Contact information:  [address] [address]  [phone]                 Patient Instructions:      Diet Adult Regular     Notify your health care provider if you experience any of the following:  temperature >100.4     Notify your health care provider if you experience any of the following:  persistent nausea and vomiting or diarrhea     Notify your health care provider if you experience any of the following:  severe uncontrolled pain     Notify your health care provider if you experience any of the following:  redness, tenderness, or signs of infection (pain, swelling, redness, odor or green/yellow discharge around incision site)     Notify your health care provider if you experience any of the following:  difficulty breathing or increased cough     Notify your health care provider if you experience any of the following:  severe persistent headache     Notify your health care provider if you experience any of the following:  worsening rash     Notify your health care provider if you experience any of the following:  persistent dizziness, light-headedness, or visual disturbances     Notify your health  care provider if you experience any of the following:  increased confusion or weakness     Notify your health care provider if you experience any of the following:  increased confusion or weakness     Notify your health care provider if you experience any of the following:  persistent dizziness, light-headedness, or visual disturbances     Notify your health care provider if you experience any of the following:  worsening rash     Notify your health care provider if you experience any of the following:  severe persistent headache     Notify your health care provider if you experience any of the following:  difficulty breathing or increased cough     Notify your health care provider if you experience any of the following:  redness, tenderness, or signs of infection (pain, swelling, redness, odor or green/yellow discharge around incision site)     Notify your health care provider if you experience any of the following:  severe uncontrolled pain     Notify your health care provider if you experience any of the following:  persistent nausea and vomiting or diarrhea     Notify your health care provider if you experience any of the following:  temperature >100.4     No dressing needed     Activity as tolerated   Order Comments: No driving while on narcotics and until you can react quickly without pain.     Shower on day dressing removed (No bath)   Order Comments: You can shower when you get home. No bathing in tubs, spas, or pools for another week.     Activity as tolerated   Order Comments: No swimming or soaking.  Ok to shower gently over incision     Medications:  Reconciled Home Medications:      Medication List      START taking these medications    calcium carbonate 500 mg calcium (1,250 mg) tablet  Commonly known as:  OS-HARMEET  Take 2 tablets (1,000 mg total) by mouth 2 (two) times daily.     HYDROcodone-acetaminophen 5-325 mg per tablet  Commonly known as:  NORCO  Take 1 tablet by mouth every 6 (six) hours as  "needed for Pain.        CHANGE how you take these medications    irbesartan 300 MG tablet  Commonly known as:  AVAPRO  Take 1 tablet (300 mg total) by mouth once daily.  What changed:  how much to take        CONTINUE taking these medications    albuterol 1.25 mg/3 mL Nebu  Commonly known as:  ACCUNEB  Take 3 mLs (1.25 mg total) by nebulization every 6 (six) hours as needed (shortness of breath). Rescue     amLODIPine 10 MG tablet  Commonly known as:  NORVASC  TAKE 1 TABLET (10 MG TOTAL) BY MOUTH ONCE DAILY.     atorvastatin 10 MG tablet  Commonly known as:  LIPITOR  Take 1 tablet (10 mg total) by mouth once daily.     blood sugar diagnostic Strp  Pt to check BG up to QID. Dispense strips compatible with pt brand meter     blood-glucose meter kit  Commonly known as:  FREESTYLE SYSTEM KIT  Please dispense per pt insurance formulary and pt choice Use as instructed     lancets Misc  Commonly known as:  ONETOUCH ULTRASOFT LANCETS  Pt to check BG up to QID. Dispense lancets compatible with pt brand meter     levothyroxine 137 MCG Tab tablet  Commonly known as:  SYNTHROID  TAKE 1 TABLET (137 MCG TOTAL) BY MOUTH BEFORE BREAKFAST.     meloxicam 15 MG tablet  Commonly known as:  MOBIC  TAKE 1 TABLET (15 MG TOTAL) BY MOUTH DAILY AS NEEDED FOR PAIN.     metFORMIN 1000 MG tablet  Commonly known as:  GLUCOPHAGE  TAKE 1 TABLET BY MOUTH TWICE A DAY WITH FOOD     nitroGLYCERIN 0.4 MG SL tablet  Commonly known as:  NITROSTAT  Place 0.4 mg under the tongue every 5 (five) minutes as needed for Chest pain.     * NOVOFINE 32 32 gauge x 1/4" Ndle  Generic drug:  pen needle, diabetic  1 EACH BY MISC.(NON-DRUG COMBO ROUTE) ROUTE ONCE DAILY.     * pen needle, diabetic 32 gauge x 1/6" Ndle  Commonly known as:  NOVOFINE PLUS  1 each by Misc.(Non-Drug; Combo Route) route once daily.     SITagliptin 100 MG Tab  Commonly known as:  JANUVIA  Take 1 tablet (100 mg total) by mouth once daily.     tamsulosin 0.4 mg Cap  Commonly known as:  " FLOMAX  Take 1 capsule (0.4 mg total) by mouth once daily.     temazepam 30 mg capsule  Commonly known as:  RESTORIL  TAKE 1 CAPSULE BY MOUTH EVERY DAY AT BEDTIME AS NEEDED     vitamin D 1000 units Tab  Commonly known as:  VITAMIN D3  Take 185 mg by mouth once daily.         * This list has 2 medication(s) that are the same as other medications prescribed for you. Read the directions carefully, and ask your doctor or other care provider to review them with you.              Time spent on the discharge of patient: 30 minutes    Jimmie Lombardi MD  General Surgery  [facility]

## 2019-06-05 NOTE — PATIENT INSTRUCTIONS
Discharge Instructions for Hypothyroidism and Myxedema  Hypothyroidism means your thyroid gland is not producing enough thyroid hormone to meet your body's needs. Overall, hypothyroidism slows your bodys normal rate of functioning, causing mental and physical sluggishness. Various symptoms may range from mild to severe. The most severe form is called myxedema.  Medicine  Take your medicine exactly as directed. You will take this medicine for the rest of your life.  · Take your medicine the same time every day.  · Keep your pills in a container that is labeled with the days of the week. This will help you remember if youve taken your medicine each day.  · Take your medicine with a full glass of water. Take it at least 1 before you eat breakfast. Or at bedtime, at least 3 hours after eating.  · Do not take calcium or iron within 4 hours of taking your thyroid medicine. And, ask your healthcare provider about taking other medicines with your thyroid pill.  · Continue to take your medicine if you become pregnant. Many women need more thyroid medicine during pregnancy. Your doctor may increase your dose.  · Your healthcare provider will regularly check your thyroid hormone levels with blood tests. If your dose is changed, you will usually have lab work in 4 to 6 weeks to be sure that the new dose is right for you.  · Always alert your healthcare providers of changes in your other medicines (including estrogens, testosterone, and anti-seizure medicines) as these changes may affect your thyroid hormone levels.  · Never stop treatment on your own. If you do, your symptoms will return.  Other home care  · During your routine visits, tell your healthcare provider about any signs of hyperthyroidism (too much thyroid hormone), such as:  ¨ Restlessness  ¨ Rapid weight loss  ¨ Sweating  ¨ Palpitations  · Eat a high-fiber, low-calorie diet to relieve constipation and maintain a healthy weight.  · Exercise. Start slow, with a 5  to 15 minute walk each day. Try to work up to 10,000 steps, or 3 20-minute walks each day.  · Remember, hypothyroidism is associated with increased cholesterol and an increased risk of heart disease. Correcting hypothyroidism generally improves cholesterol levels. Talk to your healthcare provider about the elements of a healthy lifestyle.  To learn more  The resources below can help you learn more:  · American Thy[employer] Association [phone] www.thyroid.org  · [facility] [phone] www.hormone.org  Follow-up care  Follow-up with your healthcare provider, or as advised.  When to seek medical care  Call your healthcare provider right away if you have any of the following:  · Extreme fatigue  · Puffy hands, face, or feet  · Irregular heartbeat  · Confusion   Date Last Reviewed: 12/1/2016  © 6039-5818 [employer] [address] [place] [address]. All rights reserved. This information is not intended as a substitute for professional medical care. Always follow your healthcare professional's instructions.      Sepsis   Sepsis occurs when your body responds to bacteremia - the presence of bacteria in your bloodstream. Sepsis can be deadly. Blood pressure may drop and the lungs and kidneys may start to fail. Emergency care for sepsis is crucial   When to Go to the Emergency Department (ED)   Sepsis is a medical emergency. Go to the nearest emergency department if a fever is present with any of these symptoms:   Chills and shaking   Fever or low body temperature (hypothermia)   Rapid heartbeat and breathing; shortness of breath   Nausea or vomiting   Confusion, dizziness   Skin rash   Decreased urination    © 1801-1104 [employer], [address] [place] [address]. All rights reserved. This information is not intended as a substitute for professional medical care. Always follow your healthcare professional's instructions

## 2019-06-05 NOTE — TELEPHONE ENCOUNTER
C3 nurse attempted to contact patient. The following occurred:   C3 nurse attempted to contact Rob Jones Jr. for a TCC post hospital discharge follow up call. The patient is unable to conduct the call @ this time. The patient requested a callback.    The patient has a scheduled POSTOP appointment with Dr. Carmona on 6/13/2019 @ 1045 hrs. Message sent to Physician staff.

## 2019-06-06 RX ORDER — INSULIN PUMP SYRINGE, 3 ML
EACH MISCELLANEOUS
Qty: 1 EACH | Refills: 0 | Status: SHIPPED | OUTPATIENT
Start: 2019-06-06 | End: 2020-03-11 | Stop reason: SDUPTHER

## 2019-06-06 RX ORDER — LANCETS
EACH MISCELLANEOUS
Qty: 100 EACH | Refills: 11 | Status: ON HOLD | OUTPATIENT
Start: 2019-06-06 | End: 2020-03-13 | Stop reason: HOSPADM

## 2019-06-07 NOTE — ADDENDUM NOTE
Addendum  created 06/07/19 0635 by Gabrielle Darnell MD    Attestation recorded in Intraprocedure, Intraprocedure Attestations filed, Intraprocedure Blocks edited, Sign clinical note

## 2019-06-12 DIAGNOSIS — N40.0 BENIGN NON-NODULAR PROSTATIC HYPERPLASIA WITHOUT LOWER URINARY TRACT SYMPTOMS: ICD-10-CM

## 2019-06-12 RX ORDER — TAMSULOSIN HYDROCHLORIDE 0.4 MG/1
CAPSULE ORAL
Qty: 90 CAPSULE | Refills: 3 | Status: ON HOLD | OUTPATIENT
Start: 2019-06-12 | End: 2020-03-13 | Stop reason: HOSPADM

## 2019-06-13 ENCOUNTER — OFFICE VISIT (OUTPATIENT)
Dept: SURGERY | Facility: CLINIC | Age: 64
End: 2019-06-13
Payer: MEDICARE

## 2019-06-13 VITALS
WEIGHT: 157.06 LBS | TEMPERATURE: 99 F | HEIGHT: 70 IN | BODY MASS INDEX: 22.48 KG/M2 | DIASTOLIC BLOOD PRESSURE: 72 MMHG | RESPIRATION RATE: 18 BRPM | SYSTOLIC BLOOD PRESSURE: 124 MMHG | HEART RATE: 76 BPM

## 2019-06-13 DIAGNOSIS — E21.0 HYPERPARATHYROIDISM, PRIMARY: Primary | ICD-10-CM

## 2019-06-13 DIAGNOSIS — Z09 POSTOP CHECK: ICD-10-CM

## 2019-06-13 PROCEDURE — 99499 UNLISTED E&M SERVICE: CPT | Mod: S$GLB,,, | Performed by: SURGERY

## 2019-06-13 PROCEDURE — 99999 PR PBB SHADOW E&M-EST. PATIENT-LVL V: ICD-10-PCS | Mod: PBBFAC,,, | Performed by: SURGERY

## 2019-06-13 PROCEDURE — 99024 POSTOP FOLLOW-UP VISIT: CPT | Mod: S$GLB,,, | Performed by: SURGERY

## 2019-06-13 PROCEDURE — 99499 RISK ADDL DX/OHS AUDIT: ICD-10-PCS | Mod: S$GLB,,, | Performed by: SURGERY

## 2019-06-13 PROCEDURE — 99999 PR PBB SHADOW E&M-EST. PATIENT-LVL V: CPT | Mod: PBBFAC,,, | Performed by: SURGERY

## 2019-06-13 PROCEDURE — 99024 PR POST-OP FOLLOW-UP VISIT: ICD-10-PCS | Mod: S$GLB,,, | Performed by: SURGERY

## 2019-06-13 NOTE — PROGRESS NOTES
I have reviewed the Resident's progress note. I agree with the findings. Any changes were made directly in the note below.    Mounika Carmona MD  Endocrine Surgery & General Surgery      Subjective:  63-year-old male s/p parathyroidectomy 05/22/19 complicated by need for reintubation and steroid therapy following episode of stridor post op with evidence of paramedian and immobile TVCs bilaterally. Ultimately was extubated without need of tracheostomy. Today, he's doing well. Denies fevers, chills, nausea, vomiting, abdominal pain, paresthesias, or any other complaints at this time. Continues to take calcium 2000mg daily. Energy levels improving. Appetite normal. Doing well with speech and PT.     Objective:  Vitals:    06/13/19 1044   BP: 124/72   Pulse: 76   Resp: 18   Temp: 98.5 °F (36.9 °C)     Neck--incision incision is well healing. No evidence of infection, hematoma, or any other issues.     FINAL PATHOLOGIC DIAGNOSIS  1. SOFT TISSUE, NECK #1, EXCISION:  Benign lymph node.  No parathyroid tissue present.  2. SOFT TISSUE, NECK #2, EXCISION:  Benign lymph node.  No parathyroid tissue present.  3. SOFT TISSUE, NECK #3, EXCISION:  Unremarkable fibroadipose tissue.  No parathyroid tissue present.  4. PARATHYROID, RIGHT, EXCISION:  Hypercellular parathyroid tissue (0.591 g), benign.    Assessment:  63-year-old male s/p parathyroidectomy with complicated perioperative course though continue to recover well.     Plan:  -continue home health PT/OT/speech  -continue calcium supplements  -labs today to check PTH and calcium  -return to clinic 1 month  -follow with Dr. Brown (joint appointment in [facility]    Jimmie Lombardi, PGY1  General Surgery  [phone]

## 2019-06-17 NOTE — PLAN OF CARE
Problem: Physical Therapy Goal  Goal: Physical Therapy Goal  Goals to be met by: 19     Patient will increase functional independence with mobility by performin. Supine to sit with supervision  2. Sit to supine with Supervision  3. Sit to stand transfer with Supervision  4. Gait  x 200 feet with Stand-by Assistance using least restrictive assistive device ( RW vs SC).   5. Ascend/descend 4 stair with no Handrails Stand-by Assistance using HHA or SC.   6. Lower extremity exercise program x 20 reps per handout, with supervision to increase his endurance and improve balance with gait.     Outcome: Unable to achieve outcome(s) by discharge  Full discharge summary to follow.

## 2019-06-17 NOTE — PT/OT/SLP DISCHARGE
Physical Therapy Discharge Summary    Name: Rob Jones Jr.  MRN: [id number]   Principal Problem: Hyperparathyroidism     Patient Discharged from acute Physical Therapy on 6/3/19.  Please refer to prior PT noted date on 19 for functional status.     Assessment:     Patient has not met goals.    Objective:     GOALS:   Multidisciplinary Problems     Physical Therapy Goals        Problem: Physical Therapy Goal    Goal Priority Disciplines Outcome Goal Variances Interventions   Physical Therapy Goal     PT, PT/OT Unable to achieve outcome(s) by discharge     Description:  Goals to be met by: 19     Patient will increase functional independence with mobility by performin. Supine to sit with supervision  2. Sit to supine with Supervision  3. Sit to stand transfer with Supervision  4. Gait  x 200 feet with Stand-by Assistance using least restrictive assistive device ( RW vs SC).   5. Ascend/descend 4 stair with no Handrails Stand-by Assistance using HHA or SC.   6. Lower extremity exercise program x 20 reps per handout, with supervision to increase his endurance and improve balance with gait.                      Reasons for Discontinuation of Therapy Services  Transfer to alternate level of care.      Plan:     Patient Discharged to: Home with Home Health Service.    Migdalia Santiago, PT  2019

## 2019-06-26 ENCOUNTER — TELEPHONE (OUTPATIENT)
Dept: SURGERY | Facility: CLINIC | Age: 64
End: 2019-06-26

## 2019-06-26 NOTE — TELEPHONE ENCOUNTER
Spoke with pt, he advised he's been having pain under his rib cage, back, side, knees and that it's a little difficult with walking sometimes without meds. He also inquired about his results.

## 2019-06-27 ENCOUNTER — TELEPHONE (OUTPATIENT)
Dept: SURGERY | Facility: CLINIC | Age: 64
End: 2019-06-27

## 2019-06-27 RX ORDER — TRAMADOL HYDROCHLORIDE 50 MG/1
50 TABLET ORAL EVERY 6 HOURS PRN
Qty: 28 TABLET | Refills: 0 | Status: SHIPPED | OUTPATIENT
Start: 2019-06-27 | End: 2019-09-10

## 2019-06-27 NOTE — TELEPHONE ENCOUNTER
----- Message from Perla Hubbard RN sent at 6/26/2019  4:21 PM CDT -----  Contact: Pt  Spoke with pt, he advised he's been having pain under his rib cage, back, side, knees and that it's a little difficult with walking sometimes without meds. He also inquired about his results. He said he only had 15 pain pills. Suggested he try the Tylenol & Ibuprofen. Advised if a med is called in, the pharmacy should call to advise it's ready to be picked up.       ----- Message -----  From: Garcia Johns  Sent: 6/26/2019   3:18 PM  To: Kristine Ribera Staff    Needs Advice    Reason for call:The Pt needs a call back about his pain pills.  The Pt was told by Dr. Shearer that if he needed anything to call her.        Communication Preference:[phone]    Additional Information:

## 2019-06-27 NOTE — TELEPHONE ENCOUNTER
Taking Calcium 2000mg daily currently.     Ref. Range 6/13/2019 11:45   Calcium Latest Ref Range: 8.7 - 10.5 mg/dL 10.0   PTH Latest Ref Range: 9.0 - 77.0 pg/mL 24.0     Also, noted to have some persistent pain since surgery.  Getting better slowly.    RX for Tramadol sent.  OK to stop calcium.  Follow-up in clinic in 2 weeks.

## 2019-07-04 ENCOUNTER — OFFICE VISIT (OUTPATIENT)
Dept: URGENT CARE | Facility: CLINIC | Age: 64
End: 2019-07-04
Payer: MEDICARE

## 2019-07-04 VITALS
SYSTOLIC BLOOD PRESSURE: 126 MMHG | TEMPERATURE: 99 F | DIASTOLIC BLOOD PRESSURE: 77 MMHG | BODY MASS INDEX: 22.48 KG/M2 | HEART RATE: 95 BPM | WEIGHT: 157 LBS | HEIGHT: 70 IN | OXYGEN SATURATION: 100 % | RESPIRATION RATE: 18 BRPM

## 2019-07-04 DIAGNOSIS — M25.571 ACUTE RIGHT ANKLE PAIN: Primary | ICD-10-CM

## 2019-07-04 DIAGNOSIS — M79.671 RIGHT FOOT PAIN: ICD-10-CM

## 2019-07-04 PROCEDURE — 99214 OFFICE O/P EST MOD 30 MIN: CPT | Mod: S$GLB,,, | Performed by: SURGERY

## 2019-07-04 PROCEDURE — 99214 PR OFFICE/OUTPT VISIT, EST, LEVL IV, 30-39 MIN: ICD-10-PCS | Mod: S$GLB,,, | Performed by: SURGERY

## 2019-07-04 PROCEDURE — 73610 X-RAY EXAM OF ANKLE: CPT | Mod: RT,S$GLB,, | Performed by: RADIOLOGY

## 2019-07-04 PROCEDURE — 3008F BODY MASS INDEX DOCD: CPT | Mod: CPTII,S$GLB,, | Performed by: SURGERY

## 2019-07-04 PROCEDURE — 3074F PR MOST RECENT SYSTOLIC BLOOD PRESSURE < 130 MM HG: ICD-10-PCS | Mod: CPTII,S$GLB,, | Performed by: SURGERY

## 2019-07-04 PROCEDURE — 73630 XR FOOT COMPLETE 3 VIEW RIGHT: ICD-10-PCS | Mod: RT,S$GLB,, | Performed by: RADIOLOGY

## 2019-07-04 PROCEDURE — 3078F DIAST BP <80 MM HG: CPT | Mod: CPTII,S$GLB,, | Performed by: SURGERY

## 2019-07-04 PROCEDURE — 3074F SYST BP LT 130 MM HG: CPT | Mod: CPTII,S$GLB,, | Performed by: SURGERY

## 2019-07-04 PROCEDURE — 73610 XR ANKLE COMPLETE 3 VIEW RIGHT: ICD-10-PCS | Mod: RT,S$GLB,, | Performed by: RADIOLOGY

## 2019-07-04 PROCEDURE — 3008F PR BODY MASS INDEX (BMI) DOCUMENTED: ICD-10-PCS | Mod: CPTII,S$GLB,, | Performed by: SURGERY

## 2019-07-04 PROCEDURE — 73630 X-RAY EXAM OF FOOT: CPT | Mod: RT,S$GLB,, | Performed by: RADIOLOGY

## 2019-07-04 PROCEDURE — 3078F PR MOST RECENT DIASTOLIC BLOOD PRESSURE < 80 MM HG: ICD-10-PCS | Mod: CPTII,S$GLB,, | Performed by: SURGERY

## 2019-07-04 RX ORDER — DICLOFENAC SODIUM 75 MG/1
75 TABLET, DELAYED RELEASE ORAL 2 TIMES DAILY PRN
Qty: 40 TABLET | Refills: 0 | Status: SHIPPED | OUTPATIENT
Start: 2019-07-04 | End: 2019-07-24

## 2019-07-04 RX ORDER — DICLOFENAC SODIUM 75 MG/1
75 TABLET, DELAYED RELEASE ORAL 2 TIMES DAILY PRN
Qty: 40 TABLET | Refills: 0 | Status: SHIPPED | OUTPATIENT
Start: 2019-07-04 | End: 2019-07-04 | Stop reason: SDUPTHER

## 2019-07-04 NOTE — PATIENT INSTRUCTIONS
Treating Ankle Sprains  Treatment will depend on how bad your sprain is. For a severe sprain, healing may take 3 months or more.  Right after your injury: Use R.I.C.E.  · Rest: At first, keep weight off the ankle as much as you can. You may be given crutches to help you walk without putting weight on the ankle.  · Ice: Put an ice pack on the ankle for 15 minutes. Remove the pack and wait at least 30 minutes. Repeat for up to 3 days. This helps reduce swelling.  · Compression: To reduce swelling and keep the joint stable, you may need to wrap the ankle with an elastic bandage. For more severe sprains, you may need an ankle brace or a cast.  · Elevation: To reduce swelling, keep your ankle raised above your heart when you sit or lie down.  Medicine  Your healthcare provider may suggest oral non-steroidal anti-inflammatory medicine (NSAIDs), such as ibuprofen. This relieves the pain and helps reduce any swelling. Be sure to take your medicine as directed.  Contrast baths  After 3 days, soak your ankle in warm water for 30 seconds, then in cool water for 30 seconds. Go back and forth for 5 minutes. Doing this every 2 hours will help keep the swelling down.  Exercises    After about 2 to 3 weeks, you may be given exercises to strengthen the ligaments and muscles in the ankle. Doing these exercises will help prevent another ankle sprain. Exercises may include standing on your toes and then on your heels and doing ankle curls.  Ankle curls  · Sit on the edge of a sturdy table or lie on your back.  · Pull your toes toward you. Then point them away from you. Repeat for 2 to 3 minutes.   Date Last Reviewed: 9/28/2015  © 0575-5561 [employer]. [address] [place] [address], [place] [address]. All rights reserved. This information is not intended as a substitute for professional medical care. Always follow your healthcare professional's instructions.

## 2019-07-04 NOTE — PROGRESS NOTES
"Subjective:       Patient ID: Rob Jones Jr. is a 63 y.o. male.    Vitals:  height is 5' 10" (1.778 m) and weight is 71.2 kg (157 lb). His temperature is 98.7 °F (37.1 °C). His blood pressure is 126/77 and his pulse is 95. His respiration is 18 and oxygen saturation is 100%.     Chief Complaint: Ankle Injury (started Saturday )    Patient present with complaint of right ankle pain due to twist his ankle on an uneven walk way. He states the swelling just started yesterday. He cant walk on or bend his ankle. He denies taking pain mediations.     Ankle Injury    The incident occurred 3 to 5 days ago. The injury mechanism was a twisting injury. The pain is present in the right ankle. The quality of the pain is described as stabbing and aching. The pain is at a severity of 10/10. The pain is severe. The pain has been constant since onset. Associated symptoms include an inability to bear weight. He reports no foreign bodies present. The symptoms are aggravated by movement and weight bearing. He has tried nothing for the symptoms. The treatment provided no relief.       Constitution: Negative for chills, fatigue and fever.   HENT: Negative for congestion and sore throat.    Neck: Negative for painful lymph nodes.   Cardiovascular: Negative for chest pain and leg swelling.   Eyes: Negative for double vision and blurred vision.   Respiratory: Negative for cough and shortness of breath.    Gastrointestinal: Negative for nausea, vomiting and diarrhea.   Genitourinary: Negative for dysuria, frequency and urgency.   Musculoskeletal: Positive for pain, joint pain, joint swelling and pain with walking. Negative for muscle cramps and muscle ache.        Right ankle    Skin: Negative for color change, pale and rash.   Allergic/Immunologic: Negative for seasonal allergies.   Neurological: Negative for dizziness, history of vertigo, light-headedness, passing out and headaches.   Hematologic/Lymphatic: Negative for swollen lymph " nodes, easy bruising/bleeding and history of blood clots. Does not bruise/bleed easily.   Psychiatric/Behavioral: Negative for nervous/anxious, sleep disturbance and depression. The patient is not nervous/anxious.        Objective:      Physical Exam   Constitutional: He is oriented to person, place, and time. He appears well-developed and well-nourished. He is cooperative.  Non-toxic appearance. He does not appear ill. No distress.   HENT:   Head: Normocephalic and atraumatic.   Right Ear: Hearing, tympanic membrane, external ear and ear canal normal.   Left Ear: Hearing, tympanic membrane, external ear and ear canal normal.   Nose: Nose normal. No mucosal edema, rhinorrhea or nasal deformity. No epistaxis. Right sinus exhibits no maxillary sinus tenderness and no frontal sinus tenderness. Left sinus exhibits no maxillary sinus tenderness and no frontal sinus tenderness.   Mouth/Throat: Uvula is midline, oropharynx is clear and moist and mucous membranes are normal. No trismus in the jaw. Normal dentition. No uvula swelling. No posterior oropharyngeal erythema.   Eyes: Conjunctivae and lids are normal. Right eye exhibits no discharge. Left eye exhibits no discharge. No scleral icterus.   Sclera clear bilat   Neck: Trachea normal, normal range of motion, full passive range of motion without pain and phonation normal. Neck supple.   Cardiovascular: Normal rate, regular rhythm, normal heart sounds, intact distal pulses and normal pulses.   Pulmonary/Chest: Effort normal and breath sounds normal. No respiratory distress.   Abdominal: Soft. Normal appearance and bowel sounds are normal. He exhibits no distension, no pulsatile midline mass and no mass. There is no tenderness.   Musculoskeletal: He exhibits no edema or deformity.        Right ankle: He exhibits decreased range of motion. He exhibits no swelling, no ecchymosis, no deformity, no laceration and normal pulse.        Feet:    Neurological: He is alert and  oriented to person, place, and time. He exhibits normal muscle tone. Coordination normal.   Skin: Skin is warm, dry and intact. He is not diaphoretic. No pallor.   Psychiatric: He has a normal mood and affect. His speech is normal and behavior is normal. Judgment and thought content normal. Cognition and memory are normal.   Nursing note and vitals reviewed.      Assessment:       1. Acute right ankle pain    2. Right foot pain        Plan:         Acute right ankle pain  -     XR ANKLE COMPLETE 3 VIEW RIGHT; Future; Expected date: 07/04/2019    Right foot pain  -     XR FOOT COMPLETE 3 VIEW RIGHT; Future; Expected date: 07/04/2019          Xr Ankle Complete 3 View Right    Result Date: 7/4/2019  EXAMINATION: XR ANKLE COMPLETE 3 VIEW RIGHT CLINICAL HISTORY: Pain in right ankle and joints of right foot TECHNIQUE: AP, lateral, and oblique images of the right ankle were performed. COMPARISON: None FINDINGS: There is no evidence of fracture or subluxation.  There are spurs on the calcaneus.  Calcification posterior to the talus on the foot radiographs of the same date is not identified on the current examination.  No significant soft tissue swelling.     As above. Electronically signed by: Angelika Fonseca MD Date:    07/04/2019 Time:    13:01    Xr Foot Complete 3 View Right    Result Date: 7/4/2019  EXAMINATION: XR FOOT COMPLETE 3 VIEW RIGHT CLINICAL HISTORY: . Pain in right foot TECHNIQUE: AP, lateral, and oblique views of the right foot were performed. COMPARISON: None FINDINGS: There is no evidence of fracture or subluxation.  There are mild degenerative changes with joint space narrowing at the interphalangeal joints of the toes (most pronounced at the 1st and 5th digit), at the tarsal articulations, and at the 1st metatarsophalangeal joint.  There are spurs on the calcaneus.  There is a small calcification posterior to the talus.  This is not definitely identified on the ankle images of the same date and may be  artifactual.     As above. Electronically signed by: Angelika Fonseca MD Date:    07/04/2019 Time:    13:01      Wrapped with ACE wrap. Light touch sensation and cap refill equal before and after. NVI.    Patient Instructions       Treating Ankle Sprains  Treatment will depend on how bad your sprain is. For a severe sprain, healing may take 3 months or more.  Right after your injury: Use R.I.C.E.  · Rest: At first, keep weight off the ankle as much as you can. You may be given crutches to help you walk without putting weight on the ankle.  · Ice: Put an ice pack on the ankle for 15 minutes. Remove the pack and wait at least 30 minutes. Repeat for up to 3 days. This helps reduce swelling.  · Compression: To reduce swelling and keep the joint stable, you may need to wrap the ankle with an elastic bandage. For more severe sprains, you may need an ankle brace or a cast.  · Elevation: To reduce swelling, keep your ankle raised above your heart when you sit or lie down.  Medicine  Your healthcare provider may suggest oral non-steroidal anti-inflammatory medicine (NSAIDs), such as ibuprofen. This relieves the pain and helps reduce any swelling. Be sure to take your medicine as directed.  Contrast baths  After 3 days, soak your ankle in warm water for 30 seconds, then in cool water for 30 seconds. Go back and forth for 5 minutes. Doing this every 2 hours will help keep the swelling down.  Exercises    After about 2 to 3 weeks, you may be given exercises to strengthen the ligaments and muscles in the ankle. Doing these exercises will help prevent another ankle sprain. Exercises may include standing on your toes and then on your heels and doing ankle curls.  Ankle curls  · Sit on the edge of a sturdy table or lie on your back.  · Pull your toes toward you. Then point them away from you. Repeat for 2 to 3 minutes.   Date Last Reviewed: 9/28/2015  © 7327-5040 [employer]. [address] [place] [address], [place] [address].  All rights reserved. This information is not intended as a substitute for professional medical care. Always follow your healthcare professional's instructions.

## 2019-07-10 ENCOUNTER — EXTERNAL HOME HEALTH (OUTPATIENT)
Dept: HOME HEALTH SERVICES | Facility: HOSPITAL | Age: 64
End: 2019-07-10

## 2019-07-15 ENCOUNTER — OFFICE VISIT (OUTPATIENT)
Dept: SURGERY | Facility: CLINIC | Age: 64
End: 2019-07-15
Attending: SURGERY
Payer: MEDICARE

## 2019-07-15 ENCOUNTER — PATIENT OUTREACH (OUTPATIENT)
Dept: ADMINISTRATIVE | Facility: OTHER | Age: 64
End: 2019-07-15

## 2019-07-15 ENCOUNTER — OFFICE VISIT (OUTPATIENT)
Dept: ENDOCRINOLOGY | Facility: CLINIC | Age: 64
End: 2019-07-15
Attending: INTERNAL MEDICINE
Payer: MEDICARE

## 2019-07-15 VITALS
TEMPERATURE: 97 F | RESPIRATION RATE: 18 BRPM | HEIGHT: 70 IN | BODY MASS INDEX: 23.99 KG/M2 | HEIGHT: 70 IN | BODY MASS INDEX: 23.99 KG/M2 | DIASTOLIC BLOOD PRESSURE: 86 MMHG | SYSTOLIC BLOOD PRESSURE: 133 MMHG | DIASTOLIC BLOOD PRESSURE: 72 MMHG | HEART RATE: 71 BPM | WEIGHT: 167.56 LBS | WEIGHT: 167.56 LBS | SYSTOLIC BLOOD PRESSURE: 132 MMHG | HEART RATE: 66 BPM

## 2019-07-15 DIAGNOSIS — Z86.018 HISTORY OF PHEOCHROMOCYTOMA: ICD-10-CM

## 2019-07-15 DIAGNOSIS — E21.0 HYPERPARATHYROIDISM, PRIMARY: Primary | ICD-10-CM

## 2019-07-15 DIAGNOSIS — Z12.11 ENCOUNTER FOR FECAL IMMUNOCHEMICAL TEST SCREENING: Primary | ICD-10-CM

## 2019-07-15 DIAGNOSIS — E89.0 POSTOPERATIVE HYPOTHYROIDISM: ICD-10-CM

## 2019-07-15 DIAGNOSIS — E21.0 PRIMARY HYPERPARATHYROIDISM: Primary | ICD-10-CM

## 2019-07-15 PROCEDURE — 99024 POSTOP FOLLOW-UP VISIT: CPT | Mod: S$GLB,,, | Performed by: SURGERY

## 2019-07-15 PROCEDURE — 99214 PR OFFICE/OUTPT VISIT, EST, LEVL IV, 30-39 MIN: ICD-10-PCS | Mod: S$GLB,,, | Performed by: INTERNAL MEDICINE

## 2019-07-15 PROCEDURE — 3008F PR BODY MASS INDEX (BMI) DOCUMENTED: ICD-10-PCS | Mod: CPTII,S$GLB,, | Performed by: INTERNAL MEDICINE

## 2019-07-15 PROCEDURE — 3008F BODY MASS INDEX DOCD: CPT | Mod: CPTII,S$GLB,, | Performed by: INTERNAL MEDICINE

## 2019-07-15 PROCEDURE — 3044F HG A1C LEVEL LT 7.0%: CPT | Mod: CPTII,S$GLB,, | Performed by: INTERNAL MEDICINE

## 2019-07-15 PROCEDURE — 3075F SYST BP GE 130 - 139MM HG: CPT | Mod: CPTII,S$GLB,, | Performed by: INTERNAL MEDICINE

## 2019-07-15 PROCEDURE — 99499 UNLISTED E&M SERVICE: CPT | Mod: S$GLB,,, | Performed by: INTERNAL MEDICINE

## 2019-07-15 PROCEDURE — 3078F DIAST BP <80 MM HG: CPT | Mod: CPTII,S$GLB,, | Performed by: INTERNAL MEDICINE

## 2019-07-15 PROCEDURE — 99214 OFFICE O/P EST MOD 30 MIN: CPT | Mod: S$GLB,,, | Performed by: INTERNAL MEDICINE

## 2019-07-15 PROCEDURE — 99024 PR POST-OP FOLLOW-UP VISIT: ICD-10-PCS | Mod: S$GLB,,, | Performed by: SURGERY

## 2019-07-15 PROCEDURE — 3044F PR MOST RECENT HEMOGLOBIN A1C LEVEL <7.0%: ICD-10-PCS | Mod: CPTII,S$GLB,, | Performed by: INTERNAL MEDICINE

## 2019-07-15 PROCEDURE — 99499 RISK ADDL DX/OHS AUDIT: ICD-10-PCS | Mod: S$GLB,,, | Performed by: SURGERY

## 2019-07-15 PROCEDURE — 99499 UNLISTED E&M SERVICE: CPT | Mod: S$GLB,,, | Performed by: SURGERY

## 2019-07-15 PROCEDURE — 3078F PR MOST RECENT DIASTOLIC BLOOD PRESSURE < 80 MM HG: ICD-10-PCS | Mod: CPTII,S$GLB,, | Performed by: INTERNAL MEDICINE

## 2019-07-15 PROCEDURE — 3075F PR MOST RECENT SYSTOLIC BLOOD PRESS GE 130-139MM HG: ICD-10-PCS | Mod: CPTII,S$GLB,, | Performed by: INTERNAL MEDICINE

## 2019-07-15 PROCEDURE — 99499 RISK ADDL DX/OHS AUDIT: ICD-10-PCS | Mod: S$GLB,,, | Performed by: INTERNAL MEDICINE

## 2019-07-15 NOTE — LETTER
July 15, 2019      Mounika Carmona MD  [address]  [address]  [facility] [address]           [facility] [address] [address] [facility] [address]  [address], [address]  [facility] [address]  Phone: [phone]  Fax: [phone]          Patient: Rob Jones Jr.   MR Number: [id number]   YOB: 1955   Date of Visit: 7/15/2019       Dear Dr. Mounika Carmona:    Thank you for referring Rob Jones to me for evaluation. Attached you will find relevant portions of my assessment and plan of care.    If you have questions, please do not hesitate to call me. I look forward to following Rob Jones along with you.    Sincerely,    Min Brown MD    Enclosure  CC:  No Recipients    If you would like to receive this communication electronically, please contact [email or web address] or [phone] to request more information on [employer] Link access.    For providers and/or their staff who would like to refer a patient to [facility], please contact us through our one-stop-shop provider referral line, [facility], at [phone].    If you feel you have received this communication in error or would no longer like to receive these types of communications, please e-mail [email or web address]

## 2019-07-15 NOTE — PROGRESS NOTES
Subjective:      Patient ID: Tomasa Jones Jr. is a 63 y.o. male.    Chief Complaint:  Hyperparathyroidism and Hypothyroidism      History of Present Illness  Mr.Lambert KHADAR Jones Jr. presents for follow up of hypothyroidism and primary hyperparathyroidism. Last visit 2/2019.     Post operative hypothyroidism   All symptoms he mentioned were compatible with graves disease ~ in 2000, reports that path was benign ([facility]). No family history of thyroid cancer.       Now on levothyroxine 137 mcg once daily and takes on empty stomach 30 min before food or other meds.  Has had some fatigue. No heat or cold intolerance. Wt has been stable. BM's regular.  No heart palpitations or tremors.      In regards to hyperparathyroidism:  He was told he had hyperparathyroidism at the time of thyroidectomy, was advised by his Endocrinologist at the time to undergo parathyroid surgery but surgeon did not agree at the time so it has been monitored.     After his last visit with me I recommended parathyroidectomy due to degree of calcium elevation.      Reports have left adrenal pheochromocytoma resected in [place] in 2005. At the time he had spikes in his bp associated with rapid heart beat. No longer having these symptoms. Recent plasma metanephrines were normal and he had normal RET james oncogene testing.      Has had type 2 DM since 2000.   Currently taking metformin 1000 mg PO BID and sitagliptin 100 mg daily  A1c is at goal    S/p parathyroidectomy 5/22/2019:  FINAL PATHOLOGIC DIAGNOSIS  1. SOFT TISSUE, NECK #1, EXCISION:  Benign lymph node.  No parathyroid tissue present.  2. SOFT TISSUE, NECK #2, EXCISION:  Benign lymph node.  No parathyroid tissue present.  3. SOFT TISSUE, NECK #3, EXCISION:  Unremarkable fibroadipose tissue.  No parathyroid tissue present.  4. PARATHYROID, RIGHT, EXCISION:  Hypercellular parathyroid tissue (0.591 g), benign.    Results for TOMASA JONES JR. (MRN [id number]) as of 7/15/2019 10:42    Ref. Range 6/13/2019 11:45   Calcium Latest Ref Range: 8.7 - 10.5 mg/dL 10.0   PTH Latest Ref Range: 9.0 - 77.0 pg/mL 24.0     Calcium supplements were stopped after labs on 6/13/2019. He takes vitamin D 5000 units daily but sometimes forgets to take this.       Review of Systems   Constitutional: Negative for chills and fever.   Gastrointestinal: Negative for nausea and vomiting.       Objective:   Physical Exam   Nursing note and vitals reviewed.  No thyroid tissue palpated  No tremor  DTR's 2+    Lab Review:   Results for TOMASA HAQ JR. (MRN [id number]) as of 7/15/2019 07:29   Ref. Range 6/1/2019 03:48 6/13/2019 11:45   Sodium Latest Ref Range: 136 - 145 mmol/L 137    Potassium Latest Ref Range: 3.5 - 5.1 mmol/L 4.2    Chloride Latest Ref Range: 95 - 110 mmol/L 102    CO2 Latest Ref Range: 23 - 29 mmol/L 27    Anion Gap Latest Ref Range: 8 - 16 mmol/L 8    BUN, Bld Latest Ref Range: 8 - 23 mg/dL 18    Creatinine Latest Ref Range: 0.5 - 1.4 mg/dL 0.9    eGFR if non African American Latest Ref Range: >60 mL/min/1.73 m^2 >60.0    eGFR if African American Latest Ref Range: >60 mL/min/1.73 m^2 >60.0    Glucose Latest Ref Range: 70 - 110 mg/dL 216 (H)    Calcium Latest Ref Range: 8.7 - 10.5 mg/dL 9.4 10.0   Calcium, Ion Latest Ref Range: 1.06 - 1.42 mmol/L 1.10    Phosphorus Latest Ref Range: 2.7 - 4.5 mg/dL 3.1    Magnesium Latest Ref Range: 1.6 - 2.6 mg/dL 1.5 (L)    Alkaline Phosphatase Latest Ref Range: 55 - 135 U/L 55    PROTEIN TOTAL Latest Ref Range: 6.0 - 8.4 g/dL 6.0    Albumin Latest Ref Range: 3.5 - 5.2 g/dL 2.8 (L)    BILIRUBIN TOTAL Latest Ref Range: 0.1 - 1.0 mg/dL 0.5    AST Latest Ref Range: 10 - 40 U/L 28    ALT Latest Ref Range: 10 - 44 U/L 35    PTH Latest Ref Range: 9.0 - 77.0 pg/mL  24.0     Results for TOMASA HAQ JR. (MRN [id number]) as of 7/15/2019 07:29   Ref. Range 3/13/2019 13:21   Metanephrine, Free Latest Ref Range: < OR = 57 pg/mL 33   Normetanephrine, Free Latest Ref Range: < OR =  148 pg/mL 76   Metanephrine, Total, Plasma Latest Ref Range: < OR = 205 pg/mL 109       Assessment:     1. Primary hyperparathyroidism    2. Postoperative hypothyroidism    3. Uncontrolled type 2 diabetes mellitus with complication, with long-term current use of insulin    4. History of pheochromocytoma      Plan:      --Patient with primary HPT now s/p one gland parathyroidectomy on the right  --Post op PTH and calcium levels have been normal  --Advised him to continue vit D replacement  --Will repeat PTH and calcium levels in 8 weeks  --Clinically and biochemically euthyroid  --Continue current thyroid hormone dose  --Continue Januvia and metformin (TSH at goal)  --No evidence of pheo with normal plasma mets      Labs in 8 weeks, RTC in one year    Min Brown M.D. Staff Endocrinology

## 2019-07-15 NOTE — PROGRESS NOTES
"Subjective:       Rob Jones Jr. presents to the clinic 6 weeks following ectopic parathyroid excision. Eating a regular diet without difficulty. Bowel movements are Normal.  Pain is controlled without any medications. Patient denies trouble swallowing, trouble speaking and fingers, toes and perioral numbness or tingling. All supplementation has stopped.     Objective:      /86   Pulse 71   Temp 97.4 °F (36.3 °C)   Resp 18   Ht 5' 10" (1.778 m)   Wt 76 kg (167 lb 8.8 oz)   BMI 24.04 kg/m²     General:   alert, appears stated age and cooperative. Chovstek's sign absent.   Incision:   well approximated and no swelling       Labs:  Lab Results   Component Value Date    CALCIUM 10.0 06/13/2019    CALCIUM 9.4 06/01/2019    CALCIUM 9.4 05/31/2019    TSH 3.325 01/21/2019    TSH 8.727 (H) 09/25/2018    TSH 5.832 (H) 07/17/2018    FREET4 1.09 09/25/2018    FREET4 1.01 07/17/2018    FREET4 1.01 03/09/2018    OQHUEQHZ91OX 32 02/25/2019    EHTQXSRV98HU 14 (L) 01/21/2019    PTH 24.0 06/13/2019    PTH 9.0 05/24/2019    PTH 7.0 (L) 05/23/2019    PHOS 3.1 06/01/2019    PHOS 1.8 (L) 05/31/2019    PHOS 3.0 05/30/2019    CAION 1.10 06/01/2019    CAION 1.13 05/31/2019    CAION 1.14 05/30/2019            Assessment:     Rob Jones Jr. is doing well post-operatively after single parathyroidectomy (ectopic).        Plan:        1. Continue any current medications. Continued observation under the care of endocrine.  2. Wound care and scar massage discussed.  The patient is discharged from the endocrine surgery clinic but may call for any questions or concerns.     "

## 2019-08-07 DIAGNOSIS — M25.571 ACUTE RIGHT ANKLE PAIN: ICD-10-CM

## 2019-08-16 RX ORDER — DICLOFENAC SODIUM 75 MG/1
75 TABLET, DELAYED RELEASE ORAL 2 TIMES DAILY PRN
Qty: 40 TABLET | Refills: 0 | OUTPATIENT
Start: 2019-08-16 | End: 2019-09-05

## 2019-08-22 RX ORDER — TEMAZEPAM 30 MG/1
CAPSULE ORAL
Qty: 60 CAPSULE | Refills: 1 | Status: SHIPPED | OUTPATIENT
Start: 2019-08-22

## 2019-09-04 RX ORDER — ATORVASTATIN CALCIUM 10 MG/1
TABLET, FILM COATED ORAL
Qty: 90 TABLET | Refills: 3 | Status: SHIPPED | OUTPATIENT
Start: 2019-09-04

## 2019-09-09 ENCOUNTER — TELEPHONE (OUTPATIENT)
Dept: ENDOCRINOLOGY | Facility: CLINIC | Age: 64
End: 2019-09-09

## 2019-09-09 ENCOUNTER — LAB VISIT (OUTPATIENT)
Dept: LAB | Facility: OTHER | Age: 64
End: 2019-09-09
Attending: INTERNAL MEDICINE
Payer: MEDICARE

## 2019-09-09 DIAGNOSIS — E89.0 POSTOPERATIVE HYPOTHYROIDISM: ICD-10-CM

## 2019-09-09 DIAGNOSIS — E21.0 PRIMARY HYPERPARATHYROIDISM: ICD-10-CM

## 2019-09-09 LAB
ALBUMIN SERPL BCP-MCNC: 4.3 G/DL (ref 3.5–5.2)
ANION GAP SERPL CALC-SCNC: 11 MMOL/L (ref 8–16)
BUN SERPL-MCNC: 16 MG/DL (ref 8–23)
CALCIUM SERPL-MCNC: 9.8 MG/DL (ref 8.7–10.5)
CHLORIDE SERPL-SCNC: 107 MMOL/L (ref 95–110)
CO2 SERPL-SCNC: 20 MMOL/L (ref 23–29)
CREAT SERPL-MCNC: 1.1 MG/DL (ref 0.5–1.4)
EST. GFR  (AFRICAN AMERICAN): >60 ML/MIN/1.73 M^2
EST. GFR  (NON AFRICAN AMERICAN): >60 ML/MIN/1.73 M^2
ESTIMATED AVG GLUCOSE: 131 MG/DL (ref 68–131)
GLUCOSE SERPL-MCNC: 120 MG/DL (ref 70–110)
HBA1C MFR BLD HPLC: 6.2 % (ref 4–5.6)
PHOSPHATE SERPL-MCNC: 3.7 MG/DL (ref 2.7–4.5)
POTASSIUM SERPL-SCNC: 4.9 MMOL/L (ref 3.5–5.1)
PTH-INTACT SERPL-MCNC: 56.9 PG/ML (ref 9–77)
SODIUM SERPL-SCNC: 138 MMOL/L (ref 136–145)
TSH SERPL DL<=0.005 MIU/L-ACNC: 1.34 UIU/ML (ref 0.4–4)

## 2019-09-09 PROCEDURE — 36415 COLL VENOUS BLD VENIPUNCTURE: CPT

## 2019-09-09 PROCEDURE — 80069 RENAL FUNCTION PANEL: CPT

## 2019-09-09 PROCEDURE — 83970 ASSAY OF PARATHORMONE: CPT

## 2019-09-09 PROCEDURE — 84443 ASSAY THYROID STIM HORMONE: CPT

## 2019-09-09 PROCEDURE — 83036 HEMOGLOBIN GLYCOSYLATED A1C: CPT

## 2019-09-09 NOTE — TELEPHONE ENCOUNTER
Spoke to patient and let him know that he doesn't need to come in for his test results. We will give him a call when they come in.

## 2019-09-09 NOTE — TELEPHONE ENCOUNTER
----- Message from Corrie Gold MA sent at 9/9/2019 12:20 PM CDT -----  Regarding: Lab Results  Hey,    He stopped by wanting to know if he could make an appointment to discuss his lab results or how that works.     Thanks

## 2019-09-10 ENCOUNTER — OFFICE VISIT (OUTPATIENT)
Dept: INTERNAL MEDICINE | Facility: CLINIC | Age: 64
End: 2019-09-10
Attending: INTERNAL MEDICINE
Payer: MEDICARE

## 2019-09-10 ENCOUNTER — TELEPHONE (OUTPATIENT)
Dept: ENDOCRINOLOGY | Facility: CLINIC | Age: 64
End: 2019-09-10

## 2019-09-10 VITALS
BODY MASS INDEX: 25.94 KG/M2 | SYSTOLIC BLOOD PRESSURE: 122 MMHG | OXYGEN SATURATION: 97 % | HEIGHT: 70 IN | HEART RATE: 65 BPM | DIASTOLIC BLOOD PRESSURE: 78 MMHG | WEIGHT: 181.19 LBS

## 2019-09-10 DIAGNOSIS — I10 ESSENTIAL HYPERTENSION: ICD-10-CM

## 2019-09-10 DIAGNOSIS — E89.0 POSTOPERATIVE HYPOTHYROIDISM: ICD-10-CM

## 2019-09-10 DIAGNOSIS — E11.9 DIABETES MELLITUS WITHOUT COMPLICATION: ICD-10-CM

## 2019-09-10 DIAGNOSIS — E07.9 THYROID DISEASE: ICD-10-CM

## 2019-09-10 DIAGNOSIS — Z12.11 COLON CANCER SCREENING: ICD-10-CM

## 2019-09-10 DIAGNOSIS — E21.3 HYPERPARATHYROIDISM: ICD-10-CM

## 2019-09-10 DIAGNOSIS — Z87.891 FORMER SMOKER: Primary | ICD-10-CM

## 2019-09-10 DIAGNOSIS — E21.3 HYPERPARATHYROIDISM: Primary | ICD-10-CM

## 2019-09-10 PROCEDURE — 99999 PR PBB SHADOW E&M-EST. PATIENT-LVL III: CPT | Mod: PBBFAC,,, | Performed by: INTERNAL MEDICINE

## 2019-09-10 PROCEDURE — 3074F PR MOST RECENT SYSTOLIC BLOOD PRESSURE < 130 MM HG: ICD-10-PCS | Mod: CPTII,S$GLB,, | Performed by: INTERNAL MEDICINE

## 2019-09-10 PROCEDURE — 3078F PR MOST RECENT DIASTOLIC BLOOD PRESSURE < 80 MM HG: ICD-10-PCS | Mod: CPTII,S$GLB,, | Performed by: INTERNAL MEDICINE

## 2019-09-10 PROCEDURE — 3008F PR BODY MASS INDEX (BMI) DOCUMENTED: ICD-10-PCS | Mod: CPTII,S$GLB,, | Performed by: INTERNAL MEDICINE

## 2019-09-10 PROCEDURE — 3074F SYST BP LT 130 MM HG: CPT | Mod: CPTII,S$GLB,, | Performed by: INTERNAL MEDICINE

## 2019-09-10 PROCEDURE — 99214 PR OFFICE/OUTPT VISIT, EST, LEVL IV, 30-39 MIN: ICD-10-PCS | Mod: S$GLB,,, | Performed by: INTERNAL MEDICINE

## 2019-09-10 PROCEDURE — 99999 PR PBB SHADOW E&M-EST. PATIENT-LVL III: ICD-10-PCS | Mod: PBBFAC,,, | Performed by: INTERNAL MEDICINE

## 2019-09-10 PROCEDURE — 3008F BODY MASS INDEX DOCD: CPT | Mod: CPTII,S$GLB,, | Performed by: INTERNAL MEDICINE

## 2019-09-10 PROCEDURE — 99214 OFFICE O/P EST MOD 30 MIN: CPT | Mod: S$GLB,,, | Performed by: INTERNAL MEDICINE

## 2019-09-10 PROCEDURE — 3078F DIAST BP <80 MM HG: CPT | Mod: CPTII,S$GLB,, | Performed by: INTERNAL MEDICINE

## 2019-09-10 PROCEDURE — 3044F PR MOST RECENT HEMOGLOBIN A1C LEVEL <7.0%: ICD-10-PCS | Mod: CPTII,S$GLB,, | Performed by: INTERNAL MEDICINE

## 2019-09-10 PROCEDURE — 3044F HG A1C LEVEL LT 7.0%: CPT | Mod: CPTII,S$GLB,, | Performed by: INTERNAL MEDICINE

## 2019-09-10 RX ORDER — MELOXICAM 15 MG/1
15 TABLET ORAL DAILY
Qty: 90 TABLET | Refills: 1 | Status: SHIPPED | OUTPATIENT
Start: 2019-09-10 | End: 2020-03-09

## 2019-09-10 NOTE — PROGRESS NOTES
"Subjective:       Patient ID: Rob Jones Jr. is a 64 y.o. male.    Chief Complaint: Hospital Follow Up    Here for hospital f/u    Parathyroidectomy 05/2019 complicated by prolonged post op intubation. Otherwise he has been doing well. No post op calcium or dysphagia complications. Denies numbness/tingling, tremors, spasms, abd pain. + frequent throat clearing. Has seen endocrine in past 3 months. A1c controlled. Has no complaints today.        Review of Systems   Constitutional: Negative for appetite change, chills, fever and unexpected weight change.   HENT: Negative for hearing loss, sore throat and trouble swallowing.    Eyes: Negative for visual disturbance.   Respiratory: Negative for cough, chest tightness and shortness of breath.    Cardiovascular: Negative for chest pain and leg swelling.   Gastrointestinal: Negative for abdominal pain, blood in stool, constipation, diarrhea, nausea and vomiting.   Endocrine: Negative for polydipsia and polyuria.   Genitourinary: Negative for decreased urine volume, difficulty urinating, dysuria, frequency and urgency.   Musculoskeletal: Negative for gait problem.   Skin: Negative for rash.   Neurological: Negative for dizziness and numbness.   Psychiatric/Behavioral: The patient is not nervous/anxious.        Objective:      Vitals:    09/10/19 1421   BP: 122/78   Pulse: 65   SpO2: 97%   Weight: 82.2 kg (181 lb 3.5 oz)   Height: 5' 10" (1.778 m)      Physical Exam   Constitutional: He is oriented to person, place, and time. He appears well-developed and well-nourished. No distress.   HENT:   Head: Normocephalic and atraumatic.   Mouth/Throat: Oropharynx is clear and moist. No oropharyngeal exudate.   Eyes: Pupils are equal, round, and reactive to light. Conjunctivae and EOM are normal. No scleral icterus.   Neck: No thyromegaly present.   Cardiovascular: Normal rate, regular rhythm and normal heart sounds.   No murmur heard.  Pulmonary/Chest: Effort normal and breath " sounds normal. He has no wheezes. He has no rales.   Abdominal: Soft. He exhibits no distension. There is no tenderness.   Musculoskeletal: He exhibits no edema or tenderness.   Lymphadenopathy:     He has no cervical adenopathy.   Neurological: He is alert and oriented to person, place, and time.   Skin: Skin is warm and dry.   Psychiatric: He has a normal mood and affect. His behavior is normal.       Assessment:       1. Former smoker    2. Diabetes mellitus without complication    3. Thyroid disease    4. Essential hypertension    5. Colon cancer screening    6. Hyperparathyroidism        Plan:       Rob was seen today for hospital follow up.    Diagnoses and all orders for this visit:    Former smoker    Diabetes mellitus without complication   Will update labs at f/u        Thyroid disease    Essential hypertension   potential E of meloxicam discussed.    Colon cancer screening  -     Fecal Immunochemical Test (iFOBT); Future    Hyperparathyroidism      -     meloxicam (MOBIC) 15 MG tablet; Take 1 tablet (15 mg total) by mouth once daily.           Vasyl Dubois MD  Internal Medicine-[facility]        Side effects of medication(s) were discussed in detail and patient voiced understanding.  Patient will call back for any issues or complications.

## 2019-09-10 NOTE — TELEPHONE ENCOUNTER
I have spoke to patient and let them know that Dr Brown has reviewed his labs. His calcium and parathyroid hormone levels remain normal since having parathyroid surgery. His thyroid level is normal so Dr Brown recommend he continue the current dose of thyroid hormone. His A1c test for diabetes is at goal at 6.2% so Dr Brown recommend he continue the current medications for diabetes

## 2019-09-10 NOTE — TELEPHONE ENCOUNTER
Please advise patient that I reviewed his labs. His calcium and parathyroid hormone levels remain normal since having parathyroid surgery. His thyroid level is normal so I recommend he continue the current dose of thyroid hormone. His A1c test for diabetes is at goal at 6.2% so I recommend he continue the current medications for diabetes.

## 2019-09-12 ENCOUNTER — IMMUNIZATION (OUTPATIENT)
Dept: PHARMACY | Facility: CLINIC | Age: 64
End: 2019-09-12
Payer: MEDICARE

## 2019-09-12 ENCOUNTER — IMMUNIZATION (OUTPATIENT)
Dept: PHARMACY | Facility: CLINIC | Age: 64
End: 2019-09-12

## 2019-09-13 DIAGNOSIS — I10 HYPERTENSION, UNSPECIFIED TYPE: ICD-10-CM

## 2019-09-13 RX ORDER — IRBESARTAN 300 MG/1
300 TABLET ORAL DAILY
Qty: 90 TABLET | Refills: 3 | Status: SHIPPED | OUTPATIENT
Start: 2019-09-13 | End: 2020-03-10

## 2019-09-13 RX ORDER — METFORMIN HYDROCHLORIDE 1000 MG/1
TABLET ORAL
Qty: 180 TABLET | Refills: 2 | Status: SHIPPED | OUTPATIENT
Start: 2019-09-13 | End: 2020-06-07

## 2019-09-13 RX ORDER — AMLODIPINE BESYLATE 10 MG/1
10 TABLET ORAL DAILY
Qty: 90 TABLET | Refills: 2 | Status: SHIPPED | OUTPATIENT
Start: 2019-09-13 | End: 2020-06-07

## 2019-09-30 RX ORDER — LEVOTHYROXINE SODIUM 137 UG/1
137 TABLET ORAL
Qty: 90 TABLET | Refills: 2 | Status: SHIPPED | OUTPATIENT
Start: 2019-09-30 | End: 2020-09-23

## 2019-10-24 ENCOUNTER — PATIENT OUTREACH (OUTPATIENT)
Dept: ADMINISTRATIVE | Facility: HOSPITAL | Age: 64
End: 2019-10-24

## 2019-10-31 ENCOUNTER — PATIENT OUTREACH (OUTPATIENT)
Dept: ADMINISTRATIVE | Facility: HOSPITAL | Age: 64
End: 2019-10-31

## 2019-11-27 ENCOUNTER — PATIENT OUTREACH (OUTPATIENT)
Dept: ADMINISTRATIVE | Facility: OTHER | Age: 64
End: 2019-11-27

## 2019-12-09 ENCOUNTER — PATIENT OUTREACH (OUTPATIENT)
Dept: ADMINISTRATIVE | Facility: HOSPITAL | Age: 64
End: 2019-12-09

## 2019-12-09 DIAGNOSIS — E11.9 DIABETES MELLITUS WITHOUT COMPLICATION: Primary | ICD-10-CM

## 2019-12-18 ENCOUNTER — LAB VISIT (OUTPATIENT)
Dept: LAB | Facility: OTHER | Age: 64
End: 2019-12-18
Attending: INTERNAL MEDICINE
Payer: MEDICARE

## 2019-12-18 DIAGNOSIS — Z12.11 COLON CANCER SCREENING: ICD-10-CM

## 2019-12-18 PROCEDURE — 82274 ASSAY TEST FOR BLOOD FECAL: CPT

## 2019-12-19 LAB — HEMOCCULT STL QL IA: NEGATIVE

## 2020-01-13 ENCOUNTER — TELEPHONE (OUTPATIENT)
Dept: ADMINISTRATIVE | Facility: HOSPITAL | Age: 65
End: 2020-01-13

## 2020-01-13 NOTE — TELEPHONE ENCOUNTER
----- Message from Syeda Smith LPN sent at 1/6/2020  3:55 PM CST -----  Contact: TOMASA JONES JR. [[id number]]   Mr. Jones states that he would rather talk to you  ----- Message -----  From: Nova Barber  Sent: 1/6/2020   3:04 PM CST  To: Tony Fallon Staff    Type: Patient Call Back    Who called:TOMASA JONES JR. [[id number]]    What is the request in detail: Patient is requesting a call back from Frankie Pollack. He states that he was calling in regards to some appointments that he needs scheduled.   Please contact to further advise.    Can the clinic reply by [facility]? No    Best call back number: [phone]    Additional Information: N/A

## 2020-01-21 ENCOUNTER — OFFICE VISIT (OUTPATIENT)
Dept: PODIATRY | Facility: CLINIC | Age: 65
End: 2020-01-21
Payer: MEDICARE

## 2020-01-21 VITALS
HEART RATE: 73 BPM | BODY MASS INDEX: 25.91 KG/M2 | SYSTOLIC BLOOD PRESSURE: 126 MMHG | WEIGHT: 181 LBS | HEIGHT: 70 IN | DIASTOLIC BLOOD PRESSURE: 72 MMHG

## 2020-01-21 DIAGNOSIS — L84 CORN OR CALLUS: ICD-10-CM

## 2020-01-21 DIAGNOSIS — B35.1 ONYCHOMYCOSIS DUE TO DERMATOPHYTE: ICD-10-CM

## 2020-01-21 PROCEDURE — 3078F DIAST BP <80 MM HG: CPT | Mod: CPTII,S$GLB,, | Performed by: PODIATRIST

## 2020-01-21 PROCEDURE — 3008F BODY MASS INDEX DOCD: CPT | Mod: CPTII,S$GLB,, | Performed by: PODIATRIST

## 2020-01-21 PROCEDURE — 3044F HG A1C LEVEL LT 7.0%: CPT | Mod: CPTII,S$GLB,, | Performed by: PODIATRIST

## 2020-01-21 PROCEDURE — 11721 PR DEBRIDEMENT OF NAILS, 6 OR MORE: ICD-10-PCS | Mod: Q9,59,S$GLB, | Performed by: PODIATRIST

## 2020-01-21 PROCEDURE — 3074F SYST BP LT 130 MM HG: CPT | Mod: CPTII,S$GLB,, | Performed by: PODIATRIST

## 2020-01-21 PROCEDURE — 99213 OFFICE O/P EST LOW 20 MIN: CPT | Mod: 25,S$GLB,, | Performed by: PODIATRIST

## 2020-01-21 PROCEDURE — 11056 PR TRIM BENIGN HYPERKERATOTIC SKIN LESION,2-4: ICD-10-PCS | Mod: Q9,S$GLB,, | Performed by: PODIATRIST

## 2020-01-21 PROCEDURE — 99213 PR OFFICE/OUTPT VISIT, EST, LEVL III, 20-29 MIN: ICD-10-PCS | Mod: 25,S$GLB,, | Performed by: PODIATRIST

## 2020-01-21 PROCEDURE — 99999 PR PBB SHADOW E&M-EST. PATIENT-LVL III: ICD-10-PCS | Mod: PBBFAC,,, | Performed by: PODIATRIST

## 2020-01-21 PROCEDURE — 11721 DEBRIDE NAIL 6 OR MORE: CPT | Mod: Q9,59,S$GLB, | Performed by: PODIATRIST

## 2020-01-21 PROCEDURE — 3044F PR MOST RECENT HEMOGLOBIN A1C LEVEL <7.0%: ICD-10-PCS | Mod: CPTII,S$GLB,, | Performed by: PODIATRIST

## 2020-01-21 PROCEDURE — 3074F PR MOST RECENT SYSTOLIC BLOOD PRESSURE < 130 MM HG: ICD-10-PCS | Mod: CPTII,S$GLB,, | Performed by: PODIATRIST

## 2020-01-21 PROCEDURE — 3078F PR MOST RECENT DIASTOLIC BLOOD PRESSURE < 80 MM HG: ICD-10-PCS | Mod: CPTII,S$GLB,, | Performed by: PODIATRIST

## 2020-01-21 PROCEDURE — 3008F PR BODY MASS INDEX (BMI) DOCUMENTED: ICD-10-PCS | Mod: CPTII,S$GLB,, | Performed by: PODIATRIST

## 2020-01-21 PROCEDURE — 99999 PR PBB SHADOW E&M-EST. PATIENT-LVL III: CPT | Mod: PBBFAC,,, | Performed by: PODIATRIST

## 2020-01-21 PROCEDURE — 11056 PARNG/CUTG B9 HYPRKR LES 2-4: CPT | Mod: Q9,S$GLB,, | Performed by: PODIATRIST

## 2020-01-21 NOTE — LETTER
January 21, 2020      Vasyl Jimenez MD  [address]  [address]  [facility] [address]           [facility] - Podiatry  [address]  [facility] [address]  Phone: [phone]          Patient: Rob Jones Jr.   MR Number: [id number]   YOB: 1955   Date of Visit: 1/21/2020       Dear Dr. Vasyl Jimenez:    Thank you for referring Rob Jones to me for evaluation. Attached you will find relevant portions of my assessment and plan of care.    If you have questions, please do not hesitate to call me. I look forward to following Rob Jones along with you.    Sincerely,    Hank Rodriguez, DPDEJON    Enclosure  CC:  No Recipients    If you would like to receive this communication electronically, please contact [email or web address] or [phone] to request more information on [email or web address] Link access.    For providers and/or their staff who would like to refer a patient to [facility], please contact us through our one-stop-shop provider referral line, [facility] , at [phone].    If you feel you have received this communication in error or would no longer like to receive these types of communications, please e-mail [email or web address]

## 2020-01-21 NOTE — PROGRESS NOTES
Subjective:       Patient ID: Rob Jones Jr. is a 64 y.o. male.    Chief Complaint: Diabetes Mellitus (bilateral pcp Dr Jimenez 9/10/19) and Diabetic Foot Exam    HPI  Review of Systems    Objective:      Physical Exam    Assessment:       1. Uncontrolled type 2 diabetes mellitus with complication, with long-term current use of insulin    2. Onychomycosis due to dermatophyte    3. Corn or callus        Plan:       ***

## 2020-01-21 NOTE — PROGRESS NOTES
Subjective:      Patient ID: Rob Jones Jr. is a 64 y.o. male.    Chief Complaint: Diabetes Mellitus (bilateral pcp Dr Jimenez 9/10/19) and Diabetic Foot Exam    Rob is a 64 y.o. male who presents to the clinic for evaluation and treatment of high risk feet. Rob has a past medical history of Anxiety, Back pain, Cataract, Diabetes mellitus, History of hepatitis C; S/p RX with SVR 12 documented 06/2017 (6/1/2017), Hypertension, Postoperative hypothyroidism (11/3/2016), Primary hyperparathyroidism (1/18/2017), and Thyroid disease. The patient's chief complaint is long, thick toenails. This patient has documented high risk feet requiring routine maintenance secondary to diabetes mellitis and those secondary complications of diabetes, as mentioned..    PCP: Vasyl Jimenez MD    Date Last Seen by PCP:   Chief Complaint   Patient presents with    Diabetes Mellitus     bilateral pcp Dr Jimenez 9/10/19    Diabetic Foot Exam         Current shoe gear:  Affected Foot: Casual shoes     Unaffected Foot: Casual shoes    Hemoglobin A1C   Date Value Ref Range Status   09/09/2019 6.2 (H) 4.0 - 5.6 % Final     Comment:     ADA Screening Guidelines:  5.7-6.4%  Consistent with prediabetes  >or=6.5%  Consistent with diabetes  High levels of fetal hemoglobin interfere with the HbA1C  assay. Heterozygous hemoglobin variants (HbS, HgC, etc)do  not significantly interfere with this assay.   However, presence of multiple variants may affect accuracy.     05/16/2019 6.1 (H) 4.0 - 5.6 % Final     Comment:     ADA Screening Guidelines:  5.7-6.4%  Consistent with prediabetes  >or=6.5%  Consistent with diabetes  High levels of fetal hemoglobin interfere with the HbA1C  assay. Heterozygous hemoglobin variants (HbS, HgC, etc)do  not significantly interfere with this assay.   However, presence of multiple variants may affect accuracy.     01/21/2019 7.9 (H) 4.0 - 5.6 % Final     Comment:     ADA Screening Guidelines:  5.7-6.4%   Consistent with prediabetes  >or=6.5%  Consistent with diabetes  High levels of fetal hemoglobin interfere with the HbA1C  assay. Heterozygous hemoglobin variants (HbS, HgC, etc)do  not significantly interfere with this assay.   However, presence of multiple variants may affect accuracy.         Review of Systems   Constitution: Negative for chills, decreased appetite, fever and night sweats.   HENT: Negative for congestion, ear discharge, nosebleeds and tinnitus.    Eyes: Negative for double vision, pain and visual disturbance.   Cardiovascular: Negative for chest pain, claudication, cyanosis and palpitations.   Respiratory: Negative for cough, hemoptysis, shortness of breath and wheezing.    Endocrine: Negative for cold intolerance and heat intolerance.   Hematologic/Lymphatic: Negative for adenopathy and bleeding problem.   Skin: Positive for color change, dry skin, nail changes and unusual hair distribution.   Musculoskeletal: Positive for arthritis, joint pain and stiffness. Negative for myalgias.   Gastrointestinal: Negative for abdominal pain, dysphagia, nausea and vomiting.   Genitourinary: Negative for dysuria, flank pain, hematuria and pelvic pain.   Neurological: Positive for disturbances in coordination, numbness, paresthesias and sensory change.   Psychiatric/Behavioral: Negative for altered mental status, hallucinations and suicidal ideas. The patient does not have insomnia.    Allergic/Immunologic: Negative for environmental allergies and persistent infections.           Objective:      Physical Exam   Constitutional: He is oriented to person, place, and time. He appears well-developed and well-nourished.   Cardiovascular:   Pulses:       Dorsalis pedis pulses are 1+ on the right side, and 1+ on the left side.        Posterior tibial pulses are 1+ on the right side, and 1+ on the left side.   Pulmonary/Chest: Effort normal.   Musculoskeletal: Normal range of motion.   Anterior, lateral, and posterior  muscle groups bilateral lower extremities show strength 4 over 5 symmetrically. Inspection and palpation of the joints and bones reveal no crepitus or joint effusion. No tenderness upon palpation. Mild plantar flexor contractures noted to digits 2 through 5 bilaterally.  Angle and base of gait are normal.   Feet:   Right Foot:   Skin Integrity: Positive for callus and dry skin.   Left Foot:   Skin Integrity: Positive for callus and dry skin.   Neurological: He is alert and oriented to person, place, and time. He displays atrophy and abnormal reflex. A sensory deficit is present.   Reflex Scores:       Patellar reflexes are 1+ on the right side and 1+ on the left side.       Achilles reflexes are 1+ on the right side and 1+ on the left side.  Sharp, dull, light touch, and vibratory sensation are diminished bilaterally. Proprioceptive sensation is intact to both lower extremities. [place] Dean monofilament exam shows loss of protective sensation to plantar toes 1 through 5 bilaterally. Deep tendon reflexes to the patellar tendons is 1 over 4 bilaterally symmetrical. Deep tendon reflexes to the Achilles tendon is 0 over 4 bilaterally symmetrical. No ankle clonus or Babinski reflex noted bilaterally. Coordination is fair to both lower extremities.  Patient admits to intermittent burning and tingling in the feet.   Skin: Skin is warm and dry. Capillary refill takes 2 to 3 seconds. There is pallor.   Skin bilateral lower extremities noted to be thin, dry, and atrophic.  Toenails thickened, discolored, with subungual fungal debris and tenderness noted.  Hyperkeratotic lesions noted to both feet plantarly with tenderness.   Psychiatric: He has a normal mood and affect.   Vitals reviewed.            Assessment:       Encounter Diagnoses   Name Primary?    Uncontrolled type 2 diabetes mellitus with complication, with long-term current use of insulin Yes    Onychomycosis due to dermatophyte     Corn or callus           Plan:       Rob was seen today for diabetes mellitus and diabetic foot exam.    Diagnoses and all orders for this visit:    Uncontrolled type 2 diabetes mellitus with complication, with long-term current use of insulin  -     DIABETIC SHOES FOR HOME USE    Onychomycosis due to dermatophyte    Corn or callus      I counseled the patient on his conditions, their implications and medical management.      Routine Foot Care    Performed by:  Hank Rodriguez. DPM  Authorized by:  Patient     Consent Done?:  Yes (Verbal)     Nail Care Type:  Debride  Location(s): All  (Left 1st Toe, Left 3rd Toe, Left 2nd Toe, Left 4th Toe, Left 5th Toe, Right 1st Toe, Right 2nd Toe, Right 3rd Toe, Right 4th Toe and Right 5th Toe)  Patient tolerance:  Patient tolerated the procedure well with no immediate complications     With patient's permission, the toenails mentioned above were aggressively reduced and debrided using a nail nipper, removing all offending nail and debris. The patient will continue to monitor the areas daily, inspect the feet, wear protective shoe gear when ambulatory, and moisturizer to maintain skin integrity.      Callus Care Type: Debride    With patient's permission, the calluses/hyperkeratotic lesions mentioned above were aggressively reduced and debrided using a number 15 blade. The patient will continue to monitor the areas daily, inspect the feet, wear protective shoe gear when ambulatory, and moisturizer to maintain skin integrity.       Shoe inspection. Diabetic Foot Education. Patient reminded of the importance of good nutrition and blood sugar control to help prevent podiatric complications of diabetes. Patient instructed on proper foot hygeine. We discussed wearing proper shoe gear, daily foot inspections and Diabetic foot education in detail.    Return to clinic in 3-6 months or sooner if problems arise     .

## 2020-01-24 DIAGNOSIS — E11.9 TYPE 2 DIABETES MELLITUS WITHOUT COMPLICATION: ICD-10-CM

## 2020-02-07 RX ORDER — SITAGLIPTIN 100 MG/1
TABLET, FILM COATED ORAL
Qty: 90 TABLET | Refills: 3 | Status: ON HOLD | OUTPATIENT
Start: 2020-02-07 | End: 2020-03-13 | Stop reason: HOSPADM

## 2020-02-26 ENCOUNTER — PATIENT OUTREACH (OUTPATIENT)
Dept: ADMINISTRATIVE | Facility: OTHER | Age: 65
End: 2020-02-26

## 2020-02-26 ENCOUNTER — OFFICE VISIT (OUTPATIENT)
Dept: OPTOMETRY | Facility: CLINIC | Age: 65
End: 2020-02-26
Payer: MEDICARE

## 2020-02-26 DIAGNOSIS — E11.9 TYPE 2 DIABETES MELLITUS WITHOUT OPHTHALMIC MANIFESTATIONS: Primary | ICD-10-CM

## 2020-02-26 DIAGNOSIS — H25.13 NUCLEAR SCLEROSIS, BILATERAL: ICD-10-CM

## 2020-02-26 DIAGNOSIS — H52.4 PRESBYOPIA: ICD-10-CM

## 2020-02-26 PROCEDURE — 99999 PR PBB SHADOW E&M-EST. PATIENT-LVL II: ICD-10-PCS | Mod: PBBFAC,,, | Performed by: OPTOMETRIST

## 2020-02-26 PROCEDURE — 92014 PR EYE EXAM, EST PATIENT,COMPREHESV: ICD-10-PCS | Mod: S$GLB,,, | Performed by: OPTOMETRIST

## 2020-02-26 PROCEDURE — 99999 PR PBB SHADOW E&M-EST. PATIENT-LVL II: CPT | Mod: PBBFAC,,, | Performed by: OPTOMETRIST

## 2020-02-26 PROCEDURE — 92014 COMPRE OPH EXAM EST PT 1/>: CPT | Mod: S$GLB,,, | Performed by: OPTOMETRIST

## 2020-02-26 PROCEDURE — 92015 DETERMINE REFRACTIVE STATE: CPT | Mod: S$GLB,,, | Performed by: OPTOMETRIST

## 2020-02-26 PROCEDURE — 92015 PR REFRACTION: ICD-10-PCS | Mod: S$GLB,,, | Performed by: OPTOMETRIST

## 2020-02-26 NOTE — PROGRESS NOTES
HPI     Last eye exam was 2/20/19 with Dr. Cifuentes.  Patient states filled glasses rx in November and [facility] but doesn't   feel he can see well out of them-feels vision is better without glasses.  Patient denies diplopia, headaches, flashes/floaters, and pain.    Hemoglobin A1C       Date                     Value               Ref Range             Status                09/09/2019               6.2 (H)             4.0 - 5.6 %           Final                  Last edited by Roseann Sparks on 2/26/2020  1:37 PM. (History)            Assessment /Plan     For exam results, see Encounter Report.    Type 2 diabetes mellitus without ophthalmic manifestations    Nuclear sclerosis, bilateral    Presbyopia            1.  No retinopathy--monitor yearly.  BS control.  Eye health normal OU.  2.  Early-monitor.  3.  Bifocal rx given

## 2020-02-26 NOTE — PROGRESS NOTES
Chart reviewed.   Immunizations: Triggered Imm Registry     Orders placed: n/a  Upcoming appts to satisfy ERMELINDA topics: eye exam 2/26/2020

## 2020-03-09 RX ORDER — MELOXICAM 15 MG/1
TABLET ORAL
Qty: 90 TABLET | Refills: 1 | Status: SHIPPED | OUTPATIENT
Start: 2020-03-09 | End: 2020-09-02

## 2020-03-10 ENCOUNTER — OFFICE VISIT (OUTPATIENT)
Dept: INTERNAL MEDICINE | Facility: CLINIC | Age: 65
DRG: 638 | End: 2020-03-10
Attending: INTERNAL MEDICINE
Payer: MEDICARE

## 2020-03-10 VITALS
DIASTOLIC BLOOD PRESSURE: 70 MMHG | OXYGEN SATURATION: 97 % | HEIGHT: 70 IN | SYSTOLIC BLOOD PRESSURE: 114 MMHG | BODY MASS INDEX: 24.43 KG/M2 | HEART RATE: 69 BPM | WEIGHT: 170.63 LBS

## 2020-03-10 DIAGNOSIS — R13.10 DYSPHAGIA, UNSPECIFIED TYPE: ICD-10-CM

## 2020-03-10 DIAGNOSIS — Z72.0 TOBACCO USE: ICD-10-CM

## 2020-03-10 DIAGNOSIS — E11.9 DIABETES MELLITUS WITHOUT COMPLICATION: Primary | ICD-10-CM

## 2020-03-10 DIAGNOSIS — I25.10 CORONARY ARTERY DISEASE INVOLVING NATIVE CORONARY ARTERY OF NATIVE HEART WITHOUT ANGINA PECTORIS: ICD-10-CM

## 2020-03-10 DIAGNOSIS — R53.83 FATIGUE, UNSPECIFIED TYPE: ICD-10-CM

## 2020-03-10 DIAGNOSIS — E21.0 PRIMARY HYPERPARATHYROIDISM: ICD-10-CM

## 2020-03-10 DIAGNOSIS — I10 ESSENTIAL HYPERTENSION: ICD-10-CM

## 2020-03-10 DIAGNOSIS — R20.3 HYPERESTHESIA: ICD-10-CM

## 2020-03-10 DIAGNOSIS — R35.0 URINARY FREQUENCY: ICD-10-CM

## 2020-03-10 DIAGNOSIS — R79.9 ABNORMAL FINDING OF BLOOD CHEMISTRY, UNSPECIFIED: ICD-10-CM

## 2020-03-10 PROCEDURE — 99999 PR PBB SHADOW E&M-EST. PATIENT-LVL III: CPT | Mod: PBBFAC,,, | Performed by: INTERNAL MEDICINE

## 2020-03-10 PROCEDURE — 3044F HG A1C LEVEL LT 7.0%: CPT | Mod: CPTII,S$GLB,, | Performed by: INTERNAL MEDICINE

## 2020-03-10 PROCEDURE — 3078F PR MOST RECENT DIASTOLIC BLOOD PRESSURE < 80 MM HG: ICD-10-PCS | Mod: CPTII,S$GLB,, | Performed by: INTERNAL MEDICINE

## 2020-03-10 PROCEDURE — 99214 OFFICE O/P EST MOD 30 MIN: CPT | Mod: S$GLB,,, | Performed by: INTERNAL MEDICINE

## 2020-03-10 PROCEDURE — 99214 PR OFFICE/OUTPT VISIT, EST, LEVL IV, 30-39 MIN: ICD-10-PCS | Mod: S$GLB,,, | Performed by: INTERNAL MEDICINE

## 2020-03-10 PROCEDURE — 3078F DIAST BP <80 MM HG: CPT | Mod: CPTII,S$GLB,, | Performed by: INTERNAL MEDICINE

## 2020-03-10 PROCEDURE — 3044F PR MOST RECENT HEMOGLOBIN A1C LEVEL <7.0%: ICD-10-PCS | Mod: CPTII,S$GLB,, | Performed by: INTERNAL MEDICINE

## 2020-03-10 PROCEDURE — 3008F PR BODY MASS INDEX (BMI) DOCUMENTED: ICD-10-PCS | Mod: CPTII,S$GLB,, | Performed by: INTERNAL MEDICINE

## 2020-03-10 PROCEDURE — 3074F SYST BP LT 130 MM HG: CPT | Mod: CPTII,S$GLB,, | Performed by: INTERNAL MEDICINE

## 2020-03-10 PROCEDURE — 3008F BODY MASS INDEX DOCD: CPT | Mod: CPTII,S$GLB,, | Performed by: INTERNAL MEDICINE

## 2020-03-10 PROCEDURE — 99499 UNLISTED E&M SERVICE: CPT | Mod: S$GLB,,, | Performed by: INTERNAL MEDICINE

## 2020-03-10 PROCEDURE — 99999 PR PBB SHADOW E&M-EST. PATIENT-LVL III: ICD-10-PCS | Mod: PBBFAC,,, | Performed by: INTERNAL MEDICINE

## 2020-03-10 PROCEDURE — 3074F PR MOST RECENT SYSTOLIC BLOOD PRESSURE < 130 MM HG: ICD-10-PCS | Mod: CPTII,S$GLB,, | Performed by: INTERNAL MEDICINE

## 2020-03-10 PROCEDURE — 99499 RISK ADDL DX/OHS AUDIT: ICD-10-PCS | Mod: S$GLB,,, | Performed by: INTERNAL MEDICINE

## 2020-03-10 NOTE — PROGRESS NOTES
Subjective:       Patient ID: Rob Jones Jr. is a 64 y.o. male.    Chief Complaint: Annual Exam; Abdominal Pain; Fatigue; Anorexia; and Urinary Frequency    Here for routine f/u    He reports he has not felt right since surgery 10 months prior.  And that all symptoms are just progressively worsening.  He reports adherence with all of his medications except his vitamin-D he reports he only takes this here and there.  He is not currently on any calcium supplementation.  He presents today with report of several month history fatigue, frequent nausea, infrequent epigastric pain, dysphagia to liquids and solids that is intermittent, polyuria, polydipsia, dizziness and when standing up in using the restroom mL the night.  He reports he does not drink alcohol on daily basis any longer..  Admits he does not adhere to his diet.  He denies any dysuria, urinary hesitancy, urinary incontinence, chronic loose stools, chest pain at rest or with exertion, chronic cough, headache, night sweats, fevers chills.        Review of Systems   Constitutional: Negative for appetite change, chills, fever and unexpected weight change.   HENT: Negative for hearing loss, sore throat and trouble swallowing.    Eyes: Negative for visual disturbance.   Respiratory: Negative for cough, chest tightness and shortness of breath.    Cardiovascular: Negative for chest pain and leg swelling.   Gastrointestinal: Negative for abdominal pain, blood in stool, constipation, diarrhea, nausea and vomiting.   Endocrine: Negative for polydipsia and polyuria.   Genitourinary: Negative for decreased urine volume, difficulty urinating, dysuria, frequency and urgency.   Musculoskeletal: Negative for gait problem.   Skin: Negative for rash.   Neurological: Negative for dizziness and numbness.   Psychiatric/Behavioral: The patient is not nervous/anxious.        Objective:      Vitals:    03/10/20 1445   BP: 114/70   Pulse: 69   SpO2: 97%   Weight: 77.4 kg (170 lb  "10.2 oz)   Height: 5' 10" (1.778 m)      Physical Exam   Constitutional: He is oriented to person, place, and time. He appears well-developed and well-nourished. No distress.   HENT:   Head: Normocephalic and atraumatic.   Mouth/Throat: Oropharynx is clear and moist. No oropharyngeal exudate.   Eyes: Pupils are equal, round, and reactive to light. Conjunctivae and EOM are normal. No scleral icterus.   Neck: No thyromegaly present.   Cardiovascular: Normal rate, regular rhythm and normal heart sounds.   No murmur heard.  Pulmonary/Chest: Effort normal and breath sounds normal. He has no wheezes. He has no rales.   Abdominal: Soft. He exhibits no distension. There is no tenderness.   Musculoskeletal: He exhibits no edema or tenderness.   Lymphadenopathy:     He has no cervical adenopathy.   Neurological: He is alert and oriented to person, place, and time.   Skin: Skin is warm and dry.   Psychiatric: He has a normal mood and affect. His behavior is normal.       Assessment:       1. Diabetes mellitus without complication    2. Essential hypertension    3. Coronary artery disease involving native coronary artery of native heart without angina pectoris    4. Primary hyperparathyroidism    5. Tobacco use    6. Fatigue, unspecified type    7. Urinary frequency    8. Hyperesthesia     9. Abnormal finding of blood chemistry, unspecified     10. Dysphagia, unspecified type        Plan:       Rob was seen today for annual exam, abdominal pain, fatigue, anorexia and urinary frequency.    Diagnoses and all orders for this visit:    Diabetes mellitus without complication    Essential hypertension  -     CBC auto differential; Future  -     Hemoglobin A1c; Future    Coronary artery disease involving native coronary artery of native heart without angina pectoris  -     Lipid panel; Future  -     TSH; Future    Primary hyperparathyroidism  -     Comprehensive metabolic panel; Future  -     Vitamin D; Future    Tobacco " use    Fatigue, unspecified type  routine labs as differential wide at this time. F/u in 3-4 weeks to review.  -     Urinalysis, Reflex to Urine Culture Urine, Clean Catch; Future    Urinary frequency  -     Ambulatory referral/consult to Urology; Future    Hyperesthesia   -     TSH; Future    Abnormal finding of blood chemistry, unspecified   -     Hemoglobin A1c; Future    Dysphagia, unspecified type           Vasyl Dubois MD  Internal Medicine-[facility]        Side effects of medication(s) were discussed in detail and patient voiced understanding.  Patient will call back for any issues or complications.

## 2020-03-11 ENCOUNTER — OFFICE VISIT (OUTPATIENT)
Dept: UROLOGY | Facility: CLINIC | Age: 65
DRG: 638 | End: 2020-03-11
Attending: INTERNAL MEDICINE
Payer: MEDICARE

## 2020-03-11 ENCOUNTER — TELEPHONE (OUTPATIENT)
Dept: UROLOGY | Facility: CLINIC | Age: 65
End: 2020-03-11

## 2020-03-11 ENCOUNTER — HOSPITAL ENCOUNTER (INPATIENT)
Facility: OTHER | Age: 65
LOS: 2 days | Discharge: HOME | DRG: 638 | End: 2020-03-13
Attending: EMERGENCY MEDICINE | Admitting: HOSPITALIST
Payer: MEDICARE

## 2020-03-11 VITALS
SYSTOLIC BLOOD PRESSURE: 107 MMHG | HEIGHT: 70 IN | DIASTOLIC BLOOD PRESSURE: 72 MMHG | WEIGHT: 170 LBS | BODY MASS INDEX: 24.34 KG/M2

## 2020-03-11 DIAGNOSIS — R81 GLUCOSURIA: ICD-10-CM

## 2020-03-11 DIAGNOSIS — R39.15 URINARY URGENCY: Primary | ICD-10-CM

## 2020-03-11 DIAGNOSIS — E11.10 DIABETIC KETOACIDOSIS WITHOUT COMA ASSOCIATED WITH TYPE 2 DIABETES MELLITUS: ICD-10-CM

## 2020-03-11 DIAGNOSIS — E11.10 DKA (DIABETIC KETOACIDOSES): ICD-10-CM

## 2020-03-11 DIAGNOSIS — E83.52 HYPERCALCEMIA: ICD-10-CM

## 2020-03-11 DIAGNOSIS — N17.9 AKI (ACUTE KIDNEY INJURY): ICD-10-CM

## 2020-03-11 DIAGNOSIS — R73.9 HYPERGLYCEMIA: Primary | ICD-10-CM

## 2020-03-11 DIAGNOSIS — R35.0 URINARY FREQUENCY: ICD-10-CM

## 2020-03-11 LAB
ALBUMIN SERPL BCP-MCNC: 5 G/DL (ref 3.5–5.2)
ALLENS TEST: ABNORMAL
ALP SERPL-CCNC: 121 U/L (ref 55–135)
ALT SERPL W/O P-5'-P-CCNC: 16 U/L (ref 10–44)
ANION GAP SERPL CALC-SCNC: 18 MMOL/L (ref 8–16)
AST SERPL-CCNC: 15 U/L (ref 10–40)
B-OH-BUTYR BLD STRIP-SCNC: 1.4 MMOL/L (ref 0–0.5)
BACTERIA #/AREA URNS HPF: ABNORMAL /HPF
BASOPHILS # BLD AUTO: 0.05 K/UL (ref 0–0.2)
BASOPHILS NFR BLD: 0.7 % (ref 0–1.9)
BILIRUB SERPL-MCNC: 0.5 MG/DL (ref 0.1–1)
BILIRUB SERPL-MCNC: ABNORMAL MG/DL
BILIRUB UR QL STRIP: NEGATIVE
BLOOD URINE, POC: ABNORMAL
BUN SERPL-MCNC: 21 MG/DL (ref 8–23)
CALCIUM SERPL-MCNC: 10.9 MG/DL (ref 8.7–10.5)
CHLORIDE SERPL-SCNC: 93 MMOL/L (ref 95–110)
CLARITY UR: ABNORMAL
CO2 SERPL-SCNC: 22 MMOL/L (ref 23–29)
COLOR UR: YELLOW
COLOR, POC UA: ABNORMAL
CREAT SERPL-MCNC: 1.8 MG/DL (ref 0.5–1.4)
DELSYS: ABNORMAL
DIFFERENTIAL METHOD: ABNORMAL
EOSINOPHIL # BLD AUTO: 0.1 K/UL (ref 0–0.5)
EOSINOPHIL NFR BLD: 1.3 % (ref 0–8)
ERYTHROCYTE [DISTWIDTH] IN BLOOD BY AUTOMATED COUNT: 12.1 % (ref 11.5–14.5)
EST. GFR  (AFRICAN AMERICAN): 45 ML/MIN/1.73 M^2
EST. GFR  (NON AFRICAN AMERICAN): 39 ML/MIN/1.73 M^2
GLUCOSE SERPL-MCNC: 462 MG/DL (ref 70–110)
GLUCOSE UR QL STRIP: 1000
GLUCOSE UR QL STRIP: ABNORMAL
HCO3 UR-SCNC: 27.9 MMOL/L (ref 24–28)
HCT VFR BLD AUTO: 45.5 % (ref 40–54)
HGB BLD-MCNC: 14.7 G/DL (ref 14–18)
HGB UR QL STRIP: NEGATIVE
IMM GRANULOCYTES # BLD AUTO: 0.02 K/UL (ref 0–0.04)
IMM GRANULOCYTES NFR BLD AUTO: 0.3 % (ref 0–0.5)
KETONES UR QL STRIP: ABNORMAL
KETONES UR QL STRIP: ABNORMAL
LEUKOCYTE ESTERASE UR QL STRIP: NEGATIVE
LEUKOCYTE ESTERASE URINE, POC: ABNORMAL
LYMPHOCYTES # BLD AUTO: 2.1 K/UL (ref 1–4.8)
LYMPHOCYTES NFR BLD: 29 % (ref 18–48)
MCH RBC QN AUTO: 30.2 PG (ref 27–31)
MCHC RBC AUTO-ENTMCNC: 32.3 G/DL (ref 32–36)
MCV RBC AUTO: 94 FL (ref 82–98)
MICROSCOPIC COMMENT: ABNORMAL
MODE: ABNORMAL
MONOCYTES # BLD AUTO: 0.3 K/UL (ref 0.3–1)
MONOCYTES NFR BLD: 4.8 % (ref 4–15)
NEUTROPHILS # BLD AUTO: 4.5 K/UL (ref 1.8–7.7)
NEUTROPHILS NFR BLD: 63.9 % (ref 38–73)
NITRITE UR QL STRIP: NEGATIVE
NITRITE, POC UA: ABNORMAL
NRBC BLD-RTO: 0 /100 WBC
PCO2 BLDA: 47.7 MMHG (ref 35–45)
PH SMN: 7.38 [PH] (ref 7.35–7.45)
PH UR STRIP: 6 [PH] (ref 5–8)
PH, POC UA: 5
PLATELET # BLD AUTO: 351 K/UL (ref 150–350)
PMV BLD AUTO: 9.7 FL (ref 9.2–12.9)
PO2 BLDA: 27 MMHG (ref 40–60)
POC BE: 3 MMOL/L
POC SATURATED O2: 48 % (ref 95–100)
POCT GLUCOSE: 230 MG/DL (ref 70–110)
POCT GLUCOSE: 323 MG/DL (ref 70–110)
POCT GLUCOSE: 324 MG/DL (ref 70–110)
POCT GLUCOSE: 448 MG/DL (ref 70–110)
POTASSIUM SERPL-SCNC: 5.2 MMOL/L (ref 3.5–5.1)
PROT SERPL-MCNC: 8.8 G/DL (ref 6–8.4)
PROT UR QL STRIP: NEGATIVE
PROTEIN, POC: + 30
RBC # BLD AUTO: 4.86 M/UL (ref 4.6–6.2)
RBC #/AREA URNS HPF: 0 /HPF (ref 0–4)
SAMPLE: ABNORMAL
SITE: ABNORMAL
SODIUM SERPL-SCNC: 133 MMOL/L (ref 136–145)
SP GR UR STRIP: 1.01 (ref 1–1.03)
SPECIFIC GRAVITY, POC UA: 1.02
SQUAMOUS #/AREA URNS HPF: 90 /HPF
URN SPEC COLLECT METH UR: ABNORMAL
UROBILINOGEN UR STRIP-ACNC: NEGATIVE EU/DL
UROBILINOGEN, POC UA: NORMAL
WBC # BLD AUTO: 7.06 K/UL (ref 3.9–12.7)
WBC #/AREA URNS HPF: 24 /HPF (ref 0–5)
WBC CLUMPS URNS QL MICRO: ABNORMAL
YEAST URNS QL MICRO: ABNORMAL

## 2020-03-11 PROCEDURE — 3074F SYST BP LT 130 MM HG: CPT | Mod: CPTII,S$GLB,, | Performed by: NURSE PRACTITIONER

## 2020-03-11 PROCEDURE — 3008F PR BODY MASS INDEX (BMI) DOCUMENTED: ICD-10-PCS | Mod: CPTII,S$GLB,, | Performed by: NURSE PRACTITIONER

## 2020-03-11 PROCEDURE — 82010 KETONE BODYS QUAN: CPT

## 2020-03-11 PROCEDURE — 81002 POCT URINE DIPSTICK WITHOUT MICROSCOPE: ICD-10-PCS | Mod: S$GLB,,, | Performed by: NURSE PRACTITIONER

## 2020-03-11 PROCEDURE — 99223 1ST HOSP IP/OBS HIGH 75: CPT | Mod: ,,, | Performed by: NURSE PRACTITIONER

## 2020-03-11 PROCEDURE — 3074F PR MOST RECENT SYSTOLIC BLOOD PRESSURE < 130 MM HG: ICD-10-PCS | Mod: CPTII,S$GLB,, | Performed by: NURSE PRACTITIONER

## 2020-03-11 PROCEDURE — 87086 URINE CULTURE/COLONY COUNT: CPT

## 2020-03-11 PROCEDURE — 87086 URINE CULTURE/COLONY COUNT: CPT | Mod: 59

## 2020-03-11 PROCEDURE — 81002 URINALYSIS NONAUTO W/O SCOPE: CPT | Mod: S$GLB,,, | Performed by: NURSE PRACTITIONER

## 2020-03-11 PROCEDURE — 63600175 PHARM REV CODE 636 W HCPCS: Performed by: PHYSICIAN ASSISTANT

## 2020-03-11 PROCEDURE — 81000 URINALYSIS NONAUTO W/SCOPE: CPT

## 2020-03-11 PROCEDURE — 99204 OFFICE O/P NEW MOD 45 MIN: CPT | Mod: 25,S$GLB,, | Performed by: NURSE PRACTITIONER

## 2020-03-11 PROCEDURE — 96361 HYDRATE IV INFUSION ADD-ON: CPT

## 2020-03-11 PROCEDURE — 99204 PR OFFICE/OUTPT VISIT, NEW, LEVL IV, 45-59 MIN: ICD-10-PCS | Mod: 25,S$GLB,, | Performed by: NURSE PRACTITIONER

## 2020-03-11 PROCEDURE — 85025 COMPLETE CBC W/AUTO DIFF WBC: CPT

## 2020-03-11 PROCEDURE — 82962 GLUCOSE BLOOD TEST: CPT

## 2020-03-11 PROCEDURE — 96374 THER/PROPH/DIAG INJ IV PUSH: CPT

## 2020-03-11 PROCEDURE — 80053 COMPREHEN METABOLIC PANEL: CPT

## 2020-03-11 PROCEDURE — 63600175 PHARM REV CODE 636 W HCPCS: Performed by: EMERGENCY MEDICINE

## 2020-03-11 PROCEDURE — 11000001 HC ACUTE MED/SURG PRIVATE ROOM

## 2020-03-11 PROCEDURE — 3008F BODY MASS INDEX DOCD: CPT | Mod: CPTII,S$GLB,, | Performed by: NURSE PRACTITIONER

## 2020-03-11 PROCEDURE — 3078F PR MOST RECENT DIASTOLIC BLOOD PRESSURE < 80 MM HG: ICD-10-PCS | Mod: CPTII,S$GLB,, | Performed by: NURSE PRACTITIONER

## 2020-03-11 PROCEDURE — 99285 EMERGENCY DEPT VISIT HI MDM: CPT | Mod: 25

## 2020-03-11 PROCEDURE — 99900035 HC TECH TIME PER 15 MIN (STAT)

## 2020-03-11 PROCEDURE — 99223 PR INITIAL HOSPITAL CARE,LEVL III: ICD-10-PCS | Mod: ,,, | Performed by: NURSE PRACTITIONER

## 2020-03-11 PROCEDURE — 3078F DIAST BP <80 MM HG: CPT | Mod: CPTII,S$GLB,, | Performed by: NURSE PRACTITIONER

## 2020-03-11 PROCEDURE — 82803 BLOOD GASES ANY COMBINATION: CPT

## 2020-03-11 RX ORDER — CEPHALEXIN 250 MG/1
250 CAPSULE ORAL EVERY 12 HOURS
Qty: 14 CAPSULE | Refills: 0 | Status: SHIPPED | OUTPATIENT
Start: 2020-03-11 | End: 2020-03-11 | Stop reason: CLARIF

## 2020-03-11 RX ORDER — ONDANSETRON 2 MG/ML
4 INJECTION INTRAMUSCULAR; INTRAVENOUS EVERY 8 HOURS PRN
Status: CANCELLED | OUTPATIENT
Start: 2020-03-11

## 2020-03-11 RX ORDER — INSULIN PUMP SYRINGE, 3 ML
EACH MISCELLANEOUS
Qty: 1 EACH | Refills: 0 | Status: ON HOLD | OUTPATIENT
Start: 2020-03-11 | End: 2020-03-13 | Stop reason: HOSPADM

## 2020-03-11 RX ADMIN — SODIUM CHLORIDE 1000 ML: 0.9 INJECTION, SOLUTION INTRAVENOUS at 04:03

## 2020-03-11 RX ADMIN — INSULIN HUMAN 6 UNITS: 100 INJECTION, SOLUTION PARENTERAL at 08:03

## 2020-03-11 RX ADMIN — SODIUM CHLORIDE 1000 ML: 0.9 INJECTION, SOLUTION INTRAVENOUS at 08:03

## 2020-03-11 RX ADMIN — CEFTRIAXONE 1 G: 1 INJECTION, SOLUTION INTRAVENOUS at 09:03

## 2020-03-11 NOTE — ED TRIAGE NOTES
+Pt sent from urology clinic for high serum glucose. Pt reporting fatigue and urinary frequency. Non-compliant with insulin since 01/2020.

## 2020-03-11 NOTE — PROGRESS NOTES
Subjective:      Rob Jones Jr. is a 64 y.o. male who was referred by Dr. Jimenez for evaluation of his urinary frequency.      The patient presents today reporting worsening urinary frequency and urgency that began a few months ago. Denies slow stream, LORENZA, dysuria, flank pain, gross hematuria, and fever/chills. Has taken flomax for years.  Of note, labs were completed yesterday by his PCP. His A1C was >14 and BG on CMP yesterday was 638. Denies a history of recurrent UTIs and nephrolithiasis. Denies a family history of prostate cancer.     The following portions of the patient's history were reviewed and updated as appropriate: allergies, current medications, past family history, past medical history, past social history, past surgical history and problem list.    Review of Systems  Constitutional: no fever or chills  ENT: no nasal congestion or sore throat  Respiratory: no cough or shortness of breath  Cardiovascular: no chest pain or palpitations  Gastrointestinal: no nausea or vomiting, tolerating diet  Genitourinary: as per HPI  Hematologic/Lymphatic: no easy bruising or lymphadenopathy  Musculoskeletal: no arthralgias or myalgias  Neurological: no seizures or tremors  Behavioral/Psych: no auditory or visual hallucinations     Objective:   Vitals:   Vitals:    03/11/20 1337   BP: 107/72       Physical Exam   General: alert and oriented, no acute distress  Head: normocephalic, atraumatic  Neck: supple, no lymphadenopathy, normal ROM, no masses  Respiratory: Symmetric expansion, non-labored breathing  Cardiovascular: regular rate and rhythm, nomal pulses, no peripheral edema  Abdomen: soft, non tender, non distended, no palpable masses, no hernias, no hepatomegaly or splenomegaly  Genitourinary: deferred  Lymphatic: no inguinal nodes  Skin: normal coloration and turgor, no rashes, no suspicious skin lesions noted  Neuro: alert and oriented x3, no gross deficits  Psych: normal judgment and insight, normal  mood/affect and non-anxious  No CVA tenderness    Lab Review   Urinalysis demonstrates positive for nitrites, leukocytes (+), glucose (1000 mg/dL), protein (+)  Lab Results   Component Value Date    WBC 6.57 03/10/2020    HGB 14.0 03/10/2020    HCT 43.5 03/10/2020    MCV 94 03/10/2020     (H) 03/10/2020     Lab Results   Component Value Date    CREATININE 1.9 (H) 03/10/2020    BUN 18 03/10/2020     No results found for: PSA  Imaging   None    Assessment:   Urinary urgency  Urinary frequency  Glucosuria     Plan:   Rob was seen today for urinary frequency.    Diagnoses and all orders for this visit:    Urinary urgency    Urinary frequency  -     Ambulatory referral/consult to Urology  -     POCT URINE DIPSTICK WITHOUT MICROSCOPE  -     Urine culture  -     cephALEXin (KEFLEX) 250 MG capsule; Take 1 capsule (250 mg total) by mouth every 12 (twelve) hours. for 7 days    Glucosuria    Plan: Visit was expedited after evaluation of his labs from yesterday   --Urinary frequency likely 2/2 glucosuria and UTI  --Urine culture  --Start renal dose keflex, will notify if needs adjustment based on culture results  --Continue flomax   --Tight BG control   --Advised the pt to report to the ED for evaluation and treatment

## 2020-03-11 NOTE — ED PROVIDER NOTES
Encounter Date: 3/11/2020    SCRIBE #1 NOTE: I, Kierra Griffin, am scribing for, and in the presence of, Dr. Rodríguez.       History     Chief Complaint   Patient presents with    Hyperglycemia     +Pt sent from urology clinic for high serum glucose. Pt reporting fatigue and urinary frequency. Non-compliant with insulin since 01/2020.     Time seen by provider: 4:14 PM    This is a 64 y.o. male with a history of diabetes mellitus who presents by recommendation of urology clinic due to high blood glucose today. He states that measurement was in the 600's. He reports visit with Dr. Jimenez yesterday where he expressed the desire to have a blood glucose meter at home. He has not been taking Insulin (Lantus, 10-12 units) since parathyroid surgery last year. He is compliant with Metformin and Januvia prescriptions. He denies any dysuria. He has no other complaints at the time.    The history is provided by the patient.     Review of patient's allergies indicates:   Allergen Reactions    Tizanidine Shortness Of Breath     Past Medical History:   Diagnosis Date    Anxiety     Back pain     Cataract     Diabetes mellitus     History of hepatitis C; S/p RX with SVR 12 documented 06/2017 6/1/2017    Hypertension     Postoperative hypothyroidism 11/3/2016    Primary hyperparathyroidism 1/18/2017    Thyroid disease      Past Surgical History:   Procedure Laterality Date    EXAMINATION UNDER ANESTHESIA N/A 5/28/2019    Procedure: Exam under anesthesia;;  Surgeon: Mounika Carmona MD;  Location: [address];  Service: General;  Laterality: N/A;    LARYNGOSCOPY  5/28/2019    Procedure: LARYNGOSCOPY, flexible;  Surgeon: Mounika Carmona MD;  Location: [facility] OR [address] [place] [address];  Service: General;;    LUMBAR FUSION      PARATHYROIDECTOMY N/A 5/22/2019    Procedure: PARATHYROIDECTOMY;  Surgeon: Mounika Carmona MD;  Location: [facility] OR [address] [place] [address];  Service: General;  Laterality: N/A;  NIMS / Intraop PTH Monitoring      RE-EXPLORATION FOR BLEEDING  5/22/2019    Procedure: RE-EXPLORATION, FOR BLEEDING;  Surgeon: Mounika aCrmona MD;  Location: [facility] OR [address] [place] [address];  Service: General;;    THYROIDECTOMY      TONSILLECTOMY       Family History   Problem Relation Age of Onset    Cancer Mother         breast    Cataracts Father     No Known Problems Sister     No Known Problems Brother     No Known Problems Brother     Heart disease Brother     No Known Problems Sister     No Known Problems Sister     No Known Problems Sister     Glaucoma Paternal Uncle     Glaucoma Paternal Uncle      Social History     Tobacco Use    Smoking status: Current Every Day Smoker    Smokeless tobacco: Never Used   Substance Use Topics    Alcohol use: Yes    Drug use: No     Review of Systems   Constitutional: Negative for chills and fever.   HENT: Negative for congestion, sore throat and trouble swallowing.    Eyes: Negative for photophobia and visual disturbance.   Respiratory: Negative for cough, chest tightness and shortness of breath.    Cardiovascular: Negative for chest pain.   Gastrointestinal: Negative for abdominal pain, nausea and vomiting.   Genitourinary: Negative for dysuria.   Musculoskeletal: Negative for back pain.   Skin: Negative for pallor.   Neurological: Negative for headaches.   Psychiatric/Behavioral: Negative for confusion.       Physical Exam     Initial Vitals [03/11/20 1430]   BP Pulse Resp Temp SpO2   108/66 99 18 98.4 °F (36.9 °C) 97 %      MAP       --         Physical Exam    Nursing note and vitals reviewed.  Constitutional: He appears well-developed and well-nourished. No distress.   HENT:   Head: Normocephalic and atraumatic.   Eyes: Conjunctivae and EOM are normal. Pupils are equal, round, and reactive to light.   Neck: Normal range of motion. Neck supple.   Cardiovascular: Normal rate and normal heart sounds.   Pulmonary/Chest: Effort normal and breath sounds normal.   Abdominal: Soft. Normal appearance  and bowel sounds are normal. There is no tenderness.   Musculoskeletal: Normal range of motion.   Neurological: He is alert and oriented to person, place, and time. He has normal strength. No cranial nerve deficit or sensory deficit.   Skin: Skin is warm and dry.   Psychiatric: He has a normal mood and affect. His behavior is normal. Thought content normal.         ED Course   Critical Care  Date/Time: 3/23/2020 1:36 PM  Performed by: Angelika Rodríguez MD  Authorized by: Iveth Bhatia MD   Total critical care time (exclusive of procedural time) : 60 minutes  Critical care was necessary to treat or prevent imminent or life-threatening deterioration of the following conditions: metabolic crisis.  Critical care was time spent personally by me on the following activities: review of old charts, re-evaluation of patient's condition, ordering and review of laboratory studies, ordering and performing treatments and interventions, obtaining history from patient or surrogate and examination of patient.        Labs Reviewed   CBC W/ AUTO DIFFERENTIAL - Abnormal; Notable for the following components:       Result Value    Platelets 351 (*)     All other components within normal limits   COMPREHENSIVE METABOLIC PANEL - Abnormal; Notable for the following components:    Sodium 133 (*)     Potassium 5.2 (*)     Chloride 93 (*)     CO2 22 (*)     Glucose 462 (*)     Creatinine 1.8 (*)     Calcium 10.9 (*)     Total Protein 8.8 (*)     Anion Gap 18 (*)     eGFR if  45 (*)     eGFR if non  39 (*)     All other components within normal limits    Narrative:     GLU   critical result(s) called and verbal readback obtained from   Sumanth Baez RN. by LBW 03/11/2020 18:56   BETA - HYDROXYBUTYRATE, SERUM - Abnormal; Notable for the following components:    Beta-Hydroxybutyrate 1.4 (*)     All other components within normal limits   URINALYSIS, REFLEX TO URINE CULTURE - Abnormal; Notable for the  following components:    Appearance, UA Hazy (*)     Glucose, UA 3+ (*)     Ketones, UA 1+ (*)     All other components within normal limits    Narrative:     Preferred Collection Type->Urine, Clean Catch   URINALYSIS MICROSCOPIC - Abnormal; Notable for the following components:    WBC, UA 24 (*)     WBC Clumps, UA Few (*)     All other components within normal limits    Narrative:     Preferred Collection Type->Urine, Clean Catch   POCT GLUCOSE - Abnormal; Notable for the following components:    POCT Glucose 448 (*)     All other components within normal limits   POCT GLUCOSE - Abnormal; Notable for the following components:    POCT Glucose 323 (*)     All other components within normal limits   POCT GLUCOSE - Abnormal; Notable for the following components:    POCT Glucose 324 (*)     All other components within normal limits   ISTAT PROCEDURE - Abnormal; Notable for the following components:    POC PCO2 47.7 (*)     POC PO2 27 (*)     POC SATURATED O2 48 (*)     All other components within normal limits   CULTURE, URINE   POCT GLUCOSE MONITORING CONTINUOUS             Medical Decision Making:   History:   Old Medical Records: I decided to obtain old medical records.  Clinical Tests:   Lab Tests: Ordered and Reviewed  ED Management:  Labs with hyperglycemia, elevated AG w normal pH, beta hydroxybutyrate 1.4, metabolic derangements including mild hyperkalemia, bicarb 22, creatinine elevated from baseline, 1.8, hypercalcemia present 10.9 given these abnormalities, my concern the patient will likely need to be transitioned to insulin again, will plan to place the patient on observation for IV fluids, insulin, NPO status and repeat blood work.            Scribe Attestation:   Scribe #1: I performed the above scribed service and the documentation accurately describes the services I performed. I attest to the accuracy of the note.    Attending Attestation:           Physician Attestation for Scribe:  Physician  Attestation Statement for Scribe #1: I, Dr. Rodríguez, reviewed documentation, as scribed by Kierra Griffin in my presence, and it is both accurate and complete.                 ED Course as of Mar 11 2011   Wed Mar 11, 2020   6717 Sort note: Rob Jones Jr. nontoxic/afebrile 64 y.o.  presented to the ED with c/o hyperglycemia with recent CBG in clinic of 638 and A1C > 14. Reports malaise however no additional complaints.     Patient seen and medically screened by Physician assistant in Sort process due to ED crowding.  Appropriate tests and/or medications ordered.  Care transferred to an alternate provider when patient was placed in an Exam Room from the [facility] for physical exam, additional orders, and disposition. 2:28 PM. LC      [LC]   8926 Her evaluation of 64-year-old gentleman with diabetes sent from Urology Clinic given concern for UTI.  Patient was seen by his PCP yesterday, has not been on his insulin or 4 months, reports adherence to oral antidiabetic medications.  Labs completed, with A1c greater than 14, hyperglycemia.  Patient denies history of recurrent UTIs.  Patient started on Keflex today by Urology.    [DM]   2002 Discussed case with NP Miko Whatley, will place in observation under Dr. Poon.    [SF]      ED Course User Index  [DM] Angelika Rodríguez MD  [LC] JESS Arellano  [SF] Kierra Griffin                Clinical Impression:     1. Hyperglycemia    2. KRIS (acute kidney injury)    3. Hypercalcemia            Disposition:   Disposition: Placed in Observation  Condition: Fair     ED Disposition Condition    Observation                           Angelika Rodríguez MD  03/11/20 2011       Angelika Rodríguez MD  03/23/20 4353

## 2020-03-11 NOTE — LETTER
March 11, 2020      Vasyl Jimenez MD  [address]  [address]  [facility] [address]           [facility] [address]  [address] [facility] [address] [facility] [address]  [facility] [address]  Phone: [phone]  Fax: [phone]          Patient: Rob Jones Jr.   MR Number: [id number]   YOB: 1955   Date of Visit: 3/11/2020       Dear Dr. Vasyl Jimenez:    Thank you for referring Rob Jones to me for evaluation. Attached you will find relevant portions of my assessment and plan of care.    If you have questions, please do not hesitate to call me. I look forward to following Rob Jones along with you.    Sincerely,    Kierra Torres, NP    Enclosure  CC:  No Recipients    If you would like to receive this communication electronically, please contact [email or web address] or [phone] to request more information on [employer] access.    For providers and/or their staff who would like to refer a patient to [facility], please contact us through our one-stop-shop provider referral line, [facility], at [phone].    If you feel you have received this communication in error or would no longer like to receive these types of communications, please e-mail [email or web address]

## 2020-03-11 NOTE — TELEPHONE ENCOUNTER
----- Message from Brianda Sargent sent at 3/11/2020 10:34 AM CDT -----  Contact: TOMASA HAQ JR.  Name of Who is Calling: TOMASA HAQ JR.      What is the request in detail: Would like to speak to staff in regards to his glucose being 638 and wanted to know what was the update on his glucose meter. Please advise.       Can the clinic reply by [facility]: No      What Number to Call Back if not [facility]: [phone]

## 2020-03-12 PROBLEM — N30.00 ACUTE CYSTITIS WITHOUT HEMATURIA: Status: ACTIVE | Noted: 2020-03-12

## 2020-03-12 PROBLEM — N17.9 AKI (ACUTE KIDNEY INJURY): Status: ACTIVE | Noted: 2020-03-12

## 2020-03-12 LAB
ANION GAP SERPL CALC-SCNC: 10 MMOL/L (ref 8–16)
ANION GAP SERPL CALC-SCNC: 6 MMOL/L (ref 8–16)
ANION GAP SERPL CALC-SCNC: 8 MMOL/L (ref 8–16)
BASOPHILS # BLD AUTO: 0.07 K/UL (ref 0–0.2)
BASOPHILS NFR BLD: 1.2 % (ref 0–1.9)
BUN SERPL-MCNC: 13 MG/DL (ref 8–23)
BUN SERPL-MCNC: 15 MG/DL (ref 8–23)
BUN SERPL-MCNC: 18 MG/DL (ref 8–23)
CALCIUM SERPL-MCNC: 8.3 MG/DL (ref 8.7–10.5)
CALCIUM SERPL-MCNC: 8.5 MG/DL (ref 8.7–10.5)
CALCIUM SERPL-MCNC: 8.5 MG/DL (ref 8.7–10.5)
CHLORIDE SERPL-SCNC: 101 MMOL/L (ref 95–110)
CHLORIDE SERPL-SCNC: 102 MMOL/L (ref 95–110)
CHLORIDE SERPL-SCNC: 104 MMOL/L (ref 95–110)
CHOLEST SERPL-MCNC: 90 MG/DL (ref 120–199)
CHOLEST/HDLC SERPL: 2.2 {RATIO} (ref 2–5)
CO2 SERPL-SCNC: 22 MMOL/L (ref 23–29)
CO2 SERPL-SCNC: 23 MMOL/L (ref 23–29)
CO2 SERPL-SCNC: 25 MMOL/L (ref 23–29)
CREAT SERPL-MCNC: 1.2 MG/DL (ref 0.5–1.4)
CREAT SERPL-MCNC: 1.2 MG/DL (ref 0.5–1.4)
CREAT SERPL-MCNC: 1.4 MG/DL (ref 0.5–1.4)
DIFFERENTIAL METHOD: ABNORMAL
EOSINOPHIL # BLD AUTO: 0.2 K/UL (ref 0–0.5)
EOSINOPHIL NFR BLD: 3.1 % (ref 0–8)
ERYTHROCYTE [DISTWIDTH] IN BLOOD BY AUTOMATED COUNT: 12.2 % (ref 11.5–14.5)
EST. GFR  (AFRICAN AMERICAN): >60 ML/MIN/1.73 M^2
EST. GFR  (NON AFRICAN AMERICAN): 53 ML/MIN/1.73 M^2
EST. GFR  (NON AFRICAN AMERICAN): >60 ML/MIN/1.73 M^2
EST. GFR  (NON AFRICAN AMERICAN): >60 ML/MIN/1.73 M^2
ESTIMATED AVG GLUCOSE: ABNORMAL MG/DL (ref 68–131)
GLUCOSE SERPL-MCNC: 213 MG/DL (ref 70–110)
GLUCOSE SERPL-MCNC: 223 MG/DL (ref 70–110)
GLUCOSE SERPL-MCNC: 320 MG/DL (ref 70–110)
HBA1C MFR BLD HPLC: >14 % (ref 4–5.6)
HCT VFR BLD AUTO: 36.5 % (ref 40–54)
HDLC SERPL-MCNC: 41 MG/DL (ref 40–75)
HDLC SERPL: 45.6 % (ref 20–50)
HGB BLD-MCNC: 11.9 G/DL (ref 14–18)
IMM GRANULOCYTES # BLD AUTO: 0.01 K/UL (ref 0–0.04)
IMM GRANULOCYTES NFR BLD AUTO: 0.2 % (ref 0–0.5)
LDLC SERPL CALC-MCNC: 24.6 MG/DL (ref 63–159)
LYMPHOCYTES # BLD AUTO: 2.8 K/UL (ref 1–4.8)
LYMPHOCYTES NFR BLD: 49.1 % (ref 18–48)
MAGNESIUM SERPL-MCNC: 1.7 MG/DL (ref 1.6–2.6)
MCH RBC QN AUTO: 30.3 PG (ref 27–31)
MCHC RBC AUTO-ENTMCNC: 32.6 G/DL (ref 32–36)
MCV RBC AUTO: 93 FL (ref 82–98)
MONOCYTES # BLD AUTO: 0.3 K/UL (ref 0.3–1)
MONOCYTES NFR BLD: 5.9 % (ref 4–15)
NEUTROPHILS # BLD AUTO: 2.4 K/UL (ref 1.8–7.7)
NEUTROPHILS NFR BLD: 40.5 % (ref 38–73)
NONHDLC SERPL-MCNC: 49 MG/DL
NRBC BLD-RTO: 0 /100 WBC
PHOSPHATE SERPL-MCNC: 3.1 MG/DL (ref 2.7–4.5)
PLATELET # BLD AUTO: 311 K/UL (ref 150–350)
PMV BLD AUTO: 9.9 FL (ref 9.2–12.9)
POCT GLUCOSE: 195 MG/DL (ref 70–110)
POCT GLUCOSE: 204 MG/DL (ref 70–110)
POCT GLUCOSE: 259 MG/DL (ref 70–110)
POCT GLUCOSE: 261 MG/DL (ref 70–110)
POTASSIUM SERPL-SCNC: 4.8 MMOL/L (ref 3.5–5.1)
POTASSIUM SERPL-SCNC: 4.8 MMOL/L (ref 3.5–5.1)
POTASSIUM SERPL-SCNC: 4.9 MMOL/L (ref 3.5–5.1)
RBC # BLD AUTO: 3.93 M/UL (ref 4.6–6.2)
SODIUM SERPL-SCNC: 133 MMOL/L (ref 136–145)
SODIUM SERPL-SCNC: 133 MMOL/L (ref 136–145)
SODIUM SERPL-SCNC: 135 MMOL/L (ref 136–145)
TRIGL SERPL-MCNC: 122 MG/DL (ref 30–150)
WBC # BLD AUTO: 5.79 K/UL (ref 3.9–12.7)

## 2020-03-12 PROCEDURE — 63600175 PHARM REV CODE 636 W HCPCS: Performed by: EMERGENCY MEDICINE

## 2020-03-12 PROCEDURE — 80061 LIPID PANEL: CPT

## 2020-03-12 PROCEDURE — 99233 PR SUBSEQUENT HOSPITAL CARE,LEVL III: ICD-10-PCS | Mod: ,,, | Performed by: INTERNAL MEDICINE

## 2020-03-12 PROCEDURE — 11000001 HC ACUTE MED/SURG PRIVATE ROOM

## 2020-03-12 PROCEDURE — 25000003 PHARM REV CODE 250: Performed by: NURSE PRACTITIONER

## 2020-03-12 PROCEDURE — 85025 COMPLETE CBC W/AUTO DIFF WBC: CPT

## 2020-03-12 PROCEDURE — 83036 HEMOGLOBIN GLYCOSYLATED A1C: CPT

## 2020-03-12 PROCEDURE — 63600175 PHARM REV CODE 636 W HCPCS: Performed by: NURSE PRACTITIONER

## 2020-03-12 PROCEDURE — 83735 ASSAY OF MAGNESIUM: CPT

## 2020-03-12 PROCEDURE — C9399 UNCLASSIFIED DRUGS OR BIOLOG: HCPCS | Performed by: INTERNAL MEDICINE

## 2020-03-12 PROCEDURE — 63600175 PHARM REV CODE 636 W HCPCS: Performed by: INTERNAL MEDICINE

## 2020-03-12 PROCEDURE — 99233 SBSQ HOSP IP/OBS HIGH 50: CPT | Mod: ,,, | Performed by: INTERNAL MEDICINE

## 2020-03-12 PROCEDURE — 84100 ASSAY OF PHOSPHORUS: CPT

## 2020-03-12 PROCEDURE — 80048 BASIC METABOLIC PNL TOTAL CA: CPT | Mod: 91

## 2020-03-12 PROCEDURE — 36415 COLL VENOUS BLD VENIPUNCTURE: CPT

## 2020-03-12 RX ORDER — IBUPROFEN 200 MG
16 TABLET ORAL
Status: DISCONTINUED | OUTPATIENT
Start: 2020-03-12 | End: 2020-03-13 | Stop reason: HOSPADM

## 2020-03-12 RX ORDER — TAMSULOSIN HYDROCHLORIDE 0.4 MG/1
1 CAPSULE ORAL DAILY
Status: DISCONTINUED | OUTPATIENT
Start: 2020-03-12 | End: 2020-03-13 | Stop reason: HOSPADM

## 2020-03-12 RX ORDER — GLUCAGON 1 MG
1 KIT INJECTION
Status: DISCONTINUED | OUTPATIENT
Start: 2020-03-12 | End: 2020-03-13 | Stop reason: HOSPADM

## 2020-03-12 RX ORDER — ACETAMINOPHEN 325 MG/1
650 TABLET ORAL EVERY 4 HOURS PRN
Status: DISCONTINUED | OUTPATIENT
Start: 2020-03-12 | End: 2020-03-12

## 2020-03-12 RX ORDER — IBUPROFEN 200 MG
24 TABLET ORAL
Status: DISCONTINUED | OUTPATIENT
Start: 2020-03-12 | End: 2020-03-13 | Stop reason: HOSPADM

## 2020-03-12 RX ORDER — SODIUM CHLORIDE 0.9 % (FLUSH) 0.9 %
10 SYRINGE (ML) INJECTION
Status: DISCONTINUED | OUTPATIENT
Start: 2020-03-12 | End: 2020-03-13 | Stop reason: HOSPADM

## 2020-03-12 RX ORDER — ONDANSETRON 8 MG/1
8 TABLET, ORALLY DISINTEGRATING ORAL EVERY 8 HOURS PRN
Status: DISCONTINUED | OUTPATIENT
Start: 2020-03-12 | End: 2020-03-13 | Stop reason: HOSPADM

## 2020-03-12 RX ORDER — ACETAMINOPHEN 325 MG/1
650 TABLET ORAL EVERY 8 HOURS PRN
Status: DISCONTINUED | OUTPATIENT
Start: 2020-03-12 | End: 2020-03-13 | Stop reason: HOSPADM

## 2020-03-12 RX ORDER — INSULIN ASPART 100 [IU]/ML
1-10 INJECTION, SOLUTION INTRAVENOUS; SUBCUTANEOUS
Status: DISCONTINUED | OUTPATIENT
Start: 2020-03-12 | End: 2020-03-13 | Stop reason: HOSPADM

## 2020-03-12 RX ORDER — SODIUM CHLORIDE 9 MG/ML
INJECTION, SOLUTION INTRAVENOUS CONTINUOUS
Status: DISCONTINUED | OUTPATIENT
Start: 2020-03-12 | End: 2020-03-12

## 2020-03-12 RX ORDER — SODIUM CHLORIDE 9 MG/ML
INJECTION, SOLUTION INTRAVENOUS CONTINUOUS
Status: DISCONTINUED | OUTPATIENT
Start: 2020-03-12 | End: 2020-03-13

## 2020-03-12 RX ORDER — INSULIN ASPART 100 [IU]/ML
5 INJECTION, SOLUTION INTRAVENOUS; SUBCUTANEOUS
Status: DISCONTINUED | OUTPATIENT
Start: 2020-03-12 | End: 2020-03-13 | Stop reason: HOSPADM

## 2020-03-12 RX ORDER — ATORVASTATIN CALCIUM 10 MG/1
10 TABLET, FILM COATED ORAL DAILY
Status: DISCONTINUED | OUTPATIENT
Start: 2020-03-12 | End: 2020-03-13 | Stop reason: HOSPADM

## 2020-03-12 RX ORDER — IRBESARTAN 150 MG/1
150 TABLET ORAL DAILY
Status: DISCONTINUED | OUTPATIENT
Start: 2020-03-12 | End: 2020-03-13 | Stop reason: HOSPADM

## 2020-03-12 RX ORDER — AMLODIPINE BESYLATE 5 MG/1
10 TABLET ORAL DAILY
Status: DISCONTINUED | OUTPATIENT
Start: 2020-03-12 | End: 2020-03-13 | Stop reason: HOSPADM

## 2020-03-12 RX ORDER — HEPARIN SODIUM 5000 [USP'U]/ML
5000 INJECTION, SOLUTION INTRAVENOUS; SUBCUTANEOUS EVERY 8 HOURS
Status: DISCONTINUED | OUTPATIENT
Start: 2020-03-12 | End: 2020-03-13 | Stop reason: HOSPADM

## 2020-03-12 RX ADMIN — AMLODIPINE BESYLATE 10 MG: 5 TABLET ORAL at 08:03

## 2020-03-12 RX ADMIN — IRBESARTAN 150 MG: 150 TABLET, FILM COATED ORAL at 08:03

## 2020-03-12 RX ADMIN — LEVOTHYROXINE SODIUM 137 MCG: 25 TABLET ORAL at 06:03

## 2020-03-12 RX ADMIN — HEPARIN SODIUM 5000 UNITS: 5000 INJECTION, SOLUTION INTRAVENOUS; SUBCUTANEOUS at 09:03

## 2020-03-12 RX ADMIN — SODIUM CHLORIDE: 0.9 INJECTION, SOLUTION INTRAVENOUS at 06:03

## 2020-03-12 RX ADMIN — HEPARIN SODIUM 5000 UNITS: 5000 INJECTION, SOLUTION INTRAVENOUS; SUBCUTANEOUS at 06:03

## 2020-03-12 RX ADMIN — INSULIN ASPART 6 UNITS: 100 INJECTION, SOLUTION INTRAVENOUS; SUBCUTANEOUS at 08:03

## 2020-03-12 RX ADMIN — INSULIN DETEMIR 10 UNITS: 100 INJECTION, SOLUTION SUBCUTANEOUS at 08:03

## 2020-03-12 RX ADMIN — CEFTRIAXONE 1 G: 1 INJECTION, SOLUTION INTRAVENOUS at 09:03

## 2020-03-12 RX ADMIN — INSULIN ASPART 5 UNITS: 100 INJECTION, SOLUTION INTRAVENOUS; SUBCUTANEOUS at 06:03

## 2020-03-12 RX ADMIN — INSULIN ASPART 2 UNITS: 100 INJECTION, SOLUTION INTRAVENOUS; SUBCUTANEOUS at 09:03

## 2020-03-12 RX ADMIN — INSULIN ASPART 6 UNITS: 100 INJECTION, SOLUTION INTRAVENOUS; SUBCUTANEOUS at 06:03

## 2020-03-12 RX ADMIN — SODIUM CHLORIDE: 0.9 INJECTION, SOLUTION INTRAVENOUS at 08:03

## 2020-03-12 RX ADMIN — HEPARIN SODIUM 5000 UNITS: 5000 INJECTION, SOLUTION INTRAVENOUS; SUBCUTANEOUS at 04:03

## 2020-03-12 RX ADMIN — SODIUM CHLORIDE: 0.9 INJECTION, SOLUTION INTRAVENOUS at 12:03

## 2020-03-12 RX ADMIN — ATORVASTATIN CALCIUM 10 MG: 10 TABLET, FILM COATED ORAL at 08:03

## 2020-03-12 RX ADMIN — TAMSULOSIN HYDROCHLORIDE 0.4 MG: 0.4 CAPSULE ORAL at 08:03

## 2020-03-12 NOTE — H&P
[facility] Hospital Medicine  History & Physical    Patient Name: Rob Jones Jr.  MRN: [id number]  Admission Date: 3/11/2020  Attending Physician: Emy Poon MD   Primary Care Provider: Vasyl Jimenez MD         Patient information was obtained from patient, past medical records and ER records.     Subjective:     Principal Problem:<principal problem not specified>    Chief Complaint:   Chief Complaint   Patient presents with    Hyperglycemia     +Pt sent from urology clinic for high serum glucose. Pt reporting fatigue and urinary frequency. Non-compliant with insulin since 01/2020.        HPI: The patient is a 64 y.o. male with a history of diabetes mellitus who presents by recommendation of urology clinic due to high blood glucose today. He states that measurement was in the 600's. He reports visit with Dr. Jimenez yesterday where he expressed the desire to have a blood glucose meter at home. He has not been taking Insulin (Lantus, 10-12 units) since parathyroid surgery last year. He is compliant with Metformin and Januvia prescriptions. He denies any dysuria. He has no other complaints at the time    Past Medical History:   Diagnosis Date    Anxiety     Back pain     Cataract     Diabetes mellitus     History of hepatitis C; S/p RX with SVR 12 documented 06/2017 6/1/2017    Hypertension     Postoperative hypothyroidism 11/3/2016    Primary hyperparathyroidism 1/18/2017    Thyroid disease        Past Surgical History:   Procedure Laterality Date    EXAMINATION UNDER ANESTHESIA N/A 5/28/2019    Procedure: Exam under anesthesia;;  Surgeon: Mounika Carmona MD;  Location: [address];  Service: General;  Laterality: N/A;    LARYNGOSCOPY  5/28/2019    Procedure: LARYNGOSCOPY, flexible;  Surgeon: Mounika Carmona MD;  Location: [address];  Service: General;;    LUMBAR FUSION      PARATHYROIDECTOMY N/A 5/22/2019    Procedure: PARATHYROIDECTOMY;   Surgeon: Mounika Carmona MD;  Location: [facility] OR [address] [place] [address];  Service: General;  Laterality: N/A;  NIMS / Intraop PTH Monitoring     RE-EXPLORATION FOR BLEEDING  5/22/2019    Procedure: RE-EXPLORATION, FOR BLEEDING;  Surgeon: Mounika Carmona MD;  Location: [facility] OR [address] [place] [address];  Service: General;;    THYROIDECTOMY      TONSILLECTOMY         Review of patient's allergies indicates:   Allergen Reactions    Tizanidine Shortness Of Breath       No current facility-administered medications on file prior to encounter.      Current Outpatient Medications on File Prior to Encounter   Medication Sig    amLODIPine (NORVASC) 10 MG tablet TAKE 1 TABLET (10 MG TOTAL) BY MOUTH ONCE DAILY.    atorvastatin (LIPITOR) 10 MG tablet TAKE 1 TABLET BY MOUTH EVERY DAY    irbesartan (AVAPRO) 300 MG tablet Take 1 tablet (300 mg total) by mouth once daily. (Patient taking differently: Take 150 mg by mouth once daily. )    JANUVIA 100 mg Tab TAKE 1 TABLET BY MOUTH EVERY DAY    levothyroxine (SYNTHROID) 137 MCG Tab tablet TAKE 1 TABLET (137 MCG TOTAL) BY MOUTH BEFORE BREAKFAST.    metFORMIN (GLUCOPHAGE) 1000 MG tablet TAKE 1 TABLET BY MOUTH TWICE A DAY WITH FOOD    tamsulosin (FLOMAX) 0.4 mg Cap TAKE ONE CAPSULE BY MOUTH EVERY DAY    vitamin D 1000 units Tab Take 185 mg by mouth once daily.    blood sugar diagnostic Strp Pt to check BG up to 6 times daily    blood sugar diagnostic Strp Pt to check BG up to QID. Dispense strips compatible with pt brand meter    blood-glucose meter (FREESTYLE SYSTEM KIT) kit Please dispense per pt insurance formulary and pt choice Use as instructed    blood-glucose meter kit Use as instructed    lancets (ONETOUCH ULTRASOFT LANCETS) [facility] [place] to check BG up to QID. Dispense lancets compatible with pt brand meter    meloxicam (MOBIC) 15 MG tablet TAKE 1 TABLET BY MOUTH EVERY DAY    nitroGLYCERIN (NITROSTAT) 0.4 MG SL tablet Place 0.4 mg under the tongue every 5 (five) minutes as needed for Chest  "pain.    NOVOFINE 32 32 gauge x 1/4" Ndle 1 EACH BY MISC.(NON-DRUG COMBO ROUTE) ROUTE ONCE DAILY.    pen needle, diabetic (NOVOFINE PLUS) 32 gauge x 1/6" Ndle 1 each by Misc.(Non-Drug; Combo Route) route once daily.    temazepam (RESTORIL) 30 mg capsule TAKE 1 CAPSULE BY MOUTH EVERY DAY AT BEDTIME AS NEEDED    [DISCONTINUED] blood sugar diagnostic Strp Pt to check BG up to QID. Dispense strips compatible with pt brand meter    [DISCONTINUED] blood-glucose meter kit Use as instructed    [DISCONTINUED] cephALEXin (KEFLEX) 250 MG capsule Take 1 capsule (250 mg total) by mouth every 12 (twelve) hours. for 7 days    [DISCONTINUED] gabapentin (NEURONTIN) 300 MG capsule Take 300 mg by mouth 2 (two) times daily.     Family History     Problem Relation (Age of Onset)    Cancer Mother    Cataracts Father    Glaucoma Paternal Uncle, Paternal Uncle    Heart disease Brother    No Known Problems Sister, Brother, Brother, Sister, Sister, Sister        Tobacco Use    Smoking status: Current Every Day Smoker    Smokeless tobacco: Never Used   Substance and Sexual Activity    Alcohol use: Yes    Drug use: No    Sexual activity: Not on file     Review of Systems   Constitutional: Negative for activity change, appetite change, fatigue and fever.   HENT: Negative for congestion, ear pain and postnasal drip.    Eyes: Negative for discharge.   Respiratory: Negative for apnea, shortness of breath and wheezing.    Cardiovascular: Negative for chest pain and leg swelling.   Gastrointestinal: Positive for abdominal pain. Negative for abdominal distention, nausea and vomiting.   Endocrine: Positive for polydipsia and polyuria. Negative for polyphagia.   Genitourinary: Negative for difficulty urinating, flank pain, frequency, hematuria and urgency.   Musculoskeletal: Positive for gait problem. Negative for arthralgias and joint swelling.   Skin: Negative for pallor and rash.   Allergic/Immunologic: Negative for environmental " allergies and food allergies.   Neurological: Negative for dizziness, speech difficulty, weakness, light-headedness and headaches.   Hematological: Does not bruise/bleed easily.   Psychiatric/Behavioral: Negative for agitation.     Objective:     Vital Signs (Most Recent):  Temp: 98.4 °F (36.9 °C) (03/11/20 2251)  Pulse: 78 (03/11/20 2251)  Resp: 20 (03/11/20 2251)  BP: 125/81 (03/11/20 2251)  SpO2: 95 % (03/11/20 2251) Vital Signs (24h Range):  Temp:  [97.9 °F (36.6 °C)-98.6 °F (37 °C)] 98.4 °F (36.9 °C)  Pulse:  [65-99] 78  Resp:  [18-20] 20  SpO2:  [91 %-98 %] 95 %  BP: (107-131)/(66-83) 125/81     Weight: 77.6 kg (171 lb)  Body mass index is 24.54 kg/m².    Physical Exam   Constitutional: He is oriented to person, place, and time. He appears well-developed and well-nourished.   HENT:   Head: Normocephalic.   Eyes: Conjunctivae are normal.   Neck: Normal range of motion. Neck supple.   Cardiovascular: Normal rate, regular rhythm, normal heart sounds and intact distal pulses.   Pulmonary/Chest: Effort normal and breath sounds normal.   Abdominal: Soft. Bowel sounds are normal. He exhibits no distension. There is no tenderness.   Musculoskeletal: Normal range of motion.   Neurological: He is alert and oriented to person, place, and time.   Skin: Skin is warm and dry.   Psychiatric: He has a normal mood and affect. His behavior is normal.           Significant Labs:   CBC:   Recent Labs   Lab 03/10/20  1521 03/11/20  1606   WBC 6.57 7.06   HGB 14.0 14.7   HCT 43.5 45.5   * 351*     CMP:   Recent Labs   Lab 03/10/20  1521 03/11/20  1606   * 133*   K 6.2* 5.2*   CL 92* 93*   CO2 29 22*   * 462*   BUN 18 21   CREATININE 1.9* 1.8*   CALCIUM 10.7* 10.9*   PROT 8.1 8.8*   ALBUMIN 4.7 5.0   BILITOT 0.4 0.5   ALKPHOS 130 121   AST 13 15   ALT 16 16   ANIONGAP 11 18*   EGFRNONAA 37* 39*       Significant Imaging: I have reviewed all pertinent imaging results/findings within the past 24  hours.    Assessment/Plan:     Diabetic ketoacidosis without coma associated with type 2 diabetes mellitus  BG- 462, beta hydroxy- 1.4, anion gap- 18    IVF  Moderate dose SSI  BG Q6  A1c pending      Postoperative hypothyroidism  Continue levothyroxine      Essential hypertension  Normotensive currently    Continue amlodipine, irbesartan         VTE Risk Mitigation (From admission, onward)    None             Greg Whatley NP  Department of Hospital Medicine   [facility]

## 2020-03-12 NOTE — SUBJECTIVE & OBJECTIVE
Past Medical History:   Diagnosis Date    Anxiety     Back pain     Cataract     Diabetes mellitus     History of hepatitis C; S/p RX with SVR 12 documented 06/2017 6/1/2017    Hypertension     Postoperative hypothyroidism 11/3/2016    Primary hyperparathyroidism 1/18/2017    Thyroid disease        Past Surgical History:   Procedure Laterality Date    EXAMINATION UNDER ANESTHESIA N/A 5/28/2019    Procedure: Exam under anesthesia;;  Surgeon: Mounika Carmona MD;  [facility];  Service: General;  Laterality: N/A;    LARYNGOSCOPY  5/28/2019    Procedure: LARYNGOSCOPY, flexible;  Surgeon: Mounika Carmona MD;  [facility];  Service: General;;    LUMBAR FUSION      PARATHYROIDECTOMY N/A 5/22/2019    Procedure: PARATHYROIDECTOMY;  Surgeon: Mounika Carmona MD;  [facility];  Service: General;  Laterality: N/A;  NIMS / Intraop PTH Monitoring     RE-EXPLORATION FOR BLEEDING  5/22/2019    Procedure: RE-EXPLORATION, FOR BLEEDING;  Surgeon: Mounika Carmona MD;  Location: [facility] OR [address] [place] [address];  Service: General;;    THYROIDECTOMY      TONSILLECTOMY         Review of patient's allergies indicates:   Allergen Reactions    Tizanidine Shortness Of Breath       No current facility-administered medications on file prior to encounter.      Current Outpatient Medications on File Prior to Encounter   Medication Sig    amLODIPine (NORVASC) 10 MG tablet TAKE 1 TABLET (10 MG TOTAL) BY MOUTH ONCE DAILY.    atorvastatin (LIPITOR) 10 MG tablet TAKE 1 TABLET BY MOUTH EVERY DAY    irbesartan (AVAPRO) 300 MG tablet Take 1 tablet (300 mg total) by mouth once daily. (Patient taking differently: Take 150 mg by mouth once daily. )    JANUVIA 100 mg Tab TAKE 1 TABLET BY MOUTH EVERY DAY    levothyroxine (SYNTHROID) 137 MCG Tab tablet TAKE 1 TABLET (137 MCG TOTAL) BY MOUTH BEFORE BREAKFAST.    metFORMIN (GLUCOPHAGE) 1000 MG tablet TAKE 1 TABLET BY MOUTH TWICE A DAY WITH FOOD  "   tamsulosin (FLOMAX) 0.4 mg Cap TAKE ONE CAPSULE BY MOUTH EVERY DAY    vitamin D 1000 units Tab Take 185 mg by mouth once daily.    blood sugar diagnostic Strp Pt to check BG up to 6 times daily    blood sugar diagnostic Strp Pt to check BG up to QID. Dispense strips compatible with pt brand meter    blood-glucose meter (FREESTYLE SYSTEM KIT) kit Please dispense per pt insurance formulary and pt choice Use as instructed    blood-glucose meter kit Use as instructed    lancets (ONETOUCH ULTRASOFT LANCETS) [facility] Pt to check BG up to QID. Dispense lancets compatible with pt brand meter    meloxicam (MOBIC) 15 MG tablet TAKE 1 TABLET BY MOUTH EVERY DAY    nitroGLYCERIN (NITROSTAT) 0.4 MG SL tablet Place 0.4 mg under the tongue every 5 (five) minutes as needed for Chest pain.    NOVOFINE 32 32 gauge x 1/4" Ndle 1 EACH BY MISC.(NON-DRUG COMBO ROUTE) ROUTE ONCE DAILY.    pen needle, diabetic (NOVOFINE PLUS) 32 gauge x 1/6" Ndle 1 each by Misc.(Non-Drug; Combo Route) route once daily.    temazepam (RESTORIL) 30 mg capsule TAKE 1 CAPSULE BY MOUTH EVERY DAY AT BEDTIME AS NEEDED    [DISCONTINUED] blood sugar diagnostic Strp Pt to check BG up to QID. Dispense strips compatible with pt brand meter    [DISCONTINUED] blood-glucose meter kit Use as instructed    [DISCONTINUED] cephALEXin (KEFLEX) 250 MG capsule Take 1 capsule (250 mg total) by mouth every 12 (twelve) hours. for 7 days    [DISCONTINUED] gabapentin (NEURONTIN) 300 MG capsule Take 300 mg by mouth 2 (two) times daily.     Family History     Problem Relation (Age of Onset)    Cancer Mother    Cataracts Father    Glaucoma Paternal Uncle, Paternal Uncle    Heart disease Brother    No Known Problems Sister, Brother, Brother, Sister, Sister, Sister        Tobacco Use    Smoking status: Current Every Day Smoker    Smokeless tobacco: Never Used   Substance and Sexual Activity    Alcohol use: Yes    Drug use: No    Sexual activity: Not on file     Review " of Systems   Constitutional: Negative for activity change, appetite change, fatigue and fever.   HENT: Negative for congestion, ear pain and postnasal drip.    Eyes: Negative for discharge.   Respiratory: Negative for apnea, shortness of breath and wheezing.    Cardiovascular: Negative for chest pain and leg swelling.   Gastrointestinal: Positive for abdominal pain. Negative for abdominal distention, nausea and vomiting.   Endocrine: Positive for polydipsia and polyuria. Negative for polyphagia.   Genitourinary: Negative for difficulty urinating, flank pain, frequency, hematuria and urgency.   Musculoskeletal: Positive for gait problem. Negative for arthralgias and joint swelling.   Skin: Negative for pallor and rash.   Allergic/Immunologic: Negative for environmental allergies and food allergies.   Neurological: Negative for dizziness, speech difficulty, weakness, light-headedness and headaches.   Hematological: Does not bruise/bleed easily.   Psychiatric/Behavioral: Negative for agitation.     Objective:     Vital Signs (Most Recent):  Temp: 98.4 °F (36.9 °C) (03/11/20 2251)  Pulse: 78 (03/11/20 2251)  Resp: 20 (03/11/20 2251)  BP: 125/81 (03/11/20 2251)  SpO2: 95 % (03/11/20 2251) Vital Signs (24h Range):  Temp:  [97.9 °F (36.6 °C)-98.6 °F (37 °C)] 98.4 °F (36.9 °C)  Pulse:  [65-99] 78  Resp:  [18-20] 20  SpO2:  [91 %-98 %] 95 %  BP: (107-131)/(66-83) 125/81     Weight: 77.6 kg (171 lb)  Body mass index is 24.54 kg/m².    Physical Exam   Constitutional: He is oriented to person, place, and time. He appears well-developed and well-nourished.   HENT:   Head: Normocephalic.   Eyes: Conjunctivae are normal.   Neck: Normal range of motion. Neck supple.   Cardiovascular: Normal rate, regular rhythm, normal heart sounds and intact distal pulses.   Pulmonary/Chest: Effort normal and breath sounds normal.   Abdominal: Soft. Bowel sounds are normal. He exhibits no distension. There is no tenderness.   Musculoskeletal:  Normal range of motion.   Neurological: He is alert and oriented to person, place, and time.   Skin: Skin is warm and dry.   Psychiatric: He has a normal mood and affect. His behavior is normal.           Significant Labs:   CBC:   Recent Labs   Lab 03/10/20  1521 03/11/20  1606   WBC 6.57 7.06   HGB 14.0 14.7   HCT 43.5 45.5   * 351*     CMP:   Recent Labs   Lab 03/10/20  1521 03/11/20  1606   * 133*   K 6.2* 5.2*   CL 92* 93*   CO2 29 22*   * 462*   BUN 18 21   CREATININE 1.9* 1.8*   CALCIUM 10.7* 10.9*   PROT 8.1 8.8*   ALBUMIN 4.7 5.0   BILITOT 0.4 0.5   ALKPHOS 130 121   AST 13 15   ALT 16 16   ANIONGAP 11 18*   EGFRNONAA 37* 39*       Significant Imaging: I have reviewed all pertinent imaging results/findings within the past 24 hours.

## 2020-03-12 NOTE — PROGRESS NOTES
[facility] Hospital Medicine  Progress Note    Patient Name: Rob Jones Jr.  MRN: [id number]  Patient Class: IP- Inpatient   Admission Date: 3/11/2020  Length of Stay: 1 days  Attending Physician: Iveth Bhatia MD  Primary Care Provider: Vasyl Jimenez MD        Subjective:     Principal Problem:Diabetic ketoacidosis without coma associated with type 2 diabetes mellitus        HPI:  The patient is a 64 y.o. male with a history of diabetes mellitus who presents by recommendation of urology clinic due to high blood glucose today. He states that measurement was in the 600's. He reports visit with Dr. Jimenez yesterday where he expressed the desire to have a blood glucose meter at home. He has not been taking Insulin (Lantus, 10-12 units) since parathyroid surgery last year. He is compliant with Metformin and Januvia prescriptions. He denies any dysuria. He has no other complaints at the time    Overview/Hospital Course:  Patient admitted with hyperglycemia and possible UTI    Interval History:  Pt seen and examined at bedside. He reports feeling improved today. Has not taken insulin in about 1 year. Discussed lab results.     Review of Systems   Constitutional: Negative for chills and fever.   Respiratory: Negative for shortness of breath.    Cardiovascular: Negative for chest pain.   Gastrointestinal: Negative for nausea and vomiting.     Objective:     Vital Signs (Most Recent):  Temp: 97.8 °F (36.6 °C) (03/12/20 0858)  Pulse: (!) 56 (03/12/20 0858)  Resp: 19 (03/12/20 0858)  BP: 125/65 (03/12/20 0858)  SpO2: 97 % (03/12/20 0858) Vital Signs (24h Range):  Temp:  [97.8 °F (36.6 °C)-98.6 °F (37 °C)] 97.8 °F (36.6 °C)  Pulse:  [48-99] 56  Resp:  [18-20] 19  SpO2:  [91 %-98 %] 97 %  BP: (107-131)/(65-83) 125/65     Weight: 77.6 kg (171 lb)  Body mass index is 24.54 kg/m².    Intake/Output Summary (Last 24 hours) at 3/12/2020 1149  Last data filed at 3/12/2020 0634  Gross per 24 hour    Intake 3040 ml   Output 850 ml   Net 2190 ml      Physical Exam   Constitutional: He is oriented to person, place, and time. He appears well-developed and well-nourished. No distress.   Cardiovascular: Normal rate, regular rhythm and normal heart sounds.   Pulmonary/Chest: Effort normal and breath sounds normal. No respiratory distress.   Abdominal: Soft. Bowel sounds are normal. There is no tenderness.   Musculoskeletal: He exhibits no edema.   Neurological: He is alert and oriented to person, place, and time.   Skin: Skin is warm and dry.   Psychiatric: He has a normal mood and affect. His behavior is normal.   Nursing note and vitals reviewed.      Significant Labs:   BMP:   Recent Labs   Lab 03/12/20  0443 03/12/20  1106   * 223*   * 133*   K 4.8 4.9    104   CO2 22* 23   BUN 18 15   CREATININE 1.4 1.2   CALCIUM 8.5* 8.3*   MG 1.7  --      CBC:   Recent Labs   Lab 03/10/20  1521 03/11/20  1606 03/12/20  0443   WBC 6.57 7.06 5.79   HGB 14.0 14.7 11.9*   HCT 43.5 45.5 36.5*   * 351* 311     All pertinent labs within the past 24 hours have been reviewed.    Significant Imaging: I have reviewed all pertinent imaging results/findings within the past 24 hours.      Assessment/Plan:      * Diabetic ketoacidosis without coma associated with type 2 diabetes mellitus  Resolved. Gap closed  A1c >14. Off insulin for > 1 year  Start Levemir 10 units qd, aspart 5 units TID WM plus SSI  Titrate insulin as needed      KRIS (acute kidney injury)  Due to volume depletion from polyuria  Resolved with IVF      Postoperative hypothyroidism  Continue levothyroxine      Essential hypertension  Controlled  Continue amlodipine, irbesartan       Acute cystitis       Continue rocephin pending urine culture      VTE Risk Mitigation (From admission, onward)         Ordered     heparin (porcine) injection 5,000 Units  Every 8 hours      03/12/20 0024     Place NANNETTE hose  Until discontinued      03/12/20 0024     IP  VTE HIGH RISK PATIENT  Once      03/12/20 0024                      vIeth Bhatia MD  Department of Hospital Medicine   [facility]

## 2020-03-12 NOTE — HOSPITAL COURSE
Patient admitted with hyperglycemia and possible UTI. He was given IV insulin and had quick improvement in glucose so did not require insulin gtt. He was started on empiric rocephin for UTI. Urine culture showed no growth so antibiotics were stopped. He was started on basal/bolus insulin with improvement in glucose. A1c was >14. He is feeling well today and stable for discharge home on current insulin regimen. He will see his PCP for diabetic follow up and continued insulin titration.

## 2020-03-12 NOTE — PLAN OF CARE
"Plan of care reviewed with pt. Pt verbalized understanding. Hourly rounding performed. No complaints of pain during the shift. Pt states, " he feels much better" IV fluids maintained. Personal items within reach. Bed lowered and locked. Will continue to monitor.   "

## 2020-03-12 NOTE — HPI
The patient is a 64 y.o. male with a history of diabetes mellitus who presents by recommendation of urology clinic due to high blood glucose today. He states that measurement was in the 600's. He reports visit with Dr. Jimenez yesterday where he expressed the desire to have a blood glucose meter at home. He has not been taking Insulin (Lantus, 10-12 units) since parathyroid surgery last year. He is compliant with Metformin and Januvia prescriptions. He denies any dysuria. He has no other complaints at the time

## 2020-03-12 NOTE — PLAN OF CARE
LMSW met with the patient at the bedside.     Patient is alert and oriented with no communication barriers.     Prior to admission patient was independent. Patient denies the current use of HH and DME. Patient will need BGM.     Patients PCP is correct on the face sheet. Patient choice pharmacy is [facility] on [address].     Patient does not have any advance directives.      Patients family will transport the patient home at discharge.     No CM needs identified at this time.            03/12/20 1131   Discharge Assessment   Assessment Type Discharge Planning Assessment   Confirmed/corrected address and phone number on facesheet? Yes   Assessment information obtained from? Patient   Communicated expected length of stay with patient/caregiver no   Prior to hospitilization cognitive status: Alert/Oriented   Prior to hospitalization functional status: Independent   Current cognitive status: Alert/Oriented   Current Functional Status: Independent   Lives With alone   Able to Return to Prior Arrangements yes   Is patient able to care for self after discharge? Yes   Patient's perception of discharge disposition assisted living;group home   Readmission Within the Last 30 Days no previous admission in last 30 days   Patient currently being followed by outpatient case management? No   Patient currently receives any other outside agency services? No   Equipment Currently Used at Home none   Do you have any problems affording any of your prescribed medications? No   Is the patient taking medications as prescribed? yes   Does the patient have transportation home? Yes   Transportation Anticipated family or friend will provide   Does the patient receive services at the Coumadin Clinic? No   Discharge Plan A Home   DME Needed Upon Discharge  glucometer   Patient/Family in Agreement with Plan yes

## 2020-03-12 NOTE — SUBJECTIVE & OBJECTIVE
Interval History:  Pt seen and examined at bedside. He reports feeling improved today. Has not taken insulin in about 1 year. Discussed lab results.     Review of Systems   Constitutional: Negative for chills and fever.   Respiratory: Negative for shortness of breath.    Cardiovascular: Negative for chest pain.   Gastrointestinal: Negative for nausea and vomiting.     Objective:     Vital Signs (Most Recent):  Temp: 97.8 °F (36.6 °C) (03/12/20 0858)  Pulse: (!) 56 (03/12/20 0858)  Resp: 19 (03/12/20 0858)  BP: 125/65 (03/12/20 0858)  SpO2: 97 % (03/12/20 0858) Vital Signs (24h Range):  Temp:  [97.8 °F (36.6 °C)-98.6 °F (37 °C)] 97.8 °F (36.6 °C)  Pulse:  [48-99] 56  Resp:  [18-20] 19  SpO2:  [91 %-98 %] 97 %  BP: (107-131)/(65-83) 125/65     Weight: 77.6 kg (171 lb)  Body mass index is 24.54 kg/m².    Intake/Output Summary (Last 24 hours) at 3/12/2020 1149  Last data filed at 3/12/2020 0634  Gross per 24 hour   Intake 3040 ml   Output 850 ml   Net 2190 ml      Physical Exam   Constitutional: He is oriented to person, place, and time. He appears well-developed and well-nourished. No distress.   Cardiovascular: Normal rate, regular rhythm and normal heart sounds.   Pulmonary/Chest: Effort normal and breath sounds normal. No respiratory distress.   Abdominal: Soft. Bowel sounds are normal. There is no tenderness.   Musculoskeletal: He exhibits no edema.   Neurological: He is alert and oriented to person, place, and time.   Skin: Skin is warm and dry.   Psychiatric: He has a normal mood and affect. His behavior is normal.   Nursing note and vitals reviewed.      Significant Labs:   BMP:   Recent Labs   Lab 03/12/20  0443 03/12/20  1106   * 223*   * 133*   K 4.8 4.9    104   CO2 22* 23   BUN 18 15   CREATININE 1.4 1.2   CALCIUM 8.5* 8.3*   MG 1.7  --      CBC:   Recent Labs   Lab 03/10/20  1521 03/11/20  1606 03/12/20  0443   WBC 6.57 7.06 5.79   HGB 14.0 14.7 11.9*   HCT 43.5 45.5 36.5*   *  351* 311     All pertinent labs within the past 24 hours have been reviewed.    Significant Imaging: I have reviewed all pertinent imaging results/findings within the past 24 hours.

## 2020-03-12 NOTE — ASSESSMENT & PLAN NOTE
Resolved. Gap closed  A1c >14. Off insulin for > 1 year  Start Levemir 10 units qd, aspart 5 units TID WM plus SSI  Titrate insulin as needed

## 2020-03-13 VITALS
DIASTOLIC BLOOD PRESSURE: 67 MMHG | OXYGEN SATURATION: 98 % | HEART RATE: 56 BPM | RESPIRATION RATE: 18 BRPM | SYSTOLIC BLOOD PRESSURE: 127 MMHG | HEIGHT: 70 IN | TEMPERATURE: 98 F | WEIGHT: 171 LBS | BODY MASS INDEX: 24.48 KG/M2

## 2020-03-13 PROBLEM — E11.10 DIABETIC KETOACIDOSIS WITHOUT COMA ASSOCIATED WITH TYPE 2 DIABETES MELLITUS: Status: RESOLVED | Noted: 2020-03-11 | Resolved: 2020-03-13

## 2020-03-13 PROBLEM — N30.00 ACUTE CYSTITIS WITHOUT HEMATURIA: Status: RESOLVED | Noted: 2020-03-12 | Resolved: 2020-03-13

## 2020-03-13 PROBLEM — N17.9 AKI (ACUTE KIDNEY INJURY): Status: RESOLVED | Noted: 2020-03-12 | Resolved: 2020-03-13

## 2020-03-13 LAB
ANION GAP SERPL CALC-SCNC: 5 MMOL/L (ref 8–16)
BACTERIA UR CULT: NO GROWTH
BACTERIA UR CULT: NORMAL
BACTERIA UR CULT: NORMAL
BASOPHILS # BLD AUTO: 0.05 K/UL (ref 0–0.2)
BASOPHILS NFR BLD: 0.6 % (ref 0–1.9)
BUN SERPL-MCNC: 13 MG/DL (ref 8–23)
CALCIUM SERPL-MCNC: 8 MG/DL (ref 8.7–10.5)
CHLORIDE SERPL-SCNC: 104 MMOL/L (ref 95–110)
CO2 SERPL-SCNC: 24 MMOL/L (ref 23–29)
CREAT SERPL-MCNC: 1.2 MG/DL (ref 0.5–1.4)
DIFFERENTIAL METHOD: ABNORMAL
EOSINOPHIL # BLD AUTO: 0.2 K/UL (ref 0–0.5)
EOSINOPHIL NFR BLD: 1.8 % (ref 0–8)
ERYTHROCYTE [DISTWIDTH] IN BLOOD BY AUTOMATED COUNT: 12.3 % (ref 11.5–14.5)
EST. GFR  (AFRICAN AMERICAN): >60 ML/MIN/1.73 M^2
EST. GFR  (NON AFRICAN AMERICAN): >60 ML/MIN/1.73 M^2
GLUCOSE SERPL-MCNC: 313 MG/DL (ref 70–110)
HCT VFR BLD AUTO: 38.1 % (ref 40–54)
HGB BLD-MCNC: 12.2 G/DL (ref 14–18)
IMM GRANULOCYTES # BLD AUTO: 0.02 K/UL (ref 0–0.04)
IMM GRANULOCYTES NFR BLD AUTO: 0.2 % (ref 0–0.5)
LYMPHOCYTES # BLD AUTO: 2.2 K/UL (ref 1–4.8)
LYMPHOCYTES NFR BLD: 26.8 % (ref 18–48)
MAGNESIUM SERPL-MCNC: 1.9 MG/DL (ref 1.6–2.6)
MCH RBC QN AUTO: 30 PG (ref 27–31)
MCHC RBC AUTO-ENTMCNC: 32 G/DL (ref 32–36)
MCV RBC AUTO: 94 FL (ref 82–98)
MONOCYTES # BLD AUTO: 0.4 K/UL (ref 0.3–1)
MONOCYTES NFR BLD: 5 % (ref 4–15)
NEUTROPHILS # BLD AUTO: 5.4 K/UL (ref 1.8–7.7)
NEUTROPHILS NFR BLD: 65.6 % (ref 38–73)
NRBC BLD-RTO: 0 /100 WBC
PHOSPHATE SERPL-MCNC: 2.5 MG/DL (ref 2.7–4.5)
PLATELET # BLD AUTO: 288 K/UL (ref 150–350)
PMV BLD AUTO: 9.1 FL (ref 9.2–12.9)
POCT GLUCOSE: 189 MG/DL (ref 70–110)
POTASSIUM SERPL-SCNC: 5.4 MMOL/L (ref 3.5–5.1)
RBC # BLD AUTO: 4.06 M/UL (ref 4.6–6.2)
SODIUM SERPL-SCNC: 133 MMOL/L (ref 136–145)
WBC # BLD AUTO: 8.28 K/UL (ref 3.9–12.7)

## 2020-03-13 PROCEDURE — 84100 ASSAY OF PHOSPHORUS: CPT

## 2020-03-13 PROCEDURE — 63600175 PHARM REV CODE 636 W HCPCS: Performed by: EMERGENCY MEDICINE

## 2020-03-13 PROCEDURE — 25000003 PHARM REV CODE 250: Performed by: NURSE PRACTITIONER

## 2020-03-13 PROCEDURE — 36415 COLL VENOUS BLD VENIPUNCTURE: CPT

## 2020-03-13 PROCEDURE — 83735 ASSAY OF MAGNESIUM: CPT

## 2020-03-13 PROCEDURE — 63600175 PHARM REV CODE 636 W HCPCS: Performed by: NURSE PRACTITIONER

## 2020-03-13 PROCEDURE — 94761 N-INVAS EAR/PLS OXIMETRY MLT: CPT

## 2020-03-13 PROCEDURE — 85025 COMPLETE CBC W/AUTO DIFF WBC: CPT

## 2020-03-13 PROCEDURE — 99239 PR HOSPITAL DISCHARGE DAY,>30 MIN: ICD-10-PCS | Mod: ,,, | Performed by: INTERNAL MEDICINE

## 2020-03-13 PROCEDURE — 99239 HOSP IP/OBS DSCHRG MGMT >30: CPT | Mod: ,,, | Performed by: INTERNAL MEDICINE

## 2020-03-13 RX ORDER — LANCETS
1 EACH MISCELLANEOUS
Qty: 200 EACH | Refills: 0 | Status: SHIPPED | OUTPATIENT
Start: 2020-03-13 | End: 2020-04-23

## 2020-03-13 RX ORDER — INSULIN ASPART 100 [IU]/ML
5 INJECTION, SOLUTION INTRAVENOUS; SUBCUTANEOUS
Qty: 15 ML | Refills: 0 | Status: SHIPPED | OUTPATIENT
Start: 2020-03-13 | End: 2020-04-21 | Stop reason: SDUPTHER

## 2020-03-13 RX ORDER — INSULIN PUMP SYRINGE, 3 ML
EACH MISCELLANEOUS
Qty: 1 EACH | Refills: 0 | Status: SHIPPED | OUTPATIENT
Start: 2020-03-13 | End: 2020-03-13 | Stop reason: SDUPTHER

## 2020-03-13 RX ORDER — INSULIN PUMP SYRINGE, 3 ML
EACH MISCELLANEOUS
Qty: 1 EACH | Refills: 0 | Status: SHIPPED | OUTPATIENT
Start: 2020-03-13 | End: 2021-03-13

## 2020-03-13 RX ORDER — INSULIN GLARGINE 100 [IU]/ML
15 INJECTION, SOLUTION SUBCUTANEOUS NIGHTLY
Qty: 15 ML | Refills: 0 | Status: SHIPPED | OUTPATIENT
Start: 2020-03-13 | End: 2020-04-21 | Stop reason: SDUPTHER

## 2020-03-13 RX ADMIN — INSULIN ASPART 2 UNITS: 100 INJECTION, SOLUTION INTRAVENOUS; SUBCUTANEOUS at 09:03

## 2020-03-13 RX ADMIN — ATORVASTATIN CALCIUM 10 MG: 10 TABLET, FILM COATED ORAL at 09:03

## 2020-03-13 RX ADMIN — IRBESARTAN 150 MG: 150 TABLET, FILM COATED ORAL at 09:03

## 2020-03-13 RX ADMIN — HEPARIN SODIUM 5000 UNITS: 5000 INJECTION, SOLUTION INTRAVENOUS; SUBCUTANEOUS at 05:03

## 2020-03-13 RX ADMIN — TAMSULOSIN HYDROCHLORIDE 0.4 MG: 0.4 CAPSULE ORAL at 09:03

## 2020-03-13 RX ADMIN — SODIUM CHLORIDE: 0.9 INJECTION, SOLUTION INTRAVENOUS at 01:03

## 2020-03-13 RX ADMIN — LEVOTHYROXINE SODIUM 137 MCG: 25 TABLET ORAL at 05:03

## 2020-03-13 RX ADMIN — INSULIN ASPART 5 UNITS: 100 INJECTION, SOLUTION INTRAVENOUS; SUBCUTANEOUS at 09:03

## 2020-03-13 RX ADMIN — AMLODIPINE BESYLATE 10 MG: 5 TABLET ORAL at 09:03

## 2020-03-13 NOTE — PLAN OF CARE
03/13/20 1101   Final Note   Assessment Type Final Discharge Note   Anticipated Discharge Disposition Home   What phone number can be called within the next 1-3 days to see how you are doing after discharge?   ([phone])   Hospital Follow Up  Appt(s) scheduled? No   Discharge plans and expectations educations in teach back method with documentation complete? Yes

## 2020-03-13 NOTE — NURSING
Discharge instructions reviewed, pt very understanding and demonstrates understanding. Provided with insulin supplies from outpatient pharmacy and understands to  glucometer and supplies at [facility]. IV and telemetry removed. Discharge papers and instructions all reviewed and provided and understood. Pt escorted to [facility].

## 2020-03-13 NOTE — DISCHARGE SUMMARY
[facility] Hospital Medicine  Discharge Summary      Patient Name: Rob Jones Jr.  MRN: [id number]  Admission Date: 3/11/2020  Hospital Length of Stay: 2 days  Discharge Date and Time:  03/13/2020 10:27 AM  Attending Physician: Iveth Bhatia MD   Discharging Provider: Iveth Bhatia MD  Primary Care Provider: Vasyl Jimenez MD      HPI:   The patient is a 64 y.o. male with a history of diabetes mellitus who presents by recommendation of urology clinic due to high blood glucose today. He states that measurement was in the 600's. He reports visit with Dr. Jimenez yesterday where he expressed the desire to have a blood glucose meter at home. He has not been taking Insulin (Lantus, 10-12 units) since parathyroid surgery last year. He is compliant with Metformin and Januvia prescriptions. He denies any dysuria. He has no other complaints at the time    * No surgery found *      Hospital Course:   Patient admitted with hyperglycemia and possible UTI. He was given IV insulin and had quick improvement in glucose so did not require insulin gtt. He was started on empiric rocephin for UTI. Urine culture showed no growth so antibiotics were stopped. He was started on basal/bolus insulin with improvement in glucose. A1c was >14. He is feeling well today and stable for discharge home on current insulin regimen. He will see his PCP for diabetic follow up and continued insulin titration.     Consults:     Acute cystitis without hematuria-resolved as of 3/13/2020  Continue rocephin pending urine culture        Final Active Diagnoses:    Diagnosis Date Noted POA    Postoperative hypothyroidism [E89.0] 11/03/2016 Yes    Essential hypertension [I10] 05/20/2015 Yes      Problems Resolved During this Admission:    Diagnosis Date Noted Date Resolved POA    PRINCIPAL PROBLEM:  Diabetic ketoacidosis without coma associated with type 2 diabetes mellitus [E11.10] 03/11/2020 03/13/2020 Yes    KRIS (acute  kidney injury) [N17.9] 03/12/2020 03/13/2020 Yes    Acute cystitis without hematuria [N30.00] 03/12/2020 03/13/2020 Yes       Discharged Condition: good    Disposition: Home or Self Care    Follow Up:  Follow-up Information     Vasyl Jimenez MD In 2 weeks.    Specialty:  Internal Medicine  Why:  hospital follow-up for diabetes  Contact information:  [address]  [facility] [address]  [facility] [address]  [phone]                 Patient Instructions:      Diet diabetic     Activity as tolerated       Significant Diagnostic Studies: Labs:   BMP:   Recent Labs   Lab 03/12/20  0443 03/12/20  1106 03/12/20  1742 03/12/20  2348 03/13/20  0901   * 223* 213* 313*  --    * 133* 135* 133*  --    K 4.8 4.9 4.8 5.4*  --     104 102 104  --    CO2 22* 23 25 24  --    BUN 18 15 13 13  --    CREATININE 1.4 1.2 1.2 1.2  --    CALCIUM 8.5* 8.3* 8.5* 8.0*  --    MG 1.7  --   --   --  1.9   , CBC   Recent Labs   Lab 03/11/20  1606 03/12/20  0443 03/13/20  0901   WBC 7.06 5.79 8.28   HGB 14.7 11.9* 12.2*   HCT 45.5 36.5* 38.1*   * 311 288    and All labs within the past 24 hours have been reviewed    Pending Diagnostic Studies:     None         Medications:  Reconciled Home Medications:      Medication List      START taking these medications    insulin aspart U-100 100 unit/mL (3 mL) Inpn pen  Commonly known as:  NovoLOG Flexpen U-100 Insulin  Inject 5 Units into the skin 3 (three) times daily with meals.     insulin glargine 100 units/mL (3mL) SubQ pen  Commonly known as:  LANTUS SOLOSTAR U-100 INSULIN  Inject 15 Units into the skin every evening.        CHANGE how you take these medications    blood sugar diagnostic Strp  1 strip by Misc.(Non-Drug; Combo Route) route 4 (four) times daily before meals and nightly.  What changed:    · how much to take  · how to take this  · when to take this  · additional instructions  · Another medication with the same name was removed. Continue taking this  "medication, and follow the directions you see here.     blood-glucose meter kit  Use as instructed  What changed:  Another medication with the same name was removed. Continue taking this medication, and follow the directions you see here.     irbesartan 300 MG tablet  Commonly known as:  AVAPRO  Take 1 tablet (300 mg total) by mouth once daily.  What changed:  how much to take     lancets Misc  Commonly known as:  ACCU-CHEK SOFTCLIX LANCETS  1 Units by Misc.(Non-Drug; Combo Route) route 4 (four) times daily before meals and nightly.  What changed:    · how much to take  · how to take this  · when to take this  · additional instructions        CONTINUE taking these medications    amLODIPine 10 MG tablet  Commonly known as:  NORVASC  TAKE 1 TABLET (10 MG TOTAL) BY MOUTH ONCE DAILY.     atorvastatin 10 MG tablet  Commonly known as:  LIPITOR  TAKE 1 TABLET BY MOUTH EVERY DAY     levothyroxine 137 MCG Tab tablet  Commonly known as:  SYNTHROID  TAKE 1 TABLET (137 MCG TOTAL) BY MOUTH BEFORE BREAKFAST.     meloxicam 15 MG tablet  Commonly known as:  MOBIC  TAKE 1 TABLET BY MOUTH EVERY DAY     metFORMIN 1000 MG tablet  Commonly known as:  GLUCOPHAGE  TAKE 1 TABLET BY MOUTH TWICE A DAY WITH FOOD     nitroGLYCERIN 0.4 MG SL tablet  Commonly known as:  NITROSTAT  Place 0.4 mg under the tongue every 5 (five) minutes as needed for Chest pain.     temazepam 30 mg capsule  Commonly known as:  RESTORIL  TAKE 1 CAPSULE BY MOUTH EVERY DAY AT BEDTIME AS NEEDED     vitamin D 1000 units Tab  Commonly known as:  VITAMIN D3  Take 185 mg by mouth once daily.        STOP taking these medications    JANUVIA 100 MG Tab  Generic drug:  SITagliptin     NOVOFINE 32 32 gauge x 1/4" Ndle  Generic drug:  pen needle, diabetic     pen needle, diabetic 32 gauge x 1/6" Ndle  Commonly known as:  NOVOFINE PLUS     tamsulosin 0.4 mg Cap  Commonly known as:  FLOMAX            Indwelling Lines/Drains at time of discharge:   Lines/Drains/Airways     None     "             Time spent on the discharge of patient: 35 minutes  Patient was seen and examined on the date of discharge and determined to be suitable for discharge.         Iveth Bhatia MD  Department of Hospital Medicine  [facility]

## 2020-03-13 NOTE — PLAN OF CARE
"Plan of care reviewed with patient during his assessment.  No new questions asked.  Accucheck noted at 202.  Two units Novolog coverage given subq.  No skin breakdown observed.  IVF patent to the left forearm site.  Denies pain/distress.  Rounding maintained per protocol.  No issues during the night...but patient did decline AM labs to be drawn with c/o "being stuck too much".  "

## 2020-03-16 ENCOUNTER — PATIENT OUTREACH (OUTPATIENT)
Dept: ADMINISTRATIVE | Facility: CLINIC | Age: 65
End: 2020-03-16

## 2020-03-16 ENCOUNTER — TELEPHONE (OUTPATIENT)
Dept: INTERNAL MEDICINE | Facility: CLINIC | Age: 65
End: 2020-03-16

## 2020-03-16 NOTE — TELEPHONE ENCOUNTER
Informed pt a order for bg meter was sent to [facility] on 3/13/2020. Pt stated he will give [facility] a call. Pt stated he will do hospital f/u on apt that's scheduled on 4/7/2020.

## 2020-03-16 NOTE — PATIENT INSTRUCTIONS
"  Diabetes with High Blood Sugar  You have been treated for high blood sugar (hyperglycemia). This may be because of an infection or other illness, eating too many sweets or starches, or not taking enough insulin.  Home care  Monitor and write down your blood sugar level at least twice a day. Do this before breakfast and before dinner. If you take insulin, record your routine insulin dose as well. Also record any additional doses required based on your sliding scale. Do this for the next 3 to 5 days.  High blood sugar may cause symptoms that you can learn to recognize, such as:  · Frequent urination  · Thirst  · Dizziness  · Headache  · Shortness of breath  · Breath that smells fruity  · Nausea or vomiting  · Abdominal pain  · Drowsiness or loss of consciousness  If you have high-blood-sugar symptoms, use a blood or urine test to find out what your blood sugar level is. If it is above your usual range, use the "sliding scale" regular insulin dose prescribed by your healthcare provider. If you were not given a range for your insulin dose, contact your healthcare provider for more advice.  Follow-up care  Follow up with your healthcare provider, or as advised. You may need to meet with your healthcare provider in the next week. You will likely review your blood sugar records together. You may also talk to your provider about adjusting your dose of insulin or other medicine for blood sugar.  When to seek medical advice  Call your healthcare provider right away if these occur:  · High blood sugar symptoms (Symptoms are described above.)  · Blood sugar over 300 mg/dl If you cant reach your healthcare provider, go to a hospital emergency room or urgent care center  Date Last Reviewed: 6/1/2016  © 2280-4100 [employer]. [address] [place] [address], [place] [address]. All rights reserved. This information is not intended as a substitute for professional medical care. Always follow your healthcare professional's " instructions.

## 2020-03-19 ENCOUNTER — TELEPHONE (OUTPATIENT)
Dept: ADMINISTRATIVE | Facility: HOSPITAL | Age: 65
End: 2020-03-19

## 2020-03-19 ENCOUNTER — OFFICE VISIT (OUTPATIENT)
Dept: PRIMARY CARE CLINIC | Facility: CLINIC | Age: 65
End: 2020-03-19
Payer: MEDICARE

## 2020-03-19 VITALS
TEMPERATURE: 99 F | OXYGEN SATURATION: 97 % | SYSTOLIC BLOOD PRESSURE: 136 MMHG | WEIGHT: 171.94 LBS | DIASTOLIC BLOOD PRESSURE: 75 MMHG | HEIGHT: 70 IN | BODY MASS INDEX: 24.61 KG/M2 | HEART RATE: 86 BPM

## 2020-03-19 DIAGNOSIS — E11.10 TYPE 2 DIABETES MELLITUS WITH KETOACIDOSIS WITHOUT COMA, WITH LONG-TERM CURRENT USE OF INSULIN: Primary | ICD-10-CM

## 2020-03-19 DIAGNOSIS — M62.838 MUSCLE SPASM: ICD-10-CM

## 2020-03-19 DIAGNOSIS — Z79.4 TYPE 2 DIABETES MELLITUS WITH KETOACIDOSIS WITHOUT COMA, WITH LONG-TERM CURRENT USE OF INSULIN: Primary | ICD-10-CM

## 2020-03-19 PROCEDURE — 3044F PR MOST RECENT HEMOGLOBIN A1C LEVEL <7.0%: ICD-10-PCS | Mod: CPTII,S$GLB,, | Performed by: NURSE PRACTITIONER

## 2020-03-19 PROCEDURE — 3008F PR BODY MASS INDEX (BMI) DOCUMENTED: ICD-10-PCS | Mod: CPTII,S$GLB,, | Performed by: NURSE PRACTITIONER

## 2020-03-19 PROCEDURE — 3075F SYST BP GE 130 - 139MM HG: CPT | Mod: CPTII,S$GLB,, | Performed by: NURSE PRACTITIONER

## 2020-03-19 PROCEDURE — 3008F BODY MASS INDEX DOCD: CPT | Mod: CPTII,S$GLB,, | Performed by: NURSE PRACTITIONER

## 2020-03-19 PROCEDURE — 3044F HG A1C LEVEL LT 7.0%: CPT | Mod: CPTII,S$GLB,, | Performed by: NURSE PRACTITIONER

## 2020-03-19 PROCEDURE — 3078F DIAST BP <80 MM HG: CPT | Mod: CPTII,S$GLB,, | Performed by: NURSE PRACTITIONER

## 2020-03-19 PROCEDURE — 99214 OFFICE O/P EST MOD 30 MIN: CPT | Mod: S$GLB,,, | Performed by: NURSE PRACTITIONER

## 2020-03-19 PROCEDURE — 3075F PR MOST RECENT SYSTOLIC BLOOD PRESS GE 130-139MM HG: ICD-10-PCS | Mod: CPTII,S$GLB,, | Performed by: NURSE PRACTITIONER

## 2020-03-19 PROCEDURE — 3078F PR MOST RECENT DIASTOLIC BLOOD PRESSURE < 80 MM HG: ICD-10-PCS | Mod: CPTII,S$GLB,, | Performed by: NURSE PRACTITIONER

## 2020-03-19 PROCEDURE — 99499 RISK ADDL DX/OHS AUDIT: ICD-10-PCS | Mod: S$GLB,,, | Performed by: NURSE PRACTITIONER

## 2020-03-19 PROCEDURE — 99999 PR PBB SHADOW E&M-EST. PATIENT-LVL V: ICD-10-PCS | Mod: PBBFAC,,, | Performed by: NURSE PRACTITIONER

## 2020-03-19 PROCEDURE — 99999 PR PBB SHADOW E&M-EST. PATIENT-LVL V: CPT | Mod: PBBFAC,,, | Performed by: NURSE PRACTITIONER

## 2020-03-19 PROCEDURE — 99499 UNLISTED E&M SERVICE: CPT | Mod: S$GLB,,, | Performed by: NURSE PRACTITIONER

## 2020-03-19 PROCEDURE — 99214 PR OFFICE/OUTPT VISIT, EST, LEVL IV, 30-39 MIN: ICD-10-PCS | Mod: S$GLB,,, | Performed by: NURSE PRACTITIONER

## 2020-03-19 RX ORDER — CYCLOBENZAPRINE HCL 5 MG
5-10 TABLET ORAL 3 TIMES DAILY PRN
Qty: 45 TABLET | Refills: 0 | Status: SHIPPED | OUTPATIENT
Start: 2020-03-19 | End: 2020-03-29

## 2020-03-19 NOTE — PROGRESS NOTES
Subjective:       Patient ID: Rob Jones Jr. is a 64 y.o. male.    Chief Complaint: Neck Pain    Mr Jones presents for 3 days of neck pain. He has difficulty turning his head. He awoke with the pain. He denies injury strain or trauma preceding the pain.     Review of Systems   Constitutional: Negative for fever.   HENT: Negative for facial swelling.    Eyes: Negative for visual disturbance.   Respiratory: Negative for shortness of breath.    Cardiovascular: Negative for chest pain.   Gastrointestinal: Negative for diarrhea, nausea and vomiting.   Genitourinary: Negative for dysuria.   Musculoskeletal: Positive for neck pain and neck stiffness.   Skin: Negative for rash.   Neurological: Positive for headaches.   Psychiatric/Behavioral: Negative for confusion.       Objective:      Physical Exam   Constitutional: He is oriented to person, place, and time. He appears well-developed and well-nourished. No distress.   Neck: No Brudzinski's sign and no Kernig's sign noted.   Cardiovascular: Normal rate, regular rhythm and normal heart sounds.   Pulmonary/Chest: Effort normal and breath sounds normal. No stridor. No respiratory distress. He has no wheezes. He has no rales.   Musculoskeletal:        Cervical back: He exhibits decreased range of motion, tenderness, pain and spasm. He exhibits no bony tenderness.   Neurological: He is alert and oriented to person, place, and time.   Skin: Skin is warm and dry. He is not diaphoretic.   Psychiatric: He has a normal mood and affect. His behavior is normal.   Nursing note and vitals reviewed.      Assessment:       1. Type 2 diabetes mellitus with ketoacidosis without coma, with long-term current use of insulin    2. Muscle spasm        Plan:   1. Type 2 diabetes mellitus with ketoacidosis without coma, with long-term current use of insulin  - blood sugar diagnostic Strp; 1 strip by Misc.(Non-Drug; Combo Route) route 4 (four) times daily before meals and nightly.   Dispense: 200 strip; Refill: 11    2. Muscle spasm  - cyclobenzaprine (FLEXERIL) 5 MG tablet; Take 1-2 tablets (5-10 mg total) by mouth 3 (three) times daily as needed for Muscle spasms.  Dispense: 45 tablet; Refill: 0      Pt has been given instructions populated from [employer] database and has verbalized understanding of the after visit summary and information contained wherein.    Follow up with a primary care provider. May go to ER for acute shortness of breath, lightheadedness, fever, or any other emergent complaints or changes in condition.

## 2020-03-19 NOTE — TELEPHONE ENCOUNTER
The patient was phoned about an upcoming appointment and the expectations of the pre-visit. I was unable to reach the patient and left a message.     Cassandra MOY LPN  Clinical Care Coordinator  Internal Medicine  [facility]  [phone]

## 2020-04-23 ENCOUNTER — OFFICE VISIT (OUTPATIENT)
Dept: PODIATRY | Facility: CLINIC | Age: 65
End: 2020-04-23
Attending: INTERNAL MEDICINE
Payer: MEDICARE

## 2020-04-23 VITALS
BODY MASS INDEX: 24.48 KG/M2 | TEMPERATURE: 99 F | WEIGHT: 171 LBS | SYSTOLIC BLOOD PRESSURE: 147 MMHG | HEART RATE: 79 BPM | HEIGHT: 70 IN | DIASTOLIC BLOOD PRESSURE: 72 MMHG

## 2020-04-23 DIAGNOSIS — B35.1 ONYCHOMYCOSIS DUE TO DERMATOPHYTE: ICD-10-CM

## 2020-04-23 PROCEDURE — 99499 UNLISTED E&M SERVICE: CPT | Mod: S$GLB,,, | Performed by: PODIATRIST

## 2020-04-23 PROCEDURE — 99999 PR PBB SHADOW E&M-EST. PATIENT-LVL IV: CPT | Mod: PBBFAC,,, | Performed by: PODIATRIST

## 2020-04-23 PROCEDURE — 11721 DEBRIDE NAIL 6 OR MORE: CPT | Mod: Q9,S$GLB,, | Performed by: PODIATRIST

## 2020-04-23 PROCEDURE — 11721 ROUTINE FOOT CARE: ICD-10-PCS | Mod: Q9,S$GLB,, | Performed by: PODIATRIST

## 2020-04-23 PROCEDURE — 99999 PR PBB SHADOW E&M-EST. PATIENT-LVL IV: ICD-10-PCS | Mod: PBBFAC,,, | Performed by: PODIATRIST

## 2020-04-23 PROCEDURE — 99499 NO LOS: ICD-10-PCS | Mod: S$GLB,,, | Performed by: PODIATRIST

## 2020-04-23 NOTE — LETTER
April 23, 2020      Vasyl Jimenez MD  [address]  [address]  [facility] [address]           [place] - Podiatry  [address] [address]  Phone: [phone]  Fax: [phone]          Patient: Rob Jones Jr.   MR Number: [id number]   YOB: 1955   Date of Visit: 4/23/2020       Dear Dr. Vasyl Jimenez:    Thank you for referring Rob Jones to me for evaluation. Attached you will find relevant portions of my assessment and plan of care.    If you have questions, please do not hesitate to call me. I look forward to following Rob Jones along with you.    Sincerely,    Cheli Forrester, CRISTHIAN    Enclosure  CC:  No Recipients    If you would like to receive this communication electronically, please contact [email or web address] or [phone] to request more information on [employer] Link access.    For providers and/or their staff who would like to refer a patient to [facility], please contact us through our one-stop-shop provider referral line, [facility], at [phone].    If you feel you have received this communication in error or would no longer like to receive these types of communications, please e-mail [email or web address]

## 2020-04-23 NOTE — PROGRESS NOTES
Subjective:      Patient ID: Rob Jones Jr. is a 64 y.o. male.    Chief Complaint: Diabetes Mellitus (PCP: Vasyl Jimenez 03/10/2020  A1C: 03/12/2020 / 14.0) and Diabetic Foot Exam    Rob is a 64 y.o. male who presents to the clinic for evaluation and treatment of high risk feet. Rob has a past medical history of Anxiety, Back pain, Cataract, Diabetes mellitus, History of hepatitis C; S/p RX with SVR 12 documented 06/2017 (6/1/2017), Hypertension, Postoperative hypothyroidism (11/3/2016), Primary hyperparathyroidism (1/18/2017), and Thyroid disease.     The patient's chief complaint is long, thick toenails.       This patient has documented high risk feet requiring routine maintenance secondary to diabetes mellitis and those secondary complications of diabetes, as mentioned..        PCP: Vasyl Jimenez MD    Date Last Seen by PCP:   Chief Complaint   Patient presents with    Diabetes Mellitus     PCP: Vasyl Jimenez 03/10/2020  A1C: 03/12/2020 / 14.0    Diabetic Foot Exam         Current shoe gear:  Affected Foot: Casual shoes     Unaffected Foot: Casual shoes    Hemoglobin A1C   Date Value Ref Range Status   03/12/2020 >14.0 (H) 4.0 - 5.6 % Final     Comment:     ADA Screening Guidelines:  5.7-6.4%  Consistent with prediabetes  >or=6.5%  Consistent with diabetes  High levels of fetal hemoglobin interfere with the HbA1C  assay. Heterozygous hemoglobin variants (HbS, HgC, etc)do  not significantly interfere with this assay.   However, presence of multiple variants may affect accuracy.     03/10/2020 >14.0 (H) 4.0 - 5.6 % Final     Comment:     ADA Screening Guidelines:  5.7-6.4%  Consistent with prediabetes  >or=6.5%  Consistent with diabetes  High levels of fetal hemoglobin interfere with the HbA1C  assay. Heterozygous hemoglobin variants (HbS, HgC, etc)do  not significantly interfere with this assay.   However, presence of multiple variants may affect accuracy.     09/09/2019 6.2 (H)  4.0 - 5.6 % Final     Comment:     ADA Screening Guidelines:  5.7-6.4%  Consistent with prediabetes  >or=6.5%  Consistent with diabetes  High levels of fetal hemoglobin interfere with the HbA1C  assay. Heterozygous hemoglobin variants (HbS, HgC, etc)do  not significantly interfere with this assay.   However, presence of multiple variants may affect accuracy.         Review of Systems   Constitution: Negative for chills, decreased appetite, fever and night sweats.   HENT: Negative for congestion, ear discharge, nosebleeds and tinnitus.    Eyes: Negative for double vision, pain and visual disturbance.   Cardiovascular: Negative for chest pain, claudication, cyanosis and palpitations.   Respiratory: Negative for cough, hemoptysis, shortness of breath and wheezing.    Endocrine: Negative for cold intolerance and heat intolerance.   Hematologic/Lymphatic: Negative for adenopathy and bleeding problem.   Skin: Positive for color change, dry skin, nail changes and unusual hair distribution.   Musculoskeletal: Positive for arthritis, joint pain and stiffness. Negative for myalgias.   Gastrointestinal: Negative for abdominal pain, dysphagia, nausea and vomiting.   Genitourinary: Negative for dysuria, flank pain, hematuria and pelvic pain.   Neurological: Positive for disturbances in coordination, numbness, paresthesias and sensory change.   Psychiatric/Behavioral: Negative for altered mental status, hallucinations and suicidal ideas. The patient does not have insomnia.    Allergic/Immunologic: Negative for environmental allergies and persistent infections.           Objective:      Physical Exam   Constitutional: He is oriented to person, place, and time. He appears well-developed and well-nourished.   Cardiovascular:   Pulses:       Dorsalis pedis pulses are 1+ on the right side, and 1+ on the left side.        Posterior tibial pulses are 1+ on the right side, and 1+ on the left side.   Pulmonary/Chest: Effort normal.    Musculoskeletal: Normal range of motion.   Anterior, lateral, and posterior muscle groups bilateral lower extremities show strength 4 over 5 symmetrically. Inspection and palpation of the joints and bones reveal no crepitus or joint effusion. No tenderness upon palpation. Mild plantar flexor contractures noted to digits 2 through 5 bilaterally.  Angle and base of gait are normal.   Feet:   Right Foot:   Skin Integrity: Positive for callus and dry skin.   Left Foot:   Skin Integrity: Positive for callus and dry skin.   Neurological: He is alert and oriented to person, place, and time. He displays atrophy and abnormal reflex. A sensory deficit is present.   Reflex Scores:       Patellar reflexes are 1+ on the right side and 1+ on the left side.       Achilles reflexes are 1+ on the right side and 1+ on the left side.  Sharp, dull, light touch, and vibratory sensation are diminished bilaterally. Proprioceptive sensation is intact to both lower extremities. Ohlman Dean monofilament exam shows loss of protective sensation to plantar toes 1 through 5 bilaterally. Deep tendon reflexes to the patellar tendons is 1 over 4 bilaterally symmetrical. Deep tendon reflexes to the Achilles tendon is 0 over 4 bilaterally symmetrical. No ankle clonus or Babinski reflex noted bilaterally. Coordination is fair to both lower extremities.  Patient admits to intermittent burning and tingling in the feet.   Skin: Skin is warm and dry. Capillary refill takes 2 to 3 seconds. There is pallor.   Skin bilateral lower extremities noted to be thin, dry, and atrophic.  Toenails thickened, discolored, with subungual fungal debris and tenderness noted.  Hyperkeratotic lesions noted to both feet plantarly with tenderness.   Psychiatric: He has a normal mood and affect.   Vitals reviewed.            Assessment:       Encounter Diagnoses   Name Primary?    Uncontrolled type 2 diabetes mellitus with complication, with long-term current use of  insulin Yes    Onychomycosis due to dermatophyte          Plan:       Rob was seen today for diabetes mellitus and diabetic foot exam.    Diagnoses and all orders for this visit:    Uncontrolled type 2 diabetes mellitus with complication, with long-term current use of insulin    Onychomycosis due to dermatophyte    Other orders  -     Routine Foot Care      I counseled the patient on his conditions, their implications and medical management.    Shoe inspection. Diabetic Foot Education. Patient reminded of the importance of good nutrition and blood sugar control to help prevent podiatric complications of diabetes. Patient instructed on proper foot hygeine. We discussed wearing proper shoe gear, daily foot inspections and Diabetic foot education in detail.    Routine Foot Care  Date/Time: 4/23/2020 12:15 PM  Performed by: Cheli Forrester DPM  Authorized by: Cheli Forrester DPM     Consent Done?:  Yes (Verbal)    Nail Care Type:  Debride  Location(s): All  (Left 1st Toe, Left 3rd Toe, Left 2nd Toe, Left 4th Toe, Left 5th Toe, Right 1st Toe, Right 2nd Toe, Right 3rd Toe, Right 4th Toe and Right 5th Toe)  Patient tolerance:  Patient tolerated the procedure well with no immediate complications     With patient's permission, the toenails mentioned above were aggressively reduced and debrided using a nail nipper, removing all offending nail and debris. The patient will continue to monitor the areas daily, inspect the feet, wear protective shoe gear when ambulatory, and moisturizer to maintain skin integrity.           Return to clinic in 3-6 months or sooner if problems arise     .

## 2020-04-23 NOTE — PATIENT INSTRUCTIONS
General nail care measures for abnormal nails include:    ? Keeping nails trimmed short    ? Avoiding trauma     ? Avoiding contact irritants     ? Keeping nails dry (avoiding wet work)    ? Avoiding all nail cosmetics         How to Check Your Feet    Below are tips to help you look for foot problems. Try to check your feet at the same time each day, such as when you get out of bed in the morning.    · Check the top of each foot. The tops of toes, back of the heel, and outer edge of the foot can get a lot of rubbing from poor-fitting shoes.    · Check the bottom of each foot. Daily wear and tear often leads to problems at pressure spots.    · Check the toes and nails. Fungal infections often occur between toes. Toenail problems can also be a sign of fungal infections or lead to breaks in the skin.    · Check your shoes, too. Loose objects inside a shoe can injure the foot. Use your hand to feel inside your shoes for things like ciro, loose stitching, or rough areas that could irritate your skin.        Diabetic Foot Care    Diabetes can lead to a number of different foot complications. Fortunately, most of these complications can be prevented with a little extra foot care. If diabetes is not well controlled, the high blood sugar can cause damage to blood vessels and result in poor circulation to the foot. When the skin does not get enough blood flow, it becomes prone to pressure sores and ulcers, which heal slowly.  High blood sugar can also damage nerves, interfering with the ability to feel pain and pressure. When you cant feel your foot normally, it is easy to injure your skin, bones and joints without knowing it. For these reasons diabetes increases the risk of fungal infections, bunions and ulcers. Deep ulcers can lead to bone infection. Gangrene is the most serious foot complication of diabetes. It usually occurs on the tips of the toes as blacked areas of skin. The black area is dead tissue. In severe  cases, gangrene spreads to involve the entire toe, other toes and the entire foot. Foot or toe amputation may be required. Good foot care and blood sugar control can prevent this.    Home Care  1. Wear comfortable, proper fitting shoes.  2. Wash your feet daily with warm water and mild soap.  3. After drying, apply a moisturizing cream or lotion.  4. Check your feet daily for skin breaks, blisters, swelling, or redness. Look between your toes also.  5. Wear cotton socks and change them every day.  6. Trim toe nails carefully and do not cut your cuticles.  7. Strive to keep your blood sugar under control with a combination of medicines, diet and activity.  8. If you smoke and have diabetes, it is very important that you stop. Smoking reduces blood flow to your foot.  9. Avoid activities that increase your risk of foot injury:  · Do not walk barefoot.  · Do not use heating pads or hot water bottles on your feet.  · Do not put your foot in a hot tub without first checking the temperature with your hand.  10) Schedule yearly foot exams.    Follow Up  with your doctor or as advised by our staff. Report any cut, puncture, scrape, other injury, blister, ingrown toenail or ulcer on your foot.    Get Prompt Medical Attention  if any of the following occur:  -- Open ulcer with pus draining from the wound  -- Increasing foot or leg pain  -- New areas of redness or swelling or tender areas of the foot    © 8932-4012 [employer]. [address] [place] [address], [place] [address]. All rights reserved. This information is not intended as a substitute for professional medical care. Always follow your healthcare professional's instructions.

## 2020-04-24 DIAGNOSIS — E11.9 DIABETES MELLITUS WITHOUT COMPLICATION: Primary | ICD-10-CM

## 2020-04-24 RX ORDER — LANCETS
EACH MISCELLANEOUS
Qty: 100 EACH | Refills: 11 | Status: SHIPPED | OUTPATIENT
Start: 2020-04-24

## 2020-04-24 NOTE — TELEPHONE ENCOUNTER
Spoke with patient. He does not need an Epipen. This is what he thought the insulin pens are called. He only needs the needles that fit on the end of the insulin pens. Verified what type with the patient. Called in rx. Sent message to provider to sign order. Pt notified.

## 2020-04-24 NOTE — TELEPHONE ENCOUNTER
----- Message from Vicente Kay sent at 4/24/2020  2:32 PM CDT -----  Contact: Self      Can the clinic reply in [facility]:        Please refill the medication(s) listed below. Please call the patient when the prescription(s) is ready for  at this phone number. Pt called said that the needles that was sent over was wrong he needs Epipen.Please contact to further discuss and advise.          Medication #1     Medication #2       Preferred Pharmacy: [facility] #[id number] - [place] - [address]

## 2020-05-28 ENCOUNTER — TELEPHONE (OUTPATIENT)
Dept: INTERNAL MEDICINE | Facility: CLINIC | Age: 65
End: 2020-05-28

## 2020-05-28 DIAGNOSIS — Z98.1 S/P LUMBAR FUSION: ICD-10-CM

## 2020-05-28 DIAGNOSIS — M54.17 LUMBOSACRAL RADICULOPATHY: ICD-10-CM

## 2020-05-28 DIAGNOSIS — G89.4 CHRONIC PAIN SYNDROME: ICD-10-CM

## 2020-05-28 DIAGNOSIS — M47.816 FACET ARTHRITIS, DEGENERATIVE, LUMBAR SPINE: ICD-10-CM

## 2020-05-28 DIAGNOSIS — M47.816 LUMBAR SPONDYLOSIS: ICD-10-CM

## 2020-05-28 DIAGNOSIS — M51.36 DDD (DEGENERATIVE DISC DISEASE), LUMBAR: ICD-10-CM

## 2020-05-28 RX ORDER — GABAPENTIN 300 MG/1
300 CAPSULE ORAL 3 TIMES DAILY
Qty: 270 CAPSULE | Refills: 1 | Status: SHIPPED | OUTPATIENT
Start: 2020-05-28 | End: 2020-09-29

## 2020-05-28 NOTE — TELEPHONE ENCOUNTER
----- Message from Braydon Astudillo sent at 5/28/2020  9:23 AM CDT -----  Contact: TOMASA HAQ JR. [[id number]]  Can the clinic reply in [facility]:     Please refill the medication(s) listed below. The patient can be reached at this phone number once it is called into the pharmacy.    Medication #1  gabapentin (NEURONTIN) 300 MG     Medication #2    Preferred Pharmacy: [facility] #[id number] - [place] [address] [address]

## 2020-07-23 ENCOUNTER — OFFICE VISIT (OUTPATIENT)
Dept: PODIATRY | Facility: CLINIC | Age: 65
End: 2020-07-23
Payer: MEDICARE

## 2020-07-23 VITALS
HEART RATE: 65 BPM | BODY MASS INDEX: 24.48 KG/M2 | TEMPERATURE: 97 F | DIASTOLIC BLOOD PRESSURE: 71 MMHG | SYSTOLIC BLOOD PRESSURE: 125 MMHG | HEIGHT: 70 IN | WEIGHT: 171 LBS

## 2020-07-23 DIAGNOSIS — B35.1 ONYCHOMYCOSIS DUE TO DERMATOPHYTE: ICD-10-CM

## 2020-07-23 PROCEDURE — 99499 RISK ADDL DX/OHS AUDIT: ICD-10-PCS | Mod: S$GLB,,, | Performed by: PODIATRIST

## 2020-07-23 PROCEDURE — 99999 PR PBB SHADOW E&M-EST. PATIENT-LVL IV: CPT | Mod: PBBFAC,,, | Performed by: PODIATRIST

## 2020-07-23 PROCEDURE — 99499 UNLISTED E&M SERVICE: CPT | Mod: S$GLB,,, | Performed by: PODIATRIST

## 2020-07-23 PROCEDURE — 99999 PR PBB SHADOW E&M-EST. PATIENT-LVL IV: ICD-10-PCS | Mod: PBBFAC,,, | Performed by: PODIATRIST

## 2020-07-23 PROCEDURE — 11721 ROUTINE FOOT CARE: ICD-10-PCS | Mod: Q9,S$GLB,, | Performed by: PODIATRIST

## 2020-07-23 PROCEDURE — 11721 DEBRIDE NAIL 6 OR MORE: CPT | Mod: Q9,S$GLB,, | Performed by: PODIATRIST

## 2020-07-23 NOTE — PATIENT INSTRUCTIONS
How to Check Your Feet    Below are tips to help you look for foot problems. Try to check your feet at the same time each day, such as when you get out of bed in the morning.    · Check the top of each foot. The tops of toes, back of the heel, and outer edge of the foot can get a lot of rubbing from poor-fitting shoes.    · Check the bottom of each foot. Daily wear and tear often leads to problems at pressure spots.    · Check the toes and nails. Fungal infections often occur between toes. Toenail problems can also be a sign of fungal infections or lead to breaks in the skin.    · Check your shoes, too. Loose objects inside a shoe can injure the foot. Use your hand to feel inside your shoes for things like ciro, loose stitching, or rough areas that could irritate your skin.        Diabetic Foot Care    Diabetes can lead to a number of different foot complications. Fortunately, most of these complications can be prevented with a little extra foot care. If diabetes is not well controlled, the high blood sugar can cause damage to blood vessels and result in poor circulation to the foot. When the skin does not get enough blood flow, it becomes prone to pressure sores and ulcers, which heal slowly.  High blood sugar can also damage nerves, interfering with the ability to feel pain and pressure. When you cant feel your foot normally, it is easy to injure your skin, bones and joints without knowing it. For these reasons diabetes increases the risk of fungal infections, bunions and ulcers. Deep ulcers can lead to bone infection. Gangrene is the most serious foot complication of diabetes. It usually occurs on the tips of the toes as blacked areas of skin. The black area is dead tissue. In severe cases, gangrene spreads to involve the entire toe, other toes and the entire foot. Foot or toe amputation may be required. Good foot care and blood sugar control can prevent this.    Home Care  1. Wear comfortable, proper fitting  shoes.  2. Wash your feet daily with warm water and mild soap.  3. After drying, apply a moisturizing cream or lotion.  4. Check your feet daily for skin breaks, blisters, swelling, or redness. Look between your toes also.  5. Wear cotton socks and change them every day.  6. Trim toe nails carefully and do not cut your cuticles.  7. Strive to keep your blood sugar under control with a combination of medicines, diet and activity.  8. If you smoke and have diabetes, it is very important that you stop. Smoking reduces blood flow to your foot.  9. Avoid activities that increase your risk of foot injury:  · Do not walk barefoot.  · Do not use heating pads or hot water bottles on your feet.  · Do not put your foot in a hot tub without first checking the temperature with your hand.  10) Schedule yearly foot exams.    Follow Up  with your doctor or as advised by our staff. Report any cut, puncture, scrape, other injury, blister, ingrown toenail or ulcer on your foot.    Get Prompt Medical Attention  if any of the following occur:  -- Open ulcer with pus draining from the wound  -- Increasing foot or leg pain  -- New areas of redness or swelling or tender areas of the foot    © 3721-9319 [employer]. [address] [place] [address], [place] [address]. All rights reserved. This information is not intended as a substitute for professional medical care. Always follow your healthcare professional's instructions.      General nail care measures for abnormal nails include:    ? Keeping nails trimmed short    ? Avoiding trauma     ? Avoiding contact irritants     ? Keeping nails dry (avoiding wet work)    ? Avoiding all nail cosmetics

## 2020-07-23 NOTE — PROGRESS NOTES
Subjective:      Patient ID: Rob Jones Jr. is a 64 y.o. male.    Chief Complaint: Diabetes Mellitus (Primary Care: Kierra Eisenberg 03/19/2020  A1C: 03/12/2020 >14)    Rob is a 64 y.o. male who presents to the clinic for evaluation and treatment of high risk feet. Rob has a past medical history of Anxiety, Back pain, Cataract, Diabetes mellitus, History of hepatitis C; S/p RX with SVR 12 documented 06/2017 (6/1/2017), Hypertension, Postoperative hypothyroidism (11/3/2016), Primary hyperparathyroidism (1/18/2017), and Thyroid disease.     The patient's chief complaint is long, thick toenails.       This patient has documented high risk feet requiring routine maintenance secondary to diabetes mellitis and those secondary complications of diabetes, as mentioned..        PCP: Vasyl Jimenez MD    Date Last Seen by PCP:   Chief Complaint   Patient presents with    Diabetes Mellitus     Primary Care: Kierra Eisenberg 03/19/2020  A1C: 03/12/2020 >14         Current shoe gear:  Affected Foot: Casual shoes     Unaffected Foot: Casual shoes    Hemoglobin A1C   Date Value Ref Range Status   03/12/2020 >14.0 (H) 4.0 - 5.6 % Final     Comment:     ADA Screening Guidelines:  5.7-6.4%  Consistent with prediabetes  >or=6.5%  Consistent with diabetes  High levels of fetal hemoglobin interfere with the HbA1C  assay. Heterozygous hemoglobin variants (HbS, HgC, etc)do  not significantly interfere with this assay.   However, presence of multiple variants may affect accuracy.     03/10/2020 >14.0 (H) 4.0 - 5.6 % Final     Comment:     ADA Screening Guidelines:  5.7-6.4%  Consistent with prediabetes  >or=6.5%  Consistent with diabetes  High levels of fetal hemoglobin interfere with the HbA1C  assay. Heterozygous hemoglobin variants (HbS, HgC, etc)do  not significantly interfere with this assay.   However, presence of multiple variants may affect accuracy.     09/09/2019 6.2 (H) 4.0 - 5.6 % Final     Comment:     ADA Screening  Guidelines:  5.7-6.4%  Consistent with prediabetes  >or=6.5%  Consistent with diabetes  High levels of fetal hemoglobin interfere with the HbA1C  assay. Heterozygous hemoglobin variants (HbS, HgC, etc)do  not significantly interfere with this assay.   However, presence of multiple variants may affect accuracy.         Review of Systems   Constitution: Negative for chills, decreased appetite, fever and night sweats.   HENT: Negative for congestion, ear discharge, nosebleeds and tinnitus.    Eyes: Negative for double vision, pain and visual disturbance.   Cardiovascular: Negative for chest pain, claudication, cyanosis and palpitations.   Respiratory: Negative for cough, hemoptysis, shortness of breath and wheezing.    Endocrine: Negative for cold intolerance and heat intolerance.   Hematologic/Lymphatic: Negative for adenopathy and bleeding problem.   Skin: Positive for color change, dry skin, nail changes and unusual hair distribution.   Musculoskeletal: Positive for arthritis, joint pain and stiffness. Negative for myalgias.   Gastrointestinal: Negative for abdominal pain, dysphagia, nausea and vomiting.   Genitourinary: Negative for dysuria, flank pain, hematuria and pelvic pain.   Neurological: Positive for disturbances in coordination, numbness, paresthesias and sensory change.   Psychiatric/Behavioral: Negative for altered mental status, hallucinations and suicidal ideas. The patient does not have insomnia.    Allergic/Immunologic: Negative for environmental allergies and persistent infections.           Objective:      Physical Exam  Vitals signs reviewed.   Constitutional:       Appearance: He is well-developed.   Cardiovascular:      Pulses:           Dorsalis pedis pulses are 1+ on the right side and 1+ on the left side.        Posterior tibial pulses are 1+ on the right side and 1+ on the left side.   Pulmonary:      Effort: Pulmonary effort is normal.   Musculoskeletal: Normal range of motion.       Comments: Anterior, lateral, and posterior muscle groups bilateral lower extremities show strength 4 over 5 symmetrically. Inspection and palpation of the joints and bones reveal no crepitus or joint effusion. No tenderness upon palpation. Mild plantar flexor contractures noted to digits 2 through 5 bilaterally.  Angle and base of gait are normal.   Feet:      Right foot:      Skin integrity: Callus and dry skin present.      Left foot:      Skin integrity: Callus and dry skin present.   Skin:     General: Skin is warm and dry.      Capillary Refill: Capillary refill takes 2 to 3 seconds.      Coloration: Skin is pale.      Comments: Skin bilateral lower extremities noted to be thin, dry, and atrophic.  Toenails thickened, discolored, with subungual fungal debris and tenderness noted.  Hyperkeratotic lesions noted to both feet plantarly with tenderness.   Neurological:      Mental Status: He is alert and oriented to person, place, and time.      Sensory: Sensory deficit present.      Motor: Atrophy present.      Deep Tendon Reflexes: Reflexes abnormal.      Reflex Scores:       Patellar reflexes are 1+ on the right side and 1+ on the left side.       Achilles reflexes are 1+ on the right side and 1+ on the left side.     Comments: Sharp, dull, light touch, and vibratory sensation are diminished bilaterally. Proprioceptive sensation is intact to both lower extremities. [place] Dean monofilament exam shows loss of protective sensation to plantar toes 1 through 5 bilaterally. Deep tendon reflexes to the patellar tendons is 1 over 4 bilaterally symmetrical. Deep tendon reflexes to the Achilles tendon is 0 over 4 bilaterally symmetrical. No ankle clonus or Babinski reflex noted bilaterally. Coordination is fair to both lower extremities.  Patient admits to intermittent burning and tingling in the feet.               Assessment:       Encounter Diagnoses   Name Primary?    Uncontrolled type 2 diabetes mellitus with  complication, with long-term current use of insulin Yes    Onychomycosis due to dermatophyte          Plan:       Rob was seen today for diabetes mellitus.    Diagnoses and all orders for this visit:    Uncontrolled type 2 diabetes mellitus with complication, with long-term current use of insulin    Onychomycosis due to dermatophyte    Other orders  -     Routine Foot Care      I counseled the patient on his conditions, their implications and medical management.    Shoe inspection. Diabetic Foot Education. Patient reminded of the importance of good nutrition and blood sugar control to help prevent podiatric complications of diabetes. Patient instructed on proper foot hygeine. We discussed wearing proper shoe gear, daily foot inspections and Diabetic foot education in detail.    Routine Foot Care    Date/Time: 7/23/2020 11:15 AM  Performed by: Cheli Forrester DPM  Authorized by: Cheli Forrester DPM     Consent Done?:  Yes (Verbal)    Nail Care Type:  Debride  Location(s): All  (Left 1st Toe, Left 3rd Toe, Left 2nd Toe, Left 4th Toe, Left 5th Toe, Right 1st Toe, Right 2nd Toe, Right 3rd Toe, Right 4th Toe and Right 5th Toe)  Patient tolerance:  Patient tolerated the procedure well with no immediate complications     With patient's permission, the toenails mentioned above were aggressively reduced and debrided using a nail nipper, removing all offending nail and debris. The patient will continue to monitor the areas daily, inspect the feet, wear protective shoe gear when ambulatory, and moisturizer to maintain skin integrity.           Return to clinic in 3-6 months or sooner if problems arise     .

## 2020-07-25 NOTE — TELEPHONE ENCOUNTER
Encounter details (provider/department) have been updated by OR staff. Of note, HF details may not display.     As of this time Protocols/ CDM: Does not populate or display     Updated: Provider    Will resend  refill request encounter to P Centralized Refill Staff Pool.     [facility]     Note composed:9:41 AM 07/25/2020

## 2020-07-27 RX ORDER — INSULIN GLARGINE 100 [IU]/ML
INJECTION, SOLUTION SUBCUTANEOUS
Qty: 15 SYRINGE | Refills: 2 | Status: SHIPPED | OUTPATIENT
Start: 2020-07-27

## 2020-08-11 ENCOUNTER — PATIENT OUTREACH (OUTPATIENT)
Dept: ADMINISTRATIVE | Facility: HOSPITAL | Age: 65
End: 2020-08-11

## 2020-08-18 ENCOUNTER — TELEPHONE (OUTPATIENT)
Dept: UROLOGY | Facility: CLINIC | Age: 65
End: 2020-08-18

## 2020-08-18 ENCOUNTER — PATIENT OUTREACH (OUTPATIENT)
Dept: ADMINISTRATIVE | Facility: HOSPITAL | Age: 65
End: 2020-08-18

## 2020-08-18 ENCOUNTER — TELEPHONE (OUTPATIENT)
Dept: PRIMARY CARE CLINIC | Facility: CLINIC | Age: 65
End: 2020-08-18

## 2020-08-18 NOTE — TELEPHONE ENCOUNTER
----- Message from Winsome Garibay sent at 8/18/2020  9:11 AM CDT -----  Type:  Patient Returning Call    Who Called: Self     Who Left Message for Patient:  Nurse Coordinator     Does the patient know what this is regarding?: patient states the nurse coordinator left him a message to call her.     Would the patient rather a call back or a response via [facility]?  Call     Best Call Back Number:[phone] (home) [phone] (work)

## 2020-08-18 NOTE — TELEPHONE ENCOUNTER
Spoke to patient at 10:20a.m. stated to give urine sample. Patient states he will bring in urine sample on August 25,2020.    Lizeth Roper

## 2020-08-19 ENCOUNTER — PATIENT OUTREACH (OUTPATIENT)
Dept: ADMINISTRATIVE | Facility: HOSPITAL | Age: 65
End: 2020-08-19

## 2020-08-25 ENCOUNTER — LAB VISIT (OUTPATIENT)
Dept: LAB | Facility: OTHER | Age: 65
End: 2020-08-25
Attending: INTERNAL MEDICINE
Payer: MEDICARE

## 2020-08-25 DIAGNOSIS — E11.9 DIABETES MELLITUS WITHOUT COMPLICATION: ICD-10-CM

## 2020-08-25 DIAGNOSIS — E11.9 TYPE 2 DIABETES MELLITUS WITHOUT COMPLICATION: ICD-10-CM

## 2020-08-25 DIAGNOSIS — E21.3 HYPERPARATHYROIDISM: ICD-10-CM

## 2020-08-25 DIAGNOSIS — E89.0 POSTOPERATIVE HYPOTHYROIDISM: ICD-10-CM

## 2020-08-25 LAB
25(OH)D3+25(OH)D2 SERPL-MCNC: 30 NG/ML (ref 30–96)
ALBUMIN SERPL BCP-MCNC: 4.3 G/DL (ref 3.5–5.2)
ALBUMIN SERPL BCP-MCNC: 4.3 G/DL (ref 3.5–5.2)
ALP SERPL-CCNC: 96 U/L (ref 55–135)
ALT SERPL W/O P-5'-P-CCNC: 14 U/L (ref 10–44)
ANION GAP SERPL CALC-SCNC: 11 MMOL/L (ref 8–16)
ANION GAP SERPL CALC-SCNC: 11 MMOL/L (ref 8–16)
AST SERPL-CCNC: 18 U/L (ref 10–40)
BILIRUB SERPL-MCNC: 0.5 MG/DL (ref 0.1–1)
BUN SERPL-MCNC: 15 MG/DL (ref 8–23)
BUN SERPL-MCNC: 15 MG/DL (ref 8–23)
CALCIUM SERPL-MCNC: 9.6 MG/DL (ref 8.7–10.5)
CALCIUM SERPL-MCNC: 9.6 MG/DL (ref 8.7–10.5)
CHLORIDE SERPL-SCNC: 106 MMOL/L (ref 95–110)
CHLORIDE SERPL-SCNC: 106 MMOL/L (ref 95–110)
CHOLEST SERPL-MCNC: 127 MG/DL (ref 120–199)
CHOLEST/HDLC SERPL: 2.2 {RATIO} (ref 2–5)
CO2 SERPL-SCNC: 21 MMOL/L (ref 23–29)
CO2 SERPL-SCNC: 21 MMOL/L (ref 23–29)
CREAT SERPL-MCNC: 1.1 MG/DL (ref 0.5–1.4)
CREAT SERPL-MCNC: 1.1 MG/DL (ref 0.5–1.4)
EST. GFR  (AFRICAN AMERICAN): >60 ML/MIN/1.73 M^2
EST. GFR  (AFRICAN AMERICAN): >60 ML/MIN/1.73 M^2
EST. GFR  (NON AFRICAN AMERICAN): >60 ML/MIN/1.73 M^2
EST. GFR  (NON AFRICAN AMERICAN): >60 ML/MIN/1.73 M^2
ESTIMATED AVG GLUCOSE: 192 MG/DL (ref 68–131)
GLUCOSE SERPL-MCNC: 115 MG/DL (ref 70–110)
GLUCOSE SERPL-MCNC: 115 MG/DL (ref 70–110)
HBA1C MFR BLD HPLC: 8.3 % (ref 4–5.6)
HDLC SERPL-MCNC: 59 MG/DL (ref 40–75)
HDLC SERPL: 46.5 % (ref 20–50)
LDLC SERPL CALC-MCNC: 54.6 MG/DL (ref 63–159)
NONHDLC SERPL-MCNC: 68 MG/DL
PHOSPHATE SERPL-MCNC: 2.8 MG/DL (ref 2.7–4.5)
POTASSIUM SERPL-SCNC: 4.8 MMOL/L (ref 3.5–5.1)
POTASSIUM SERPL-SCNC: 4.8 MMOL/L (ref 3.5–5.1)
PROT SERPL-MCNC: 7.7 G/DL (ref 6–8.4)
PTH-INTACT SERPL-MCNC: 44.7 PG/ML (ref 9–77)
SODIUM SERPL-SCNC: 138 MMOL/L (ref 136–145)
SODIUM SERPL-SCNC: 138 MMOL/L (ref 136–145)
TRIGL SERPL-MCNC: 67 MG/DL (ref 30–150)
TSH SERPL DL<=0.005 MIU/L-ACNC: 0.45 UIU/ML (ref 0.4–4)

## 2020-08-25 PROCEDURE — 36415 COLL VENOUS BLD VENIPUNCTURE: CPT

## 2020-08-25 PROCEDURE — 80061 LIPID PANEL: CPT

## 2020-08-25 PROCEDURE — 83036 HEMOGLOBIN GLYCOSYLATED A1C: CPT

## 2020-08-25 PROCEDURE — 80053 COMPREHEN METABOLIC PANEL: CPT

## 2020-08-25 PROCEDURE — 83970 ASSAY OF PARATHORMONE: CPT

## 2020-08-25 PROCEDURE — 80069 RENAL FUNCTION PANEL: CPT

## 2020-08-25 PROCEDURE — 82306 VITAMIN D 25 HYDROXY: CPT

## 2020-08-25 PROCEDURE — 84443 ASSAY THYROID STIM HORMONE: CPT

## 2020-08-26 ENCOUNTER — TELEPHONE (OUTPATIENT)
Dept: ENDOCRINOLOGY | Facility: CLINIC | Age: 65
End: 2020-08-26

## 2020-08-26 NOTE — TELEPHONE ENCOUNTER
Please advise patient that I reviewed his labs. His calcium, vitamin D and parathyroid levels remain normal. His A1c has improved quite a bit to 8.3% but he likely needs some adjustments to his diabetes meds and he can discuss this with his PCP at their upcoming visit.

## 2020-08-26 NOTE — TELEPHONE ENCOUNTER
Informed patient that I reviewed his labs. His calcium, vitamin D and parathyroid levels remain normal. His A1c has improved quite a bit to 8.3% but he likely needs some adjustments to his diabetes meds and he can discuss this with his PCP at their upcoming visit.   Patient verbalized understanding

## 2020-09-02 ENCOUNTER — LAB VISIT (OUTPATIENT)
Dept: LAB | Facility: OTHER | Age: 65
End: 2020-09-02
Attending: INTERNAL MEDICINE
Payer: MEDICARE

## 2020-09-02 ENCOUNTER — OFFICE VISIT (OUTPATIENT)
Dept: INTERNAL MEDICINE | Facility: CLINIC | Age: 65
End: 2020-09-02
Attending: INTERNAL MEDICINE
Payer: MEDICARE

## 2020-09-02 VITALS
OXYGEN SATURATION: 96 % | HEIGHT: 70 IN | WEIGHT: 185.88 LBS | DIASTOLIC BLOOD PRESSURE: 64 MMHG | BODY MASS INDEX: 26.61 KG/M2 | SYSTOLIC BLOOD PRESSURE: 112 MMHG | HEART RATE: 95 BPM

## 2020-09-02 DIAGNOSIS — M46.99 UNSPECIFIED INFLAMMATORY SPONDYLOPATHY, MULTIPLE SITES IN SPINE: ICD-10-CM

## 2020-09-02 DIAGNOSIS — R30.0 DYSURIA: Primary | ICD-10-CM

## 2020-09-02 DIAGNOSIS — E11.9 DIABETES MELLITUS WITHOUT COMPLICATION: ICD-10-CM

## 2020-09-02 DIAGNOSIS — I25.10 CORONARY ARTERY DISEASE INVOLVING NATIVE CORONARY ARTERY OF NATIVE HEART WITHOUT ANGINA PECTORIS: ICD-10-CM

## 2020-09-02 DIAGNOSIS — G89.4 CHRONIC PAIN SYNDROME: ICD-10-CM

## 2020-09-02 DIAGNOSIS — R30.0 DYSURIA: ICD-10-CM

## 2020-09-02 PROBLEM — B18.2 CHRONIC HEPATITIS C WITHOUT HEPATIC COMA: Status: ACTIVE | Noted: 2020-09-02

## 2020-09-02 PROBLEM — I73.9 INTERMITTENT CLAUDICATION: Status: ACTIVE | Noted: 2020-09-02

## 2020-09-02 LAB
BILIRUB UR QL STRIP: NEGATIVE
CLARITY UR REFRACT.AUTO: CLEAR
COLOR UR AUTO: YELLOW
GLUCOSE UR QL STRIP: NEGATIVE
HGB UR QL STRIP: NEGATIVE
KETONES UR QL STRIP: NEGATIVE
LEUKOCYTE ESTERASE UR QL STRIP: NEGATIVE
NITRITE UR QL STRIP: NEGATIVE
PH UR STRIP: 6 [PH] (ref 5–8)
PROT UR QL STRIP: NEGATIVE
SP GR UR STRIP: 1.01 (ref 1–1.03)
URN SPEC COLLECT METH UR: NORMAL

## 2020-09-02 PROCEDURE — 99499 RISK ADDL DX/OHS AUDIT: ICD-10-PCS | Mod: S$GLB,,, | Performed by: INTERNAL MEDICINE

## 2020-09-02 PROCEDURE — 3008F PR BODY MASS INDEX (BMI) DOCUMENTED: ICD-10-PCS | Mod: CPTII,S$GLB,, | Performed by: INTERNAL MEDICINE

## 2020-09-02 PROCEDURE — 3078F PR MOST RECENT DIASTOLIC BLOOD PRESSURE < 80 MM HG: ICD-10-PCS | Mod: CPTII,S$GLB,, | Performed by: INTERNAL MEDICINE

## 2020-09-02 PROCEDURE — 99999 PR PBB SHADOW E&M-EST. PATIENT-LVL IV: ICD-10-PCS | Mod: PBBFAC,,, | Performed by: INTERNAL MEDICINE

## 2020-09-02 PROCEDURE — 3074F SYST BP LT 130 MM HG: CPT | Mod: CPTII,S$GLB,, | Performed by: INTERNAL MEDICINE

## 2020-09-02 PROCEDURE — 99214 OFFICE O/P EST MOD 30 MIN: CPT | Mod: S$GLB,,, | Performed by: INTERNAL MEDICINE

## 2020-09-02 PROCEDURE — 3074F PR MOST RECENT SYSTOLIC BLOOD PRESSURE < 130 MM HG: ICD-10-PCS | Mod: CPTII,S$GLB,, | Performed by: INTERNAL MEDICINE

## 2020-09-02 PROCEDURE — 81003 URINALYSIS AUTO W/O SCOPE: CPT

## 2020-09-02 PROCEDURE — 99214 PR OFFICE/OUTPT VISIT, EST, LEVL IV, 30-39 MIN: ICD-10-PCS | Mod: S$GLB,,, | Performed by: INTERNAL MEDICINE

## 2020-09-02 PROCEDURE — 3078F DIAST BP <80 MM HG: CPT | Mod: CPTII,S$GLB,, | Performed by: INTERNAL MEDICINE

## 2020-09-02 PROCEDURE — 3052F PR MOST RECENT HEMOGLOBIN A1C LEVEL 8.0 - < 9.0%: ICD-10-PCS | Mod: CPTII,S$GLB,, | Performed by: INTERNAL MEDICINE

## 2020-09-02 PROCEDURE — 99999 PR PBB SHADOW E&M-EST. PATIENT-LVL IV: CPT | Mod: PBBFAC,,, | Performed by: INTERNAL MEDICINE

## 2020-09-02 PROCEDURE — 3008F BODY MASS INDEX DOCD: CPT | Mod: CPTII,S$GLB,, | Performed by: INTERNAL MEDICINE

## 2020-09-02 PROCEDURE — 3052F HG A1C>EQUAL 8.0%<EQUAL 9.0%: CPT | Mod: CPTII,S$GLB,, | Performed by: INTERNAL MEDICINE

## 2020-09-02 PROCEDURE — 99499 UNLISTED E&M SERVICE: CPT | Mod: S$GLB,,, | Performed by: INTERNAL MEDICINE

## 2020-09-02 RX ORDER — TRAMADOL HYDROCHLORIDE 50 MG/1
50 TABLET ORAL EVERY 6 HOURS
Qty: 30 TABLET | Refills: 0 | Status: SHIPPED | OUTPATIENT
Start: 2020-09-02 | End: 2020-10-01 | Stop reason: SDUPTHER

## 2020-09-02 RX ORDER — LANCETS
EACH MISCELLANEOUS
Qty: 100 EACH | Refills: 11 | Status: SHIPPED | OUTPATIENT
Start: 2020-09-02 | End: 2020-09-02 | Stop reason: SDUPTHER

## 2020-09-02 RX ORDER — INSULIN PUMP SYRINGE, 3 ML
EACH MISCELLANEOUS
Qty: 1 EACH | Refills: 0 | Status: SHIPPED | OUTPATIENT
Start: 2020-09-02

## 2020-09-21 NOTE — LETTER
January 18, 2017      Remi Weathers MD  [address]  [facility] [address]           [facility]  [address]  [facility] [address]  Phone: [phone]  Fax: [phone]          Patient: Rob Jones Jr.   MR Number: [id number]   YOB: 1955   Date of Visit: 1/18/2017       Dear Dr. Remi Weathers:    Thank you for referring Rob Jones to me for evaluation. Attached you will find relevant portions of my assessment and plan of care.    If you have questions, please do not hesitate to call me. I look forward to following Rob Jones along with you.    Sincerely,    Doris Jo MD    Enclosure  CC:  No Recipients    If you would like to receive this communication electronically, please contact [email or web address] or [phone] to request more information on [facility] access.    For providers and/or their staff who would like to refer a patient to [facility], please contact us through our one-stop-shop provider referral line, [facility], at [phone].    If you feel you have received this communication in error or would no longer like to receive these types of communications, please e-mail [email or web address]         
ADMIT

## 2020-10-01 DIAGNOSIS — M54.50 ACUTE BILATERAL LOW BACK PAIN WITHOUT SCIATICA: ICD-10-CM

## 2020-10-01 DIAGNOSIS — G89.4 CHRONIC PAIN SYNDROME: Primary | ICD-10-CM

## 2020-10-06 RX ORDER — TRAMADOL HYDROCHLORIDE 50 MG/1
50 TABLET ORAL EVERY 12 HOURS PRN
Qty: 60 TABLET | Refills: 0 | Status: SHIPPED | OUTPATIENT
Start: 2020-10-06

## 2020-10-20 ENCOUNTER — PATIENT OUTREACH (OUTPATIENT)
Dept: ADMINISTRATIVE | Facility: OTHER | Age: 65
End: 2020-10-20

## 2020-10-20 NOTE — PROGRESS NOTES
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    Last Fill Date:   09/30/2020  Last Visit with PCP:   09/14/2020  Future Visits with PCP:   No.    Processing:   Larry GIRALDO CMA at 10:20 AM on 10/20/2020.                                   Subjective:      Patient ID: Rob Jones Jr. is a 62 y.o. male.    Chief Complaint: Diabetes Mellitus (PCP Dr. Weathers 9/8/17); Diabetic Foot Exam; Routine Foot Care; and Peripheral Neuropathy    Rob is a 62 y.o. male who presents to the clinic for evaluation and treatment of diabetic feet. Rob has a past medical history of Anxiety; Back pain; Cataract; Diabetes mellitus; History of hepatitis C; S/p RX with SVR 12 documented 06/2017 (6/1/2017); Hypertension; Postoperative hypothyroidism (11/3/2016); Primary hyperparathyroidism (1/18/2017); and Thyroid disease. Patient relates no major problem with feet. Only complaints today consist of thick, fungal nails. States he has tired and failed several topical treatments in the past with none to little success. Here for his annual DM foot exam.     PCP: Remi Weathers MD (Inactive)    Date Last Seen by PCP:   Chief Complaint   Patient presents with    Diabetes Mellitus     PCP Dr. Weathers 9/8/17    Diabetic Foot Exam    Routine Foot Care    Peripheral Neuropathy       Current shoe gear: Tennis shoes    Hemoglobin A1C   Date Value Ref Range Status   09/08/2017 6.3 (H) 4.0 - 5.6 % Final     Comment:     According to ADA guidelines, hemoglobin A1c <7.0% represents  optimal control in non-pregnant diabetic patients. Different  metrics may apply to specific patient populations.   Standards of Medical Care in Diabetes-2016.  For the purpose of screening for the presence of diabetes:  <5.7%     Consistent with the absence of diabetes  5.7-6.4%  Consistent with increasing risk for diabetes   (prediabetes)  >or=6.5%  Consistent with diabetes  Currently, no consensus exists for use of hemoglobin A1c  for diagnosis of diabetes for children.  This Hemoglobin A1c assay has significant interference with fetal   hemoglobin   (HbF). The results are invalid for patients with abnormal amounts of   HbF,   including those with known Hereditary Persistence   of Fetal  Hemoglobin. Heterozygous hemoglobin variants (HbAS, HbAC,   HbAD, HbAE, HbA2) do not significantly interfere with this assay;   however, presence of multiple variants in a sample may impact the %   interference.     05/08/2017 7.2 (H) 4.5 - 6.2 % Final     Comment:     According to ADA guidelines, hemoglobin A1C <7.0% represents  optimal control in non-pregnant diabetic patients.  Different  metrics may apply to specific populations.   Standards of Medical Care in Diabetes - 2016.  For the purpose of screening for the presence of diabetes:  <5.7%     Consistent with the absence of diabetes  5.7-6.4%  Consistent with increasing risk for diabetes   (prediabetes)  >or=6.5%  Consistent with diabetes  Currently no consensus exists for use of hemoglobin A1C  for diagnosis of diabetes for children.     02/07/2017 7.3 (H) 4.5 - 6.2 % Final     Comment:     According to ADA guidelines, hemoglobin A1C <7.0% represents  optimal control in non-pregnant diabetic patients.  Different  metrics may apply to specific populations.   Standards of Medical Care in Diabetes - 2016.  For the purpose of screening for the presence of diabetes:  <5.7%     Consistent with the absence of diabetes  5.7-6.4%  Consistent with increasing risk for diabetes   (prediabetes)  >or=6.5%  Consistent with diabetes  Currently no consensus exists for use of hemoglobin A1C  for diagnosis of diabetes for children.             Review of Systems   Constitution: Negative for chills, fever and malaise/fatigue.   Cardiovascular: Negative for chest pain, leg swelling, orthopnea and palpitations.   Respiratory: Negative for cough, shortness of breath and wheezing.    Skin: Positive for color change, dry skin and nail changes. Negative for itching, poor wound healing and rash.   Musculoskeletal: Negative for arthritis, gout, joint pain, joint swelling, muscle weakness and myalgias.   Neurological: Positive for numbness, paresthesias and sensory change. Negative for  disturbances in coordination, dizziness, focal weakness and tremors.           Objective:      Physical Exam   Cardiovascular:   Dorsalis pedis and posterior tibial pulses are diminished Bilaterally. Toes are cool to touch. Feet are warm proximally.There is decreased digital hair. Skin is atrophic, slightly hyperpigmented, and mildly edematous       Musculoskeletal:   Musculoskeletal:  Muscle strength is 5/5 in all groups bilaterally.  Metatarsophalangeal and subtalar range of motion are within normal limits without crepitus bilaterally. There is limitation of ankle dorsiflexion with knees extended and flexed Bilaterally.       Neurological:   [place]-Dean 5.07 monofilamant testing is diminished both feet. Sharp/dull sensation diminished Bilaterally. Light touch absent Bilaterally.       Skin:   Toenails 1-5 bilaterally are elongated by 2-3 mm, thickened by 2-3 mm, discolored/yellowed, dystrophic, brittle with subungual debris. No incurvation. Mild xerosis noted, bilat. No open wounds.                 Assessment:       Encounter Diagnoses   Name Primary?    Type II diabetes mellitus with neurological manifestations Yes    PVD (peripheral vascular disease)     Bilateral lower extremity edema     Onychomycosis due to dermatophyte          Plan:       Rob was seen today for diabetes mellitus, diabetic foot exam, routine foot care and peripheral neuropathy.    Diagnoses and all orders for this visit:    Type II diabetes mellitus with neurological manifestations    PVD (peripheral vascular disease)    Bilateral lower extremity edema    Onychomycosis due to dermatophyte      I counseled the patient on his conditions, their implications and medical management.        - Shoe inspection. Diabetic Foot Education. Patient reminded of the importance of good nutrition and blood sugar control to help prevent podiatric complications of diabetes. Patient instructed on proper foot hygeine. We discussed wearing proper  shoe gear, daily foot inspections, never walking without protective shoe gear, never putting sharp instruments to feet, routine podiatric visits every 6 months.      - With patient's permission, nails were aggressively reduced and debrided x 10 to their soft tissue attachment mechanically and with electric , removing all offending nail and debris. Patient relates relief following the procedure. He will continue to monitor the areas daily, inspect his feet, wear protective shoe gear when ambulatory, moisturizer to maintain skin integrity and follow in this office in approximately 6 months, sooner p.r.n.    Discussed application of Emre's vaporub on affected toenails for up to 1 year for improvement in appearance and fungal infection.     He would like to follow up at [facility]. F/u 3 months, sooner PRN

## 2020-10-21 ENCOUNTER — TELEPHONE (OUTPATIENT)
Dept: PODIATRY | Facility: CLINIC | Age: 65
End: 2020-10-21

## 2020-10-21 NOTE — TELEPHONE ENCOUNTER
----- Message from Dread Escalona sent at 10/21/2020 11:21 AM CDT -----  Name of Who is Calling: TOMASA HAQ JR.  What is the request in detail: The patient is calling to find out what time should he come in for his appointment tomorrow due to him receiving an message stating his appointment time will move up. Please advise Can the clinic reply by [facility]: No What Number to Call Back if not in [facility]: [phone]

## 2020-10-21 NOTE — PROGRESS NOTES
Health Maintenance Due   Topic Date Due    HIV Screening  08/03/1970    Shingles Vaccine (3 of 3) 11/06/2019    Influenza Vaccine (1) 08/01/2020     Updates were requested from care everywhere.  Chart was reviewed for overdue Proactive [facility] Encounters (ERMELINDA) topics (CRS, Breast Cancer Screening, Eye exam)  Health Maintenance has been updated.  LINKS immunization registry triggered.  Immunizations were reconciled.

## 2020-10-22 ENCOUNTER — TELEPHONE (OUTPATIENT)
Dept: PODIATRY | Facility: CLINIC | Age: 65
End: 2020-10-22

## 2020-10-22 NOTE — TELEPHONE ENCOUNTER
----- Message from Braydon Astudillo sent at 10/22/2020  9:21 AM CDT -----   Name of Who is Calling:     What is the request in detail: patient request call back in reference to time //patient was ask to come in earlier than scheduled time  but was not given a time  Please contact to further discuss and advise      Can the clinic reply by [facility]: no    What Number to Call Back if not in [facility]:  [phone]

## 2020-10-23 ENCOUNTER — TELEPHONE (OUTPATIENT)
Dept: ORTHOPEDICS | Facility: CLINIC | Age: 65
End: 2020-10-23

## 2020-10-23 DIAGNOSIS — M51.36 DDD (DEGENERATIVE DISC DISEASE), LUMBAR: Primary | ICD-10-CM

## 2020-10-30 ENCOUNTER — PATIENT MESSAGE (OUTPATIENT)
Dept: ADMINISTRATIVE | Facility: HOSPITAL | Age: 65
End: 2020-10-30

## 2020-11-04 ENCOUNTER — TELEPHONE (OUTPATIENT)
Dept: ORTHOPEDICS | Facility: CLINIC | Age: 65
End: 2020-11-04

## 2020-11-04 DIAGNOSIS — M51.36 DDD (DEGENERATIVE DISC DISEASE), LUMBAR: Primary | ICD-10-CM

## 2020-11-17 ENCOUNTER — OFFICE VISIT (OUTPATIENT)
Dept: PODIATRY | Facility: CLINIC | Age: 65
End: 2020-11-17
Payer: MEDICARE

## 2020-11-17 VITALS
SYSTOLIC BLOOD PRESSURE: 132 MMHG | WEIGHT: 185.88 LBS | HEART RATE: 54 BPM | HEIGHT: 70 IN | BODY MASS INDEX: 26.61 KG/M2 | DIASTOLIC BLOOD PRESSURE: 71 MMHG

## 2020-11-17 DIAGNOSIS — B35.1 ONYCHOMYCOSIS DUE TO DERMATOPHYTE: Primary | ICD-10-CM

## 2020-11-17 PROCEDURE — 99499 NO LOS: ICD-10-PCS | Mod: S$GLB,,, | Performed by: PODIATRIST

## 2020-11-17 PROCEDURE — 99499 UNLISTED E&M SERVICE: CPT | Mod: S$GLB,,, | Performed by: PODIATRIST

## 2020-11-17 PROCEDURE — 3008F BODY MASS INDEX DOCD: CPT | Mod: CPTII,S$GLB,, | Performed by: PODIATRIST

## 2020-11-17 PROCEDURE — 3008F PR BODY MASS INDEX (BMI) DOCUMENTED: ICD-10-PCS | Mod: CPTII,S$GLB,, | Performed by: PODIATRIST

## 2020-11-17 PROCEDURE — 11721 ROUTINE FOOT CARE: ICD-10-PCS | Mod: Q9,S$GLB,, | Performed by: PODIATRIST

## 2020-11-17 PROCEDURE — 3072F LOW RISK FOR RETINOPATHY: CPT | Mod: S$GLB,,, | Performed by: PODIATRIST

## 2020-11-17 PROCEDURE — 3072F PR LOW RISK FOR RETINOPATHY: ICD-10-PCS | Mod: S$GLB,,, | Performed by: PODIATRIST

## 2020-11-17 PROCEDURE — 1125F PR PAIN SEVERITY QUANTIFIED, PAIN PRESENT: ICD-10-PCS | Mod: S$GLB,,, | Performed by: PODIATRIST

## 2020-11-17 PROCEDURE — 99999 PR PBB SHADOW E&M-EST. PATIENT-LVL IV: CPT | Mod: PBBFAC,,, | Performed by: PODIATRIST

## 2020-11-17 PROCEDURE — 1125F AMNT PAIN NOTED PAIN PRSNT: CPT | Mod: S$GLB,,, | Performed by: PODIATRIST

## 2020-11-17 PROCEDURE — 11721 DEBRIDE NAIL 6 OR MORE: CPT | Mod: Q9,S$GLB,, | Performed by: PODIATRIST

## 2020-11-17 PROCEDURE — 99999 PR PBB SHADOW E&M-EST. PATIENT-LVL IV: ICD-10-PCS | Mod: PBBFAC,,, | Performed by: PODIATRIST

## 2020-11-17 NOTE — PATIENT INSTRUCTIONS
How to Check Your Feet    Below are tips to help you look for foot problems. Try to check your feet at the same time each day, such as when you get out of bed in the morning.    · Check the top of each foot. The tops of toes, back of the heel, and outer edge of the foot can get a lot of rubbing from poor-fitting shoes.    · Check the bottom of each foot. Daily wear and tear often leads to problems at pressure spots.    · Check the toes and nails. Fungal infections often occur between toes. Toenail problems can also be a sign of fungal infections or lead to breaks in the skin.    · Check your shoes, too. Loose objects inside a shoe can injure the foot. Use your hand to feel inside your shoes for things like ciro, loose stitching, or rough areas that could irritate your skin.        Diabetic Foot Care    Diabetes can lead to a number of different foot complications. Fortunately, most of these complications can be prevented with a little extra foot care. If diabetes is not well controlled, the high blood sugar can cause damage to blood vessels and result in poor circulation to the foot. When the skin does not get enough blood flow, it becomes prone to pressure sores and ulcers, which heal slowly.  High blood sugar can also damage nerves, interfering with the ability to feel pain and pressure. When you cant feel your foot normally, it is easy to injure your skin, bones and joints without knowing it. For these reasons diabetes increases the risk of fungal infections, bunions and ulcers. Deep ulcers can lead to bone infection. Gangrene is the most serious foot complication of diabetes. It usually occurs on the tips of the toes as blacked areas of skin. The black area is dead tissue. In severe cases, gangrene spreads to involve the entire toe, other toes and the entire foot. Foot or toe amputation may be required. Good foot care and blood sugar control can prevent this.    Home Care  1. Wear comfortable, proper fitting  shoes.  2. Wash your feet daily with warm water and mild soap.  3. After drying, apply a moisturizing cream or lotion.  4. Check your feet daily for skin breaks, blisters, swelling, or redness. Look between your toes also.  5. Wear cotton socks and change them every day.  6. Trim toe nails carefully and do not cut your cuticles.  7. Strive to keep your blood sugar under control with a combination of medicines, diet and activity.  8. If you smoke and have diabetes, it is very important that you stop. Smoking reduces blood flow to your foot.  9. Avoid activities that increase your risk of foot injury:  · Do not walk barefoot.  · Do not use heating pads or hot water bottles on your feet.  · Do not put your foot in a hot tub without first checking the temperature with your hand.  10) Schedule yearly foot exams.    Follow Up  with your doctor or as advised by our staff. Report any cut, puncture, scrape, other injury, blister, ingrown toenail or ulcer on your foot.    Get Prompt Medical Attention  if any of the following occur:  -- Open ulcer with pus draining from the wound  -- Increasing foot or leg pain  -- New areas of redness or swelling or tender areas of the foot    © 3126-0239 [employer]. [address] [place] [address], [place] [address]. All rights reserved. This information is not intended as a substitute for professional medical care. Always follow your healthcare professional's instructions.      General nail care measures for abnormal nails include:    ? Keeping nails trimmed short    ? Avoiding trauma     ? Avoiding contact irritants     ? Keeping nails dry (avoiding wet work)    ? Avoiding all nail cosmetics

## 2020-11-17 NOTE — PROGRESS NOTES
Subjective:      Patient ID: Rob Jones Jr. is a 65 y.o. male.    Chief Complaint: Diabetes Mellitus (Vasyl Byersvo 9/2/2020 A1c 8.3 8/28/2020)    Rob is a 65 y.o. male who presents to the clinic for evaluation and treatment of high risk feet. Rob has a past medical history of Anxiety, Back pain, Cataract, Diabetes mellitus, History of hepatitis C; S/p RX with SVR 12 documented 06/2017 (6/1/2017), Hypertension, Postoperative hypothyroidism (11/3/2016), Primary hyperparathyroidism (1/18/2017), and Thyroid disease.     The patient's chief complaint is long, thick toenails.       This patient has documented high risk feet requiring routine maintenance secondary to diabetes mellitis and those secondary complications of diabetes, as mentioned..        PCP: Vasyl Jimenez MD    Date Last Seen by PCP:   Chief Complaint   Patient presents with    Diabetes Mellitus     Vasyl Jimenez 9/2/2020 A1c 8.3 8/28/2020         Current shoe gear:  Affected Foot: Casual shoes     Unaffected Foot: Casual shoes    Hemoglobin A1C   Date Value Ref Range Status   08/25/2020 8.3 (H) 4.0 - 5.6 % Final     Comment:     ADA Screening Guidelines:  5.7-6.4%  Consistent with prediabetes  >or=6.5%  Consistent with diabetes  High levels of fetal hemoglobin interfere with the HbA1C  assay. Heterozygous hemoglobin variants (HbS, HgC, etc)do  not significantly interfere with this assay.   However, presence of multiple variants may affect accuracy.     03/12/2020 >14.0 (H) 4.0 - 5.6 % Final     Comment:     ADA Screening Guidelines:  5.7-6.4%  Consistent with prediabetes  >or=6.5%  Consistent with diabetes  High levels of fetal hemoglobin interfere with the HbA1C  assay. Heterozygous hemoglobin variants (HbS, HgC, etc)do  not significantly interfere with this assay.   However, presence of multiple variants may affect accuracy.     03/10/2020 >14.0 (H) 4.0 - 5.6 % Final     Comment:     ADA Screening Guidelines:  5.7-6.4%   Consistent with prediabetes  >or=6.5%  Consistent with diabetes  High levels of fetal hemoglobin interfere with the HbA1C  assay. Heterozygous hemoglobin variants (HbS, HgC, etc)do  not significantly interfere with this assay.   However, presence of multiple variants may affect accuracy.         Review of Systems   Constitution: Negative for chills, decreased appetite, fever and night sweats.   HENT: Negative for congestion, ear discharge, nosebleeds and tinnitus.    Eyes: Negative for double vision, pain and visual disturbance.   Cardiovascular: Negative for chest pain, claudication, cyanosis and palpitations.   Respiratory: Negative for cough, hemoptysis, shortness of breath and wheezing.    Endocrine: Negative for cold intolerance and heat intolerance.   Hematologic/Lymphatic: Negative for adenopathy and bleeding problem.   Skin: Positive for color change, dry skin, nail changes and unusual hair distribution.   Musculoskeletal: Positive for arthritis, joint pain and stiffness. Negative for myalgias.   Gastrointestinal: Negative for abdominal pain, dysphagia, nausea and vomiting.   Genitourinary: Negative for dysuria, flank pain, hematuria and pelvic pain.   Neurological: Positive for disturbances in coordination, numbness, paresthesias and sensory change.   Psychiatric/Behavioral: Negative for altered mental status, hallucinations and suicidal ideas. The patient does not have insomnia.    Allergic/Immunologic: Negative for environmental allergies and persistent infections.           Objective:      Physical Exam  Vitals signs reviewed.   Constitutional:       Appearance: He is well-developed.   Cardiovascular:      Pulses:           Dorsalis pedis pulses are 1+ on the right side and 1+ on the left side.        Posterior tibial pulses are 1+ on the right side and 1+ on the left side.   Pulmonary:      Effort: Pulmonary effort is normal.   Musculoskeletal: Normal range of motion.      Comments: Anterior, lateral,  and posterior muscle groups bilateral lower extremities show strength 4 over 5 symmetrically. Inspection and palpation of the joints and bones reveal no crepitus or joint effusion. No tenderness upon palpation. Mild plantar flexor contractures noted to digits 2 through 5 bilaterally.  Angle and base of gait are normal.   Feet:      Right foot:      Skin integrity: Callus and dry skin present.      Left foot:      Skin integrity: Callus and dry skin present.   Skin:     General: Skin is warm and dry.      Capillary Refill: Capillary refill takes 2 to 3 seconds.      Coloration: Skin is pale.      Comments: Skin bilateral lower extremities noted to be thin, dry, and atrophic.  Toenails thickened, discolored, with subungual fungal debris and tenderness noted.  Hyperkeratotic lesions noted to both feet plantarly with tenderness.   Neurological:      Mental Status: He is alert and oriented to person, place, and time.      Sensory: Sensory deficit present.      Motor: Atrophy present.      Deep Tendon Reflexes: Reflexes abnormal.      Reflex Scores:       Patellar reflexes are 1+ on the right side and 1+ on the left side.       Achilles reflexes are 1+ on the right side and 1+ on the left side.     Comments: Sharp, dull, light touch, and vibratory sensation are diminished bilaterally. Proprioceptive sensation is intact to both lower extremities. [place] monofilament exam shows loss of protective sensation to plantar toes 1 through 5 bilaterally. Deep tendon reflexes to the patellar tendons is 1 over 4 bilaterally symmetrical. Deep tendon reflexes to the Achilles tendon is 0 over 4 bilaterally symmetrical. No ankle clonus or Babinski reflex noted bilaterally. Coordination is fair to both lower extremities.  Patient admits to intermittent burning and tingling in the feet.               Assessment:       Encounter Diagnoses   Name Primary?    Onychomycosis due to dermatophyte Yes    Uncontrolled type 2 diabetes  mellitus with complication, with long-term current use of insulin          Plan:       Rob was seen today for diabetes mellitus.    Diagnoses and all orders for this visit:    Onychomycosis due to dermatophyte    Uncontrolled type 2 diabetes mellitus with complication, with long-term current use of insulin      I counseled the patient on his conditions, their implications and medical management.    Shoe inspection. Diabetic Foot Education. Patient reminded of the importance of good nutrition and blood sugar control to help prevent podiatric complications of diabetes. Patient instructed on proper foot hygeine. We discussed wearing proper shoe gear, daily foot inspections and Diabetic foot education in detail.    Routine Foot Care    Date/Time: 11/17/2020 3:45 PM  Performed by: Cheli Forrester DPM  Authorized by: Cheli Forrester DPM     Consent Done?:  Yes (Verbal)    Nail Care Type:  Debride  Location(s): All  (Left 1st Toe, Left 3rd Toe, Left 2nd Toe, Left 4th Toe, Left 5th Toe, Right 1st Toe, Right 2nd Toe, Right 3rd Toe, Right 4th Toe and Right 5th Toe)  Patient tolerance:  Patient tolerated the procedure well with no immediate complications     With patient's permission, the toenails mentioned above were aggressively reduced and debrided using a nail nipper, removing all offending nail and debris. The patient will continue to monitor the areas daily, inspect the feet, wear protective shoe gear when ambulatory, and moisturizer to maintain skin integrity.           Return to clinic in 3-6 months or sooner if problems arise     .

## 2020-12-27 ENCOUNTER — PATIENT OUTREACH (OUTPATIENT)
Dept: ADMINISTRATIVE | Facility: OTHER | Age: 65
End: 2020-12-27

## 2020-12-27 DIAGNOSIS — Z12.11 ENCOUNTER FOR FIT (FECAL IMMUNOCHEMICAL TEST) SCREENING: Primary | ICD-10-CM

## 2020-12-27 NOTE — PROGRESS NOTES
Care Everywhere: updated  Immunization: updated  Health Maintenance: updated  Media Review: review for outside colon cancer report   Legacy Review:   Order placed: fit kit   Upcoming appts:

## 2020-12-28 ENCOUNTER — PATIENT MESSAGE (OUTPATIENT)
Dept: ENDOCRINOLOGY | Facility: CLINIC | Age: 65
End: 2020-12-28

## 2021-01-05 ENCOUNTER — PATIENT MESSAGE (OUTPATIENT)
Dept: ADMINISTRATIVE | Facility: HOSPITAL | Age: 66
End: 2021-01-05

## 2021-01-26 ENCOUNTER — HOSPITAL ENCOUNTER (OUTPATIENT)
Dept: RADIOLOGY | Facility: HOSPITAL | Age: 66
Discharge: HOME | End: 2021-01-26
Attending: PHYSICIAN ASSISTANT
Payer: MEDICARE

## 2021-01-26 ENCOUNTER — OFFICE VISIT (OUTPATIENT)
Dept: ORTHOPEDICS | Facility: CLINIC | Age: 66
End: 2021-01-26
Payer: MEDICARE

## 2021-01-26 VITALS — BODY MASS INDEX: 26.61 KG/M2 | WEIGHT: 185.88 LBS | HEIGHT: 70 IN

## 2021-01-26 DIAGNOSIS — M51.36 DDD (DEGENERATIVE DISC DISEASE), LUMBAR: ICD-10-CM

## 2021-01-26 DIAGNOSIS — Z98.1 HISTORY OF LUMBAR FUSION: Primary | ICD-10-CM

## 2021-01-26 PROCEDURE — 72120 X-RAY BEND ONLY L-S SPINE: CPT | Mod: 26,,, | Performed by: RADIOLOGY

## 2021-01-26 PROCEDURE — 3072F PR LOW RISK FOR RETINOPATHY: ICD-10-PCS | Mod: S$GLB,,, | Performed by: PHYSICIAN ASSISTANT

## 2021-01-26 PROCEDURE — 3008F PR BODY MASS INDEX (BMI) DOCUMENTED: ICD-10-PCS | Mod: CPTII,S$GLB,, | Performed by: PHYSICIAN ASSISTANT

## 2021-01-26 PROCEDURE — 72120 X-RAY BEND ONLY L-S SPINE: CPT | Mod: TC

## 2021-01-26 PROCEDURE — 72120 XR LUMBAR SPINE AP AND LAT WITH FLEX/EXT: ICD-10-PCS | Mod: 26,,, | Performed by: RADIOLOGY

## 2021-01-26 PROCEDURE — 72100 X-RAY EXAM L-S SPINE 2/3 VWS: CPT | Mod: 26,,, | Performed by: RADIOLOGY

## 2021-01-26 PROCEDURE — 99999 PR PBB SHADOW E&M-EST. PATIENT-LVL III: CPT | Mod: PBBFAC,,, | Performed by: PHYSICIAN ASSISTANT

## 2021-01-26 PROCEDURE — 99999 PR PBB SHADOW E&M-EST. PATIENT-LVL III: ICD-10-PCS | Mod: PBBFAC,,, | Performed by: PHYSICIAN ASSISTANT

## 2021-01-26 PROCEDURE — 1125F PR PAIN SEVERITY QUANTIFIED, PAIN PRESENT: ICD-10-PCS | Mod: S$GLB,,, | Performed by: PHYSICIAN ASSISTANT

## 2021-01-26 PROCEDURE — 72100 XR LUMBAR SPINE AP AND LAT WITH FLEX/EXT: ICD-10-PCS | Mod: 26,,, | Performed by: RADIOLOGY

## 2021-01-26 PROCEDURE — 99204 PR OFFICE/OUTPT VISIT, NEW, LEVL IV, 45-59 MIN: ICD-10-PCS | Mod: S$GLB,,, | Performed by: PHYSICIAN ASSISTANT

## 2021-01-26 PROCEDURE — 1125F AMNT PAIN NOTED PAIN PRSNT: CPT | Mod: S$GLB,,, | Performed by: PHYSICIAN ASSISTANT

## 2021-01-26 PROCEDURE — 99204 OFFICE O/P NEW MOD 45 MIN: CPT | Mod: S$GLB,,, | Performed by: PHYSICIAN ASSISTANT

## 2021-01-26 PROCEDURE — 3072F LOW RISK FOR RETINOPATHY: CPT | Mod: S$GLB,,, | Performed by: PHYSICIAN ASSISTANT

## 2021-01-26 PROCEDURE — 3008F BODY MASS INDEX DOCD: CPT | Mod: CPTII,S$GLB,, | Performed by: PHYSICIAN ASSISTANT

## 2021-02-03 ENCOUNTER — PATIENT OUTREACH (OUTPATIENT)
Dept: ADMINISTRATIVE | Facility: HOSPITAL | Age: 66
End: 2021-02-03

## 2021-12-15 DIAGNOSIS — M47.816 FACET ARTHRITIS, DEGENERATIVE, LUMBAR SPINE: ICD-10-CM

## 2021-12-15 DIAGNOSIS — G89.4 CHRONIC PAIN SYNDROME: ICD-10-CM

## 2021-12-15 DIAGNOSIS — Z98.1 S/P LUMBAR FUSION: ICD-10-CM

## 2021-12-15 DIAGNOSIS — M54.17 LUMBOSACRAL RADICULOPATHY: ICD-10-CM

## 2021-12-15 DIAGNOSIS — M47.816 LUMBAR SPONDYLOSIS: ICD-10-CM

## 2021-12-15 DIAGNOSIS — M51.36 DDD (DEGENERATIVE DISC DISEASE), LUMBAR: ICD-10-CM

## 2021-12-15 RX ORDER — GABAPENTIN 300 MG/1
CAPSULE ORAL
Qty: 270 CAPSULE | Refills: 1 | OUTPATIENT
Start: 2021-12-15

## 2021-12-22 ENCOUNTER — OFFICE VISIT (OUTPATIENT)
Dept: OPTOMETRY | Facility: CLINIC | Age: 66
End: 2021-12-22
Payer: MEDICARE

## 2021-12-22 DIAGNOSIS — E11.9 TYPE 2 DIABETES MELLITUS WITHOUT OPHTHALMIC MANIFESTATIONS: Primary | ICD-10-CM

## 2021-12-22 DIAGNOSIS — H52.4 PRESBYOPIA: ICD-10-CM

## 2021-12-22 DIAGNOSIS — H25.13 NUCLEAR SCLEROSIS, BILATERAL: ICD-10-CM

## 2021-12-22 PROCEDURE — 92015 DETERMINE REFRACTIVE STATE: CPT | Mod: S$GLB,,, | Performed by: OPTOMETRIST

## 2021-12-22 PROCEDURE — 1101F PR PT FALLS ASSESS DOC 0-1 FALLS W/OUT INJ PAST YR: ICD-10-PCS | Mod: CPTII,S$GLB,, | Performed by: OPTOMETRIST

## 2021-12-22 PROCEDURE — 1159F PR MEDICATION LIST DOCUMENTED IN MEDICAL RECORD: ICD-10-PCS | Mod: CPTII,S$GLB,, | Performed by: OPTOMETRIST

## 2021-12-22 PROCEDURE — 3288F FALL RISK ASSESSMENT DOCD: CPT | Mod: CPTII,S$GLB,, | Performed by: OPTOMETRIST

## 2021-12-22 PROCEDURE — 2023F PR DILATED RETINAL EXAM W/O EVID OF RETINOPATHY: ICD-10-PCS | Mod: CPTII,S$GLB,, | Performed by: OPTOMETRIST

## 2021-12-22 PROCEDURE — 4010F PR ACE/ARB THEARPY RXD/TAKEN: ICD-10-PCS | Mod: CPTII,S$GLB,, | Performed by: OPTOMETRIST

## 2021-12-22 PROCEDURE — 99999 PR PBB SHADOW E&M-EST. PATIENT-LVL III: ICD-10-PCS | Mod: PBBFAC,,, | Performed by: OPTOMETRIST

## 2021-12-22 PROCEDURE — 3288F PR FALLS RISK ASSESSMENT DOCUMENTED: ICD-10-PCS | Mod: CPTII,S$GLB,, | Performed by: OPTOMETRIST

## 2021-12-22 PROCEDURE — 92014 COMPRE OPH EXAM EST PT 1/>: CPT | Mod: S$GLB,,, | Performed by: OPTOMETRIST

## 2021-12-22 PROCEDURE — 1101F PT FALLS ASSESS-DOCD LE1/YR: CPT | Mod: CPTII,S$GLB,, | Performed by: OPTOMETRIST

## 2021-12-22 PROCEDURE — 92015 PR REFRACTION: ICD-10-PCS | Mod: S$GLB,,, | Performed by: OPTOMETRIST

## 2021-12-22 PROCEDURE — 92014 PR EYE EXAM, EST PATIENT,COMPREHESV: ICD-10-PCS | Mod: S$GLB,,, | Performed by: OPTOMETRIST

## 2021-12-22 PROCEDURE — 4010F ACE/ARB THERAPY RXD/TAKEN: CPT | Mod: CPTII,S$GLB,, | Performed by: OPTOMETRIST

## 2021-12-22 PROCEDURE — 99999 PR PBB SHADOW E&M-EST. PATIENT-LVL III: CPT | Mod: PBBFAC,,, | Performed by: OPTOMETRIST

## 2021-12-22 PROCEDURE — 2023F DILAT RTA XM W/O RTNOPTHY: CPT | Mod: CPTII,S$GLB,, | Performed by: OPTOMETRIST

## 2021-12-22 PROCEDURE — 1159F MED LIST DOCD IN RCRD: CPT | Mod: CPTII,S$GLB,, | Performed by: OPTOMETRIST

## 2021-12-22 RX ORDER — TAMSULOSIN HYDROCHLORIDE 0.4 MG/1
1 CAPSULE ORAL DAILY
COMMUNITY
Start: 2021-11-03

## 2021-12-22 RX ORDER — TERBINAFINE HYDROCHLORIDE 250 MG/1
250 TABLET ORAL DAILY
COMMUNITY
Start: 2021-12-05

## 2021-12-22 RX ORDER — HUMAN INSULIN 100 [IU]/ML
INJECTION, SUSPENSION SUBCUTANEOUS
COMMUNITY
Start: 2021-03-04

## 2021-12-22 RX ORDER — IRBESARTAN AND HYDROCHLOROTHIAZIDE 300; 12.5 MG/1; MG/1
1 TABLET, FILM COATED ORAL DAILY
COMMUNITY
Start: 2021-10-08

## 2022-02-24 NOTE — TELEPHONE ENCOUNTER
Refill Routing Note   Medication(s) are not appropriate for processing by [facility] for the following reason(s):      - Outside of protocol  - Unclear if patient follows with you     [facility] action(s):  Route          --->Care Gap information included in message below if applicable.   Medication reconciliation completed: No   Automatic Epic Generated Protocol Data:        Requested Prescriptions   Pending Prescriptions Disp Refills    meloxicam (MOBIC) 15 MG tablet [Pharmacy Med Name: MELOXICAM 15 MG TABLET] 30 tablet 14     Sig: TAKE 1 TABLET BY MOUTH EVERY DAY       NSAIDs Protocol Failed - 2/24/2022  2:45 PM        Failed - Serum Creatinine less than 1.4 on file in the past 12 months     Lab Results   Component Value Date    CREATININE 1.1 08/25/2020    CREATININE 1.1 08/25/2020    CREATININE 1.2 03/12/2020     Lab Results   Component Value Date    EGFRNONAA >60 08/25/2020    EGFRNONAA >60 08/25/2020    EGFRNONAA >60 03/12/2020               Failed - Visit with authorizing provider in past 12 months or upcoming 90 days        Failed - HGB greater than 10 or HCT greater than 30 in past 12 months        Failed - AST in past 12 months      Lab Results   Component Value Date    AST 18 08/25/2020    AST 15 03/11/2020    AST 13 03/10/2020              Failed - Serum Potassium less than 5.2 on file in the past 12 months     Lab Results   Component Value Date    K 4.8 08/25/2020    K 4.8 08/25/2020    K 5.4 (H) 03/12/2020                  Failed - Blood Pressure below 139/89 on file in past 12 months      BP Readings from Last 3 Encounters:   11/17/20 132/71   09/02/20 112/64   07/23/20 125/71             Failed - ALT less than 95 in past 12 months     Lab Results   Component Value Date    ALT 14 08/25/2020    ALT 16 03/11/2020    ALT 16 03/10/2020                    Appointments  past 12m or future 3m with PCP    Date Provider   Last Visit   9/2/2020 Vasyl Jimenez MD   Next Visit   Visit date not found  Vasyl Jimenez MD   ED visits in past 90 days: 0        Note composed:4:58 PM 02/24/2022

## 2022-02-25 RX ORDER — MELOXICAM 15 MG/1
TABLET ORAL
Qty: 30 TABLET | Refills: 14 | Status: SHIPPED | OUTPATIENT
Start: 2022-02-25

## 2022-04-19 NOTE — TELEPHONE ENCOUNTER
Refill Routing Note   Medication(s) are not appropriate for processing by [facility] for the following reason(s):      - Patient has not been seen in over 15 months by PCP  - Required laboratory values are outdated  - Unclear if patient follows with you     [facility] action(s):  Defer Medication-related problems identified:   Requires labs  Requires appointment        Medication reconciliation completed: No     Appointments  past 12m or future 3m with PCP    Date Provider   Last Visit   9/2/2020 Vasyl Jimenez MD   Next Visit   Visit date not found Vasyl Jimenez MD   ED visits in past 90 days: 0        Note composed:6:50 PM 04/19/2022

## 2022-04-20 RX ORDER — LEVOTHYROXINE SODIUM 137 UG/1
137 TABLET ORAL
Qty: 90 TABLET | Refills: 2 | OUTPATIENT
Start: 2022-04-20 | End: 2023-04-20

## 2024-04-17 NOTE — PROGRESS NOTES
Refill Routing Note   Medication(s) are not appropriate for processing by [facility]:       - Non-participating provider           Medication reconciliation completed: No      Automatic Epic Generated Protocol Data:        Requested Prescriptions   Pending Prescriptions Disp Refills    LANTUS SOLOSTAR U-100 INSULIN glargine 100 units/mL (3mL) SubQ pen [Pharmacy Med Name: LANTUS SOLOSTAR 100 UNIT/ML] 15 Syringe 0     Sig: INJECT 15 UNITS INTO THE SKIN EVERY EVENING.       Insulin Protocol Passed - 7/25/2020  9:42 AM        Passed - Serum Creatinine less than 1.4 on file in the past 12 months     Lab Results   Component Value Date    CREATININE 1.2 03/12/2020    CREATININE 1.2 03/12/2020    CREATININE 1.2 03/12/2020     Lab Results   Component Value Date    ESTGFRAFRICA >60 03/12/2020    ESTGFRAFRICA >60 03/12/2020    ESTGFRAFRICA >60 03/12/2020               Passed - Visit with authorizing provider in past 12 months or upcoming 90 days         Passed - HgA1c in past 12 months     A1c   Date Value Ref Range Status   05/18/2016 9.2 (H) <5.7 % of total Hgb Final     Comment:     According to ADA guidelines, hemoglobin A1c <7.0%  represents optimal control in non-pregnant diabetic  patients. Different metrics may apply to specific  patient populations. Standards of Medical Care in  Diabetes-2013. Diabetes Care. 2013;36:s11-s66     For the purpose of screening for the presence of  diabetes  <5.7%       Consistent with the absence of diabetes  5.7-6.4%    Consistent with increased risk for diabetes              (prediabetes)  >or=6.5%    Consistent with diabetes     This assay result is consistent with diabetes  mellitus.     Currently, no consensus exists for use of hemoglobin  A1c for diagnosis of diabetes for children.     09/14/2015 7.0 (H) <5.7 % of total Hgb Final     Comment:     According to ADA guidelines, hemoglobin A1c <7.0%  represents optimal control in non-pregnant diabetic  patients. Different  metrics may apply to specific  patient populations. Standards of Medical Care in  Diabetes-2013. Diabetes Care. 2013;36:[id number]     For the purpose of screening for the presence of  diabetes  <5.7%       Consistent with the absence of diabetes  5.7-6.4%    Consistent with increased risk for diabetes              (prediabetes)  >or=6.5%    Consistent with diabetes     This assay result is consistent with diabetes  mellitus.     Currently, no consensus exists for use of hemoglobin  A1c for diagnosis of diabetes for children.       Hemoglobin A1C   Date Value Ref Range Status   03/12/2020 >14.0 (H) 4.0 - 5.6 % Final     Comment:     ADA Screening Guidelines:  5.7-6.4%  Consistent with prediabetes  >or=6.5%  Consistent with diabetes  High levels of fetal hemoglobin interfere with the HbA1C  assay. Heterozygous hemoglobin variants (HbS, HgC, etc)do  not significantly interfere with this assay.   However, presence of multiple variants may affect accuracy.     03/10/2020 >14.0 (H) 4.0 - 5.6 % Final     Comment:     ADA Screening Guidelines:  5.7-6.4%  Consistent with prediabetes  >or=6.5%  Consistent with diabetes  High levels of fetal hemoglobin interfere with the HbA1C  assay. Heterozygous hemoglobin variants (HbS, HgC, etc)do  not significantly interfere with this assay.   However, presence of multiple variants may affect accuracy.     09/09/2019 6.2 (H) 4.0 - 5.6 % Final     Comment:     ADA Screening Guidelines:  5.7-6.4%  Consistent with prediabetes  >or=6.5%  Consistent with diabetes  High levels of fetal hemoglobin interfere with the HbA1C  assay. Heterozygous hemoglobin variants (HbS, HgC, etc)do  not significantly interfere with this assay.   However, presence of multiple variants may affect accuracy.                     Appointments  past 12m or future 3m with PCP    Date Provider   Last Visit   3/10/2020 Vasyl Jimenez MD   Next Visit   9/2/2020 Vasyl Jimenez MD   ED visits in past 90 days: 0      Note composed:10:59 AM 07/27/2020           BMP/CBC/CMP/PT/PTT/INR/UCG/EKG

## 2024-09-26 NOTE — PROGRESS NOTES
"Subjective:       Patient ID: Rbo Jones Jr. is a 65 y.o. male.    Chief Complaint: Back Pain (continues and does not want surgery), Knee Pain (left ), and Numbness (right foot)    Here for routine f/u    64 yo M c/ PMHx of CAD, DM, HTN, hypothyroidism, tobacco abuse, HCV s/p Tx 2017, BPH, left sided Pheo s/p resection 2006, KRISTAN, MDD, chronic low back pain presents for pre op exam    Biggest concern is his chronic neck and low back pain. F/u back up with back and spine.    Had UTI several months prior and concerned could have another. Reports intermittent dysuria. He denies urinary frequency, urinary urgency, decreased force of stream, incomplete emptying of bladder, post void dribble, nocturia, or gross hematuria.     DM  A1c down to 8.3 from down from 14. No diabetic neuropathy symptoms. No lows.                 Review of Systems   Constitutional: Negative for appetite change, chills, fever and unexpected weight change.   HENT: Negative for hearing loss, sore throat and trouble swallowing.    Eyes: Negative for visual disturbance.   Respiratory: Negative for cough, chest tightness and shortness of breath.    Cardiovascular: Negative for chest pain and leg swelling.   Gastrointestinal: Negative for abdominal pain, blood in stool, constipation, diarrhea, nausea and vomiting.   Endocrine: Negative for polydipsia and polyuria.   Genitourinary: Negative for decreased urine volume, difficulty urinating, dysuria, frequency and urgency.   Musculoskeletal: Negative for gait problem.   Skin: Negative for rash.   Neurological: Negative for dizziness and numbness.   Psychiatric/Behavioral: The patient is not nervous/anxious.        Objective:      Vitals:    09/02/20 1152   BP: 112/64   BP Location: Right arm   Patient Position: Sitting   BP Method: Large (Manual)   Pulse: 95   SpO2: 96%   Weight: 84.3 kg (185 lb 13.6 oz)   Height: 5' 10" (1.778 m)      Physical Exam  Constitutional:       General: He is not in acute " distress.     Appearance: He is well-developed.   HENT:      Head: Normocephalic and atraumatic.      Mouth/Throat:      Pharynx: No oropharyngeal exudate.   Eyes:      General: No scleral icterus.     Conjunctiva/sclera: Conjunctivae normal.      Pupils: Pupils are equal, round, and reactive to light.   Neck:      Thyroid: No thyromegaly.   Cardiovascular:      Rate and Rhythm: Normal rate and regular rhythm.      Heart sounds: Normal heart sounds. No murmur.   Pulmonary:      Effort: Pulmonary effort is normal.      Breath sounds: Normal breath sounds. No wheezing or rales.   Abdominal:      General: There is no distension.      Palpations: Abdomen is soft.      Tenderness: There is no abdominal tenderness.   Musculoskeletal:         General: No tenderness.   Lymphadenopathy:      Cervical: No cervical adenopathy.   Skin:     General: Skin is warm and dry.   Neurological:      Mental Status: He is alert and oriented to person, place, and time.   Psychiatric:         Behavior: Behavior normal.         Assessment:       1. Dysuria    2. Unspecified inflammatory spondylopathy, multiple sites in spine    3. Chronic pain syndrome    4. Diabetes mellitus without complication    5. Coronary artery disease involving native coronary artery of native heart without angina pectoris        Plan:       Rob was seen today for back pain, knee pain and numbness.    Diagnoses and all orders for this visit:    Dysuria  -     Urinalysis, Reflex to Urine Culture Urine, Clean Catch; Future    Unspecified inflammatory spondylopathy, multiple sites in spine       Chronic pain syndrome   Short course of pain meds and f/u with back and spine   -     traMADoL (ULTRAM) 50 mg tablet; Take 1 tablet (50 mg total) by mouth every 6 (six) hours.    Diabetes mellitus without complication  -     Hemoglobin A1C; Future  -     Microalbumin/creatinine urine ratio; Future    DM  Update A1c in 2-3 months.   -     blood-glucose meter kit; Use as  instructed  -     blood sugar diagnostic Strp; Pt to check BG up to QID. Dispense strips compatible with pt brand meter  -     lancets (ONETOUCH ULTRASOFT LANCETS) Misc; Pt to check BG up to QID. Dispense lancets compatible with pt brand meter           Vasyl Dubois MD  Internal Medicine-[facility]        Side effects of medication(s) were discussed in detail and patient voiced understanding.  Patient will call back for any issues or complications.      no

## 2024-11-26 ENCOUNTER — HOSPITAL ENCOUNTER (OUTPATIENT)
Dept: RADIOLOGY | Facility: OTHER | Age: 69
Discharge: HOME | End: 2024-11-26
Attending: STUDENT IN AN ORGANIZED HEALTH CARE EDUCATION/TRAINING PROGRAM
Payer: MEDICARE

## 2024-11-26 DIAGNOSIS — M54.59 OTHER LOW BACK PAIN: ICD-10-CM

## 2024-11-26 PROCEDURE — 72148 MRI LUMBAR SPINE W/O DYE: CPT | Mod: TC

## 2024-11-26 PROCEDURE — 72148 MRI LUMBAR SPINE W/O DYE: CPT | Mod: 26,,, | Performed by: RADIOLOGY

## 2024-11-26 PROCEDURE — 72110 X-RAY EXAM L-2 SPINE 4/>VWS: CPT | Mod: TC,FY

## 2024-11-26 PROCEDURE — 72110 X-RAY EXAM L-2 SPINE 4/>VWS: CPT | Mod: 26,,, | Performed by: RADIOLOGY

## 2025-03-26 RX ORDER — NIFEDIPINE 60 MG/1
1 TABLET, EXTENDED RELEASE ORAL DAILY
COMMUNITY

## 2025-03-26 RX ORDER — SILDENAFIL 25 MG/1
1 TABLET, FILM COATED ORAL DAILY
COMMUNITY

## 2025-03-26 RX ORDER — HYDROCODONE BITARTRATE AND ACETAMINOPHEN 10; 325 MG/1; MG/1
1 TABLET ORAL EVERY 12 HOURS PRN
COMMUNITY

## 2025-03-26 RX ORDER — SITAGLIPTIN 25 MG/1
25 TABLET, FILM COATED ORAL DAILY
COMMUNITY

## 2025-03-26 NOTE — PRE-PROCEDURE INSTRUCTIONS
Pre admit phone call completed.    Instructions given to patient about NPO status as follows:     The evening before surgery do not eat anything after 9 p.m. ( this includes hard candy, chewing gum and mints).  You may only have GATORADE, POWERADE AND WATER from 9 p.m. until you leave your home. DO NOT  DRINK ANY LIQUIDS ON THE WAY TO THE HOSPITAL.      Patient was also instructed on the below information:    Park in the Parking lot behind the hospital or in the [employer] [facility] across the street from the parking lot.  Parking is complimentary.  If you will be discharged the same day as your procedure, please arrange for a responsible adult to drive you home or  to accompany you if traveling by taxi.  YOU WILL NOT BE PERMITTED TO DRIVE OR TO LEAVE THE HOSPITAL ALONE AFTER SURGERY.  It is strongly recommended that you arrange for someone to remain with you for the first 24 hrs following your surgery.    Patient verbalized understanding of above instructions.

## 2025-03-27 ENCOUNTER — ANESTHESIA EVENT (OUTPATIENT)
Dept: SURGERY | Facility: OTHER | Age: 70
End: 2025-03-27
Payer: MEDICARE

## 2025-03-28 ENCOUNTER — HOSPITAL ENCOUNTER (OUTPATIENT)
Facility: OTHER | Age: 70
Discharge: HOME | End: 2025-03-28
Attending: ORTHOPAEDIC SURGERY | Admitting: ORTHOPAEDIC SURGERY
Payer: MEDICARE

## 2025-03-28 ENCOUNTER — ANESTHESIA (OUTPATIENT)
Dept: SURGERY | Facility: OTHER | Age: 70
End: 2025-03-28
Payer: MEDICARE

## 2025-03-28 DIAGNOSIS — G56.02 CARPAL TUNNEL SYNDROME ON LEFT: Primary | ICD-10-CM

## 2025-03-28 DIAGNOSIS — G56.02 CARPAL TUNNEL SYNDROME, LEFT: ICD-10-CM

## 2025-03-28 LAB — POCT GLUCOSE: 240 MG/DL (ref 70–110)

## 2025-03-28 PROCEDURE — 27201423 OPTIME MED/SURG SUP & DEVICES STERILE SUPPLY: Performed by: ORTHOPAEDIC SURGERY

## 2025-03-28 PROCEDURE — 25000003 PHARM REV CODE 250: Performed by: NURSE ANESTHETIST, CERTIFIED REGISTERED

## 2025-03-28 PROCEDURE — 63600175 PHARM REV CODE 636 W HCPCS: Performed by: NURSE ANESTHETIST, CERTIFIED REGISTERED

## 2025-03-28 PROCEDURE — 36000706: Performed by: ORTHOPAEDIC SURGERY

## 2025-03-28 PROCEDURE — 25000003 PHARM REV CODE 250: Performed by: ORTHOPAEDIC SURGERY

## 2025-03-28 PROCEDURE — 37000009 HC ANESTHESIA EA ADD 15 MINS: Performed by: ORTHOPAEDIC SURGERY

## 2025-03-28 PROCEDURE — 82962 GLUCOSE BLOOD TEST: CPT | Performed by: ORTHOPAEDIC SURGERY

## 2025-03-28 PROCEDURE — 71000015 HC POSTOP RECOV 1ST HR: Performed by: ORTHOPAEDIC SURGERY

## 2025-03-28 PROCEDURE — 37000008 HC ANESTHESIA 1ST 15 MINUTES: Performed by: ORTHOPAEDIC SURGERY

## 2025-03-28 PROCEDURE — 36000707: Performed by: ORTHOPAEDIC SURGERY

## 2025-03-28 RX ORDER — PROPOFOL 10 MG/ML
VIAL (ML) INTRAVENOUS CONTINUOUS PRN
Status: DISCONTINUED | OUTPATIENT
Start: 2025-03-28 | End: 2025-03-28

## 2025-03-28 RX ORDER — LIDOCAINE HYDROCHLORIDE 20 MG/ML
INJECTION INTRAVENOUS
Status: DISCONTINUED | OUTPATIENT
Start: 2025-03-28 | End: 2025-03-28

## 2025-03-28 RX ORDER — GLUCAGON 1 MG
1 KIT INJECTION
Status: DISCONTINUED | OUTPATIENT
Start: 2025-03-28 | End: 2025-03-28 | Stop reason: HOSPADM

## 2025-03-28 RX ORDER — LIDOCAINE HYDROCHLORIDE 10 MG/ML
0.5 INJECTION, SOLUTION EPIDURAL; INFILTRATION; INTRACAUDAL; PERINEURAL ONCE
Status: DISCONTINUED | OUTPATIENT
Start: 2025-03-28 | End: 2025-03-28 | Stop reason: HOSPADM

## 2025-03-28 RX ORDER — BUPIVACAINE HYDROCHLORIDE AND EPINEPHRINE 2.5; 5 MG/ML; UG/ML
INJECTION, SOLUTION EPIDURAL; INFILTRATION; INTRACAUDAL; PERINEURAL
Status: DISCONTINUED | OUTPATIENT
Start: 2025-03-28 | End: 2025-03-28 | Stop reason: HOSPADM

## 2025-03-28 RX ORDER — HYDROMORPHONE HYDROCHLORIDE 2 MG/ML
0.4 INJECTION, SOLUTION INTRAMUSCULAR; INTRAVENOUS; SUBCUTANEOUS EVERY 5 MIN PRN
Status: DISCONTINUED | OUTPATIENT
Start: 2025-03-28 | End: 2025-03-28 | Stop reason: HOSPADM

## 2025-03-28 RX ORDER — PROCHLORPERAZINE EDISYLATE 5 MG/ML
5 INJECTION INTRAMUSCULAR; INTRAVENOUS EVERY 30 MIN PRN
Status: DISCONTINUED | OUTPATIENT
Start: 2025-03-28 | End: 2025-03-28 | Stop reason: HOSPADM

## 2025-03-28 RX ORDER — FENTANYL CITRATE 50 UG/ML
INJECTION, SOLUTION INTRAMUSCULAR; INTRAVENOUS
Status: DISCONTINUED | OUTPATIENT
Start: 2025-03-28 | End: 2025-03-28

## 2025-03-28 RX ORDER — ONDANSETRON HYDROCHLORIDE 2 MG/ML
4 INJECTION, SOLUTION INTRAVENOUS DAILY PRN
Status: DISCONTINUED | OUTPATIENT
Start: 2025-03-28 | End: 2025-03-28 | Stop reason: HOSPADM

## 2025-03-28 RX ORDER — OXYCODONE HYDROCHLORIDE 5 MG/1
5 TABLET ORAL
Status: DISCONTINUED | OUTPATIENT
Start: 2025-03-28 | End: 2025-03-28 | Stop reason: HOSPADM

## 2025-03-28 RX ORDER — SODIUM CHLORIDE 0.9 % (FLUSH) 0.9 %
2 SYRINGE (ML) INJECTION ONCE
Status: DISCONTINUED | OUTPATIENT
Start: 2025-03-28 | End: 2025-03-28 | Stop reason: HOSPADM

## 2025-03-28 RX ORDER — PROPOFOL 10 MG/ML
VIAL (ML) INTRAVENOUS
Status: DISCONTINUED | OUTPATIENT
Start: 2025-03-28 | End: 2025-03-28

## 2025-03-28 RX ORDER — SODIUM CHLORIDE, SODIUM LACTATE, POTASSIUM CHLORIDE, CALCIUM CHLORIDE 600; 310; 30; 20 MG/100ML; MG/100ML; MG/100ML; MG/100ML
INJECTION, SOLUTION INTRAVENOUS CONTINUOUS
Status: DISCONTINUED | OUTPATIENT
Start: 2025-03-28 | End: 2025-03-28 | Stop reason: HOSPADM

## 2025-03-28 RX ADMIN — FENTANYL CITRATE 100 MCG: 50 INJECTION, SOLUTION INTRAMUSCULAR; INTRAVENOUS at 08:03

## 2025-03-28 RX ADMIN — LIDOCAINE HYDROCHLORIDE 100 MG: 20 INJECTION, SOLUTION INTRAVENOUS at 08:03

## 2025-03-28 RX ADMIN — PROPOFOL 30 MG: 10 INJECTION, EMULSION INTRAVENOUS at 09:03

## 2025-03-28 RX ADMIN — SODIUM CHLORIDE: 0.9 INJECTION, SOLUTION INTRAVENOUS at 08:03

## 2025-03-28 RX ADMIN — PROPOFOL 40 MG: 10 INJECTION, EMULSION INTRAVENOUS at 08:03

## 2025-03-28 RX ADMIN — DEXTROSE 2 G: 50 INJECTION, SOLUTION INTRAVENOUS at 08:03

## 2025-03-28 RX ADMIN — PROPOFOL 80 MCG/KG/MIN: 10 INJECTION, EMULSION INTRAVENOUS at 08:03

## 2025-03-28 NOTE — ANESTHESIA POSTPROCEDURE EVALUATION
Anesthesia Post Evaluation    Patient: Rob Jones Jr.    Procedure(s) Performed: Procedure(s) (LRB):  RELEASE, CARPAL TUNNEL (Left)    Final Anesthesia Type: MAC      Patient location during evaluation: [facility]  Patient participation: Yes- Able to Participate  Level of consciousness: awake and alert  Post-procedure vital signs: reviewed and stable  Pain management: adequate  Airway patency: patent    PONV status at discharge: No PONV  Anesthetic complications: no      Cardiovascular status: blood pressure returned to baseline  Respiratory status: unassisted, room air and spontaneous ventilation  Hydration status: euvolemic  Follow-up not needed.              Vitals Value Taken Time   /72 03/28/25 08:27   Temp 36.7 °C (98 °F) 03/28/25 08:27   Pulse 66 03/28/25 08:27   Resp 16 03/28/25 08:27   SpO2 97 % 03/28/25 08:27         No case tracking events are documented in the log.      Pain/Isa Score: No data recorded

## 2025-03-28 NOTE — PLAN OF CARE
Rob Jones Jr. has met all discharge criteria from Phase II. Vital Signs are stable, ambulating  without difficulty. Discharge instructions given, patient verbalized understanding. Discharged from facility via wheelchair in stable condition.

## 2025-03-28 NOTE — ANESTHESIA PREPROCEDURE EVALUATION
03/28/2025  Rob Jones Jr. is a 69 y.o., male.      Pre-op Assessment    I have reviewed the Patient Summary Reports.     I have reviewed the Nursing Notes. I have reviewed the NPO Status.   I have reviewed the Medications.     Review of Systems  Anesthesia Hx:  No problems with previous Anesthesia                Cardiovascular:     Hypertension   CAD                                          Hepatic/GI:       Hepatitis, C              Endocrine:  Diabetes Hypothyroidism            Physical Exam  General: Well nourished, Cooperative and Alert    Airway:  Mallampati: II   Mouth Opening: Normal  TM Distance: Normal  Neck ROM: Normal ROM    Dental:  Edentulous    Anesthesia Plan  Type of Anesthesia, risks & benefits discussed:    Anesthesia Type: MAC  Intra-op Monitoring Plan: Standard ASA Monitors  Induction:  IV  Informed Consent: Informed consent signed with the Patient and all parties understand the risks and agree with anesthesia plan.  All questions answered.   ASA Score: 3    Ready For Surgery From Anesthesia Perspective.   .

## 2025-03-28 NOTE — BRIEF OP NOTE
Surgery Date: 03/28/2025  Patient Name: Rob Jones Jr.  CSN: [id number]  Surgeon(s) and Role:    Claude S. Williams IV, MD - Primary    Pre-op Diagnosis:  Carpal tunnel syndrome, left [G56.02]    Post-op Diagnosis:  Carpal tunnel syndrome, left [G56.02]    Procedure(s) (LRB):  RELEASE, CARPAL TUNNEL (Left)    INDICATIONS: This patient has had pain and numbness  which has been unrelieved   with conservative measures.  Electrical studies have demonstrated delay of the conduction velocity of the median nerve at the wrist level. After the potential benefits as well as potential risks and   complications of operative decompression of the carpal canal were reviewed with   the patient, she has elected to undergo the above procedure.     PROCEDURE IN DETAIL: After proper informed consent, the patient was transported   to the Operating Suite. The left hand was thoroughly prepped with alcohol,   Betadine and draped in the usual sterile fashion. Preoperative routine timeout   was taken, and the operative site was identified by the operative team. After   Esmarch exsanguination, a padded upper arm tourniquet was then elevated to 250   mmHg. An incision was then made in the left palm in line with the radial border of the ring finger and careful dissection was carried down through the subdermal tissue using bipolar cautery for hemostasis.  The palmar fascia was divided and the transverse carpal ligament was identified.  The ligament was divided longitudinally using a 15.  Blade scalpel completely to the distal margin.  The contents of the carpal canal were then freed from the deep surface of the transverse carpal ligament. A carpal tunnel guide was then used to protect the contents of the carpal canal. A carpal tunnel knife was then used to further divide the transverse carpal ligament longitudinally from distal to proximal to approximately 3 cm proximal to the wrist crease under direct visualization.  The median nerve was  then inspected and found to be fully decompressed proximally and distally including the motor branch.  There was slight narrowing of the nerve at the level of the carpal canal. No masses or other pathology was noted.  There was minimal synovial proliferation.  The wound was irrigated and hemostasis was confirmed.  The incision was then closed with nylon interrupted suture. A sterile soft dressing was then applied. The patient was awakened in the operative suite and taken to the recovery area in stable condition. The procedure was tolerated very well.  Lap instrument and needle counts were correct.        COMPLICATIONS: None.      Anesthesia: Local MAC    Estimated Blood Loss: minimal         Specimens     None          Discharge Note    SUMMARY     Admit Date: 3/28/2025    Discharge Date and Time:  03/28/2025 9:47 AM    Hospital Course (synopsis of major diagnoses, care, treatment, and services provided during the course of the hospital stay): uneventful     Final Diagnosis: Post-Op Diagnosis Codes:     * Carpal tunnel syndrome, left [G56.02]    Disposition: Home or Self Care    Follow Up/Patient Instructions:     Medications:  Reconciled Home Medications:      Medication List        ASK your doctor about these medications      amLODIPine 10 MG tablet  Commonly known as: NORVASC  TAKE 1 TABLET (10 MG TOTAL) BY MOUTH ONCE DAILY.     * atorvastatin 10 MG tablet  Commonly known as: LIPITOR  TAKE 1 TABLET BY MOUTH EVERY DAY     * atorvastatin 10 MG tablet  Commonly known as: LIPITOR  TAKE 1 TABLET BY MOUTH EVERY DAY     blood sugar diagnostic Strp  Pt to check BG up to QID. Dispense strips compatible with pt brand meter     blood-glucose meter kit  Use as instructed     gabapentin 300 MG capsule  Commonly known as: NEURONTIN  TAKE 1 CAPSULE BY MOUTH THREE TIMES A DAY     HYDROcodone-acetaminophen  mg per tablet  Commonly known as: NORCO  Take 1 tablet by mouth every 12 (twelve) hours as needed.     insulin aspart  "U-100 100 unit/mL (3 mL) Inpn pen  Commonly known as: NovoLOG Flexpen U-100 Insulin  Inject 5 Units into the skin 3 (three) times daily with meals.     irbesartan 300 MG tablet  Commonly known as: AVAPRO  Take 1 tablet (300 mg total) by mouth once daily.     irbesartan-hydrochlorothiazide 300-12.5 mg per tablet  Commonly known as: AVALIDE  Take 1 tablet by mouth once daily.     JANUVIA 25 mg Tab  Generic drug: SITagliptin phosphate  Take 25 mg by mouth once daily.     LANTUS SOLOSTAR U-100 INSULIN 100 unit/mL (3 mL) Inpn pen  Generic drug: insulin glargine U-100 (Lantus)  INJECT 15 UNITS INTO THE SKIN EVERY EVENING.     levothyroxine 137 MCG Tab tablet  Commonly known as: SYNTHROID  TAKE 1 TABLET (137 MCG TOTAL) BY MOUTH BEFORE BREAKFAST.     meloxicam 15 MG tablet  Commonly known as: MOBIC  TAKE 1 TABLET BY MOUTH EVERY DAY     metFORMIN 1000 MG tablet  Commonly known as: GLUCOPHAGE  TAKE 1 TABLET BY MOUTH TWICE A DAY WITH FOOD     NIFEdipine 60 MG (OSM) 24 hr tablet  Commonly known as: PROCARDIA-XL  Take 1 tablet by mouth once daily.     nitroGLYCERIN 0.4 MG SL tablet  Commonly known as: NITROSTAT  Place 0.4 mg under the tongue every 5 (five) minutes as needed for Chest pain.     NovoLIN N NPH U-100 Insulin 100 unit/mL injection  Generic drug: insulin NPH  6 units     * ONETOUCH ULTRASOFT LANCETS lancets  Generic drug: lancets  USE TO CHECK BLOOD SUGAR 4 TIMES A DAY     * lancets Misc  Commonly known as: ONETOUCH ULTRASOFT LANCETS  Pt to check BG up to QID. Dispense lancets compatible with pt brand meter     * pen needle, diabetic 32 gauge x 1/6" Ndle  Commonly known as: NOVOFINE PLUS  1 each by Misc.(Non-Drug; Combo Route) route once daily.     * BD ULTRA-FINE MINI PEN NEEDLE 31 gauge x 3/16" Ndle  Generic drug: pen needle, diabetic  USE AS DIRECTED     * BD ULTRA-FINE MINI PEN NEEDLE 31 gauge x 3/16" Ndle  Generic drug: pen needle, diabetic  USE AS DIRECTED     sildenafiL 25 MG tablet  Commonly known as: " VIAGRA  Take 1 tablet by mouth once daily.     tamsulosin 0.4 mg Cap  Commonly known as: FLOMAX  Take 1 capsule by mouth once daily.     temazepam 30 mg capsule  Commonly known as: RESTORIL  TAKE 1 CAPSULE BY MOUTH EVERY DAY AT BEDTIME AS NEEDED     terbinafine  mg tablet  Commonly known as: LAMISIL  Take 250 mg by mouth once daily.     traMADoL 50 mg tablet  Commonly known as: ULTRAM  Take 1 tablet (50 mg total) by mouth every 12 (twelve) hours as needed for Pain.     vitamin D 1000 units Tab  Commonly known as: VITAMIN D3  Take 185 mg by mouth once daily.           * This list has 7 medication(s) that are the same as other medications prescribed for you. Read the directions carefully, and ask your doctor or other care provider to review them with you.                No discharge procedures on file.

## 2025-03-30 VITALS
HEART RATE: 65 BPM | BODY MASS INDEX: 24.44 KG/M2 | WEIGHT: 165 LBS | SYSTOLIC BLOOD PRESSURE: 136 MMHG | RESPIRATION RATE: 15 BRPM | TEMPERATURE: 98 F | OXYGEN SATURATION: 97 % | DIASTOLIC BLOOD PRESSURE: 65 MMHG | HEIGHT: 69 IN

## 2025-05-19 RX ORDER — MELATONIN 10 MG
CAPSULE ORAL NIGHTLY PRN
COMMUNITY

## 2025-06-03 ENCOUNTER — ANESTHESIA EVENT (OUTPATIENT)
Dept: SURGERY | Facility: OTHER | Age: 70
End: 2025-06-03
Payer: MEDICARE

## 2025-06-04 ENCOUNTER — ANESTHESIA (OUTPATIENT)
Dept: SURGERY | Facility: OTHER | Age: 70
End: 2025-06-04
Payer: MEDICARE

## 2025-06-04 ENCOUNTER — HOSPITAL ENCOUNTER (OUTPATIENT)
Facility: OTHER | Age: 70
Discharge: HOME | End: 2025-06-04
Attending: ORTHOPAEDIC SURGERY | Admitting: ORTHOPAEDIC SURGERY
Payer: MEDICARE

## 2025-06-04 DIAGNOSIS — G56.01 CARPAL TUNNEL SYNDROME ON RIGHT: Primary | ICD-10-CM

## 2025-06-04 LAB — POCT GLUCOSE: 127 MG/DL (ref 70–110)

## 2025-06-04 PROCEDURE — 25000003 PHARM REV CODE 250: Performed by: STUDENT IN AN ORGANIZED HEALTH CARE EDUCATION/TRAINING PROGRAM

## 2025-06-04 PROCEDURE — 25000003 PHARM REV CODE 250

## 2025-06-04 PROCEDURE — 25000003 PHARM REV CODE 250: Performed by: ORTHOPAEDIC SURGERY

## 2025-06-04 PROCEDURE — 37000009 HC ANESTHESIA EA ADD 15 MINS: Performed by: ORTHOPAEDIC SURGERY

## 2025-06-04 PROCEDURE — 27201423 OPTIME MED/SURG SUP & DEVICES STERILE SUPPLY: Performed by: ORTHOPAEDIC SURGERY

## 2025-06-04 PROCEDURE — 71000016 HC POSTOP RECOV ADDL HR: Performed by: ORTHOPAEDIC SURGERY

## 2025-06-04 PROCEDURE — 82962 GLUCOSE BLOOD TEST: CPT | Performed by: ORTHOPAEDIC SURGERY

## 2025-06-04 PROCEDURE — 36000707: Performed by: ORTHOPAEDIC SURGERY

## 2025-06-04 PROCEDURE — 63600175 PHARM REV CODE 636 W HCPCS: Performed by: STUDENT IN AN ORGANIZED HEALTH CARE EDUCATION/TRAINING PROGRAM

## 2025-06-04 PROCEDURE — 37000008 HC ANESTHESIA 1ST 15 MINUTES: Performed by: ORTHOPAEDIC SURGERY

## 2025-06-04 PROCEDURE — 36000706: Performed by: ORTHOPAEDIC SURGERY

## 2025-06-04 PROCEDURE — 71000015 HC POSTOP RECOV 1ST HR: Performed by: ORTHOPAEDIC SURGERY

## 2025-06-04 RX ORDER — HYDROMORPHONE HYDROCHLORIDE 2 MG/ML
0.4 INJECTION, SOLUTION INTRAMUSCULAR; INTRAVENOUS; SUBCUTANEOUS EVERY 5 MIN PRN
Status: DISCONTINUED | OUTPATIENT
Start: 2025-06-04 | End: 2025-06-04 | Stop reason: HOSPADM

## 2025-06-04 RX ORDER — OXYCODONE HYDROCHLORIDE 5 MG/1
5 TABLET ORAL
Status: DISCONTINUED | OUTPATIENT
Start: 2025-06-04 | End: 2025-06-04 | Stop reason: HOSPADM

## 2025-06-04 RX ORDER — FENTANYL CITRATE 50 UG/ML
INJECTION, SOLUTION INTRAMUSCULAR; INTRAVENOUS
Status: DISCONTINUED | OUTPATIENT
Start: 2025-06-04 | End: 2025-06-04

## 2025-06-04 RX ORDER — LIDOCAINE HYDROCHLORIDE 20 MG/ML
INJECTION INTRAVENOUS
Status: DISCONTINUED | OUTPATIENT
Start: 2025-06-04 | End: 2025-06-04

## 2025-06-04 RX ORDER — PROCHLORPERAZINE EDISYLATE 5 MG/ML
5 INJECTION INTRAMUSCULAR; INTRAVENOUS EVERY 30 MIN PRN
Status: DISCONTINUED | OUTPATIENT
Start: 2025-06-04 | End: 2025-06-04 | Stop reason: HOSPADM

## 2025-06-04 RX ORDER — ACETAMINOPHEN 500 MG
1000 TABLET ORAL
Status: COMPLETED | OUTPATIENT
Start: 2025-06-04 | End: 2025-06-04

## 2025-06-04 RX ORDER — GLUCAGON 1 MG
1 KIT INJECTION
Status: DISCONTINUED | OUTPATIENT
Start: 2025-06-04 | End: 2025-06-04 | Stop reason: HOSPADM

## 2025-06-04 RX ORDER — BUPIVACAINE HYDROCHLORIDE AND EPINEPHRINE 2.5; 5 MG/ML; UG/ML
INJECTION, SOLUTION EPIDURAL; INFILTRATION; INTRACAUDAL; PERINEURAL
Status: DISCONTINUED | OUTPATIENT
Start: 2025-06-04 | End: 2025-06-04 | Stop reason: HOSPADM

## 2025-06-04 RX ORDER — SODIUM CHLORIDE, SODIUM LACTATE, POTASSIUM CHLORIDE, CALCIUM CHLORIDE 600; 310; 30; 20 MG/100ML; MG/100ML; MG/100ML; MG/100ML
INJECTION, SOLUTION INTRAVENOUS CONTINUOUS
Status: DISCONTINUED | OUTPATIENT
Start: 2025-06-04 | End: 2025-06-04 | Stop reason: HOSPADM

## 2025-06-04 RX ORDER — SODIUM CHLORIDE 0.9 % (FLUSH) 0.9 %
3 SYRINGE (ML) INJECTION
Status: DISCONTINUED | OUTPATIENT
Start: 2025-06-04 | End: 2025-06-04 | Stop reason: HOSPADM

## 2025-06-04 RX ORDER — LIDOCAINE HYDROCHLORIDE 10 MG/ML
0.5 INJECTION, SOLUTION EPIDURAL; INFILTRATION; INTRACAUDAL; PERINEURAL ONCE
Status: DISCONTINUED | OUTPATIENT
Start: 2025-06-04 | End: 2025-06-04 | Stop reason: HOSPADM

## 2025-06-04 RX ORDER — CEFAZOLIN 2 G/1
INJECTION, POWDER, FOR SOLUTION INTRAMUSCULAR; INTRAVENOUS
Status: DISCONTINUED | OUTPATIENT
Start: 2025-06-04 | End: 2025-06-04

## 2025-06-04 RX ORDER — PROPOFOL 10 MG/ML
VIAL (ML) INTRAVENOUS CONTINUOUS PRN
Status: DISCONTINUED | OUTPATIENT
Start: 2025-06-04 | End: 2025-06-04

## 2025-06-04 RX ORDER — HYDROCODONE BITARTRATE AND ACETAMINOPHEN 5; 325 MG/1; MG/1
1 TABLET ORAL EVERY 4 HOURS PRN
Qty: 5 TABLET | Refills: 0 | Status: SHIPPED | OUTPATIENT
Start: 2025-06-04

## 2025-06-04 RX ORDER — ONDANSETRON HYDROCHLORIDE 2 MG/ML
INJECTION, SOLUTION INTRAVENOUS
Status: DISCONTINUED | OUTPATIENT
Start: 2025-06-04 | End: 2025-06-04

## 2025-06-04 RX ORDER — PROPOFOL 10 MG/ML
VIAL (ML) INTRAVENOUS
Status: DISCONTINUED | OUTPATIENT
Start: 2025-06-04 | End: 2025-06-04

## 2025-06-04 RX ADMIN — ONDANSETRON HYDROCHLORIDE 4 MG: 2 INJECTION INTRAMUSCULAR; INTRAVENOUS at 02:06

## 2025-06-04 RX ADMIN — FENTANYL CITRATE 100 MCG: 50 INJECTION, SOLUTION INTRAMUSCULAR; INTRAVENOUS at 02:06

## 2025-06-04 RX ADMIN — SODIUM CHLORIDE: 0.9 INJECTION, SOLUTION INTRAVENOUS at 02:06

## 2025-06-04 RX ADMIN — CEFAZOLIN 2 G: 2 INJECTION, POWDER, FOR SOLUTION INTRAMUSCULAR; INTRAVENOUS at 02:06

## 2025-06-04 RX ADMIN — PROPOFOL 100 MCG/KG/MIN: 10 INJECTION, EMULSION INTRAVENOUS at 02:06

## 2025-06-04 RX ADMIN — LIDOCAINE HYDROCHLORIDE 50 MG: 20 INJECTION, SOLUTION INTRAVENOUS at 02:06

## 2025-06-04 RX ADMIN — PROPOFOL 50 MG: 10 INJECTION, EMULSION INTRAVENOUS at 02:06

## 2025-06-04 RX ADMIN — ACETAMINOPHEN 1000 MG: 500 TABLET, FILM COATED ORAL at 11:06

## 2025-06-05 VITALS
HEART RATE: 64 BPM | TEMPERATURE: 97 F | HEIGHT: 69 IN | SYSTOLIC BLOOD PRESSURE: 136 MMHG | BODY MASS INDEX: 25.33 KG/M2 | WEIGHT: 171 LBS | DIASTOLIC BLOOD PRESSURE: 89 MMHG | RESPIRATION RATE: 16 BRPM | OXYGEN SATURATION: 97 %

## (undated) DEVICE — CONTAINER SPECIMEN STRL 4OZ

## (undated) DEVICE — DRESSING XEROFORM NONADH 1X8IN

## (undated) DEVICE — SET BASIN 48X48IN 6000ML RING

## (undated) DEVICE — APPLICATOR CHLORAPREP ORN 26ML

## (undated) DEVICE — LOOP VESSEL BLUE MAXI

## (undated) DEVICE — BLADE SURG #15 CARBON STEEL

## (undated) DEVICE — SEE MEDLINE ITEM 152622

## (undated) DEVICE — SPONGE COTTON TRAY 4X4IN

## (undated) DEVICE — NDL STRAIGHT 4CM LEIBINGER

## (undated) DEVICE — BLADE SURG CARBON STEEL SZ11

## (undated) DEVICE — PACK UNIVERSAL SPLIT II

## (undated) DEVICE — SEE MEDLINE ITEM 157117

## (undated) DEVICE — UNDERGLOVES BIOGEL PI SIZE 8

## (undated) DEVICE — SCRUB HIBICLENS 4% CHG 4OZ

## (undated) DEVICE — SUT VICRYL PLUS 3-0 SH 18IN

## (undated) DEVICE — SOL IRR SOD CHL .9% POUR

## (undated) DEVICE — SEE MEDLINE ITEM 154981

## (undated) DEVICE — SUT 3-0 12-18IN SILK

## (undated) DEVICE — SYR B-D DISP CONTROL 10CC100/C

## (undated) DEVICE — HEMOSTAT SURGICEL FIBRLR 1X2IN

## (undated) DEVICE — SUT MONOCRYL 5-0 P-3 UND 18

## (undated) DEVICE — PROBE SIMULATOR KRAFF

## (undated) DEVICE — GLOVE BIOGEL SKINSENSE PI 7.5

## (undated) DEVICE — Device

## (undated) DEVICE — PACK UPPER EXTREMITY BAPTIST

## (undated) DEVICE — TRAY MINOR GEN SURG

## (undated) DEVICE — SPONGE GAUZE 16PLY 4X4

## (undated) DEVICE — GOWN SURGICAL X-LARGE

## (undated) DEVICE — SUT PROLENE 0 MO6 30IN BLUE

## (undated) DEVICE — DRESSING TELFA STRL 4X3 LF

## (undated) DEVICE — CUFF TOURNIQUET DL PRT

## (undated) DEVICE — BLADE SURG STAINLESS STEEL #15

## (undated) DEVICE — SYR 30CC LUER LOCK

## (undated) DEVICE — PAD CAST SPECIALIST STRL 3

## (undated) DEVICE — ADHESIVE DERMABOND ADVANCED

## (undated) DEVICE — SUT LIGACLIP SMALL XTRA

## (undated) DEVICE — BANDAGE ADHESIVE

## (undated) DEVICE — SEE MEDLINE ITEM 157131

## (undated) DEVICE — GAUZE SPONGE PEANUT STRL

## (undated) DEVICE — DRESSING LEUKOPLAST FLEX 1X3IN

## (undated) DEVICE — SUT PROLENE RB-1 BL MONO

## (undated) DEVICE — CLIP LIGACLIP XTRA TITANIUM

## (undated) DEVICE — ELECTRODE BLADE INSULATED 1 IN

## (undated) DEVICE — WRAP COHES MULTCLR NS 3INX5YD

## (undated) DEVICE — SUT 4-0 12-18IN SILK BLACK

## (undated) DEVICE — GLOVE BIOGEL ORTHOPEDIC 7

## (undated) DEVICE — CORD BIPOLAR 12 FOOT

## (undated) DEVICE — NDL HYPO REG 25G X 1 1/2

## (undated) DEVICE — LUBRICANT SURGILUBE 2 OZ

## (undated) DEVICE — SYS CLSR DERMABOND PRINEO 22CM

## (undated) DEVICE — SEE MEDLINE ITEM 157194

## (undated) DEVICE — SUT ETHILON 3/0 18IN PS-1

## (undated) DEVICE — SEE MEDLINE ITEM 157116

## (undated) DEVICE — KNIFE CARPAL TUNNEL

## (undated) DEVICE — SEE MEDLINE ITEM 157166

## (undated) DEVICE — ALCOHOL ISOPROPYL BLU 70% 16OZ

## (undated) DEVICE — SUT PROLENE RB-1 4-0

## (undated) DEVICE — SEE MEDLINE ITEM 157144

## (undated) DEVICE — ELECTRODE REM PLYHSV RETURN 9

## (undated) DEVICE — SEE MEDLINE ITEM 152532